# Patient Record
Sex: FEMALE | Race: WHITE | NOT HISPANIC OR LATINO | Employment: OTHER | URBAN - METROPOLITAN AREA
[De-identification: names, ages, dates, MRNs, and addresses within clinical notes are randomized per-mention and may not be internally consistent; named-entity substitution may affect disease eponyms.]

---

## 2017-01-25 ENCOUNTER — HOSPITAL ENCOUNTER (OUTPATIENT)
Dept: LAB | Facility: MEDICAL CENTER | Age: 67
End: 2017-01-25
Attending: NURSE PRACTITIONER
Payer: MEDICARE

## 2017-01-25 LAB
INR PPP: 1.48 (ref 0.87–1.13)
PROTHROMBIN TIME: 18.4 SEC (ref 12–14.6)

## 2017-01-25 PROCEDURE — 36415 COLL VENOUS BLD VENIPUNCTURE: CPT

## 2017-01-25 PROCEDURE — 85610 PROTHROMBIN TIME: CPT

## 2017-01-30 ENCOUNTER — HOSPITAL ENCOUNTER (OUTPATIENT)
Dept: LAB | Facility: MEDICAL CENTER | Age: 67
End: 2017-01-30
Attending: NURSE PRACTITIONER
Payer: MEDICARE

## 2017-01-30 ENCOUNTER — HOSPITAL ENCOUNTER (OUTPATIENT)
Dept: LAB | Facility: MEDICAL CENTER | Age: 67
End: 2017-01-30
Attending: PHYSICIAN ASSISTANT
Payer: MEDICARE

## 2017-01-30 LAB
ALBUMIN SERPL BCP-MCNC: 4.5 G/DL (ref 3.2–4.9)
ALBUMIN/GLOB SERPL: 1.1 G/DL
ALP SERPL-CCNC: 110 U/L (ref 30–99)
ALT SERPL-CCNC: 16 U/L (ref 2–50)
ANION GAP SERPL CALC-SCNC: 6 MMOL/L (ref 0–11.9)
AST SERPL-CCNC: 25 U/L (ref 12–45)
BASOPHILS # BLD AUTO: 0.03 K/UL (ref 0–0.12)
BASOPHILS NFR BLD AUTO: 0.5 % (ref 0–1.8)
BILIRUB SERPL-MCNC: 2 MG/DL (ref 0.1–1.5)
BUN SERPL-MCNC: 15 MG/DL (ref 8–22)
CALCIUM SERPL-MCNC: 10.3 MG/DL (ref 8.5–10.5)
CHLORIDE SERPL-SCNC: 98 MMOL/L (ref 96–112)
CHOLEST SERPL-MCNC: 236 MG/DL (ref 100–199)
CO2 SERPL-SCNC: 33 MMOL/L (ref 20–33)
CREAT SERPL-MCNC: 0.88 MG/DL (ref 0.5–1.4)
DIGOXIN SERPL-MCNC: 2.5 NG/ML (ref 0.8–2)
EOSINOPHIL # BLD: 0.24 K/UL (ref 0–0.51)
EOSINOPHIL NFR BLD AUTO: 3.9 % (ref 0–6.9)
ERYTHROCYTE [DISTWIDTH] IN BLOOD BY AUTOMATED COUNT: 54.5 FL (ref 35.9–50)
GLOBULIN SER CALC-MCNC: 4.2 G/DL (ref 1.9–3.5)
GLUCOSE SERPL-MCNC: 201 MG/DL (ref 65–99)
HCT VFR BLD AUTO: 45.3 % (ref 37–47)
HDLC SERPL-MCNC: 42 MG/DL
HGB BLD-MCNC: 14.1 G/DL (ref 12–16)
IMM GRANULOCYTES # BLD AUTO: 0.03 K/UL (ref 0–0.11)
IMM GRANULOCYTES NFR BLD AUTO: 0.5 % (ref 0–0.9)
INR PPP: 2.1 (ref 0.87–1.13)
LDLC SERPL CALC-MCNC: 146 MG/DL
LYMPHOCYTES # BLD: 0.98 K/UL (ref 1–4.8)
LYMPHOCYTES NFR BLD AUTO: 15.7 % (ref 22–41)
MCH RBC QN AUTO: 30.7 PG (ref 27–33)
MCHC RBC AUTO-ENTMCNC: 31.1 G/DL (ref 33.6–35)
MCV RBC AUTO: 98.5 FL (ref 81.4–97.8)
MONOCYTES # BLD: 0.49 K/UL (ref 0–0.85)
MONOCYTES NFR BLD AUTO: 7.9 % (ref 0–13.4)
NEUTROPHILS # BLD: 4.46 K/UL (ref 2–7.15)
NEUTROPHILS NFR BLD AUTO: 71.5 % (ref 44–72)
NRBC # BLD AUTO: 0 K/UL
NRBC BLD-RTO: 0 /100 WBC
PLATELET # BLD AUTO: 146 K/UL (ref 164–446)
PMV BLD AUTO: 12.7 FL (ref 9–12.9)
POTASSIUM SERPL-SCNC: 3.8 MMOL/L (ref 3.6–5.5)
PROT SERPL-MCNC: 8.7 G/DL (ref 6–8.2)
PROTHROMBIN TIME: 24.2 SEC (ref 12–14.6)
RBC # BLD AUTO: 4.6 M/UL (ref 4.2–5.4)
SODIUM SERPL-SCNC: 137 MMOL/L (ref 135–145)
T4 FREE SERPL-MCNC: 1.04 NG/DL (ref 0.53–1.43)
TRIGL SERPL-MCNC: 241 MG/DL (ref 0–149)
TSH SERPL DL<=0.005 MIU/L-ACNC: 2.99 UIU/ML (ref 0.3–3.7)
WBC # BLD AUTO: 6.2 K/UL (ref 4.8–10.8)

## 2017-01-30 PROCEDURE — 85610 PROTHROMBIN TIME: CPT

## 2017-01-30 PROCEDURE — 36415 COLL VENOUS BLD VENIPUNCTURE: CPT

## 2017-02-06 ENCOUNTER — HOSPITAL ENCOUNTER (OUTPATIENT)
Dept: LAB | Facility: MEDICAL CENTER | Age: 67
End: 2017-02-06
Attending: NURSE PRACTITIONER
Payer: MEDICARE

## 2017-02-06 LAB
INR PPP: 2.34 (ref 0.87–1.13)
PROTHROMBIN TIME: 26.4 SEC (ref 12–14.6)

## 2017-02-06 PROCEDURE — 36415 COLL VENOUS BLD VENIPUNCTURE: CPT

## 2017-02-06 PROCEDURE — 85610 PROTHROMBIN TIME: CPT

## 2017-02-13 ENCOUNTER — OFFICE VISIT (OUTPATIENT)
Dept: MEDICAL GROUP | Facility: PHYSICIAN GROUP | Age: 67
End: 2017-02-13
Payer: MEDICARE

## 2017-02-13 VITALS
TEMPERATURE: 98.8 F | BODY MASS INDEX: 35.17 KG/M2 | OXYGEN SATURATION: 94 % | RESPIRATION RATE: 16 BRPM | SYSTOLIC BLOOD PRESSURE: 136 MMHG | DIASTOLIC BLOOD PRESSURE: 84 MMHG | HEART RATE: 75 BPM | WEIGHT: 206 LBS | HEIGHT: 64 IN

## 2017-02-13 DIAGNOSIS — M25.561 RIGHT KNEE PAIN, UNSPECIFIED CHRONICITY: ICD-10-CM

## 2017-02-13 DIAGNOSIS — I48.91 ATRIAL FIBRILLATION, UNSPECIFIED TYPE (HCC): ICD-10-CM

## 2017-02-13 DIAGNOSIS — E11.8 TYPE 2 DIABETES MELLITUS WITH COMPLICATION, UNSPECIFIED LONG TERM INSULIN USE STATUS: ICD-10-CM

## 2017-02-13 DIAGNOSIS — J44.9 CHRONIC OBSTRUCTIVE PULMONARY DISEASE, UNSPECIFIED COPD TYPE (HCC): ICD-10-CM

## 2017-02-13 DIAGNOSIS — Z76.89 ENCOUNTER TO ESTABLISH CARE WITH NEW DOCTOR: ICD-10-CM

## 2017-02-13 DIAGNOSIS — Z79.01 ANTICOAGULATED ON WARFARIN: ICD-10-CM

## 2017-02-13 DIAGNOSIS — Z98.890 HISTORY OF MITRAL VALVE REPAIR: ICD-10-CM

## 2017-02-13 PROCEDURE — 1101F PT FALLS ASSESS-DOCD LE1/YR: CPT | Performed by: NURSE PRACTITIONER

## 2017-02-13 PROCEDURE — G8417 CALC BMI ABV UP PARAM F/U: HCPCS | Performed by: NURSE PRACTITIONER

## 2017-02-13 PROCEDURE — 1036F TOBACCO NON-USER: CPT | Performed by: NURSE PRACTITIONER

## 2017-02-13 PROCEDURE — 3046F HEMOGLOBIN A1C LEVEL >9.0%: CPT | Mod: 8P | Performed by: NURSE PRACTITIONER

## 2017-02-13 PROCEDURE — 3014F SCREEN MAMMO DOC REV: CPT | Mod: 8P | Performed by: NURSE PRACTITIONER

## 2017-02-13 PROCEDURE — 99204 OFFICE O/P NEW MOD 45 MIN: CPT | Performed by: NURSE PRACTITIONER

## 2017-02-13 PROCEDURE — G8432 DEP SCR NOT DOC, RNG: HCPCS | Performed by: NURSE PRACTITIONER

## 2017-02-13 PROCEDURE — 3017F COLORECTAL CA SCREEN DOC REV: CPT | Mod: 8P | Performed by: NURSE PRACTITIONER

## 2017-02-13 PROCEDURE — 4040F PNEUMOC VAC/ADMIN/RCVD: CPT | Mod: 8P | Performed by: NURSE PRACTITIONER

## 2017-02-13 PROCEDURE — G8484 FLU IMMUNIZE NO ADMIN: HCPCS | Performed by: NURSE PRACTITIONER

## 2017-02-13 RX ORDER — SPIRONOLACTONE 25 MG/1
25 TABLET ORAL
Status: ON HOLD | COMMUNITY
End: 2017-09-18

## 2017-02-13 RX ORDER — ALBUTEROL SULFATE 90 UG/1
2 AEROSOL, METERED RESPIRATORY (INHALATION) EVERY 4 HOURS PRN
COMMUNITY
End: 2017-06-25

## 2017-02-13 RX ORDER — CARVEDILOL 12.5 MG/1
12.5 TABLET ORAL 2 TIMES DAILY WITH MEALS
COMMUNITY
End: 2018-03-12

## 2017-02-13 RX ORDER — DIGOXIN 125 MCG
125 TABLET ORAL DAILY
COMMUNITY
End: 2022-05-18 | Stop reason: SDUPTHER

## 2017-02-13 RX ORDER — PNV NO.95/FERROUS FUM/FOLIC AC 28MG-0.8MG
1 TABLET ORAL DAILY
COMMUNITY
End: 2017-06-25

## 2017-02-13 RX ORDER — FUROSEMIDE 20 MG/1
40 TABLET ORAL
Status: ON HOLD | COMMUNITY
End: 2017-09-18

## 2017-02-13 RX ORDER — POTASSIUM CHLORIDE 750 MG/1
10 TABLET, EXTENDED RELEASE ORAL 2 TIMES DAILY
Status: ON HOLD | COMMUNITY
End: 2017-09-18

## 2017-02-13 ASSESSMENT — PATIENT HEALTH QUESTIONNAIRE - PHQ9: CLINICAL INTERPRETATION OF PHQ2 SCORE: 0

## 2017-02-13 NOTE — ASSESSMENT & PLAN NOTE
Chronic right knee pain, followed by Dr. Grimm.  Will have a total knee replacement when she can get it scheduled. May need something to help with pain at some point.  Discussed plan.

## 2017-02-13 NOTE — ASSESSMENT & PLAN NOTE
Was on Phen-Phen for years.  Wound up with mitral valve damage and mitral valve repair in [place], [place].  Is followed by Dr. Prabhakar.

## 2017-02-13 NOTE — PROGRESS NOTES
Chief Complaint   Patient presents with   • Establish Care       HISTORY OF PRESENT ILLNESS: Patient is a 66 y.o. female new patient who presents today to discuss the following issues:    History of mitral valve repair  Was on Phen-Phen for years.  Wound up with mitral valve damage and mitral valve repair in [place], [place].  Is followed by Dr. Prabhakar.    Atrial fibrillation (CMS-HCC)  Has a pacemaker, followed by cardiology.  Had a pacer check recently.    Right knee pain  Chronic right knee pain, followed by Dr. Grimm.  Will have a total knee replacement when she can get it scheduled. May need something to help with pain at some point.  Discussed plan.    Encounter to establish care with new doctor  Is here to establish with a new primary care provider.  Was previously seen by Dr. Crow.      Chronic obstructive pulmonary disease (CMS-HCC)  Diagnosed with COPD.  On O2.  Followed by Dr. Balderas.    Type 2 diabetes mellitus with complication (CMS-HCC)  Was recently told that she has diabetes.  Would like a referral to a dietician to start managing her diabetes better.  She has had a difficult time with dietary choices because she is on Coumadin and dietary changes often mess with her INR.      Anticoagulated on warfarin  Needs a referral to our Coumadin Clinic.  She is currently managed by [facility].        Patient Active Problem List    Diagnosis Date Noted   • Encounter to establish care with new doctor 02/13/2017   • History of mitral valve repair 02/13/2017   • Chronic obstructive pulmonary disease (CMS-HCC) 02/13/2017   • Atrial fibrillation (CMS-HCC) 02/13/2017   • Right knee pain 02/13/2017   • Type 2 diabetes mellitus with complication (CMS-HCC) 02/13/2017   • Anticoagulated on warfarin 02/13/2017       Allergies:Amlodipine besylate-valsartan; Amoxicillin; Etodolac; Lisinopril; and Other drug    Current Outpatient Prescriptions   Medication Sig Dispense Refill   • carvedilol (COREG) 12.5 MG Tab Take 12.5  mg by mouth 2 times a day, with meals.     • MD ALERT... warfarin (COUMADIN) by Other route as needed.     • furosemide (LASIX) 20 MG Tab Take 20 mg by mouth 2 times a day.     • potassium chloride SA (K-DUR) 10 MEQ Tab CR Take 10 mEq by mouth 2 times a day.     • spironolactone (ALDACTONE) 25 MG Tab Take 25 mg by mouth 1 time daily as needed.     • digoxin (LANOXIN) 125 MCG Tab Take 125 mcg by mouth every day.     • albuterol (VENTOLIN HFA) 108 (90 BASE) MCG/ACT Aero Soln inhalation aerosol Inhale 2 Puffs by mouth every four hours as needed for Shortness of Breath.     • Umeclidinium-Vilanterol (ANORO ELLIPTA) 62.5-25 MCG/INH AEROSOL POWDER, BREATH ACTIVATED Inhale 1 Puff by mouth every day.     • vitamin D (CHOLECALCIFEROL) 1000 UNIT Tab Take 1,000 Units by mouth every day.     • Zinc 50 MG Cap Take 50 mg by mouth every day.     • Ferrous Sulfate (IRON) 325 (65 FE) MG Tab Take 1 Tab by mouth every day.       No current facility-administered medications for this visit.       Social History   Substance Use Topics   • Smoking status: Never Smoker    • Smokeless tobacco: Never Used   • Alcohol Use: No       No family status information on file.   History reviewed. No pertinent family history.    Review of Systems:   Constitutional: Negative for fever, chills, weight loss and malaise/fatigue.   HENT: Negative for ear pain, nosebleeds, congestion, sore throat and neck pain.    Eyes: Negative for blurred vision.   Respiratory: Negative for cough, sputum production, shortness of breath and wheezing. Positive for COPD on O2.  Cardiovascular: Negative for chest pain, palpitations, orthopnea and leg swelling. Positive for A-fib with pacemaker.  Gastrointestinal: Negative for heartburn, nausea, vomiting and abdominal pain.   Genitourinary: Negative for dysuria, urgency and frequency.   Musculoskeletal: Negative for myalgias, joint pain, and back pain. Positive for chronic right knee pain.  Skin: Negative for rash and itching.  "  Neurological: Negative for dizziness, tingling, tremors, sensory change, focal weakness and headaches.   Endo/Heme/Allergies: Does not bruise/bleed easily.   Psychiatric/Behavioral: Negative for depression, suicidal ideas and memory loss.  The patient is not nervous/anxious and does not have insomnia.    All other systems reviewed and are negative except as in HPI.    Exam:  Blood pressure 136/84, pulse 75, temperature 37.1 °C (98.8 °F), resp. rate 16, height 1.626 m (5' 4.02\"), weight 93.441 kg (206 lb), SpO2 94 %.  General:  Well nourished, well developed female in NAD  Head: Grossly normal.  Neck: Supple without JVD or bruit. Thyroid is not enlarged.  Pulmonary: Clear to ausculation. Normal effort. No rales, ronchi, or wheezing. On O2.  Cardiovascular: Regular rate and rhythm without murmur.   Extremities: No clubbing, cyanosis, or edema.  Skin: Intact with no obvious rashes or lesions.  Neuro: Grossly intact.  Psych: Alert and oriented x 3.  Mood and affect appropriate.    Medical decision-making and discussion: Salma is here to establish with a new primary care provider.  We reviewed her past medical history and discussed her current medications.  Referrals for a Dietician and the Coumadin Clinic were submitted.  She will sign a records release for her previous provider, she will sign up with [facility], and she will plan to follow-up here as needed.         Assessment/Plan:  1. Encounter to establish care with new doctor     2. Chronic obstructive pulmonary disease, unspecified COPD type (CMS-HCC)     3. History of mitral valve repair     4. Atrial fibrillation, unspecified type (CMS-HCC)  REFERRAL TO ANTICOAGULATION MONITORING   5. Type 2 diabetes mellitus with complication, unspecified long term insulin use status (HCC)  REFERRAL TO NUTRITION SERVICES   6. Anticoagulated on warfarin  REFERRAL TO NUTRITION SERVICES    REFERRAL TO ANTICOAGULATION MONITORING   7. Right knee pain, unspecified chronicity   "       Return if symptoms worsen or fail to improve.    Please note that this dictation was created using voice recognition software. I have made every reasonable attempt to correct obvious errors, but I expect that there are errors of grammar and possibly content that I did not discover before finalizing the note.

## 2017-02-13 NOTE — ASSESSMENT & PLAN NOTE
Was recently told that she has diabetes.  Would like a referral to a dietician to start managing her diabetes better.  She has had a difficult time with dietary choices because she is on Coumadin and dietary changes often mess with her INR.

## 2017-02-13 NOTE — MR AVS SNAPSHOT
"        Salma Lees   2017 9:20 AM   Office Visit   MRN: [id number]    Department:  [facility]   Dept Phone:  [phone]    Description:  Female : 1950   Provider:  LILIYA De La Rosa           Reason for Visit     Establish Care           Allergies as of 2017     Allergen Noted Reactions    Amlodipine Besylate-Valsartan 2017       Amoxicillin 2017       Etodolac 2017       Lisinopril 2017       Other Drug 2017       metal      You were diagnosed with     Encounter to establish care with new doctor   [[id number]]       Chronic obstructive pulmonary disease, unspecified COPD type (CMS-HCC)   [7826139]       History of mitral valve repair   [874204]       Atrial fibrillation, unspecified type (CMS-HCC)   [3037234]       Type 2 diabetes mellitus with complication, unspecified long term insulin use status (CMS-HCC)   [2939451]       Anticoagulated on warfarin   [846469]       Right knee pain, unspecified chronicity   [1903074]         Vital Signs     Blood Pressure Pulse Temperature Respirations Height Weight    136/84 mmHg 75 37.1 °C (98.8 °F) 16 1.626 m (5' 4.02\") 93.441 kg (206 lb)    Body Mass Index Oxygen Saturation Smoking Status             35.34 kg/m2 94% Never Smoker          Basic Information     Date Of Birth Sex Race Ethnicity Preferred Language    1950 Female White Non- English      Problem List              ICD-10-CM Priority Class Noted - Resolved    Encounter to establish care with new doctor Z71.89   2017 - Present    History of mitral valve repair Z98.890   2017 - Present    Chronic obstructive pulmonary disease (CMS-HCC) J44.9   2017 - Present    Atrial fibrillation (CMS-HCC) I48.91   2017 - Present    Right knee pain M25.561   2017 - Present      Health Maintenance     Patient has no pending health maintenance at this time      Current Immunizations     No immunizations on file.      Below " and/or attached are the medications your provider expects you to take. Review all of your home medications and newly ordered medications with your provider and/or pharmacist. Follow medication instructions as directed by your provider and/or pharmacist. Please keep your medication list with you and share with your provider. Update the information when medications are discontinued, doses are changed, or new medications (including over-the-counter products) are added; and carry medication information at all times in the event of emergency situations     Allergies:  AMLODIPINE BESYLATE-VALSARTAN - (reactions not documented)     AMOXICILLIN - (reactions not documented)     ETODOLAC - (reactions not documented)     LISINOPRIL - (reactions not documented)     OTHER DRUG - (reactions not documented)               Medications  Valid as of: February 13, 2017 -  9:21 AM    Generic Name Brand Name Tablet Size Instructions for use    .                 Medicines prescribed today were sent to:     None      Medication refill instructions:       If your prescription bottle indicates you have medication refills left, it is not necessary to call your provider’s office. Please contact your pharmacy and they will refill your medication.    If your prescription bottle indicates you do not have any refills left, you may request refills at any time through one of the following ways: The online [employer] system (except Urgent Care), by calling your provider’s office, or by asking your pharmacy to contact your provider’s office with a refill request. Medication refills are processed only during regular business hours and may not be available until the next business day. Your provider may request additional information or to have a follow-up visit with you prior to refilling your medication.   *Please Note: Medication refills are assigned a new Rx number when refilled electronically. Your pharmacy may indicate that no refills were authorized  even though a new prescription for the same medication is available at the pharmacy. Please request the medicine by name with the pharmacy before contacting your provider for a refill.        Referral     A referral request has been sent to our patient care coordination department. Please allow 3-5 business days for us to process this request and contact you either by phone or mail. If you do not hear from us by the 5th business day, please call us at [phone].           6Scan Access Code: Activation code not generated  Current 6Scan Status: Active

## 2017-02-16 ENCOUNTER — TELEPHONE (OUTPATIENT)
Dept: VASCULAR LAB | Facility: MEDICAL CENTER | Age: 67
End: 2017-02-16

## 2017-02-17 RX ORDER — FUROSEMIDE 20 MG/1
20 TABLET ORAL 2 TIMES DAILY
Qty: 60 TAB | Refills: 5 | Status: SHIPPED | OUTPATIENT
Start: 2017-02-17 | End: 2017-06-25

## 2017-02-17 NOTE — TELEPHONE ENCOUNTER
Zeferino- I know pt is new to you but medication history states pt is on furosemide 20mg bid and pharmacy is requesting furosemide 20mg 2tabs qd. Please clarify which sig you would like pt to use.

## 2017-02-24 ENCOUNTER — ANTICOAGULATION VISIT (OUTPATIENT)
Dept: MEDICAL GROUP | Facility: PHYSICIAN GROUP | Age: 67
End: 2017-02-24
Payer: MEDICARE

## 2017-02-24 DIAGNOSIS — Z79.01 CHRONIC ANTICOAGULATION: ICD-10-CM

## 2017-02-24 DIAGNOSIS — I48.91 ATRIAL FIBRILLATION, UNSPECIFIED TYPE (HCC): ICD-10-CM

## 2017-02-24 DIAGNOSIS — Z98.890 HISTORY OF MITRAL VALVE REPAIR: ICD-10-CM

## 2017-02-24 LAB — INR PPP: 2.5 (ref 2–3.5)

## 2017-02-24 PROCEDURE — 4040F PNEUMOC VAC/ADMIN/RCVD: CPT | Mod: 8P | Performed by: FAMILY MEDICINE

## 2017-02-24 PROCEDURE — 85610 PROTHROMBIN TIME: CPT | Performed by: FAMILY MEDICINE

## 2017-02-24 PROCEDURE — G8417 CALC BMI ABV UP PARAM F/U: HCPCS | Performed by: FAMILY MEDICINE

## 2017-02-24 PROCEDURE — 99211 OFF/OP EST MAY X REQ PHY/QHP: CPT | Performed by: FAMILY MEDICINE

## 2017-02-24 NOTE — MR AVS SNAPSHOT
Salma Lees   2017 11:15 AM   Anticoagulation Visit   MRN: [id number]    Department:  [facility]   Dept Phone:  [phone]    Description:  Female : 1950   Provider:  Kenneth Salas, PHARMD           Allergies as of 2017     Allergen Noted Reactions    Amlodipine Besylate-Valsartan 2017       Amoxicillin 2017       Etodolac 2017       Lisinopril 2017       Other Drug 2017       metal      You were diagnosed with     Atrial fibrillation, unspecified type (CMS-HCC)   [7418814]         Vital Signs     Smoking Status                   Never Smoker            Basic Information     Date Of Birth Sex Race Ethnicity Preferred Language    1950 Female White Non- English      Your appointments     Mar 10, 2017 11:30 AM   Anti-Coag Routine with [place] PHARMACIST   [facility] [address] [address]   [phone]              Problem List              ICD-10-CM Priority Class Noted - Resolved    Encounter to establish care with new doctor Z71.89   2017 - Present    History of mitral valve repair Z98.890   2017 - Present    Chronic obstructive pulmonary disease (CMS-HCC) J44.9   2017 - Present    Atrial fibrillation (CMS-HCC) I48.91   2017 - Present    Right knee pain M25.561   2017 - Present    Type 2 diabetes mellitus with complication (CMS-HCC) E11.8   2017 - Present    Anticoagulated on warfarin Z79.01   2017 - Present      Health Maintenance        Date Due Completion Dates    A1C SCREENING 1950 ---    DIABETES MONOFILAMENT / LE EXAM 1950 ---    RETINAL SCREENING 1967 ---    URINE ACR / MICROALBUMIN 1967 ---    IMM DTaP/Tdap/Td Vaccine (1 - Tdap) 1968 ---    PAP SMEAR 1970 ---    MAMMOGRAM 1989 ---    COLONOSCOPY 1999 ---    IMM ZOSTER VACCINE 2009 ---    BONE DENSITY 2014 ---    IMM PNEUMOCOCCAL 65+  (ADULT) LOW/MEDIUM RISK SERIES (1 of 2 - PCV13) 2/16/2014 ---    IMM INFLUENZA (1) 9/1/2016 ---    FASTING LIPID PROFILE 1/30/2018 1/30/2017    SERUM CREATININE 1/30/2018 1/30/2017            Results     POCT Protime      Component    INR    2.5    Comment:     144 580-11 1/2018 ic valid                        Current Immunizations     No immunizations on file.      Below and/or attached are the medications your provider expects you to take. Review all of your home medications and newly ordered medications with your provider and/or pharmacist. Follow medication instructions as directed by your provider and/or pharmacist. Please keep your medication list with you and share with your provider. Update the information when medications are discontinued, doses are changed, or new medications (including over-the-counter products) are added; and carry medication information at all times in the event of emergency situations     Allergies:  AMLODIPINE BESYLATE-VALSARTAN - (reactions not documented)     AMOXICILLIN - (reactions not documented)     ETODOLAC - (reactions not documented)     LISINOPRIL - (reactions not documented)     OTHER DRUG - (reactions not documented)               Medications  Valid as of: February 24, 2017 - 11:35 AM    Generic Name Brand Name Tablet Size Instructions for use    Albuterol Sulfate (Aero Soln) albuterol 108 (90 BASE) MCG/ACT Inhale 2 Puffs by mouth every four hours as needed for Shortness of Breath.        Carvedilol (Tab) COREG 12.5 MG Take 12.5 mg by mouth 2 times a day, with meals.        Cholecalciferol (Tab) cholecalciferol 1000 UNIT Take 1,000 Units by mouth every day.        Digoxin (Tab) LANOXIN 125 MCG Take 125 mcg by mouth every day.        Ferrous Sulfate (Tab) Iron 325 (65 FE) MG Take 1 Tab by mouth every day.        Furosemide (Tab) LASIX 20 MG Take 20 mg by mouth 2 times a day.        Furosemide (Tab) LASIX 20 MG Take 1 Tab by mouth 2 times a day.        MD ALERT... warfarin  (COUMADIN) COUMADIN  by Other route as needed.        Potassium Chloride Darline CR (Tab CR) K-DUR 10 MEQ Take 10 mEq by mouth 2 times a day.        Spironolactone (Tab) ALDACTONE 25 MG Take 25 mg by mouth 1 time daily as needed.        Umeclidinium-Vilanterol (AEROSOL POWDER, BREATH ACTIVATED) Umeclidinium-Vilanterol 62.5-25 MCG/INH Inhale 1 Puff by mouth every day.        Zinc (Cap) Zinc 50 MG Take 50 mg by mouth every day.        .                 Medicines prescribed today were sent to:     [id number]  [place], [place] - [address] AT [facility] [address] [facility]    [address] [place] [address]    Phone: [phone] Fax: [phone]    Open 24 Hours?: No      Medication refill instructions:       If your prescription bottle indicates you have medication refills left, it is not necessary to call your provider’s office. Please contact your pharmacy and they will refill your medication.    If your prescription bottle indicates you do not have any refills left, you may request refills at any time through one of the following ways: The online [employer] system (except Urgent Care), by calling your provider’s office, or by asking your pharmacy to contact your provider’s office with a refill request. Medication refills are processed only during regular business hours and may not be available until the next business day. Your provider may request additional information or to have a follow-up visit with you prior to refilling your medication.   *Please Note: Medication refills are assigned a new Rx number when refilled electronically. Your pharmacy may indicate that no refills were authorized even though a new prescription for the same medication is available at the pharmacy. Please request the medicine by name with the pharmacy before contacting your provider for a refill.        Warfarin Dosing Calendar   February 2017 Details    Sun Mon Tue Wed Thu Fri Sat        1               2               3                 4                 5               6               7               8               9               10               11                 12               13               14               15               16               17               18                 19               20               21               22               23               24   2.5   10 mg   See details      25      12.5 mg           26      10 mg         27      10 mg         28      10 mg              Date Details   02/24 This INR check   INR: 2.5   144 580-11 1/2018 ic valid               How to take your warfarin dose     To take:  10 mg Take 2 of the 5 mg tablets.    To take:  12.5 mg Take 2.5 of the 5 mg tablets.           Warfarin Dosing Calendar   March 2017 Details    Sun Mon Tue Wed Thu Fri Sat        1      12.5 mg         2      10 mg         3      10 mg         4      12.5 mg           5      10 mg         6      10 mg         7      10 mg         8      12.5 mg         9      10 mg         10      10 mg         11                 12               13               14               15               16               17               18                 19               20               21               22               23               24               25                 26               27               28               29               30               31                 Date Details   No additional details    Date of next INR:  3/10/2017         How to take your warfarin dose     To take:  10 mg Take 2 of the 5 mg tablets.    To take:  12.5 mg Take 2.5 of the 5 mg tablets.              Close Access Code: Activation code not generated  Current Close Status: Active

## 2017-02-24 NOTE — PROGRESS NOTES
Anticoagulation Summary as of 2/24/2017     INR goal 2.0-3.0   Selected INR 2.5 (2/24/2017)   Maintenance plan 12.5 mg (5 mg x 2.5) on Wed, Sat; 10 mg (5 mg x 2) all other days   Weekly total 75 mg   Plan last modified Kenneth Salas, PHARMD (2/24/2017)   Next INR check 3/10/2017   Target end date Indefinite    Indications   Chronic anticoagulation [Z79.01]  Atrial fibrillation (CMS-HCC) [I48.91]  History of mitral valve repair [Z98.890]         Anticoagulation Episode Summary     INR check location     Preferred lab     Send INR reminders to     Comments       Anticoagulation Care Providers     Provider Role Specialty Phone number    Zeferino Duarte, A.P.N. Referring Family Medicine [phone]    [facility] Responsible  [phone]    Kenneth Salas, PHARMD Responsible          Current Outpatient Prescriptions on File Prior to Visit   Medication Sig Dispense Refill   • furosemide (LASIX) 20 MG Tab Take 1 Tab by mouth 2 times a day. 60 Tab 5   • carvedilol (COREG) 12.5 MG Tab Take 12.5 mg by mouth 2 times a day, with meals.     • MD ALERT... warfarin (COUMADIN) by Other route as needed.     • furosemide (LASIX) 20 MG Tab Take 20 mg by mouth 2 times a day.     • potassium chloride SA (K-DUR) 10 MEQ Tab CR Take 10 mEq by mouth 2 times a day.     • spironolactone (ALDACTONE) 25 MG Tab Take 25 mg by mouth 1 time daily as needed.     • digoxin (LANOXIN) 125 MCG Tab Take 125 mcg by mouth every day.     • albuterol (VENTOLIN HFA) 108 (90 BASE) MCG/ACT Aero Soln inhalation aerosol Inhale 2 Puffs by mouth every four hours as needed for Shortness of Breath.     • Umeclidinium-Vilanterol (ANORO ELLIPTA) 62.5-25 MCG/INH AEROSOL POWDER, BREATH ACTIVATED Inhale 1 Puff by mouth every day.     • vitamin D (CHOLECALCIFEROL) 1000 UNIT Tab Take 1,000 Units by mouth every day.     • Zinc 50 MG Cap Take 50 mg by mouth every day.     • Ferrous Sulfate (IRON) 325 (65 FE) MG Tab Take 1 Tab by mouth every day.        No current facility-administered medications on file prior to visit.     Lab Results   Component Value Date/Time    SODIUM 137 01/30/2017 02:00 PM    POTASSIUM 3.8 01/30/2017 02:00 PM    CHLORIDE 98 01/30/2017 02:00 PM    CO2 33 01/30/2017 02:00 PM    GLUCOSE 201* 01/30/2017 02:00 PM    BUN 15 01/30/2017 02:00 PM    CREATININE 0.88 01/30/2017 02:00 PM      Past Medical History   Diagnosis Date   • A-fib (CMS-HCC)    • COPD (chronic obstructive pulmonary disease) (CMS-HCC)      Anticoagulation Patient Findings    Pt is not new to warfarin, but new to [facility].  Discussed indication for warfarin therapy and INR goal range. Explained our services, hours of operation, warfarin therapy, potential SE, potential DI.. Discussed diet at length, with an emphasis on foods rich in vitamin K.  Discussed monitoring parameters, such as blood in urine, blood in stool, discussed what to do if a dose is missed, or suspected as missed.  Emphasized importance of compliance.  Discussed lifestyle choices of ETOH & smoking and its impact on therapy.    Patient previously followed by [facility].  Cardiologist is Dr. Prabhakar.  History of atrial fibrillation and mitral valve repair.  [id number] = at least 3 (age, female, DM)  Patient denies history of VTE or stroke.  Verified current warfarin regimen.  Med rec completed.  She is newly diagnosed diabetic and is making changes to diet, especially VitK intake.  INR's have been stable for the past six weeks.  INR therapeutic today at 2.5.  Pt is to continue with current warfarin dosing regimen.  Follow up in 2 weeks.    Kenneth Salas, PHARMD    CC Dr Michael Bloch

## 2017-02-27 ENCOUNTER — TELEPHONE (OUTPATIENT)
Dept: VASCULAR LAB | Facility: MEDICAL CENTER | Age: 67
End: 2017-02-27

## 2017-02-28 PROBLEM — Z79.01 CHRONIC ANTICOAGULATION: Status: ACTIVE | Noted: 2017-02-28

## 2017-02-28 NOTE — TELEPHONE ENCOUNTER
Initial anticoagulation note and most recent cards note reviewed.    Pending any further recomendations we will continue with indefinite anticoagulation with warfairn for afib with chads-vasc at least 3 and h/o previous mitral valve repair.    Will defer management of rhythm, rate, valvular heart disease and other CV issues to cards and PCP    Michael Bloch, MD  Anticoagulation Clinic    Cc:  Dr. Aki Duarte

## 2017-03-06 ENCOUNTER — TELEPHONE (OUTPATIENT)
Dept: MEDICAL GROUP | Facility: PHYSICIAN GROUP | Age: 67
End: 2017-03-06

## 2017-03-06 DIAGNOSIS — J44.9 CHRONIC OBSTRUCTIVE PULMONARY DISEASE, UNSPECIFIED COPD TYPE (HCC): ICD-10-CM

## 2017-03-07 NOTE — TELEPHONE ENCOUNTER
Please sign the pended order for the referral to pulmonology.  The patient needs an updated referral.  Thank you.

## 2017-03-07 NOTE — TELEPHONE ENCOUNTER
----- Message from Warren Howell sent at 3/6/2017  4:08 PM PST -----  Regarding: FW: Referral Request  Contact: [phone]      ----- Message -----     From: Salma Lees     Sent: 3/6/2017   1:25 PM       To: [facility]  Subject: Referral Request                                 Salma Lees would like to request a referral.  Reason: replacing [facility]'s physician  Requested provider: pulmonologist  Comment:  I have been a patient of Dr. Andrea Balderas for over two years.  I changed my insurance and must  see a pulmonologist under [facility].

## 2017-03-10 ENCOUNTER — ANTICOAGULATION VISIT (OUTPATIENT)
Dept: MEDICAL GROUP | Facility: PHYSICIAN GROUP | Age: 67
End: 2017-03-10
Payer: MEDICARE

## 2017-03-10 DIAGNOSIS — I48.91 ATRIAL FIBRILLATION, UNSPECIFIED TYPE (HCC): ICD-10-CM

## 2017-03-10 DIAGNOSIS — Z79.01 CHRONIC ANTICOAGULATION: ICD-10-CM

## 2017-03-10 DIAGNOSIS — Z98.890 HISTORY OF MITRAL VALVE REPAIR: ICD-10-CM

## 2017-03-10 LAB — INR PPP: 1.7 (ref 2–3.5)

## 2017-03-10 PROCEDURE — 4040F PNEUMOC VAC/ADMIN/RCVD: CPT | Mod: 8P | Performed by: NURSE PRACTITIONER

## 2017-03-10 PROCEDURE — 99999 PR NO CHARGE: CPT | Performed by: NURSE PRACTITIONER

## 2017-03-10 PROCEDURE — 85610 PROTHROMBIN TIME: CPT | Performed by: NURSE PRACTITIONER

## 2017-03-10 PROCEDURE — G8417 CALC BMI ABV UP PARAM F/U: HCPCS | Performed by: NURSE PRACTITIONER

## 2017-03-10 NOTE — MR AVS SNAPSHOT
Salma Lees   3/10/2017 11:30 AM   Anticoagulation Visit   MRN: [id number]    Department:  [facility]   Dept Phone:  [phone]    Description:  Female : 1950   Provider:  Kenneth Salas PHARMD           Allergies as of 3/10/2017     Allergen Noted Reactions    Amlodipine Besylate-Valsartan 2017       Amoxicillin 2017       Etodolac 2017       Lisinopril 2017       Other Drug 2017       metal      You were diagnosed with     Chronic anticoagulation   [440197]       Atrial fibrillation, unspecified type (CMS-[facility])   [9724548]       History of mitral valve repair   [949805]         Vital Signs     Smoking Status                   Never Smoker            Basic Information     Date Of Birth Sex Race Ethnicity Preferred Language    1950 Female White Non- English      Your appointments     Mar 10, 2017 11:30 AM   Anti-Coag Routine with Kenneth Salas PHARMD   [facility]    [address] [facility] [id number] [phone]            Mar 24, 2017 11:15 AM   Anti-Coag Routine with [place] PHARMACIST   [facility]    [address] [facility] [id number] [phone]              Problem List              ICD-10-CM Priority Class Noted - Resolved    Encounter to establish care with new doctor Z71.89   2017 - Present    History of mitral valve repair Z98.890   2017 - Present    Chronic obstructive pulmonary disease (CMS-[facility]) J44.9   2017 - Present    Atrial fibrillation (CMS-[facility]) I48.91   2017 - Present    Right knee pain M25.561   2017 - Present    Type 2 diabetes mellitus with complication (CMS-[facility]) E11.8   2017 - Present    Anticoagulated on warfarin Z79.01   2017 - Present    Chronic anticoagulation Z79.01   2017 - Present      Health Maintenance        Date Due Completion Dates    A1C SCREENING 1950 ---    DIABETES MONOFILAMENT  / LE EXAM 1950 ---    RETINAL SCREENING 2/16/1967 ---    URINE ACR / MICROALBUMIN 2/16/1967 ---    IMM DTaP/Tdap/Td Vaccine (1 - Tdap) 2/16/1968 ---    PAP SMEAR 2/16/1970 ---    MAMMOGRAM 2/16/1989 ---    COLONOSCOPY 2/16/1999 ---    IMM ZOSTER VACCINE 2/16/2009 ---    BONE DENSITY 2/16/2014 ---    IMM PNEUMOCOCCAL 65+ (ADULT) LOW/MEDIUM RISK SERIES (1 of 2 - PCV13) 2/16/2014 ---    IMM INFLUENZA (1) 9/1/2016 ---    FASTING LIPID PROFILE 1/30/2018 1/30/2017    SERUM CREATININE 1/30/2018 1/30/2017            Results     POCT Protime      Component    INR    1.7    Comment:     144 580-11 1/2018 ic valid                        Current Immunizations     No immunizations on file.      Below and/or attached are the medications your provider expects you to take. Review all of your home medications and newly ordered medications with your provider and/or pharmacist. Follow medication instructions as directed by your provider and/or pharmacist. Please keep your medication list with you and share with your provider. Update the information when medications are discontinued, doses are changed, or new medications (including over-the-counter products) are added; and carry medication information at all times in the event of emergency situations     Allergies:  AMLODIPINE BESYLATE-VALSARTAN - (reactions not documented)     AMOXICILLIN - (reactions not documented)     ETODOLAC - (reactions not documented)     LISINOPRIL - (reactions not documented)     OTHER DRUG - (reactions not documented)               Medications  Valid as of: March 10, 2017 - 11:25 AM    Generic Name Brand Name Tablet Size Instructions for use    Albuterol Sulfate (Aero Soln) albuterol 108 (90 BASE) MCG/ACT Inhale 2 Puffs by mouth every four hours as needed for Shortness of Breath.        Carvedilol (Tab) COREG 12.5 MG Take 12.5 mg by mouth 2 times a day, with meals.        Cholecalciferol (Tab) cholecalciferol 1000 UNIT Take 1,000 Units by mouth every  day.        Digoxin (Tab) LANOXIN 125 MCG Take 125 mcg by mouth every day.        Ferrous Sulfate (Tab) Iron 325 (65 FE) MG Take 1 Tab by mouth every day.        Furosemide (Tab) LASIX 20 MG Take 20 mg by mouth 2 times a day.        Furosemide (Tab) LASIX 20 MG Take 1 Tab by mouth 2 times a day.        MD ALERT... warfarin (COUMADIN) COUMADIN  by Other route as needed.        Potassium Chloride Darline CR (Tab CR) K-DUR 10 MEQ Take 10 mEq by mouth 2 times a day.        Spironolactone (Tab) ALDACTONE 25 MG Take 25 mg by mouth 1 time daily as needed.        Umeclidinium-Vilanterol (AEROSOL POWDER, BREATH ACTIVATED) Umeclidinium-Vilanterol 62.5-25 MCG/INH Inhale 1 Puff by mouth every day.        Zinc (Cap) Zinc 50 MG Take 50 mg by mouth every day.        .                 Medicines prescribed today were sent to:     [facility] [address] [place] [address] [facility] [address] [address]    Phone: [phone] Fax: [phone]    Open 24 Hours?: No      Medication refill instructions:       If your prescription bottle indicates you have medication refills left, it is not necessary to call your provider’s office. Please contact your pharmacy and they will refill your medication.    If your prescription bottle indicates you do not have any refills left, you may request refills at any time through one of the following ways: The online [employer] system (except Urgent Care), by calling your provider’s office, or by asking your pharmacy to contact your provider’s office with a refill request. Medication refills are processed only during regular business hours and may not be available until the next business day. Your provider may request additional information or to have a follow-up visit with you prior to refilling your medication.   *Please Note: Medication refills are assigned a new Rx number when refilled electronically. Your pharmacy may indicate that no refills were  authorized even though a new prescription for the same medication is available at the pharmacy. Please request the medicine by name with the pharmacy before contacting your provider for a refill.        Warfarin Dosing Calendar   March 2017 Details    Sun Mon Tue Wed Thu Fri Sat        1               2               3               4                 5               6               7               8               9               10   1.7   12.5 mg   See details      11      12.5 mg           12      10 mg         13      10 mg         14      10 mg         15      12.5 mg         16      10 mg         17      10 mg         18      12.5 mg           19      10 mg         20      10 mg         21      10 mg         22      12.5 mg         23      10 mg         24      10 mg         25                 26               27               28               29               30               31                 Date Details   03/10 This INR check   INR: 1.7   144 580-11 1/2018 ic valid       Date of next INR:  3/24/2017         How to take your warfarin dose     To take:  10 mg Take 2 of the 5 mg tablets.    To take:  12.5 mg Take 2.5 of the 5 mg tablets.              [employer] Access Code: Activation code not generated  Current [employer] Status: Active

## 2017-03-10 NOTE — PROGRESS NOTES
Anticoagulation Summary as of 3/10/2017     INR goal 2.0-3.0   Selected INR 1.7! (3/10/2017)   Maintenance plan 12.5 mg (5 mg x 2.5) on Wed, Sat; 10 mg (5 mg x 2) all other days   Weekly total 75 mg   Plan last modified Kenneth Salas PHARMD (2/24/2017)   Next INR check 3/24/2017   Target end date Indefinite    Indications   Chronic anticoagulation [Z79.01]  Atrial fibrillation (CMS-[facility]) [I48.91]  History of mitral valve repair [Z98.890]         Anticoagulation Episode Summary     INR check location     Preferred lab     Send INR reminders to     Comments       Anticoagulation Care Providers     Provider Role Specialty Phone number    Zeferino Duarte, A.P.N. Referring Family Medicine [phone]    [facility] Responsible  [phone]    Kenneth Salas, MELE Responsible          Anticoagulation Patient Findings   Positives Missed Doses    Negatives Extra Doses, Medication Changes, Antibiotic Use, Diet Changes, Dental/Other Procedures, Hospitalization, Bleeding Gums, Nose Bleeds, Blood in Urine, Blood in Stool, Any Bruising, Other Complaints        Patient is subtherapeutic today with INR of 1.7.  Pt denies any unusual s/s of bleeding, bruising, clotting or any changes to diet or medications.  She missed dose two days ago, which has lead to low INR.  Instructed her to bolus with 12.5mg X 1, then continue with current warfarin dosing regimen.  Follow up in 2 weeks.    Kenneth Salas, MELE

## 2017-03-24 ENCOUNTER — ANTICOAGULATION VISIT (OUTPATIENT)
Dept: MEDICAL GROUP | Facility: PHYSICIAN GROUP | Age: 67
End: 2017-03-24
Payer: MEDICARE

## 2017-03-24 VITALS — SYSTOLIC BLOOD PRESSURE: 106 MMHG | HEART RATE: 69 BPM | DIASTOLIC BLOOD PRESSURE: 60 MMHG

## 2017-03-24 DIAGNOSIS — Z79.01 CHRONIC ANTICOAGULATION: ICD-10-CM

## 2017-03-24 DIAGNOSIS — Z98.890 HISTORY OF MITRAL VALVE REPAIR: ICD-10-CM

## 2017-03-24 DIAGNOSIS — I48.91 ATRIAL FIBRILLATION, UNSPECIFIED TYPE (HCC): ICD-10-CM

## 2017-03-24 LAB — INR PPP: 2.7 (ref 2–3.5)

## 2017-03-24 PROCEDURE — 99999 PR NO CHARGE: CPT | Performed by: FAMILY MEDICINE

## 2017-03-24 PROCEDURE — 4040F PNEUMOC VAC/ADMIN/RCVD: CPT | Mod: 8P | Performed by: FAMILY MEDICINE

## 2017-03-24 PROCEDURE — G8417 CALC BMI ABV UP PARAM F/U: HCPCS | Performed by: FAMILY MEDICINE

## 2017-03-24 PROCEDURE — 85610 PROTHROMBIN TIME: CPT | Performed by: FAMILY MEDICINE

## 2017-03-24 NOTE — MR AVS SNAPSHOT
Salma Lees   3/24/2017 11:15 AM   Anticoagulation Visit   MRN: [id number]    Department:  [facility]   Dept Phone:  [phone]    Description:  Female : 1950   Provider:  Kenneth Salas, YVONNED           Allergies as of 3/24/2017     Allergen Noted Reactions    Amlodipine Besylate-Valsartan 2017       Amoxicillin 2017       Etodolac 2017       Lisinopril 2017       Other Drug 2017       metal      You were diagnosed with     Chronic anticoagulation   [519900]       Atrial fibrillation, unspecified type (CMS-[facility])   [2022015]       History of mitral valve repair   [339469]         Vital Signs     Blood Pressure Pulse Smoking Status             106/60 mmHg 69 Never Smoker          Basic Information     Date Of Birth Sex Race Ethnicity Preferred Language    1950 Female White Non- English      Your appointments     2017  9:00 AM   Type II Class Day 1 with TYPE 2 CLASS   [facility] ([facility])    [address]  [address]  [place] [id number] [phone]           All visits are required to successfully complete the program.  It is the patient's responsibility to check with your Insurance for benefit coverage for visit / visits.  Arrive 15 minutes before your scheduled appt time  Please eat before arriving.  24 hours notice is required for all appointment changes or cancellation.  All visits are required to successfully complete the program  Please bring the following with you: 1) Picture Id 2) Insurance card 3) New patient forms including the Comprehensive         Patient History Form 4) All medication (including insulin) 5) Blood glucose monitor and strips 6) Blood glucose logs (if you have them) 7) Morning/afternoon snack if you generally eat one            2017  9:00 AM   Type II Class Day 2 with TYPE 2 CLASS   [facility] ([facility])    [address]  [address]  [place] [address]      [phone]           It is the patient's responsibility to check with your Insurance for benefit coverage for visit / visits.  Arrive 15 minutes before your scheduled appt time  Please eat before arriving.  24 hours notice is required for all appointment changes or cancellation.  All visits are required to successfully complete the program  Please bring the following with you: 1) Picture Id 2) Insurance card 3) New patient forms including the Comprehensive      Patient History Form 4) All medication (including insulin) 5) Blood glucose monitor and strips 6) Blood glucose logs (if you have them) 7) Morning/afternoon snack if you generally eat one            2017 11:30 AM   Anti-Coag Routine with [place] PHARMACIST   [facility]    [address] [place] [id number] [phone]            2017 10:00 AM   Follow Up Visit with Beata Michele RD   [facility] [facility])    [address]  [address]  [place] [address] [phone]           It is the patient's responsibility to check with your Insurance for benefit coverage for visit / visits.  24 hours notice is required for all appointment changes or cancellation.  Please arrive 20 min. before your appointment time  Please bring the following with you: 1)Picture Id 2) Insurance card 3) Completed Forms if New Patient  If scheduled for DIABETES VISIT please also brin) Medications 2) Meter 3) Blood glucose logs 4) Any recent labs if you have them  If scheduled for NUTRITION VISIT please also brin) 2-3 days of detailed food intake logs 2) Blood glucose monitor and blood glucose logs (if you have them)            2017  1:00 PM   Pulmonary Function Test with PFT-RM1   [facility] (--)    [address]  [address]  [place] [address] [phone]            2017  2:00 PM   New Patient Pulmonary with A Rotation   [facility]  Pulmonary Medicine (--)    [address]  [address]  [place] [address]   [phone]            Sep 29, 2017 10:00 AM   New Patient with Michelle Brumfield R.N., Beata Michele RD   [facility])    [address]  [address]  [place] [address] [phone]           It is the patient's responsibility to check with your Insurance for benefit coverage for visit / visits.  24 hours notice is required for all appointment changes or cancellation.  Please arrive 20 min. before your appointment time  Please bring the following with you: 1)Picture Id 2) Insurance card 3) Completed Forms if New Patient  If scheduled for DIABETES VISIT please also brin) Medications 2) Meter 3) Blood glucose logs 4) Any recent labs if you have them  If scheduled for NUTRITION VISIT please also brin) 2-3 days of detailed food intake logs 2) Blood glucose monitor and blood glucose logs (if you have them)              Problem List              ICD-10-CM Priority Class Noted - Resolved    Encounter to establish care with new doctor Z71.89   2017 - Present    History of mitral valve repair Z98.890   2017 - Present    Chronic obstructive pulmonary disease (CMS-[facility]) J44.9   2017 - Present    Atrial fibrillation (CMS-[facility]) I48.91   2017 - Present    Right knee pain M25.561   2017 - Present    Type 2 diabetes mellitus with complication (CMS-HCC) E11.8   2017 - Present    Anticoagulated on warfarin Z79.01   2017 - Present    Chronic anticoagulation Z79.01   2017 - Present      Health Maintenance        Date Due Completion Dates    A1C SCREENING 1950 ---    DIABETES MONOFILAMENT / LE EXAM 1950 ---    RETINAL SCREENING 1967 ---    URINE ACR / MICROALBUMIN 1967 ---    IMM DTaP/Tdap/Td Vaccine (1 - Tdap) 1968 ---    PAP SMEAR 1970 ---    MAMMOGRAM 1989 ---    COLONOSCOPY 1999 ---    IMM ZOSTER VACCINE 2009 ---    BONE DENSITY 2014 ---     IMM PNEUMOCOCCAL 65+ (ADULT) LOW/MEDIUM RISK SERIES (1 of 2 - PCV13) 2/16/2014 ---    IMM INFLUENZA (1) 9/1/2016 ---    FASTING LIPID PROFILE 1/30/2018 1/30/2017    SERUM CREATININE 1/30/2018 1/30/2017            Results     POCT Protime      Component    INR    2.7    Comment:     [id number] 2/2018 ic valid                         Current Immunizations     No immunizations on file.      Below and/or attached are the medications your provider expects you to take. Review all of your home medications and newly ordered medications with your provider and/or pharmacist. Follow medication instructions as directed by your provider and/or pharmacist. Please keep your medication list with you and share with your provider. Update the information when medications are discontinued, doses are changed, or new medications (including over-the-counter products) are added; and carry medication information at all times in the event of emergency situations     Allergies:  AMLODIPINE BESYLATE-VALSARTAN - (reactions not documented)     AMOXICILLIN - (reactions not documented)     ETODOLAC - (reactions not documented)     LISINOPRIL - (reactions not documented)     OTHER DRUG - (reactions not documented)               Medications  Valid as of: March 24, 2017 - 11:20 AM    Generic Name Brand Name Tablet Size Instructions for use    Albuterol Sulfate (Aero Soln) albuterol 108 (90 BASE) MCG/ACT Inhale 2 Puffs by mouth every four hours as needed for Shortness of Breath.        Carvedilol (Tab) COREG 12.5 MG Take 12.5 mg by mouth 2 times a day, with meals.        Cholecalciferol (Tab) cholecalciferol 1000 UNIT Take 1,000 Units by mouth every day.        Digoxin (Tab) LANOXIN 125 MCG Take 125 mcg by mouth every day.        Ferrous Sulfate (Tab) Iron 325 (65 FE) MG Take 1 Tab by mouth every day.        Furosemide (Tab) LASIX 20 MG Take 20 mg by mouth 2 times a day.        Furosemide (Tab) LASIX 20 MG Take 1 Tab by mouth 2 times a day.        MD  ALERT... warfarin (COUMADIN) COUMADIN  by Other route as needed.        Potassium Chloride Darline CR (Tab CR) K-DUR 10 MEQ Take 10 mEq by mouth 2 times a day.        Spironolactone (Tab) ALDACTONE 25 MG Take 25 mg by mouth 1 time daily as needed.        Umeclidinium-Vilanterol (AEROSOL POWDER, BREATH ACTIVATED) Umeclidinium-Vilanterol 62.5-25 MCG/INH Inhale 1 Puff by mouth every day.        Zinc (Cap) Zinc 50 MG Take 50 mg by mouth every day.        .                 Medicines prescribed today were sent to:     [facility] [id number]  [employer] [place] - [address] [facility]    [address] [place] [address]    Phone: [phone] Fax: [phone]    Open 24 Hours?: No      Medication refill instructions:       If your prescription bottle indicates you have medication refills left, it is not necessary to call your provider’s office. Please contact your pharmacy and they will refill your medication.    If your prescription bottle indicates you do not have any refills left, you may request refills at any time through one of the following ways: The online [employer] system (except Urgent Care), by calling your provider’s office, or by asking your pharmacy to contact your provider’s office with a refill request. Medication refills are processed only during regular business hours and may not be available until the next business day. Your provider may request additional information or to have a follow-up visit with you prior to refilling your medication.   *Please Note: Medication refills are assigned a new Rx number when refilled electronically. Your pharmacy may indicate that no refills were authorized even though a new prescription for the same medication is available at the pharmacy. Please request the medicine by name with the pharmacy before contacting your provider for a refill.        Warfarin Dosing Calendar   March 2017 Details    Sun Mon Tue Wed Thu Fri Sat        1               2                  3               4                 5               6               7               8               9               10               11                 12               13               14               15               16               17               18                 19               20               21               22               23               24   2.7   10 mg   See details      25      12.5 mg           26      10 mg         27      10 mg         28      10 mg         29      12.5 mg         30      10 mg         31      10 mg           Date Details   03/24 This INR check   INR: 2.7   [id number] 2/2018 ic valid                How to take your warfarin dose     To take:  10 mg Take 2 of the 5 mg tablets.    To take:  12.5 mg Take 2.5 of the 5 mg tablets.           Warfarin Dosing Calendar   April 2017 Details    Sun Mon Tue Wed Thu Fri Sat           1      12.5 mg           2      10 mg         3      10 mg         4      10 mg         5      12.5 mg         6      10 mg         7      10 mg         8      12.5 mg           9      10 mg         10      10 mg         11      10 mg         12      12.5 mg         13      10 mg         14      10 mg         15                 16               17               18               19               20               21               22                 23               24               25               26               27               28               29                 30                      Date Details   No additional details    Date of next INR:  4/14/2017         How to take your warfarin dose     To take:  10 mg Take 2 of the 5 mg tablets.    To take:  12.5 mg Take 2.5 of the 5 mg tablets.              [employer] Access Code: Activation code not generated  Current [employer] Status: Active

## 2017-03-24 NOTE — PROGRESS NOTES
Anticoagulation Summary as of 3/24/2017     INR goal 2.0-3.0   Selected INR 2.7 (3/24/2017)   Maintenance plan 12.5 mg (5 mg x 2.5) on Wed, Sat; 10 mg (5 mg x 2) all other days   Weekly total 75 mg   Plan last modified Kenneth Salas, MELE (2/24/2017)   Next INR check 4/14/2017   Target end date Indefinite    Indications   Chronic anticoagulation [Z79.01]  Atrial fibrillation (CMS-HCC) [I48.91]  History of mitral valve repair [Z98.890]         Anticoagulation Episode Summary     INR check location     Preferred lab     Send INR reminders to     Comments       Anticoagulation Care Providers     Provider Role Specialty Phone number    Zeferino Duarte, A.P.N. Referring Family Medicine [phone]    [facility] Responsible  [phone]    Kenneth Salas, MELE Responsible          Anticoagulation Patient Findings   Negatives Missed Doses, Extra Doses, Medication Changes, Antibiotic Use, Diet Changes, Dental/Other Procedures, Hospitalization, Bleeding Gums, Nose Bleeds, Blood in Urine, Blood in Stool, Any Bruising, Other Complaints        Patient is therapeutic today with INR of 2.7.  Pt denies any unusual s/s of bleeding, bruising, clotting or any changes to diet or medications.  Pt is to continue with current warfarin dosing regimen.  Follow up in 3 weeks.    Kenneth Salas, YVONNED

## 2017-03-28 ENCOUNTER — APPOINTMENT (OUTPATIENT)
Dept: HEALTH INFORMATION MANAGEMENT | Facility: MEDICAL CENTER | Age: 67
End: 2017-03-28
Payer: MEDICARE

## 2017-03-31 ENCOUNTER — APPOINTMENT (OUTPATIENT)
Dept: HEALTH INFORMATION MANAGEMENT | Facility: MEDICAL CENTER | Age: 67
End: 2017-03-31
Payer: MEDICARE

## 2017-04-04 ENCOUNTER — APPOINTMENT (OUTPATIENT)
Dept: HEALTH INFORMATION MANAGEMENT | Facility: MEDICAL CENTER | Age: 67
End: 2017-04-04
Payer: MEDICARE

## 2017-04-11 ENCOUNTER — NON-PROVIDER VISIT (OUTPATIENT)
Dept: HEALTH INFORMATION MANAGEMENT | Facility: MEDICAL CENTER | Age: 67
End: 2017-04-11
Payer: MEDICARE

## 2017-04-11 DIAGNOSIS — E11.8 TYPE 2 DIABETES MELLITUS WITH COMPLICATION, UNSPECIFIED LONG TERM INSULIN USE STATUS: ICD-10-CM

## 2017-04-11 PROCEDURE — G0109 DIAB MANAGE TRN IND/GROUP: HCPCS | Performed by: PREVENTIVE MEDICINE

## 2017-04-11 NOTE — PROGRESS NOTES
Attended Type 2 Self Management Class on : 4/11/17  Time: 6868-8626  Has had diabetes since: 2017  Medications for diabetes: none at this time    Exercise: walking and light weights 45 min. 3x a week.     Eye Exam: no  Foot Exam: no    Diabetes Education Content    Introduction To Diabetes   Define Type 2 diabetes: Education taught  Define Type 1 diabetes: Education taught  Understand feaures and benefits of education and management: Education taught  Describe who is responsible for diabetes management: Education taught  Describe impact of diabetes on family/friends: Education taught  Discuss role of significant other in management: Education taught  Diabetes Lifestyle Changes / Goals  State benefits of making appropriate lifestyle changes: Education taught  Identify lifestyle behaviors participant wants to change: Education taught  Identify risk factors that interfere with health and strategies to reduce : Education taught  Verbalize need for and frequency of health care follow-up: Education taught  Develop behavioral objectives and expected health outcomes: Education taught  Diabetes Exercise and Activity  Describe role of exercise in diabetes management: Education taught  State relationship of exercise to blood sugar: Education taught  State the benefits/risk(s) of exercise and precautions to follow: Education taught  Diabetes Self Blood Glucose Monitoring  Discuss rationale and importance of SBGM: Education taught  Discuss appropriate record keeping: Education taught  Discuss how to use results from blood glucose testing: Education taught  Evaluation and interpretation of blood glucose patterns: Education taught  Diabetes Disease Process  Discuss signs, symptoms, TX and prevention of hyperglycemia: Education taught  Discuss beta cell dysfunction and insulin resistance: Education taught  Discuss Insulin and its role in the body: Education taught  Discuss the role of the liver in glucose metabolism: Education  "taught  Discuss hormonal regulation: Education taught  Define benefits of good control and discuss what it means to be in \"good control\": Education taught  Discuss impact of exercise, food, meds, stress, and special factors on diabetes: Education taught  Discuss when to confer with HCP for possible treatment plan adjustments: Education taught  Diabetes Insulin and Medications  Action of oral medications, onset and duration: Education taught  Discuss incretin secretagogs and their use in diabetes management: Education taught  Identify the onset, peak and duration of different insulin: Education taught  Discuss proper injection technique and site rotation: Education taught  Discuss storage of insulin and disposal of sharps: Education taught  Hypoglycemia  List signs, symptoms and causes of hypoglycemia: Education taught  Discuss physiology of hypoglycemic reactions: Education taught  Accurately describe appropriate treatment and prevention: Education taught  Discuss when to contact HCP: Education taught  Sick Day Care  Verbalize important items to monitor when sick and when to contact HCP: Education taught  Review sick day box: Education taught  State diabetes medication adjustments on sick days: Education taught  Complications (Chronic)  Explain prevention, TX, signs/symptoms of: retinopathy, neuropathy, nephropathy, infections: Education taught  Explain prevention, TX, signs/symptoms of: CAD, cerebral-vascular disease and sexual dysfunction: Education taught  Identify when to notify HCP of complications: Education taught  State principles of skin, dental and foot care.  Discuss proper foot care, prevention of foot probelms when to notify HCP>: Education taught  Demonstrate how to examine feet and what to look for: Education taught  Psychosocial adjustment  Identify sources of stress: Education taught  Identify resources and techniques for stress reduction: Education taught  Discuss health care referral network / " community resources for support: Education taught  Define proper precautions to use when traveling: Education taught

## 2017-04-11 NOTE — Clinical Note
April 11, 2017            RE: Salma Lees  [id number]    Dear: SHAUNA Espino    The above referenced patient received 3 hours of diabetes education from [facility].    Topics taught (may include but not limited to):  Introduction to diabetes, benefits and responsibilities of patient, physiology of diabetes and the diease process, benefits of blood glucose monitoring and record keeping, medication action and possible side effects, hypoglycemia, sick day management, exercise, stress reduction and travel with diabetes.     Provided meter and instructed in use: no. Pt using Relion metermeter.  HER BLOOD SUGAR AT 12 NOON , WHICH WAS 4 HOURS AFTER A MEAL.   Dilated eye exam within the past year: no Goal is for patient to have yearly.   Lipid panel:   Lab Results   Component Value Date/Time    CHOLESTEROL,* 01/30/2017 02:00 PM    * 01/30/2017 02:00 PM    HDL 42 01/30/2017 02:00 PM    TRIGLYCERIDES 241* 01/30/2017 02:00 PM     Patient/caregiver appeared to understand the content as demonstrated by appropriate questions.     Notes She should have a baseline A1c done.  She is going to check her blood sugars at least one time per day at alternating meals and if her blood sugars are > 200 she is to call to get in to see you ASAP in order to start medication.  She was told she cannot wait until a May appt.     The patient was provided with written materials to back-up verbal education.   Thank you for this consultation,     Jody Walsh R.N.  Certified Diabetes Nurse Educator

## 2017-04-11 NOTE — MR AVS SNAPSHOT
Salma Lees   2017 9:00 AM   Appointment   MRN: [id number]    Department:  [facility]   Dept Phone:  [phone]    Description:  Female : 1950   Provider:  TYPE 2 CLASS           Allergies as of 2017     Allergen Noted Reactions    Amlodipine Besylate-Valsartan 2017       Amoxicillin 2017       Etodolac 2017       Lisinopril 2017       Other Drug 2017       metal      Vital Signs     Smoking Status                   Never Smoker            Basic Information     Date Of Birth Sex Race Ethnicity Preferred Language    1950 Female White Non- English      Your appointments     2017  9:00 AM   Type II Class Day 2 with TYPE 2 CLASS   [facility] ([facility])    [address]  [address]  [place] [id number] [phone]           It is the patient's responsibility to check with your Insurance for benefit coverage for visit / visits.  Arrive 15 minutes before your scheduled appt time  Please eat before arriving.  24 hours notice is required for all appointment changes or cancellation.  All visits are required to successfully complete the program  Please bring the following with you: 1) Picture Id 2) Insurance card 3) New patient forms including the Comprehensive      Patient History Form 4) All medication (including insulin) 5) Blood glucose monitor and strips 6) Blood glucose logs (if you have them) 7) Morning/afternoon snack if you generally eat one            2017  3:30 PM   Anti-Coag Routine with [place] PHARMACIST   [facility] ([place])    [address] [place] [address] [phone]            2017 10:00 AM   Follow Up Visit with Beata Michele RD   [facility] ([place])    [address]  [address]  [place] [id number] [phone]           It is the patient's responsibility to check with your Insurance for benefit coverage for  visit / visits.  24 hours notice is required for all appointment changes or cancellation.  Please arrive 20 min. before your appointment time  Please bring the following with you: 1)Picture Id 2) Insurance card 3) Completed Forms if New Patient  If scheduled for DIABETES VISIT please also brin) Medications 2) Meter 3) Blood glucose logs 4) Any recent labs if you have them  If scheduled for NUTRITION VISIT please also brin) 2-3 days of detailed food intake logs 2) Blood glucose monitor and blood glucose logs (if you have them)            2017  1:00 PM   Pulmonary Function Test with PFT-RM1   [facility] (--)    [address]  [address]  [place] [address]   [phone]            2017  2:00 PM   New Patient Pulmonary with A Rotation   [facility] (--)    [address]  [address]  [place] [address]   [phone]            Sep 29, 2017 10:00 AM   New Patient with Michelle Brumfield R.N., Beata Michele RD   [facility])    [address]  [address]  [place] [address]   [phone]           It is the patient's responsibility to check with your Insurance for benefit coverage for visit / visits.  24 hours notice is required for all appointment changes or cancellation.  Please arrive 20 min. before your appointment time  Please bring the following with you: 1)Picture Id 2) Insurance card 3) Completed Forms if New Patient  If scheduled for DIABETES VISIT please also brin) Medications 2) Meter 3) Blood glucose logs 4) Any recent labs if you have them  If scheduled for NUTRITION VISIT please also brin) 2-3 days of detailed food intake logs 2) Blood glucose monitor and blood glucose logs (if you have them)              Problem List              ICD-10-CM Priority Class Noted - Resolved    Encounter to establish care with new doctor Z71.89   2017 - Present    History of mitral valve repair Z98.890   2017 -  Present    Chronic obstructive pulmonary disease (CMS-HCC) J44.9   2/13/2017 - Present    Atrial fibrillation (CMS-HCC) I48.91   2/13/2017 - Present    Right knee pain M25.561   2/13/2017 - Present    Type 2 diabetes mellitus with complication (CMS-HCC) E11.8   2/13/2017 - Present    Anticoagulated on warfarin Z79.01   2/13/2017 - Present    Chronic anticoagulation Z79.01   2/28/2017 - Present      Health Maintenance        Date Due Completion Dates    A1C SCREENING 1950 ---    DIABETES MONOFILAMENT / LE EXAM 1950 ---    RETINAL SCREENING 2/16/1967 ---    URINE ACR / MICROALBUMIN 2/16/1967 ---    IMM DTaP/Tdap/Td Vaccine (1 - Tdap) 2/16/1968 ---    PAP SMEAR 2/16/1970 ---    MAMMOGRAM 2/16/1989 ---    COLONOSCOPY 2/16/1999 ---    IMM ZOSTER VACCINE 2/16/2009 ---    BONE DENSITY 2/16/2014 ---    IMM PNEUMOCOCCAL 65+ (ADULT) LOW/MEDIUM RISK SERIES (1 of 2 - PCV13) 2/16/2014 ---    FASTING LIPID PROFILE 1/30/2018 1/30/2017    SERUM CREATININE 1/30/2018 1/30/2017            Current Immunizations     No immunizations on file.      Below and/or attached are the medications your provider expects you to take. Review all of your home medications and newly ordered medications with your provider and/or pharmacist. Follow medication instructions as directed by your provider and/or pharmacist. Please keep your medication list with you and share with your provider. Update the information when medications are discontinued, doses are changed, or new medications (including over-the-counter products) are added; and carry medication information at all times in the event of emergency situations     Allergies:  AMLODIPINE BESYLATE-VALSARTAN - (reactions not documented)     AMOXICILLIN - (reactions not documented)     ETODOLAC - (reactions not documented)     LISINOPRIL - (reactions not documented)     OTHER DRUG - (reactions not documented)               Medications  Valid as of: April 11, 2017 - 12:10 PM    Generic Name Brand  Name Tablet Size Instructions for use    Albuterol Sulfate (Aero Soln) albuterol 108 (90 BASE) MCG/ACT Inhale 2 Puffs by mouth every four hours as needed for Shortness of Breath.        Carvedilol (Tab) COREG 12.5 MG Take 12.5 mg by mouth 2 times a day, with meals.        Cholecalciferol (Tab) cholecalciferol 1000 UNIT Take 1,000 Units by mouth every day.        Digoxin (Tab) LANOXIN 125 MCG Take 125 mcg by mouth every day.        Ferrous Sulfate (Tab) Iron 325 (65 FE) MG Take 1 Tab by mouth every day.        Furosemide (Tab) LASIX 20 MG Take 20 mg by mouth 2 times a day.        Furosemide (Tab) LASIX 20 MG Take 1 Tab by mouth 2 times a day.        MD ALERT... warfarin (COUMADIN) COUMADIN  by Other route as needed.        Potassium Chloride Darline CR (Tab CR) K-DUR 10 MEQ Take 10 mEq by mouth 2 times a day.        Spironolactone (Tab) ALDACTONE 25 MG Take 25 mg by mouth 1 time daily as needed.        Umeclidinium-Vilanterol (AEROSOL POWDER, BREATH ACTIVATED) Umeclidinium-Vilanterol 62.5-25 MCG/INH Inhale 1 Puff by mouth every day.        Zinc (Cap) Zinc 50 MG Take 50 mg by mouth every day.        .                 Medicines prescribed today were sent to:     [facility] [address] [place] [address] AT [facility] & [address] [address]    Phone: [phone] Fax: [phone]    Open 24 Hours?: No      Medication refill instructions:       If your prescription bottle indicates you have medication refills left, it is not necessary to call your provider’s office. Please contact your pharmacy and they will refill your medication.    If your prescription bottle indicates you do not have any refills left, you may request refills at any time through one of the following ways: The online [facility] system (except Urgent Care), by calling your provider’s office, or by asking your pharmacy to contact your provider’s office with a refill request. Medication refills are processed only  during regular business hours and may not be available until the next business day. Your provider may request additional information or to have a follow-up visit with you prior to refilling your medication.   *Please Note: Medication refills are assigned a new Rx number when refilled electronically. Your pharmacy may indicate that no refills were authorized even though a new prescription for the same medication is available at the pharmacy. Please request the medicine by name with the pharmacy before contacting your provider for a refill.           [employer] Access Code: Activation code not generated  Current [employer] Status: Active

## 2017-04-14 ENCOUNTER — NON-PROVIDER VISIT (OUTPATIENT)
Dept: HEALTH INFORMATION MANAGEMENT | Facility: MEDICAL CENTER | Age: 67
End: 2017-04-14
Payer: MEDICARE

## 2017-04-14 VITALS — BODY MASS INDEX: 35.17 KG/M2 | HEIGHT: 64 IN | WEIGHT: 206 LBS

## 2017-04-14 DIAGNOSIS — E11.8 TYPE 2 DIABETES MELLITUS WITH COMPLICATION, WITHOUT LONG-TERM CURRENT USE OF INSULIN (HCC): ICD-10-CM

## 2017-04-14 PROCEDURE — G0109 DIAB MANAGE TRN IND/GROUP: HCPCS | Performed by: DIETITIAN, REGISTERED

## 2017-04-14 NOTE — PROGRESS NOTES
"4/14/2017    LILIYA Casey  67 y.o.   Time in/out: 9:00/11:00    Anthropometrics/Objective  Filed Vitals:    04/14/17 1224   Height: 1.626 m (5' 4.02\")   Weight: 93.441 kg (206 lb)       Body mass index is 35.34 kg/(m^2).    Stated Goal Weight: 130 lb.  Estimated Caloric needs 1900  Kcal   See comprehensive patient history form for further information     Subjective:  I have to be careful of foods that contain vitamin K so I can keep my INR at 2.5.  I try to be consistant in my intake of foods containing Vitamin K.    Nutrition Diagnosis (PES Statement)  Problem (Nutrition diagnosis)  Clinical: Altered nutrition-related lab values, Clinical: Obese, Behavioral/Environmental: Food/nutrition knowledge deficit and Behavioral/Environmental: Inadequate physical activity    Etiology(Addresses the cause,contributing factors)  Cardiac/endocrine/kidney/liver/neurological/pulmonary dysfunction, Food and nutrition related knowledge deficit and No previous DM nutrition education    Signs/Symptoms (Address observations and stated info: subjective and objective data)  Infrequent, low-duration and /or low-intensity physical activity and Limited knowledge of CHO/PRO/FAT compostition of food and /or CHO/PRO/FAT metabolism  · BMI 35.39  · Weight loss:  She states she's lost 80#.    Client history:  Condition(s) associated with a diagnosis or treatment (specify)     Biochemical data, medical test and procedures  No results found for: HBA1C@  No results found for: POCGLUCOSE  Lab Results   Component Value Date/Time    CHOLESTEROL,* 01/30/2017 02:00 PM    * 01/30/2017 02:00 PM    HDL 42 01/30/2017 02:00 PM    TRIGLYCERIDES 241* 01/30/2017 02:00 PM         Nutrition Intervention  Nutrition Prescription  Recommended Daily Kcals 1600  Carb choices: 13  Protein: 6-8 oz/day  Fat grams: 45    Meal and Snack  Recommend a general/healthful diet, 3 meals and one snack/day.    Comprehensive Nutrition education Instruction " or training leading to in-depth nutrition related knowledge about:  Benefits to following meal plan, Combine carb, protein and fat at each meal, Eating out, Fast food, Meal timing and spacing, Menu Planning, Metabolism of carb, protein, fat, Physical activity/exercise, Portion control, Sweets and alcohol in moderation, Heart-healthy guidelines, Increasing/Decreasing PO intake, Label Reading and Handouts provided regarding topics discussed    Monitoring & Evaluation Plan    Behavioral-Environmental:  Food/Nutrition knowledge Salma has a good knowledge of foods containing Vitamin K.  She is trying to eat healthfully.    Food / Nutrient Intake:  Micronutrients intake She is very careful of the sodium in foods.    Physical Signs / Symptoms:  HbA1c profiles We did not have an A1c for her yet.    Assessment Notes:  Salma was very interested in class and asked many good questions.

## 2017-04-14 NOTE — Clinical Note
April 14, 2017      LILIYA Casey  [address]  [place] [address]                   Dear:Zeferino Duarte A.P.N.      Your patient Salma Lees 1950 [facility] nutrition counseling, regarding type 2 diabetes.      Should you desire any notes from this visit please do not hesitate to give us a call at [phone].      Sincerely  Geeta Victoria RD, CDE  Certified Diabetes Educator

## 2017-04-14 NOTE — MR AVS SNAPSHOT
Salma Lees   2017 9:00 AM   Appointment   MRN: [id number]    Department:  [facility]   Dept Phone:  [phone]    Description:  Female : 1950   Provider:  TYPE 2 CLASS           Allergies as of 2017     Allergen Noted Reactions    Amlodipine Besylate-Valsartan 2017       Amoxicillin 2017       Etodolac 2017       Lisinopril 2017       Other Drug 2017       metal      Vital Signs     Smoking Status                   Never Smoker            Basic Information     Date Of Birth Sex Race Ethnicity Preferred Language    1950 Female White Non- English      Your appointments     2017  3:30 PM   Anti-Coag Routine with JODI STEVEN PHARMACIST   [facility] Jodi Steven ([place])    [address] [facility] [facility] [place] [id number] [phone]            2017  9:30 AM   Follow Up Visit with Beata Michele RD   [facility])    [address]  [address]  [place] [address] [phone]           It is the patient's responsibility to check with your Insurance for benefit coverage for visit / visits.  24 hours notice is required for all appointment changes or cancellation.  Please arrive 20 min. before your appointment time  Please bring the following with you: 1)Picture Id 2) Insurance card 3) Completed Forms if New Patient  If scheduled for DIABETES VISIT please also brin) Medications 2) Meter 3) Blood glucose logs 4) Any recent labs if you have them  If scheduled for NUTRITION VISIT please also brin) 2-3 days of detailed food intake logs 2) Blood glucose monitor and blood glucose logs (if you have them)            2017  1:00 PM   Pulmonary Function Test with PFT-RM1   [facility]monary Medicine (--)    [address]  [address]  [place] [address] [phone]            2017  2:00 PM   New Patient Pulmonary with A Rotation   [facility]monary  Medicine (--)    [address]  [address]  [place] [address]   [phone]            Sep 29, 2017 10:00 AM   New Patient with Michelle Brumfield R.N., Beata Michele RD   [facility])    [address]  [address]  [place] [address] [phone]           It is the patient's responsibility to check with your Insurance for benefit coverage for visit / visits.  24 hours notice is required for all appointment changes or cancellation.  Please arrive 20 min. before your appointment time  Please bring the following with you: 1)Picture Id 2) Insurance card 3) Completed Forms if New Patient  If scheduled for DIABETES VISIT please also brin) Medications 2) Meter 3) Blood glucose logs 4) Any recent labs if you have them  If scheduled for NUTRITION VISIT please also brin) 2-3 days of detailed food intake logs 2) Blood glucose monitor and blood glucose logs (if you have them)              Problem List              ICD-10-CM Priority Class Noted - Resolved    Encounter to establish care with new doctor Z71.89   2017 - Present    History of mitral valve repair Z98.890   2017 - Present    Chronic obstructive pulmonary disease (CMS-HCC) J44.9   2017 - Present    Atrial fibrillation (CMS-HCC) I48.91   2017 - Present    Right knee pain M25.561   2017 - Present    Type 2 diabetes mellitus with complication (CMS-HCC) E11.8   2017 - Present    Anticoagulated on warfarin Z79.01   2017 - Present    Chronic anticoagulation Z79.01   2017 - Present      Health Maintenance        Date Due Completion Dates    A1C SCREENING 1950 ---    DIABETES MONOFILAMENT / LE EXAM 1949 ---    RETINAL SCREENING 1967 ---    URINE ACR / MICROALBUMIN 1967 ---    IMM DTaP/Tdap/Td Vaccine (1 - Tdap) 1968 ---    PAP SMEAR 1970 ---    MAMMOGRAM 1989 ---    COLONOSCOPY 1999 ---    IMM ZOSTER VACCINE 2009 ---    BONE DENSITY 2014 ---    IMM  PNEUMOCOCCAL 65+ (ADULT) LOW/MEDIUM RISK SERIES (1 of 2 - PCV13) 2/16/2014 ---    FASTING LIPID PROFILE 1/30/2018 1/30/2017    SERUM CREATININE 1/30/2018 1/30/2017            Current Immunizations     No immunizations on file.      Below and/or attached are the medications your provider expects you to take. Review all of your home medications and newly ordered medications with your provider and/or pharmacist. Follow medication instructions as directed by your provider and/or pharmacist. Please keep your medication list with you and share with your provider. Update the information when medications are discontinued, doses are changed, or new medications (including over-the-counter products) are added; and carry medication information at all times in the event of emergency situations     Allergies:  AMLODIPINE BESYLATE-VALSARTAN - (reactions not documented)     AMOXICILLIN - (reactions not documented)     ETODOLAC - (reactions not documented)     LISINOPRIL - (reactions not documented)     OTHER DRUG - (reactions not documented)               Medications  Valid as of: April 14, 2017 - 11:25 AM    Generic Name Brand Name Tablet Size Instructions for use    Albuterol Sulfate (Aero Soln) albuterol 108 (90 BASE) MCG/ACT Inhale 2 Puffs by mouth every four hours as needed for Shortness of Breath.        Carvedilol (Tab) COREG 12.5 MG Take 12.5 mg by mouth 2 times a day, with meals.        Cholecalciferol (Tab) cholecalciferol 1000 UNIT Take 1,000 Units by mouth every day.        Digoxin (Tab) LANOXIN 125 MCG Take 125 mcg by mouth every day.        Ferrous Sulfate (Tab) Iron 325 (65 FE) MG Take 1 Tab by mouth every day.        Furosemide (Tab) LASIX 20 MG Take 20 mg by mouth 2 times a day.        Furosemide (Tab) LASIX 20 MG Take 1 Tab by mouth 2 times a day.        MD ALERT... warfarin (COUMADIN) COUMADIN  by Other route as needed.        Potassium Chloride Darline CR (Tab CR) K-DUR 10 MEQ Take 10 mEq by mouth 2 times a day.         Spironolactone (Tab) ALDACTONE 25 MG Take 25 mg by mouth 1 time daily as needed.        Umeclidinium-Vilanterol (AEROSOL POWDER, BREATH ACTIVATED) Umeclidinium-Vilanterol 62.5-25 MCG/INH Inhale 1 Puff by mouth every day.        Zinc (Cap) Zinc 50 MG Take 50 mg by mouth every day.        .                 Medicines prescribed today were sent to:     [id number] [place] [place] [address] [facility]    [address] [place] [address]    Phone: [phone] Fax: [phone]    Open 24 Hours?: No      Medication refill instructions:       If your prescription bottle indicates you have medication refills left, it is not necessary to call your provider’s office. Please contact your pharmacy and they will refill your medication.    If your prescription bottle indicates you do not have any refills left, you may request refills at any time through one of the following ways: The online [employer] system (except Urgent Care), by calling your provider’s office, or by asking your pharmacy to contact your provider’s office with a refill request. Medication refills are processed only during regular business hours and may not be available until the next business day. Your provider may request additional information or to have a follow-up visit with you prior to refilling your medication.   *Please Note: Medication refills are assigned a new Rx number when refilled electronically. Your pharmacy may indicate that no refills were authorized even though a new prescription for the same medication is available at the pharmacy. Please request the medicine by name with the pharmacy before contacting your provider for a refill.           [employer] Access Code: Activation code not generated  Current [employer] Status: Active

## 2017-04-17 ENCOUNTER — ANTICOAGULATION VISIT (OUTPATIENT)
Dept: MEDICAL GROUP | Facility: PHYSICIAN GROUP | Age: 67
End: 2017-04-17
Payer: MEDICARE

## 2017-04-17 VITALS — DIASTOLIC BLOOD PRESSURE: 80 MMHG | HEART RATE: 79 BPM | SYSTOLIC BLOOD PRESSURE: 140 MMHG

## 2017-04-17 DIAGNOSIS — Z79.01 CHRONIC ANTICOAGULATION: ICD-10-CM

## 2017-04-17 DIAGNOSIS — I48.91 ATRIAL FIBRILLATION, UNSPECIFIED TYPE (HCC): ICD-10-CM

## 2017-04-17 DIAGNOSIS — Z98.890 HISTORY OF MITRAL VALVE REPAIR: ICD-10-CM

## 2017-04-17 LAB — INR PPP: 2.5 (ref 2–3.5)

## 2017-04-17 PROCEDURE — 85610 PROTHROMBIN TIME: CPT | Performed by: NURSE PRACTITIONER

## 2017-04-17 PROCEDURE — 4040F PNEUMOC VAC/ADMIN/RCVD: CPT | Mod: 8P | Performed by: NURSE PRACTITIONER

## 2017-04-17 PROCEDURE — G8417 CALC BMI ABV UP PARAM F/U: HCPCS | Performed by: NURSE PRACTITIONER

## 2017-04-17 PROCEDURE — 99999 PR NO CHARGE: CPT | Performed by: NURSE PRACTITIONER

## 2017-04-17 NOTE — PROGRESS NOTES
Anticoagulation Summary as of 4/17/2017     INR goal 2.0-3.0   Selected INR 2.5 (4/17/2017)   Maintenance plan 12.5 mg (5 mg x 2.5) on Wed, Sat; 10 mg (5 mg x 2) all other days   Weekly total 75 mg   Plan last modified Kenneth Salas PHARMD (2/24/2017)   Next INR check 5/19/2017   Target end date Indefinite    Indications   Chronic anticoagulation [Z79.01]  Atrial fibrillation (CMS-HCC) [I48.91]  History of mitral valve repair [Z98.890]         Anticoagulation Episode Summary     INR check location     Preferred lab     Send INR reminders to     Comments       Anticoagulation Care Providers     Provider Role Specialty Phone number    SUSANNE [email or web address]. Referring Family Medicine [phone]    [facility] Responsible  [phone]    Kenneth aSlas, MELE Responsible          Anticoagulation Patient Findings   Negatives Missed Doses, Extra Doses, Medication Changes, Antibiotic Use, Diet Changes, Dental/Other Procedures, Hospitalization, Bleeding Gums, Nose Bleeds, Blood in Urine, Blood in Stool, Any Bruising, Other Complaints        Patient is therapeutic today with INR of 2.5.  Pt denies any unusual s/s of bleeding, bruising, clotting or any changes to diet or medications.  Pt is to continue with current warfarin dosing regimen.  Follow up in 4 weeks.    Kenneth Salas PHARMD

## 2017-04-17 NOTE — MR AVS SNAPSHOT
Salma Lees   2017 3:00 PM   Anticoagulation Visit   MRN: [id number]    Department:  [facility]   Dept Phone:  [phone]    Description:  Female : 1950   Provider:  Kenneth Salas PHARMD           Allergies as of 2017     Allergen Noted Reactions    Amlodipine Besylate-Valsartan 2017       Amoxicillin 2017       Etodolac 2017       Lisinopril 2017       Other Drug 2017       metal      You were diagnosed with     Chronic anticoagulation   [763962]       Atrial fibrillation, unspecified type (CMS-HCC)   [5504285]       History of mitral valve repair   [921778]         Vital Signs     Smoking Status                   Never Smoker            Basic Information     Date Of Birth Sex Race Ethnicity Preferred Language    1950 Female White Non- English      Your appointments     May 19, 2017  3:00 PM   Anti-Coag Routine with [facility] PHARMACIST   [address] [id number] [phone]            2017  9:30 AM   Follow Up Visit with Beata Michele RD   [facility])    [address]  [address]  [facility] [address] [phone]           It is the patient's responsibility to check with your Insurance for benefit coverage for visit / visits.  24 hours notice is required for all appointment changes or cancellation.  Please arrive 20 min. before your appointment time  Please bring the following with you: 1)Picture Id 2) Insurance card 3) Completed Forms if New Patient  If scheduled for DIABETES VISIT please also brin) Medications 2) Meter 3) Blood glucose logs 4) Any recent labs if you have them  If scheduled for NUTRITION VISIT please also brin) 2-3 days of detailed food intake logs 2) Blood glucose monitor and blood glucose logs (if you have them)            2017  1:00 PM   Pulmonary Function Test with PFT-RM1   [facility] (--)    [address]  [address]  [place] [address]   [phone]            2017  2:00 PM   New Patient Pulmonary with A Rotation   [facility] (--)    [address]  [address]  [place] [address]   [phone]            Sep 29, 2017 10:00 AM   New Patient with Michelle Brumfield R.N., Beata Michele RD   [facility]    [address]  [address]  [place] [address]   [phone]           It is the patient's responsibility to check with your Insurance for benefit coverage for visit / visits.  24 hours notice is required for all appointment changes or cancellation.  Please arrive 20 min. before your appointment time  Please bring the following with you: 1)Picture Id 2) Insurance card 3) Completed Forms if New Patient  If scheduled for DIABETES VISIT please also brin) Medications 2) Meter 3) Blood glucose logs 4) Any recent labs if you have them  If scheduled for NUTRITION VISIT please also brin) 2-3 days of detailed food intake logs 2) Blood glucose monitor and blood glucose logs (if you have them)              Problem List              ICD-10-CM Priority Class Noted - Resolved    Encounter to establish care with new doctor Z71.89   2017 - Present    History of mitral valve repair Z98.890   2017 - Present    Chronic obstructive pulmonary disease (CMS-HCC) J44.9   2017 - Present    Atrial fibrillation (CMS-HCC) I48.91   2017 - Present    Right knee pain M25.561   2017 - Present    Type 2 diabetes mellitus with complication (CMS-HCC) E11.8   2017 - Present    Anticoagulated on warfarin Z79.01   2017 - Present    Chronic anticoagulation Z79.01   2017 - Present      Health Maintenance        Date Due Completion Dates    A1C SCREENING 1950 ---    DIABETES MONOFILAMENT / LE EXAM 1949 ---    RETINAL SCREENING 1967 ---    URINE ACR / MICROALBUMIN 1967 ---    IMM  DTaP/Tdap/Td Vaccine (1 - Tdap) 2/16/1968 ---    PAP SMEAR 2/16/1970 ---    MAMMOGRAM 2/16/1989 ---    COLONOSCOPY 2/16/1999 ---    IMM ZOSTER VACCINE 2/16/2009 ---    BONE DENSITY 2/16/2014 ---    IMM PNEUMOCOCCAL 65+ (ADULT) LOW/MEDIUM RISK SERIES (1 of 2 - PCV13) 2/16/2014 ---    FASTING LIPID PROFILE 1/30/2018 1/30/2017    SERUM CREATININE 1/30/2018 1/30/2017            Results     POCT Protime      Component    INR    2.5    Comment:     [id number] 2/2018 ic valid                        Current Immunizations     No immunizations on file.      Below and/or attached are the medications your provider expects you to take. Review all of your home medications and newly ordered medications with your provider and/or pharmacist. Follow medication instructions as directed by your provider and/or pharmacist. Please keep your medication list with you and share with your provider. Update the information when medications are discontinued, doses are changed, or new medications (including over-the-counter products) are added; and carry medication information at all times in the event of emergency situations     Allergies:  AMLODIPINE BESYLATE-VALSARTAN - (reactions not documented)     AMOXICILLIN - (reactions not documented)     ETODOLAC - (reactions not documented)     LISINOPRIL - (reactions not documented)     OTHER DRUG - (reactions not documented)               Medications  Valid as of: April 17, 2017 -  3:05 PM    Generic Name Brand Name Tablet Size Instructions for use    Albuterol Sulfate (Aero Soln) albuterol 108 (90 BASE) MCG/ACT Inhale 2 Puffs by mouth every four hours as needed for Shortness of Breath.        Carvedilol (Tab) COREG 12.5 MG Take 12.5 mg by mouth 2 times a day, with meals.        Cholecalciferol (Tab) cholecalciferol 1000 UNIT Take 1,000 Units by mouth every day.        Digoxin (Tab) LANOXIN 125 MCG Take 125 mcg by mouth every day.        Ferrous Sulfate (Tab) Iron 325 (65 FE) MG Take 1 Tab by mouth  every day.        Furosemide (Tab) LASIX 20 MG Take 20 mg by mouth 2 times a day.        Furosemide (Tab) LASIX 20 MG Take 1 Tab by mouth 2 times a day.        MD ALERT... warfarin (COUMADIN) COUMADIN  by Other route as needed.        Potassium Chloride Darline CR (Tab CR) K-DUR 10 MEQ Take 10 mEq by mouth 2 times a day.        Spironolactone (Tab) ALDACTONE 25 MG Take 25 mg by mouth 1 time daily as needed.        Umeclidinium-Vilanterol (AEROSOL POWDER, BREATH ACTIVATED) Umeclidinium-Vilanterol 62.5-25 MCG/INH Inhale 1 Puff by mouth every day.        Zinc (Cap) Zinc 50 MG Take 50 mg by mouth every day.        .                 Medicines prescribed today were sent to:     [facility] [address] [place] [address], [place] - [address] [facility] & [facility]    [address] [place] [address]    Phone: [phone] Fax: [phone]    Open 24 Hours?: No      Medication refill instructions:       If your prescription bottle indicates you have medication refills left, it is not necessary to call your provider’s office. Please contact your pharmacy and they will refill your medication.    If your prescription bottle indicates you do not have any refills left, you may request refills at any time through one of the following ways: The online [employer] system (except Urgent Care), by calling your provider’s office, or by asking your pharmacy to contact your provider’s office with a refill request. Medication refills are processed only during regular business hours and may not be available until the next business day. Your provider may request additional information or to have a follow-up visit with you prior to refilling your medication.   *Please Note: Medication refills are assigned a new Rx number when refilled electronically. Your pharmacy may indicate that no refills were authorized even though a new prescription for the same medication is available at the pharmacy. Please request the medicine by name with the pharmacy  before contacting your provider for a refill.        Warfarin Dosing Calendar   April 2017 Details    Sun Mon Tue Wed Thu Fri Sat           1                 2               3               4               5               6               7               8                 9               10               11               12               13               14               15                 16               17   2.5   10 mg   See details      18      10 mg         19      12.5 mg         20      10 mg         21      10 mg         22      12.5 mg           23      10 mg         24      10 mg         25      10 mg         26      12.5 mg         27      10 mg         28      10 mg         29      12.5 mg           30      10 mg                Date Details   04/17 This INR check   INR: 2.5   [id number] 2/2018 ic valid               How to take your warfarin dose     To take:  10 mg Take 2 of the 5 mg tablets.    To take:  12.5 mg Take 2.5 of the 5 mg tablets.           Warfarin Dosing Calendar   May 2017 Details    Sun Mon Tue Wed Thu Fri Sat      1      10 mg         2      10 mg         3      12.5 mg         4      10 mg         5      10 mg         6      12.5 mg           7      10 mg         8      10 mg         9      10 mg         10      12.5 mg         11      10 mg         12      10 mg         13      12.5 mg           14      10 mg         15      10 mg         16      10 mg         17      12.5 mg         18      10 mg         19      10 mg         20                 21               22               23               24               25               26               27                 28               29               30               31                   Date Details   No additional details    Date of next INR:  5/19/2017         How to take your warfarin dose     To take:  10 mg Take 2 of the 5 mg tablets.    To take:  12.5 mg Take 2.5 of the 5 mg tablets.              [employer] Access Code: Activation code  not generated  Current MyChart Status: Active

## 2017-04-21 ENCOUNTER — APPOINTMENT (OUTPATIENT)
Dept: HEALTH INFORMATION MANAGEMENT | Facility: MEDICAL CENTER | Age: 67
End: 2017-04-21
Payer: MEDICARE

## 2017-05-18 ENCOUNTER — TELEPHONE (OUTPATIENT)
Dept: MEDICAL GROUP | Facility: PHYSICIAN GROUP | Age: 67
End: 2017-05-18

## 2017-05-18 RX ORDER — WARFARIN SODIUM 5 MG/1
TABLET ORAL
Qty: 90 TAB | Refills: 99 | Status: SHIPPED | OUTPATIENT
Start: 2017-05-18 | End: 2017-06-25

## 2017-05-18 NOTE — TELEPHONE ENCOUNTER
1. Caller Name: Salma Lees                                           Call Back Number: [phone] (home)         Patient approves a detailed voicemail message: N\A    Pt needs refill on her warfarin.  She states she takes 5 mgs tabs  10 mgs daily and 12 mg Wed and Sat.  She states cardiologist informed her today that since she is doing Coumadin clinic with Kenneth her primary has to refill this.  She states she has 1 pill left.

## 2017-05-19 ENCOUNTER — ANTICOAGULATION VISIT (OUTPATIENT)
Dept: MEDICAL GROUP | Facility: PHYSICIAN GROUP | Age: 67
End: 2017-05-19
Payer: MEDICARE

## 2017-05-19 VITALS — SYSTOLIC BLOOD PRESSURE: 97 MMHG | DIASTOLIC BLOOD PRESSURE: 65 MMHG | HEART RATE: 73 BPM

## 2017-05-19 DIAGNOSIS — Z79.01 CHRONIC ANTICOAGULATION: ICD-10-CM

## 2017-05-19 DIAGNOSIS — Z98.890 HISTORY OF MITRAL VALVE REPAIR: ICD-10-CM

## 2017-05-19 DIAGNOSIS — I48.91 ATRIAL FIBRILLATION, UNSPECIFIED TYPE (HCC): ICD-10-CM

## 2017-05-19 LAB — INR PPP: 3.9 (ref 2–3.5)

## 2017-05-19 PROCEDURE — 99999 PR NO CHARGE: CPT | Performed by: FAMILY MEDICINE

## 2017-05-19 PROCEDURE — 85610 PROTHROMBIN TIME: CPT | Performed by: FAMILY MEDICINE

## 2017-05-19 PROCEDURE — G8417 CALC BMI ABV UP PARAM F/U: HCPCS | Performed by: FAMILY MEDICINE

## 2017-05-19 PROCEDURE — 4040F PNEUMOC VAC/ADMIN/RCVD: CPT | Mod: 8P | Performed by: FAMILY MEDICINE

## 2017-05-19 RX ORDER — WARFARIN SODIUM 5 MG/1
TABLET ORAL
Qty: 225 TAB | Refills: 1 | Status: SHIPPED | OUTPATIENT
Start: 2017-05-19 | End: 2017-06-25

## 2017-05-19 NOTE — MR AVS SNAPSHOT
Salma Lees   2017 3:00 PM   Anticoagulation Visit   MRN: [id number]    Department:  [facility]   Dept Phone:  [phone]    Description:  Female : 1950   Provider:  Kenneth Salas PHARMD           Allergies as of 2017     Allergen Noted Reactions    Amlodipine Besylate-Valsartan 2017       Amoxicillin 2017       Etodolac 2017       Lisinopril 2017       Other Drug 2017       metal      You were diagnosed with     Chronic anticoagulation   [973066]       Atrial fibrillation, unspecified type (CMS-HCC)   [7773128]       History of mitral valve repair   [754609]         Vital Signs     Blood Pressure Pulse Smoking Status             97/65 mmHg 73 Never Smoker          Basic Information     Date Of Birth Sex Race Ethnicity Preferred Language    1950 Female White Non- English      Your appointments     2017 10:45 AM   Anti-Coag Routine with [place] PHARMACIST   [facility] ([place])    [address] [facility] [address] [phone]            2017  9:30 AM   Follow Up Visit with Beata Michele RD   [employer] [facility])    [address]  [address]  [facility] [address] [phone]           It is the patient's responsibility to check with your Insurance for benefit coverage for visit / visits.  24 hours notice is required for all appointment changes or cancellation.  Please arrive 20 min. before your appointment time  Please bring the following with you: 1)Picture Id 2) Insurance card 3) Completed Forms if New Patient  If scheduled for DIABETES VISIT please also brin) Medications 2) Meter 3) Blood glucose logs 4) Any recent labs if you have them  If scheduled for NUTRITION VISIT please also brin) 2-3 days of detailed food intake logs 2) Blood glucose monitor and blood glucose logs (if you have them)            2017  3:40 PM   Established Patient with  MARTA Casey.   [facility] ([place])    [address] [facility] [place] [address] [phone]           You will be receiving a confirmation call a few days before your appointment from our automated call confirmation system.   Please bring to your appointment; a photo ID, copies of your insurance card, all medication bottles and any co-pay that you are responsible for.  Please allow 45-60 minutes to complete your scheduled appointment.            2017  1:00 PM   Pulmonary Function Test with PFT-RM2   [facility] (--)    [address]  [address]  [place] [address] [phone]            2017  2:00 PM   New Patient Pulmonary with A Rotation   [facility] (--)    [address]  [address]  [place] [address]   [phone]            Sep 29, 2017 10:00 AM   New Patient with Michelle Brumfield R.N., Beata Michele RD   [facility]    [address]  [address]  [place] [address]   [phone]           It is the patient's responsibility to check with your Insurance for benefit coverage for visit / visits.  24 hours notice is required for all appointment changes or cancellation.  Please arrive 20 min. before your appointment time  Please bring the following with you: 1)Picture Id 2) Insurance card 3) Completed Forms if New Patient  If scheduled for DIABETES VISIT please also brin) Medications 2) Meter 3) Blood glucose logs 4) Any recent labs if you have them  If scheduled for NUTRITION VISIT please also brin) 2-3 days of detailed food intake logs 2) Blood glucose monitor and blood glucose logs (if you have them)              Problem List              ICD-10-CM Priority Class Noted - Resolved    Encounter to establish care with new doctor Z71.89   2017 - Present    History of mitral valve repair Z98.890   2017 - Present    Chronic obstructive pulmonary disease (CMS-HCC)  J44.9   2/13/2017 - Present    Atrial fibrillation (CMS-HCC) I48.91   2/13/2017 - Present    Right knee pain M25.561   2/13/2017 - Present    Type 2 diabetes mellitus with complication (CMS-HCC) E11.8   2/13/2017 - Present    Anticoagulated on warfarin Z79.01   2/13/2017 - Present    Chronic anticoagulation Z79.01   2/28/2017 - Present      Health Maintenance        Date Due Completion Dates    A1C SCREENING 1950 ---    DIABETES MONOFILAMENT / LE EXAM 1950 ---    RETINAL SCREENING 2/16/1967 ---    URINE ACR / MICROALBUMIN 2/16/1967 ---    IMM DTaP/Tdap/Td Vaccine (1 - Tdap) 2/16/1968 ---    PAP SMEAR 2/16/1970 ---    MAMMOGRAM 2/16/1989 ---    COLONOSCOPY 2/16/1999 ---    IMM ZOSTER VACCINE 2/16/2009 ---    BONE DENSITY 2/16/2014 ---    IMM PNEUMOCOCCAL 65+ (ADULT) LOW/MEDIUM RISK SERIES (1 of 2 - PCV13) 2/16/2014 ---    FASTING LIPID PROFILE 1/30/2018 1/30/2017    SERUM CREATININE 1/30/2018 1/30/2017            Results     POCT Protime      Component    INR    3.9    Comment:     [id number] 3/2018 ic valid                        Current Immunizations     No immunizations on file.      Below and/or attached are the medications your provider expects you to take. Review all of your home medications and newly ordered medications with your provider and/or pharmacist. Follow medication instructions as directed by your provider and/or pharmacist. Please keep your medication list with you and share with your provider. Update the information when medications are discontinued, doses are changed, or new medications (including over-the-counter products) are added; and carry medication information at all times in the event of emergency situations     Allergies:  AMLODIPINE BESYLATE-VALSARTAN - (reactions not documented)     AMOXICILLIN - (reactions not documented)     ETODOLAC - (reactions not documented)     LISINOPRIL - (reactions not documented)     OTHER DRUG - (reactions not documented)               Medications   Valid as of: May 19, 2017 -  3:05 PM    Generic Name Brand Name Tablet Size Instructions for use    Albuterol Sulfate (Aero Soln) albuterol 108 (90 BASE) MCG/ACT Inhale 2 Puffs by mouth every four hours as needed for Shortness of Breath.        Carvedilol (Tab) COREG 12.5 MG Take 12.5 mg by mouth 2 times a day, with meals.        Cholecalciferol (Tab) cholecalciferol 1000 UNIT Take 1,000 Units by mouth every day.        Digoxin (Tab) LANOXIN 125 MCG Take 125 mcg by mouth every day.        Ferrous Sulfate (Tab) Iron 325 (65 FE) MG Take 1 Tab by mouth every day.        Furosemide (Tab) LASIX 20 MG Take 20 mg by mouth 2 times a day.        Furosemide (Tab) LASIX 20 MG Take 1 Tab by mouth 2 times a day.        MD ALERT... warfarin (COUMADIN) COUMADIN  by Other route as needed.        Potassium Chloride Darline CR (Tab CR) K-DUR 10 MEQ Take 10 mEq by mouth 2 times a day.        Spironolactone (Tab) ALDACTONE 25 MG Take 25 mg by mouth 1 time daily as needed.        Umeclidinium-Vilanterol (AEROSOL POWDER, BREATH ACTIVATED) Umeclidinium-Vilanterol 62.5-25 MCG/INH Inhale 1 Puff by mouth every day.        Warfarin Sodium (Tab) COUMADIN 5 MG Take as directed by Coumadin clinic.        Zinc (Cap) Zinc 50 MG Take 50 mg by mouth every day.        .                 Medicines prescribed today were sent to:     [facility] [id number] [place] [address] AT [facility] & [address]    [address] [facility] [address]    Phone: [phone] Fax: [phone]    Open 24 Hours?: No      Medication refill instructions:       If your prescription bottle indicates you have medication refills left, it is not necessary to call your provider’s office. Please contact your pharmacy and they will refill your medication.    If your prescription bottle indicates you do not have any refills left, you may request refills at any time through one of the following ways: The online [employer] system (except Urgent Care), by calling your  provider’s office, or by asking your pharmacy to contact your provider’s office with a refill request. Medication refills are processed only during regular business hours and may not be available until the next business day. Your provider may request additional information or to have a follow-up visit with you prior to refilling your medication.   *Please Note: Medication refills are assigned a new Rx number when refilled electronically. Your pharmacy may indicate that no refills were authorized even though a new prescription for the same medication is available at the pharmacy. Please request the medicine by name with the pharmacy before contacting your provider for a refill.        Warfarin Dosing Calendar   May 2017 Details    Sun Mon Tue Wed Thu Fri Sat      1               2               3               4               5               6                 7               8               9               10               11               12               13                 14               15               16               17               18               19   3.9   Hold   See details      20      12.5 mg           21      10 mg         22      10 mg         23      10 mg         24      12.5 mg         25      10 mg         26      10 mg         27      12.5 mg           28      10 mg         29      10 mg         30      10 mg         31      12.5 mg             Date Details   05/19 This INR check   INR: 3.9   [id number] 3/2018 ic valid               How to take your warfarin dose     To take:  10 mg Take 2 of the 5 mg tablets.    To take:  12.5 mg Take 2.5 of the 5 mg tablets.    Hold Do not take your warfarin dose. See the Details table to the right for additional instructions.                Warfarin Dosing Calendar   June 2017 Details    Sun Mon Tue Wed Thu Fri Sat         1      10 mg         2      10 mg         3                 4               5               6               7               8                9               10                 11               12               13               14               15               16               17                 18               19               20               21               22               23               24                 25               26               27               28               29               30                 Date Details   No additional details    Date of next INR:  6/2/2017         How to take your warfarin dose     To take:  10 mg Take 2 of the 5 mg tablets.              [employer] Access Code: Activation code not generated  Current [employer] Status: Active

## 2017-05-19 NOTE — PROGRESS NOTES
Anticoagulation Summary as of 5/19/2017     INR goal 2.0-3.0   Selected INR 3.9! (5/19/2017)   Maintenance plan 12.5 mg (5 mg x 2.5) on Wed, Sat; 10 mg (5 mg x 2) all other days   Weekly total 75 mg   Plan last modified Kenneth Salas PHARMD (2/24/2017)   Next INR check 6/2/2017   Target end date Indefinite    Indications   Chronic anticoagulation [Z79.01]  Atrial fibrillation (CMS-[facility]) [I48.91]  History of mitral valve repair [Z98.890]         Anticoagulation Episode Summary     INR check location     Preferred lab     Send INR reminders to     Comments       Anticoagulation Care Providers     Provider Role Specialty Phone number    Zeferino Duarte A.P.N. Referring Family Medicine [phone]    [facility] Responsible  [phone]    Kenneth Salas, MELE Responsible          Anticoagulation Patient Findings   Negatives Missed Doses, Extra Doses, Medication Changes, Antibiotic Use, Diet Changes, Dental/Other Procedures, Hospitalization, Bleeding Gums, Nose Bleeds, Blood in Urine, Blood in Stool, Any Bruising, Other Complaints        Patient is supratherapeutic today with INR of 3.9.  Pt denies any unusual s/s of bleeding, bruising, clotting or any changes to diet or medications.  Instructed patient to HOLD X 1, then resume current warfarin regimen.  Follow up in 2 weeks.    Kenneth Salas, MELE

## 2017-05-23 ENCOUNTER — HOSPITAL ENCOUNTER (OUTPATIENT)
Dept: LAB | Facility: MEDICAL CENTER | Age: 67
End: 2017-05-23
Attending: INTERNAL MEDICINE
Payer: MEDICARE

## 2017-05-23 LAB
ALBUMIN SERPL BCP-MCNC: 3.8 G/DL (ref 3.2–4.9)
ALBUMIN/GLOB SERPL: 1.1 G/DL
ALP SERPL-CCNC: 85 U/L (ref 30–99)
ALT SERPL-CCNC: 16 U/L (ref 2–50)
ANION GAP SERPL CALC-SCNC: 5 MMOL/L (ref 0–11.9)
AST SERPL-CCNC: 21 U/L (ref 12–45)
BILIRUB SERPL-MCNC: 1.7 MG/DL (ref 0.1–1.5)
BUN SERPL-MCNC: 27 MG/DL (ref 8–22)
CALCIUM SERPL-MCNC: 9.4 MG/DL (ref 8.5–10.5)
CHLORIDE SERPL-SCNC: 100 MMOL/L (ref 96–112)
CHOLEST SERPL-MCNC: 219 MG/DL (ref 100–199)
CO2 SERPL-SCNC: 29 MMOL/L (ref 20–33)
CREAT SERPL-MCNC: 1.08 MG/DL (ref 0.5–1.4)
GFR SERPL CREATININE-BSD FRML MDRD: 50 ML/MIN/1.73 M 2
GLOBULIN SER CALC-MCNC: 3.6 G/DL (ref 1.9–3.5)
GLUCOSE SERPL-MCNC: 263 MG/DL (ref 65–99)
HDLC SERPL-MCNC: 38 MG/DL
LDLC SERPL CALC-MCNC: 128 MG/DL
POTASSIUM SERPL-SCNC: 4.1 MMOL/L (ref 3.6–5.5)
PROT SERPL-MCNC: 7.4 G/DL (ref 6–8.2)
SODIUM SERPL-SCNC: 134 MMOL/L (ref 135–145)
TRIGL SERPL-MCNC: 263 MG/DL (ref 0–149)

## 2017-05-23 PROCEDURE — 36415 COLL VENOUS BLD VENIPUNCTURE: CPT

## 2017-05-23 PROCEDURE — 80061 LIPID PANEL: CPT

## 2017-05-23 PROCEDURE — 80053 COMPREHEN METABOLIC PANEL: CPT

## 2017-06-02 ENCOUNTER — ANTICOAGULATION VISIT (OUTPATIENT)
Dept: MEDICAL GROUP | Facility: PHYSICIAN GROUP | Age: 67
End: 2017-06-02
Payer: MEDICARE

## 2017-06-02 VITALS — HEART RATE: 70 BPM | DIASTOLIC BLOOD PRESSURE: 77 MMHG | SYSTOLIC BLOOD PRESSURE: 112 MMHG

## 2017-06-02 DIAGNOSIS — Z79.01 CHRONIC ANTICOAGULATION: ICD-10-CM

## 2017-06-02 DIAGNOSIS — Z98.890 HISTORY OF MITRAL VALVE REPAIR: ICD-10-CM

## 2017-06-02 LAB — INR PPP: 3.6 (ref 2–3.5)

## 2017-06-02 PROCEDURE — G8417 CALC BMI ABV UP PARAM F/U: HCPCS | Performed by: NURSE PRACTITIONER

## 2017-06-02 PROCEDURE — 99999 PR NO CHARGE: CPT | Performed by: NURSE PRACTITIONER

## 2017-06-02 PROCEDURE — 4040F PNEUMOC VAC/ADMIN/RCVD: CPT | Mod: 8P | Performed by: NURSE PRACTITIONER

## 2017-06-02 PROCEDURE — 85610 PROTHROMBIN TIME: CPT | Performed by: NURSE PRACTITIONER

## 2017-06-02 NOTE — MR AVS SNAPSHOT
Salma Nabeel   2017 10:45 AM   Anticoagulation Visit   MRN: [id number]    Department:  [facility]   Dept Phone:  [phone]    Description:  Female : 1950   Provider:  Tam Rosas, PHARMD           Allergies as of 2017     Allergen Noted Reactions    Amlodipine Besylate-Valsartan 2017       Amoxicillin 2017       Etodolac 2017       Lisinopril 2017       Other Drug 2017       metal      You were diagnosed with     Chronic anticoagulation   [372855]       History of mitral valve repair   [464633]         Vital Signs     Blood Pressure Pulse Smoking Status             112/77 mmHg 70 Never Smoker          Basic Information     Date Of Birth Sex Race Ethnicity Preferred Language    1950 Female White Non- English      Your appointments     2017  9:30 AM   Follow Up Visit with Beata Michele RD   [facility])    [address]  [address]  [place] [address] [phone]           It is the patient's responsibility to check with your Insurance for benefit coverage for visit / visits.  24 hours notice is required for all appointment changes or cancellation.  Please arrive 20 min. before your appointment time  Please bring the following with you: 1)Picture Id 2) Insurance card 3) Completed Forms if New Patient  If scheduled for DIABETES VISIT please also brin) Medications 2) Meter 3) Blood glucose logs 4) Any recent labs if you have them  If scheduled for NUTRITION VISIT please also brin) 2-3 days of detailed food intake logs 2) Blood glucose monitor and blood glucose logs (if you have them)            2017 10:45 AM   Anti-Coag Routine with [place] PHARMACIST   [facility] ([place])    [address] [facility] [place] [address]   [phone]            2017  1:00 PM   Pulmonary Function Test with PFT-RM2   [facility] (--)    [address]  [address]  [place] [address]   [phone]            2017  2:00 PM   New Patient Pulmonary with A Rotation   [facility] (--)    [address]  [address]  [place] [address]   [phone]            Sep 29, 2017 10:00 AM   New Patient with Michelle Brumfield R.N., Beata Michele RD   [facility])    [address]  [address]  [place] [address]   [phone]           It is the patient's responsibility to check with your Insurance for benefit coverage for visit / visits.  24 hours notice is required for all appointment changes or cancellation.  Please arrive 20 min. before your appointment time  Please bring the following with you: 1)Picture Id 2) Insurance card 3) Completed Forms if New Patient  If scheduled for DIABETES VISIT please also brin) Medications 2) Meter 3) Blood glucose logs 4) Any recent labs if you have them  If scheduled for NUTRITION VISIT please also brin) 2-3 days of detailed food intake logs 2) Blood glucose monitor and blood glucose logs (if you have them)              Problem List              ICD-10-CM Priority Class Noted - Resolved    Encounter to establish care with new doctor Z71.89   2017 - Present    History of mitral valve repair Z98.890   2017 - Present    Chronic obstructive pulmonary disease (CMS-HCC) J44.9   2017 - Present    Atrial fibrillation (CMS-HCC) I48.91   2017 - Present    Right knee pain M25.561   2017 - Present    Type 2 diabetes mellitus with complication (CMS-HCC) E11.8   2017 - Present    Anticoagulated on warfarin Z79.01   2017 - Present    Chronic anticoagulation Z79.01   2017 - Present      Health Maintenance        Date Due Completion Dates    A1C SCREENING 1950 ---    DIABETES MONOFILAMENT / LE EXAM 1950 ---    RETINAL SCREENING 1967 ---    URINE ACR / MICROALBUMIN 1967 ---    IMM DTaP/Tdap/Td Vaccine (1 - Tdap) 1968 ---     PAP SMEAR 2/16/1970 ---    MAMMOGRAM 2/16/1989 ---    COLONOSCOPY 2/16/1999 ---    IMM ZOSTER VACCINE 2/16/2009 ---    BONE DENSITY 2/16/2014 ---    IMM PNEUMOCOCCAL 65+ (ADULT) LOW/MEDIUM RISK SERIES (1 of 2 - PCV13) 2/16/2014 ---    FASTING LIPID PROFILE 5/23/2018 5/23/2017, 1/30/2017    SERUM CREATININE 5/23/2018 5/23/2017, 1/30/2017            Results     POCT Protime      Component    INR    3.6                        Current Immunizations     No immunizations on file.      Below and/or attached are the medications your provider expects you to take. Review all of your home medications and newly ordered medications with your provider and/or pharmacist. Follow medication instructions as directed by your provider and/or pharmacist. Please keep your medication list with you and share with your provider. Update the information when medications are discontinued, doses are changed, or new medications (including over-the-counter products) are added; and carry medication information at all times in the event of emergency situations     Allergies:  AMLODIPINE BESYLATE-VALSARTAN - (reactions not documented)     AMOXICILLIN - (reactions not documented)     ETODOLAC - (reactions not documented)     LISINOPRIL - (reactions not documented)     OTHER DRUG - (reactions not documented)               Medications  Valid as of: June 02, 2017 - 10:53 AM    Generic Name Brand Name Tablet Size Instructions for use    Albuterol Sulfate (Aero Soln) albuterol 108 (90 BASE) MCG/ACT Inhale 2 Puffs by mouth every four hours as needed for Shortness of Breath.        Carvedilol (Tab) COREG 12.5 MG Take 12.5 mg by mouth 2 times a day, with meals.        Cholecalciferol (Tab) cholecalciferol 1000 UNIT Take 1,000 Units by mouth every day.        Digoxin (Tab) LANOXIN 125 MCG Take 125 mcg by mouth every day.        Ferrous Sulfate (Tab) Iron 325 (65 FE) MG Take 1 Tab by mouth every day.        Furosemide (Tab) LASIX 20 MG Take 20 mg by mouth 2  times a day.        Furosemide (Tab) LASIX 20 MG Take 1 Tab by mouth 2 times a day.        Potassium Chloride Darline CR (Tab CR) K-DUR 10 MEQ Take 10 mEq by mouth 2 times a day.        Spironolactone (Tab) ALDACTONE 25 MG Take 25 mg by mouth 1 time daily as needed.        Umeclidinium-Vilanterol (AEROSOL POWDER, BREATH ACTIVATED) Umeclidinium-Vilanterol 62.5-25 MCG/INH Inhale 1 Puff by mouth every day.        Warfarin Sodium (Tab) COUMADIN 5 MG Take as directed by Coumadin clinic.        Warfarin Sodium (Tab) COUMADIN 5 MG Take two to two and one-half tablets by mouth one time daily or as directed by coumadin clinic        Zinc (Cap) Zinc 50 MG Take 50 mg by mouth every day.        .                 Medicines prescribed today were sent to:     [facility] [id number] [place] [address], [place] - [address] [facility]    [address] [facility] [address]    Phone: [phone] Fax: [phone]    Open 24 Hours?: No      Medication refill instructions:       If your prescription bottle indicates you have medication refills left, it is not necessary to call your provider’s office. Please contact your pharmacy and they will refill your medication.    If your prescription bottle indicates you do not have any refills left, you may request refills at any time through one of the following ways: The online [employer] system (except Urgent Care), by calling your provider’s office, or by asking your pharmacy to contact your provider’s office with a refill request. Medication refills are processed only during regular business hours and may not be available until the next business day. Your provider may request additional information or to have a follow-up visit with you prior to refilling your medication.   *Please Note: Medication refills are assigned a new Rx number when refilled electronically. Your pharmacy may indicate that no refills were authorized even though a new prescription for the same medication is  available at the pharmacy. Please request the medicine by name with the pharmacy before contacting your provider for a refill.        Warfarin Dosing Calendar   June 2017 Details    Sun Mon Tue Wed Thu Fri Sat         1               2   3.6   10 mg   See details      3      10 mg           4      10 mg         5      10 mg         6      10 mg         7      10 mg         8      10 mg         9      10 mg         10      10 mg           11      10 mg         12      10 mg         13      10 mg         14      10 mg         15      10 mg         16      10 mg         17                 18               19               20               21               22               23               24                 25               26               27               28               29               30                 Date Details   06/02 This INR check   INR: 3.6       Date of next INR:  6/16/2017         How to take your warfarin dose     To take:  10 mg Take 2 of the 5 mg tablets.              [employer] Access Code: Activation code not generated  Current [employer] Status: Active

## 2017-06-02 NOTE — PROGRESS NOTES
Anticoagulation Summary as of 6/2/2017     INR goal 2.0-3.0   Selected INR 3.6! (6/2/2017)   Maintenance plan 10 mg (5 mg x 2) every day   Weekly total 70 mg   Plan last modified Tam Rosas, YVONNED (6/2/2017)   Next INR check 6/16/2017   Target end date Indefinite    Indications   Chronic anticoagulation [Z79.01]  Atrial fibrillation (CMS-HCC) [I48.91]  History of mitral valve repair [Z98.890]         Anticoagulation Episode Summary     INR check location     Preferred lab     Send INR reminders to     Comments       Anticoagulation Care Providers     Provider Role Specialty Phone number    Zeferino Duarte A.P.YASMANY. Referring Family Medicine [phone]    [facility] Responsible  [phone]    Kenneth Salas, YVONNED Responsible          Anticoagulation Patient Findings    Patient's INR was SUPRA therapeutic.  Pt denies any unusual s/s of bleeding, bruising, clotting or any changes to diet.  Lasix and Carvedilol have been adjusted, no interactions.  Pt to begin 7% reduced regimen.    Follow up in 2 weeks.    Tam Rosas, PHARMD

## 2017-06-06 ENCOUNTER — APPOINTMENT (OUTPATIENT)
Dept: HEALTH INFORMATION MANAGEMENT | Facility: MEDICAL CENTER | Age: 67
End: 2017-06-06
Payer: MEDICARE

## 2017-06-14 ENCOUNTER — TELEPHONE (OUTPATIENT)
Dept: PULMONOLOGY | Facility: HOSPICE | Age: 67
End: 2017-06-14

## 2017-06-14 DIAGNOSIS — R06.00 DYSPNEA, UNSPECIFIED TYPE: ICD-10-CM

## 2017-06-16 ENCOUNTER — ANTICOAGULATION VISIT (OUTPATIENT)
Dept: MEDICAL GROUP | Facility: PHYSICIAN GROUP | Age: 67
End: 2017-06-16
Payer: MEDICARE

## 2017-06-16 VITALS — HEART RATE: 91 BPM | SYSTOLIC BLOOD PRESSURE: 122 MMHG | DIASTOLIC BLOOD PRESSURE: 81 MMHG

## 2017-06-16 DIAGNOSIS — Z98.890 HISTORY OF MITRAL VALVE REPAIR: ICD-10-CM

## 2017-06-16 DIAGNOSIS — I48.91 ATRIAL FIBRILLATION, UNSPECIFIED TYPE (HCC): ICD-10-CM

## 2017-06-16 DIAGNOSIS — Z79.01 CHRONIC ANTICOAGULATION: ICD-10-CM

## 2017-06-16 LAB — INR PPP: 2.2 (ref 2–3.5)

## 2017-06-16 PROCEDURE — 85610 PROTHROMBIN TIME: CPT | Performed by: FAMILY MEDICINE

## 2017-06-16 PROCEDURE — 99999 PR NO CHARGE: CPT | Performed by: FAMILY MEDICINE

## 2017-06-16 NOTE — PROGRESS NOTES
Anticoagulation Summary as of 6/16/2017     INR goal 2.0-3.0   Selected INR 2.2 (6/16/2017)   Maintenance plan 10 mg (5 mg x 2) every day   Weekly total 70 mg   Plan last modified Tam Rosas, YVONEND (6/2/2017)   Next INR check 7/7/2017   Target end date Indefinite    Indications   Chronic anticoagulation [Z79.01]  Atrial fibrillation (CMS-HCC) [I48.91]  History of mitral valve repair [Z98.890]         Anticoagulation Episode Summary     INR check location     Preferred lab     Send INR reminders to     Comments       Anticoagulation Care Providers     Provider Role Specialty Phone number    Zeferino Duarte A.P.YASMANY. Referring Family Medicine [phone]    [facility] Responsible  [phone]    Kenneth Salas, YVONNED Responsible          Anticoagulation Patient Findings   Positives Medication Changes    Negatives Missed Doses, Extra Doses, Antibiotic Use, Diet Changes, Dental/Other Procedures, Hospitalization, Bleeding Gums, Nose Bleeds, Blood in Urine, Blood in Stool, Any Bruising, Other Complaints    Comments Carvedilol dose increased         Patient is therapeutic today with INR of 2.2.  Pt denies any unusual s/s of bleeding, bruising, clotting or any changes to diet or medications.  Pt is to continue with current warfarin dosing regimen.  Follow up in 3 weeks.    Kenneth Salas, YVONNED

## 2017-06-16 NOTE — MR AVS SNAPSHOT
Salma Lees   2017 10:45 AM   Anticoagulation Visit   MRN: [id number]    Department:  [facility]   Dept Phone:  [phone]    Description:  Female : 1950   Provider:  Kenneth aSlas PHARMD           Allergies as of 2017     Allergen Noted Reactions    Amlodipine Besylate-Valsartan 2017       Amoxicillin 2017       Etodolac 2017       Lisinopril 2017       Other Drug 2017       metal      You were diagnosed with     Chronic anticoagulation   [148481]       Atrial fibrillation, unspecified type (CMS-HCC)   [7481021]       History of mitral valve repair   [275964]         Vital Signs     Blood Pressure Pulse Smoking Status             122/81 mmHg 91 Never Smoker          Basic Information     Date Of Birth Sex Race Ethnicity Preferred Language    1950 Female White Non- English      Your appointments     2017 10:45 AM   Anti-Coag Routine with Kenneth Salas PHARMD   [facility])    [address] [facility] [address] [phone]            2017  2:00 PM   Follow Up Visit with Beata Michele RD   [employer] [facility])    [address]  [address]  [facility] [id number] [phone]           It is the patient's responsibility to check with your Insurance for benefit coverage for visit / visits.  24 hours notice is required for all appointment changes or cancellation.  Please arrive 20 min. before your appointment time  Please bring the following with you: 1)Picture Id 2) Insurance card 3) Completed Forms if New Patient  If scheduled for DIABETES VISIT please also brin) Medications 2) Meter 3) Blood glucose logs 4) Any recent labs if you have them  If scheduled for NUTRITION VISIT please also brin) 2-3 days of detailed food intake logs 2) Blood glucose monitor and blood glucose logs (if you have them)            2017 10:30 AM   Anti-Coag Routine  with JODI LUGO PHARMACIST   [facility]    [address] [place]  [place] [address]   [phone]            2017  1:00 PM   Pulmonary Function Test with PFT-RM2   [facility] (--)    [address]  [address]  [place] [address]   [phone]            2017  2:00 PM   New Patient Pulmonary with A Rotation   [facility] (--)    [address]  [address]  [place] [address]   [phone]            Sep 29, 2017 10:00 AM   New Patient with Michelle Brumfield R.N., Beata Michele, IVA   [employer] [facility])    [address]  [address]  [place] [id number] [phone]           It is the patient's responsibility to check with your Insurance for benefit coverage for visit / visits.  24 hours notice is required for all appointment changes or cancellation.  Please arrive 20 min. before your appointment time  Please bring the following with you: 1)Picture Id 2) Insurance card 3) Completed Forms if New Patient  If scheduled for DIABETES VISIT please also brin) Medications 2) Meter 3) Blood glucose logs 4) Any recent labs if you have them  If scheduled for NUTRITION VISIT please also brin) 2-3 days of detailed food intake logs 2) Blood glucose monitor and blood glucose logs (if you have them)              Problem List              ICD-10-CM Priority Class Noted - Resolved    Encounter to establish care with new doctor Z71.89   2017 - Present    History of mitral valve repair Z98.890   2017 - Present    Chronic obstructive pulmonary disease (CMS-HCC) J44.9   2017 - Present    Atrial fibrillation (CMS-HCC) I48.91   2017 - Present    Right knee pain M25.561   2017 - Present    Type 2 diabetes mellitus with complication (CMS-HCC) E11.8   2017 - Present    Anticoagulated on warfarin Z79.01   2017 - Present    Chronic anticoagulation Z79.01   2017 - Present         Health Maintenance        Date Due Completion Dates    A1C SCREENING 1950 ---    DIABETES MONOFILAMENT / LE EXAM 1950 ---    RETINAL SCREENING 2/16/1967 ---    URINE ACR / MICROALBUMIN 2/16/1967 ---    IMM DTaP/Tdap/Td Vaccine (1 - Tdap) 2/16/1968 ---    PAP SMEAR 2/16/1970 ---    MAMMOGRAM 2/16/1989 ---    COLONOSCOPY 2/16/1999 ---    IMM ZOSTER VACCINE 2/16/2009 ---    BONE DENSITY 2/16/2014 ---    IMM PNEUMOCOCCAL 65+ (ADULT) LOW/MEDIUM RISK SERIES (1 of 2 - PCV13) 2/16/2014 ---    FASTING LIPID PROFILE 5/23/2018 5/23/2017, 1/30/2017    SERUM CREATININE 5/23/2018 5/23/2017, 1/30/2017            Results     POCT Protime      Component    INR    2.2    Comment:     [id number] 3/2018 ic valid                        Current Immunizations     No immunizations on file.      Below and/or attached are the medications your provider expects you to take. Review all of your home medications and newly ordered medications with your provider and/or pharmacist. Follow medication instructions as directed by your provider and/or pharmacist. Please keep your medication list with you and share with your provider. Update the information when medications are discontinued, doses are changed, or new medications (including over-the-counter products) are added; and carry medication information at all times in the event of emergency situations     Allergies:  AMLODIPINE BESYLATE-VALSARTAN - (reactions not documented)     AMOXICILLIN - (reactions not documented)     ETODOLAC - (reactions not documented)     LISINOPRIL - (reactions not documented)     OTHER DRUG - (reactions not documented)               Medications  Valid as of: June 16, 2017 - 10:44 AM    Generic Name Brand Name Tablet Size Instructions for use    Albuterol Sulfate (Aero Soln) albuterol 108 (90 BASE) MCG/ACT Inhale 2 Puffs by mouth every four hours as needed for Shortness of Breath.        Carvedilol (Tab) COREG 12.5 MG Take 12.5 mg by mouth 2 times a day, with  meals.        Cholecalciferol (Tab) cholecalciferol 1000 UNIT Take 1,000 Units by mouth every day.        Digoxin (Tab) LANOXIN 125 MCG Take 125 mcg by mouth every day.        Ferrous Sulfate (Tab) Iron 325 (65 FE) MG Take 1 Tab by mouth every day.        Furosemide (Tab) LASIX 20 MG Take 20 mg by mouth 2 times a day.        Furosemide (Tab) LASIX 20 MG Take 1 Tab by mouth 2 times a day.        Potassium Chloride Darline CR (Tab CR) K-DUR 10 MEQ Take 10 mEq by mouth 2 times a day.        Spironolactone (Tab) ALDACTONE 25 MG Take 25 mg by mouth 1 time daily as needed.        Umeclidinium-Vilanterol (AEROSOL POWDER, BREATH ACTIVATED) Umeclidinium-Vilanterol 62.5-25 MCG/INH Inhale 1 Puff by mouth every day.        Warfarin Sodium (Tab) COUMADIN 5 MG Take as directed by Coumadin clinic.        Warfarin Sodium (Tab) COUMADIN 5 MG Take two to two and one-half tablets by mouth one time daily or as directed by coumadin clinic        Zinc (Cap) Zinc 50 MG Take 50 mg by mouth every day.        .                 Medicines prescribed today were sent to:     [facility] [address] [place] [address] - [facility]    [address] [facility] [address]    Phone: [phone] Fax: [phone]    Open 24 Hours?: No      Medication refill instructions:       If your prescription bottle indicates you have medication refills left, it is not necessary to call your provider’s office. Please contact your pharmacy and they will refill your medication.    If your prescription bottle indicates you do not have any refills left, you may request refills at any time through one of the following ways: The online [employer] system (except Urgent Care), by calling your provider’s office, or by asking your pharmacy to contact your provider’s office with a refill request. Medication refills are processed only during regular business hours and may not be available until the next business day. Your provider may request  additional information or to have a follow-up visit with you prior to refilling your medication.   *Please Note: Medication refills are assigned a new Rx number when refilled electronically. Your pharmacy may indicate that no refills were authorized even though a new prescription for the same medication is available at the pharmacy. Please request the medicine by name with the pharmacy before contacting your provider for a refill.        Warfarin Dosing Calendar   June 2017 Details    Sun Mon Tue Wed Thu Fri Sat         1               2               3                 4               5               6               7               8               9               10                 11               12               13               14               15               16   2.2   10 mg   See details      17      10 mg           18      10 mg         19      10 mg         20      10 mg         21      10 mg         22      10 mg         23      10 mg         24      10 mg           25      10 mg         26      10 mg         27      10 mg         28      10 mg         29      10 mg         30      10 mg           Date Details   06/16 This INR check   INR: 2.2   [id number] 3/2018 ic valid               How to take your warfarin dose     To take:  10 mg Take 2 of the 5 mg tablets.           Warfarin Dosing Calendar   July 2017 Details    Sun Mon Tue Wed Thu Fri Sat           1      10 mg           2      10 mg         3      10 mg         4      10 mg         5      10 mg         6      10 mg         7      10 mg         8                 9               10               11               12               13               14               15                 16               17               18               19               20               21               22                 23               24               25               26               27               28               29                 30               31                      Date Details   No additional details    Date of next INR:  7/7/2017         How to take your warfarin dose     To take:  10 mg Take 2 of the 5 mg tablets.              [employer] Access Code: Activation code not generated  Current [employer] Status: Active

## 2017-06-19 ENCOUNTER — TELEPHONE (OUTPATIENT)
Dept: PULMONOLOGY | Facility: HOSPICE | Age: 67
End: 2017-06-19

## 2017-06-19 DIAGNOSIS — R06.00 DYSPNEA, UNSPECIFIED TYPE: ICD-10-CM

## 2017-06-21 ENCOUNTER — NON-PROVIDER VISIT (OUTPATIENT)
Dept: HEALTH INFORMATION MANAGEMENT | Facility: MEDICAL CENTER | Age: 67
End: 2017-06-21
Payer: MEDICARE

## 2017-06-21 VITALS — WEIGHT: 198 LBS | HEIGHT: 64 IN | BODY MASS INDEX: 33.8 KG/M2

## 2017-06-21 DIAGNOSIS — E11.8 TYPE 2 DIABETES MELLITUS WITH COMPLICATION, UNSPECIFIED LONG TERM INSULIN USE STATUS: ICD-10-CM

## 2017-06-21 PROCEDURE — G0108 DIAB MANAGE TRN  PER INDIV: HCPCS | Performed by: INTERNAL MEDICINE

## 2017-06-21 NOTE — MR AVS SNAPSHOT
Salma Lees   2017 2:00 PM   Appointment   MRN: [id number]    Department:  [facility]   Dept Phone:  [phone]    Description:  Female : 1950   Provider:  Beata Michele RD           Allergies as of 2017     Allergen Noted Reactions    Amlodipine Besylate-Valsartan 2017       Amoxicillin 2017       Etodolac 2017       Lisinopril 2017       Other Drug 2017       metal      Vital Signs     Smoking Status                   Never Smoker            Basic Information     Date Of Birth Sex Race Ethnicity Preferred Language    1950 Female White Non- English      Your appointments     2017 10:30 AM   Anti-Coag Routine with Alvin PHARMACIST   [facility]    [address] [facility] [place] [address]   [phone]            2017  1:00 PM   Pulmonary Function Test with PFT-RM2   [facility] (--)    [address]  [address]  [place] [address]   [phone]            2017  2:00 PM   New Patient Pulmonary with A Rotation   [facility] (--)    [address]  [address]  [place] [address]   [phone]            Sep 29, 2017 10:00 AM   New Patient with Michelle Brumfield R.N., Beata Michele RD   [facility])    [address]  [address]  [place] [address] [phone]           It is the patient's responsibility to check with your Insurance for benefit coverage for visit / visits.  24 hours notice is required for all appointment changes or cancellation.  Please arrive 20 min. before your appointment time  Please bring the following with you: 1)Picture Id 2) Insurance card 3) Completed Forms if New Patient  If scheduled for DIABETES VISIT please also brin) Medications 2) Meter 3) Blood glucose logs 4) Any recent labs if you have them  If scheduled for NUTRITION VISIT please also brin) 2-3 days of detailed  food intake logs 2) Blood glucose monitor and blood glucose logs (if you have them)              Problem List              ICD-10-CM Priority Class Noted - Resolved    Encounter to establish care with new doctor Z71.89   2/13/2017 - Present    History of mitral valve repair Z98.890   2/13/2017 - Present    Chronic obstructive pulmonary disease (CMS-HCC) J44.9   2/13/2017 - Present    Atrial fibrillation (CMS-HCC) I48.91   2/13/2017 - Present    Right knee pain M25.561   2/13/2017 - Present    Type 2 diabetes mellitus with complication (CMS-HCC) E11.8   2/13/2017 - Present    Anticoagulated on warfarin Z79.01   2/13/2017 - Present    Chronic anticoagulation Z79.01   2/28/2017 - Present      Health Maintenance        Date Due Completion Dates    A1C SCREENING 1950 ---    DIABETES MONOFILAMENT / LE EXAM 1950 ---    RETINAL SCREENING 2/16/1967 ---    URINE ACR / MICROALBUMIN 2/16/1967 ---    IMM DTaP/Tdap/Td Vaccine (1 - Tdap) 2/16/1968 ---    PAP SMEAR 2/16/1970 ---    MAMMOGRAM 2/16/1989 ---    COLONOSCOPY 2/16/1999 ---    IMM ZOSTER VACCINE 2/16/2009 ---    BONE DENSITY 2/16/2014 ---    IMM PNEUMOCOCCAL 65+ (ADULT) LOW/MEDIUM RISK SERIES (1 of 2 - PCV13) 2/16/2014 ---    FASTING LIPID PROFILE 5/23/2018 5/23/2017, 1/30/2017    SERUM CREATININE 5/23/2018 5/23/2017, 1/30/2017            Current Immunizations     No immunizations on file.      Below and/or attached are the medications your provider expects you to take. Review all of your home medications and newly ordered medications with your provider and/or pharmacist. Follow medication instructions as directed by your provider and/or pharmacist. Please keep your medication list with you and share with your provider. Update the information when medications are discontinued, doses are changed, or new medications (including over-the-counter products) are added; and carry medication information at all times in the event of emergency situations     Allergies:   AMLODIPINE BESYLATE-VALSARTAN - (reactions not documented)     AMOXICILLIN - (reactions not documented)     ETODOLAC - (reactions not documented)     LISINOPRIL - (reactions not documented)     OTHER DRUG - (reactions not documented)               Medications  Valid as of: June 21, 2017 -  2:36 PM    Generic Name Brand Name Tablet Size Instructions for use    Albuterol Sulfate (Aero Soln) albuterol 108 (90 BASE) MCG/ACT Inhale 2 Puffs by mouth every four hours as needed for Shortness of Breath.        Carvedilol (Tab) COREG 12.5 MG Take 12.5 mg by mouth 2 times a day, with meals.        Cholecalciferol (Tab) cholecalciferol 1000 UNIT Take 1,000 Units by mouth every day.        Digoxin (Tab) LANOXIN 125 MCG Take 125 mcg by mouth every day.        Ferrous Sulfate (Tab) Iron 325 (65 FE) MG Take 1 Tab by mouth every day.        Furosemide (Tab) LASIX 20 MG Take 20 mg by mouth 2 times a day.        Furosemide (Tab) LASIX 20 MG Take 1 Tab by mouth 2 times a day.        Potassium Chloride Darline CR (Tab CR) K-DUR 10 MEQ Take 10 mEq by mouth 2 times a day.        Spironolactone (Tab) ALDACTONE 25 MG Take 25 mg by mouth 1 time daily as needed.        Umeclidinium-Vilanterol (AEROSOL POWDER, BREATH ACTIVATED) Umeclidinium-Vilanterol 62.5-25 MCG/INH Inhale 1 Puff by mouth every day.        Warfarin Sodium (Tab) COUMADIN 5 MG Take as directed by Coumadin clinic.        Warfarin Sodium (Tab) COUMADIN 5 MG Take two to two and one-half tablets by mouth one time daily or as directed by coumadin clinic        Zinc (Cap) Zinc 50 MG Take 50 mg by mouth every day.        .                 Medicines prescribed today were sent to:     [facility] [id number]  [place], [place] - [address] [facility] [address]    [address] [facility] [place] [address]    Phone: [phone] Fax: [phone]    Open 24 Hours?: No      Medication refill instructions:       If your prescription bottle indicates you have medication refills left, it is  not necessary to call your provider’s office. Please contact your pharmacy and they will refill your medication.    If your prescription bottle indicates you do not have any refills left, you may request refills at any time through one of the following ways: The online [employer] system (except Urgent Care), by calling your provider’s office, or by asking your pharmacy to contact your provider’s office with a refill request. Medication refills are processed only during regular business hours and may not be available until the next business day. Your provider may request additional information or to have a follow-up visit with you prior to refilling your medication.   *Please Note: Medication refills are assigned a new Rx number when refilled electronically. Your pharmacy may indicate that no refills were authorized even though a new prescription for the same medication is available at the pharmacy. Please request the medicine by name with the pharmacy before contacting your provider for a refill.           [employer] Access Code: Activation code not generated  Current [employer] Status: Active

## 2017-06-21 NOTE — PROGRESS NOTES
"Nutrition Reassess    6/21/2017    LILIYA Casey   67 y.o.   Time: in/out 2:00-2:34pm       Subjective:  Pt attended Type 2 DM class in April. She is eating 2 meals a day; skips lunch or sometimes has a snack in the afternoon. Keeps a food journal and records fasting blood sugar, weight and BP daily.   Fasting glucose levels have been ~220's-260's the past 3 weeks. No prandial FSBS.   Pt states due to asthma and COPD she is not medically allowed to exercise.     Anthropometrics/Objective    Filed Vitals:    06/21/17 1522   Height: 1.626 m (5' 4\")   Weight: 89.812 kg (198 lb)     Body mass index is 33.97 kg/(m^2).    Previous weight/date: 206lb  Change : -8lb       Stated Goal Weight: 130lb   BG Values: 220-260's fasting. Recommended that pt start checking pre prandial and HS FSBS , rotating times once a day.   Medication changes: not on any diabetes medications and doesn't want to start any   Estimated calorie goal: 1300kcal based on Camp with consideration for inactivity and age     Diet Recall  Time   :B  Plain greek yogurt with 1/2 c blueberries OR SF banana bread with yogurt   :L  Cheese & crackers or nothing or fruit    :D  Salad or soup with crackers    :S  SF popsicle or nuts or nothing     ReAssesment/Notes:  Pt is currently following a low calorie diet. She was not aware that fruit provided carbohydrate and she will be more mindful of that. Based on her diet hx and food journal, she doesn't appear to eat excess amounts of carbohydrates. Recommended that she consume 4 small meals a day ~ every 4 hours to optimize glycemic control. Recommended she consume a consistent carbohydrate amount; ~30g per meal x4 per day. To include protein each more and 1 serving added fat per meal. This will provide ~1300kcal per day. Recommended pt check FSBS once a day, but to rotate times between fasting, before lunch, before dinner and before bed. If pt is having sugars still over 200, and cannot start exercise she " will likely need to start hypoglycemic agents for diabetes management. Explained to her that it may be necessary to start medication, and that it would not be her fault but just that he body may be very insulin resistant and/or not making sufficient insulin (as insulin production decreases with age).   She is very aware of her carbohydrate intake and now understands the importance of consistent controlled intake.     Follow-up: 6 month follow up class in September

## 2017-06-25 ENCOUNTER — HOSPITAL ENCOUNTER (INPATIENT)
Facility: MEDICAL CENTER | Age: 67
LOS: 3 days | DRG: 871 | End: 2017-06-28
Attending: EMERGENCY MEDICINE | Admitting: INTERNAL MEDICINE
Payer: MEDICARE

## 2017-06-25 ENCOUNTER — APPOINTMENT (OUTPATIENT)
Dept: RADIOLOGY | Facility: MEDICAL CENTER | Age: 67
DRG: 871 | End: 2017-06-25
Attending: EMERGENCY MEDICINE
Payer: MEDICARE

## 2017-06-25 ENCOUNTER — APPOINTMENT (OUTPATIENT)
Dept: RADIOLOGY | Facility: MEDICAL CENTER | Age: 67
DRG: 871 | End: 2017-06-25
Attending: UROLOGY
Payer: MEDICARE

## 2017-06-25 ENCOUNTER — RESOLUTE PROFESSIONAL BILLING HOSPITAL PROF FEE (OUTPATIENT)
Dept: HOSPITALIST | Facility: MEDICAL CENTER | Age: 67
End: 2017-06-25
Payer: MEDICARE

## 2017-06-25 ENCOUNTER — OFFICE VISIT (OUTPATIENT)
Dept: URGENT CARE | Facility: PHYSICIAN GROUP | Age: 67
End: 2017-06-25
Payer: MEDICARE

## 2017-06-25 ENCOUNTER — APPOINTMENT (OUTPATIENT)
Dept: RADIOLOGY | Facility: MEDICAL CENTER | Age: 67
DRG: 871 | End: 2017-06-25
Attending: ANESTHESIOLOGY
Payer: MEDICARE

## 2017-06-25 VITALS
HEART RATE: 108 BPM | SYSTOLIC BLOOD PRESSURE: 168 MMHG | OXYGEN SATURATION: 92 % | DIASTOLIC BLOOD PRESSURE: 88 MMHG | TEMPERATURE: 101.4 F | RESPIRATION RATE: 20 BRPM

## 2017-06-25 DIAGNOSIS — I48.91 ATRIAL FIBRILLATION WITH RAPID VENTRICULAR RESPONSE (HCC): ICD-10-CM

## 2017-06-25 DIAGNOSIS — N12 PYELONEPHRITIS: ICD-10-CM

## 2017-06-25 DIAGNOSIS — J44.9 CHRONIC OBSTRUCTIVE PULMONARY DISEASE, UNSPECIFIED COPD TYPE (HCC): ICD-10-CM

## 2017-06-25 DIAGNOSIS — A41.9 SEPSIS, DUE TO UNSPECIFIED ORGANISM: ICD-10-CM

## 2017-06-25 DIAGNOSIS — N28.9 RENAL INSUFFICIENCY: ICD-10-CM

## 2017-06-25 DIAGNOSIS — Z98.890 HISTORY OF MITRAL VALVE REPAIR: ICD-10-CM

## 2017-06-25 DIAGNOSIS — Z79.01 CHRONIC ANTICOAGULATION: ICD-10-CM

## 2017-06-25 DIAGNOSIS — E11.8 TYPE 2 DIABETES MELLITUS WITH COMPLICATION, WITHOUT LONG-TERM CURRENT USE OF INSULIN (HCC): ICD-10-CM

## 2017-06-25 DIAGNOSIS — N13.2 URETERAL STONE WITH HYDRONEPHROSIS: ICD-10-CM

## 2017-06-25 DIAGNOSIS — R10.9 ACUTE LEFT FLANK PAIN: ICD-10-CM

## 2017-06-25 PROBLEM — N13.9 OBSTRUCTIVE UROPATHY: Status: ACTIVE | Noted: 2017-06-25

## 2017-06-25 PROBLEM — N10 ACUTE PYELONEPHRITIS: Status: ACTIVE | Noted: 2017-06-25

## 2017-06-25 PROBLEM — R65.20 SEVERE SEPSIS(995.92): Status: ACTIVE | Noted: 2017-06-25

## 2017-06-25 LAB
ABO GROUP BLD: NORMAL
ALBUMIN SERPL BCP-MCNC: 3.8 G/DL (ref 3.2–4.9)
ALBUMIN/GLOB SERPL: 1 G/DL
ALP SERPL-CCNC: 112 U/L (ref 30–99)
ALT SERPL-CCNC: 15 U/L (ref 2–50)
ANION GAP SERPL CALC-SCNC: 10 MMOL/L (ref 0–11.9)
APPEARANCE UR: ABNORMAL
APPEARANCE UR: CLEAR
AST SERPL-CCNC: 17 U/L (ref 12–45)
BACTERIA #/AREA URNS HPF: ABNORMAL /HPF
BARCODED ABORH UBTYP: 6200
BARCODED PRD CODE UBPRD: NORMAL
BARCODED UNIT NUM UBUNT: NORMAL
BASOPHILS # BLD AUTO: 0.9 % (ref 0–1.8)
BASOPHILS # BLD: 0.11 K/UL (ref 0–0.12)
BILIRUB SERPL-MCNC: 2.9 MG/DL (ref 0.1–1.5)
BILIRUB UR QL STRIP.AUTO: NEGATIVE
BILIRUB UR STRIP-MCNC: NORMAL MG/DL
BUN SERPL-MCNC: 26 MG/DL (ref 8–22)
CALCIUM SERPL-MCNC: 9.1 MG/DL (ref 8.5–10.5)
CHLORIDE SERPL-SCNC: 98 MMOL/L (ref 96–112)
CO2 SERPL-SCNC: 21 MMOL/L (ref 20–33)
COLOR UR AUTO: YELLOW
COLOR UR: ABNORMAL
COMPONENT FT 8504FT: NORMAL
CREAT SERPL-MCNC: 1.55 MG/DL (ref 0.5–1.4)
CULTURE IF INDICATED INDCX: YES UA CULTURE
DIGOXIN SERPL-MCNC: 1.7 NG/ML (ref 0.8–2)
DIGOXIN SERPL-MCNC: 1.8 NG/ML (ref 0.8–2)
EOSINOPHIL # BLD AUTO: 0 K/UL (ref 0–0.51)
EOSINOPHIL NFR BLD: 0 % (ref 0–6.9)
EPI CELLS #/AREA URNS HPF: ABNORMAL /HPF
ERYTHROCYTE [DISTWIDTH] IN BLOOD BY AUTOMATED COUNT: 51.8 FL (ref 35.9–50)
GFR SERPL CREATININE-BSD FRML MDRD: 33 ML/MIN/1.73 M 2
GLOBULIN SER CALC-MCNC: 3.7 G/DL (ref 1.9–3.5)
GLUCOSE SERPL-MCNC: 513 MG/DL (ref 65–99)
GLUCOSE UR STRIP.AUTO-MCNC: >1000 MG/DL
GLUCOSE UR STRIP.AUTO-MCNC: >2000 MG/DL
HCT VFR BLD AUTO: 37.6 % (ref 37–47)
HGB BLD-MCNC: 12.3 G/DL (ref 12–16)
HYALINE CASTS #/AREA URNS LPF: ABNORMAL /LPF
INR PPP: 2.93 (ref 0.87–1.13)
KETONES UR STRIP.AUTO-MCNC: NEGATIVE MG/DL
KETONES UR STRIP.AUTO-MCNC: NORMAL MG/DL
LACTATE BLD-SCNC: 1.7 MMOL/L (ref 0.5–2)
LACTATE BLD-SCNC: 3.2 MMOL/L (ref 0.5–2)
LEUKOCYTE ESTERASE UR QL STRIP.AUTO: ABNORMAL
LEUKOCYTE ESTERASE UR QL STRIP.AUTO: NORMAL
LYMPHOCYTES # BLD AUTO: 0.31 K/UL (ref 1–4.8)
LYMPHOCYTES NFR BLD: 2.6 % (ref 22–41)
MANUAL DIFF BLD: NORMAL
MCH RBC QN AUTO: 30.4 PG (ref 27–33)
MCHC RBC AUTO-ENTMCNC: 32.7 G/DL (ref 33.6–35)
MCV RBC AUTO: 93.1 FL (ref 81.4–97.8)
METAMYELOCYTES NFR BLD MANUAL: 0.9 %
MICRO URNS: ABNORMAL
MONOCYTES # BLD AUTO: 0.11 K/UL (ref 0–0.85)
MONOCYTES NFR BLD AUTO: 0.9 % (ref 0–13.4)
MORPHOLOGY BLD-IMP: NORMAL
MUCOUS THREADS #/AREA URNS HPF: ABNORMAL /HPF
NEUTROPHILS # BLD AUTO: 11.27 K/UL (ref 2–7.15)
NEUTROPHILS NFR BLD: 85 % (ref 44–72)
NEUTS BAND NFR BLD MANUAL: 9.7 % (ref 0–10)
NITRITE UR QL STRIP.AUTO: NEGATIVE
NITRITE UR QL STRIP.AUTO: NORMAL
NRBC # BLD AUTO: 0 K/UL
NRBC BLD AUTO-RTO: 0 /100 WBC
PH UR STRIP.AUTO: 5 [PH] (ref 5–8)
PH UR STRIP.AUTO: 5.5 [PH]
PLATELET # BLD AUTO: 153 K/UL (ref 164–446)
PLATELET BLD QL SMEAR: NORMAL
PMV BLD AUTO: 11.8 FL (ref 9–12.9)
POTASSIUM SERPL-SCNC: 4.1 MMOL/L (ref 3.6–5.5)
PRODUCT TYPE UPROD: NORMAL
PROT SERPL-MCNC: 7.5 G/DL (ref 6–8.2)
PROT UR QL STRIP: 30 MG/DL
PROT UR QL STRIP: NEGATIVE MG/DL
PROTHROMBIN TIME: 31.5 SEC (ref 12–14.6)
RBC # BLD AUTO: 4.04 M/UL (ref 4.2–5.4)
RBC # URNS HPF: ABNORMAL /HPF
RBC BLD AUTO: NORMAL
RBC UR QL AUTO: ABNORMAL
RBC UR QL AUTO: NORMAL
RH BLD: NORMAL
SODIUM SERPL-SCNC: 129 MMOL/L (ref 135–145)
SP GR UR STRIP.AUTO: 1
SP GR UR STRIP.AUTO: 1.02
UNIT STATUS USTAT: NORMAL
UROBILINOGEN UR STRIP-MCNC: NORMAL MG/DL
WBC # BLD AUTO: 11.9 K/UL (ref 4.8–10.8)
WBC #/AREA URNS HPF: >150 /HPF

## 2017-06-25 PROCEDURE — 80053 COMPREHEN METABOLIC PANEL: CPT

## 2017-06-25 PROCEDURE — A9270 NON-COVERED ITEM OR SERVICE: HCPCS | Performed by: INTERNAL MEDICINE

## 2017-06-25 PROCEDURE — 80162 ASSAY OF DIGOXIN TOTAL: CPT

## 2017-06-25 PROCEDURE — 700105 HCHG RX REV CODE 258: Performed by: INTERNAL MEDICINE

## 2017-06-25 PROCEDURE — 81001 URINALYSIS AUTO W/SCOPE: CPT

## 2017-06-25 PROCEDURE — 36415 COLL VENOUS BLD VENIPUNCTURE: CPT

## 2017-06-25 PROCEDURE — C1769 GUIDE WIRE: HCPCS | Performed by: UROLOGY

## 2017-06-25 PROCEDURE — 99223 1ST HOSP IP/OBS HIGH 75: CPT | Performed by: INTERNAL MEDICINE

## 2017-06-25 PROCEDURE — 160028 HCHG SURGERY MINUTES - 1ST 30 MINS LEVEL 3: Performed by: UROLOGY

## 2017-06-25 PROCEDURE — 160039 HCHG SURGERY MINUTES - EA ADDL 1 MIN LEVEL 3: Performed by: UROLOGY

## 2017-06-25 PROCEDURE — 87040 BLOOD CULTURE FOR BACTERIA: CPT | Mod: 91

## 2017-06-25 PROCEDURE — 87186 SC STD MICRODIL/AGAR DIL: CPT

## 2017-06-25 PROCEDURE — 93005 ELECTROCARDIOGRAM TRACING: CPT | Performed by: EMERGENCY MEDICINE

## 2017-06-25 PROCEDURE — 74176 CT ABD & PELVIS W/O CONTRAST: CPT

## 2017-06-25 PROCEDURE — 30233K1 TRANSFUSION OF NONAUTOLOGOUS FROZEN PLASMA INTO PERIPHERAL VEIN, PERCUTANEOUS APPROACH: ICD-10-PCS | Performed by: UROLOGY

## 2017-06-25 PROCEDURE — C2617 STENT, NON-COR, TEM W/O DEL: HCPCS | Performed by: UROLOGY

## 2017-06-25 PROCEDURE — 700102 HCHG RX REV CODE 250 W/ 637 OVERRIDE(OP)

## 2017-06-25 PROCEDURE — 502240 HCHG MISC OR SUPPLY RC 0272: Performed by: UROLOGY

## 2017-06-25 PROCEDURE — 85027 COMPLETE CBC AUTOMATED: CPT

## 2017-06-25 PROCEDURE — 501329 HCHG SET, CYSTO IRRIG Y TUR: Performed by: UROLOGY

## 2017-06-25 PROCEDURE — 85610 PROTHROMBIN TIME: CPT

## 2017-06-25 PROCEDURE — 83036 HEMOGLOBIN GLYCOSYLATED A1C: CPT

## 2017-06-25 PROCEDURE — 700111 HCHG RX REV CODE 636 W/ 250 OVERRIDE (IP): Performed by: EMERGENCY MEDICINE

## 2017-06-25 PROCEDURE — 99202 OFFICE O/P NEW SF 15 MIN: CPT | Performed by: EMERGENCY MEDICINE

## 2017-06-25 PROCEDURE — 85007 BL SMEAR W/DIFF WBC COUNT: CPT

## 2017-06-25 PROCEDURE — 700111 HCHG RX REV CODE 636 W/ 250 OVERRIDE (IP)

## 2017-06-25 PROCEDURE — 94760 N-INVAS EAR/PLS OXIMETRY 1: CPT

## 2017-06-25 PROCEDURE — 0T9B80Z DRAINAGE OF BLADDER WITH DRAINAGE DEVICE, VIA NATURAL OR ARTIFICIAL OPENING ENDOSCOPIC: ICD-10-PCS | Performed by: UROLOGY

## 2017-06-25 PROCEDURE — 96361 HYDRATE IV INFUSION ADD-ON: CPT

## 2017-06-25 PROCEDURE — 160048 HCHG OR STATISTICAL LEVEL 1-5: Performed by: UROLOGY

## 2017-06-25 PROCEDURE — 82962 GLUCOSE BLOOD TEST: CPT

## 2017-06-25 PROCEDURE — 36430 TRANSFUSION BLD/BLD COMPNT: CPT

## 2017-06-25 PROCEDURE — 700105 HCHG RX REV CODE 258: Performed by: EMERGENCY MEDICINE

## 2017-06-25 PROCEDURE — 87077 CULTURE AEROBIC IDENTIFY: CPT

## 2017-06-25 PROCEDURE — 0T778DZ DILATION OF LEFT URETER WITH INTRALUMINAL DEVICE, VIA NATURAL OR ARTIFICIAL OPENING ENDOSCOPIC: ICD-10-PCS | Performed by: UROLOGY

## 2017-06-25 PROCEDURE — A4357 BEDSIDE DRAINAGE BAG: HCPCS | Performed by: UROLOGY

## 2017-06-25 PROCEDURE — A4338 INDWELLING CATHETER LATEX: HCPCS | Performed by: UROLOGY

## 2017-06-25 PROCEDURE — 99291 CRITICAL CARE FIRST HOUR: CPT

## 2017-06-25 PROCEDURE — 86900 BLOOD TYPING SEROLOGIC ABO: CPT

## 2017-06-25 PROCEDURE — 71010 DX-CHEST-PORTABLE (1 VIEW): CPT

## 2017-06-25 PROCEDURE — 770020 HCHG ROOM/CARE - TELE (206)

## 2017-06-25 PROCEDURE — P9017 PLASMA 1 DONOR FRZ W/IN 8 HR: HCPCS

## 2017-06-25 PROCEDURE — 81002 URINALYSIS NONAUTO W/O SCOPE: CPT | Performed by: EMERGENCY MEDICINE

## 2017-06-25 PROCEDURE — 96365 THER/PROPH/DIAG IV INF INIT: CPT

## 2017-06-25 PROCEDURE — 160035 HCHG PACU - 1ST 60 MINS PHASE I: Performed by: UROLOGY

## 2017-06-25 PROCEDURE — 500880 HCHG PACK, CYSTO W/SEP LEGGINGS: Performed by: UROLOGY

## 2017-06-25 PROCEDURE — 160009 HCHG ANES TIME/MIN: Performed by: UROLOGY

## 2017-06-25 PROCEDURE — 87086 URINE CULTURE/COLONY COUNT: CPT

## 2017-06-25 PROCEDURE — 83605 ASSAY OF LACTIC ACID: CPT

## 2017-06-25 PROCEDURE — 700102 HCHG RX REV CODE 250 W/ 637 OVERRIDE(OP): Performed by: INTERNAL MEDICINE

## 2017-06-25 PROCEDURE — 86901 BLOOD TYPING SEROLOGIC RH(D): CPT

## 2017-06-25 PROCEDURE — 700101 HCHG RX REV CODE 250

## 2017-06-25 PROCEDURE — A9270 NON-COVERED ITEM OR SERVICE: HCPCS

## 2017-06-25 PROCEDURE — 160002 HCHG RECOVERY MINUTES (STAT): Performed by: UROLOGY

## 2017-06-25 DEVICE — STENT UROLOGICAL POLARIS 6X24  ULTRA: Type: IMPLANTABLE DEVICE | Status: FUNCTIONAL

## 2017-06-25 RX ORDER — DEXTROSE MONOHYDRATE 25 G/50ML
25 INJECTION, SOLUTION INTRAVENOUS
Status: DISCONTINUED | OUTPATIENT
Start: 2017-06-25 | End: 2017-06-28 | Stop reason: HOSPADM

## 2017-06-25 RX ORDER — LEVOFLOXACIN 5 MG/ML
750 INJECTION, SOLUTION INTRAVENOUS
Status: DISCONTINUED | OUTPATIENT
Start: 2017-06-26 | End: 2017-06-28 | Stop reason: HOSPADM

## 2017-06-25 RX ORDER — AMOXICILLIN 250 MG
2 CAPSULE ORAL 2 TIMES DAILY
Status: DISCONTINUED | OUTPATIENT
Start: 2017-06-25 | End: 2017-06-28 | Stop reason: HOSPADM

## 2017-06-25 RX ORDER — LEVOFLOXACIN 5 MG/ML
750 INJECTION, SOLUTION INTRAVENOUS ONCE
Status: COMPLETED | OUTPATIENT
Start: 2017-06-25 | End: 2017-06-25

## 2017-06-25 RX ORDER — DIGOXIN 125 MCG
125 TABLET ORAL DAILY
Status: DISCONTINUED | OUTPATIENT
Start: 2017-06-26 | End: 2017-06-28 | Stop reason: HOSPADM

## 2017-06-25 RX ORDER — MORPHINE SULFATE 4 MG/ML
4 INJECTION, SOLUTION INTRAMUSCULAR; INTRAVENOUS
Status: DISCONTINUED | OUTPATIENT
Start: 2017-06-25 | End: 2017-06-28 | Stop reason: HOSPADM

## 2017-06-25 RX ORDER — ACETAMINOPHEN 325 MG/1
650 TABLET ORAL EVERY 6 HOURS PRN
Status: DISCONTINUED | OUTPATIENT
Start: 2017-06-25 | End: 2017-06-28 | Stop reason: HOSPADM

## 2017-06-25 RX ORDER — OXYCODONE HYDROCHLORIDE 10 MG/1
10 TABLET ORAL
Status: DISCONTINUED | OUTPATIENT
Start: 2017-06-25 | End: 2017-06-28 | Stop reason: HOSPADM

## 2017-06-25 RX ORDER — ONDANSETRON 2 MG/ML
4 INJECTION INTRAMUSCULAR; INTRAVENOUS EVERY 4 HOURS PRN
Status: DISCONTINUED | OUTPATIENT
Start: 2017-06-25 | End: 2017-06-28 | Stop reason: HOSPADM

## 2017-06-25 RX ORDER — OXYCODONE HYDROCHLORIDE 5 MG/1
5 TABLET ORAL
Status: DISCONTINUED | OUTPATIENT
Start: 2017-06-25 | End: 2017-06-28 | Stop reason: HOSPADM

## 2017-06-25 RX ORDER — SODIUM CHLORIDE 9 MG/ML
1700 INJECTION, SOLUTION INTRAVENOUS ONCE
Status: COMPLETED | OUTPATIENT
Start: 2017-06-25 | End: 2017-06-25

## 2017-06-25 RX ORDER — SODIUM CHLORIDE 9 MG/ML
500 INJECTION, SOLUTION INTRAVENOUS
Status: DISCONTINUED | OUTPATIENT
Start: 2017-06-25 | End: 2017-06-28 | Stop reason: HOSPADM

## 2017-06-25 RX ORDER — WARFARIN SODIUM 10 MG/1
10 TABLET ORAL EVERY EVENING
COMMUNITY
End: 2017-07-07

## 2017-06-25 RX ORDER — ACETAMINOPHEN 500 MG
1000 TABLET ORAL ONCE
Status: COMPLETED | OUTPATIENT
Start: 2017-06-25 | End: 2017-06-25

## 2017-06-25 RX ORDER — CARVEDILOL 12.5 MG/1
12.5 TABLET ORAL 3 TIMES DAILY
Status: DISCONTINUED | OUTPATIENT
Start: 2017-06-25 | End: 2017-06-27

## 2017-06-25 RX ORDER — LABETALOL HYDROCHLORIDE 5 MG/ML
10 INJECTION, SOLUTION INTRAVENOUS EVERY 4 HOURS PRN
Status: DISCONTINUED | OUTPATIENT
Start: 2017-06-25 | End: 2017-06-28 | Stop reason: HOSPADM

## 2017-06-25 RX ORDER — ONDANSETRON 4 MG/1
4 TABLET, ORALLY DISINTEGRATING ORAL EVERY 4 HOURS PRN
Status: DISCONTINUED | OUTPATIENT
Start: 2017-06-25 | End: 2017-06-28 | Stop reason: HOSPADM

## 2017-06-25 RX ORDER — POLYETHYLENE GLYCOL 3350 17 G/17G
1 POWDER, FOR SOLUTION ORAL
Status: DISCONTINUED | OUTPATIENT
Start: 2017-06-25 | End: 2017-06-28 | Stop reason: HOSPADM

## 2017-06-25 RX ORDER — SODIUM CHLORIDE 9 MG/ML
INJECTION, SOLUTION INTRAVENOUS CONTINUOUS
Status: DISCONTINUED | OUTPATIENT
Start: 2017-06-25 | End: 2017-06-28 | Stop reason: HOSPADM

## 2017-06-25 RX ORDER — MAGNESIUM HYDROXIDE 1200 MG/15ML
LIQUID ORAL
Status: DISCONTINUED | OUTPATIENT
Start: 2017-06-25 | End: 2017-06-25 | Stop reason: HOSPADM

## 2017-06-25 RX ORDER — BISACODYL 10 MG
10 SUPPOSITORY, RECTAL RECTAL
Status: DISCONTINUED | OUTPATIENT
Start: 2017-06-25 | End: 2017-06-28 | Stop reason: HOSPADM

## 2017-06-25 RX ORDER — CEFTRIAXONE 1 G/1
1 INJECTION, POWDER, FOR SOLUTION INTRAMUSCULAR; INTRAVENOUS ONCE
Status: DISCONTINUED | OUTPATIENT
Start: 2017-06-25 | End: 2017-06-25

## 2017-06-25 RX ORDER — SODIUM CHLORIDE 9 MG/ML
1000 INJECTION, SOLUTION INTRAVENOUS ONCE
Status: COMPLETED | OUTPATIENT
Start: 2017-06-25 | End: 2017-06-25

## 2017-06-25 RX ORDER — ONDANSETRON 4 MG/1
4 TABLET, ORALLY DISINTEGRATING ORAL ONCE
Status: DISCONTINUED | OUTPATIENT
Start: 2017-06-25 | End: 2017-06-25

## 2017-06-25 RX ORDER — WARFARIN SODIUM 10 MG/1
10 TABLET ORAL
Status: COMPLETED | OUTPATIENT
Start: 2017-06-25 | End: 2017-06-25

## 2017-06-25 RX ADMIN — Medication 1000 MG: at 16:30

## 2017-06-25 RX ADMIN — SODIUM CHLORIDE 1700 ML: 9 INJECTION, SOLUTION INTRAVENOUS at 19:07

## 2017-06-25 RX ADMIN — LEVOFLOXACIN 750 MG: 5 INJECTION, SOLUTION INTRAVENOUS at 18:40

## 2017-06-25 RX ADMIN — SODIUM CHLORIDE 1000 ML: 9 INJECTION, SOLUTION INTRAVENOUS at 17:45

## 2017-06-25 RX ADMIN — PHYTONADIONE 10 MG: 10 INJECTION, EMULSION INTRAMUSCULAR; INTRAVENOUS; SUBCUTANEOUS at 19:15

## 2017-06-25 RX ADMIN — DILTIAZEM HYDROCHLORIDE 30 MG: 30 TABLET, FILM COATED ORAL at 22:11

## 2017-06-25 RX ADMIN — CARVEDILOL 12.5 MG: 12.5 TABLET, FILM COATED ORAL at 22:11

## 2017-06-25 RX ADMIN — PHYTONADIONE 10 MG: 10 INJECTION, EMULSION INTRAMUSCULAR; INTRAVENOUS; SUBCUTANEOUS at 19:30

## 2017-06-25 RX ADMIN — STANDARDIZED SENNA CONCENTRATE AND DOCUSATE SODIUM 2 TABLET: 8.6; 5 TABLET, FILM COATED ORAL at 22:11

## 2017-06-25 RX ADMIN — ONDANSETRON 4 MG: 4 TABLET, ORALLY DISINTEGRATING ORAL at 14:55

## 2017-06-25 RX ADMIN — COAGULATION FACTOR VIIA (RECOMBINANT) 1000 MCG: KIT at 19:15

## 2017-06-25 RX ADMIN — WARFARIN SODIUM 10 MG: 10 TABLET ORAL at 23:21

## 2017-06-25 RX ADMIN — SODIUM CHLORIDE: 9 INJECTION, SOLUTION INTRAVENOUS at 21:54

## 2017-06-25 RX ADMIN — INSULIN LISPRO 6 UNITS: 100 INJECTION, SOLUTION INTRAVENOUS; SUBCUTANEOUS at 23:15

## 2017-06-25 ASSESSMENT — PAIN SCALES - GENERAL
PAINLEVEL_OUTOF10: 0
PAINLEVEL_OUTOF10: 6
PAINLEVEL_OUTOF10: 0
PAINLEVEL_OUTOF10: 0

## 2017-06-25 ASSESSMENT — ENCOUNTER SYMPTOMS
FEVER: 1
COUGH: 1
VOMITING: 1
BACK PAIN: 1
EYE DISCHARGE: 0
SPEECH CHANGE: 0
NERVOUS/ANXIOUS: 1
DIAPHORESIS: 1
PERIANAL NUMBNESS: 0
EYE REDNESS: 0
HEADACHES: 0
HEMOPTYSIS: 1
SENSORY CHANGE: 0
WEAKNESS: 1
LEG PAIN: 0
CHILLS: 1
NAUSEA: 1
ABDOMINAL PAIN: 0

## 2017-06-25 ASSESSMENT — PATIENT HEALTH QUESTIONNAIRE - PHQ9
SUM OF ALL RESPONSES TO PHQ QUESTIONS 1-9: 0
2. FEELING DOWN, DEPRESSED, IRRITABLE, OR HOPELESS: NOT AT ALL
SUM OF ALL RESPONSES TO PHQ9 QUESTIONS 1 AND 2: 0
1. LITTLE INTEREST OR PLEASURE IN DOING THINGS: NOT AT ALL

## 2017-06-25 ASSESSMENT — LIFESTYLE VARIABLES
ALCOHOL_USE: NO
EVER_SMOKED: YES
DO YOU DRINK ALCOHOL: NO

## 2017-06-25 NOTE — IP AVS SNAPSHOT
LensAR Access Code: Activation code not generated  Current LensAR Status: Active    [employer]  A secure, online tool to manage your health information     [employer]’s LensAR® is a secure, online tool that connects you to your personalized health information from the privacy of your home -- day or night - making it very easy for you to manage your healthcare. Once the activation process is completed, you can even access your medical information using the LensAR andi, which is available for free in the Apple Andi store or Google Play store.     LensAR provides the following levels of access (as shown below):   My Chart Features   [facility] Primary Care Doctor [facility]  Specialists [facility]  Urgent  Care Non-[facility]  Primary Care  Doctor   Email your healthcare team securely and privately 24/7 X X X X   Manage appointments: schedule your next appointment; view details of past/upcoming appointments X      Request prescription refills. X      View recent personal medical records, including lab and immunizations X X X X   View health record, including health history, allergies, medications X X X X   Read reports about your outpatient visits, procedures, consult and ER notes X X X X   See your discharge summary, which is a recap of your hospital and/or ER visit that includes your diagnosis, lab results, and care plan. X X       How to register for LensAR:  1. Go to  [email or web address].  2. Click on the Sign Up Now box, which takes you to the New Member Sign Up page. You will need to provide the following information:  a. Enter your LensAR Access Code exactly as it appears at the top of this page. (You will not need to use this code after you’ve completed the sign-up process. If you do not sign up before the expiration date, you must request a new code.)   b. Enter your date of birth.   c. Enter your home email address.   d. Click Submit, and follow the next screen’s instructions.  3. Create a LensAR ID. This will  be your [employer] login ID and cannot be changed, so think of one that is secure and easy to remember.  4. Create a [employer] password. You can change your password at any time.  5. Enter your Password Reset Question and Answer. This can be used at a later time if you forget your password.   6. Enter your e-mail address. This allows you to receive e-mail notifications when new information is available in [employer].  7. Click Sign Up. You can now view your health information.    For assistance activating your [employer] account, call [phone]

## 2017-06-25 NOTE — IP AVS SNAPSHOT
" Home Care Instructions                                                                                                                  Name:Salma Lees  Medical Record Number:[id number]  CSN: [id number]    YOB: 1950   Age: 67 y.o.  Sex: female  HT:1.626 m (5' 4\") WT: 95.7 kg (210 lb 15.7 oz)          Admit Date: 6/25/2017     Discharge Date:   Today's Date: 6/28/2017  Attending Doctor:  Abhinav Dorado D.O.                  Allergies:  Amlodipine besylate-valsartan; Amoxicillin; Etodolac; Lisinopril; and Other drug            Discharge Instructions       Discharge Instructions    Discharged to home by car with relative. Discharged via wheelchair, hospital escort: Yes.  Special equipment needed: Wheelchair    Be sure to schedule a follow-up appointment with your primary care doctor or any specialists as instructed.     Discharge Plan:   Diet Plan: Discussed (cardiac)  Activity Level: Discussed (as tolerated)  Confirmed Follow up Appointment: Patient to Call and Schedule Appointment  Confirmed Symptoms Management: Discussed  Medication Reconciliation Updated: Yes  Influenza Vaccine Indication: Not indicated: Previously immunized this influenza season and > 8 years of age    I understand that a diet low in cholesterol, fat, and sodium is recommended for good health. Unless I have been given specific instructions below for another diet, I accept this instruction as my diet prescription.   Other diet: cardiac    Special Instructions: None    · Is patient discharged on Warfarin / Coumadin?   Yes    You are receiving the drug warfarin. Please understand the importance of monitoring warfarin with scheduled PT/INR blood draws.  Follow[facility]up with the [facility] in one week for INR lab..    IMPORTANT: HOW TO USE THIS INFORMATION:  This is a summary and does NOT have all possible information about this product. This information does not assure that this product is safe, effective, or appropriate for you. This " "information is not individual medical advice and does not substitute for the advice of your health care professional. Always ask your health care professional for complete information about this product and your specific health needs.      WARFARIN - ORAL (WARF-uh-rin)      COMMON BRAND NAME(S): Coumadin      WARNING:  Warfarin can cause very serious (possibly fatal) bleeding. This is more likely to occur when you first start taking this medication or if you take too much warfarin. To decrease your risk for bleeding, your doctor or other health care provider will monitor you closely and check your lab results (INR test) to make sure you are not taking too much warfarin. Keep all medical and laboratory appointments. Tell your doctor right away if you notice any signs of serious bleeding. See also Side Effects section.      USES:  This medication is used to treat blood clots (such as in deep vein thrombosis-DVT or pulmonary embolus-PE) and/or to prevent new clots from forming in your body. Preventing harmful blood clots helps to reduce the risk of a stroke or heart attack. Conditions that increase your risk of developing blood clots include a certain type of irregular heart rhythm (atrial fibrillation), heart valve replacement, recent heart attack, and certain surgeries (such as hip/knee replacement). Warfarin is commonly called a \"blood thinner,\" but the more correct term is \"anticoagulant.\" It helps to keep blood flowing smoothly in your body by decreasing the amount of certain substances (clotting proteins) in your blood.      HOW TO USE:  Read the Medication Guide provided by your pharmacist before you start taking warfarin and each time you get a refill. If you have any questions, ask your doctor or pharmacist. Take this medication by mouth with or without food as directed by your doctor or other health care professional, usually once a day. It is very important to take it exactly as directed. Do not increase the " dose, take it more frequently, or stop using it unless directed by your doctor. Dosage is based on your medical condition, laboratory tests (such as INR), and response to treatment. Your doctor or other health care provider will monitor you closely while you are taking this medication to determine the right dose for you. Use this medication regularly to get the most benefit from it. To help you remember, take it at the same time each day. It is important to eat a balanced, consistent diet while taking warfarin. Some foods can affect how warfarin works in your body and may affect your treatment and dose. Avoid sudden large increases or decreases in your intake of foods high in vitamin K (such as broccoli, cauliflower, cabbage, brussels sprouts, kale, spinach, and other green leafy vegetables, liver, green tea, certain vitamin supplements). If you are trying to lose weight, check with your doctor before you try to go on a diet. Cranberry products may also affect how your warfarin works. Limit the amount of cranberry juice (16 ounces/480 milliliters a day) or other cranberry products you may drink or eat.      SIDE EFFECTS:  Nausea, loss of appetite, or stomach/abdominal pain may occur. If any of these effects persist or worsen, tell your doctor or pharmacist promptly. Remember that your doctor has prescribed this medication because he or she has judged that the benefit to you is greater than the risk of side effects. Many people using this medication do not have serious side effects. This medication can cause serious bleeding if it affects your blood clotting proteins too much (shown by unusually high INR lab results). Even if your doctor stops your medication, this risk of bleeding can continue for up to a week. Tell your doctor right away if you have any signs of serious bleeding, including: unusual pain/swelling/discomfort, unusual/easy bruising, prolonged bleeding from cuts or gums, persistent/frequent nosebleeds,  unusually heavy/prolonged menstrual flow, pink/dark urine, coughing up blood, vomit that is bloody or looks like coffee grounds, severe headache, dizziness/fainting, unusual or persistent tiredness/weakness, bloody/black/tarry stools, chest pain, shortness of breath, difficulty swallowing. Tell your doctor right away if any of these unlikely but serious side effects occur: persistent nausea/vomiting, severe stomach/abdominal pain, yellowing eyes/skin. This drug rarely has caused very serious (possibly fatal) problems if its effects lead to small blood clots (usually at the beginning of treatment). This can lead to severe skin/tissue damage that may require surgery or amputation if left untreated. Patients with certain blood conditions (protein C or S deficiency) may be at greater risk. Get medical help right away if any of these rare but serious side effects occur: painful/red/purplish patches on the skin (such as on the toe, breast, abdomen), change in the amount of urine, vision changes, confusion, slurred speech, weakness on one side of the body. A very serious allergic reaction to this drug is rare. However, get medical help right away if you notice any symptoms of a serious allergic reaction, including: rash, itching/swelling (especially of the face/tongue/throat), severe dizziness, trouble breathing. This is not a complete list of possible side effects. If you notice other effects not listed above, contact your doctor or pharmacist. In the [place] - Call your doctor for medical advice about side effects. You may report side effects to FDA at 3-036-SEW-8604. In [place] - Call your doctor for medical advice about side effects. You may report side effects to [facility] [place] at [phone].      PRECAUTIONS:  Before taking warfarin, tell your doctor or pharmacist if you are allergic to it; or if you have any other allergies. This product may contain inactive ingredients, which can cause allergic reactions or other  problems. Talk to your pharmacist for more details. Before using this medication, tell your doctor or pharmacist your medical history, especially of: blood disorders (such as anemia, hemophilia), bleeding problems (such as bleeding of the stomach/intestines, bleeding in the brain), blood vessel disorders (such as aneurysms), recent major injury/surgery, liver disease, alcohol use, mental/mood disorders (including memory problems), frequent falls/injuries. It is important that all your doctors and dentists know that you take warfarin. Before having surgery or any medical/dental procedures, tell your doctor or dentist that you are taking this medication and about all the products you use (including prescription drugs, nonprescription drugs, and herbal products). Avoid getting injections into the muscles. If you must have an injection into a muscle (for example, a flu shot), it should be given in the arm. This way, it will be easier to check for bleeding and/or apply pressure bandages. This medication may cause stomach bleeding. Daily use of alcohol while using this medicine will increase your risk for stomach bleeding and may also affect how this medication works. Limit or avoid alcoholic beverages. If you have not been eating well, if you have an illness or infection that causes fever, vomiting, or diarrhea for more than 2 days, or if you start using any antibiotic medications, contact your doctor or pharmacist immediately because these conditions can affect how warfarin works. This medication can cause heavy bleeding. To lower the chance of getting cut, bruised, or injured, use great caution with sharp objects like safety razors and nail cutters. Use an electric razor when shaving and a soft toothbrush when brushing your teeth. Avoid activities such as contact sports. If you fall or injure yourself, especially if you hit your head, call your doctor immediately. Your doctor may need to check you. The Food & Drug  "Administration has stated that generic warfarin products are interchangeable. However, consult your doctor or pharmacist before switching warfarin products. Be careful not to take more than one medication that contains warfarin unless specifically directed by the doctor or health care provider who is monitoring your warfarin treatment. Older adults may be at greater risk for bleeding while using this drug. This medication is not recommended for use during pregnancy because of serious (possibly fatal) harm to an unborn baby. Discuss the use of reliable forms of birth control with your doctor. If you become pregnant or think you may be pregnant, tell your doctor immediately. If you are planning pregnancy, discuss a plan for managing your condition with your doctor before you become pregnant. Your doctor may switch the type of medication you use during pregnancy. Very small amounts of this medication may pass into breast milk but is unlikely to harm a nursing infant. Consult your doctor before breast-feeding.      DRUG INTERACTIONS:  Drug interactions may change how your medications work or increase your risk for serious side effects. This document does not contain all possible drug interactions. Keep a list of all the products you use (including prescription/nonprescription drugs and herbal products) and share it with your doctor and pharmacist. Do not start, stop, or change the dosage of any medicines without your doctor's approval. Warfarin interacts with many prescription, nonprescription, vitamin, and herbal products. This includes medications that are applied to the skin or inside the vagina or rectum. The interactions with warfarin usually result in an increase or decrease in the \"blood-thinning\" (anticoagulant) effect. Your doctor or other health care professional should closely monitor you to prevent serious bleeding or clotting problems. While taking warfarin, it is very important to tell your doctor or " pharmacist of any changes in medications, vitamins, or herbal products that you are taking. Some products that may interact with this drug include: capecitabine, imatinib, mifepristone. Aspirin, aspirin-like drugs (salicylates), and nonsteroidal anti-inflammatory drugs (NSAIDs such as ibuprofen, naproxen, celecoxib) may have effects similar to warfarin. These drugs may increase the risk of bleeding problems if taken during treatment with warfarin. Carefully check all prescription/nonprescription product labels (including drugs applied to the skin such as pain-relieving creams) since the products may contain NSAIDs or salicylates. Talk to your doctor about using a different medication (such as acetaminophen) to treat pain/fever. Low-dose aspirin and related drugs (such as clopidogrel, ticlopidine) should be continued if prescribed by your doctor for specific medical reasons such as heart attack or stroke prevention. Consult your doctor or pharmacist for more details. Many herbal products interact with warfarin. Tell your doctor before taking any herbal products, especially bromelains, coenzyme Q10, cranberry, danshen, dong quai, fenugreek, garlic, ginkgo biloba, ginseng, and Marybeth's wort, among others. This medication may interfere with a certain laboratory test to measure theophylline levels, possibly causing false test results. Make sure laboratory personnel and all your doctors know you use this drug.      OVERDOSE:  If overdose is suspected, contact a poison control center or emergency room immediately. [place] residents can call the [place] National Poison Hotline at 1-655.800.4785. [place] residents can call a provincial poison control center. Symptoms of overdose may include: bloody/black/tarry stools, pink/dark urine, unusual/prolonged bleeding.      NOTES:  Do not share this medication with others. Laboratory and/or medical tests (such as INR, complete blood count) must be performed periodically to monitor your  progress or check for side effects. Consult your doctor for more details.      MISSED DOSE:  For the best possible benefit, do not miss any doses. If you do miss a dose and remember on the same day, take it as soon as you remember. If you remember on the next day, skip the missed dose and resume your usual dosing schedule. Do not double the dose to catch up because this could increase your risk for bleeding. Keep a record of missed doses to give to your doctor or pharmacist. Contact your doctor or pharmacist if you miss 2 or more doses in a row.      STORAGE:  Store at room temperature away from light and moisture. Do not store in the bathroom. Keep all medications away from children and pets. Do not flush medications down the toilet or pour them into a drain unless instructed to do so. Properly discard this product when it is  or no longer needed. Consult your pharmacist or local waste disposal company for more details about how to safely discard your product.      MEDICAL ALERT:  Your condition and medication can cause complications in a medical emergency. For information about enrolling in MedicAlert, call 1-393.262.3660 ([place]) or 1-896.508.4322 ([place]).      Information last revised 2010 Copyright(c) 2010 [employer]             · Is patient Post Blood Transfusion?  No    Depression / Suicide Risk    As you are discharged from this [facility] facility, it is important to learn how to keep safe from harming yourself.    Recognize the warning signs:  · Abrupt changes in personality, positive or negative- including increase in energy   · Giving away possessions  · Change in eating patterns- significant weight changes-  positive or negative  · Change in sleeping patterns- unable to sleep or sleeping all the time   · Unwillingness or inability to communicate  · Depression  · Unusual sadness, discouragement and loneliness  · Talk of wanting to die  · Neglect of personal  appearance   · Rebelliousness- reckless behavior  · Withdrawal from people/activities they love  · Confusion- inability to concentrate     If you or a loved one observes any of these behaviors or has concerns about self-harm, here's what you can do:  · Talk about it- your feelings and reasons for harming yourself  · Remove any means that you might use to hurt yourself (examples: pills, rope, extension cords, firearm)  · Get professional help from the community (Mental Health, Substance Abuse, psychological counseling)  · Do not be alone:Call your Safe Contact- someone whom you trust who will be there for you.  · Call your local CRISIS HOTLINE 600-2137 or 813-524-8904  · Call your local Children's Mobile Crisis Response Team [place] (479) 621-7742 or www.CollabFinder  · Call the toll free National Suicide Prevention Hotlines   · National Suicide Prevention Lifeline 626-021-YTSC (1847)  [employer] Line Network 800-SUICIDE (681-3539)    Ureteral Stent Implantation  Ureteral stent implantation is the implantation of a soft plastic tube with multiple holes into the tube that drains urine from your kidney to your bladder (ureter). The stent helps drain your kidney when there is a blockage of the flow of urine in your ureter. The stent has a coil on each end to keep it from falling out. One end stays in the kidney. The other end stays in the bladder. It is most often taken out after any blockage has been removed or your ureter has healed. Short-term stents have a string attached to make removal quite easy. Removal of a short-term stent can be done in your health care provider's office or by you at home. Long-term stents need to be changed every few months.  LET YOUR HEALTH CARE PROVIDER KNOW ABOUT:  · Any allergies you have.  · All medicines you are taking, including vitamins, herbs, eye drops, creams, and over-the-counter medicines.  · Previous problems you or members of your family have had with the use of  anesthetics.  · Any blood disorders you have.  · Previous surgeries you have had.  · Medical conditions you have.  RISKS AND COMPLICATIONS  Generally, ureteral stent implantation is a safe procedure. However, as with any procedure, complications can occur. Possible complications include:  · Movement of the stent away from where it was originally placed (migration). This may affect the ability of the stent to properly drain your kidney. If migration of the stent occurs, the stent may need to be replaced or repositioned.  · Perforation of the ureter.   · Infection.  BEFORE THE PROCEDURE  · You may be asked to wash your genital area with sterile soap the morning of your procedure.  · You may be given an oral antibiotic which you should take with a sip of water as prescribed by your health care provider.  · You may be asked to not eat or drink for 8 hours before the surgery.  PROCEDURE  · First you will be given an anesthetic so you do not feel pain during the procedure.  · Your health care provider will insert a special lighted instrument called a cystoscope into your bladder. This allows your health care provider to see the opening to your ureter.  · A thin wire is carefully threaded into your bladder and up the ureter. The stent is inserted over the wire and the wire is then removed.  · Your bladder will be emptied of urine.  AFTER THE PROCEDURE  You will be taken to a recovery room until it is okay for you to go home.     This information is not intended to replace advice given to you by your health care provider. Make sure you discuss any questions you have with your health care provider.     Document Released: 12/15/2001 Document Revised: 12/23/2014 Document Reviewed: 05/27/2014  [employer] Interactive Patient Education ©2016 [employer]    ·   Ureteral Stent Implantation, Care After  Refer to this sheet in the next few weeks. These instructions provide you with information on caring for yourself after your  procedure. Your health care provider may also give you more specific instructions. Your treatment has been planned according to current medical practices, but problems sometimes occur. Call your health care provider if you have any problems or questions after your procedure.  WHAT TO EXPECT AFTER THE PROCEDURE  You should be back to normal activity within 48 hours after the procedure. Nausea and vomiting may occur and are commonly the result of anesthesia.  It is common to experience sharp pain in the back or lower abdomen and penis with voiding. This is caused by movement of the ends of the stent with the act of urinating. It usually goes away within minutes after you have stopped urinating.  HOME CARE INSTRUCTIONS  Make sure to drink plenty of fluids. You may have small amounts of bleeding, causing your urine to be red. This is normal. Certain movements may trigger pain or a feeling that you need to urinate. You may be given medicines to prevent infection or bladder spasms. Be sure to take all medicines as directed. Only take over-the-counter or prescription medicines for pain, discomfort, or fever as directed by your health care provider. Do not take aspirin, as this can make bleeding worse.  Your stent will be left in until the blockage is resolved. This may take 2 weeks or longer, depending on the reason for stent implantation. You may have an X-ray exam to make sure your ureter is open and that the stent has not moved out of position (migrated). The stent can be removed by your health care provider in the office. Medicines may be given for comfort while the stent is being removed. Be sure to keep all follow-up appointments so your health care provider can check that you are healing properly.  SEEK MEDICAL CARE IF:  · You experience increasing pain.  · Your pain medicine is not working.  SEEK IMMEDIATE MEDICAL CARE IF:  · Your urine is dark red or has blood clots.  · You are leaking urine (incontinent).  · You  have a fever, chills, feeling sick to your stomach (nausea), or vomiting.  · Your pain is not relieved by pain medicine.  · The end of the stent comes out of the urethra.  · You are unable to urinate.     This information is not intended to replace advice given to you by your health care provider. Make sure you discuss any questions you have with your health care provider.     Document Released: 2014 Document Revised: 2014 Document Reviewed: 2014  [employer] Interactive Patient Education © [employer]      Your appointments     2017 10:30 AM   Anti-Coag Routine with LOS DixS PHARMACIST   [facility]    [address] [place] [address]   [phone]            2017  1:00 PM   Pulmonary Function Test with PFT-RM2   [facility] (--)    [address]  [address]  [place] [address]   [phone]            2017  2:00 PM   New Patient Pulmonary with A Rotation   [facility] (--)    [address]  [address]  [place] [address]   [phone]            Sep 29, 2017 10:00 AM   New Patient with Michelle Brumfield R.N., Beata Michele, IVA   [facility])    [address]  [address]  [place] [address]   [phone]           It is the patient's responsibility to check with your Insurance for benefit coverage for visit / visits.  24 hours notice is required for all appointment changes or cancellation.  Please arrive 20 min. before your appointment time  Please bring the following with you: 1)Picture Id 2) Insurance card 3) Completed Forms if New Patient  If scheduled for DIABETES VISIT please also brin) Medications 2) Meter 3) Blood glucose logs 4) Any recent labs if you have them  If scheduled for NUTRITION VISIT please also brin) 2-3 days of detailed food intake logs 2) Blood glucose monitor and blood glucose logs (if you have them)              Follow-up  Information     1. Follow up with Edgardo Richter M.D.. Schedule an appointment as soon as possible for a visit in 1 week.    Specialty:  Urology    Contact information    [address]  [address]  [place] [address]  [phone]          2. Follow up with LILIYA Casey. Schedule an appointment as soon as possible for a visit in 1 week.    Specialty:  Family Medicine    Contact information    [address]  [address]  [place] [address] [phone]           Discharge Medication Instructions:    Below are the medications your physician expects you to take upon discharge:    Review all your home medications and newly ordered medications with your doctor and/or pharmacist. Follow medication instructions as directed by your doctor and/or pharmacist.    Please keep your medication list with you and share with your physician.               Medication List      START taking these medications        Instructions    Morning Afternoon Evening Bedtime    acetaminophen 325 MG Tabs   Commonly known as:  TYLENOL   Next Dose Due:  As needed        Take 2 Tabs by mouth every 6 hours as needed (Mild Pain; (Pain scale 1-3); Temp greater than 100.5 F).   Dose:  650 mg                        levofloxacin 500 MG tablet   Start taking on:  6/29/2017   Commonly known as:  LEVAQUIN   Next Dose Due:  Tomorrow          Take 1 Tab by mouth every day.   Dose:  500 mg                        tramadol 50 MG Tabs   Last time this was given:  50 mg on 6/28/2017  1:09 AM   Commonly known as:  ULTRAM   Next Dose Due:  As needed        Take 1 Tab by mouth every 6 hours as needed for Moderate Pain or Severe Pain.   Dose:  50 mg                          CONTINUE taking these medications        Instructions    Morning Afternoon Evening Bedtime    carvedilol 12.5 MG Tabs   Last time this was given:  12.5 mg on 6/26/2017  8:41 PM   Commonly known as:  COREG   Next Dose Due:  Today          Take 12.5 mg by mouth 3 times a day.   Dose:  12.5  mg                        DIABETES SUPPORT THERAPY PACK Misc   Next Dose Due:  As directed        Take 1 Each by mouth every day. OTC diabetic pack   Dose:  1 Each                        digoxin 125 MCG Tabs   Last time this was given:  125 mcg on 6/28/2017  9:26 AM   Commonly known as:  LANOXIN   Next Dose Due:  Tomorrow          Take 125 mcg by mouth every day.   Dose:  125 mcg                        diltiazem 30 MG Tabs   Last time this was given:  30 mg on 6/28/2017  9:26 AM   Commonly known as:  CARDIZEM   Next Dose Due:  Tonight          Take 30 mg by mouth 2 Times a Day.   Dose:  30 mg                        furosemide 20 MG Tabs   Commonly known as:  LASIX   Next Dose Due:  As directed        Take 40 mg by mouth every 48 hours. Alternating days with Spironolactone   Dose:  40 mg                        potassium chloride SA 10 MEQ Tbcr   Commonly known as:  K-DUR   Next Dose Due:  Tonight          Take 10 mEq by mouth 2 times a day.   Dose:  10 mEq                        spironolactone 25 MG Tabs   Commonly known as:  ALDACTONE   Next Dose Due:  As directed        Take 25 mg by mouth every 48 hours. Alternating  every other day with furosemide   Dose:  25 mg                        Umeclidinium-Vilanterol 62.5-25 MCG/INH Aepb   Last time this was given:  1 Puff on 6/28/2017  9:29 AM   Commonly known as:  ANORO ELLIPTA   Next Dose Due:  Tomorrow          Doctor's comments:  This is an authorization for a requested refill   Inhale 1 Puff by mouth every day.   Dose:  1 Puff                        warfarin 10 MG Tabs   Last time this was given:  15 mg on 6/27/2017  5:41 PM   Commonly known as:  COUMADIN   Next Dose Due:  Tonight          Take 10 mg by mouth every evening.   Dose:  10 mg                             Where to Get Your Medications      These medications were sent to [id number] - [place], [place] - [address] [facility] [address]  [address] [place] [address]     Phone:   [phone]    - levofloxacin 500 MG tablet      Information about where to get these medications is not yet available     ! Ask your nurse or doctor about these medications    - tramadol 50 MG Tabs            Instructions           Diet / Nutrition:    Follow any diet instructions given to you by your doctor or the dietician, including how much salt (sodium) you are allowed each day.    If you are overweight, talk to your doctor about a weight reduction plan.    Activity:    Remain physically active following your doctor's instructions about exercise and activity.    Rest often.     Any time you become even a little tired or short of breath, SIT DOWN and rest.    Worsening Symptoms:    Report any of the following signs and symptoms to the doctor's office immediately:    *Pain of jaw, arm, or neck  *Chest pain not relieved by medication                               *Dizziness or loss of consciousness  *Difficulty breathing even when at rest   *More tired than usual                                       *Bleeding drainage or swelling of surgical site  *Swelling of feet, ankles, legs or stomach                 *Fever (>100ºF)  *Pink or blood tinged sputum  *Weight gain (3lbs/day or 5lbs /week)           *Shock from internal defibrillator (if applicable)  *Palpitations or irregular heartbeats                *Cool and/or numb extremities    Stroke Awareness    Common Risk Factors for Stroke include:    Age  Atrial Fibrillation  Carotid Artery Stenosis  Diabetes Mellitus  Excessive alcohol consumption  High blood pressure  Overweight   Physical inactivity  Smoking    Warning signs and symptoms of a stroke include:    *Sudden numbness or weakness of the face, arm or leg (especially on one side of the body).  *Sudden confusion, trouble speaking or understanding.  *Sudden trouble seeing in one or both eyes.  *Sudden trouble walking, dizziness, loss of balance or coordination.Sudden severe headache with no known  cause.    It is very important to get treatment quickly when a stroke occurs. If you experience any of the above warning signs, call 911 immediately.                   Disclaimer         Quit Smoking / Tobacco Use:    I understand the use of any tobacco products increases my chance of suffering from future heart disease or stroke and could cause other illnesses which may shorten my life. Quitting the use of tobacco products is the single most important thing I can do to improve my health. For further information on smoking / tobacco cessation call a Toll Free Quit Line at 1-331.834.9986 (*[employer] [place]) or 1-371.272.8636 (American Lung Association) or you can access the web based program at www.lungusa.org.    [place] Tobacco Users Help Line:  (802) 667-4175       Toll Free: 1-234.948.9515  Quit Tobacco Program [facility] Management Services (874)232-2093    Crisis Hotline:    [place] Crisis Hotline:  4-460-NTNOXWG or 1-371.604.5816    [place] Crisis Hotline:    1-328.439.3431 or 923-244-0883    Discharge Survey:   Thank you for choosing [facility]. We hope we did everything we could to make your hospital stay a pleasant one. You may be receiving a phone survey and we would appreciate your time and participation in answering the questions. Your input is very valuable to us in our efforts to improve our service to our patients and their families.        My signature on this form indicates that:    1. I have reviewed and understand the above information.  2. My questions regarding this information have been answered to my satisfaction.  3. I have formulated a plan with my discharge nurse to obtain my prescribed medications for home.                  Disclaimer         __________________________________                     __________       ________                       Patient Signature                                                 Date                    Time

## 2017-06-25 NOTE — IP AVS SNAPSHOT
6/28/2017    Salma Lees  [address]  [place] [address]    Dear Salma:    [facility] wants to ensure your discharge home is safe and you or your loved ones have had all of your questions answered regarding your care after you leave the hospital.    Below is a list of resources and contact information should you have any questions regarding your hospital stay, follow-up instructions, or active medical symptoms.    Questions or Concerns Regarding… Contact   Medical Questions Related to Your Discharge  (7 days a week, 8am-5pm) Contact a Nurse Care Coordinator   [phone]   Medical Questions Not Related to Your Discharge  (24 hours a day / 7 days a week)  Contact the Nurse Health Line   [phone]    Medications or Discharge Instructions Refer to your discharge packet   or contact your [facility] Primary Care Provider   [phone]   Follow-up Appointment(s) Schedule your appointment via MideoMe   or contact Scheduling [phone]   Billing Review your statement via MideoMe  or contact Billing [phone]   Medical Records Review your records via MideoMe   or contact Medical Records [phone]     You may receive a telephone call within two days of discharge. This call is to make certain you understand your discharge instructions and have the opportunity to have any questions answered. You can also easily access your medical information, test results and upcoming appointments via the MideoMe free online health management tool. You can learn more and sign up at [employer]/MideoMe. For assistance setting up your MideoMe account, please call [phone].    Once again, we want to ensure your discharge home is safe and that you have a clear understanding of any next steps in your care. If you have any questions or concerns, please do not hesitate to contact us, we are here for you. Thank you for choosing [facility] for your healthcare needs.    Sincerely,    Your [facility] Healthcare Team

## 2017-06-25 NOTE — IP AVS SNAPSHOT
" <p align=\"LEFT\"><IMG SRC=\"//EMRWB/blob$/Images/[email or web address]\" alt=\"Image\" WIDTH=\"50%\" HEIGHT=\"200\" BORDER=\"\"></p>                   Name:Salma Lees  Medical Record Number:[id number]  CSN: [id number]    YOB: 1950   Age: 67 y.o.  Sex: female  HT:1.626 m (5' 4\") WT: 95.7 kg (210 lb 15.7 oz)          Admit Date: 2017     Discharge Date:   Today's Date: 2017  Attending Doctor:  Abhinav Dorado D.O.                  Allergies:  Amlodipine besylate-valsartan; Amoxicillin; Etodolac; Lisinopril; and Other drug          Your appointments     2017 10:30 AM   Anti-Coag Routine with [place] PHARMACIST   [facility]    [address] [facility] [place] [address]   [phone]            2017  1:00 PM   Pulmonary Function Test with PFT-RM2   [facility] (--)    [address]  [address]  [place] [address]   [phone]            2017  2:00 PM   New Patient Pulmonary with A Rotation   [facility] (--)    [address]  [address]  [place] [address]   [phone]            Sep 29, 2017 10:00 AM   New Patient with Michelle Brumfield R.N., Beata Michele, IVA   [facility])    [address]  [address]  [place] [address]   [phone]           It is the patient's responsibility to check with your Insurance for benefit coverage for visit / visits.  24 hours notice is required for all appointment changes or cancellation.  Please arrive 20 min. before your appointment time  Please bring the following with you: 1)Picture Id 2) Insurance card 3) Completed Forms if New Patient  If scheduled for DIABETES VISIT please also brin) Medications 2) Meter 3) Blood glucose logs 4) Any recent labs if you have them  If scheduled for NUTRITION VISIT please also brin) 2-3 days of detailed food intake logs 2) Blood glucose monitor and blood glucose logs (if you have them)              "   Follow-up Information     1. Follow up with Edgardo Richter M.D.. Schedule an appointment as soon as possible for a visit in 1 week.    Specialty:  Urology    Contact information    [address]  [address]  [place] [address]  [phone]          2. Follow up with LILIYA Casey. Schedule an appointment as soon as possible for a visit in 1 week.    Specialty:  Family Medicine    Contact information    [address] [facility]  [address]  [place] [address] [phone]           Medication List      Take these Medications        Instructions    acetaminophen 325 MG Tabs   Commonly known as:  TYLENOL    Take 2 Tabs by mouth every 6 hours as needed (Mild Pain; (Pain scale 1-3); Temp greater than 100.5 F).   Dose:  650 mg       carvedilol 12.5 MG Tabs   Commonly known as:  COREG    Take 12.5 mg by mouth 3 times a day.   Dose:  12.5 mg       DIABETES SUPPORT THERAPY PACK Misc    Take 1 Each by mouth every day. OTC diabetic pack   Dose:  1 Each       digoxin 125 MCG Tabs   Commonly known as:  LANOXIN    Take 125 mcg by mouth every day.   Dose:  125 mcg       diltiazem 30 MG Tabs   Commonly known as:  CARDIZEM    Take 30 mg by mouth 2 Times a Day.   Dose:  30 mg       furosemide 20 MG Tabs   Commonly known as:  LASIX    Take 40 mg by mouth every 48 hours. Alternating days with Spironolactone   Dose:  40 mg       levofloxacin 500 MG tablet   Start taking on:  6/29/2017   Commonly known as:  LEVAQUIN    Take 1 Tab by mouth every day.   Dose:  500 mg       potassium chloride SA 10 MEQ Tbcr   Commonly known as:  K-DUR    Take 10 mEq by mouth 2 times a day.   Dose:  10 mEq       spironolactone 25 MG Tabs   Commonly known as:  ALDACTONE    Take 25 mg by mouth every 48 hours. Alternating  every other day with furosemide   Dose:  25 mg       tramadol 50 MG Tabs   Commonly known as:  ULTRAM    Take 1 Tab by mouth every 6 hours as needed for Moderate Pain or Severe Pain.   Dose:  50 mg       Umeclidinium-Vilanterol 62.5-25  MCG/INH Aepb   Commonly known as:  PHILL PATEL    Doctor's comments:  This is an authorization for a requested refill   Inhale 1 Puff by mouth every day.   Dose:  1 Puff       warfarin 10 MG Tabs   Commonly known as:  COUMADIN    Take 10 mg by mouth every evening.   Dose:  10 mg

## 2017-06-25 NOTE — MR AVS SNAPSHOT
Salma Lees   2017 2:30 PM   Office Visit   MRN: [id number]    Department:  [facility]   Dept Phone:  [phone]    Description:  Female : 1950   Provider:  MIGUE Hurley M.D.           Reason for Visit     Back Pain lower back pain with lower right side abd pain x this AM      Allergies as of 2017     Allergen Noted Reactions    Amlodipine Besylate-Valsartan 2017       Amoxicillin 2017       Etodolac 2017       Lisinopril 2017       Other Drug 2017       metal      You were diagnosed with     Acute left flank pain   [[id number]]         Vital Signs     Blood Pressure Pulse Temperature Respirations Oxygen Saturation Smoking Status    168/88 mmHg 108 38.6 °C (101.4 °F) 20 92% Never Smoker       Basic Information     Date Of Birth Sex Race Ethnicity Preferred Language    1950 Female White Non- English      Your appointments     2017 10:30 AM   Anti-Coag Routine with [place] PHARMACIST   [facility])    [address] [facility] [place] [address]   [phone]            2017  1:00 PM   Pulmonary Function Test with PFT-RM2   [facility] (--)    [address]  [address]  [place] [address]   [phone]            2017  2:00 PM   New Patient Pulmonary with A Rotation   [facility] (--)    [address]  [address]  [place] [address]   [phone]            Sep 29, 2017 10:00 AM   New Patient with Michelle Brumfield R.N., Beata Michele, IVA   [facility])    [address]  [address]  [place] [address]   [phone]           It is the patient's responsibility to check with your Insurance for benefit coverage for visit / visits.  24 hours notice is required for all appointment changes or cancellation.  Please arrive 20 min. before your appointment time  Please bring the following with you: 1)Picture Id 2)  Insurance card 3) Completed Forms if New Patient  If scheduled for DIABETES VISIT please also brin) Medications 2) Meter 3) Blood glucose logs 4) Any recent labs if you have them  If scheduled for NUTRITION VISIT please also brin) 2-3 days of detailed food intake logs 2) Blood glucose monitor and blood glucose logs (if you have them)              Problem List              ICD-10-CM Priority Class Noted - Resolved    Encounter to establish care with new doctor Z71.89   2017 - Present    History of mitral valve repair Z98.890   2017 - Present    Chronic obstructive pulmonary disease (CMS-HCC) J44.9   2017 - Present    Atrial fibrillation (CMS-HCC) I48.91   2017 - Present    Right knee pain M25.561   2017 - Present    Type 2 diabetes mellitus with complication (CMS-HCC) E11.8   2017 - Present    Anticoagulated on warfarin Z79.01   2017 - Present    Chronic anticoagulation Z79.01   2017 - Present      Health Maintenance        Date Due Completion Dates    A1C SCREENING 1950 ---    DIABETES MONOFILAMENT / LE EXAM 1950 ---    RETINAL SCREENING 1967 ---    URINE ACR / MICROALBUMIN 1967 ---    IMM DTaP/Tdap/Td Vaccine (1 - Tdap) 1968 ---    PAP SMEAR 1970 ---    MAMMOGRAM 1989 ---    COLONOSCOPY 1999 ---    IMM ZOSTER VACCINE 2009 ---    BONE DENSITY 2014 ---    IMM PNEUMOCOCCAL 65+ (ADULT) LOW/MEDIUM RISK SERIES (1 of 2 - PCV13) 2014 ---    FASTING LIPID PROFILE 2018, 2017    SERUM CREATININE 2018, 2017            Current Immunizations     No immunizations on file.      Below and/or attached are the medications your provider expects you to take. Review all of your home medications and newly ordered medications with your provider and/or pharmacist. Follow medication instructions as directed by your provider and/or pharmacist. Please keep your medication list with you and share with  your provider. Update the information when medications are discontinued, doses are changed, or new medications (including over-the-counter products) are added; and carry medication information at all times in the event of emergency situations     Allergies:  AMLODIPINE BESYLATE-VALSARTAN - (reactions not documented)     AMOXICILLIN - (reactions not documented)     ETODOLAC - (reactions not documented)     LISINOPRIL - (reactions not documented)     OTHER DRUG - (reactions not documented)               Medications  Valid as of: June 25, 2017 -  4:08 PM    Generic Name Brand Name Tablet Size Instructions for use    Albuterol Sulfate (Aero Soln) albuterol 108 (90 BASE) MCG/ACT Inhale 2 Puffs by mouth every four hours as needed for Shortness of Breath.        Carvedilol (Tab) COREG 12.5 MG Take 12.5 mg by mouth 2 times a day, with meals.        Cholecalciferol (Tab) cholecalciferol 1000 UNIT Take 1,000 Units by mouth every day.        Digoxin (Tab) LANOXIN 125 MCG Take 125 mcg by mouth every day.        Ferrous Sulfate (Tab) Iron 325 (65 FE) MG Take 1 Tab by mouth every day.        Furosemide (Tab) LASIX 20 MG Take 20 mg by mouth 2 times a day.        Furosemide (Tab) LASIX 20 MG Take 1 Tab by mouth 2 times a day.        Potassium Chloride Darline CR (Tab CR) K-DUR 10 MEQ Take 10 mEq by mouth 2 times a day.        Spironolactone (Tab) ALDACTONE 25 MG Take 25 mg by mouth 1 time daily as needed.        Umeclidinium-Vilanterol (AEROSOL POWDER, BREATH ACTIVATED) Umeclidinium-Vilanterol 62.5-25 MCG/INH Inhale 1 Puff by mouth every day.        Warfarin Sodium (Tab) COUMADIN 5 MG Take as directed by Coumadin clinic.        Warfarin Sodium (Tab) COUMADIN 5 MG Take two to two and one-half tablets by mouth one time daily or as directed by coumadin clinic        Zinc (Cap) Zinc 50 MG Take 50 mg by mouth every day.        .                 Medicines prescribed today were sent to:     [email or web address] \[facility]", [place] - [address] AT [facility] [address]    [address] [place] [address]    Phone: [phone] Fax: [phone]    Open 24 Hours?: No      Medication refill instructions:       If your prescription bottle indicates you have medication refills left, it is not necessary to call your provider’s office. Please contact your pharmacy and they will refill your medication.    If your prescription bottle indicates you do not have any refills left, you may request refills at any time through one of the following ways: The online ClickTale system (except Urgent Care), by calling your provider’s office, or by asking your pharmacy to contact your provider’s office with a refill request. Medication refills are processed only during regular business hours and may not be available until the next business day. Your provider may request additional information or to have a follow-up visit with you prior to refilling your medication.   *Please Note: Medication refills are assigned a new Rx number when refilled electronically. Your pharmacy may indicate that no refills were authorized even though a new prescription for the same medication is available at the pharmacy. Please request the medicine by name with the pharmacy before contacting your provider for a refill.           ClickTale Access Code: Activation code not generated  Current ClickTale Status: Active

## 2017-06-25 NOTE — PROGRESS NOTES
Subjective:      Salma Lees is a 67 y.o. female who presents with Back Pain            Back Pain  This is a new problem. The current episode started today (patient started having shaking chills with left-sided back pain 11:00 this morning. Associated with vomiting.). The problem occurs constantly. The pain is present in the thoracic spine (left CVA). The pain is severe. Associated symptoms include a fever and weakness. Pertinent negatives include no abdominal pain, bladder incontinence, chest pain, headaches, leg pain, pelvic pain or perianal numbness. (Patient is having shaking chills temperature is 101.4 sugars 465.) Risk factors: patient status post open heart surgery second surgery for pacemaker implantation. Denies any history of an aneurysm on Coumadin. She has tried nothing for the symptoms.     Allergies   Allergen Reactions   • Amlodipine Besylate-Valsartan    • Amoxicillin    • Etodolac    • Lisinopril    • Other Drug      metal     Social History     Social History   • Marital Status: Unknown     Spouse Name: N/A   • Number of Children: N/A   • Years of Education: N/A     Occupational History   • Not on file.     Social History Main Topics   • Smoking status: Never Smoker    • Smokeless tobacco: Never Used   • Alcohol Use: No   • Drug Use: No   • Sexual Activity: Not on file     Other Topics Concern   • Not on file     Social History Narrative   • No narrative on file     Past Medical History   Diagnosis Date   • A-fib (CMS-HCC)    • COPD (chronic obstructive pulmonary disease) (CMS-HCC)      Current Outpatient Prescriptions on File Prior to Visit   Medication Sig Dispense Refill   • warfarin (COUMADIN) 5 MG Tab Take two to two and one-half tablets by mouth one time daily or as directed by coumadin clinic 225 Tab 1   • warfarin (COUMADIN) 5 MG Tab Take as directed by Coumadin clinic. 90 Tab 99   • Umeclidinium-Vilanterol (ANORO ELLIPTA) 62.5-25 MCG/INH AEROSOL POWDER, BREATH ACTIVATED Inhale 1 Puff by  mouth every day. 3 Each 0   • furosemide (LASIX) 20 MG Tab Take 1 Tab by mouth 2 times a day. 60 Tab 5   • carvedilol (COREG) 12.5 MG Tab Take 12.5 mg by mouth 2 times a day, with meals.     • furosemide (LASIX) 20 MG Tab Take 20 mg by mouth 2 times a day.     • potassium chloride SA (K-DUR) 10 MEQ Tab CR Take 10 mEq by mouth 2 times a day.     • spironolactone (ALDACTONE) 25 MG Tab Take 25 mg by mouth 1 time daily as needed.     • digoxin (LANOXIN) 125 MCG Tab Take 125 mcg by mouth every day.     • albuterol (VENTOLIN HFA) 108 (90 BASE) MCG/ACT Aero Soln inhalation aerosol Inhale 2 Puffs by mouth every four hours as needed for Shortness of Breath.     • vitamin D (CHOLECALCIFEROL) 1000 UNIT Tab Take 1,000 Units by mouth every day.     • Zinc 50 MG Cap Take 50 mg by mouth every day.     • Ferrous Sulfate (IRON) 325 (65 FE) MG Tab Take 1 Tab by mouth every day.       No current facility-administered medications on file prior to visit.   No family history on file.    Review of Systems   Constitutional: Positive for fever, chills and diaphoresis.   Eyes: Negative for discharge and redness.   Respiratory: Positive for cough and hemoptysis.    Cardiovascular: Negative for chest pain.   Gastrointestinal: Positive for nausea and vomiting. Negative for abdominal pain.   Genitourinary: Negative.  Negative for bladder incontinence and pelvic pain.   Musculoskeletal: Positive for back pain.   Skin: Negative for rash.   Neurological: Positive for weakness. Negative for sensory change, speech change and headaches.   Psychiatric/Behavioral: The patient is nervous/anxious.           Objective:     /88 mmHg  Pulse 108  Temp(Src) 37.4 °C (99.3 °F)  Resp 20  SpO2 92%     Physical Exam   Constitutional: She appears well-developed and well-nourished. She appears distressed.   HENT:   Head: Normocephalic and atraumatic.   Right Ear: External ear normal.   Left Ear: External ear normal.   Eyes: Right eye exhibits no discharge.  Left eye exhibits no discharge.   Pulmonary/Chest: Respiratory distress:  now know that her  as TTP estimate.   Skin: She is diaphoretic.          Zofran 4 mg ODT    Acetaminophen 1 g by mouth    Point of care urine positive leukocytes positive blood.    Serum glucose 465     Assessment/Plan:     Diagnosis Acute pyelonephritis                       Rigors                       Vomiting    I feel the patient has pyelonephritis with or without a kidney stone. I feel she needs to be in the emergency department I feel she is septic and vomiting. They signed out AMA refusing to go to the emergency room via ambulance because of lack of funds. I called and spoke to Dr. Gary Gansert who will have the patient evaluated

## 2017-06-25 NOTE — ED NOTES
"Chief Complaint   Patient presents with   • Abdominal Pain     Pain started in her low back at 1100 and moved around to her abdomen. Pt then started vomiting and felt \"cold\".    • Sent from Urgent Care     Pt seen at  and had a urine test done and sent for further eval of her kidneys.      Pt wheeled into triage on her home oxygen. States she was sent for eval for kidney stone and UTI. Provided with urine cup.   /54 mmHg  Pulse 56  Temp(Src) 36.8 °C (98.2 °F)  Resp 16  Ht 1.626 m (5' 4\")  SpO2 93%  Pt placed back in lobby, educated on triage process, and told to inform staff of any change in condition.     "

## 2017-06-26 PROBLEM — D64.9 NORMOCYTIC ANEMIA: Status: ACTIVE | Noted: 2017-06-26

## 2017-06-26 PROBLEM — N13.2 HYDRONEPHROSIS WITH OBSTRUCTING CALCULUS: Status: ACTIVE | Noted: 2017-06-26

## 2017-06-26 PROBLEM — D68.32 HEMORRHAGIC DISORDER DUE TO EXTRINSIC CIRCULATING ANTICOAGULANTS (HCC): Status: ACTIVE | Noted: 2017-06-26

## 2017-06-26 PROBLEM — N17.9 ACUTE RENAL FAILURE (ARF) (HCC): Status: ACTIVE | Noted: 2017-06-26

## 2017-06-26 LAB
ANION GAP SERPL CALC-SCNC: 7 MMOL/L (ref 0–11.9)
BASOPHILS # BLD AUTO: 0 % (ref 0–1.8)
BASOPHILS # BLD: 0 K/UL (ref 0–0.12)
BUN SERPL-MCNC: 26 MG/DL (ref 8–22)
CALCIUM SERPL-MCNC: 8.3 MG/DL (ref 8.5–10.5)
CHLORIDE SERPL-SCNC: 104 MMOL/L (ref 96–112)
CO2 SERPL-SCNC: 24 MMOL/L (ref 20–33)
CREAT SERPL-MCNC: 1.36 MG/DL (ref 0.5–1.4)
EOSINOPHIL # BLD AUTO: 0 K/UL (ref 0–0.51)
EOSINOPHIL NFR BLD: 0 % (ref 0–6.9)
ERYTHROCYTE [DISTWIDTH] IN BLOOD BY AUTOMATED COUNT: 54 FL (ref 35.9–50)
EST. AVERAGE GLUCOSE BLD GHB EST-MCNC: 272 MG/DL
GFR SERPL CREATININE-BSD FRML MDRD: 39 ML/MIN/1.73 M 2
GLUCOSE BLD-MCNC: 234 MG/DL (ref 65–99)
GLUCOSE BLD-MCNC: 263 MG/DL (ref 65–99)
GLUCOSE BLD-MCNC: 263 MG/DL (ref 65–99)
GLUCOSE BLD-MCNC: 310 MG/DL (ref 65–99)
GLUCOSE BLD-MCNC: 393 MG/DL (ref 65–99)
GLUCOSE BLD-MCNC: 403 MG/DL (ref 65–99)
GLUCOSE SERPL-MCNC: 243 MG/DL (ref 65–99)
HBA1C MFR BLD: 11.1 % (ref 0–5.6)
HCT VFR BLD AUTO: 30.9 % (ref 37–47)
HGB BLD-MCNC: 10 G/DL (ref 12–16)
INR PPP: 1.88 (ref 0.87–1.13)
LACTATE BLD-SCNC: 2.2 MMOL/L (ref 0.5–2)
LYMPHOCYTES # BLD AUTO: 0.61 K/UL (ref 1–4.8)
LYMPHOCYTES NFR BLD: 4.3 % (ref 22–41)
MANUAL DIFF BLD: NORMAL
MCH RBC QN AUTO: 30.7 PG (ref 27–33)
MCHC RBC AUTO-ENTMCNC: 32.4 G/DL (ref 33.6–35)
MCV RBC AUTO: 94.8 FL (ref 81.4–97.8)
MONOCYTES # BLD AUTO: 1.1 K/UL (ref 0–0.85)
MONOCYTES NFR BLD AUTO: 7.8 % (ref 0–13.4)
MORPHOLOGY BLD-IMP: NORMAL
NEUTROPHILS # BLD AUTO: 12.39 K/UL (ref 2–7.15)
NEUTROPHILS NFR BLD: 80.9 % (ref 44–72)
NEUTS BAND NFR BLD MANUAL: 7 % (ref 0–10)
NRBC # BLD AUTO: 0 K/UL
NRBC BLD AUTO-RTO: 0 /100 WBC
PLATELET # BLD AUTO: 107 K/UL (ref 164–446)
PLATELET BLD QL SMEAR: NORMAL
PMV BLD AUTO: 11.6 FL (ref 9–12.9)
POTASSIUM SERPL-SCNC: 4.5 MMOL/L (ref 3.6–5.5)
PROTHROMBIN TIME: 22.2 SEC (ref 12–14.6)
RBC # BLD AUTO: 3.26 M/UL (ref 4.2–5.4)
RBC BLD AUTO: NORMAL
SODIUM SERPL-SCNC: 135 MMOL/L (ref 135–145)
WBC # BLD AUTO: 14.1 K/UL (ref 4.8–10.8)

## 2017-06-26 PROCEDURE — 83605 ASSAY OF LACTIC ACID: CPT

## 2017-06-26 PROCEDURE — 700102 HCHG RX REV CODE 250 W/ 637 OVERRIDE(OP): Performed by: INTERNAL MEDICINE

## 2017-06-26 PROCEDURE — 700105 HCHG RX REV CODE 258: Performed by: INTERNAL MEDICINE

## 2017-06-26 PROCEDURE — 700111 HCHG RX REV CODE 636 W/ 250 OVERRIDE (IP): Performed by: INTERNAL MEDICINE

## 2017-06-26 PROCEDURE — 94760 N-INVAS EAR/PLS OXIMETRY 1: CPT

## 2017-06-26 PROCEDURE — 700102 HCHG RX REV CODE 250 W/ 637 OVERRIDE(OP)

## 2017-06-26 PROCEDURE — 85027 COMPLETE CBC AUTOMATED: CPT

## 2017-06-26 PROCEDURE — 80048 BASIC METABOLIC PNL TOTAL CA: CPT

## 2017-06-26 PROCEDURE — 82962 GLUCOSE BLOOD TEST: CPT | Mod: 91

## 2017-06-26 PROCEDURE — 85007 BL SMEAR W/DIFF WBC COUNT: CPT

## 2017-06-26 PROCEDURE — 770020 HCHG ROOM/CARE - TELE (206)

## 2017-06-26 PROCEDURE — A9270 NON-COVERED ITEM OR SERVICE: HCPCS | Performed by: INTERNAL MEDICINE

## 2017-06-26 PROCEDURE — 85610 PROTHROMBIN TIME: CPT

## 2017-06-26 PROCEDURE — A9270 NON-COVERED ITEM OR SERVICE: HCPCS

## 2017-06-26 PROCEDURE — 36415 COLL VENOUS BLD VENIPUNCTURE: CPT

## 2017-06-26 PROCEDURE — 99233 SBSQ HOSP IP/OBS HIGH 50: CPT | Performed by: INTERNAL MEDICINE

## 2017-06-26 RX ORDER — ALBUTEROL SULFATE 90 UG/1
2 AEROSOL, METERED RESPIRATORY (INHALATION) EVERY 4 HOURS PRN
Status: DISCONTINUED | OUTPATIENT
Start: 2017-06-26 | End: 2017-06-28 | Stop reason: HOSPADM

## 2017-06-26 RX ORDER — SODIUM CHLORIDE 9 MG/ML
1000 INJECTION, SOLUTION INTRAVENOUS ONCE
Status: COMPLETED | OUTPATIENT
Start: 2017-06-26 | End: 2017-06-26

## 2017-06-26 RX ADMIN — INSULIN HUMAN 9 UNITS: 100 INJECTION, SOLUTION PARENTERAL at 18:12

## 2017-06-26 RX ADMIN — LEVOFLOXACIN 750 MG: 750 INJECTION, SOLUTION INTRAVENOUS at 18:08

## 2017-06-26 RX ADMIN — DILTIAZEM HYDROCHLORIDE 30 MG: 30 TABLET, FILM COATED ORAL at 10:32

## 2017-06-26 RX ADMIN — INSULIN LISPRO 3 UNITS: 100 INJECTION, SOLUTION INTRAVENOUS; SUBCUTANEOUS at 11:43

## 2017-06-26 RX ADMIN — STANDARDIZED SENNA CONCENTRATE AND DOCUSATE SODIUM 2 TABLET: 8.6; 5 TABLET, FILM COATED ORAL at 20:41

## 2017-06-26 RX ADMIN — CARVEDILOL 12.5 MG: 12.5 TABLET, FILM COATED ORAL at 10:32

## 2017-06-26 RX ADMIN — DILTIAZEM HYDROCHLORIDE 30 MG: 30 TABLET, FILM COATED ORAL at 20:41

## 2017-06-26 RX ADMIN — INSULIN LISPRO 2 UNITS: 100 INJECTION, SOLUTION INTRAVENOUS; SUBCUTANEOUS at 06:11

## 2017-06-26 RX ADMIN — OXYCODONE HYDROCHLORIDE 5 MG: 5 TABLET ORAL at 19:41

## 2017-06-26 RX ADMIN — SODIUM CHLORIDE: 9 INJECTION, SOLUTION INTRAVENOUS at 04:01

## 2017-06-26 RX ADMIN — WARFARIN SODIUM 12.5 MG: 2.5 TABLET ORAL at 18:06

## 2017-06-26 RX ADMIN — OXYCODONE HYDROCHLORIDE 5 MG: 5 TABLET ORAL at 10:28

## 2017-06-26 RX ADMIN — INSULIN HUMAN 12 UNITS: 100 INJECTION, SOLUTION PARENTERAL at 20:45

## 2017-06-26 RX ADMIN — DIGOXIN 125 MCG: 125 TABLET ORAL at 10:28

## 2017-06-26 RX ADMIN — CARVEDILOL 12.5 MG: 12.5 TABLET, FILM COATED ORAL at 20:41

## 2017-06-26 RX ADMIN — SODIUM CHLORIDE 1000 ML: 9 INJECTION, SOLUTION INTRAVENOUS at 16:10

## 2017-06-26 ASSESSMENT — ENCOUNTER SYMPTOMS
WEAKNESS: 0
LOSS OF CONSCIOUSNESS: 0
COUGH: 0
VOMITING: 0
FALLS: 0
CHILLS: 0
ABDOMINAL PAIN: 0
DIZZINESS: 0
NAUSEA: 0
TINGLING: 0
SHORTNESS OF BREATH: 0
HEADACHES: 0
DEPRESSION: 0
DIARRHEA: 0
BACK PAIN: 1
MYALGIAS: 0
FLANK PAIN: 1
STRIDOR: 0
PALPITATIONS: 0
SPUTUM PRODUCTION: 0
CONSTIPATION: 0
FEVER: 0

## 2017-06-26 ASSESSMENT — PAIN SCALES - GENERAL
PAINLEVEL_OUTOF10: 5
PAINLEVEL_OUTOF10: 0
PAINLEVEL_OUTOF10: 1
PAINLEVEL_OUTOF10: 4
PAINLEVEL_OUTOF10: 4
PAINLEVEL_OUTOF10: 0

## 2017-06-26 ASSESSMENT — COPD QUESTIONNAIRES
HAVE YOU SMOKED AT LEAST 100 CIGARETTES IN YOUR ENTIRE LIFE: YES
DO YOU EVER COUGH UP ANY MUCUS OR PHLEGM?: NO/ONLY WITH OCCASIONAL COLDS OR INFECTIONS
COPD SCREENING SCORE: 6
DURING THE PAST 4 WEEKS HOW MUCH DID YOU FEEL SHORT OF BREATH: SOME OF THE TIME

## 2017-06-26 ASSESSMENT — LIFESTYLE VARIABLES: EVER_SMOKED: YES

## 2017-06-26 NOTE — H&P
"CHIEF COMPLAINT:  Left flank pain, chills, fevers.      HISTORY OF PRESENT ILLNESS:  This is a 67-year-old female with history of   atrial fibrillation on chronic anticoagulation with Coumadin, COPD on home   oxygen 2 liters at rest and 5 liters with exertion, coronary artery disease   status post CABG, with permanent pacemaker in place, along with history of   mitral valve repair.  She also has type 2 diabetes mellitus.      She was doing well until about 9 in the morning on the day of admission when   she suddenly had acute onset low back pain across her back, which subsequently   localized to the left flank, which she described as sharp and \"worse than   childbirth.\"  By 11:00 a.m., she started to feel nauseous with multiple   episodes of vomiting, along with vigorous shaking and chills.  She denied any   chest pain, diarrhea, or any other symptoms.  She did have chronic shortness   of breath, which remains unchanged.  Due to her symptoms, she then decided to   go to the ED for further evaluation and management.      EMERGENCY DEPARTMENT COURSE:  The patient was initially evaluated in the   emergency department, with blood pressure maintaining, tachycardic,   saturating well on requiring 5 liters oxygen by nasal cannula to keep   saturations up, and was afebrile.  Initial blood workup was remarkable for WBC   count of 11,900 with left shift, but no bandemia, with platelet count of 153.    BMP was remarkable for sodium of 129, BUN of 26 and creatinine of 1.55 up   from 1.08, along with hyperglycemia of 513.  .  Lactate was 3.2.  Alkaline   phosphatase was 112 with normal AST and ALT, with INR 2.93.  Urinalysis showed   greater than 150 wbc's with large leukocyte esterase and negative nitrite,   along with 20-50 rbc's.  CT of the kidneys showed 2 left UPJ calculus   measuring 4-5 mm, with associated left hydronephrosis.  She was given IV   Levaquin, along with a normal saline bolus.  Urology was consulted, who then "   planned on bringing her to the OR for cystoscopy.  Given her elevated INR, she   was given vitamin K and FFP.  She underwent cystoscopy and stent placement by   urology in the OR.  She was subsequently admitted to the hospitalist service   for further evaluation and management.      REVIEW OF SYSTEMS  A complete review of system was done. All other systems were negative.    PMH/PSH/FMH: I personally reviewed all ancillary histories as noted.    PAST MEDICAL HISTORY:  Past Medical History   Diagnosis Date   • A-fib (CMS-HCC)    • COPD (chronic obstructive pulmonary disease) (CMS-HCC)        PAST SURGICAL HISTORY:  Past Surgical History   Procedure Laterality Date   • Mitral valve repair  2009   • Pacemaker insertion  2015   • Knee replacement, total Left 2015       PERSONAL/SOCIAL HISTORY:  Social History   Substance Use Topics   • Smoking status: Never Smoker    • Smokeless tobacco: Never Used   • Alcohol Use: No       FAMILY MEDICAL HISTORY:  Reviewed. Non-contributory.    ALLERGIES:  Amlodipine besylate-valsartan; Amoxicillin; Etodolac; Lisinopril; and Other drug    HOME MEDICATIONS:  Medication Sig   warfarin (COUMADIN) 10 MG Tab Take 10 mg by mouth every evening.   Nutritional Supplements (DIABETES SUPPORT) THERAPY PACK Misc Take 1 Each by mouth every day. OTC diabetic pack   diltiazem (CARDIZEM) 30 MG Tab Take 30 mg by mouth 2 Times a Day.   Umeclidinium-Vilanterol (ANORO ELLIPTA) 62.5-25 MCG/INH AEROSOL POWDER, BREATH ACTIVATED Inhale 1 Puff by mouth every day.   carvedilol (COREG) 12.5 MG Tab Take 12.5 mg by mouth 3 times a day.   furosemide (LASIX) 20 MG Tab Take 40 mg by mouth every 48 hours. Alternating days with Spironolactone   potassium chloride SA (K-DUR) 10 MEQ Tab CR Take 10 mEq by mouth 2 times a day.   spironolactone (ALDACTONE) 25 MG Tab Take 25 mg by mouth every 48 hours. Alternating  every other day with furosemide   digoxin (LANOXIN) 125 MCG Tab Take 125 mcg by mouth every day.            PHYSICAL EXAMINATION:    VITAL SIGNS:  Blood pressure 90/46, heart rate 88, respiratory rate 16, oxygen   saturation 97% on 2.5 liters nasal cannula and temperature 36.6 degrees   Celsius.    CONSTITUTIONAL: (-) diaphoresis, (-) distress  HENT: Normocephalic, atraumatic, (-) tonsillopharyngal congestion or exudate.  EYES: PERRLA, pink conjuctivae, (-) icteric sclerae  NECK: (-) cervical lymphadenopathy, (-) neck rigidity  RESPIRATORY:  Diminished air entry, bilateral lung bases, otherwise clear to   auscultation bilaterally.    CARDIOVASCULAR:  Tachycardic, irregular rhythm.  No murmurs, rubs, or gallops.    No edema on the lower extremities.    GASTROINTESTINAL:  Slight left flank tenderness which is better than on   admission.  No abdominal tenderness.  No guarding or rebound.  Normoactive   bowel sounds.  Jose catheter in place.    MUSCULOSKELETAL: (-) joint swelling/tenderness, (-) joint deformities, (-) muscle tenderness,   (-) gross limitation of movement of 4 extremities  SKIN: (-) erythema, warmth, rashes, ulcers, open wounds  PSYCHIATRIC: mood, affect, and thought content WNL, behavior age appropriate  NEUROLOGIC: Non-focal, moves all 4 extremities, sensory grossly intact      PERTINENT DIAGNOSTIC RESULTS:  Reviewed, and as mentioned above. Please refer to ED course.      ASSESSMENT:    1.  Severe sepsis secondary to acute pyelonephritis with obstructive uropathy.  2.  Infected left ureterolithiasis and pyelonephritis.    3.  Atrial fibrillation with rapid ventricular response secondary to sepsis.    4.  Acute on chronic respiratory failure with hypoxia due to sepsis.    5.  Acute kidney injury, prerenal from obstructive uropathy and sepsis.    6.  Hypovolemic hyponatremia secondary to sepsis.    7.  Hyperglycemia secondary to sepsis.    8.  Type 2 diabetes mellitus.    9.  Acquired anticoagulant disorder.    10.  Chronic obstructive pulmonary disease, not in acute exacerbation.    11.  Coronary  artery disease.      PLAN:    ---  I will admit her to the telemetry unit.  I anticipate that she would need   at least 2 midnights hospital stay to provide medically necessary services.    ---  I will continue her on aggressive IV fluids per sepsis protocol.  Patient   was already given initial 30 mL per kilogram of IV fluid bolus, and I will   continue her on normal saline at 125 mL per hour.  We will do serial lactate   and close hemodynamic monitoring with p.r.n. boluses for elevated lactate and   hypotension.  I will hold her diuretics for now.    ---  I will continue her on IV Levaquin for her pyelonephritis.  We will follow   her urine cultures and blood cultures.  We will trend her WBC count as well.    She already underwent cystoscopy and stent placement by urology.    ---  I will continue her on p.r.n. oxycodone and IV morphine for pain, along   with p.r.n. antiemetics.    ---  Resume home dose Cardizem, Coreg, and digoxin.  I will send for digoxin   level given her acute kidney injury.  We will monitor her on telemetry.  Anticipate   improvement in her rate control with improvement in her sepsis.   Resume Coumadin per pharmacy dosing postoperatively.    ---  Check hemoglobin A1c.  I anticipate that her blood sugar control will   improve with resolution of her sepsis.  We will do Accu-Cheks a.c. and at   bedtime.  I will place her on sliding scale insulin while she is in-house.    ---  Continue bronchodilators per RT protocol, along with oxygen support to   keep saturations above 88%.  Resume home dose inhalers including her Anoro   Ellipta.    ---  Follow renal function and sodium with IV fluid resuscitation.  Repeat BMP   in the morning.  Avoid further nephrotoxins.        Deep venous thrombosis prophylaxis -- Coumadin.    Gastrointestinal prophylaxis -- not indicated.    Code status -- full code.       ____________________________________     Corey Hurtado M.D.    [facility]AH / FRANCESCO    DD:  06/26/2017  00:50:39  DT:  06/26/2017 01:13:16    D#:  [id number]  Job#:  [id number]

## 2017-06-26 NOTE — ASSESSMENT & PLAN NOTE
- causing sepsis, continue levaquin  - UA positive for proteus which is sensitive to levaquin  - pain improved

## 2017-06-26 NOTE — ED NOTES
Pt to rm 9 via w/c from triage.  Agree with triage note.  Pt states UTI sx for 3-4 days now with L flank pain.  Urine spec sent

## 2017-06-26 NOTE — DISCHARGE PLANNING
Care Transition Team Assessment    Completed screening and pt interested in financial resources for rx, food, and utilities. Patient provided resource list and Extra Help application for Rx assistance.     Information Source  Orientation : Oriented x 4  Information Given By: Patient  Informant's Name: Salma  Who is responsible for making decisions for patient? : Patient    Elopement Risk  Legal Hold: No  Ambulatory or Self Mobile in Wheelchair: Yes  Disoriented: No  Psychiatric Symptoms: None  History of Wandering: No  Elopement this Admit: No  Vocalizing Wanting to Leave: No  Displays Behaviors, Body Language Wanting to Leave: No-Not at Risk for Elopement  Elopement Risk: Not at Risk for Elopement    Interdisciplinary Discharge Planning  Does Admitting Nurse Feel This Could be a Complex Discharge?: No  Primary Care Physician: DR. Luis Manuel Campbell  Lives with - Patient's Self Care Capacity: Spouse  Support Systems: Spouse / Significant Other  Housing / Facility: 1 Story Apartment / Condo  Do You Take your Prescribed Medications Regularly: Yes  Able to Return to Previous ADL's: Yes  Mobility Issues: No  Prior Services: Home-Independent  Patient Expects to be Discharged to:: Home  Assistance Needed: No  Durable Medical Equipment: Home Oxygen, Walker  DME Provider / Phone: [facility]    Discharge Preparedness  What is your plan after discharge?: Home with help  What are your discharge supports?: Spouse  Prior Functional Level: Drives Self, Independent with Activities of Daily Living, Independent with Medication Management, Uses Walker  Difficulity with ADLs: Walking  Difficulty with ADLs Comment: Uses walker and cane as needed  Difficulity with IADLs: None    Functional Assesment  Prior Functional Level: Drives Self, Independent with Activities of Daily Living, Independent with Medication Management, Uses Walker    Finances  Financial Barriers to Discharge: Yes (Rx, Utilites, food assistance needed. Spouse  unemployed)  Average Monthly Income: 1900 $  Source of Income: Social Security, Pension  Prescription Coverage: Yes ([facility] [employer])    Vision / Hearing Impairment  Vision Impairment : Yes  Right Eye Vision: Impaired, Wears Glasses  Left Eye Vision: Impaired, Wears Glasses  Hearing Impairment : No    Domestic Abuse  Have you ever been the victim of abuse or violence?: No  Physical Abuse or Sexual Abuse: No  Verbal Abuse or Emotional Abuse: No  Possible Abuse Reported to:: Not Applicable    Psychological Assessment  History of Substance Abuse: None  History of Psychiatric Problems: No    Discharge Risks or Barriers  Discharge risks or barriers?: No    Anticipated Discharge Information  Anticipated discharge disposition: Home  Discharge Address: [address] [place] [id number]  Discharge Contact Phone Number: [phone]

## 2017-06-26 NOTE — ED NOTES
Med rec unable to complete   Pt unaware of heart medications name  Called S/O to verify. No answer. LM  Pt home pharmacy closed: [email or web address]   Will follow up     Allergies have been reviewed  No oral ABX within last 30 days

## 2017-06-26 NOTE — PROGRESS NOTES
Report taken on patient from PACU RN, patient arrive to unit via hospital bed with PACU RN and CNA, patient vitamin K infusion just completed as patient arrive to unit. Patient  is alert and oriented, patient put on 2.5L nasal cannula, patient stated that is on 2L at home and increase it to 5L when she is moving around, patient has a brown in place, and right side IJ tripple lumen, Assessment done on patient, post op vitals started on patient, patient denies any pain, stated that she felt on comfortable with brown on and feels the urge to void. Admission profile done on patient. 2RN skin check done, patient started on IVF through peripheral line, awaiting xray result about central line. Lab called to ask for patient blood cultures to be drawn, culture drawn by . Swallow eval done on patient. Patient's BG high max units given to patient to be rechecked in one hour. Patient denies any more need at this time

## 2017-06-26 NOTE — ASSESSMENT & PLAN NOTE
- d/t left ureteral calculous  - now s/p cystoscopy with stenting  - await additional recommendations per urology

## 2017-06-26 NOTE — PROGRESS NOTES
[facility] Hospitalist Progress Note    Date of Service: 2017    Chief Complaint  67 y.o. female admitted 2017 with left flank pain, fever and chills.    Interval Problem Update  Noted left ureteral calculus, hydronephrosis and sepsis.  Now s/p cystoscopy and insertion of left ureteral stent.  Discussed plan and pt condition with RN at bedside and social work.    Consultants/Specialty  Urology - Dr Hugo-Ray    Disposition  Pt requires additional treatment in the hospital        Review of Systems   Constitutional: Positive for malaise/fatigue. Negative for fever and chills.   HENT: Negative for congestion.    Respiratory: Negative for cough, sputum production, shortness of breath and stridor.    Cardiovascular: Negative for chest pain, palpitations and leg swelling.   Gastrointestinal: Negative for nausea, vomiting, abdominal pain, diarrhea and constipation.   Genitourinary: Positive for flank pain (improved ). Negative for dysuria and urgency.   Musculoskeletal: Positive for back pain. Negative for myalgias and falls.   Neurological: Negative for dizziness, tingling, loss of consciousness, weakness and headaches.   Psychiatric/Behavioral: Negative for depression and suicidal ideas.      Physical Exam  Laboratory/Imaging   Hemodynamics  Temp (24hrs), Av.8 °C (98.2 °F), Min:36.6 °C (97.8 °F), Max:37.3 °C (99.1 °F)   Temperature: 36.7 °C (98 °F)  Pulse  Av.6  Min: 56  Max: 117 Heart Rate (Monitored): (!) 103  Blood Pressure : (!) 99/68 mmHg (Nurse notified.), NIBP: 105/57 mmHg      Respiratory      Respiration: 18, Pulse Oximetry: 100 %, O2 Daily Delivery Respiratory : Silicone Nasal Cannula        RUL Breath Sounds: Clear, RML Breath Sounds: Diminished, RLL Breath Sounds: Diminished, SCOTT Breath Sounds: Clear, LLL Breath Sounds: Diminished    Fluids    Intake/Output Summary (Last 24 hours) at 17 0946  Last data filed at 17 0800   Gross per 24 hour   Intake   2890 ml   Output    305 ml   Net    2585 ml       Nutrition  Orders Placed This Encounter   Procedures   • Diet Order     Standing Status: Standing      Number of Occurrences: 1      Standing Expiration Date:      Order Specific Question:  Diet:     Answer:  Diabetic [3]     Order Specific Question:  Diet:     Answer:  Cardiac [6]     Physical Exam   Constitutional: She is oriented to person, place, and time. She appears well-developed. No distress.   HENT:   Head: Normocephalic and atraumatic.   Mouth/Throat: Oropharynx is clear and moist. No oropharyngeal exudate.   Eyes: Right eye exhibits no discharge. Left eye exhibits no discharge.   Neck: Neck supple. No tracheal deviation present.   Cardiovascular: Normal rate and regular rhythm.  Exam reveals no gallop and no friction rub.    No murmur heard.  Pulmonary/Chest: Effort normal and breath sounds normal. No stridor. No respiratory distress. She has no wheezes. She has no rales. She exhibits no tenderness.   Abdominal: Soft. Bowel sounds are normal. She exhibits no distension. There is no tenderness.   Musculoskeletal: Normal range of motion. She exhibits no edema or tenderness.   Lymphadenopathy:     She has no cervical adenopathy.   Neurological: She is alert and oriented to person, place, and time. No cranial nerve deficit.   Skin: Skin is warm and dry. No rash noted. She is not diaphoretic. No erythema.   Psychiatric: She has a normal mood and affect. Her behavior is normal. Judgment and thought content normal.   Nursing note and vitals reviewed.      Recent Labs      06/25/17   1713  06/26/17   0345   WBC  11.9*  14.1*   RBC  4.04*  3.26*   HEMOGLOBIN  12.3  10.0*   HEMATOCRIT  37.6  30.9*   MCV  93.1  94.8   MCH  30.4  30.7   MCHC  32.7*  32.4*   RDW  51.8*  54.0*   PLATELETCT  153*  107*   MPV  11.8  11.6     Recent Labs      06/25/17   1713  06/26/17   0345   SODIUM  129*  135   POTASSIUM  4.1  4.5   CHLORIDE  98  104   CO2  21  24   GLUCOSE  513*  243*   BUN  26*  26*   CREATININE  1.55*   1.36   CALCIUM  9.1  8.3*     Recent Labs      06/25/17   1713  06/26/17   0345   INR  2.93*  1.88*                  Assessment/Plan     Severe sepsis (CMS-HCC)  Assessment & Plan  - d/t pyelonephritis d/t infected ureteral stone  - improved  - continue levaquin  - await full culture results     Chronic obstructive pulmonary disease (CMS-HCC) (present on admission)  Assessment & Plan  - no acute exacerbation    Atrial fibrillation (CMS-HCC) (present on admission)  Assessment & Plan  - now sinus  - continue coreg, diltiazem, digoxin and coumadin    Type 2 diabetes mellitus with complication (CMS-HCC) (present on admission)  Assessment & Plan  - uncontrolled, will increase ISS to high  - pt on no dm meds as outpt  - has hgbA1c of 11.1, will need to be discharged on meds    Obstructive uropathy  Assessment & Plan  - d/t left ureteral calculous  - now s/p cystoscopy with stenting  - await additional recommendations per urology     Acute pyelonephritis  Assessment & Plan  - causing sepsis, continue levaquin  - UA positive for proteus  - pain improved    Hydronephrosis with obstructing calculus  Assessment & Plan  - now s/p ureteral stent    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)  Assessment & Plan  - continue coumadin  - repeat cbc in am    Normocytic anemia  Assessment & Plan  - no sign of gross bleeding  - repeat cbc in am    Acute renal failure (ARF) (CMS-HCC)  Assessment & Plan  - improved with ivf, continue  - repeat bmp in am      Labs reviewed, Medications reviewed and Radiology images reviewed        DVT Prophylaxis: Warfarin (Coumadin)      Antibiotics: Treating active infection/contamination beyond 24 hours perioperative coverage

## 2017-06-26 NOTE — PROGRESS NOTES
"Urology Progress Note    Post op Day # 1. Left ureteral stent placement    Overnight Events: None    S: No fevers, chills, nausea or vomiting.  Feeling better overall. Some left flank discomfort.     O:   Blood pressure 101/60, pulse 71, temperature 36.7 °C (98 °F), resp. rate 18, height 1.626 m (5' 4\"), weight 89.8 kg (197 lb 15.6 oz), SpO2 100 %.  Recent Labs      06/25/17   1713  06/26/17   0345   SODIUM  129*  135   POTASSIUM  4.1  4.5   CHLORIDE  98  104   CO2  21  24   GLUCOSE  513*  243*   BUN  26*  26*   CREATININE  1.55*  1.36   CALCIUM  9.1  8.3*     Recent Labs      06/25/17 1713 06/26/17   0345   WBC  11.9*  14.1*   RBC  4.04*  3.26*   HEMOGLOBIN  12.3  10.0*   HEMATOCRIT  37.6  30.9*   MCV  93.1  94.8   MCH  30.4  30.7   MCHC  32.7*  32.4*   RDW  51.8*  54.0*   PLATELETCT  153*  107*   MPV  11.8  11.6         Intake/Output Summary (Last 24 hours) at 06/26/17 1034  Last data filed at 06/26/17 0800   Gross per 24 hour   Intake   2890 ml   Output    305 ml   Net   2585 ml       Exam:  Abdomen soft, benign.    Urine: clear pink      A/P:    Active Hospital Problems    Diagnosis   • Severe sepsis (CMS-HCC) [A41.9, R65.20]   • Obstructive uropathy [N13.9]   • Acute pyelonephritis [N10]       Stable.   Ambulate, IS.  Urology signing off  F/U 1-2 weeks with Dr Richter to discuss stone treatment  "

## 2017-06-26 NOTE — ED NOTES
Med rec updated and complete.  Allergies reviewed.  Pt called her   at home.   at home read off  pts current prescriptions and strengths.

## 2017-06-26 NOTE — CONSULTS
"DATE OF SERVICE:  06/25/2017    REASON FOR CONSULTATION:  1.  Left upper ureteral calculus.  2.  Left hydronephrosis.  3.  Left flank pain.  4.  Sepsis with elevated white count and elevated lactic acid.    BRIEF HISTORY:  This 67-year-old female developed left flank pain earlier this   morning.  It progressed to the point that later in day, she developed chills   and ongoing nausea and vomiting for most of the day.  She presented to the   emergency room where a CT scan was done that showed a 4.5 mm in diameter left   upper ureteral calculus with hydronephrosis.  She was found to have a white   count of 11,900 with a left shift.  She has slight elevation of her creatinine   up to 1.55 and a lactic acid was 3.5.  Urine also had a lot of white cells in   it and some red cells.  At this point, because of this finding, it was felt   that she is developing \"urosepsis\".  At this point, is to emergently take her   to the operating room.    PAST MEDICAL HISTORY:  Significant for coronary artery disease.  She does have   a cardiac pacemaker in place.  She had a mitral valve replaced in the past.    She has had kidney stones in the distant past, but nothing in this area.  She   has also had a total knee replacement in the past.  She suffers from atrial   fibrillation and COPD and does see the ____ pulmonologist for that.    SOCIAL HISTORY:  She never smoked and does not drink.    ALLERGIES:  TO AMLODIPINE, AMOXICILLIN, ETODOLAC, LISINOPRIL.    CURRENT MEDICATIONS:  Include carvedilol, digoxin, diltiazem and warfarin.    PHYSICAL EXAMINATION:  GENERAL:  Showed a well-developed, well-nourished female, resting fairly   comfortably in bed.  She is awake and alert.  VITAL SIGNS:  Showed she is afebrile, pulse was 107, blood pressure 96/52 at   one point ____ 105/57, respirations 17.    LABORATORY DATA:  Have been discussed.    IMPRESSION:  1.  Left upper ureteral calculus.  2.  Left hydronephrosis.  3.  Left flank pain.  4.  " Sepsis with elevated lactic acid and elevated white count.    PLAN:  At this point, we will take her emergently to the operating room for   cystoscopy and insertion of left ureteral stent.  We will treat the stone at a   later date after the sepsis is resolved.  The need for only doing this was   discussed and the seriousness of the situation was discussed.  At this point,   all questions were answered.  We will proceed with the procedure emergently.       ____________________________________     MD QUINN HO / FRANCESCO    DD:  06/25/2017 21:41:56  DT:  06/25/2017 22:15:23    D#:  [id number]  Job#:  [id number]

## 2017-06-26 NOTE — ASSESSMENT & PLAN NOTE
- now s/p ureteral stent  - pain worse today, no sign of pt losing stent, if pain still bad tomorrow may need to obtain imaging   - will add tramadol and toradol now that renal function is improved

## 2017-06-26 NOTE — ASSESSMENT & PLAN NOTE
- now sinus  - continue diltiazem, digoxin and coumadin  - now with borderline low BP, will stop coreg

## 2017-06-26 NOTE — ASSESSMENT & PLAN NOTE
- d/t pyelonephritis d/t infected ureteral stone, proteus on urine culture   - improved  - continue levaquin

## 2017-06-26 NOTE — PROGRESS NOTES
Inpatient Anticoagulation Service Note    Date: 6/26/2017  Reason for Anticoagulation: Atrial Fibrillation, Mitral Valve Repair        Hemoglobin Value: 10  Hematocrit Value: 30.9  Lab Platelet Value: 107  Target INR: 2.0 to 3.0    INR from last 7 days     Date/Time INR Value    06/26/17 0345 (!)1.88    06/25/17 1713 (!)2.93        Dose from last 7 days     Date/Time Dose (mg)    06/25/17 2000 10        Significant Interactions: Antibiotics  Bridge Therapy: No (If less than 5 days and overlap therapy discontinued -- document reason (i.e. Bleed Risk))    (If still on overlap therapy, if No -- document reason (i.e. Bleed Risk))    Reversal Agent Administered: Vitamin K  Intravenous (10 mg IV started in OR)     Comments: Emergent cystoscopy awith L ureteral stent insertion.  Patient takes 10 mg coumadin daily for A fib and mitral valve repair and is followed by anti coag clinic, last dose was 10 mg yesterday 6/24. INR is therapeutic upon admit.  Vit K 10 mg IV given in OR.     Plan:  Gave 10 mg x1 post op at 23:00.  Monitor INR daily.  Clinical pharmacist to determine continued dosing.    Education Material Provided?: No    Pharmacist suggested discharge dosing: continue 10 mg daily     Elaina Valdivia [facility]

## 2017-06-26 NOTE — ED NOTES
Tita from Lab called with critical result of glucose 513 at 1806. Critical lab result read back to Tita.   Dr. De Souza notified of critical lab result at 1808.  Critical lab result read back by Dr. De Souza.  Primary RN aware.

## 2017-06-26 NOTE — OR SURGEON
Immediate Post-Operative Note      PreOp Diagnosis: L upper ureteral calculus; Left hydronephrosis; left renal colic; sepsis with elevaetd WBC and hyper coagulable state    PostOp Diagnosis: same ; L pyonephrosis    Procedure(s):  CYSTOSCOPY, LEFT URETERAL STENT PLACEMENT - Wound Class: Clean Contaminated    Surgeon(s):  Edgardo Richter M.D.    Anesthesiologist/Type of Anesthesia:  Anesthesiologist: Magdaleno Flannery M.D./General    Surgical Staff:  Circulator: Micha Lopez RYEISON; Devin Cagle R.N.  Scrub Person: Leopold von Garcia; Maribel Medrano Jr.    Specimen: none    Estimated Blood Loss: none    Findings: Grossly purulent drainage from L ureteral stent    Complications: none to PACU guarded        6/25/2017 9:25 PM Edgardo Richter

## 2017-06-26 NOTE — CARE PLAN
Problem: Safety  Goal: Will remain free from injury  Outcome: PROGRESSING AS EXPECTED  Patient steady on her feet call out appropriately, educate patient on use of call bell     Problem: Infection  Goal: Will remain free from infection  Outcome: PROGRESSING SLOWER THAN EXPECTED  IV antibiotics order, lab being monitored

## 2017-06-26 NOTE — PROGRESS NOTES
Urology Er consult note.  Full note dictated    .68 y/o female with 4.5 mm proximal ureteral stone with hydronephrosis.,  WBC = 11.9 with L shift.  INR = 2.93.  Lactic acid = 3.5.  Look ok despite labs.  Will take emergently to OR for cystoscopy L ureteral stent insertion.  Risks and benefits discussed.  Stone treatment at later date.      Edgardo Richter MD

## 2017-06-26 NOTE — RESPIRATORY CARE
COPD EDUCATION by COPD CLINICAL EDUCATOR  6/26/2017 at 11:35 AM by Jewels Sewell     Patient interviewed by COPD education team. Patient refused COPD program at this time.

## 2017-06-26 NOTE — ED PROVIDER NOTES
"ED Provider Note    Scribed for Kiana De Souza M.D. by Tam Murillo. 6/25/2017, 5:33 PM.    Primary care provider: LILIYA Casey  Means of arrival: Walk in  History obtained from: Patient  History limited by: None    CHIEF COMPLAINT  Chief Complaint   Patient presents with   • Abdominal Pain     Pain started in her low back at 1100 and moved around to her abdomen. Pt then started vomiting and felt \"cold\".    • Sent from Urgent Care     Pt seen at  and had a urine test done and sent for further eval of her kidneys.        HPI  Salma Lees is a 67 y.o. female who presents to the Emergency Department complaining of abdominal pain onset 11 AM this morning. Patient states the pain radiates across her back, wrapping around to her left side. The pain has been progressively worsening to the point of vomiting. Patient reports noticing associated blood in her urine 4 days ago and yesterday. She visited Urgent Care earlier who sent her here for further evaluation and care. She states they discovered an infection. Patient also notes fever earlier today at 101 °F, chills, and shortness of breath exacerbated when walking. She denies any dysuria or diarrhea. Patient notes having a kidney stone in 2005. At that time she was admitted for a heart problem. The kidney stone was removed as the heart problem was addressed. Patient has a pacemaker placed. Since readjusting the pacemaker, she notes frequently going into atrial fibrillation. Patient is on Coumadin and Lasix.    REVIEW OF SYSTEMS  Pertinent positives include abdominal pain, hematuria, vomiting, fevers, chills, shortness of breath. Pertinent negatives include no dysuria, diarrhea. As above, all other systems reviewed and are negative.   See HPI for further details.   C    PAST MEDICAL HISTORY  Past Medical History   Diagnosis Date   • A-fib (CMS-[facility])    • COPD (chronic obstructive pulmonary disease) (CMS-[facility])        SURGICAL HISTORY  Past Surgical " "History   Procedure Laterality Date   • Mitral valve repair  2009   • Pacemaker insertion  2015   • Knee replacement, total Left 2015       SOCIAL HISTORY  Social History   Substance Use Topics   • Smoking status: Never Smoker    • Smokeless tobacco: Never Used   • Alcohol Use: No      History   Drug Use No       FAMILY HISTORY  None noted    CURRENT MEDICATIONS  Home Medications     Reviewed by Joi Montenegro, Pharmacy Int (Pharmacy Intern) on 06/25/17 at 1843  Med List Status: Complete    Medication Last Dose Status    albuterol (VENTOLIN HFA) 108 (90 BASE) MCG/ACT Aero Soln inhalation aerosol prn Active    carvedilol (COREG) 12.5 MG Tab 6/25/2017 Active    digoxin (LANOXIN) 125 MCG Tab 6/25/2017 Active    Ferrous Sulfate (IRON) 325 (65 FE) MG Tab 6/25/2017 Active    furosemide (LASIX) 20 MG Tab 6/24/2017 Active    potassium chloride SA (K-DUR) 10 MEQ Tab CR 6/25/2017 Active    spironolactone (ALDACTONE) 25 MG Tab 6/25/2017 Active    Umeclidinium-Vilanterol (ANORO ELLIPTA) 62.5-25 MCG/INH AEROSOL POWDER, BREATH ACTIVATED 6/25/2017 Active    vitamin D (CHOLECALCIFEROL) 1000 UNIT Tab 6/25/2017 Active    warfarin (COUMADIN) 10 MG Tab 6/24/2017 Active    Zinc 50 MG Cap 6/25/2017 Active                ALLERGIES  Allergies   Allergen Reactions   • Amlodipine Besylate-Valsartan    • Amoxicillin    • Etodolac    • Lisinopril    • Other Drug      metal       PHYSICAL EXAM  VITAL SIGNS: /54 mmHg  Pulse 110  Temp(Src) 36.8 °C (98.2 °F)  Resp 16  Ht 1.626 m (5' 4\")  SpO2 89%  Vitals reviewed.  Consitutional: Well-developed, well-nourished. Negative for: distress.  HENT: Normocephalic, right external ear normal, left external ear normal, oropharynx clear and moist.  Eyes: Conjunctivae normal, extraocular movements normal. Negative for: discharge in right and left eye, icterus.  Neck: Range of motion normal, supple. Negative for cervical adenopathy.  Cardiovascular: Tachycardic, irregularly irregular rhythm, heart " sounds normal, intact distal pulses. Negative for: murmur, rub, gallop.  Pulmonary/Chest Wall: Effort normal, breath sounds normal. Negative for: respiratory distress, wheezes, rales, rhonchi.   Abdominal: Soft, bowel sounds normal. Tenderness to left umbilicus. Negative for: distention, rebound, guarding.  Musculoskeletal: Normal range of motion. Negative for edema.  Neurological: Alert and oriented x3. No focal deficits.  Skin: Warm, dry. Negative for rash.  Psych: Mood/affect normal, behavior normal, judgment normal.    DIAGNOSTIC STUDIES / PROCEDURES    LABS  Results for orders placed or performed during the hospital encounter of 06/25/17   URINALYSIS CULTURE, IF INDICATED   Result Value Ref Range    Micro Urine Req Microscopic     Color Lt. Yellow     Character Cloudy (A)     Specific Gravity 1.019 <1.035    Ph 5.5 5.0-8.0    Glucose >1000 (A) Negative mg/dL    Ketones Negative Negative mg/dL    Protein Negative Negative mg/dL    Bilirubin Negative Negative    Nitrite Negative Negative    Leukocyte Esterase Large (A) Negative    Occult Blood Small (A) Negative    Culture Indicated Yes UA Culture   URINE MICROSCOPIC (W/UA)   Result Value Ref Range    WBC >150 (A) /hpf    RBC 20-50 (A) /hpf    Bacteria Few (A) None /hpf    Epithelial Cells Few /hpf    Mucous Threads Few /hpf    Hyaline Cast 0-2 /lpf   CBC WITH DIFFERENTIAL   Result Value Ref Range    WBC 11.9 (H) 4.8 - 10.8 K/uL    RBC 4.04 (L) 4.20 - 5.40 M/uL    Hemoglobin 12.3 12.0 - 16.0 g/dL    Hematocrit 37.6 37.0 - 47.0 %    MCV 93.1 81.4 - 97.8 fL    MCH 30.4 27.0 - 33.0 pg    MCHC 32.7 (L) 33.6 - 35.0 g/dL    RDW 51.8 (H) 35.9 - 50.0 fL    Platelet Count 153 (L) 164 - 446 K/uL    MPV 11.8 9.0 - 12.9 fL    Nucleated RBC 0.00 /100 WBC    NRBC (Absolute) 0.00 K/uL    Neutrophils-Polys 85.00 (H) 44.00 - 72.00 %    Lymphocytes 2.60 (L) 22.00 - 41.00 %    Monocytes 0.90 0.00 - 13.40 %    Eosinophils 0.00 0.00 - 6.90 %    Basophils 0.90 0.00 - 1.80 %     Neutrophils (Absolute) 11.27 (H) 2.00 - 7.15 K/uL    Lymphs (Absolute) 0.31 (L) 1.00 - 4.80 K/uL    Monos (Absolute) 0.11 0.00 - 0.85 K/uL    Eos (Absolute) 0.00 0.00 - 0.51 K/uL    Baso (Absolute) 0.11 0.00 - 0.12 K/uL   COMP METABOLIC PANEL   Result Value Ref Range    Sodium 129 (L) 135 - 145 mmol/L    Potassium 4.1 3.6 - 5.5 mmol/L    Chloride 98 96 - 112 mmol/L    Co2 21 20 - 33 mmol/L    Anion Gap 10.0 0.0 - 11.9    Glucose 513 (HH) 65 - 99 mg/dL    Bun 26 (H) 8 - 22 mg/dL    Creatinine 1.55 (H) 0.50 - 1.40 mg/dL    Calcium 9.1 8.5 - 10.5 mg/dL    AST(SGOT) 17 12 - 45 U/L    ALT(SGPT) 15 2 - 50 U/L    Alkaline Phosphatase 112 (H) 30 - 99 U/L    Total Bilirubin 2.9 (H) 0.1 - 1.5 mg/dL    Albumin 3.8 3.2 - 4.9 g/dL    Total Protein 7.5 6.0 - 8.2 g/dL    Globulin 3.7 (H) 1.9 - 3.5 g/dL    A-G Ratio 1.0 g/dL   LACTIC ACID   Result Value Ref Range    Lactic Acid 3.2 (H) 0.5 - 2.0 mmol/L   PROTHROMBIN TIME (INR)   Result Value Ref Range    PT 31.5 (H) 12.0 - 14.6 sec    INR 2.93 (H) 0.87 - 1.13   DIFFERENTIAL MANUAL   Result Value Ref Range    Bands-Stabs 9.70 0.00 - 10.00 %    Metamyelocytes 0.90 %    Manual Diff Status PERFORMED    PERIPHERAL SMEAR REVIEW   Result Value Ref Range    Peripheral Smear Review see below    PLATELET ESTIMATE   Result Value Ref Range    Plt Estimation Decreased    MORPHOLOGY   Result Value Ref Range    RBC Morphology Normal    ESTIMATED GFR   Result Value Ref Range    GFR If  40 (A) >60 mL/min/1.73 m 2    GFR If Non  33 (A) >60 mL/min/1.73 m 2   DIGOXIN   Result Value Ref Range    Digoxin 1.7 0.8 - 2.0 ng/mL   ABO AND RH DETERMINATION   Result Value Ref Range    ABO Grouping Only A     Rh Grouping Only NEG    FRESH FROZEN PLASMA   Result Value Ref Range    Component Ft       FPT                 Plasma, Thawed      [id number]   transfused   06/25/17   19:41      Product Type Plasma  Thawed     Dispense Status Transfused     Unit Number (Barcoded)  [id number]     Product Code (Barcoded) A6202E25     Blood Type (Barcoded) 6200    DIGOXIN   Result Value Ref Range    Digoxin 1.8 0.8 - 2.0 ng/mL   Lactic Acid -STAT Once   Result Value Ref Range    Lactic Acid 1.7 0.5 - 2.0 mmol/L   EKG (NOW)   Result Value Ref Range    Report       [facility] Emergency Dept.    Test Date:  2017  Pt Name:    CHARLENE GARCIA                Department: ER  MRN:        [id number]                      Room:        09  Gender:     F                            Technician: [id number]  :        [id number]                   Requested By:ER TRIAGE PROTOCOL  Order #:    [id number]                    Reading MD:    Measurements  Intervals                                Axis  Rate:       114                          P:          253  MA:         100                          QRS:        66  QRSD:       94                           T:          266  QT:         336  QTc:        463    Interpretive Statements  SINUS OR ECTOPIC ATRIAL TACHYCARDIA  ATRIAL PREMATURE COMPLEX  BORDERLINE INFERIOR Q WAVES  REPOL ABNRM, PROBABLE ISCHEMIA, DIFFUSE LEADS  No previous ECG available for comparison        All labs reviewed by me.    EKG Interpretation:  12-lead EKG by my interpretation shows: Atrial fibrillation with ventricular response rate of 120  ATRIAL PREMATURE COMPLEX  BORDERLINE INFERIOR Q WAVES  REPOL ABNRM, PROBABLE ISCHEMIA, DIFFUSE LEADS  No previous ECG available for comparison    RADIOLOGY  CT-RENAL COLIC EVALUATION(A/P W/O)   Final Result      1.  2 left ureteropelvic junction calculus each measuring 4 to 5 mm      2.  Associated left hydronephrosis      3.  No other acute finding      4.  Cholelithiasis      5.  Diverticulosis      6.  Aortic atherosclerotic plaque      7.  Prior hysterectomy        The radiologist's interpretation of all radiological studies have been reviewed by me.    COURSE & MEDICAL DECISION MAKING  Nursing notes, VS, PMSFHx reviewed in  chart.    After searching, she has no old records to review.    5:33 PM Patient seen and examined at bedside. Discussed plan of care which includes scan of abdomen to look for any stones and infections. Will consult with pharmacy about patient's medications. The patient presents with abdominal pain and hematuria and the differential diagnosis includes but is not limited to pyelonephritis, kidney stone. Ordered CT renal colic, digoxin, INR, lactic acid, CBC, CMP, urinalysis culture, urine microscopic, urine culture, EKG. The patient will be resuscitated with NS IV due to likely sepsis.  Patient will be treated with IV Levaquin.      6:24 PM Paged urology    6:37 PM - I discussed the patient's case and the above findings with Dr. Richter (urology) who advised to take patient to OR emergently.     6:41 PM Patient reevaluated at bedside. Discussed lab and radiology results as seen above which shows infected kidney stones. Discussed plan of care which includes going to OR. Patient understands and agrees to plan. Due to chronic anticoagulation, I ordered FFP and vitamin K stat with the patient's upcoming procedure.    7:31 PM Paged hospitalist.      8:04 PM - I discussed the patient's case and the above findings with Dr. Hurtado (hospitalist) who will admit the patient for stabilization in the ICU. He will catch up with her after she gets out of the OR.    CRITICAL CARE  The very real possibilty of a deterioration of this patient's condition required the highest level of my preparedness for sudden, emergent intervention.  I provided critical care services, which included medication orders, frequent reevaluations of the patient's condition and response to treatment, ordering and reviewing test results, and discussing the case with various consultants.  The critical care time associated with the care of the patient was 35 minutes. Review chart for interventions. This time is exclusive of any other billable procedures.        DISPOSITION:  Patient will be admitted to Dr. Hurtado, hospitalist, in critical condition.     FINAL IMPRESSION  1. Sepsis, due to unspecified organism (CMS-[facility])    2. Chronic anticoagulation    3. History of mitral valve repair    4. Chronic obstructive pulmonary disease, unspecified COPD type (CMS-[facility])    5. Atrial fibrillation with rapid ventricular response (CMS-[facility])    6. Type 2 diabetes mellitus with complication, without long-term current use of insulin (CMS-[facility])    7. Renal insufficiency    8. Ureteral stone with hydronephrosis    9. Pyelonephritis          Tam STANLEY (Scribe), am scribing for, and in the presence of, Kiana De Souza M.D..    Electronically signed by: Tam Murillo (Scribe), 6/25/2017    Kiana STANLEY M.D. personally performed the services described in this documentation, as scribed by Tam Murillo in my presence, and it is both accurate and complete.    The note accurately reflects work and decisions made by me.  Kiana De Souza  6/25/2017  11:25 PM

## 2017-06-26 NOTE — PROGRESS NOTES
"I examined the patient 6/25/2017 11:45 PM  Vital Signs:BP 95/53 mmHg  Pulse 103  Temp(Src) 36.7 °C (98 °F)  Resp 16  Ht 1.626 m (5' 4\")  Wt 89.8 kg (197 lb 15.6 oz)  BMI 33.97 kg/m2  SpO2 96%  Cardiac examination significant for Tachycardia  Pulmonary examination significant for Clear lung fileds  Capillary refill is brisk  Peripheral Pulse is 2+   Skin is normal  "

## 2017-06-26 NOTE — PROGRESS NOTES
Patient BG recheck after given 6 units insulin for , patient BG is still at 393, NP page to see if they want to give patient one time dose of insulin, NP called back and stated that she will order another 4 units dose for patient, to cover blood sugar

## 2017-06-26 NOTE — ASSESSMENT & PLAN NOTE
- improved on high ISS  - pt on no dm meds as outpt  - has hgbA1c of 11.1, will need to be discharged on meds

## 2017-06-27 LAB
ANION GAP SERPL CALC-SCNC: 7 MMOL/L (ref 0–11.9)
BACTERIA UR CULT: ABNORMAL
BACTERIA UR CULT: ABNORMAL
BUN SERPL-MCNC: 20 MG/DL (ref 8–22)
CALCIUM SERPL-MCNC: 8.7 MG/DL (ref 8.5–10.5)
CHLORIDE SERPL-SCNC: 105 MMOL/L (ref 96–112)
CO2 SERPL-SCNC: 23 MMOL/L (ref 20–33)
CREAT SERPL-MCNC: 0.99 MG/DL (ref 0.5–1.4)
ERYTHROCYTE [DISTWIDTH] IN BLOOD BY AUTOMATED COUNT: 55.1 FL (ref 35.9–50)
GFR SERPL CREATININE-BSD FRML MDRD: 56 ML/MIN/1.73 M 2
GLUCOSE BLD-MCNC: 152 MG/DL (ref 65–99)
GLUCOSE BLD-MCNC: 203 MG/DL (ref 65–99)
GLUCOSE BLD-MCNC: 223 MG/DL (ref 65–99)
GLUCOSE BLD-MCNC: 224 MG/DL (ref 65–99)
GLUCOSE SERPL-MCNC: 133 MG/DL (ref 65–99)
HCT VFR BLD AUTO: 29.5 % (ref 37–47)
HGB BLD-MCNC: 9.4 G/DL (ref 12–16)
INR PPP: 1.41 (ref 0.87–1.13)
MCH RBC QN AUTO: 30.7 PG (ref 27–33)
MCHC RBC AUTO-ENTMCNC: 31.9 G/DL (ref 33.6–35)
MCV RBC AUTO: 96.4 FL (ref 81.4–97.8)
PLATELET # BLD AUTO: 102 K/UL (ref 164–446)
PMV BLD AUTO: 11.6 FL (ref 9–12.9)
POTASSIUM SERPL-SCNC: 3.9 MMOL/L (ref 3.6–5.5)
PROTHROMBIN TIME: 17.7 SEC (ref 12–14.6)
RBC # BLD AUTO: 3.06 M/UL (ref 4.2–5.4)
SIGNIFICANT IND 70042: ABNORMAL
SITE SITE: ABNORMAL
SODIUM SERPL-SCNC: 135 MMOL/L (ref 135–145)
SOURCE SOURCE: ABNORMAL
WBC # BLD AUTO: 9.5 K/UL (ref 4.8–10.8)

## 2017-06-27 PROCEDURE — 82962 GLUCOSE BLOOD TEST: CPT

## 2017-06-27 PROCEDURE — A9270 NON-COVERED ITEM OR SERVICE: HCPCS

## 2017-06-27 PROCEDURE — 700102 HCHG RX REV CODE 250 W/ 637 OVERRIDE(OP): Performed by: INTERNAL MEDICINE

## 2017-06-27 PROCEDURE — 85027 COMPLETE CBC AUTOMATED: CPT

## 2017-06-27 PROCEDURE — 99233 SBSQ HOSP IP/OBS HIGH 50: CPT | Performed by: INTERNAL MEDICINE

## 2017-06-27 PROCEDURE — 80048 BASIC METABOLIC PNL TOTAL CA: CPT

## 2017-06-27 PROCEDURE — 770020 HCHG ROOM/CARE - TELE (206)

## 2017-06-27 PROCEDURE — A9270 NON-COVERED ITEM OR SERVICE: HCPCS | Performed by: INTERNAL MEDICINE

## 2017-06-27 PROCEDURE — 700102 HCHG RX REV CODE 250 W/ 637 OVERRIDE(OP)

## 2017-06-27 PROCEDURE — 85610 PROTHROMBIN TIME: CPT

## 2017-06-27 PROCEDURE — 700105 HCHG RX REV CODE 258: Performed by: INTERNAL MEDICINE

## 2017-06-27 RX ORDER — WARFARIN SODIUM 7.5 MG/1
15 TABLET ORAL
Status: COMPLETED | OUTPATIENT
Start: 2017-06-27 | End: 2017-06-27

## 2017-06-27 RX ORDER — TRAMADOL HYDROCHLORIDE 50 MG/1
50 TABLET ORAL EVERY 6 HOURS PRN
Status: DISCONTINUED | OUTPATIENT
Start: 2017-06-27 | End: 2017-06-28 | Stop reason: HOSPADM

## 2017-06-27 RX ORDER — KETOROLAC TROMETHAMINE 30 MG/ML
30 INJECTION, SOLUTION INTRAMUSCULAR; INTRAVENOUS EVERY 6 HOURS PRN
Status: DISCONTINUED | OUTPATIENT
Start: 2017-06-27 | End: 2017-06-28 | Stop reason: HOSPADM

## 2017-06-27 RX ADMIN — DILTIAZEM HYDROCHLORIDE 30 MG: 30 TABLET, FILM COATED ORAL at 09:57

## 2017-06-27 RX ADMIN — TRAMADOL HYDROCHLORIDE 50 MG: 50 TABLET, COATED ORAL at 10:14

## 2017-06-27 RX ADMIN — SODIUM CHLORIDE: 9 INJECTION, SOLUTION INTRAVENOUS at 18:37

## 2017-06-27 RX ADMIN — STANDARDIZED SENNA CONCENTRATE AND DOCUSATE SODIUM 2 TABLET: 8.6; 5 TABLET, FILM COATED ORAL at 20:51

## 2017-06-27 RX ADMIN — WARFARIN SODIUM 15 MG: 7.5 TABLET ORAL at 17:41

## 2017-06-27 RX ADMIN — TRAMADOL HYDROCHLORIDE 50 MG: 50 TABLET, COATED ORAL at 18:35

## 2017-06-27 RX ADMIN — INSULIN HUMAN 3 UNITS: 100 INJECTION, SOLUTION PARENTERAL at 06:25

## 2017-06-27 RX ADMIN — OXYCODONE HYDROCHLORIDE 5 MG: 5 TABLET ORAL at 00:49

## 2017-06-27 RX ADMIN — DILTIAZEM HYDROCHLORIDE 30 MG: 30 TABLET, FILM COATED ORAL at 20:51

## 2017-06-27 RX ADMIN — INSULIN HUMAN 6 UNITS: 100 INJECTION, SOLUTION PARENTERAL at 20:55

## 2017-06-27 RX ADMIN — STANDARDIZED SENNA CONCENTRATE AND DOCUSATE SODIUM 2 TABLET: 8.6; 5 TABLET, FILM COATED ORAL at 11:36

## 2017-06-27 RX ADMIN — INSULIN HUMAN 6 UNITS: 100 INJECTION, SOLUTION PARENTERAL at 17:39

## 2017-06-27 RX ADMIN — SODIUM CHLORIDE: 9 INJECTION, SOLUTION INTRAVENOUS at 09:57

## 2017-06-27 RX ADMIN — DIGOXIN 125 MCG: 125 TABLET ORAL at 09:57

## 2017-06-27 RX ADMIN — INSULIN HUMAN 6 UNITS: 100 INJECTION, SOLUTION PARENTERAL at 11:39

## 2017-06-27 ASSESSMENT — PAIN SCALES - GENERAL
PAINLEVEL_OUTOF10: 0
PAINLEVEL_OUTOF10: 4
PAINLEVEL_OUTOF10: 7
PAINLEVEL_OUTOF10: 0
PAINLEVEL_OUTOF10: 0

## 2017-06-27 ASSESSMENT — ENCOUNTER SYMPTOMS
SHORTNESS OF BREATH: 0
BACK PAIN: 1
SPUTUM PRODUCTION: 0
DEPRESSION: 0
DIZZINESS: 0
NAUSEA: 0
FLANK PAIN: 1
HEADACHES: 1
ABDOMINAL PAIN: 1
CONSTIPATION: 0
FALLS: 0
STRIDOR: 0
CHILLS: 0
PALPITATIONS: 0
LOSS OF CONSCIOUSNESS: 0
DIARRHEA: 0
FEVER: 0
VOMITING: 0

## 2017-06-27 NOTE — PROGRESS NOTES
[facility] Hospitalist Progress Note    Date of Service: 2017    Chief Complaint  67 y.o. female admitted 2017 with left flank pain, fever and chills.    Interval Problem Update  Noted left ureteral calculus, hydronephrosis and sepsis.  Now s/p cystoscopy and insertion of left ureteral stent.  Today with worse pain on left.  Discussed plan and pt condition with RN at bedside and social work.    Consultants/Specialty  Urology - Dr Hugo-Ray    Disposition  Pt requires additional treatment in the hospital        Review of Systems   Constitutional: Positive for malaise/fatigue. Negative for fever and chills.   HENT: Negative for congestion.    Respiratory: Negative for sputum production, shortness of breath and stridor.    Cardiovascular: Negative for chest pain and palpitations.   Gastrointestinal: Positive for abdominal pain. Negative for nausea, vomiting, diarrhea and constipation.   Genitourinary: Positive for flank pain (improved ). Negative for dysuria, urgency and hematuria.   Musculoskeletal: Positive for back pain. Negative for falls.   Neurological: Positive for headaches. Negative for dizziness and loss of consciousness.   Psychiatric/Behavioral: Negative for depression and suicidal ideas.      Physical Exam  Laboratory/Imaging   Hemodynamics  Temp (24hrs), Av.2 °C (98.9 °F), Min:36.6 °C (97.8 °F), Max:37.6 °C (99.7 °F)   Temperature: 37.6 °C (99.7 °F)  Pulse  Av.8  Min: 56  Max: 117   Blood Pressure : 100/58 mmHg      Respiratory      Respiration: 19, Pulse Oximetry: 97 %        RUL Breath Sounds: Clear, RML Breath Sounds: Diminished, RLL Breath Sounds: Diminished, SCOTT Breath Sounds: Clear, LLL Breath Sounds: Diminished    Fluids    Intake/Output Summary (Last 24 hours) at 17 0935  Last data filed at 17 0800   Gross per 24 hour   Intake   1000 ml   Output   1450 ml   Net   -450 ml       Nutrition  Orders Placed This Encounter   Procedures   • Diet Order     Standing Status:  Standing      Number of Occurrences: 1      Standing Expiration Date:      Order Specific Question:  Diet:     Answer:  Diabetic [3]     Order Specific Question:  Diet:     Answer:  Cardiac [6]     Physical Exam   Constitutional: She is oriented to person, place, and time. No distress.   HENT:   Head: Normocephalic and atraumatic.   Mouth/Throat: No oropharyngeal exudate.   Eyes: Right eye exhibits no discharge. Left eye exhibits no discharge. No scleral icterus.   Neck: Neck supple.   Cardiovascular: Normal rate and regular rhythm.    No murmur heard.  Pulmonary/Chest: Effort normal and breath sounds normal. No stridor. No respiratory distress. She has no wheezes. She exhibits no tenderness.   Abdominal: Soft. Bowel sounds are normal. She exhibits no distension. There is tenderness (left and left flank).   Musculoskeletal: She exhibits no edema or tenderness.   Lymphadenopathy:     She has no cervical adenopathy.   Neurological: She is alert and oriented to person, place, and time.   Skin: Skin is warm and dry. She is not diaphoretic. No erythema.   Psychiatric: She has a normal mood and affect. Her behavior is normal. Judgment normal.   Nursing note and vitals reviewed.      Recent Labs      06/25/17 1713 06/26/17 0345 06/27/17   0255   WBC  11.9*  14.1*  9.5   RBC  4.04*  3.26*  3.06*   HEMOGLOBIN  12.3  10.0*  9.4*   HEMATOCRIT  37.6  30.9*  29.5*   MCV  93.1  94.8  96.4   MCH  30.4  30.7  30.7   MCHC  32.7*  32.4*  31.9*   RDW  51.8*  54.0*  55.1*   PLATELETCT  153*  107*  102*   MPV  11.8  11.6  11.6     Recent Labs      06/25/17 1713 06/26/17 0345  06/27/17   0255   SODIUM  129*  135  135   POTASSIUM  4.1  4.5  3.9   CHLORIDE  98  104  105   CO2  21  24  23   GLUCOSE  513*  243*  133*   BUN  26*  26*  20   CREATININE  1.55*  1.36  0.99   CALCIUM  9.1  8.3*  8.7     Recent Labs      06/25/17 1713 06/26/17 0345  06/27/17   0255   INR  2.93*  1.88*  1.41*                  Assessment/Plan      Severe sepsis (CMS-HCC)  Assessment & Plan  - d/t pyelonephritis d/t infected ureteral stone, proteus on urine culture   - improved  - continue levaquin     Chronic obstructive pulmonary disease (CMS-HCC) (present on admission)  Assessment & Plan  - no acute exacerbation    Atrial fibrillation (CMS-HCC) (present on admission)  Assessment & Plan  - now sinus  - continue diltiazem, digoxin and coumadin  - now with borderline low BP, will stop coreg    Type 2 diabetes mellitus with complication (CMS-HCC) (present on admission)  Assessment & Plan  - improved on high ISS  - pt on no dm meds as outpt  - has hgbA1c of 11.1, will need to be discharged on meds    Obstructive uropathy  Assessment & Plan  - d/t left ureteral calculous  - now s/p cystoscopy with stenting  - await additional recommendations per urology     Acute pyelonephritis  Assessment & Plan  - causing sepsis, continue levaquin  - UA positive for proteus which is sensitive to levaquin  - pain improved    Hydronephrosis with obstructing calculus  Assessment & Plan  - now s/p ureteral stent  - pain worse today, no sign of pt losing stent, if pain still bad tomorrow may need to obtain imaging   - will add tramadol and toradol now that renal function is improved    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)  Assessment & Plan  - continue coumadin  - repeat cbc in am    Normocytic anemia  Assessment & Plan  - no sign of gross bleeding  - repeat cbc in am    Acute renal failure (ARF) (CMS-HCC)  Assessment & Plan  - improved with ivf, continue, now only mild insufficiency   - repeat bmp in am      Labs reviewed, Medications reviewed and Radiology images reviewed        DVT Prophylaxis: Warfarin (Coumadin)      Antibiotics: Treating active infection/contamination beyond 24 hours perioperative coverage

## 2017-06-27 NOTE — CARE PLAN
Problem: Fluid Volume:  Goal: Will maintain balanced intake and output  Outcome: PROGRESSING AS EXPECTED  Strict I&O with brown in place, patient IVF KVO    Problem: Safety  Goal: Will remain free from injury  Outcome: PROGRESSING AS EXPECTED  Bed alarm in place, patient heart tachy to 160s, patient educate on call RN before getting up

## 2017-06-27 NOTE — PROGRESS NOTES
Bedside report received, assumed pt care @9824. Pt A&Ox4. She c/o mild flank pain. POC discussed, she verbalized understanding. Pt steady on her feet. Call light within reach

## 2017-06-27 NOTE — PROGRESS NOTES
Inpatient Anticoagulation Service Note    Date: 6/27/2017  Reason for Anticoagulation: Atrial Fibrillation, Mitral Valve Repair        Hemoglobin Value: 9.4  Hematocrit Value: 29.5  Lab Platelet Value: 102  Target INR: 2.0 to 3.0    INR from last 7 days     Date/Time INR Value    06/27/17 0255 (!)1.41    06/26/17 0345 (!)1.88    06/25/17 1713 (!)2.93        Dose from last 7 days     Date/Time Dose (mg)    06/27/17 1300 15    06/26/17 1700 12.5    06/25/17 2000 10        Significant Interactions: Antibiotics  Bridge Therapy: No     Reversal Agent Administered: Vitamin K  Intravenous (10 mg IV  given in OR)    Comments: INR remains subtherapeutic despite higher dosing last night. Will take a few days for the INR to start increasing to goal due to effects of Vitamin K.  Will give a higher dose today     Plan:  Warfarin 15mg today. Trend INR  Education Material Provided?: No  Pharmacist suggested discharge dosing: Most likely 10mg po daily (home dose)     Samreen Grimaldo, PharmD

## 2017-06-27 NOTE — PROGRESS NOTES
Pt BP 81/52. She denies any SOB or dizziness. Pt resting in bed with eyes closed. Dr Dorado notified. New orders received to give 1 liter bolus NS.

## 2017-06-27 NOTE — PROGRESS NOTES
Inpatient Anticoagulation Service Note    Date: 6/26/2017  Reason for Anticoagulation: Atrial Fibrillation, Mitral Valve Repair        Hemoglobin Value: 10  Hematocrit Value: 30.9  Lab Platelet Value: 107  Target INR: 2.0 to 3.0    INR from last 7 days     Date/Time INR Value    06/26/17 0345 (!)1.88    06/25/17 1713 (!)2.93        Dose from last 7 days     Date/Time Dose (mg)    06/26/17 1700 12.5    06/25/17 2000 10        Significant Interactions: Antibiotics  Bridge Therapy: No     Reversal Agent Administered: Vitamin K  Intravenous (10 mg IV  given in OR)    Comments: INR subtherapeutic most likely due to recent IV vitamin K administration. Emergency surgery, pt takes 10 mg coumdain daily for a fib and mitral valve repair and is followed by Coumadin clinic. Will give a higher dose today due to recent Vitamin K dose.    Plan:  Warfarin 12.5 mg po today, trend INR  Education Material Provided?: No  Pharmacist suggested discharge dosing: most likely warfarin 10mg po daily with a follow up appointment at the Coumadin clinic she normally attends.     Samreen Grimaldo, PharmD

## 2017-06-27 NOTE — PROGRESS NOTES
Bedside report taken on patient, Assume care of patient. Patient is alert and oriented,  Assessment done on patient, patient complain of 4/10 discomfort in vaginal area due to brown, prn pain medication given, brown care done on patient, patient denies any need at this time

## 2017-06-28 ENCOUNTER — PATIENT OUTREACH (OUTPATIENT)
Dept: HEALTH INFORMATION MANAGEMENT | Facility: OTHER | Age: 67
End: 2017-06-28

## 2017-06-28 VITALS
SYSTOLIC BLOOD PRESSURE: 112 MMHG | WEIGHT: 210.98 LBS | OXYGEN SATURATION: 94 % | TEMPERATURE: 97.3 F | DIASTOLIC BLOOD PRESSURE: 71 MMHG | HEIGHT: 64 IN | HEART RATE: 110 BPM | BODY MASS INDEX: 36.02 KG/M2 | RESPIRATION RATE: 16 BRPM

## 2017-06-28 PROBLEM — N17.9 ACUTE RENAL FAILURE (ARF) (HCC): Status: RESOLVED | Noted: 2017-06-26 | Resolved: 2017-06-28

## 2017-06-28 PROBLEM — N13.2 HYDRONEPHROSIS WITH OBSTRUCTING CALCULUS: Status: RESOLVED | Noted: 2017-06-26 | Resolved: 2017-06-28

## 2017-06-28 PROBLEM — N10 ACUTE PYELONEPHRITIS: Status: RESOLVED | Noted: 2017-06-25 | Resolved: 2017-06-28

## 2017-06-28 PROBLEM — N13.9 OBSTRUCTIVE UROPATHY: Status: RESOLVED | Noted: 2017-06-25 | Resolved: 2017-06-28

## 2017-06-28 PROBLEM — R65.20 SEVERE SEPSIS(995.92): Status: RESOLVED | Noted: 2017-06-25 | Resolved: 2017-06-28

## 2017-06-28 PROBLEM — A41.9 SEVERE SEPSIS(995.92): Status: RESOLVED | Noted: 2017-06-25 | Resolved: 2017-06-28

## 2017-06-28 LAB
GLUCOSE BLD-MCNC: 164 MG/DL (ref 65–99)
GLUCOSE BLD-MCNC: 194 MG/DL (ref 65–99)
INR PPP: 1.27 (ref 0.87–1.13)
PROTHROMBIN TIME: 16.3 SEC (ref 12–14.6)

## 2017-06-28 PROCEDURE — 700105 HCHG RX REV CODE 258: Performed by: INTERNAL MEDICINE

## 2017-06-28 PROCEDURE — 700102 HCHG RX REV CODE 250 W/ 637 OVERRIDE(OP): Performed by: INTERNAL MEDICINE

## 2017-06-28 PROCEDURE — 85610 PROTHROMBIN TIME: CPT

## 2017-06-28 PROCEDURE — A9270 NON-COVERED ITEM OR SERVICE: HCPCS | Performed by: INTERNAL MEDICINE

## 2017-06-28 PROCEDURE — 82962 GLUCOSE BLOOD TEST: CPT

## 2017-06-28 PROCEDURE — 99239 HOSP IP/OBS DSCHRG MGMT >30: CPT | Performed by: INTERNAL MEDICINE

## 2017-06-28 RX ORDER — LEVOFLOXACIN 500 MG/1
500 TABLET, FILM COATED ORAL DAILY
Qty: 4 TAB | Refills: 0 | Status: SHIPPED | OUTPATIENT
Start: 2017-06-29 | End: 2017-07-07

## 2017-06-28 RX ORDER — ACETAMINOPHEN 325 MG/1
650 TABLET ORAL EVERY 6 HOURS PRN
Qty: 30 TAB | Refills: 0 | COMMUNITY
Start: 2017-06-28 | End: 2017-09-07

## 2017-06-28 RX ORDER — WARFARIN SODIUM 7.5 MG/1
15 TABLET ORAL
Status: DISCONTINUED | OUTPATIENT
Start: 2017-06-28 | End: 2017-06-28 | Stop reason: HOSPADM

## 2017-06-28 RX ORDER — WARFARIN SODIUM 10 MG/1
10 TABLET ORAL
Status: DISCONTINUED | OUTPATIENT
Start: 2017-06-29 | End: 2017-06-28 | Stop reason: HOSPADM

## 2017-06-28 RX ORDER — TRAMADOL HYDROCHLORIDE 50 MG/1
50 TABLET ORAL EVERY 6 HOURS PRN
Qty: 30 TAB | Refills: 0 | Status: SHIPPED | OUTPATIENT
Start: 2017-06-28 | End: 2017-09-07

## 2017-06-28 RX ADMIN — DILTIAZEM HYDROCHLORIDE 30 MG: 30 TABLET, FILM COATED ORAL at 09:26

## 2017-06-28 RX ADMIN — DIGOXIN 125 MCG: 125 TABLET ORAL at 09:26

## 2017-06-28 RX ADMIN — INSULIN HUMAN 3 UNITS: 100 INJECTION, SOLUTION PARENTERAL at 05:53

## 2017-06-28 RX ADMIN — SODIUM CHLORIDE: 9 INJECTION, SOLUTION INTRAVENOUS at 01:09

## 2017-06-28 RX ADMIN — SODIUM CHLORIDE: 9 INJECTION, SOLUTION INTRAVENOUS at 09:27

## 2017-06-28 RX ADMIN — TRAMADOL HYDROCHLORIDE 50 MG: 50 TABLET, COATED ORAL at 01:09

## 2017-06-28 RX ADMIN — INSULIN HUMAN 3 UNITS: 100 INJECTION, SOLUTION PARENTERAL at 11:58

## 2017-06-28 RX ADMIN — STANDARDIZED SENNA CONCENTRATE AND DOCUSATE SODIUM 2 TABLET: 8.6; 5 TABLET, FILM COATED ORAL at 09:27

## 2017-06-28 ASSESSMENT — PAIN SCALES - GENERAL
PAINLEVEL_OUTOF10: 0
PAINLEVEL_OUTOF10: 4
PAINLEVEL_OUTOF10: 0

## 2017-06-28 NOTE — PROGRESS NOTES
Bedside report taken on patient, Assume care of patient. Patient is alert and oriented, Assessment done on patient, patient stated that she did not have a good day today, she did not feel well, she had back pain and stomach. Stated that she feels much better now, tramadol pain medication is working well for her and that heat packs working well for her back, denies nausea, still passing gas. Patient denies any need at this time

## 2017-06-28 NOTE — CARE PLAN
Problem: Fluid Volume:  Goal: Will maintain balanced intake and output  Outcome: PROGRESSING AS EXPECTED  Patient voiding frequently and adequately since brown removal     Problem: Pain Management  Goal: Pain level will decrease to patient’s comfort goal  Outcome: PROGRESSING AS EXPECTED  Pain control with prn pain medication and heat pack

## 2017-06-28 NOTE — PROGRESS NOTES
Inpatient Anticoagulation Service Note    Date: 6/28/2017  Reason for Anticoagulation: Atrial Fibrillation, Mitral Valve Repair        Hemoglobin Value: 9.4  Hematocrit Value: 29.5  Lab Platelet Value: 102  Target INR: 2.0 to 3.0    INR from last 7 days     Date/Time INR Value    06/28/17 0336 (!)1.27    06/27/17 0255 (!)1.41    06/26/17 0345 (!)1.88    06/25/17 1713 (!)2.93        Dose from last 7 days     Date/Time Dose (mg)    06/28/17 1100 15    06/27/17 1300 15    06/26/17 1700 12.5    06/25/17 2000 10        Average Dose (mg):  (10 mg daily)  Significant Interactions: Antibiotics (Levofloxacin)  Bridge Therapy: No    Reversal Agent Administered: Vitamin K  Intravenous (10 mg IV  given in OR)  Comments: INR still trending down.  Will give 15 mg again tonight then resume home dose of 10 mg daily.  INR daily to follow trend.  Effects of parenteral phytonadione may take several days to overcome.  New drug-drug interaction noted: levofloxacin.      Plan:  15 mg tonight then 10 mg daily  Education Material Provided?: No (Chronic home medication)  Pharmacist suggested discharge dosing: 10 mg daily per [facility]     Jhon De Los Santos, PharmD, BCPS

## 2017-06-28 NOTE — PROGRESS NOTES
"Bedside report received, assumed pt care @7115. Pt A&Ox4. Pt c/o \"feeling worse than yesterday.\" Pt c/o headache and back pain; medicated pt per mar. POC discussed, she verbalized understanding. Pt stephanie removed. Pt appears steady on her feet. Call light within reach  "

## 2017-06-28 NOTE — DISCHARGE SUMMARY
"CHIEF COMPLAINT ON ADMISSION  Chief Complaint   Patient presents with   • Abdominal Pain     Pain started in her low back at 1100 and moved around to her abdomen. Pt then started vomiting and felt \"cold\".    • Sent from Urgent Care     Pt seen at  and had a urine test done and sent for further eval of her kidneys.        CODE STATUS  Full Code    HPI & HOSPITAL COURSE  This is a 67 y.o. Female admitted 6/25/17 with left flank pain, fever and chills. Discharged on 2/28/17.  Pt noted to have left ureteral calculus, hydronephrosis and sepsis. Placed on abx.  Went for cystoscopy with insertion of left ureteral stent.  Pt did well and is now pain free. Will be sent home on abx and f/u with urology soon.       Therefore, she is discharged in good and stable condition with close outpatient follow-up.    SPECIFIC OUTPATIENT FOLLOW-UP  F/U with PCP within 1 week  F/U with urology as scheduled  ER if emergency     DISCHARGE PROBLEM LIST  Active Problems:    Chronic obstructive pulmonary disease (CMS-[facility]) POA: Yes    Atrial fibrillation (CMS-[facility]) POA: Yes    Type 2 diabetes mellitus with complication (CMS-[facility]) POA: Yes    Hemorrhagic disorder due to extrinsic circulating anticoagulants (CMS-[facility]) POA: Unknown    Normocytic anemia POA: Unknown  Resolved Problems:    Severe sepsis (CMS-[facility]) POA: Unknown    Obstructive uropathy POA: Unknown    Acute pyelonephritis POA: Unknown    Hydronephrosis with obstructing calculus POA: Unknown    Acute renal failure (ARF) (CMS-[facility]) POA: Unknown      FOLLOW UP  Future Appointments  Date Time Provider Department Center   7/7/2017 10:30 AM [place] PHARMACIST CARLOS ZAPATA [facility]   7/11/2017 1:00 PM PFT-[id number] PULM None   7/11/2017 2:00 PM [facility] PULM None   9/29/2017 10:00 AM YASMIN Lin M.D.  [address]  [address]  [facility] [address]  [phone]    Schedule an appointment as soon as possible for a visit in 1 week        MEDICATIONS ON DISCHARGE   " Salma Lees   Home Medication Instructions RACHEL:[id number]    Printed on:06/28/17 1215   Medication Information                      acetaminophen (TYLENOL) 325 MG Tab  Take 2 Tabs by mouth every 6 hours as needed (Mild Pain; (Pain scale 1-3); Temp greater than 100.5 F).             carvedilol (COREG) 12.5 MG Tab  Take 12.5 mg by mouth 3 times a day.             digoxin (LANOXIN) 125 MCG Tab  Take 125 mcg by mouth every day.             diltiazem (CARDIZEM) 30 MG Tab  Take 30 mg by mouth 2 Times a Day.             furosemide (LASIX) 20 MG Tab  Take 40 mg by mouth every 48 hours. Alternating days with Spironolactone             levofloxacin (LEVAQUIN) 500 MG tablet  Take 1 Tab by mouth every day.             Nutritional Supplements (DIABETES SUPPORT) THERAPY PACK Misc  Take 1 Each by mouth every day. OTC diabetic pack             potassium chloride SA (K-DUR) 10 MEQ Tab CR  Take 10 mEq by mouth 2 times a day.             spironolactone (ALDACTONE) 25 MG Tab  Take 25 mg by mouth every 48 hours. Alternating  every other day with furosemide             tramadol (ULTRAM) 50 MG Tab  Take 1 Tab by mouth every 6 hours as needed for Moderate Pain or Severe Pain.             Umeclidinium-Vilanterol (ANORO ELLIPTA) 62.5-25 MCG/INH AEROSOL POWDER, BREATH ACTIVATED  Inhale 1 Puff by mouth every day.             warfarin (COUMADIN) 10 MG Tab  Take 10 mg by mouth every evening.                 DIET  Orders Placed This Encounter   Procedures   • Diet Order     Standing Status: Standing      Number of Occurrences: 1      Standing Expiration Date:      Order Specific Question:  Diet:     Answer:  Diabetic [3]     Order Specific Question:  Diet:     Answer:  Cardiac [6]       ACTIVITY  As tolerated.  Weight bearing as tolerated      CONSULTATIONS  Urology - Dr Hugo-Ray    PROCEDURES  Cystoscopy with left ureteral stent    LABORATORY  Lab Results   Component Value Date/Time    SODIUM 135 06/27/2017 02:55 AM    POTASSIUM 3.9  06/27/2017 02:55 AM    CHLORIDE 105 06/27/2017 02:55 AM    CO2 23 06/27/2017 02:55 AM    GLUCOSE 133* 06/27/2017 02:55 AM    BUN 20 06/27/2017 02:55 AM    CREATININE 0.99 06/27/2017 02:55 AM        Lab Results   Component Value Date/Time    WBC 9.5 06/27/2017 02:55 AM    HEMOGLOBIN 9.4* 06/27/2017 02:55 AM    HEMATOCRIT 29.5* 06/27/2017 02:55 AM    PLATELET COUNT 102* 06/27/2017 02:55 AM        Total time of the discharge process exceeds 36 minutes

## 2017-06-28 NOTE — DISCHARGE INSTRUCTIONS
Discharge Instructions    Discharged to home by car with relative. Discharged via wheelchair, hospital escort: Yes.  Special equipment needed: Wheelchair    Be sure to schedule a follow-up appointment with your primary care doctor or any specialists as instructed.     Discharge Plan:   Diet Plan: Discussed (cardiac)  Activity Level: Discussed (as tolerated)  Confirmed Follow up Appointment: Patient to Call and Schedule Appointment  Confirmed Symptoms Management: Discussed  Medication Reconciliation Updated: Yes  Influenza Vaccine Indication: Not indicated: Previously immunized this influenza season and > 8 years of age    I understand that a diet low in cholesterol, fat, and sodium is recommended for good health. Unless I have been given specific instructions below for another diet, I accept this instruction as my diet prescription.   Other diet: cardiac    Special Instructions: None    · Is patient discharged on Warfarin / Coumadin?   Yes    You are receiving the drug warfarin. Please understand the importance of monitoring warfarin with scheduled PT/INR blood draws.  Follow[facility]up with the [facility] in one week for INR lab..    IMPORTANT: HOW TO USE THIS INFORMATION:  This is a summary and does NOT have all possible information about this product. This information does not assure that this product is safe, effective, or appropriate for you. This information is not individual medical advice and does not substitute for the advice of your health care professional. Always ask your health care professional for complete information about this product and your specific health needs.      WARFARIN - ORAL (WARF-uh-rin)      COMMON BRAND NAME(S): Coumadin      WARNING:  Warfarin can cause very serious (possibly fatal) bleeding. This is more likely to occur when you first start taking this medication or if you take too much warfarin. To decrease your risk for bleeding, your doctor or other health care provider will monitor  "you closely and check your lab results (INR test) to make sure you are not taking too much warfarin. Keep all medical and laboratory appointments. Tell your doctor right away if you notice any signs of serious bleeding. See also Side Effects section.      USES:  This medication is used to treat blood clots (such as in deep vein thrombosis-DVT or pulmonary embolus-PE) and/or to prevent new clots from forming in your body. Preventing harmful blood clots helps to reduce the risk of a stroke or heart attack. Conditions that increase your risk of developing blood clots include a certain type of irregular heart rhythm (atrial fibrillation), heart valve replacement, recent heart attack, and certain surgeries (such as hip/knee replacement). Warfarin is commonly called a \"blood thinner,\" but the more correct term is \"anticoagulant.\" It helps to keep blood flowing smoothly in your body by decreasing the amount of certain substances (clotting proteins) in your blood.      HOW TO USE:  Read the Medication Guide provided by your pharmacist before you start taking warfarin and each time you get a refill. If you have any questions, ask your doctor or pharmacist. Take this medication by mouth with or without food as directed by your doctor or other health care professional, usually once a day. It is very important to take it exactly as directed. Do not increase the dose, take it more frequently, or stop using it unless directed by your doctor. Dosage is based on your medical condition, laboratory tests (such as INR), and response to treatment. Your doctor or other health care provider will monitor you closely while you are taking this medication to determine the right dose for you. Use this medication regularly to get the most benefit from it. To help you remember, take it at the same time each day. It is important to eat a balanced, consistent diet while taking warfarin. Some foods can affect how warfarin works in your body and " may affect your treatment and dose. Avoid sudden large increases or decreases in your intake of foods high in vitamin K (such as broccoli, cauliflower, cabbage, brussels sprouts, kale, spinach, and other green leafy vegetables, liver, green tea, certain vitamin supplements). If you are trying to lose weight, check with your doctor before you try to go on a diet. Cranberry products may also affect how your warfarin works. Limit the amount of cranberry juice (16 ounces/480 milliliters a day) or other cranberry products you may drink or eat.      SIDE EFFECTS:  Nausea, loss of appetite, or stomach/abdominal pain may occur. If any of these effects persist or worsen, tell your doctor or pharmacist promptly. Remember that your doctor has prescribed this medication because he or she has judged that the benefit to you is greater than the risk of side effects. Many people using this medication do not have serious side effects. This medication can cause serious bleeding if it affects your blood clotting proteins too much (shown by unusually high INR lab results). Even if your doctor stops your medication, this risk of bleeding can continue for up to a week. Tell your doctor right away if you have any signs of serious bleeding, including: unusual pain/swelling/discomfort, unusual/easy bruising, prolonged bleeding from cuts or gums, persistent/frequent nosebleeds, unusually heavy/prolonged menstrual flow, pink/dark urine, coughing up blood, vomit that is bloody or looks like coffee grounds, severe headache, dizziness/fainting, unusual or persistent tiredness/weakness, bloody/black/tarry stools, chest pain, shortness of breath, difficulty swallowing. Tell your doctor right away if any of these unlikely but serious side effects occur: persistent nausea/vomiting, severe stomach/abdominal pain, yellowing eyes/skin. This drug rarely has caused very serious (possibly fatal) problems if its effects lead to small blood clots (usually  at the beginning of treatment). This can lead to severe skin/tissue damage that may require surgery or amputation if left untreated. Patients with certain blood conditions (protein C or S deficiency) may be at greater risk. Get medical help right away if any of these rare but serious side effects occur: painful/red/purplish patches on the skin (such as on the toe, breast, abdomen), change in the amount of urine, vision changes, confusion, slurred speech, weakness on one side of the body. A very serious allergic reaction to this drug is rare. However, get medical help right away if you notice any symptoms of a serious allergic reaction, including: rash, itching/swelling (especially of the face/tongue/throat), severe dizziness, trouble breathing. This is not a complete list of possible side effects. If you notice other effects not listed above, contact your doctor or pharmacist. In the [place] - Call your doctor for medical advice about side effects. You may report side effects to FDA at 1-213-NVO-4654. In [place] - Call your doctor for medical advice about side effects. You may report side effects to [facility] [place] at 1-453.743.3155.      PRECAUTIONS:  Before taking warfarin, tell your doctor or pharmacist if you are allergic to it; or if you have any other allergies. This product may contain inactive ingredients, which can cause allergic reactions or other problems. Talk to your pharmacist for more details. Before using this medication, tell your doctor or pharmacist your medical history, especially of: blood disorders (such as anemia, hemophilia), bleeding problems (such as bleeding of the stomach/intestines, bleeding in the brain), blood vessel disorders (such as aneurysms), recent major injury/surgery, liver disease, alcohol use, mental/mood disorders (including memory problems), frequent falls/injuries. It is important that all your doctors and dentists know that you take warfarin. Before having surgery or any  medical/dental procedures, tell your doctor or dentist that you are taking this medication and about all the products you use (including prescription drugs, nonprescription drugs, and herbal products). Avoid getting injections into the muscles. If you must have an injection into a muscle (for example, a flu shot), it should be given in the arm. This way, it will be easier to check for bleeding and/or apply pressure bandages. This medication may cause stomach bleeding. Daily use of alcohol while using this medicine will increase your risk for stomach bleeding and may also affect how this medication works. Limit or avoid alcoholic beverages. If you have not been eating well, if you have an illness or infection that causes fever, vomiting, or diarrhea for more than 2 days, or if you start using any antibiotic medications, contact your doctor or pharmacist immediately because these conditions can affect how warfarin works. This medication can cause heavy bleeding. To lower the chance of getting cut, bruised, or injured, use great caution with sharp objects like safety razors and nail cutters. Use an electric razor when shaving and a soft toothbrush when brushing your teeth. Avoid activities such as contact sports. If you fall or injure yourself, especially if you hit your head, call your doctor immediately. Your doctor may need to check you. The Food & Drug Administration has stated that generic warfarin products are interchangeable. However, consult your doctor or pharmacist before switching warfarin products. Be careful not to take more than one medication that contains warfarin unless specifically directed by the doctor or health care provider who is monitoring your warfarin treatment. Older adults may be at greater risk for bleeding while using this drug. This medication is not recommended for use during pregnancy because of serious (possibly fatal) harm to an unborn baby. Discuss the use of reliable forms of birth  "control with your doctor. If you become pregnant or think you may be pregnant, tell your doctor immediately. If you are planning pregnancy, discuss a plan for managing your condition with your doctor before you become pregnant. Your doctor may switch the type of medication you use during pregnancy. Very small amounts of this medication may pass into breast milk but is unlikely to harm a nursing infant. Consult your doctor before breast-feeding.      DRUG INTERACTIONS:  Drug interactions may change how your medications work or increase your risk for serious side effects. This document does not contain all possible drug interactions. Keep a list of all the products you use (including prescription/nonprescription drugs and herbal products) and share it with your doctor and pharmacist. Do not start, stop, or change the dosage of any medicines without your doctor's approval. Warfarin interacts with many prescription, nonprescription, vitamin, and herbal products. This includes medications that are applied to the skin or inside the vagina or rectum. The interactions with warfarin usually result in an increase or decrease in the \"blood-thinning\" (anticoagulant) effect. Your doctor or other health care professional should closely monitor you to prevent serious bleeding or clotting problems. While taking warfarin, it is very important to tell your doctor or pharmacist of any changes in medications, vitamins, or herbal products that you are taking. Some products that may interact with this drug include: capecitabine, imatinib, mifepristone. Aspirin, aspirin-like drugs (salicylates), and nonsteroidal anti-inflammatory drugs (NSAIDs such as ibuprofen, naproxen, celecoxib) may have effects similar to warfarin. These drugs may increase the risk of bleeding problems if taken during treatment with warfarin. Carefully check all prescription/nonprescription product labels (including drugs applied to the skin such as pain-relieving " creams) since the products may contain NSAIDs or salicylates. Talk to your doctor about using a different medication (such as acetaminophen) to treat pain/fever. Low-dose aspirin and related drugs (such as clopidogrel, ticlopidine) should be continued if prescribed by your doctor for specific medical reasons such as heart attack or stroke prevention. Consult your doctor or pharmacist for more details. Many herbal products interact with warfarin. Tell your doctor before taking any herbal products, especially bromelains, coenzyme Q10, cranberry, danshen, dong quai, fenugreek, garlic, ginkgo biloba, ginseng, and Sargent's wort, among others. This medication may interfere with a certain laboratory test to measure theophylline levels, possibly causing false test results. Make sure laboratory personnel and all your doctors know you use this drug.      OVERDOSE:  If overdose is suspected, contact a poison control center or emergency room immediately.  residents can call the [place] Poison Hotline at 1-713.775.5742. [place] residents can call a provincial poison control center. Symptoms of overdose may include: bloody/black/tarry stools, pink/dark urine, unusual/prolonged bleeding.      NOTES:  Do not share this medication with others. Laboratory and/or medical tests (such as INR, complete blood count) must be performed periodically to monitor your progress or check for side effects. Consult your doctor for more details.      MISSED DOSE:  For the best possible benefit, do not miss any doses. If you do miss a dose and remember on the same day, take it as soon as you remember. If you remember on the next day, skip the missed dose and resume your usual dosing schedule. Do not double the dose to catch up because this could increase your risk for bleeding. Keep a record of missed doses to give to your doctor or pharmacist. Contact your doctor or pharmacist if you miss 2 or more doses in a row.      STORAGE:  Store at room  temperature away from light and moisture. Do not store in the bathroom. Keep all medications away from children and pets. Do not flush medications down the toilet or pour them into a drain unless instructed to do so. Properly discard this product when it is  or no longer needed. Consult your pharmacist or local waste disposal company for more details about how to safely discard your product.      MEDICAL ALERT:  Your condition and medication can cause complications in a medical emergency. For information about enrolling in MedicAlert, call 1-798.509.6991 ([place]) or 1-140.301.3438 ([place]).      Information last revised 2010 Copyright(c)  [employer]             · Is patient Post Blood Transfusion?  No    Depression / Suicide Risk    As you are discharged from this [facility] facility, it is important to learn how to keep safe from harming yourself.    Recognize the warning signs:  · Abrupt changes in personality, positive or negative- including increase in energy   · Giving away possessions  · Change in eating patterns- significant weight changes-  positive or negative  · Change in sleeping patterns- unable to sleep or sleeping all the time   · Unwillingness or inability to communicate  · Depression  · Unusual sadness, discouragement and loneliness  · Talk of wanting to die  · Neglect of personal appearance   · Rebelliousness- reckless behavior  · Withdrawal from people/activities they love  · Confusion- inability to concentrate     If you or a loved one observes any of these behaviors or has concerns about self-harm, here's what you can do:  · Talk about it- your feelings and reasons for harming yourself  · Remove any means that you might use to hurt yourself (examples: pills, rope, extension cords, firearm)  · Get professional help from the community (Mental Health, Substance Abuse, psychological counseling)  · Do not be alone:Call your Safe Contact- someone whom you trust who will be  there for you.  · Call your local CRISIS HOTLINE 082-9066 or 612-001-4056  · Call your local Children's Mobile Crisis Response Team [place] (682) 675-4605 or www.IIZI group  · Call the toll free National Suicide Prevention Hotlines   · National Suicide Prevention Lifeline 107-354-TKJP (2306)  [facility] Line Network 800-SUICIDE (214-5472)    Ureteral Stent Implantation  Ureteral stent implantation is the implantation of a soft plastic tube with multiple holes into the tube that drains urine from your kidney to your bladder (ureter). The stent helps drain your kidney when there is a blockage of the flow of urine in your ureter. The stent has a coil on each end to keep it from falling out. One end stays in the kidney. The other end stays in the bladder. It is most often taken out after any blockage has been removed or your ureter has healed. Short-term stents have a string attached to make removal quite easy. Removal of a short-term stent can be done in your health care provider's office or by you at home. Long-term stents need to be changed every few months.  LET YOUR HEALTH CARE PROVIDER KNOW ABOUT:  · Any allergies you have.  · All medicines you are taking, including vitamins, herbs, eye drops, creams, and over-the-counter medicines.  · Previous problems you or members of your family have had with the use of anesthetics.  · Any blood disorders you have.  · Previous surgeries you have had.  · Medical conditions you have.  RISKS AND COMPLICATIONS  Generally, ureteral stent implantation is a safe procedure. However, as with any procedure, complications can occur. Possible complications include:  · Movement of the stent away from where it was originally placed (migration). This may affect the ability of the stent to properly drain your kidney. If migration of the stent occurs, the stent may need to be replaced or repositioned.  · Perforation of the ureter.   · Infection.  BEFORE THE PROCEDURE  · You may  be asked to wash your genital area with sterile soap the morning of your procedure.  · You may be given an oral antibiotic which you should take with a sip of water as prescribed by your health care provider.  · You may be asked to not eat or drink for 8 hours before the surgery.  PROCEDURE  · First you will be given an anesthetic so you do not feel pain during the procedure.  · Your health care provider will insert a special lighted instrument called a cystoscope into your bladder. This allows your health care provider to see the opening to your ureter.  · A thin wire is carefully threaded into your bladder and up the ureter. The stent is inserted over the wire and the wire is then removed.  · Your bladder will be emptied of urine.  AFTER THE PROCEDURE  You will be taken to a recovery room until it is okay for you to go home.     This information is not intended to replace advice given to you by your health care provider. Make sure you discuss any questions you have with your health care provider.     Document Released: 12/15/2001 Document Revised: 12/23/2014 Document Reviewed: 05/27/2014  [employer] Interactive Patient Education ©2016 [employer]    ·   Ureteral Stent Implantation, Care After  Refer to this sheet in the next few weeks. These instructions provide you with information on caring for yourself after your procedure. Your health care provider may also give you more specific instructions. Your treatment has been planned according to current medical practices, but problems sometimes occur. Call your health care provider if you have any problems or questions after your procedure.  WHAT TO EXPECT AFTER THE PROCEDURE  You should be back to normal activity within 48 hours after the procedure. Nausea and vomiting may occur and are commonly the result of anesthesia.  It is common to experience sharp pain in the back or lower abdomen and penis with voiding. This is caused by movement of the ends of the stent with  the act of urinating. It usually goes away within minutes after you have stopped urinating.  HOME CARE INSTRUCTIONS  Make sure to drink plenty of fluids. You may have small amounts of bleeding, causing your urine to be red. This is normal. Certain movements may trigger pain or a feeling that you need to urinate. You may be given medicines to prevent infection or bladder spasms. Be sure to take all medicines as directed. Only take over-the-counter or prescription medicines for pain, discomfort, or fever as directed by your health care provider. Do not take aspirin, as this can make bleeding worse.  Your stent will be left in until the blockage is resolved. This may take 2 weeks or longer, depending on the reason for stent implantation. You may have an X-ray exam to make sure your ureter is open and that the stent has not moved out of position (migrated). The stent can be removed by your health care provider in the office. Medicines may be given for comfort while the stent is being removed. Be sure to keep all follow-up appointments so your health care provider can check that you are healing properly.  SEEK MEDICAL CARE IF:  · You experience increasing pain.  · Your pain medicine is not working.  SEEK IMMEDIATE MEDICAL CARE IF:  · Your urine is dark red or has blood clots.  · You are leaking urine (incontinent).  · You have a fever, chills, feeling sick to your stomach (nausea), or vomiting.  · Your pain is not relieved by pain medicine.  · The end of the stent comes out of the urethra.  · You are unable to urinate.     This information is not intended to replace advice given to you by your health care provider. Make sure you discuss any questions you have with your health care provider.     Document Released: 08/20/2014 Document Revised: 12/23/2014 Document Reviewed: 08/20/2014  [employer] Interactive Patient Education ©2016 [employer]

## 2017-06-28 NOTE — PROGRESS NOTES
Patient discharged with all belongings and prescriptions. RN reviewed discharge summary with pt and discussed medications and hospital stay, all questions answered. Patient's PIV removed. Patient left with  , via personal vehical.

## 2017-06-29 ENCOUNTER — PATIENT OUTREACH (OUTPATIENT)
Dept: HEALTH INFORMATION MANAGEMENT | Facility: OTHER | Age: 67
End: 2017-06-29

## 2017-06-30 ENCOUNTER — PATIENT OUTREACH (OUTPATIENT)
Dept: HEALTH INFORMATION MANAGEMENT | Facility: OTHER | Age: 67
End: 2017-06-30

## 2017-06-30 NOTE — PROGRESS NOTES
06/30/2017 1357 - Discharge Outreach - received call from patient after being discharged on 6/28. States she was told to resume her Lasix at discharge after having ureteral stents placed. States she has resumed Lasix, but urine output has remained very low and has gained around 20 pounds since discharge. Advised to call urologist or PCP now. No other questions or concerns.

## 2017-07-01 LAB
BACTERIA BLD CULT: NORMAL
BACTERIA BLD CULT: NORMAL
SIGNIFICANT IND 70042: NORMAL
SIGNIFICANT IND 70042: NORMAL
SITE SITE: NORMAL
SITE SITE: NORMAL
SOURCE SOURCE: NORMAL
SOURCE SOURCE: NORMAL

## 2017-07-03 ENCOUNTER — PATIENT OUTREACH (OUTPATIENT)
Dept: HEALTH INFORMATION MANAGEMENT | Facility: OTHER | Age: 67
End: 2017-07-03

## 2017-07-05 ENCOUNTER — OFFICE VISIT (OUTPATIENT)
Dept: MEDICAL GROUP | Facility: PHYSICIAN GROUP | Age: 67
End: 2017-07-05
Payer: MEDICARE

## 2017-07-05 VITALS
HEIGHT: 64 IN | TEMPERATURE: 98.6 F | RESPIRATION RATE: 16 BRPM | OXYGEN SATURATION: 92 % | WEIGHT: 207 LBS | BODY MASS INDEX: 35.34 KG/M2 | HEART RATE: 70 BPM | DIASTOLIC BLOOD PRESSURE: 60 MMHG | SYSTOLIC BLOOD PRESSURE: 140 MMHG

## 2017-07-05 DIAGNOSIS — N20.0 KIDNEY STONES: ICD-10-CM

## 2017-07-05 PROBLEM — Z76.89 ENCOUNTER TO ESTABLISH CARE WITH NEW DOCTOR: Status: RESOLVED | Noted: 2017-02-13 | Resolved: 2017-07-05

## 2017-07-05 PROCEDURE — 99214 OFFICE O/P EST MOD 30 MIN: CPT | Performed by: NURSE PRACTITIONER

## 2017-07-05 NOTE — MR AVS SNAPSHOT
"        Salma Martinezp   2017 3:00 PM   Office Visit   MRN: [id number]    Department:  [facility]   Dept Phone:  [phone]    Description:  Female : 1950   Provider:  LILIYA Casey           Reason for Visit     Hospital Follow-up           Allergies as of 2017     Allergen Noted Reactions    Amlodipine Besylate-Valsartan 2017   Unspecified    Chest pain and dizziness     Amoxicillin 2017   Shortness of Breath, Diarrhea    SOB and Diarrhea    Etodolac 2017   Hives    Lisinopril 2017   Unspecified    Dizziness     Other Drug 2017       metal      Vital Signs     Blood Pressure Pulse Temperature Respirations Height Weight    140/60 mmHg 70 37 °C (98.6 °F) 16 1.626 m (5' 4.02\") 93.895 kg (207 lb)    Body Mass Index Oxygen Saturation Smoking Status             35.51 kg/m2 92% Never Smoker          Basic Information     Date Of Birth Sex Race Ethnicity Preferred Language    1950 Female White Non- English      Your appointments     2017 10:30 AM   Anti-Coag Routine with [place] PHARMACIST   [facility] ([place])    [address] [place] [address] [phone]            2017  1:00 PM   Pulmonary Function Test with PFT-RM2   [facility] (--)    [address]  [address]  [place] [id number] [phone]            2017  2:00 PM   New Patient Pulmonary with A Rotation   [facility] (--)    [address]  [address]  [place] [address] [phone]            Sep 29, 2017 10:00 AM   New Patient with Michelle Brumfield R.N., Beata Michele, IVA   [facility])    [address]  [address]  [place] [address] [phone]           It is the patient's responsibility to check with your Insurance for benefit coverage for visit / visits.  24 hours notice is required for all appointment changes or cancellation.  Please " arrive 20 min. before your appointment time  Please bring the following with you: 1)Picture Id 2) Insurance card 3) Completed Forms if New Patient  If scheduled for DIABETES VISIT please also brin) Medications 2) Meter 3) Blood glucose logs 4) Any recent labs if you have them  If scheduled for NUTRITION VISIT please also brin) 2-3 days of detailed food intake logs 2) Blood glucose monitor and blood glucose logs (if you have them)              Problem List              ICD-10-CM Priority Class Noted - Resolved    History of mitral valve repair Z98.890   2017 - Present    Chronic obstructive pulmonary disease (CMS-HCC) J44.9   2017 - Present    Atrial fibrillation (CMS-HCC) I48.91   2017 - Present    Right knee pain M25.561   2017 - Present    Type 2 diabetes mellitus with complication (CMS-HCC) E11.8   2017 - Present    Anticoagulated on warfarin Z79.01   2017 - Present    Chronic anticoagulation Z79.01   2017 - Present    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   2017 - Present    Normocytic anemia D64.9   2017 - Present      Health Maintenance        Date Due Completion Dates    DIABETES MONOFILAMENT / LE EXAM 1950 ---    RETINAL SCREENING 1967 ---    URINE ACR / MICROALBUMIN 1967 ---    IMM DTaP/Tdap/Td Vaccine (1 - Tdap) 1968 ---    PAP SMEAR 1970 ---    MAMMOGRAM 1989 ---    COLONOSCOPY 1999 ---    IMM ZOSTER VACCINE 2009 ---    BONE DENSITY 2014 ---    IMM PNEUMOCOCCAL 65+ (ADULT) LOW/MEDIUM RISK SERIES (1 of 2 - PCV13) 2014 ---    IMM INFLUENZA (1) 2017 10/25/2016    A1C SCREENING 2017    FASTING LIPID PROFILE 2018, 2017    SERUM CREATININE 2018, 2017, 2017, 2017, 2017            Current Immunizations     Influenza Vaccine Quad Inj (Pf) 10/25/2016    Pneumococcal Vaccine (PCV7) Historical Data  10/25/2016      Below and/or attached are the medications your provider expects you to take. Review all of your home medications and newly ordered medications with your provider and/or pharmacist. Follow medication instructions as directed by your provider and/or pharmacist. Please keep your medication list with you and share with your provider. Update the information when medications are discontinued, doses are changed, or new medications (including over-the-counter products) are added; and carry medication information at all times in the event of emergency situations     Allergies:  AMLODIPINE BESYLATE-VALSARTAN - Unspecified     AMOXICILLIN - Shortness of Breath,Diarrhea     ETODOLAC - Hives     LISINOPRIL - Unspecified     OTHER DRUG - (reactions not documented)               Medications  Valid as of: July 05, 2017 -  3:43 PM    Generic Name Brand Name Tablet Size Instructions for use    Acetaminophen (Tab) TYLENOL 325 MG Take 2 Tabs by mouth every 6 hours as needed (Mild Pain; (Pain scale 1-3); Temp greater than 100.5 F).        Carvedilol (Tab) COREG 12.5 MG Take 12.5 mg by mouth 3 times a day.        Digoxin (Tab) LANOXIN 125 MCG Take 125 mcg by mouth every day.        DilTIAZem HCl (Tab) CARDIZEM 30 MG Take 30 mg by mouth 2 Times a Day.        Furosemide (Tab) LASIX 20 MG Take 40 mg by mouth every 48 hours. Alternating days with Spironolactone        LevoFLOXacin (Tab) LEVAQUIN 500 MG Take 1 Tab by mouth every day.        Nutritional Supplements (Misc) DIABETES SUPPORT THERAPY PACK Take 1 Each by mouth every day. OTC diabetic pack        Potassium Chloride Darline CR (Tab CR) K-DUR 10 MEQ Take 10 mEq by mouth 2 times a day.        Spironolactone (Tab) ALDACTONE 25 MG Take 25 mg by mouth every 48 hours. Alternating  every other day with furosemide        TraMADol HCl (Tab) ULTRAM 50 MG Take 1 Tab by mouth every 6 hours as needed for Moderate Pain or Severe Pain.        Umeclidinium-Vilanterol (AEROSOL POWDER,  BREATH ACTIVATED) Umeclidinium-Vilanterol 62.5-25 MCG/INH Inhale 1 Puff by mouth every day.        Warfarin Sodium (Tab) COUMADIN 10 MG Take 10 mg by mouth every evening.        .                 Medicines prescribed today were sent to:     [id number]  [place], [place] - [address] AT [facility] & [address]    [address] [place] [address]    Phone: [phone] Fax: [phone]    Open 24 Hours?: No      Medication refill instructions:       If your prescription bottle indicates you have medication refills left, it is not necessary to call your provider’s office. Please contact your pharmacy and they will refill your medication.    If your prescription bottle indicates you do not have any refills left, you may request refills at any time through one of the following ways: The online [facility] system (except Urgent Care), by calling your provider’s office, or by asking your pharmacy to contact your provider’s office with a refill request. Medication refills are processed only during regular business hours and may not be available until the next business day. Your provider may request additional information or to have a follow-up visit with you prior to refilling your medication.   *Please Note: Medication refills are assigned a new Rx number when refilled electronically. Your pharmacy may indicate that no refills were authorized even though a new prescription for the same medication is available at the pharmacy. Please request the medicine by name with the pharmacy before contacting your provider for a refill.           [facility] Access Code: Activation code not generated  Current [facility] Status: Active

## 2017-07-07 ENCOUNTER — HOSPITAL ENCOUNTER (OUTPATIENT)
Facility: MEDICAL CENTER | Age: 67
End: 2017-07-07
Attending: UROLOGY | Admitting: UROLOGY
Payer: MEDICARE

## 2017-07-07 ENCOUNTER — ANTICOAGULATION VISIT (OUTPATIENT)
Dept: MEDICAL GROUP | Facility: PHYSICIAN GROUP | Age: 67
End: 2017-07-07
Payer: MEDICARE

## 2017-07-07 VITALS — DIASTOLIC BLOOD PRESSURE: 77 MMHG | SYSTOLIC BLOOD PRESSURE: 111 MMHG | HEART RATE: 112 BPM

## 2017-07-07 DIAGNOSIS — I48.91 ATRIAL FIBRILLATION, UNSPECIFIED TYPE (HCC): ICD-10-CM

## 2017-07-07 DIAGNOSIS — Z98.890 HISTORY OF MITRAL VALVE REPAIR: ICD-10-CM

## 2017-07-07 DIAGNOSIS — Z79.01 CHRONIC ANTICOAGULATION: ICD-10-CM

## 2017-07-07 LAB — INR PPP: 3.5 (ref 2–3.5)

## 2017-07-07 PROCEDURE — 99999 PR NO CHARGE: CPT | Performed by: FAMILY MEDICINE

## 2017-07-07 RX ORDER — WARFARIN SODIUM 5 MG/1
TABLET ORAL
Qty: 180 TAB | Refills: 1 | Status: ON HOLD | OUTPATIENT
Start: 2017-07-07 | End: 2017-09-18

## 2017-07-07 NOTE — MR AVS SNAPSHOT
Salma Martinezp   2017 10:30 AM   Anticoagulation Visit   MRN: [id number]    Department:  [facility]   Dept Phone:  [phone]    Description:  Female : 1950   Provider:  Kenneth Salas PHARMD           Allergies as of 2017     Allergen Noted Reactions    Amlodipine Besylate-Valsartan 2017   Unspecified    Chest pain and dizziness     Amoxicillin 2017   Shortness of Breath, Diarrhea    SOB and Diarrhea    Etodolac 2017   Hives    Lisinopril 2017   Unspecified    Dizziness     Other Drug 2017       metal      You were diagnosed with     Chronic anticoagulation   [257149]       Atrial fibrillation, unspecified type (CMS-HCC)   [2980541]       History of mitral valve repair   [221397]         Vital Signs     Blood Pressure Pulse Smoking Status             111/77 mmHg 112 Never Smoker          Basic Information     Date Of Birth Sex Race Ethnicity Preferred Language    1950 Female White Non- English      Your appointments     2017  1:00 PM   Pulmonary Function Test with PFT-RM2   [facility] (--)    [address]  [address]  [place] [id number] [phone]            2017  2:00 PM   New Patient Pulmonary with A Rotation   [facility] (--)    [address]  [address]  [place] [phone] [phone]            2017 10:30 AM   Anti-Coag Routine with [place] PHARMACIST   [facility] ([place])    [address] [address] [facility] [place] [address] [phone]            Sep 29, 2017 10:00 AM   New Patient with Michelle Brumfield R.N., Beata Michele, IVA   [facility])    [address]  [address]  [place] [id number] [phone]           It is the patient's responsibility to check with your Insurance for benefit coverage for visit / visits.  24 hours notice is required for all appointment changes or cancellation.  Please arrive 20  min. before your appointment time  Please bring the following with you: 1)Picture Id 2) Insurance card 3) Completed Forms if New Patient  If scheduled for DIABETES VISIT please also brin) Medications 2) Meter 3) Blood glucose logs 4) Any recent labs if you have them  If scheduled for NUTRITION VISIT please also brin) 2-3 days of detailed food intake logs 2) Blood glucose monitor and blood glucose logs (if you have them)              Problem List              ICD-10-CM Priority Class Noted - Resolved    History of mitral valve repair Z98.890   2017 - Present    Chronic obstructive pulmonary disease (CMS-HCC) J44.9   2017 - Present    Atrial fibrillation (CMS-HCC) I48.91   2017 - Present    Right knee pain M25.561   2017 - Present    Type 2 diabetes mellitus with complication (CMS-HCC) E11.8   2017 - Present    Anticoagulated on warfarin Z79.01   2017 - Present    Chronic anticoagulation Z79.01   2017 - Present    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   2017 - Present    Normocytic anemia D64.9   2017 - Present      Health Maintenance        Date Due Completion Dates    DIABETES MONOFILAMENT / LE EXAM 1949 ---    RETINAL SCREENING 1967 ---    URINE ACR / MICROALBUMIN 1967 ---    IMM DTaP/Tdap/Td Vaccine (1 - Tdap) 1968 ---    PAP SMEAR 1970 ---    MAMMOGRAM 1989 ---    COLONOSCOPY 1999 ---    IMM ZOSTER VACCINE 2009 ---    BONE DENSITY 2014 ---    IMM PNEUMOCOCCAL 65+ (ADULT) LOW/MEDIUM RISK SERIES (1 of 2 - PCV13) 2014 ---    IMM INFLUENZA (1) 2017 10/25/2016    A1C SCREENING 2017    FASTING LIPID PROFILE 2018, 2017    SERUM CREATININE 2018, 2017, 2017, 2017, 2017            Results     POCT Protime      Component    INR    3.5    Comment:     [id number] 2018 ic valid                        Current  Immunizations     Influenza Vaccine Quad Inj (Pf) 10/25/2016    Pneumococcal Vaccine (PCV7) Historical Data 10/25/2016      Below and/or attached are the medications your provider expects you to take. Review all of your home medications and newly ordered medications with your provider and/or pharmacist. Follow medication instructions as directed by your provider and/or pharmacist. Please keep your medication list with you and share with your provider. Update the information when medications are discontinued, doses are changed, or new medications (including over-the-counter products) are added; and carry medication information at all times in the event of emergency situations     Allergies:  AMLODIPINE BESYLATE-VALSARTAN - Unspecified     AMOXICILLIN - Shortness of Breath,Diarrhea     ETODOLAC - Hives     LISINOPRIL - Unspecified     OTHER DRUG - (reactions not documented)               Medications  Valid as of: July 07, 2017 - 10:44 AM    Generic Name Brand Name Tablet Size Instructions for use    Acetaminophen (Tab) TYLENOL 325 MG Take 2 Tabs by mouth every 6 hours as needed (Mild Pain; (Pain scale 1-3); Temp greater than 100.5 F).        Carvedilol (Tab) COREG 12.5 MG Take 12.5 mg by mouth 3 times a day.        Digoxin (Tab) LANOXIN 125 MCG Take 125 mcg by mouth every day.        DilTIAZem HCl (Tab) CARDIZEM 30 MG Take 30 mg by mouth 2 Times a Day.        Furosemide (Tab) LASIX 20 MG Take 40 mg by mouth every 48 hours. Alternating days with Spironolactone        LevoFLOXacin (Tab) LEVAQUIN 500 MG Take 1 Tab by mouth every day.        Nutritional Supplements (Misc) DIABETES SUPPORT THERAPY PACK Take 1 Each by mouth every day. OTC diabetic pack        Potassium Chloride Darline CR (Tab CR) K-DUR 10 MEQ Take 10 mEq by mouth 2 times a day.        Spironolactone (Tab) ALDACTONE 25 MG Take 25 mg by mouth every 48 hours. Alternating  every other day with furosemide        TraMADol HCl (Tab) ULTRAM 50 MG Take 1 Tab by mouth  every 6 hours as needed for Moderate Pain or Severe Pain.        Umeclidinium-Vilanterol (AEROSOL POWDER, BREATH ACTIVATED) Umeclidinium-Vilanterol 62.5-25 MCG/INH Inhale 1 Puff by mouth every day.        Warfarin Sodium (Tab) COUMADIN 10 MG Take 10 mg by mouth every evening.        .                 Medicines prescribed today were sent to:     [facility] [address], [place] - [address] [facility] [address] [facility]    [address] [facility] [address]    Phone: [phone] Fax: [phone]    Open 24 Hours?: No      Medication refill instructions:       If your prescription bottle indicates you have medication refills left, it is not necessary to call your provider’s office. Please contact your pharmacy and they will refill your medication.    If your prescription bottle indicates you do not have any refills left, you may request refills at any time through one of the following ways: The online [facility] system (except Urgent Care), by calling your provider’s office, or by asking your pharmacy to contact your provider’s office with a refill request. Medication refills are processed only during regular business hours and may not be available until the next business day. Your provider may request additional information or to have a follow-up visit with you prior to refilling your medication.   *Please Note: Medication refills are assigned a new Rx number when refilled electronically. Your pharmacy may indicate that no refills were authorized even though a new prescription for the same medication is available at the pharmacy. Please request the medicine by name with the pharmacy before contacting your provider for a refill.        Warfarin Dosing Calendar   July 2017 Details    Sun Mon Tue Wed Thu Fri Sat           1                 2               3               4               5               6               7   3.5   2.5 mg   See details      8      10 mg           9      10 mg         10      10 mg          11      10 mg         12      10 mg         13      10 mg         14      10 mg         15                 16               17               18               19               20               21               22                 23               24               25               26               27               28               29                 30               31                     Date Details   07/07 This INR check   INR: 3.5   [id number] 5/2018 ic valid       Date of next INR:  7/14/2017         How to take your warfarin dose     To take:  2.5 mg Take 0.5 of a 5 mg tablet.    To take:  10 mg Take 2 of the 5 mg tablets.              [facility] Access Code: Activation code not generated  Current [facility] Status: Active

## 2017-07-07 NOTE — PROGRESS NOTES
Anticoagulation Summary as of 7/7/2017     INR goal 2.0-3.0   Selected INR 3.5! (7/7/2017)   Maintenance plan 10 mg (5 mg x 2) every day   Weekly total 70 mg   Plan last modified Tam Rosas, YVONNED (6/2/2017)   Next INR check 7/14/2017   Target end date Indefinite    Indications   Chronic anticoagulation [Z79.01]  Atrial fibrillation (CMS-HCC) [I48.91]  History of mitral valve repair [Z98.890]         Anticoagulation Episode Summary     INR check location     Preferred lab     Send INR reminders to     Comments       Anticoagulation Care Providers     Provider Role Specialty Phone number    Zeferino Duarte A.P.YASMANY. Referring Family Medicine [phone]    [facility] Responsible  [phone]    Kenneth Salas, YVONNED Responsible          Anticoagulation Patient Findings   Positives Antibiotic Use, Hospitalization    Negatives Missed Doses, Extra Doses, Medication Changes, Diet Changes, Dental/Other Procedures, Bleeding Gums, Nose Bleeds, Blood in Urine, Blood in Stool, Any Bruising, Other Complaints    Comments Finished course of levaquin this week          Patient is supratherapeutic today with INR of 3.5.  Pt denies any unusual s/s of bleeding, bruising, clotting or any changes to diet or medications.  Instructed patient to take 2.5mg X 1, then continue with current warfarin dosing regimen.  Follow up in 1 weeks.    Kenneth Salas, PHARMD

## 2017-07-10 PROBLEM — N20.0 KIDNEY STONES: Status: ACTIVE | Noted: 2017-07-10

## 2017-07-10 NOTE — ASSESSMENT & PLAN NOTE
Patient recently hospitalized for infected kidney stones and sepsis.  A stent was placed and she was treated with IV antibiotics.  She is feeling much better.  She was instructed to finish all antibiotics, and to keep her follow-up appointments with urology.

## 2017-07-10 NOTE — PROGRESS NOTES
Chief Complaint   Patient presents with   • Hospital Follow-up       HISTORY OF PRESENT ILLNESS: Patient is a 67 y.o. female established patient who presents today to discuss the following issues:    Kidney stones  Patient recently hospitalized for infected kidney stones and sepsis.  A stent was placed and she was treated with IV antibiotics.  She is feeling much better.  She was instructed to finish all antibiotics, and to keep her follow-up appointments with urology.        Patient Active Problem List    Diagnosis Date Noted   • Kidney stones 07/10/2017   • 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) 06/26/2017   • Normocytic anemia 06/26/2017   • Chronic anticoagulation 02/28/2017   • History of mitral valve repair 02/13/2017   • Chronic obstructive pulmonary disease (CMS-HCC) 02/13/2017   • Atrial fibrillation (CMS-HCC) 02/13/2017   • Right knee pain 02/13/2017   • Type 2 diabetes mellitus with complication (CMS-HCC) 02/13/2017   • Anticoagulated on warfarin 02/13/2017       Allergies:Amlodipine besylate-valsartan; Amoxicillin; Etodolac; Lisinopril; and Other drug    Current Outpatient Prescriptions   Medication Sig Dispense Refill   • tramadol (ULTRAM) 50 MG Tab Take 1 Tab by mouth every 6 hours as needed for Moderate Pain or Severe Pain. 30 Tab 0   • acetaminophen (TYLENOL) 325 MG Tab Take 2 Tabs by mouth every 6 hours as needed (Mild Pain; (Pain scale 1-3); Temp greater than 100.5 F). 30 Tab 0   • Nutritional Supplements (DIABETES SUPPORT) THERAPY PACK Misc Take 1 Each by mouth every day. OTC diabetic pack     • diltiazem (CARDIZEM) 30 MG Tab Take 30 mg by mouth 2 Times a Day.     • Umeclidinium-Vilanterol (ANORO ELLIPTA) 62.5-25 MCG/INH AEROSOL POWDER, BREATH ACTIVATED Inhale 1 Puff by mouth every day. 3 Each 0   • carvedilol (COREG) 12.5 MG Tab Take 12.5 mg by mouth 3 times a day.     • furosemide (LASIX) 20 MG Tab Take 40 mg by mouth every 48 hours. Alternating days with  "Spironolactone     • potassium chloride SA (K-DUR) 10 MEQ Tab CR Take 10 mEq by mouth 2 times a day.     • spironolactone (ALDACTONE) 25 MG Tab Take 25 mg by mouth every 48 hours. Alternating  every other day with furosemide     • digoxin (LANOXIN) 125 MCG Tab Take 125 mcg by mouth every day.     • warfarin (COUMADIN) 5 MG Tab Take two tablets by mouth one time daily or as directed by coumadin clinic 180 Tab 1     No current facility-administered medications for this visit.       Social History   Substance Use Topics   • Smoking status: Never Smoker    • Smokeless tobacco: Never Used   • Alcohol Use: No       No family status information on file.   History reviewed. No pertinent family history.    Review of Systems:   Constitutional: Negative for fever, chills, weight loss and malaise/fatigue.   HENT: Negative for ear pain, nosebleeds, congestion, sore throat and neck pain.    Eyes: Negative for blurred vision.   Respiratory: Negative for cough, sputum production, shortness of breath and wheezing.    Cardiovascular: Negative for chest pain, palpitations, orthopnea and leg swelling.   Gastrointestinal: Negative for heartburn, nausea, vomiting and abdominal pain.   Genitourinary: Negative for dysuria, urgency and frequency.   Musculoskeletal: Negative for myalgias, joint pain, and back pain.  Skin: Negative for rash and itching.   Neurological: Negative for dizziness, tingling, tremors, sensory change, focal weakness and headaches.   Endo/Heme/Allergies: Does not bruise/bleed easily.   Psychiatric/Behavioral: Negative for depression, suicidal ideas and memory loss.  The patient is not nervous/anxious and does not have insomnia.    All other systems reviewed and are negative except as in HPI.    Exam:  Blood pressure 140/60, pulse 70, temperature 37 °C (98.6 °F), resp. rate 16, height 1.626 m (5' 4.02\"), weight 93.895 kg (207 lb), SpO2 92 %.  General:  Well nourished, well developed female in NAD  Head: Grossly " normal.  Pulmonary: Clear to ausculation. Normal effort. No rales, ronchi, or wheezing.  Cardiovascular: Regular rate and rhythm without murmur.   Extremities: No clubbing, cyanosis, or edema.  Skin: Intact with no obvious rashes or lesions.  Neuro: Grossly intact.  Psych: Alert and oriented x 3.  Mood and affect appropriate.    Medical decision-making and discussion: Salma is here today for follow-up.  She was encouraged to finish all of her antibiotics and to keep her appointment with urology to discuss what will happen next.  She will follow-up here as needed.          Assessment/Plan:  1. Kidney stones         Return if symptoms worsen or fail to improve.    Please note that this dictation was created using voice recognition software. I have made every reasonable attempt to correct obvious errors, but I expect that there are errors of grammar and possibly content that I did not discover before finalizing the note.

## 2017-07-11 ENCOUNTER — APPOINTMENT (OUTPATIENT)
Dept: PULMONOLOGY | Facility: HOSPICE | Age: 67
End: 2017-07-11
Payer: MEDICARE

## 2017-07-14 ENCOUNTER — ANTICOAGULATION VISIT (OUTPATIENT)
Dept: MEDICAL GROUP | Facility: PHYSICIAN GROUP | Age: 67
End: 2017-07-14
Payer: MEDICARE

## 2017-07-14 VITALS — DIASTOLIC BLOOD PRESSURE: 69 MMHG | HEART RATE: 71 BPM | SYSTOLIC BLOOD PRESSURE: 100 MMHG

## 2017-07-14 DIAGNOSIS — Z98.890 HISTORY OF MITRAL VALVE REPAIR: ICD-10-CM

## 2017-07-14 DIAGNOSIS — Z79.01 CHRONIC ANTICOAGULATION: ICD-10-CM

## 2017-07-14 DIAGNOSIS — I48.91 ATRIAL FIBRILLATION, UNSPECIFIED TYPE (HCC): ICD-10-CM

## 2017-07-14 LAB — INR PPP: 3.2 (ref 2–3.5)

## 2017-07-14 NOTE — PROGRESS NOTES
Anticoagulation Summary as of 7/14/2017     INR goal 2.0-3.0   Selected INR 3.2! (7/14/2017)   Maintenance plan 5 mg (5 mg x 1) on Fri; 10 mg (5 mg x 2) all other days   Weekly total 65 mg   Plan last modified Kenneth Salas, MELE (7/14/2017)   Next INR check 7/21/2017   Target end date Indefinite    Indications   Chronic anticoagulation [Z79.01]  Atrial fibrillation (CMS-HCC) [I48.91]  History of mitral valve repair [Z98.890]         Anticoagulation Episode Summary     INR check location     Preferred lab     Send INR reminders to     Comments       Anticoagulation Care Providers     Provider Role Specialty Phone number    Zeferino Duarte, A.P.N. Referring Family Medicine [phone]    [facility] Responsible  [phone]    Kenneth Salas, YVONNED Responsible          Anticoagulation Patient Findings   Positives Diet Changes    Negatives Missed Doses, Extra Doses, Medication Changes, Antibiotic Use, Dental/Other Procedures, Hospitalization, Bleeding Gums, Nose Bleeds, Blood in Urine, Blood in Stool, Any Bruising, Other Complaints    Comments Poor appetite  Taking AZO         Patient is slightly supratherapeutic today with INR of 3.2.  Pt denies any unusual s/s of bleeding, bruising, clotting or any changes to diet or medications.  Instructed patient to decrease weekly warfarin regimen by ~7% as detailed above.  Follow up in 1 weeks.    Kenneth Salas, YVONNED

## 2017-07-14 NOTE — MR AVS SNAPSHOT
Salma Lees   2017 10:30 AM   Anticoagulation Visit   MRN: [id number]    Department:  [facility]   Dept Phone:  [phone]    Description:  Female : 1950   Provider:  Kenneth Salas, YVONNED           Allergies as of 2017     Allergen Noted Reactions    Amlodipine Besylate-Valsartan 2017   Unspecified    Chest pain and dizziness     Amoxicillin 2017   Shortness of Breath, Diarrhea    SOB and Diarrhea    Etodolac 2017   Hives    Lisinopril 2017   Unspecified    Dizziness     Other Drug 2017       metal      You were diagnosed with     Chronic anticoagulation   [282162]       Atrial fibrillation, unspecified type (CMS-HCC)   [5261327]       History of mitral valve repair   [448075]         Vital Signs     Blood Pressure Pulse Smoking Status             100/69 mmHg 71 Never Smoker          Basic Information     Date Of Birth Sex Race Ethnicity Preferred Language    1950 Female White Non- English      Your appointments     2017 11:15 AM   Anti-Coag Routine with [place] PHARMACIST   [facility] ([place])    [address] [facility] [address]   [phone]            2017  4:15 PM   Scheduled Pre Admission with PREADMIT  2   PREADMIT TESTS [facility] (--)    [address]  [place] [address]   [phone]            Sep 29, 2017 10:00 AM   New Patient with Michelle Brumfield R.N., Beata Michele, IVA   Health Improvement Programs [facility])    [address]  [address]  [facility] [address]   [phone]           It is the patient's responsibility to check with your Insurance for benefit coverage for visit / visits.  24 hours notice is required for all appointment changes or cancellation.  Please arrive 20 min. before your appointment time  Please bring the following with you: 1)Picture Id 2) Insurance card 3) Completed Forms if New Patient  If scheduled for DIABETES VISIT please also brin)  Medications 2) Meter 3) Blood glucose logs 4) Any recent labs if you have them  If scheduled for NUTRITION VISIT please also brin) 2-3 days of detailed food intake logs 2) Blood glucose monitor and blood glucose logs (if you have them)            2017  1:00 PM   Pulmonary Function Test with PFT-RM3   [facility] (--)    [address]  [address]  [place] [address]   [phone]            2017  2:20 PM   New Patient Pulmonary with A Rotation   [facility] (--)    [address]  [address]  [place] [address] [phone]              Problem List              ICD-10-CM Priority Class Noted - Resolved    History of mitral valve repair Z98.890   2017 - Present    Chronic obstructive pulmonary disease (CMS-HCC) J44.9   2017 - Present    Atrial fibrillation (CMS-HCC) I48.91   2017 - Present    Right knee pain M25.561   2017 - Present    Type 2 diabetes mellitus with complication (CMS-HCC) E11.8   2017 - Present    Anticoagulated on warfarin Z79.01   2017 - Present    Chronic anticoagulation Z79.01   2017 - Present    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   2017 - Present    Normocytic anemia D64.9   2017 - Present    Kidney stones N20.0   7/10/2017 - Present      Health Maintenance        Date Due Completion Dates    DIABETES MONOFILAMENT / LE EXAM 1950 ---    RETINAL SCREENING 1967 ---    URINE ACR / MICROALBUMIN 1967 ---    IMM DTaP/Tdap/Td Vaccine (1 - Tdap) 1968 ---    PAP SMEAR 1970 ---    MAMMOGRAM 1989 ---    COLONOSCOPY 1999 ---    IMM ZOSTER VACCINE 2009 ---    BONE DENSITY 2014 ---    IMM PNEUMOCOCCAL 65+ (ADULT) LOW/MEDIUM RISK SERIES (1 of 2 - PCV13) 2014 ---    IMM INFLUENZA (1) 2017 10/25/2016    A1C SCREENING 2017    FASTING LIPID PROFILE 2018, 2017    SERUM  CREATININE 6/27/2018 6/27/2017, 6/26/2017, 6/25/2017, 5/23/2017, 1/30/2017            Results     POCT Protime      Component    INR    3.2    Comment:     [id number] 5/2018 ic valid                        Current Immunizations     Influenza Vaccine Quad Inj (Pf) 10/25/2016    Pneumococcal Vaccine (PCV7) Historical Data 10/25/2016      Below and/or attached are the medications your provider expects you to take. Review all of your home medications and newly ordered medications with your provider and/or pharmacist. Follow medication instructions as directed by your provider and/or pharmacist. Please keep your medication list with you and share with your provider. Update the information when medications are discontinued, doses are changed, or new medications (including over-the-counter products) are added; and carry medication information at all times in the event of emergency situations     Allergies:  AMLODIPINE BESYLATE-VALSARTAN - Unspecified     AMOXICILLIN - Shortness of Breath,Diarrhea     ETODOLAC - Hives     LISINOPRIL - Unspecified     OTHER DRUG - (reactions not documented)               Medications  Valid as of: July 14, 2017 - 10:43 AM    Generic Name Brand Name Tablet Size Instructions for use    Acetaminophen (Tab) TYLENOL 325 MG Take 2 Tabs by mouth every 6 hours as needed (Mild Pain; (Pain scale 1-3); Temp greater than 100.5 F).        Carvedilol (Tab) COREG 12.5 MG Take 12.5 mg by mouth 3 times a day.        Digoxin (Tab) LANOXIN 125 MCG Take 125 mcg by mouth every day.        DilTIAZem HCl (Tab) CARDIZEM 30 MG Take 30 mg by mouth 2 Times a Day.        Furosemide (Tab) LASIX 20 MG Take 40 mg by mouth every 48 hours. Alternating days with Spironolactone        Nutritional Supplements (Misc) DIABETES SUPPORT THERAPY PACK Take 1 Each by mouth every day. OTC diabetic pack        Potassium Chloride Darline CR (Tab CR) K-DUR 10 MEQ Take 10 mEq by mouth 2 times a day.        Spironolactone (Tab) ALDACTONE 25 MG  Take 25 mg by mouth every 48 hours. Alternating  every other day with furosemide        TraMADol HCl (Tab) ULTRAM 50 MG Take 1 Tab by mouth every 6 hours as needed for Moderate Pain or Severe Pain.        Umeclidinium-Vilanterol (AEROSOL POWDER, BREATH ACTIVATED) Umeclidinium-Vilanterol 62.5-25 MCG/INH Inhale 1 Puff by mouth every day.        Warfarin Sodium (Tab) COUMADIN 5 MG Take two tablets by mouth one time daily or as directed by coumadin clinic        .                 Medicines prescribed today were sent to:     [id number]  [place], [place] - [address] AT [facility]    [address] [facility] [address]    Phone: [phone] Fax: [phone]    Open 24 Hours?: No      Medication refill instructions:       If your prescription bottle indicates you have medication refills left, it is not necessary to call your provider’s office. Please contact your pharmacy and they will refill your medication.    If your prescription bottle indicates you do not have any refills left, you may request refills at any time through one of the following ways: The online App [employer] system (except Urgent Care), by calling your provider’s office, or by asking your pharmacy to contact your provider’s office with a refill request. Medication refills are processed only during regular business hours and may not be available until the next business day. Your provider may request additional information or to have a follow-up visit with you prior to refilling your medication.   *Please Note: Medication refills are assigned a new Rx number when refilled electronically. Your pharmacy may indicate that no refills were authorized even though a new prescription for the same medication is available at the pharmacy. Please request the medicine by name with the pharmacy before contacting your provider for a refill.        Warfarin Dosing Calendar   July 2017 Details    Sun Mon Tue Wed Thu Fri Sat           1                    2               3               4               5               6               7               8                 9               10               11               12               13               14   3.2   5 mg   See details      15      10 mg           16      10 mg         17      10 mg         18      10 mg         19      10 mg         20      10 mg         21      5 mg         22                 23               24               25               26               27               28               29                 30               31                     Date Details   07/14 This INR check   INR: 3.2   [id number] 5/2018 ic valid       Date of next INR:  7/21/2017         How to take your warfarin dose     To take:  5 mg Take 1 of the 5 mg tablets.    To take:  10 mg Take 2 of the 5 mg tablets.              [employer] Access Code: Activation code not generated  Current [employer] Status: Active

## 2017-07-21 ENCOUNTER — ANTICOAGULATION VISIT (OUTPATIENT)
Dept: MEDICAL GROUP | Facility: PHYSICIAN GROUP | Age: 67
End: 2017-07-21
Payer: MEDICARE

## 2017-07-21 VITALS — DIASTOLIC BLOOD PRESSURE: 51 MMHG | SYSTOLIC BLOOD PRESSURE: 98 MMHG | HEART RATE: 74 BPM

## 2017-07-21 DIAGNOSIS — Z79.01 CHRONIC ANTICOAGULATION: ICD-10-CM

## 2017-07-21 DIAGNOSIS — Z98.890 HISTORY OF MITRAL VALVE REPAIR: ICD-10-CM

## 2017-07-21 DIAGNOSIS — I48.91 ATRIAL FIBRILLATION, UNSPECIFIED TYPE (HCC): ICD-10-CM

## 2017-07-21 LAB — INR PPP: 5.5 (ref 2–3.5)

## 2017-07-21 NOTE — MR AVS SNAPSHOT
Salma Lees   2017 11:15 AM   Anticoagulation Visit   MRN: [id number]    Department:  [facility]   Dept Phone:  [phone]    Description:  Female : 1950   Provider:  Kenneth Salas, YVONNED           Allergies as of 2017     Allergen Noted Reactions    Amlodipine Besylate-Valsartan 2017   Unspecified    Chest pain and dizziness     Amoxicillin 2017   Shortness of Breath, Diarrhea    SOB and Diarrhea    Etodolac 2017   Hives    Lisinopril 2017   Unspecified    Dizziness     Other Drug 2017       metal      You were diagnosed with     Chronic anticoagulation   [351543]       Atrial fibrillation, unspecified type (CMS-HCC)   [4232316]       History of mitral valve repair   [400498]         Vital Signs     Blood Pressure Pulse Smoking Status             98/51 mmHg 74 Never Smoker          Basic Information     Date Of Birth Sex Race Ethnicity Preferred Language    1950 Female White Non- English      Your appointments     2017 11:30 AM   Anti-Coag Routine with [place] PHARMACIST   [facility] ([place])    [address] [address]   [phone]            Sep 29, 2017 10:00 AM   New Patient with Michelle Brumfield R.N., Beata Michele, IVA   Health Improvement [employer] [facility]    [address] [facility]  [address]  [facility] [address]   [phone]           It is the patient's responsibility to check with your Insurance for benefit coverage for visit / visits.  24 hours notice is required for all appointment changes or cancellation.  Please arrive 20 min. before your appointment time  Please bring the following with you: 1)Picture Id 2) Insurance card 3) Completed Forms if New Patient  If scheduled for DIABETES VISIT please also brin) Medications 2) Meter 3) Blood glucose logs 4) Any recent labs if you have them  If scheduled for NUTRITION VISIT please also brin) 2-3 days of detailed food  intake logs 2) Blood glucose monitor and blood glucose logs (if you have them)            Nov 16, 2017  1:00 PM   Pulmonary Function Test with PFT-RM3   [facility] (--)    [address]  [address]  [place] [address]   [phone]            Nov 16, 2017  2:20 PM   New Patient Pulmonary with A Rotation   [facility] (--)    [address]  [address]  [place] [address] [phone]              Problem List              ICD-10-CM Priority Class Noted - Resolved    History of mitral valve repair Z98.890   2/13/2017 - Present    Chronic obstructive pulmonary disease (CMS-HCC) J44.9   2/13/2017 - Present    Atrial fibrillation (CMS-HCC) I48.91   2/13/2017 - Present    Right knee pain M25.561   2/13/2017 - Present    Type 2 diabetes mellitus with complication (CMS-HCC) E11.8   2/13/2017 - Present    Anticoagulated on warfarin Z79.01   2/13/2017 - Present    Chronic anticoagulation Z79.01   2/28/2017 - Present    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   6/26/2017 - Present    Normocytic anemia D64.9   6/26/2017 - Present    Kidney stones N20.0   7/10/2017 - Present      Health Maintenance        Date Due Completion Dates    DIABETES MONOFILAMENT / LE EXAM 1950 ---    RETINAL SCREENING 2/16/1967 ---    URINE ACR / MICROALBUMIN 2/16/1967 ---    IMM DTaP/Tdap/Td Vaccine (1 - Tdap) 2/16/1968 ---    PAP SMEAR 2/16/1970 ---    MAMMOGRAM 2/16/1989 ---    COLONOSCOPY 2/16/1999 ---    IMM ZOSTER VACCINE 2/16/2009 ---    BONE DENSITY 2/16/2014 ---    IMM PNEUMOCOCCAL 65+ (ADULT) LOW/MEDIUM RISK SERIES (1 of 2 - PCV13) 2/16/2014 ---    IMM INFLUENZA (1) 9/1/2017 10/25/2016    A1C SCREENING 12/25/2017 6/25/2017    FASTING LIPID PROFILE 5/23/2018 5/23/2017, 1/30/2017    SERUM CREATININE 6/27/2018 6/27/2017, 6/26/2017, 6/25/2017, 5/23/2017, 1/30/2017            Results     POCT Protime      Component    INR    5.5    Comment:      [id number] 5/2018 ic valid                        Current Immunizations     Influenza Vaccine Quad Inj (Pf) 10/25/2016    Pneumococcal Vaccine (PCV7) Historical Data 10/25/2016      Below and/or attached are the medications your provider expects you to take. Review all of your home medications and newly ordered medications with your provider and/or pharmacist. Follow medication instructions as directed by your provider and/or pharmacist. Please keep your medication list with you and share with your provider. Update the information when medications are discontinued, doses are changed, or new medications (including over-the-counter products) are added; and carry medication information at all times in the event of emergency situations     Allergies:  AMLODIPINE BESYLATE-VALSARTAN - Unspecified     AMOXICILLIN - Shortness of Breath,Diarrhea     ETODOLAC - Hives     LISINOPRIL - Unspecified     OTHER DRUG - (reactions not documented)               Medications  Valid as of: July 21, 2017 - 11:33 AM    Generic Name Brand Name Tablet Size Instructions for use    Acetaminophen (Tab) TYLENOL 325 MG Take 2 Tabs by mouth every 6 hours as needed (Mild Pain; (Pain scale 1-3); Temp greater than 100.5 F).        Carvedilol (Tab) COREG 12.5 MG Take 12.5 mg by mouth 3 times a day.        Digoxin (Tab) LANOXIN 125 MCG Take 125 mcg by mouth every day.        DilTIAZem HCl (Tab) CARDIZEM 30 MG Take 30 mg by mouth 2 Times a Day.        Furosemide (Tab) LASIX 20 MG Take 40 mg by mouth every 48 hours. Alternating days with Spironolactone        Nutritional Supplements (Misc) DIABETES SUPPORT THERAPY PACK Take 1 Each by mouth every day. OTC diabetic pack        Potassium Chloride Darline CR (Tab CR) K-DUR 10 MEQ Take 10 mEq by mouth 2 times a day.        Spironolactone (Tab) ALDACTONE 25 MG Take 25 mg by mouth every 48 hours. Alternating  every other day with furosemide        TraMADol HCl (Tab) ULTRAM 50 MG Take 1 Tab by mouth every 6 hours as  needed for Moderate Pain or Severe Pain.        Umeclidinium-Vilanterol (AEROSOL POWDER, BREATH ACTIVATED) Umeclidinium-Vilanterol 62.5-25 MCG/INH Inhale 1 Puff by mouth every day.        Warfarin Sodium (Tab) COUMADIN 5 MG Take two tablets by mouth one time daily or as directed by coumadin clinic        .                 Medicines prescribed today were sent to:     [facility] \[facility]", [place] - [address] [facility]    [email or web address] [facility] [address]    Phone: [phone] Fax: [phone]    Open 24 Hours?: No      Medication refill instructions:       If your prescription bottle indicates you have medication refills left, it is not necessary to call your provider’s office. Please contact your pharmacy and they will refill your medication.    If your prescription bottle indicates you do not have any refills left, you may request refills at any time through one of the following ways: The online [email or web address] system (except Urgent Care), by calling your provider’s office, or by asking your pharmacy to contact your provider’s office with a refill request. Medication refills are processed only during regular business hours and may not be available until the next business day. Your provider may request additional information or to have a follow-up visit with you prior to refilling your medication.   *Please Note: Medication refills are assigned a new Rx number when refilled electronically. Your pharmacy may indicate that no refills were authorized even though a new prescription for the same medication is available at the pharmacy. Please request the medicine by name with the pharmacy before contacting your provider for a refill.        Warfarin Dosing Calendar   July 2017 Details    Sun Mon Tue Wed Thu Fri Sat           1                 2               3               4               5               6               7               8                 9               10               11                  12               13               14               15                 16               17               18               19               20               21   5.5   Hold   See details      22      Hold           23      10 mg         24      5 mg         25      10 mg         26      5 mg         27      10 mg         28      5 mg         29                 30               31                     Date Details   07/21 This INR check   INR: 5.5   [id number] 5/2018 ic valid       Date of next INR:  7/28/2017         How to take your warfarin dose     To take:  5 mg Take 1 of the 5 mg tablets.    To take:  10 mg Take 2 of the 5 mg tablets.    Hold Do not take your warfarin dose. See the Details table to the right for additional instructions.                   QR Artist Access Code: Activation code not generated  Current QR Artist Status: Active

## 2017-07-21 NOTE — PROGRESS NOTES
Anticoagulation Summary as of 7/21/2017     INR goal 2.0-3.0   Selected INR 5.5! (7/21/2017)   Maintenance plan 5 mg (5 mg x 1) on Mon, Wed, Fri; 10 mg (5 mg x 2) all other days   Weekly total 55 mg   Plan last modified Kenneth Salas PHARMD (7/21/2017)   Next INR check 7/28/2017   Target end date Indefinite    Indications   Chronic anticoagulation [Z79.01]  Atrial fibrillation (CMS-HCC) [I48.91]  History of mitral valve repair [Z98.890]         Anticoagulation Episode Summary     INR check location     Preferred lab     Send INR reminders to     Comments       Anticoagulation Care Providers     Provider Role Specialty Phone number    MARTA Casey. Referring Family Medicine [phone]    [facility] Responsible  [phone]    Kenneth Salas PHARMD Responsible          Anticoagulation Patient Findings   Positives Diet Changes    Negatives Missed Doses, Extra Doses, Medication Changes, Antibiotic Use, Dental/Other Procedures, Hospitalization, Bleeding Gums, Nose Bleeds, Blood in Urine, Blood in Stool, Any Bruising, Other Complaints    Comments Less greens, decreased appetite        Patient is supratherapeutic today with INR of 5.5.  Pt denies any unusual s/s of bleeding, bruising, clotting or any changes to diet or medications.  She has a very poor appetite right now and has not been eating as many greens.  Instructed her to HOLD X 2 and decrease weekly warfarin regimen as detailed above.  Follow up in 1 weeks.    Kenneth Salas PHARMD

## 2017-07-24 ENCOUNTER — APPOINTMENT (OUTPATIENT)
Dept: ADMISSIONS | Facility: MEDICAL CENTER | Age: 67
End: 2017-07-24
Payer: MEDICARE

## 2017-07-28 ENCOUNTER — ANTICOAGULATION VISIT (OUTPATIENT)
Dept: MEDICAL GROUP | Facility: PHYSICIAN GROUP | Age: 67
End: 2017-07-28
Payer: MEDICARE

## 2017-07-28 DIAGNOSIS — Z79.01 CHRONIC ANTICOAGULATION: ICD-10-CM

## 2017-07-28 DIAGNOSIS — Z98.890 HISTORY OF MITRAL VALVE REPAIR: ICD-10-CM

## 2017-07-28 DIAGNOSIS — I48.91 ATRIAL FIBRILLATION, UNSPECIFIED TYPE (HCC): ICD-10-CM

## 2017-07-28 LAB — INR PPP: 3.7 (ref 2–3.5)

## 2017-07-28 NOTE — PROGRESS NOTES
Anticoagulation Summary as of 7/28/2017     INR goal 2.0-3.0   Selected INR 3.7! (7/28/2017)   Maintenance plan 10 mg (5 mg x 2) on Tue, Thu; 5 mg (5 mg x 1) all other days   Weekly total 45 mg   Plan last modified Kenneth Salas PHARMD (7/28/2017)   Next INR check 8/4/2017   Target end date Indefinite    Indications   Chronic anticoagulation [Z79.01]  Atrial fibrillation (CMS-HCC) [I48.91]  History of mitral valve repair [Z98.890]         Anticoagulation Episode Summary     INR check location     Preferred lab     Send INR reminders to     Comments       Anticoagulation Care Providers     Provider Role Specialty Phone number    LILIYA Casey Referring Family Medicine [phone]    [facility] Responsible  [phone]    Kenneth Salas PHARMD Responsible          Anticoagulation Patient Findings   Negatives Missed Doses, Extra Doses, Medication Changes, Antibiotic Use, Diet Changes, Dental/Other Procedures, Hospitalization, Bleeding Gums, Nose Bleeds, Blood in Urine, Blood in Stool, Any Bruising, Other Complaints        Patient is supratherapeutic today with INR of 3.7.  Pt denies any unusual s/s of bleeding, bruising, clotting or any changes to diet or medications.  Continues to have poor appetite.  Instructed her to HOLD X 1, and decrease weekly warfarin regimen an additional 18% as detailed above.  Patient declines BP/vitals check today.  Follow up in 1 weeks.    Kenneth Salas PHARMD

## 2017-07-28 NOTE — MR AVS SNAPSHOT
Salma Lees   2017 11:30 AM   Anticoagulation Visit   MRN: [id number]    Department:  [facility]   Dept Phone:  [phone]    Description:  Female : 1950   Provider:  Kenneth Salas, PHARMD           Allergies as of 2017     Allergen Noted Reactions    Amlodipine Besylate-Valsartan 2017   Unspecified    Chest pain and dizziness     Amoxicillin 2017   Shortness of Breath, Diarrhea    SOB and Diarrhea    Etodolac 2017   Hives    Lisinopril 2017   Unspecified    Dizziness     Other Drug 2017       metal      You were diagnosed with     Chronic anticoagulation   [225017]       Atrial fibrillation, unspecified type (CMS-HCC)   [6400836]       History of mitral valve repair   [642554]         Vital Signs     Smoking Status                   Never Smoker            Basic Information     Date Of Birth Sex Race Ethnicity Preferred Language    1950 Female White Non- English      Your appointments     Aug 04, 2017  1:15 PM   Anti-Coag Routine with Fulda PHARMACIST   [facility]    [address] [facility] [address]   [phone]            Sep 29, 2017 10:00 AM   New Patient with Michelle Brumfield R.N., Beata Michele, IVA   [facility] Improvement [facility])    [address]  [address]  [facility] [address]   [phone]           It is the patient's responsibility to check with your Insurance for benefit coverage for visit / visits.  24 hours notice is required for all appointment changes or cancellation.  Please arrive 20 min. before your appointment time  Please bring the following with you: 1)Picture Id 2) Insurance card 3) Completed Forms if New Patient  If scheduled for DIABETES VISIT please also brin) Medications 2) Meter 3) Blood glucose logs 4) Any recent labs if you have them  If scheduled for NUTRITION VISIT please also brin) 2-3 days of detailed food intake logs 2) Blood glucose  monitor and blood glucose logs (if you have them)            Nov 16, 2017  1:00 PM   Pulmonary Function Test with PFT-RM3   [facility] (--)    [address]  [address]  [place] [id number] [phone]            Nov 16, 2017  2:20 PM   New Patient Pulmonary with A Rotation   [facility] (--)    [address]  [address]  [place] [id number] [phone]              Problem List              ICD-10-CM Priority Class Noted - Resolved    History of mitral valve repair Z98.890   2/13/2017 - Present    Chronic obstructive pulmonary disease (CMS-HCC) J44.9   2/13/2017 - Present    Atrial fibrillation (CMS-HCC) I48.91   2/13/2017 - Present    Right knee pain M25.561   2/13/2017 - Present    Type 2 diabetes mellitus with complication (CMS-HCC) E11.8   2/13/2017 - Present    Anticoagulated on warfarin Z79.01   2/13/2017 - Present    Chronic anticoagulation Z79.01   2/28/2017 - Present    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   6/26/2017 - Present    Normocytic anemia D64.9   6/26/2017 - Present    Kidney stones N20.0   7/10/2017 - Present      Health Maintenance        Date Due Completion Dates    DIABETES MONOFILAMENT / LE EXAM 1950 ---    RETINAL SCREENING 2/16/1967 ---    URINE ACR / MICROALBUMIN 2/16/1967 ---    IMM DTaP/Tdap/Td Vaccine (1 - Tdap) 2/16/1968 ---    PAP SMEAR 2/16/1970 ---    MAMMOGRAM 2/16/1989 ---    COLONOSCOPY 2/16/1999 ---    IMM ZOSTER VACCINE 2/16/2009 ---    BONE DENSITY 2/16/2014 ---    IMM PNEUMOCOCCAL 65+ (ADULT) LOW/MEDIUM RISK SERIES (1 of 2 - PCV13) 2/16/2014 ---    IMM INFLUENZA (1) 9/1/2017 10/25/2016    A1C SCREENING 12/25/2017 6/25/2017    FASTING LIPID PROFILE 5/23/2018 5/23/2017, 1/30/2017    SERUM CREATININE 6/27/2018 6/27/2017, 6/26/2017, 6/25/2017, 5/23/2017, 1/30/2017            Results     POCT Protime      Component    INR    3.7    Comment:     [id number] 5/2018 ic valid                          Current Immunizations     Influenza Vaccine Quad Inj (Pf) 10/25/2016    Pneumococcal Vaccine (PCV7) Historical Data 10/25/2016      Below and/or attached are the medications your provider expects you to take. Review all of your home medications and newly ordered medications with your provider and/or pharmacist. Follow medication instructions as directed by your provider and/or pharmacist. Please keep your medication list with you and share with your provider. Update the information when medications are discontinued, doses are changed, or new medications (including over-the-counter products) are added; and carry medication information at all times in the event of emergency situations     Allergies:  AMLODIPINE BESYLATE-VALSARTAN - Unspecified     AMOXICILLIN - Shortness of Breath,Diarrhea     ETODOLAC - Hives     LISINOPRIL - Unspecified     OTHER DRUG - (reactions not documented)               Medications  Valid as of: July 28, 2017 - 11:40 AM    Generic Name Brand Name Tablet Size Instructions for use    Acetaminophen (Tab) TYLENOL 325 MG Take 2 Tabs by mouth every 6 hours as needed (Mild Pain; (Pain scale 1-3); Temp greater than 100.5 F).        Carvedilol (Tab) COREG 12.5 MG Take 12.5 mg by mouth 3 times a day.        Digoxin (Tab) LANOXIN 125 MCG Take 125 mcg by mouth every day.        DilTIAZem HCl (Tab) CARDIZEM 30 MG Take 30 mg by mouth 2 Times a Day.        Furosemide (Tab) LASIX 20 MG Take 40 mg by mouth every 48 hours. Alternating days with Spironolactone        Nutritional Supplements (Misc) DIABETES SUPPORT THERAPY PACK Take 1 Each by mouth every day. OTC diabetic pack        Potassium Chloride Darline CR (Tab CR) K-DUR 10 MEQ Take 10 mEq by mouth 2 times a day.        Spironolactone (Tab) ALDACTONE 25 MG Take 25 mg by mouth every 48 hours. Alternating  every other day with furosemide        TraMADol HCl (Tab) ULTRAM 50 MG Take 1 Tab by mouth every 6 hours as needed for Moderate Pain or Severe  Pain.        Umeclidinium-Vilanterol (AEROSOL POWDER, BREATH ACTIVATED) Umeclidinium-Vilanterol 62.5-25 MCG/INH Inhale 1 Puff by mouth every day.        Warfarin Sodium (Tab) COUMADIN 5 MG Take two tablets by mouth one time daily or as directed by coumadin clinic        .                 Medicines prescribed today were sent to:     [facility], [place] - [address] [facility] [address] [facility]    [address] [facility] [address]    Phone: [phone] Fax: [phone]    Open 24 Hours?: No      Medication refill instructions:       If your prescription bottle indicates you have medication refills left, it is not necessary to call your provider’s office. Please contact your pharmacy and they will refill your medication.    If your prescription bottle indicates you do not have any refills left, you may request refills at any time through one of the following ways: The online [employer] system (except Urgent Care), by calling your provider’s office, or by asking your pharmacy to contact your provider’s office with a refill request. Medication refills are processed only during regular business hours and may not be available until the next business day. Your provider may request additional information or to have a follow-up visit with you prior to refilling your medication.   *Please Note: Medication refills are assigned a new Rx number when refilled electronically. Your pharmacy may indicate that no refills were authorized even though a new prescription for the same medication is available at the pharmacy. Please request the medicine by name with the pharmacy before contacting your provider for a refill.        Warfarin Dosing Calendar   July 2017 Details    Sun Mon Tue Wed Thu Fri Sat           1                 2               3               4               5               6               7               8                 9               10               11               12               13                14               15                 16               17               18               19               20               21               22                 23               24               25               26               27               28   3.7   Hold   See details      29      5 mg           30      5 mg         31      5 mg               Date Details   07/28 This INR check   INR: 3.7   [id number] 5/2018 ic valid               How to take your warfarin dose     To take:  5 mg Take 1 of the 5 mg tablets.    Hold Do not take your warfarin dose. See the Details table to the right for additional instructions.                Warfarin Dosing Calendar   August 2017 Details    Sun Mon Tue Wed Thu Fri Sat       1      10 mg         2      5 mg         3      10 mg         4      5 mg         5                 6               7               8               9               10               11               12                 13               14               15               16               17               18               19                 20               21               22               23               24               25               26                 27               28               29               30               31                  Date Details   No additional details    Date of next INR:  8/4/2017         How to take your warfarin dose     To take:  5 mg Take 1 of the 5 mg tablets.    To take:  10 mg Take 2 of the 5 mg tablets.              [employer] Access Code: Activation code not generated  Current [employer] Status: Active

## 2017-08-01 ENCOUNTER — OFFICE VISIT (OUTPATIENT)
Dept: URGENT CARE | Facility: PHYSICIAN GROUP | Age: 67
End: 2017-08-01
Payer: MEDICARE

## 2017-08-01 ENCOUNTER — HOSPITAL ENCOUNTER (EMERGENCY)
Facility: MEDICAL CENTER | Age: 67
End: 2017-08-02
Payer: MEDICARE

## 2017-08-01 ENCOUNTER — PATIENT OUTREACH (OUTPATIENT)
Dept: HEALTH INFORMATION MANAGEMENT | Facility: OTHER | Age: 67
End: 2017-08-01

## 2017-08-01 VITALS
BODY MASS INDEX: 33.8 KG/M2 | WEIGHT: 198 LBS | SYSTOLIC BLOOD PRESSURE: 98 MMHG | OXYGEN SATURATION: 96 % | HEART RATE: 101 BPM | DIASTOLIC BLOOD PRESSURE: 54 MMHG | HEIGHT: 64 IN | TEMPERATURE: 97.4 F | RESPIRATION RATE: 18 BRPM

## 2017-08-01 VITALS
WEIGHT: 200.62 LBS | RESPIRATION RATE: 18 BRPM | DIASTOLIC BLOOD PRESSURE: 62 MMHG | SYSTOLIC BLOOD PRESSURE: 114 MMHG | TEMPERATURE: 97.8 F | BODY MASS INDEX: 34.42 KG/M2 | HEART RATE: 110 BPM | OXYGEN SATURATION: 91 %

## 2017-08-01 DIAGNOSIS — N20.0 KIDNEY STONES: ICD-10-CM

## 2017-08-01 DIAGNOSIS — R53.81 MALAISE: ICD-10-CM

## 2017-08-01 LAB
APPEARANCE UR: NORMAL
BASOPHILS # BLD AUTO: 0.6 % (ref 0–1.8)
BASOPHILS # BLD: 0.04 K/UL (ref 0–0.12)
BILIRUB UR STRIP-MCNC: NORMAL MG/DL
COLOR UR AUTO: NORMAL
EOSINOPHIL # BLD AUTO: 0.13 K/UL (ref 0–0.51)
EOSINOPHIL NFR BLD: 1.8 % (ref 0–6.9)
ERYTHROCYTE [DISTWIDTH] IN BLOOD BY AUTOMATED COUNT: 50.7 FL (ref 35.9–50)
GLUCOSE UR STRIP.AUTO-MCNC: NORMAL MG/DL
HCT VFR BLD AUTO: 35.2 % (ref 37–47)
HGB BLD-MCNC: 10.6 G/DL (ref 12–16)
IMM GRANULOCYTES # BLD AUTO: 0.05 K/UL (ref 0–0.11)
IMM GRANULOCYTES NFR BLD AUTO: 0.7 % (ref 0–0.9)
KETONES UR STRIP.AUTO-MCNC: NORMAL MG/DL
LEUKOCYTE ESTERASE UR QL STRIP.AUTO: NORMAL
LYMPHOCYTES # BLD AUTO: 0.67 K/UL (ref 1–4.8)
LYMPHOCYTES NFR BLD: 9.2 % (ref 22–41)
MCH RBC QN AUTO: 28.1 PG (ref 27–33)
MCHC RBC AUTO-ENTMCNC: 30.1 G/DL (ref 33.6–35)
MCV RBC AUTO: 93.4 FL (ref 81.4–97.8)
MONOCYTES # BLD AUTO: 0.42 K/UL (ref 0–0.85)
MONOCYTES NFR BLD AUTO: 5.8 % (ref 0–13.4)
NEUTROPHILS # BLD AUTO: 5.96 K/UL (ref 2–7.15)
NEUTROPHILS NFR BLD: 81.9 % (ref 44–72)
NITRITE UR QL STRIP.AUTO: NORMAL
NRBC # BLD AUTO: 0 K/UL
NRBC BLD AUTO-RTO: 0 /100 WBC
PH UR STRIP.AUTO: NORMAL [PH] (ref 5–8)
PLATELET # BLD AUTO: 183 K/UL (ref 164–446)
PMV BLD AUTO: 11.5 FL (ref 9–12.9)
PROT UR QL STRIP: NORMAL MG/DL
RBC # BLD AUTO: 3.77 M/UL (ref 4.2–5.4)
RBC UR QL AUTO: NORMAL
SP GR UR STRIP.AUTO: 1.02
UROBILINOGEN UR STRIP-MCNC: NORMAL MG/DL
WBC # BLD AUTO: 7.3 K/UL (ref 4.8–10.8)

## 2017-08-01 PROCEDURE — 36415 COLL VENOUS BLD VENIPUNCTURE: CPT

## 2017-08-01 PROCEDURE — 302449 STATCHG TRIAGE ONLY (STATISTIC)

## 2017-08-01 PROCEDURE — 80053 COMPREHEN METABOLIC PANEL: CPT

## 2017-08-01 PROCEDURE — 83690 ASSAY OF LIPASE: CPT

## 2017-08-01 PROCEDURE — 99214 OFFICE O/P EST MOD 30 MIN: CPT | Performed by: NURSE PRACTITIONER

## 2017-08-01 PROCEDURE — 85025 COMPLETE CBC W/AUTO DIFF WBC: CPT

## 2017-08-01 PROCEDURE — 81002 URINALYSIS NONAUTO W/O SCOPE: CPT | Performed by: NURSE PRACTITIONER

## 2017-08-01 NOTE — PROGRESS NOTES
Patient Salma Lees discharged from [facility] on 6/28/17 with a LACE+ score of 74. The patient was instructed to follow up with Dr. Edgardo Richter within 1 week and Zeferino VILLATORO within one week. The patient saw now other providers outside of discharge orders. The patient had no barriers to getting her medications. The patient did not discharge with any new orders for durable medical equipment or referrals for home health services. The patient had no issues with transportation and was able to go to and from all medical appointments.   - Follow-up with Zeferino Duarte scheduled 7/5/17 - Kept   - Follow-up with Dr. Richter scheduled 7/6/17 - Patient Rescheduled   - New-patient health improvement visit with Michelle Brumfield RN scheduled 9/29/17 - Scheduled

## 2017-08-02 LAB
ALBUMIN SERPL BCP-MCNC: 3.5 G/DL (ref 3.2–4.9)
ALBUMIN/GLOB SERPL: 0.7 G/DL
ALP SERPL-CCNC: 139 U/L (ref 30–99)
ALT SERPL-CCNC: 15 U/L (ref 2–50)
ANION GAP SERPL CALC-SCNC: 9 MMOL/L (ref 0–11.9)
AST SERPL-CCNC: 23 U/L (ref 12–45)
BILIRUB SERPL-MCNC: 1.6 MG/DL (ref 0.1–1.5)
BUN SERPL-MCNC: 17 MG/DL (ref 8–22)
CALCIUM SERPL-MCNC: 9.8 MG/DL (ref 8.5–10.5)
CHLORIDE SERPL-SCNC: 99 MMOL/L (ref 96–112)
CO2 SERPL-SCNC: 27 MMOL/L (ref 20–33)
CREAT SERPL-MCNC: 0.92 MG/DL (ref 0.5–1.4)
GFR SERPL CREATININE-BSD FRML MDRD: >60 ML/MIN/1.73 M 2
GLOBULIN SER CALC-MCNC: 4.9 G/DL (ref 1.9–3.5)
GLUCOSE SERPL-MCNC: 153 MG/DL (ref 65–99)
LIPASE SERPL-CCNC: 10 U/L (ref 11–82)
POTASSIUM SERPL-SCNC: 3.9 MMOL/L (ref 3.6–5.5)
PROT SERPL-MCNC: 8.4 G/DL (ref 6–8.2)
SODIUM SERPL-SCNC: 135 MMOL/L (ref 135–145)

## 2017-08-02 NOTE — ED NOTES
"Chief Complaint   Patient presents with   • Flank Pain   • Painful Urination   • Sent by MD     Patient ambulatory to triage with home oxygen. Patient sent by  for \"concerns of recent kidney stones with hydronephrosis and pyelonephritis resulting in sepsis. She was admitted to the hospital for observation, approximately 4 weeks ago, and placed on IV antibiotics.\" Patient states that she began to have Bilateral flank pain today. Says that the painful urination has been for a while. /62 mmHg  Pulse 110  Temp(Src) 36.6 °C (97.8 °F) (Temporal)  Resp 18  Wt 91 kg (200 lb 9.9 oz)  SpO2 91%    "

## 2017-08-02 NOTE — PROGRESS NOTES
Chief Complaint   Patient presents with   • Flank Pain       HISTORY OF PRESENT ILLNESS: Patient is a 67 y.o. female who presents today with concerns of recent kidney stones with hydronephrosis and pyelonephritis resulting in sepsis. She was admitted to the hospital for observation, approximately 4 weeks ago, and placed on IV antibiotics. At that time, stents were placed and was supposed to follow up today with urologist for possible stent removal. When she went to her office visit, the office was requesting a $750 co-pay which she was unable to afford, she has not seen a urologist since her hospital visit. States that she has had dysuria and low back pain since discharge from the hospital. She is concerned today because she has had increasing malaise, fatigue, left lower quadrant pain and fever over the past 2 days. She has not had any antibiotics prescribed to her since discharge from the hospital. She took AZO today for symptom relief. She is here today with her , both provide the history.       Patient Active Problem List    Diagnosis Date Noted   • Kidney stones 07/10/2017   • 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) 06/26/2017   • Normocytic anemia 06/26/2017   • Chronic anticoagulation 02/28/2017   • History of mitral valve repair 02/13/2017   • Chronic obstructive pulmonary disease (CMS-HCC) 02/13/2017   • Atrial fibrillation (CMS-HCC) 02/13/2017   • Right knee pain 02/13/2017   • Type 2 diabetes mellitus with complication (CMS-HCC) 02/13/2017   • Anticoagulated on warfarin 02/13/2017       Allergies:Amlodipine besylate-valsartan; Amoxicillin; Etodolac; Lisinopril; and Other drug    Current Outpatient Prescriptions Ordered in [facility]   Medication Sig Dispense Refill   • warfarin (COUMADIN) 5 MG Tab Take two tablets by mouth one time daily or as directed by coumadin clinic 180 Tab 1   • tramadol (ULTRAM) 50 MG Tab Take 1 Tab by mouth every 6 hours as needed for Moderate  Pain or Severe Pain. 30 Tab 0   • acetaminophen (TYLENOL) 325 MG Tab Take 2 Tabs by mouth every 6 hours as needed (Mild Pain; (Pain scale 1-3); Temp greater than 100.5 F). 30 Tab 0   • Nutritional Supplements (DIABETES SUPPORT) THERAPY PACK Misc Take 1 Each by mouth every day. OTC diabetic pack     • diltiazem (CARDIZEM) 30 MG Tab Take 30 mg by mouth 2 Times a Day.     • Umeclidinium-Vilanterol (ANORO ELLIPTA) 62.5-25 MCG/INH AEROSOL POWDER, BREATH ACTIVATED Inhale 1 Puff by mouth every day. 3 Each 0   • carvedilol (COREG) 12.5 MG Tab Take 12.5 mg by mouth 3 times a day.     • furosemide (LASIX) 20 MG Tab Take 40 mg by mouth every 48 hours. Alternating days with Spironolactone     • potassium chloride SA (K-DUR) 10 MEQ Tab CR Take 10 mEq by mouth 2 times a day.     • spironolactone (ALDACTONE) 25 MG Tab Take 25 mg by mouth every 48 hours. Alternating  every other day with furosemide     • digoxin (LANOXIN) 125 MCG Tab Take 125 mcg by mouth every day.       No current [facility]-ordered facility-administered medications on file.       Past Medical History   Diagnosis Date   • A-fib (CMS-HCC)    • COPD (chronic obstructive pulmonary disease) (CMS-HCC)        Social History   Substance Use Topics   • Smoking status: Never Smoker    • Smokeless tobacco: Never Used   • Alcohol Use: No       No family status information on file.   No family history on file.    ROS:  Review of Systems   Constitutional: Positive for fever, malaise, and fatigue.   HENT: Negative for ear pain, nosebleeds, congestion, sore throat and neck pain.    Eyes: Negative for vision changes.   Neuro: Negative for headache, sensory changes, weakness, seizure, LOC.   Cardiovascular: Negative for chest pain, palpitations, orthopnea and leg swelling.   Respiratory: Negative for cough, sputum production, shortness of breath and wheezing.   Gastrointestinal: Positive for abdominal pain. Negative for nausea, vomiting or diarrhea.   Genitourinary: Negative for  "dysuria, bilateral flank pain.  Musculoskeletal: Negative for falls, neck pain, back pain, joint pain, myalgias.   Skin: Negative for rash, diaphoresis.     Exam:  Blood pressure 98/54, pulse 101, temperature 36.3 °C (97.4 °F), resp. rate 18, height 1.626 m (5' 4\"), weight 89.812 kg (198 lb), SpO2 96 %.  General: well-nourished, well-developed female, appears ill  Head: normocephalic, atraumatic  Eyes: PERRLA, no conjunctival injection, acuity grossly intact, lids normal.  Ears: normal shape and symmetry, no tenderness, no discharge. External canals are without any significant edema or erythema. Tympanic membranes are without any inflammation, no effusion. Gross auditory acuity is intact.  Nose: symmetrical without tenderness, no discharge.  Mouth/Throat: reasonable hygiene, no erythema, exudates or tonsillar enlargement.  Neck: no masses, range of motion within normal limits, no tracheal deviation. No obvious thyroid enlargement.   Lymph: no cervical adenopathy. No supraclavicular adenopathy.   Neuro: alert and oriented. Cranial nerves 1-12 grossly intact. No sensory deficit.   Cardiovascular: Tachycardic rate and irregular rhythm (history of A. fib and admits she is is chronically in an irregular rhythm). No edema.  Pulmonary: Chest is symmetrical with respiration, no wheezes, crackles, or rhonchi. Oxygen administered via home nasal cannula.  Abdomen: soft, tender to bilateral lower quadrants, no guarding, no hepatosplenomegaly. Bilateral CVA tenderness present, L>R.  Musculoskeletal: no clubbing, appropriate muscle tone, gait is stable.  Skin: warm, dry, intact, no clubbing, no cyanosis, no rashes.   Psych: appropriate mood, affect, judgement.         Assessment/Plan:  1. Kidney stones  POCT Urinalysis   2. Malaise           Lab Results   Component Value Date/Time    POC COLOR Burnt Orange 08/01/2017 07:10 PM    POC APPEARANCE Cloudy 08/01/2017 07:10 PM    POC LEUKOCYTE ESTERASE large 08/01/2017 07:10 PM    POC " NITRITES ?AZO 08/01/2017 07:10 PM    POC UROBILIGEN ?AZO 08/01/2017 07:10 PM    POC PROTEIN ?AZO 08/01/2017 07:10 PM    POC URINE PH ?AZO 08/01/2017 07:10 PM    POC BLOOD moderate 08/01/2017 07:10 PM    POC SPECIFIC GRAVITY 1.020 08/01/2017 07:10 PM    POC KETONES ?AZO 08/01/2017 07:10 PM    POC BILIRUBEN ?AZO 08/01/2017 07:10 PM    POC GLUCOSE neg 08/01/2017 07:10 PM          At this time, I feel the patient requires a higher level of care including closer monitoring, stat lab work and/or imaging for further evaluation as I am concerned about possible sepsis secondary to kidney stones with recent hydronephrosis and stent placement. This has been discussed with the patient and they state agreement and understanding. The patient would like to go to [facility] ED, report has been called and will be anticipating patient's arrival. I have offered the patient an ambulance ride but she is declining at this time, will be taken directly to ED, without delay, by her .           Please note that this dictation was created using voice recognition software. I have made every reasonable attempt to correct obvious errors, but I expect that there are errors of grammar and possibly content that I did not discover before finalizing the note.      KIRBY Do.

## 2017-08-02 NOTE — ED NOTES
Full rainbow drawn and sent to lab per protocol. Pt informed and educated on triage process and wait times. Pt verbalizes understanding to inform either triage tech or RN to alert staff for any changes in condition. Pt placed in senior lounge.

## 2017-08-04 ENCOUNTER — ANTICOAGULATION VISIT (OUTPATIENT)
Dept: MEDICAL GROUP | Facility: PHYSICIAN GROUP | Age: 67
End: 2017-08-04
Payer: MEDICARE

## 2017-08-04 DIAGNOSIS — Z79.01 CHRONIC ANTICOAGULATION: ICD-10-CM

## 2017-08-04 DIAGNOSIS — I48.91 ATRIAL FIBRILLATION, UNSPECIFIED TYPE (HCC): ICD-10-CM

## 2017-08-04 DIAGNOSIS — Z98.890 HISTORY OF MITRAL VALVE REPAIR: ICD-10-CM

## 2017-08-04 LAB — INR PPP: 1.5 (ref 2–3.5)

## 2017-08-04 NOTE — MR AVS SNAPSHOT
Salma Lees   2017 1:15 PM   Anticoagulation Visit   MRN: [id number]    Department:  [facility]   Dept Phone:  [phone]    Description:  Female : 1950   Provider:  Kenneth Salas, YVONNED           Allergies as of 2017     Allergen Noted Reactions    Amlodipine Besylate-Valsartan 2017   Unspecified    Chest pain and dizziness     Amoxicillin 2017   Shortness of Breath, Diarrhea    SOB and Diarrhea    Etodolac 2017   Hives    Lisinopril 2017   Unspecified    Dizziness     Other Drug 2017       metal      You were diagnosed with     Chronic anticoagulation   [048978]       Atrial fibrillation, unspecified type (CMS-HCC)   [1174871]       History of mitral valve repair   [858390]         Vital Signs     Smoking Status                   Never Smoker            Basic Information     Date Of Birth Sex Race Ethnicity Preferred Language    1950 Female White Non- English      Your appointments     Aug 11, 2017  1:00 PM   Anti-Coag Routine with Rolla PHARMACIST   [facility]    [address] [id number] [phone]            Aug 14, 2017 11:20 AM   Established Patient with LILIYA Casey   [facility]    [address] [id number] [phone]           You will be receiving a confirmation call a few days before your appointment from our automated call confirmation system.            Sep 29, 2017 10:00 AM   New Patient with Michelle Brumfield R.N., Beata Michele RD   [facility])    [address]  [facility] [address]   [phone]           It is the patient's responsibility to check with your Insurance for benefit coverage for visit / visits.  24 hours notice is required for all appointment changes or cancellation.  Please arrive 20 min. before your appointment time  Please bring the following  with you: 1)Picture Id 2) Insurance card 3) Completed Forms if New Patient  If scheduled for DIABETES VISIT please also brin) Medications 2) Meter 3) Blood glucose logs 4) Any recent labs if you have them  If scheduled for NUTRITION VISIT please also brin) 2-3 days of detailed food intake logs 2) Blood glucose monitor and blood glucose logs (if you have them)            2017  1:00 PM   Pulmonary Function Test with PFT-RM3   [facility] (--)    [address]  [address]  [place] [address]   [phone]            2017  2:20 PM   New Patient Pulmonary with A Rotation   [facility] (--)    [address]  [address]  [place] [address]   [phone]              Problem List              ICD-10-CM Priority Class Noted - Resolved    History of mitral valve repair Z98.890   2017 - Present    Chronic obstructive pulmonary disease (CMS-HCC) J44.9   2017 - Present    Atrial fibrillation (CMS-HCC) I48.91   2017 - Present    Right knee pain M25.561   2017 - Present    Type 2 diabetes mellitus with complication (CMS-HCC) E11.8   2017 - Present    Anticoagulated on warfarin Z79.01   2017 - Present    Chronic anticoagulation Z79.01   2017 - Present    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   2017 - Present    Normocytic anemia D64.9   2017 - Present    Kidney stones N20.0   7/10/2017 - Present      Health Maintenance        Date Due Completion Dates    DIABETES MONOFILAMENT / LE EXAM 1950 ---    RETINAL SCREENING 1967 ---    URINE ACR / MICROALBUMIN 1967 ---    IMM DTaP/Tdap/Td Vaccine (1 - Tdap) 1968 ---    PAP SMEAR 1970 ---    MAMMOGRAM 1989 ---    COLONOSCOPY 1999 ---    IMM ZOSTER VACCINE 2009 ---    BONE DENSITY 2014 ---    IMM PNEUMOCOCCAL 65+ (ADULT) LOW/MEDIUM RISK SERIES (1 of 2 - PCV13) 2014 ---    IMM  INFLUENZA (1) 9/1/2017 10/25/2016    A1C SCREENING 12/25/2017 6/25/2017    FASTING LIPID PROFILE 5/23/2018 5/23/2017, 1/30/2017    SERUM CREATININE 8/1/2018 8/1/2017, 6/27/2017, 6/26/2017, 6/25/2017, 5/23/2017, 1/30/2017            Results     POCT Protime      Component    INR    1.5    Comment:     [id number] 5/2018                         Current Immunizations     Influenza Vaccine Quad Inj (Pf) 10/25/2016    Pneumococcal Vaccine (PCV7) Historical Data 10/25/2016      Below and/or attached are the medications your provider expects you to take. Review all of your home medications and newly ordered medications with your provider and/or pharmacist. Follow medication instructions as directed by your provider and/or pharmacist. Please keep your medication list with you and share with your provider. Update the information when medications are discontinued, doses are changed, or new medications (including over-the-counter products) are added; and carry medication information at all times in the event of emergency situations     Allergies:  AMLODIPINE BESYLATE-VALSARTAN - Unspecified     AMOXICILLIN - Shortness of Breath,Diarrhea     ETODOLAC - Hives     LISINOPRIL - Unspecified     OTHER DRUG - (reactions not documented)               Medications  Valid as of: August 04, 2017 -  1:24 PM    Generic Name Brand Name Tablet Size Instructions for use    Acetaminophen (Tab) TYLENOL 325 MG Take 2 Tabs by mouth every 6 hours as needed (Mild Pain; (Pain scale 1-3); Temp greater than 100.5 F).        Carvedilol (Tab) COREG 12.5 MG Take 12.5 mg by mouth 3 times a day.        Digoxin (Tab) LANOXIN 125 MCG Take 125 mcg by mouth every day.        DilTIAZem HCl (Tab) CARDIZEM 30 MG Take 30 mg by mouth 2 Times a Day.        Furosemide (Tab) LASIX 20 MG Take 40 mg by mouth every 48 hours. Alternating days with Spironolactone        Nutritional Supplements (Misc) DIABETES SUPPORT THERAPY PACK Take 1 Each by mouth every day. OTC diabetic  pack        Potassium Chloride Darline CR (Tab CR) K-DUR 10 MEQ Take 10 mEq by mouth 2 times a day.        Spironolactone (Tab) ALDACTONE 25 MG Take 25 mg by mouth every 48 hours. Alternating  every other day with furosemide        TraMADol HCl (Tab) ULTRAM 50 MG Take 1 Tab by mouth every 6 hours as needed for Moderate Pain or Severe Pain.        Umeclidinium-Vilanterol (AEROSOL POWDER, BREATH ACTIVATED) Umeclidinium-Vilanterol 62.5-25 MCG/INH Inhale 1 Puff by mouth every day.        Warfarin Sodium (Tab) COUMADIN 5 MG Take two tablets by mouth one time daily or as directed by coumadin clinic        .                 Medicines prescribed today were sent to:     [facility] [id number]  [place], [place] [facility]    [address] [facility] [address]    Phone: [phone] Fax: [phone]    Open 24 Hours?: No      Medication refill instructions:       If your prescription bottle indicates you have medication refills left, it is not necessary to call your provider’s office. Please contact your pharmacy and they will refill your medication.    If your prescription bottle indicates you do not have any refills left, you may request refills at any time through one of the following ways: The online [employer] system (except Urgent Care), by calling your provider’s office, or by asking your pharmacy to contact your provider’s office with a refill request. Medication refills are processed only during regular business hours and may not be available until the next business day. Your provider may request additional information or to have a follow-up visit with you prior to refilling your medication.   *Please Note: Medication refills are assigned a new Rx number when refilled electronically. Your pharmacy may indicate that no refills were authorized even though a new prescription for the same medication is available at the pharmacy. Please request the medicine by name with the pharmacy before  contacting your provider for a refill.        Warfarin Dosing Calendar   August 2017 Details    Sun Mon Tue Wed Thu Fri Sat       1               2               3               4   1.5   10 mg   See details      5      5 mg           6      5 mg         7      10 mg         8      5 mg         9      10 mg         10      5 mg         11      10 mg         12                 13               14               15               16               17               18               19                 20               21               22               23               24               25               26                 27               28               29               30               31                  Date Details   08/04 This INR check   INR: 1.5   [id number] 5/2018        Date of next INR:  8/11/2017         How to take your warfarin dose     To take:  5 mg Take 1 of the 5 mg tablets.    To take:  10 mg Take 2 of the 5 mg tablets.              Gist Access Code: Activation code not generated  Current Gist Status: Active

## 2017-08-04 NOTE — PROGRESS NOTES
Anticoagulation Summary as of 8/4/2017     INR goal 2.0-3.0   Selected INR 1.5! (8/4/2017)   Maintenance plan 10 mg (5 mg x 2) on Mon, Wed, Fri; 5 mg (5 mg x 1) all other days   Weekly total 50 mg   Plan last modified Kenneth Salas PHARMD (8/4/2017)   Next INR check 8/11/2017   Target end date Indefinite    Indications   Chronic anticoagulation [Z79.01]  Atrial fibrillation (CMS-HCC) [I48.91]  History of mitral valve repair [Z98.890]         Anticoagulation Episode Summary     INR check location     Preferred lab     Send INR reminders to     Comments       Anticoagulation Care Providers     Provider Role Specialty Phone number    MARTA Casey. Referring Family Medicine [phone]    [facility] Responsible  [phone]    Kenneth Salas, MELE Responsible          Anticoagulation Patient Findings   Negatives Missed Doses, Extra Doses, Medication Changes, Antibiotic Use, Diet Changes, Dental/Other Procedures, Hospitalization, Bleeding Gums, Nose Bleeds, Blood in Urine, Blood in Stool, Any Bruising, Other Complaints        Patient is now subtherapeutic today with INR of 1.5.  Pt denies any unusual s/s of bleeding, bruising, clotting or any changes to diet or medications.  Given her labile INR's, lack of appetite, and possible kidney issues, will not bolus dose today.  Instructed her to increase weekly warfarin regimen as detailed above.  Patient declines BP/vitals check today.  Follow up in 1 weeks.    Kenneth Salas, MELE

## 2017-08-08 ENCOUNTER — HOSPITAL ENCOUNTER (EMERGENCY)
Facility: MEDICAL CENTER | Age: 67
End: 2017-08-08
Attending: EMERGENCY MEDICINE
Payer: MEDICARE

## 2017-08-08 ENCOUNTER — APPOINTMENT (OUTPATIENT)
Dept: RADIOLOGY | Facility: MEDICAL CENTER | Age: 67
End: 2017-08-08
Attending: EMERGENCY MEDICINE
Payer: MEDICARE

## 2017-08-08 VITALS
HEIGHT: 64 IN | BODY MASS INDEX: 34.33 KG/M2 | OXYGEN SATURATION: 96 % | HEART RATE: 75 BPM | DIASTOLIC BLOOD PRESSURE: 75 MMHG | RESPIRATION RATE: 20 BRPM | SYSTOLIC BLOOD PRESSURE: 146 MMHG | WEIGHT: 201.06 LBS | TEMPERATURE: 99.2 F

## 2017-08-08 DIAGNOSIS — N20.1 URETERAL STONE: ICD-10-CM

## 2017-08-08 LAB
ALBUMIN SERPL BCP-MCNC: 3.9 G/DL (ref 3.2–4.9)
ALBUMIN/GLOB SERPL: 1 G/DL
ALP SERPL-CCNC: 118 U/L (ref 30–99)
ALT SERPL-CCNC: 18 U/L (ref 2–50)
ANION GAP SERPL CALC-SCNC: 10 MMOL/L (ref 0–11.9)
APPEARANCE UR: ABNORMAL
AST SERPL-CCNC: 30 U/L (ref 12–45)
BACTERIA #/AREA URNS HPF: ABNORMAL /HPF
BASOPHILS # BLD AUTO: 0.4 % (ref 0–1.8)
BASOPHILS # BLD: 0.03 K/UL (ref 0–0.12)
BILIRUB SERPL-MCNC: 1.7 MG/DL (ref 0.1–1.5)
BILIRUB UR QL STRIP.AUTO: NEGATIVE
BUN SERPL-MCNC: 15 MG/DL (ref 8–22)
CALCIUM SERPL-MCNC: 9 MG/DL (ref 8.4–10.2)
CHLORIDE SERPL-SCNC: 103 MMOL/L (ref 96–112)
CO2 SERPL-SCNC: 24 MMOL/L (ref 20–33)
COLOR UR: YELLOW
CREAT SERPL-MCNC: 0.92 MG/DL (ref 0.5–1.4)
CULTURE IF INDICATED INDCX: YES UA CULTURE
EKG IMPRESSION: NORMAL
EKG IMPRESSION: NORMAL
EOSINOPHIL # BLD AUTO: 0.24 K/UL (ref 0–0.51)
EOSINOPHIL NFR BLD: 3.5 % (ref 0–6.9)
EPI CELLS #/AREA URNS HPF: ABNORMAL /HPF
ERYTHROCYTE [DISTWIDTH] IN BLOOD BY AUTOMATED COUNT: 51.2 FL (ref 35.9–50)
GFR SERPL CREATININE-BSD FRML MDRD: >60 ML/MIN/1.73 M 2
GLOBULIN SER CALC-MCNC: 3.8 G/DL (ref 1.9–3.5)
GLUCOSE SERPL-MCNC: 184 MG/DL (ref 65–99)
GLUCOSE UR STRIP.AUTO-MCNC: NEGATIVE MG/DL
HCT VFR BLD AUTO: 33.2 % (ref 37–47)
HGB BLD-MCNC: 10.6 G/DL (ref 12–16)
IMM GRANULOCYTES # BLD AUTO: 0.05 K/UL (ref 0–0.11)
IMM GRANULOCYTES NFR BLD AUTO: 0.7 % (ref 0–0.9)
INR PPP: 1.12 (ref 0.87–1.13)
KETONES UR STRIP.AUTO-MCNC: NEGATIVE MG/DL
LACTATE BLD-SCNC: 1.38 MMOL/L (ref 0.5–2)
LEUKOCYTE ESTERASE UR QL STRIP.AUTO: ABNORMAL
LIPASE SERPL-CCNC: 21 U/L (ref 7–58)
LYMPHOCYTES # BLD AUTO: 0.89 K/UL (ref 1–4.8)
LYMPHOCYTES NFR BLD: 13.1 % (ref 22–41)
MCH RBC QN AUTO: 29.7 PG (ref 27–33)
MCHC RBC AUTO-ENTMCNC: 31.9 G/DL (ref 33.6–35)
MCV RBC AUTO: 93 FL (ref 81.4–97.8)
MICRO URNS: ABNORMAL
MONOCYTES # BLD AUTO: 0.53 K/UL (ref 0–0.85)
MONOCYTES NFR BLD AUTO: 7.8 % (ref 0–13.4)
MUCOUS THREADS #/AREA URNS HPF: ABNORMAL /HPF
NEUTROPHILS # BLD AUTO: 5.05 K/UL (ref 2–7.15)
NEUTROPHILS NFR BLD: 74.5 % (ref 44–72)
NITRITE UR QL STRIP.AUTO: POSITIVE
NRBC # BLD AUTO: 0 K/UL
NRBC BLD AUTO-RTO: 0 /100 WBC
PH UR STRIP.AUTO: 6.5 [PH]
PLATELET # BLD AUTO: 180 K/UL (ref 164–446)
PMV BLD AUTO: 11 FL (ref 9–12.9)
POTASSIUM SERPL-SCNC: 3.9 MMOL/L (ref 3.6–5.5)
PROT SERPL-MCNC: 7.7 G/DL (ref 6–8.2)
PROT UR QL STRIP: 100 MG/DL
PROTHROMBIN TIME: 14.2 SEC (ref 12–14.6)
RBC # BLD AUTO: 3.57 M/UL (ref 4.2–5.4)
RBC # URNS HPF: ABNORMAL /HPF
RBC UR QL AUTO: ABNORMAL
SODIUM SERPL-SCNC: 137 MMOL/L (ref 135–145)
SP GR UR STRIP.AUTO: 1.01
SPECIMEN DRAWN FROM PATIENT: NORMAL
WBC # BLD AUTO: 6.8 K/UL (ref 4.8–10.8)
WBC #/AREA URNS HPF: ABNORMAL /HPF

## 2017-08-08 PROCEDURE — 80053 COMPREHEN METABOLIC PANEL: CPT

## 2017-08-08 PROCEDURE — 83605 ASSAY OF LACTIC ACID: CPT

## 2017-08-08 PROCEDURE — 93005 ELECTROCARDIOGRAM TRACING: CPT | Performed by: EMERGENCY MEDICINE

## 2017-08-08 PROCEDURE — 85610 PROTHROMBIN TIME: CPT

## 2017-08-08 PROCEDURE — 96365 THER/PROPH/DIAG IV INF INIT: CPT

## 2017-08-08 PROCEDURE — 99284 EMERGENCY DEPT VISIT MOD MDM: CPT

## 2017-08-08 PROCEDURE — 700111 HCHG RX REV CODE 636 W/ 250 OVERRIDE (IP): Performed by: EMERGENCY MEDICINE

## 2017-08-08 PROCEDURE — 87040 BLOOD CULTURE FOR BACTERIA: CPT

## 2017-08-08 PROCEDURE — 87077 CULTURE AEROBIC IDENTIFY: CPT

## 2017-08-08 PROCEDURE — 74176 CT ABD & PELVIS W/O CONTRAST: CPT

## 2017-08-08 PROCEDURE — 85025 COMPLETE CBC W/AUTO DIFF WBC: CPT

## 2017-08-08 PROCEDURE — 87086 URINE CULTURE/COLONY COUNT: CPT

## 2017-08-08 PROCEDURE — 36415 COLL VENOUS BLD VENIPUNCTURE: CPT

## 2017-08-08 PROCEDURE — 700105 HCHG RX REV CODE 258: Performed by: EMERGENCY MEDICINE

## 2017-08-08 PROCEDURE — 81001 URINALYSIS AUTO W/SCOPE: CPT

## 2017-08-08 PROCEDURE — 83690 ASSAY OF LIPASE: CPT

## 2017-08-08 PROCEDURE — 87186 SC STD MICRODIL/AGAR DIL: CPT

## 2017-08-08 RX ORDER — CEFTRIAXONE 1 G/1
1 INJECTION, POWDER, FOR SOLUTION INTRAMUSCULAR; INTRAVENOUS ONCE
Status: COMPLETED | OUTPATIENT
Start: 2017-08-08 | End: 2017-08-08

## 2017-08-08 RX ORDER — CEPHALEXIN 500 MG/1
500 CAPSULE ORAL 2 TIMES DAILY
Qty: 14 CAP | Refills: 0 | Status: SHIPPED | OUTPATIENT
Start: 2017-08-08 | End: 2017-08-15

## 2017-08-08 RX ORDER — SODIUM CHLORIDE 9 MG/ML
1000 INJECTION, SOLUTION INTRAVENOUS ONCE
Status: COMPLETED | OUTPATIENT
Start: 2017-08-08 | End: 2017-08-08

## 2017-08-08 RX ORDER — HYDROCODONE BITARTRATE AND ACETAMINOPHEN 5; 325 MG/1; MG/1
1-2 TABLET ORAL EVERY 4 HOURS PRN
Qty: 20 TAB | Refills: 0 | Status: SHIPPED | OUTPATIENT
Start: 2017-08-08 | End: 2017-09-07

## 2017-08-08 RX ADMIN — CEFTRIAXONE 1 G: 1 INJECTION, POWDER, FOR SOLUTION INTRAMUSCULAR; INTRAVENOUS at 22:37

## 2017-08-08 RX ADMIN — SODIUM CHLORIDE 1000 ML: 9 INJECTION, SOLUTION INTRAVENOUS at 21:25

## 2017-08-08 ASSESSMENT — PAIN SCALES - GENERAL: PAINLEVEL_OUTOF10: 6

## 2017-08-08 NOTE — ED AVS SNAPSHOT
KitOrder Access Code: Activation code not generated  Current KitOrder Status: Active    Orbeushart  A secure, online tool to manage your health information     [employer]’s KitOrder® is a secure, online tool that connects you to your personalized health information from the privacy of your home -- day or night - making it very easy for you to manage your healthcare. Once the activation process is completed, you can even access your medical information using the KitOrder andi, which is available for free in the Apple Andi store or Google Play store.     KitOrder provides the following levels of access (as shown below):   My Chart Features   [facility] Primary Care Doctor [facility]  Specialists [facility]  Urgent  Care [facility]  Primary Care  Doctor   Email your healthcare team securely and privately 24/7 X X X X   Manage appointments: schedule your next appointment; view details of past/upcoming appointments X      Request prescription refills. X      View recent personal medical records, including lab and immunizations X X X X   View health record, including health history, allergies, medications X X X X   Read reports about your outpatient visits, procedures, consult and ER notes X X X X   See your discharge summary, which is a recap of your hospital and/or ER visit that includes your diagnosis, lab results, and care plan. X X       How to register for KitOrder:  1. Go to  [email or web address].  2. Click on the Sign Up Now box, which takes you to the New Member Sign Up page. You will need to provide the following information:  a. Enter your KitOrder Access Code exactly as it appears at the top of this page. (You will not need to use this code after you’ve completed the sign-up process. If you do not sign up before the expiration date, you must request a new code.)   b. Enter your date of birth.   c. Enter your home email address.   d. Click Submit, and follow the next screen’s instructions.  3. Create a KitOrder ID. This will  be your Kardium login ID and cannot be changed, so think of one that is secure and easy to remember.  4. Create a Kardium password. You can change your password at any time.  5. Enter your Password Reset Question and Answer. This can be used at a later time if you forget your password.   6. Enter your e-mail address. This allows you to receive e-mail notifications when new information is available in Kardium.  7. Click Sign Up. You can now view your health information.    For assistance activating your Kardium account, call [phone]

## 2017-08-08 NOTE — LETTER
8/17/2017               Salma Lees  [address]   [address]  [place] [address]        Dear Salma (MR#[id number])    As we have been unable to contact you by phone, this letter is sent in regards to your recent visit to the [facility] Emergency Department on 8/8/2017.  During the visit, tests were performed to assist the physician in a medical diagnosis.  A review of those tests requires that we notify you of the following:    Your urine culture and sensitivity that was POSITIVE for a bacteria called Methicillin Resistant Staphylococcus aureus (MRSA), and further treatment may be necessary. The currently prescribed antibiotic (cephalexin) may not be effective in treating your infection. IF YOU ARE NOT FEELING BETTER PLEASE CONTACT ME AS SOON AS POSSIBLE AT THE NUMBER BELOW.       Because of the above findings, we advise that you see your private physician or you may return to the Emergency Department for additional treatment.      Please feel free to contact me at the number below if you have any questions or concerns. Thank you for your cooperation in the matter.    Sincerely,  [employer] Follow-Up Staff  Raúl Levy, PharmD    [facility], Emergency Department  [address] [place] [id number]  [phone]

## 2017-08-08 NOTE — ED AVS SNAPSHOT
8/8/2017    Salma Lees  [address]   [address]  [place] [address]    Dear Salma:    [facility] wants to ensure your discharge home is safe and you or your loved ones have had all of your questions answered regarding your care after you leave the hospital.    Below is a list of resources and contact information should you have any questions regarding your hospital stay, follow-up instructions, or active medical symptoms.    Questions or Concerns Regarding… Contact   Medical Questions Related to Your Discharge  (7 days a week, 8am-5pm) Contact a Nurse Care Coordinator   [phone]   Medical Questions Not Related to Your Discharge  (24 hours a day / 7 days a week)  Contact the Nurse Health Line   [phone]    Medications or Discharge Instructions Refer to your discharge packet   or contact your [facility] Primary Care Provider   [phone]   Follow-up Appointment(s) Schedule your appointment via [employer]   or contact Scheduling [phone]   Billing Review your statement via [employer]  or contact Billing [phone]   Medical Records Review your records via [employer]   or contact Medical Records [phone]     You may receive a telephone call within two days of discharge. This call is to make certain you understand your discharge instructions and have the opportunity to have any questions answered. You can also easily access your medical information, test results and upcoming appointments via the [employer] free online health management tool. You can learn more and sign up at [employer] For assistance setting up your [employer] account, please call [phone].    Once again, we want to ensure your discharge home is safe and that you have a clear understanding of any next steps in your care. If you have any questions or concerns, please do not hesitate to contact us, we are here for you. Thank you for choosing [facility] for your healthcare needs.    Sincerely,    Your [facility] Healthcare Team

## 2017-08-08 NOTE — ED AVS SNAPSHOT
Home Care Instructions                                                                                                                Salma Lees   MRN: [id number]    Department:  [facility], Emergency Dept   Date of Visit:  8/8/2017            [facility], Emergency Dept    [address]    [place] [address]    Phone:  [phone]      You were seen by     Macario Daily M.D.      Your Diagnosis Was     Ureteral stone     N20.1       These are the medications you received during your hospitalization from 08/08/2017 2034 to 08/08/2017 2309     Date/Time Order Dose Route Action    08/08/2017 2125 NS infusion 1,000 mL 1,000 mL Intravenous New Bag    08/08/2017 2237 cefTRIAXone (ROCEPHIN) injection 1 g 1 g Intravenous Given      Follow-up Information     1. Follow up with Edgardo Richter M.D. In 1 day.    Specialty:  Urology    Contact information    [address]  [address]  [place] [address]  [phone]        Medication Information     Review all of your home medications and newly ordered medications with your primary doctor and/or pharmacist as soon as possible. Follow medication instructions as directed by your doctor and/or pharmacist.     Please keep your complete medication list with you and share with your physician. Update the information when medications are discontinued, doses are changed, or new medications (including over-the-counter products) are added; and carry medication information at all times in the event of emergency situations.               Medication List      START taking these medications        Instructions    Morning Afternoon Evening Bedtime    cephALEXin 500 MG Caps   Commonly known as:  KEFLEX        Take 1 Cap by mouth 2 times a day for 7 days.   Dose:  500 mg                        hydrocodone-acetaminophen 5-325 MG Tabs per tablet   Commonly known as:  NORCO        Take 1-2 Tabs by mouth every four hours as needed.   Dose:   1-2 Tab                          ASK your doctor about these medications        Instructions    Morning Afternoon Evening Bedtime    acetaminophen 325 MG Tabs   Commonly known as:  TYLENOL        Take 2 Tabs by mouth every 6 hours as needed (Mild Pain; (Pain scale 1-3); Temp greater than 100.5 F).   Dose:  650 mg                        carvedilol 12.5 MG Tabs   Commonly known as:  COREG        Take 12.5 mg by mouth 3 times a day.   Dose:  12.5 mg                        DIABETES SUPPORT THERAPY PACK Misc        Take 1 Each by mouth every day. OTC diabetic pack   Dose:  1 Each                        digoxin 125 MCG Tabs   Commonly known as:  LANOXIN        Take 125 mcg by mouth every day.   Dose:  125 mcg                        diltiazem 30 MG Tabs   Commonly known as:  CARDIZEM        Take 30 mg by mouth 2 Times a Day.   Dose:  30 mg                        furosemide 20 MG Tabs   Commonly known as:  LASIX        Take 40 mg by mouth every 48 hours. Alternating days with Spironolactone   Dose:  40 mg                        potassium chloride SA 10 MEQ Tbcr   Commonly known as:  K-DUR        Take 10 mEq by mouth 2 times a day.   Dose:  10 mEq                        spironolactone 25 MG Tabs   Commonly known as:  ALDACTONE        Take 25 mg by mouth every 48 hours. Alternating  every other day with furosemide   Dose:  25 mg                        tramadol 50 MG Tabs   Commonly known as:  ULTRAM        Take 1 Tab by mouth every 6 hours as needed for Moderate Pain or Severe Pain.   Dose:  50 mg                        Umeclidinium-Vilanterol 62.5-25 MCG/INH Aepb   Commonly known as:  ANORO ELLIPTA        Doctor's comments:  This is an authorization for a requested refill   Inhale 1 Puff by mouth every day.   Dose:  1 Puff                        warfarin 5 MG Tabs   Commonly known as:  COUMADIN        Doctor's comments:  This prescription was sent by a pharmacist working under a collaborative practice agreement   Take two  tablets by mouth one time daily or as directed by coumadin clinic                             Where to Get Your Medications      These medications were sent to [id number] - [place] - [address] [facility] [address]  [address] [address]     Phone:  [phone]    - cephALEXin 500 MG Caps      You can get these medications from any pharmacy     Bring a paper prescription for each of these medications    - hydrocodone-acetaminophen 5-325 MG Tabs per tablet            Procedures and tests performed during your visit     BLOOD CULTURE    CBC WITH DIFFERENTIAL    COMP METABOLIC PANEL    CT-RENAL COLIC EVALUATION(A/P W/O)    EKG (ER)    ESTIMATED GFR    LACTIC ACID    LIPASE    PROTHROMBIN TIME    TROPONIN    URINALYSIS CULTURE, IF INDICATED    URINE CULTURE(NEW)    URINE MICROSCOPIC (W/UA)        Discharge Instructions       Kidney Stones  Kidney stones (urolithiasis) are solid masses that form inside your kidneys. The intense pain is caused by the stone moving through the kidney, ureter, bladder, and urethra (urinary tract). When the stone moves, the ureter starts to spasm around the stone. The stone is usually passed in your pee (urine).   HOME CARE  · Drink enough fluids to keep your pee clear or pale yellow. This helps to get the stone out.  · Strain all pee through the provided strainer. Do not pee without peeing through the strainer, not even once. If you pee the stone out, catch it in the strainer. The stone may be as small as a grain of salt. Take this to your doctor. This will help your doctor figure out what you can do to try to prevent more kidney stones.  · Only take medicine as told by your doctor.  · Follow up with your doctor as told.  · Get follow-up X-rays as told by your doctor.  GET HELP IF:  You have pain that gets worse even if you have been taking pain medicine.  GET HELP RIGHT AWAY IF:   · Your pain does not get better with medicine.  · You have a  fever or shaking chills.  · Your pain increases and gets worse over 18 hours.  · You have new belly (abdominal) pain.  · You feel faint or pass out.  · You are unable to pee.  MAKE SURE YOU:   · Understand these instructions.  · Will watch your condition.  · Will get help right away if you are not doing well or get worse.     This information is not intended to replace advice given to you by your health care provider. Make sure you discuss any questions you have with your health care provider.     Document Released: 06/05/2009 Document Revised: 08/20/2014 Document Reviewed: 05/21/2014  [employer] Interactive Patient Education ©2016 [employer]            Patient Information     Patient Information    Following emergency treatment: all patient requiring follow-up care must return either to a private physician or a clinic if your condition worsens before you are able to obtain further medical attention, please return to the emergency room.     Billing Information    At [facility], we work to make the billing process streamlined for our patients.  Our Representatives are here to answer any questions you may have regarding your hospital bill.  If you have insurance coverage and have supplied your insurance information to us, we will submit a claim to your insurer on your behalf.  Should you have any questions regarding your bill, we can be reached online or by phone as follows:  Online: You are able pay your bills online or live chat with our representatives about any billing questions you may have. We are here to help Monday - Friday from 8:00am to 7:30pm and 9:00am - 12:00pm on Saturdays.  Please visit [email or web address]  for more information.   Phone:  [phone] or [phone]    Please note that your emergency physician, surgeon, pathologist, radiologist, anesthesiologist, and other specialists are not employed by [facility] and will therefore bill separately for their  services.  Please contact them directly for any questions concerning their bills at the numbers below:     Emergency Physician Services:  [phone]  [facility]:  [phone]  [facility]:  [phone]  [facility]:  [phone]    1. Your final bill may vary from the amount quoted upon discharge if all procedures are not complete at that time, or if your doctor has additional procedures of which we are not aware. You will receive an additional bill if you return to the Emergency Department at [facility] for suture removal regardless of the facility of which the sutures were placed.     2. Please arrange for settlement of this account at the emergency registration.    3. All self-pay accounts are due in full at the time of treatment.  If you are unable to meet this obligation then payment is expected within 4-5 days.     4. If you have had radiology studies (CT, X-ray, Ultrasound, MRI), you have received a preliminary result during your emergency department visit. Please contact the radiology department [phone] to receive a copy of your final result. Please discuss the Final result with your primary physician or with the follow up physician provided.     Crisis Hotline:  [place] Crisis Hotline:  1-631-QHOKQXJ or 1-845.535.5053  [place] Crisis Hotline:    1-856.237.9904 or 503-808-7902         ED Discharge Follow Up Questions    1. In order to provide you with very good care, we would like to follow up with a phone call in the next few days.  May we have your permission to contact you?     YES /  NO    2. What is the best phone number to call you? (       )_____-__________    3. What is the best time to call you?      Morning  /  Afternoon  /  Evening                   Patient Signature:  ____________________________________________________________    Date:  ____________________________________________________________      Your appointments     Aug 11,    1:00 PM   Anti-Coag Routine with [place] PHARMACIST   [facility]    [address] [facility] [place] [id number] [phone]            Aug 14, 2017 11:20 AM   Established Patient with LILIYA Casey   [facility]    [address] [facility] [place] [id number] [phone]           You will be receiving a confirmation call a few days before your appointment from our automated call confirmation system.            Sep 29, 2017 10:00 AM   New Patient with Michelle Brumfield R.N., Beata Michele, IVA   [facility])    [address] [facility]  [address]  [place] [id number] [phone]           It is the patient's responsibility to check with your Insurance for benefit coverage for visit / visits.  24 hours notice is required for all appointment changes or cancellation.  Please arrive 20 min. before your appointment time  Please bring the following with you: 1)Picture Id 2) Insurance card 3) Completed Forms if New Patient  If scheduled for DIABETES VISIT please also brin) Medications 2) Meter 3) Blood glucose logs 4) Any recent labs if you have them  If scheduled for NUTRITION VISIT please also brin) 2-3 days of detailed food intake logs 2) Blood glucose monitor and blood glucose logs (if you have them)            2017  1:00 PM   Pulmonary Function Test with PFT-RM3   [facility] (--)    [address]  [address]  [place] [id number] [phone]            2017  2:20 PM   New Patient Pulmonary with A Rotation   [facility] (--)    [address]  [address]  [place] [id number] [phone]

## 2017-08-09 ENCOUNTER — PATIENT OUTREACH (OUTPATIENT)
Dept: HEALTH INFORMATION MANAGEMENT | Facility: OTHER | Age: 67
End: 2017-08-09

## 2017-08-09 NOTE — ED PROVIDER NOTES
ED Provider Note    ED Provider Note    Primary care provider: LILIYA Casey  Means of arrival: POV  History obtained from: Patient    CHIEF COMPLAINT  Chief Complaint   Patient presents with   • Post-Op Complications   • Flank Pain     Seen at 8:45 PM.   HPI  Salma Lees is a 67 y.o. female who presents to the Emergency Department with gradual worsening bilateral flank pain with left-sided predominance for the past few weeks. She is noted intermittent pain and spasming, particularly after urination since the last ureteral stent was placed June 25. There appears to be some miscommunication, the patient was scheduled to have the stents removed August 1 and a two-week follow-up from discharge. Neither event occurred. The patient presents here with worsening pain, chills and dysuria.     REVIEW OF SYSTEMS  See HPI,  endorses nausea, decreased by mouth intake, denies fevers, abdominal pain, chest pain, shortness of breath. Does endorse some mild intermittent dizziness. Remainder of ROS negative.     PAST MEDICAL HISTORY   has a past medical history of A-fib (CMS-HCC) and COPD (chronic obstructive pulmonary disease) (CMS-HCC).    SURGICAL HISTORY   has past surgical history that includes mitral valve repair (2009); pacemaker insertion (2015); knee replacement, total (Left, 2015); and cystoscopy stent placement (Left, 6/25/2017).    SOCIAL HISTORY  Social History   Substance Use Topics   • Smoking status: Never Smoker    • Smokeless tobacco: Never Used   • Alcohol Use: No      History   Drug Use No       FAMILY HISTORY  History reviewed. No pertinent family history.    CURRENT MEDICATIONS  Reviewed.  See Encounter Summary.     ALLERGIES  Allergies   Allergen Reactions   • Amlodipine Besylate-Valsartan Unspecified     Chest pain and dizziness    • Amoxicillin Shortness of Breath and Diarrhea     SOB and Diarrhea   • Etodolac Hives   • Lisinopril Unspecified     Dizziness    • Other Drug      metal  "      PHYSICAL EXAM  VITAL SIGNS: /75 mmHg  Pulse 73  Temp(Src) 37.3 °C (99.2 °F)  Resp 20  Ht 1.626 m (5' 4\")  Wt 91.2 kg (201 lb 1 oz)  BMI 34.49 kg/m2  SpO2 96%  Constitutional: Awake, alert in no apparent distress.  HENT: Normocephalic, Bilateral external ears normal. Nose normal.   Eyes: Conjunctiva normal, non-icteric, EOMI.    Thorax & Lungs: Easy unlabored respirations, Clear to ascultation bilaterally.  Cardiovascular: Tachycardic, No murmurs, rubs or gallops.  Abdomen:  Soft, nontender, nondistended, normal active bowel sounds. CVA tenderness on the left  :    Skin: Visualized skin is  Dry, No erythema, No rash.   Musculoskeletal:   No cyanosis, clubbing or edema.  Neurologic: Alert, Grossly non-focal.   Psychiatric: Normal affect, Normal mood  Lymphatic:  No cervical LAD    EKG   12 lead Interpreted by me  Rhythm: Sinus tachycardia  Rate: 115  Axis: normal  Ectopy: none  Conduction: normal  ST Segments: ST depressions V2 through V6, unchanged from prior  T Waves: T wave inversions diffusely, unchanged  Clinical Impression: Sinus tach, compared to prior EKG no change in rhythm, T-wave inversions or ST depression.    RADIOLOGY  CT-RENAL COLIC EVALUATION(A/P W/O)   Final Result      1.  Interval placement of LEFT ureteral stent   2.  4 to 5 mm LEFT ureteropelvic junction stone   3.  Mild LEFT hydroureteronephrosis and adjacent fat stranding could be related to infection or stent   4.  Findings suggestive of cirrhosis   5.  Cholelithiasis   6.  Splenomegaly   7.  Prior hysterectomy   8.  Colonic diverticulosis   9.  Atherosclerosis           The radiologist's interpretation of all radiological studies have been reviewed by me.    COURSE & MEDICAL DECISION MAKING  Pertinent Labs & Imaging studies reviewed. (See chart for details)    Differential diagnoses include but are not limited to: Nephrolithiasis, sepsis, renal colic, infected stone, infected stent    8:45 PM - Patient seen and examined at " bedside.    10:22 PM - case discussed with Dr. Burgess of urology. Medical record and imaging reviewed.      Decision Making:  This is a 67 y.o. year old female who presents with persistent back pain since ureteral stent was placed over one month ago. She did arrive tachycardic but afebrile. She does not have any leukocytosis or leftward shift. Lactic acid is normal. Urinalysis is nitrate positive with a large amount of pyuria.    I discussed the case with urology. This is not unusual given the ureteral stent. He states that the stent is often colonized by bacteria. He states that the stent will need to stay in place until lithotripsy can be completed to remove the stone.    The patient is well-appearing, on reassessment her heart rate normalized, she does not have any pain. She does not have any signs of sepsis at this time. There is some concern over urinary tract infections I have given her a dose of Rocephin, culture the urine and will discharge the patient on Keflex. She will need to follow-up with Nrobert Bell tomorrow in the urology clinic.    I discussed the test results and plan of care. If she has intractable pain, temperatures over 101 or any other concerns she should return for repeat evaluation. Otherwise I would expect the patient to undergo some type of stone removal followed by stent removal.        I have looked the patient up in the prescription monitoring database prior to prescribing the scheduled medication. I have also counseled the patient not to drive or operate heavy machinery while taking any medication that may cause sedation.  The patient's blood pressure is elevated today. >120/80. I have referred them to primary care for follow up.     Discharge Medications:  New Prescriptions    CEPHALEXIN (KEFLEX) 500 MG CAP    Take 1 Cap by mouth 2 times a day for 7 days.    HYDROCODONE-ACETAMINOPHEN (NORCO) 5-325 MG TAB PER TABLET    Take 1-2 Tabs by mouth every four hours as needed.       The patient  will be discharged home in good condition.    FINAL IMPRESSION  1. Ureteral stone

## 2017-08-09 NOTE — DISCHARGE INSTRUCTIONS
Kidney Stones  Kidney stones (urolithiasis) are solid masses that form inside your kidneys. The intense pain is caused by the stone moving through the kidney, ureter, bladder, and urethra (urinary tract). When the stone moves, the ureter starts to spasm around the stone. The stone is usually passed in your pee (urine).   HOME CARE  · Drink enough fluids to keep your pee clear or pale yellow. This helps to get the stone out.  · Strain all pee through the provided strainer. Do not pee without peeing through the strainer, not even once. If you pee the stone out, catch it in the strainer. The stone may be as small as a grain of salt. Take this to your doctor. This will help your doctor figure out what you can do to try to prevent more kidney stones.  · Only take medicine as told by your doctor.  · Follow up with your doctor as told.  · Get follow-up X-rays as told by your doctor.  GET HELP IF:  You have pain that gets worse even if you have been taking pain medicine.  GET HELP RIGHT AWAY IF:   · Your pain does not get better with medicine.  · You have a fever or shaking chills.  · Your pain increases and gets worse over 18 hours.  · You have new belly (abdominal) pain.  · You feel faint or pass out.  · You are unable to pee.  MAKE SURE YOU:   · Understand these instructions.  · Will watch your condition.  · Will get help right away if you are not doing well or get worse.     This information is not intended to replace advice given to you by your health care provider. Make sure you discuss any questions you have with your health care provider.     Document Released: 06/05/2009 Document Revised: 08/20/2014 Document Reviewed: 05/21/2014  [employer] Interactive Patient Education ©2016 [employer]

## 2017-08-09 NOTE — ED NOTES
Pt bib family with c/o of left sided flank pain that radiates to the front and dysuria for the past week. Reports feelings of nausea but denies V/D. Pt states she ha a stent placed a couple months ago for kindey stones.

## 2017-08-09 NOTE — ED NOTES
antibiotic completed. Discharge information provided. Pt verbalized understanding of discharge instructions to follow up with PCP and to return to ER if condition worsens. Pt expressed the awareness of not driving or operating heavy machinery. Pt ambulated out of ER in a steady gait.

## 2017-08-09 NOTE — PROGRESS NOTES
· Placed discharge outreach phone call to patient s/p ER discharge 8/8/17.  Left voicemail providing my contact information and instructions to call with any questions or concerns.

## 2017-08-09 NOTE — ED NOTES
First set of blood cultures drawn when IV started. Blood and urine sample to lab. Patient to CT. Report to Marianne MONTGOMERY.

## 2017-08-11 ENCOUNTER — TELEPHONE (OUTPATIENT)
Dept: MEDICAL GROUP | Facility: PHYSICIAN GROUP | Age: 67
End: 2017-08-11

## 2017-08-11 ENCOUNTER — ANTICOAGULATION VISIT (OUTPATIENT)
Dept: MEDICAL GROUP | Facility: PHYSICIAN GROUP | Age: 67
End: 2017-08-11
Payer: MEDICARE

## 2017-08-11 VITALS — HEART RATE: 70 BPM | SYSTOLIC BLOOD PRESSURE: 129 MMHG | DIASTOLIC BLOOD PRESSURE: 77 MMHG

## 2017-08-11 DIAGNOSIS — Z79.01 CHRONIC ANTICOAGULATION: ICD-10-CM

## 2017-08-11 DIAGNOSIS — I48.91 ATRIAL FIBRILLATION, UNSPECIFIED TYPE (HCC): ICD-10-CM

## 2017-08-11 DIAGNOSIS — Z98.890 HISTORY OF MITRAL VALVE REPAIR: ICD-10-CM

## 2017-08-11 LAB
BACTERIA UR CULT: ABNORMAL
BACTERIA UR CULT: ABNORMAL
INR PPP: 1.3 (ref 2–3.5)
SIGNIFICANT IND 70042: ABNORMAL
SITE SITE: ABNORMAL
SOURCE SOURCE: ABNORMAL

## 2017-08-11 NOTE — TELEPHONE ENCOUNTER
ESTABLISHED PATIENT PRE-VISIT PLANNING     Note: Patient will not be contacted if there is no indication to call.     1.  Reviewed notes from the last few office visits within the medical group: Yes    2.  If any orders were placed at last visit or intended to be done for this visit (i.e. 6 mos follow-up), do we have Results/Consult Notes?        •  Labs - Labs ordered, completed and results are in chart.       •  Imaging - Imaging ordered, completed and results are in chart.       •  Referrals - Referral ordered, patient was seen and consult notes are in chart. Care Teams updated  NO.    3. Is this appointment scheduled as a Hospital Follow-Up? No    4.  Immunizations were updated in [employer] using [employer]?: Yes       •  Web Iz Recommendations: FLU    5.  Patient is due for the following Health Maintenance Topics:   Health Maintenance Due   Topic Date Due   • Annual Wellness Visit  1950   • DIABETES MONOFILAMENT / LE EXAM  1950   • RETINAL SCREENING  02/16/1967   • URINE ACR / MICROALBUMIN  02/16/1967   • PFT SCREENING-FEV1 AND FEV/FVC RATIO / SPIROMETRY SHOULD BE PERFORMED ANNUALLY  02/16/1967   • PAP SMEAR  02/16/1970   • MAMMOGRAM  02/16/1989   • COLONOSCOPY  02/16/1999   • IMM ZOSTER VACCINE  02/16/2009   • BONE DENSITY  02/16/2014   • IMM PNEUMOCOCCAL 65+ (ADULT) LOW/MEDIUM RISK SERIES (1 of 2 - PCV13) 02/16/2014           6.  Patient was informed to arrive 15 min prior to their scheduled appointment and bring in their medication bottles.

## 2017-08-11 NOTE — MR AVS SNAPSHOT
Salma Lees   2017 1:00 PM   Anticoagulation Visit   MRN: [id number]    Department:  [facility]   Dept Phone:  [phone]    Description:  Female : 1950   Provider:  Kenneth Salas, YVONNED           Allergies as of 2017     Allergen Noted Reactions    Amlodipine Besylate-Valsartan 2017   Unspecified    Chest pain and dizziness     Amoxicillin 2017   Shortness of Breath, Diarrhea    SOB and Diarrhea    Etodolac 2017   Hives    Lisinopril 2017   Unspecified    Dizziness     Other Drug 2017       metal      You were diagnosed with     Chronic anticoagulation   [209014]       Atrial fibrillation, unspecified type (CMS-HCC)   [7671337]       History of mitral valve repair   [293731]         Vital Signs     Smoking Status                   Never Smoker            Basic Information     Date Of Birth Sex Race Ethnicity Preferred Language    1950 Female White Non- English      Your appointments     Aug 14, 2017 11:20 AM   Established Patient with LILIYA Casey   [address] [id number] [phone]           You will be receiving a confirmation call a few days before your appointment from our automated call confirmation system.            Aug 18, 2017 10:45 AM   Anti-Coag Routine with [place] PHARMAC[facility]   [facility]    [address] [id number] [phone]            Sep 29, 2017 10:00 AM   New Patient with Michelle Brumfield R.N., Beata Michele RD   [facility])    [address]  [facility] [address]   [phone]           It is the patient's responsibility to check with your Insurance for benefit coverage for visit / visits.  24 hours notice is required for all appointment changes or cancellation.  Please arrive 20 min. before your appointment time  Please bring the following  with you: 1)Picture Id 2) Insurance card 3) Completed Forms if New Patient  If scheduled for DIABETES VISIT please also brin) Medications 2) Meter 3) Blood glucose logs 4) Any recent labs if you have them  If scheduled for NUTRITION VISIT please also brin) 2-3 days of detailed food intake logs 2) Blood glucose monitor and blood glucose logs (if you have them)            2017  1:00 PM   Pulmonary Function Test with PFT-RM3   [facility] (--)    [address]  [address]  [place] [address]   [phone]            2017  2:20 PM   New Patient Pulmonary with A Rotation   [facility] (--)    [address]  [address]  [place] [address]   [phone]              Problem List              ICD-10-CM Priority Class Noted - Resolved    History of mitral valve repair Z98.890   2017 - Present    Chronic obstructive pulmonary disease (CMS-HCC) J44.9   2017 - Present    Atrial fibrillation (CMS-HCC) I48.91   2017 - Present    Right knee pain M25.561   2017 - Present    Type 2 diabetes mellitus with complication (CMS-HCC) E11.8   2017 - Present    Anticoagulated on warfarin Z79.01   2017 - Present    Chronic anticoagulation Z79.01   2017 - Present    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   2017 - Present    Normocytic anemia D64.9   2017 - Present    Kidney stones N20.0   7/10/2017 - Present      Health Maintenance        Date Due Completion Dates    DIABETES MONOFILAMENT / LE EXAM 1950 ---    RETINAL SCREENING 1967 ---    URINE ACR / MICROALBUMIN 1967 ---    PAP SMEAR 1970 ---    MAMMOGRAM 1989 ---    COLONOSCOPY 1999 ---    IMM ZOSTER VACCINE 2009 ---    BONE DENSITY 2014 ---    IMM PNEUMOCOCCAL 65+ (ADULT) LOW/MEDIUM RISK SERIES (1 of 2 - PCV13) 2014 ---    IMM INFLUENZA (1) 2017 10/25/2016    A1C SCREENING  12/25/2017 6/25/2017    FASTING LIPID PROFILE 5/23/2018 5/23/2017, 1/30/2017    SERUM CREATININE 8/8/2018 8/8/2017, 8/1/2017, 6/27/2017, 6/26/2017, 6/25/2017, 5/23/2017, 1/30/2017    IMM DTaP/Tdap/Td Vaccine (2 - Td) 11/15/2022 11/15/2012            Results     POCT Protime      Component    INR    1.3    Comment:     [id number] 5/2018 ic valid                        Current Immunizations     Influenza Vaccine Quad Inj (Pf) 10/25/2016    Pneumococcal Vaccine (PCV7) Historical Data 10/25/2016    Tdap Vaccine 11/15/2012      Below and/or attached are the medications your provider expects you to take. Review all of your home medications and newly ordered medications with your provider and/or pharmacist. Follow medication instructions as directed by your provider and/or pharmacist. Please keep your medication list with you and share with your provider. Update the information when medications are discontinued, doses are changed, or new medications (including over-the-counter products) are added; and carry medication information at all times in the event of emergency situations     Allergies:  AMLODIPINE BESYLATE-VALSARTAN - Unspecified     AMOXICILLIN - Shortness of Breath,Diarrhea     ETODOLAC - Hives     LISINOPRIL - Unspecified     OTHER DRUG - (reactions not documented)               Medications  Valid as of: August 11, 2017 -  1:17 PM    Generic Name Brand Name Tablet Size Instructions for use    Acetaminophen (Tab) TYLENOL 325 MG Take 2 Tabs by mouth every 6 hours as needed (Mild Pain; (Pain scale 1-3); Temp greater than 100.5 F).        Carvedilol (Tab) COREG 12.5 MG Take 12.5 mg by mouth 3 times a day.        Cephalexin (Cap) KEFLEX 500 MG Take 1 Cap by mouth 2 times a day for 7 days.        Digoxin (Tab) LANOXIN 125 MCG Take 125 mcg by mouth every day.        DilTIAZem HCl (Tab) CARDIZEM 30 MG Take 30 mg by mouth 2 Times a Day.        Furosemide (Tab) LASIX 20 MG Take 40 mg by mouth every 48 hours. Alternating  days with Spironolactone        Hydrocodone-Acetaminophen (Tab) NORCO 5-325 MG Take 1-2 Tabs by mouth every four hours as needed.        Nutritional Supplements (Misc) DIABETES SUPPORT THERAPY PACK Take 1 Each by mouth every day. OTC diabetic pack        Potassium Chloride Darline CR (Tab CR) K-DUR 10 MEQ Take 10 mEq by mouth 2 times a day.        Spironolactone (Tab) ALDACTONE 25 MG Take 25 mg by mouth every 48 hours. Alternating  every other day with furosemide        TraMADol HCl (Tab) ULTRAM 50 MG Take 1 Tab by mouth every 6 hours as needed for Moderate Pain or Severe Pain.        Umeclidinium-Vilanterol (AEROSOL POWDER, BREATH ACTIVATED) Umeclidinium-Vilanterol 62.5-25 MCG/INH Inhale 1 Puff by mouth every day.        Warfarin Sodium (Tab) COUMADIN 5 MG Take two tablets by mouth one time daily or as directed by coumadin clinic        .                 Medicines prescribed today were sent to:     [facility] [address] [place] [address] [place] [address] [facility] [place] [address] [address] [facility] [address]    Phone: [phone] Fax: [phone]    Open 24 Hours?: No      Medication refill instructions:       If your prescription bottle indicates you have medication refills left, it is not necessary to call your provider’s office. Please contact your pharmacy and they will refill your medication.    If your prescription bottle indicates you do not have any refills left, you may request refills at any time through one of the following ways: The online Remote system (except Urgent Care), by calling your provider’s office, or by asking your pharmacy to contact your provider’s office with a refill request. Medication refills are processed only during regular business hours and may not be available until the next business day. Your provider may request additional information or to have a follow-up visit with you prior to refilling your medication.   *Please Note: Medication refills are assigned a new Rx  number when refilled electronically. Your pharmacy may indicate that no refills were authorized even though a new prescription for the same medication is available at the pharmacy. Please request the medicine by name with the pharmacy before contacting your provider for a refill.        Warfarin Dosing Calendar   August 2017 Details    Sun Mon Tue Wed Thu Fri Sat       1               2               3               4               5                 6               7               8               9               10               11   1.3   10 mg   See details      12      5 mg           13      5 mg         14      10 mg         15      5 mg         16      5 mg         17      5 mg         18      10 mg         19                 20               21               22               23               24               25               26                 27               28               29               30               31                  Date Details   08/11 This INR check   INR: 1.3   [id number] 5/2018 ic valid       Date of next INR:  8/18/2017         How to take your warfarin dose     To take:  5 mg Take 1 of the 5 mg tablets.    To take:  10 mg Take 2 of the 5 mg tablets.              [facility] Access Code: Activation code not generated  Current [facility] Status: Active

## 2017-08-11 NOTE — PROGRESS NOTES
Anticoagulation Summary as of 8/11/2017     INR goal 2.0-3.0   Selected INR 1.3! (8/11/2017)   Maintenance plan 10 mg (5 mg x 2) on Mon, Fri; 5 mg (5 mg x 1) all other days   Weekly total 45 mg   Plan last modified Kenneth Salas PHARMD (8/11/2017)   Next INR check 8/18/2017   Target end date Indefinite    Indications   Chronic anticoagulation [Z79.01]  Atrial fibrillation (CMS-HCC) [I48.91]  History of mitral valve repair [Z98.890]         Anticoagulation Episode Summary     INR check location     Preferred lab     Send INR reminders to     Comments       Anticoagulation Care Providers     Provider Role Specialty Phone number    LILIYA Casey Referring Family Medicine [phone]    [facility] Responsible  [phone]    Kenneth Salas PHARMD Responsible          Anticoagulation Patient Findings   Positives Antibiotic Use, Other Complaints    Negatives Missed Doses, Extra Doses, Medication Changes, Diet Changes, Dental/Other Procedures, Hospitalization, Bleeding Gums, Nose Bleeds, Blood in Urine, Blood in Stool, Any Bruising    Comments Seven day course of cephalexin  Quite sleepy since beginning antibiotic        Patient is subtherapeutic today with INR of 1.3.  Pt denies any unusual s/s of bleeding, bruising, clotting or any changes to diet or medications.  She was taking a much lower dose than instructed at previous visit.  Instructed her to resume previously instructed weekly warfarin regimen.  Started seven day course of cephalexin this week.  Has follow up with PCP on Monday.  Follow up in 1 weeks.    Kenneth Salas PHARMD

## 2017-08-13 LAB
BACTERIA BLD CULT: NORMAL
SIGNIFICANT IND 70042: NORMAL
SITE SITE: NORMAL
SOURCE SOURCE: NORMAL

## 2017-08-14 ENCOUNTER — OFFICE VISIT (OUTPATIENT)
Dept: MEDICAL GROUP | Facility: PHYSICIAN GROUP | Age: 67
End: 2017-08-14
Payer: MEDICARE

## 2017-08-14 VITALS
BODY MASS INDEX: 34.79 KG/M2 | HEIGHT: 64 IN | OXYGEN SATURATION: 92 % | RESPIRATION RATE: 16 BRPM | TEMPERATURE: 99.1 F | HEART RATE: 82 BPM | SYSTOLIC BLOOD PRESSURE: 112 MMHG | WEIGHT: 203.8 LBS | DIASTOLIC BLOOD PRESSURE: 52 MMHG

## 2017-08-14 DIAGNOSIS — E11.8 TYPE 2 DIABETES MELLITUS WITH COMPLICATION, UNSPECIFIED LONG TERM INSULIN USE STATUS: ICD-10-CM

## 2017-08-14 LAB
HBA1C MFR BLD: 6.9 % (ref ?–5.8)
INT CON NEG: NEGATIVE
INT CON POS: POSITIVE

## 2017-08-14 PROCEDURE — 99214 OFFICE O/P EST MOD 30 MIN: CPT | Performed by: NURSE PRACTITIONER

## 2017-08-14 PROCEDURE — 83036 HEMOGLOBIN GLYCOSYLATED A1C: CPT | Performed by: NURSE PRACTITIONER

## 2017-08-14 RX ORDER — PRAVASTATIN SODIUM 20 MG
20 TABLET ORAL DAILY
COMMUNITY
End: 2020-01-30

## 2017-08-14 NOTE — PROGRESS NOTES
Chief Complaint   Patient presents with   • Medication Management     Diabetic meds        HISTORY OF PRESENT ILLNESS: Patient is a 67 y.o. female established patient who presents today to discuss the following issues:    Type 2 diabetes mellitus with complication (CMS-HCC)  Blood sugars had been very high for the last few months.  She originally made this appointment to discuss possible medications.  However, she was recently seen in the ER for dysuria, and she was treated for infection.  Since starting the new antibiotics, her sugars have dropped by about 200 points.  Discussed plan to check an A1c today, and to plan follow-up in 3 months.      Patient Active Problem List    Diagnosis Date Noted   • Kidney stones 07/10/2017   • 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) 06/26/2017   • Normocytic anemia 06/26/2017   • Chronic anticoagulation 02/28/2017   • History of mitral valve repair 02/13/2017   • Chronic obstructive pulmonary disease (CMS-HCC) 02/13/2017   • Atrial fibrillation (CMS-HCC) 02/13/2017   • Right knee pain 02/13/2017   • Type 2 diabetes mellitus with complication (CMS-HCC) 02/13/2017   • Anticoagulated on warfarin 02/13/2017       Allergies:Amlodipine besylate-valsartan; Amoxicillin; Etodolac; Lisinopril; and Other drug    Current Outpatient Prescriptions   Medication Sig Dispense Refill   • pravastatin (PRAVACHOL) 20 MG Tab Take 20 mg by mouth every day.     • cephALEXin (KEFLEX) 500 MG Cap Take 1 Cap by mouth 2 times a day for 7 days. 14 Cap 0   • hydrocodone-acetaminophen (NORCO) 5-325 MG Tab per tablet Take 1-2 Tabs by mouth every four hours as needed. 20 Tab 0   • warfarin (COUMADIN) 5 MG Tab Take two tablets by mouth one time daily or as directed by coumadin clinic 180 Tab 1   • tramadol (ULTRAM) 50 MG Tab Take 1 Tab by mouth every 6 hours as needed for Moderate Pain or Severe Pain. 30 Tab 0   • acetaminophen (TYLENOL) 325 MG Tab Take 2 Tabs by mouth every 6  hours as needed (Mild Pain; (Pain scale 1-3); Temp greater than 100.5 F). 30 Tab 0   • Nutritional Supplements (DIABETES SUPPORT) THERAPY PACK Misc Take 1 Each by mouth every day. OTC diabetic pack     • diltiazem (CARDIZEM) 30 MG Tab Take 30 mg by mouth 2 Times a Day.     • Umeclidinium-Vilanterol (ANORO ELLIPTA) 62.5-25 MCG/INH AEROSOL POWDER, BREATH ACTIVATED Inhale 1 Puff by mouth every day. 3 Each 0   • carvedilol (COREG) 12.5 MG Tab Take 12.5 mg by mouth 3 times a day.     • furosemide (LASIX) 20 MG Tab Take 40 mg by mouth every 48 hours. Alternating days with Spironolactone     • potassium chloride SA (K-DUR) 10 MEQ Tab CR Take 10 mEq by mouth 2 times a day.     • spironolactone (ALDACTONE) 25 MG Tab Take 25 mg by mouth every 48 hours. Alternating  every other day with furosemide     • digoxin (LANOXIN) 125 MCG Tab Take 125 mcg by mouth every day.       No current facility-administered medications for this visit.       Social History   Substance Use Topics   • Smoking status: Never Smoker    • Smokeless tobacco: Never Used   • Alcohol Use: No       No family status information on file.   History reviewed. No pertinent family history.    Review of Systems:   Constitutional: Negative for fever, chills, weight loss and malaise/fatigue.   HENT: Negative for ear pain, nosebleeds, congestion, sore throat and neck pain.    Eyes: Negative for blurred vision.   Respiratory: Negative for cough, sputum production, shortness of breath and wheezing.    Cardiovascular: Negative for chest pain, palpitations, orthopnea and leg swelling.   Gastrointestinal: Negative for heartburn, nausea, vomiting and abdominal pain.   Genitourinary: Negative for dysuria, urgency and frequency.   Musculoskeletal: Negative for myalgias, joint pain, and back pain.  Skin: Negative for rash and itching.   Neurological: Negative for dizziness, tingling, tremors, sensory change, focal weakness and headaches.   Endo/Heme/Allergies: Does not  "bruise/bleed easily.   Psychiatric/Behavioral: Negative for depression, suicidal ideas and memory loss.  The patient is not nervous/anxious and does not have insomnia.    All other systems reviewed and are negative except as in HPI.    Exam:  Blood pressure 112/52, pulse 82, temperature 37.3 °C (99.1 °F), resp. rate 16, height 1.626 m (5' 4.02\"), weight 92.443 kg (203 lb 12.8 oz), SpO2 92 %.  General:  Well nourished, well developed female in NAD  Head: Grossly normal.  Neck: Supple without JVD or bruit. Thyroid is not enlarged.  Pulmonary: Clear to ausculation. Normal effort. No rales, ronchi, or wheezing.  Cardiovascular: Regular rate and rhythm without murmur.   Extremities: No clubbing, cyanosis, or edema.  Skin: Intact with no obvious rashes or lesions.  Neuro: Grossly intact.  Psych: Alert and oriented x 3.  Mood and affect appropriate.    Medical decision-making and discussion: Salma is here today to discuss blood sugars and UTI.  A POCT A1c was done and was 6.9%.  She will keep all appointments with urology, and she will plan to follow-up here in 3 months to discuss her blood sugar.          Assessment/Plan:  1. Type 2 diabetes mellitus with complication, unspecified long term insulin use status (CMS-[facility])  POCT  A1C       Return if symptoms worsen or fail to improve.    Please note that this dictation was created using voice recognition software. I have made every reasonable attempt to correct obvious errors, but I expect that there are errors of grammar and possibly content that I did not discover before finalizing the note.    "

## 2017-08-14 NOTE — MR AVS SNAPSHOT
"        Salma Lees   2017 11:20 AM   Office Visit   MRN: [id number]    Department:  [facility]   Dept Phone:  [phone]    Description:  Female : 1950   Provider:  LILIYA Casey           Reason for Visit     Medication Management Diabetic meds       Allergies as of 2017     Allergen Noted Reactions    Amlodipine Besylate-Valsartan 2017   Unspecified    Chest pain and dizziness     Amoxicillin 2017   Shortness of Breath, Diarrhea    SOB and Diarrhea    Etodolac 2017   Hives    Lisinopril 2017   Unspecified    Dizziness     Other Drug 2017       metal      You were diagnosed with     Type 2 diabetes mellitus with complication, unspecified long term insulin use status (CMS-[facility])   [3194690]         Vital Signs     Blood Pressure Pulse Temperature Respirations Height Weight    112/52 mmHg 82 37.3 °C (99.1 °F) 16 1.626 m (5' 4.02\") 92.443 kg (203 lb 12.8 oz)    Body Mass Index Oxygen Saturation Smoking Status             34.96 kg/m2 92% Never Smoker          Basic Information     Date Of Birth Sex Race Ethnicity Preferred Language    1950 Female White Non- English      Your appointments     Aug 18, 2017 10:45 AM   Anti-Coag Routine with [place] PHARMACIST   [facility] ([place])    [address] [place] [address]   [phone]            Sep 29, 2017 10:00 AM   New Patient with Michelle Brumfield R.N., Beata Michele RD   [facility]    [address]  [address]  [facility] [address]   [phone]           It is the patient's responsibility to check with your Insurance for benefit coverage for visit / visits.  24 hours notice is required for all appointment changes or cancellation.  Please arrive 20 min. before your appointment time  Please bring the following with you: 1)Picture Id 2) Insurance card 3) Completed Forms if New Patient  If scheduled for DIABETES VISIT please also " brin) Medications 2) Meter 3) Blood glucose logs 4) Any recent labs if you have them  If scheduled for NUTRITION VISIT please also brin) 2-3 days of detailed food intake logs 2) Blood glucose monitor and blood glucose logs (if you have them)            2017  1:00 PM   Pulmonary Function Test with PFT-RM3   [facility] (--)    [address]  [address]  [place] [address]   [phone]            2017  2:20 PM   New Patient Pulmonary with A Rotation   [facility] (--)    [address]  [address]  [place] [address]   [phone]              Problem List              ICD-10-CM Priority Class Noted - Resolved    History of mitral valve repair Z98.890   2017 - Present    Chronic obstructive pulmonary disease (CMS-HCC) J44.9   2017 - Present    Atrial fibrillation (CMS-HCC) I48.91   2017 - Present    Right knee pain M25.561   2017 - Present    Type 2 diabetes mellitus with complication (CMS-HCC) E11.8   2017 - Present    Anticoagulated on warfarin Z79.01   2017 - Present    Chronic anticoagulation Z79.01   2017 - Present    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   2017 - Present    Normocytic anemia D64.9   2017 - Present    Kidney stones N20.0   7/10/2017 - Present      Health Maintenance        Date Due Completion Dates    DIABETES MONOFILAMENT / LE EXAM 1950 ---    RETINAL SCREENING 1967 ---    URINE ACR / MICROALBUMIN 1967 ---    PAP SMEAR 1970 ---    MAMMOGRAM 1989 ---    COLONOSCOPY 1999 ---    IMM ZOSTER VACCINE 2009 ---    BONE DENSITY 2014 ---    IMM PNEUMOCOCCAL 65+ (ADULT) LOW/MEDIUM RISK SERIES (1 of 2 - PCV13) 2014 ---    IMM INFLUENZA (1) 2017 10/25/2016    A1C SCREENING 2017    FASTING LIPID PROFILE 2018, 2017    SERUM CREATININE 2018, 2017,  6/27/2017, 6/26/2017, 6/25/2017, 5/23/2017, 1/30/2017    IMM DTaP/Tdap/Td Vaccine (2 - Td) 11/15/2022 11/15/2012            Results     POCT  A1C      Component    Glycohemoglobin    6.9    Internal Control Negative    Negative    Internal Control Positive    Positive                        Current Immunizations     Influenza Vaccine Quad Inj (Pf) 10/25/2016    Pneumococcal Vaccine (PCV7) Historical Data 10/25/2016    Tdap Vaccine 11/15/2012      Below and/or attached are the medications your provider expects you to take. Review all of your home medications and newly ordered medications with your provider and/or pharmacist. Follow medication instructions as directed by your provider and/or pharmacist. Please keep your medication list with you and share with your provider. Update the information when medications are discontinued, doses are changed, or new medications (including over-the-counter products) are added; and carry medication information at all times in the event of emergency situations     Allergies:  AMLODIPINE BESYLATE-VALSARTAN - Unspecified     AMOXICILLIN - Shortness of Breath,Diarrhea     ETODOLAC - Hives     LISINOPRIL - Unspecified     OTHER DRUG - (reactions not documented)               Medications  Valid as of: August 14, 2017 - 11:46 AM    Generic Name Brand Name Tablet Size Instructions for use    Acetaminophen (Tab) TYLENOL 325 MG Take 2 Tabs by mouth every 6 hours as needed (Mild Pain; (Pain scale 1-3); Temp greater than 100.5 F).        Carvedilol (Tab) COREG 12.5 MG Take 12.5 mg by mouth 3 times a day.        Cephalexin (Cap) KEFLEX 500 MG Take 1 Cap by mouth 2 times a day for 7 days.        Digoxin (Tab) LANOXIN 125 MCG Take 125 mcg by mouth every day.        DilTIAZem HCl (Tab) CARDIZEM 30 MG Take 30 mg by mouth 2 Times a Day.        Furosemide (Tab) LASIX 20 MG Take 40 mg by mouth every 48 hours. Alternating days with Spironolactone        Hydrocodone-Acetaminophen (Tab) NORCO 5-325 MG  Take 1-2 Tabs by mouth every four hours as needed.        Nutritional Supplements (Misc) DIABETES SUPPORT THERAPY PACK Take 1 Each by mouth every day. OTC diabetic pack        Potassium Chloride Darline CR (Tab CR) K-DUR 10 MEQ Take 10 mEq by mouth 2 times a day.        Pravastatin Sodium (Tab) PRAVACHOL 20 MG Take 20 mg by mouth every day.        Spironolactone (Tab) ALDACTONE 25 MG Take 25 mg by mouth every 48 hours. Alternating  every other day with furosemide        TraMADol HCl (Tab) ULTRAM 50 MG Take 1 Tab by mouth every 6 hours as needed for Moderate Pain or Severe Pain.        Umeclidinium-Vilanterol (AEROSOL POWDER, BREATH ACTIVATED) Umeclidinium-Vilanterol 62.5-25 MCG/INH Inhale 1 Puff by mouth every day.        Warfarin Sodium (Tab) COUMADIN 5 MG Take two tablets by mouth one time daily or as directed by coumadin clinic        .                 Medicines prescribed today were sent to:     [facility] [address] [place] [address] [place] [address] AT [facility] [place] [address] [facility] [address]    Phone: [phone] Fax: [phone]    Open 24 Hours?: No      Medication refill instructions:       If your prescription bottle indicates you have medication refills left, it is not necessary to call your provider’s office. Please contact your pharmacy and they will refill your medication.    If your prescription bottle indicates you do not have any refills left, you may request refills at any time through one of the following ways: The online [employer] system (except Urgent Care), by calling your provider’s office, or by asking your pharmacy to contact your provider’s office with a refill request. Medication refills are processed only during regular business hours and may not be available until the next business day. Your provider may request additional information or to have a follow-up visit with you prior to refilling your medication.   *Please Note: Medication refills are assigned a new Rx  number when refilled electronically. Your pharmacy may indicate that no refills were authorized even though a new prescription for the same medication is available at the pharmacy. Please request the medicine by name with the pharmacy before contacting your provider for a refill.           MyChart Access Code: Activation code not generated  Current [employer] Status: Active

## 2017-08-14 NOTE — ASSESSMENT & PLAN NOTE
Blood sugars had been very high for the last few months.  She originally made this appointment to discuss possible medications.  However, she was recently seen in the ER for dysuria, and she was treated for infection.  Since starting the new antibiotics, her sugars have dropped by about 200 points.  Discussed plan to check an A1c today, and to plan follow-up in 3 months.

## 2017-08-17 NOTE — PROGRESS NOTES
"ED Positive Culture Follow-up/Notification Note:   Date: 8/17/17    Patient seen in the ED on 8/8/2017 for bilateral flank pain with left sided predominance for past few weeks. Had ureteral stent placed on June 25 and has had intermittent pain. Presents with worsening pain and dysuria.   1. Ureteral stone      Discharge Medication List as of 8/8/2017 11:09 PM      START taking these medications    Details   cephALEXin (KEFLEX) 500 MG Cap Take 1 Cap by mouth 2 times a day for 7 days., Disp-14 Cap, R-0, Normal      hydrocodone-acetaminophen (NORCO) 5-325 MG Tab per tablet Take 1-2 Tabs by mouth every four hours as needed., Disp-20 Tab, R-0, Print Rx Paper           Allergies: Amlodipine besylate-valsartan; Amoxicillin; Etodolac; Lisinopril; and Other drug    Final cultures:   Results     Procedure Component Value Units Date/Time    BLOOD CULTURE [[id number]] Collected:  08/08/17 2107    Order Status:  Completed Specimen Information:  Blood from Peripheral Updated:  08/13/17 2300     Significant Indicator NEG      Source BLD      Site Peripheral      Blood Culture --      Result:        Blood culture testing and Gram stain, if indicated, are  performed at [facility], [address] [place] [address].  Positive blood cultures are  sent to [facility], [address] [place] [address], for organism identification and  susceptibility testing.  No growth after 5 days of incubation.      Narrative:      Per Hospital Policy: Only change Specimen Src: to \"Line\" if  specified by physician order.    URINE CULTURE(NEW) [[id number]]  (Abnormal)  (Susceptibility) Collected:  08/08/17 2107    Order Status:  Completed Specimen Information:  Urine Updated:  08/11/17 0885     Significant Indicator POS (POS)      Source UR      Site --      Urine Culture -- (A)      Urine Culture -- (A)      Result:        Methicillin Resistant Staphylococcus aureus  >100,000 cfu/mL      Narrative:      CALL "  [place]  [facility] tel. [phone],  CALLED  [facility] tel. [phone] 08/11/2017, 08:57, RB PERF. RESULTS CALLED  TO:[phone]    Culture & Susceptibility     METHICILLIN RESISTANT STAPHYLOCOCCUS AUREUS     Antibiotic Sensitivity Microscan Unit Status    Daptomycin Sensitive <=0.5 mcg/mL Final    Nitrofurantoin Sensitive <=32 mcg/mL Final    Penicillin Resistant 8 mcg/mL Final    Tetracycline Sensitive <=4 mcg/mL Final    Trimeth/Sulfa Sensitive <=0.5/9.5 mcg/mL Final    Vancomycin Sensitive 2 mcg/mL Final                             Plan:   Isolated organism MRSA is resistant to prescribed therapy. Diagnosed with kidney stone and blood cultures were negative. Pt had follow-up appointment for diabetes management on 8/14 and dysuria and flank pain had resolved per appointment note. Called and left message. Sent letter notifying patient of positive culture and to call if symptoms persist or worsen. If able to establish contact with patient and symptoms have worsened, depending on severity will advise patient to be seen in ER or will call in prescription for Bactrim DS, take one tablet PO BID x 3 days, #6, no refills - MD: Georgina per protocol    Raúl Levy  Cosigned: Giana Rhodes, PharmD

## 2017-08-18 ENCOUNTER — ANTICOAGULATION VISIT (OUTPATIENT)
Dept: MEDICAL GROUP | Facility: PHYSICIAN GROUP | Age: 67
End: 2017-08-18
Payer: MEDICARE

## 2017-08-18 VITALS — HEART RATE: 70 BPM | DIASTOLIC BLOOD PRESSURE: 62 MMHG | SYSTOLIC BLOOD PRESSURE: 107 MMHG

## 2017-08-18 DIAGNOSIS — Z98.890 HISTORY OF MITRAL VALVE REPAIR: ICD-10-CM

## 2017-08-18 DIAGNOSIS — I48.91 ATRIAL FIBRILLATION, UNSPECIFIED TYPE (HCC): ICD-10-CM

## 2017-08-18 DIAGNOSIS — Z79.01 CHRONIC ANTICOAGULATION: ICD-10-CM

## 2017-08-18 LAB — INR PPP: 1.5 (ref 2–3.5)

## 2017-08-18 PROCEDURE — 99211 OFF/OP EST MAY X REQ PHY/QHP: CPT | Performed by: FAMILY MEDICINE

## 2017-08-18 PROCEDURE — 85610 PROTHROMBIN TIME: CPT | Performed by: FAMILY MEDICINE

## 2017-08-18 NOTE — TELEPHONE ENCOUNTER
Zeferino- You have never prescribed. I know pt is seen by pulmonary, is this something you would like them to be filling?

## 2017-08-18 NOTE — PROGRESS NOTES
Anticoagulation Summary as of 8/18/2017     INR goal 2.0-3.0   Selected INR 1.5! (8/18/2017)   Maintenance plan 5 mg (5 mg x 1) on Thu; 7.5 mg (5 mg x 1.5) all other days   Weekly total 50 mg   Plan last modified Kenneth Salas PHARMD (8/18/2017)   Next INR check 8/25/2017   Target end date Indefinite    Indications   Chronic anticoagulation [Z79.01]  Atrial fibrillation (CMS-HCC) [I48.91]  History of mitral valve repair [Z98.890]         Anticoagulation Episode Summary     INR check location     Preferred lab     Send INR reminders to     Comments       Anticoagulation Care Providers     Provider Role Specialty Phone number    SUSANNE Casey.P.YASMANY. Referring Family Medicine [phone]    [facility] Responsible  [phone]    Kenneth Salas, MELE Responsible          Anticoagulation Patient Findings   Negatives Missed Doses, Extra Doses, Medication Changes, Antibiotic Use, Diet Changes, Dental/Other Procedures, Hospitalization, Bleeding Gums, Nose Bleeds, Blood in Urine, Blood in Stool, Any Bruising, Other Complaints        Patient is subtherapeutic today with INR of 1.5.  Pt denies any unusual s/s of bleeding, bruising, clotting or any changes to diet or medications.  Instructed patient to increase weekly warfarin regimen as detailed above.  Follow up in 1 weeks.    Kenneth Salas, MELE

## 2017-08-18 NOTE — MR AVS SNAPSHOT
Salma Lees   2017 10:45 AM   Anticoagulation Visit   MRN: [id number]    Department:  [facility]   Dept Phone:  [phone]    Description:  Female : 1950   Provider:  Kenneth Salas, YVONNED           Allergies as of 2017     Allergen Noted Reactions    Amlodipine Besylate-Valsartan 2017   Unspecified    Chest pain and dizziness     Amoxicillin 2017   Shortness of Breath, Diarrhea    SOB and Diarrhea    Etodolac 2017   Hives    Lisinopril 2017   Unspecified    Dizziness     Other Drug 2017       metal      You were diagnosed with     Chronic anticoagulation   [340332]       Atrial fibrillation, unspecified type (CMS-HCC)   [2219356]       History of mitral valve repair   [514683]         Vital Signs     Blood Pressure Pulse Smoking Status             107/62 mmHg 70 Never Smoker          Basic Information     Date Of Birth Sex Race Ethnicity Preferred Language    1950 Female White Non- English      Your appointments     Aug 25, 2017 11:15 AM   Anti-Coag Routine with [place] PHARMACIST   [facility]    [address] [facility] [address]   [phone]            Sep 29, 2017 10:00 AM   New Patient with Michelle Brumfield R.N., Beata Michele, IVA   [facility]    [address] [facility]  [address]  [facility] [address]   [phone]           It is the patient's responsibility to check with your Insurance for benefit coverage for visit / visits.  24 hours notice is required for all appointment changes or cancellation.  Please arrive 20 min. before your appointment time  Please bring the following with you: 1)Picture Id 2) Insurance card 3) Completed Forms if New Patient  If scheduled for DIABETES VISIT please also brin) Medications 2) Meter 3) Blood glucose logs 4) Any recent labs if you have them  If scheduled for NUTRITION VISIT please also brin) 2-3 days of detailed food  intake logs 2) Blood glucose monitor and blood glucose logs (if you have them)            Nov 16, 2017 10:40 AM   Established Patient with LILIYA Casey   [facility]    [address] [facility] [place] [address] [phone]           You will be receiving a confirmation call a few days before your appointment from our automated call confirmation system.            Nov 16, 2017  1:00 PM   Pulmonary Function Test with PFT-RM3   [facility] (--)    [address]  [address]  [place] [address] [phone]            Nov 16, 2017  2:20 PM   New Patient Pulmonary with A Rotation   [facility] (--)    [address]  [address]  [place] [address] [phone]              Problem List              ICD-10-CM Priority Class Noted - Resolved    History of mitral valve repair Z98.890   2/13/2017 - Present    Chronic obstructive pulmonary disease (CMS-HCC) J44.9   2/13/2017 - Present    Atrial fibrillation (CMS-HCC) I48.91   2/13/2017 - Present    Right knee pain M25.561   2/13/2017 - Present    Type 2 diabetes mellitus with complication (CMS-HCC) E11.8   2/13/2017 - Present    Anticoagulated on warfarin Z79.01   2/13/2017 - Present    Chronic anticoagulation Z79.01   2/28/2017 - Present    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   6/26/2017 - Present    Normocytic anemia D64.9   6/26/2017 - Present    Kidney stones N20.0   7/10/2017 - Present      Health Maintenance        Date Due Completion Dates    DIABETES MONOFILAMENT / LE EXAM 1950 ---    RETINAL SCREENING 2/16/1967 ---    URINE ACR / MICROALBUMIN 2/16/1967 ---    PAP SMEAR 2/16/1970 ---    MAMMOGRAM 2/16/1989 ---    COLONOSCOPY 2/16/1999 ---    IMM ZOSTER VACCINE 2/16/2009 ---    BONE DENSITY 2/16/2014 ---    IMM PNEUMOCOCCAL 65+ (ADULT) LOW/MEDIUM RISK SERIES (1 of 2 - PCV13) 2/16/2014 ---    IMM INFLUENZA (1) 9/1/2017  10/25/2016    A1C SCREENING 2/14/2018 8/14/2017, 6/25/2017    FASTING LIPID PROFILE 5/23/2018 5/23/2017, 1/30/2017    SERUM CREATININE 8/8/2018 8/8/2017, 8/1/2017, 6/27/2017, 6/26/2017, 6/25/2017, 5/23/2017, 1/30/2017    IMM DTaP/Tdap/Td Vaccine (2 - Td) 11/15/2022 11/15/2012            Results     POCT Protime      Component    INR    1.5    Comment:     [id number] 6/2018 ic valid                        Current Immunizations     Influenza Vaccine Quad Inj (Pf) 10/25/2016    Pneumococcal Vaccine (PCV7) Historical Data 10/25/2016    Tdap Vaccine 11/15/2012      Below and/or attached are the medications your provider expects you to take. Review all of your home medications and newly ordered medications with your provider and/or pharmacist. Follow medication instructions as directed by your provider and/or pharmacist. Please keep your medication list with you and share with your provider. Update the information when medications are discontinued, doses are changed, or new medications (including over-the-counter products) are added; and carry medication information at all times in the event of emergency situations     Allergies:  AMLODIPINE BESYLATE-VALSARTAN - Unspecified     AMOXICILLIN - Shortness of Breath,Diarrhea     ETODOLAC - Hives     LISINOPRIL - Unspecified     OTHER DRUG - (reactions not documented)               Medications  Valid as of: August 18, 2017 - 10:55 AM    Generic Name Brand Name Tablet Size Instructions for use    Acetaminophen (Tab) TYLENOL 325 MG Take 2 Tabs by mouth every 6 hours as needed (Mild Pain; (Pain scale 1-3); Temp greater than 100.5 F).        Carvedilol (Tab) COREG 12.5 MG Take 12.5 mg by mouth 3 times a day.        Digoxin (Tab) LANOXIN 125 MCG Take 125 mcg by mouth every day.        DilTIAZem HCl (Tab) CARDIZEM 30 MG Take 30 mg by mouth 2 Times a Day.        Furosemide (Tab) LASIX 20 MG Take 40 mg by mouth every 48 hours. Alternating days with Spironolactone         Hydrocodone-Acetaminophen (Tab) NORCO 5-325 MG Take 1-2 Tabs by mouth every four hours as needed.        Nutritional Supplements (Misc) DIABETES SUPPORT THERAPY PACK Take 1 Each by mouth every day. OTC diabetic pack        Potassium Chloride Darline CR (Tab CR) K-DUR 10 MEQ Take 10 mEq by mouth 2 times a day.        Pravastatin Sodium (Tab) PRAVACHOL 20 MG Take 20 mg by mouth every day.        Spironolactone (Tab) ALDACTONE 25 MG Take 25 mg by mouth every 48 hours. Alternating  every other day with furosemide        TraMADol HCl (Tab) ULTRAM 50 MG Take 1 Tab by mouth every 6 hours as needed for Moderate Pain or Severe Pain.        Umeclidinium-Vilanterol (AEROSOL POWDER, BREATH ACTIVATED) Umeclidinium-Vilanterol 62.5-25 MCG/INH Inhale 1 Puff by mouth every day.        Warfarin Sodium (Tab) COUMADIN 5 MG Take two tablets by mouth one time daily or as directed by coumadin clinic        .                 Medicines prescribed today were sent to:     [email or web address] [address] [place] [address] [address] [facility]    [address] [facility] [address]    Phone: [phone] Fax: [phone]    Open 24 Hours?: No      Medication refill instructions:       If your prescription bottle indicates you have medication refills left, it is not necessary to call your provider’s office. Please contact your pharmacy and they will refill your medication.    If your prescription bottle indicates you do not have any refills left, you may request refills at any time through one of the following ways: The online [employer] system (except Urgent Care), by calling your provider’s office, or by asking your pharmacy to contact your provider’s office with a refill request. Medication refills are processed only during regular business hours and may not be available until the next business day. Your provider may request additional information or to have a follow-up visit with you prior to refilling your medication.   *Please  Note: Medication refills are assigned a new Rx number when refilled electronically. Your pharmacy may indicate that no refills were authorized even though a new prescription for the same medication is available at the pharmacy. Please request the medicine by name with the pharmacy before contacting your provider for a refill.        Warfarin Dosing Calendar   August 2017 Details    Sun Mon Tue Wed Thu Fri Sat       1               2               3               4               5                 6               7               8               9               10               11               12                 13               14               15               16               17               18   1.5   7.5 mg   See details      19      7.5 mg           20      7.5 mg         21      7.5 mg         22      7.5 mg         23      7.5 mg         24      5 mg         25      7.5 mg         26                 27               28               29               30               31                  Date Details   08/18 This INR check   INR: 1.5   [id number] 6/2018 ic valid       Date of next INR:  8/25/2017         How to take your warfarin dose     To take:  5 mg Take 1 of the 5 mg tablets.    To take:  7.5 mg Take 1.5 of the 5 mg tablets.              Belly Access Code: Activation code not generated  Current Belly Status: Active

## 2017-08-25 ENCOUNTER — TELEPHONE (OUTPATIENT)
Dept: MEDICAL GROUP | Facility: PHYSICIAN GROUP | Age: 67
End: 2017-08-25

## 2017-08-25 ENCOUNTER — OFFICE VISIT (OUTPATIENT)
Dept: MEDICAL GROUP | Facility: PHYSICIAN GROUP | Age: 67
End: 2017-08-25
Payer: MEDICARE

## 2017-08-25 ENCOUNTER — ANTICOAGULATION VISIT (OUTPATIENT)
Dept: MEDICAL GROUP | Facility: PHYSICIAN GROUP | Age: 67
End: 2017-08-25
Payer: MEDICARE

## 2017-08-25 VITALS
SYSTOLIC BLOOD PRESSURE: 102 MMHG | WEIGHT: 208 LBS | RESPIRATION RATE: 20 BRPM | HEIGHT: 64 IN | BODY MASS INDEX: 35.51 KG/M2 | TEMPERATURE: 98 F | DIASTOLIC BLOOD PRESSURE: 70 MMHG | HEART RATE: 110 BPM | OXYGEN SATURATION: 90 %

## 2017-08-25 DIAGNOSIS — Z79.01 ANTICOAGULATED ON WARFARIN: ICD-10-CM

## 2017-08-25 DIAGNOSIS — I48.91 ATRIAL FIBRILLATION, UNSPECIFIED TYPE (HCC): ICD-10-CM

## 2017-08-25 DIAGNOSIS — N30.20: ICD-10-CM

## 2017-08-25 DIAGNOSIS — Z79.899 HIGH RISK MEDICATION USE: ICD-10-CM

## 2017-08-25 DIAGNOSIS — Z98.890 HISTORY OF MITRAL VALVE REPAIR: ICD-10-CM

## 2017-08-25 DIAGNOSIS — Z79.01 CHRONIC ANTICOAGULATION: ICD-10-CM

## 2017-08-25 LAB — INR PPP: 2.2 (ref 2–3.5)

## 2017-08-25 PROCEDURE — 99213 OFFICE O/P EST LOW 20 MIN: CPT | Performed by: NURSE PRACTITIONER

## 2017-08-25 PROCEDURE — 99999 PR NO CHARGE: CPT | Performed by: FAMILY MEDICINE

## 2017-08-25 RX ORDER — SULFAMETHOXAZOLE AND TRIMETHOPRIM 800; 160 MG/1; MG/1
1 TABLET ORAL 2 TIMES DAILY
Qty: 20 TAB | Refills: 0 | Status: ON HOLD | OUTPATIENT
Start: 2017-08-25 | End: 2017-09-18

## 2017-08-25 RX ORDER — LEVOFLOXACIN 500 MG/1
TABLET, FILM COATED ORAL
COMMUNITY
Start: 2017-06-28 | End: 2017-08-25

## 2017-08-25 RX ORDER — POTASSIUM CHLORIDE 750 MG/1
TABLET, FILM COATED, EXTENDED RELEASE ORAL
COMMUNITY
Start: 2017-06-29 | End: 2017-09-07

## 2017-08-25 NOTE — PROGRESS NOTES
Subjective:     Chief Complaint   Patient presents with   • Results     pt notified of a positive MRSA inf       HPI  Salma Lees is a 67 y.o. female here today for abnormal urine test. Ongoing problem that is not resolved. Symptoms initially started June 25 when patient was admitted to the hospital for hydronephrosis and renal stone. Stent was placed in the ureter at this time. Discharged home in stable condition and recommended follow-up with urology for removal of stents. Unfortunately, co-pay is significantly large, therefore patient cannot afford to have the stent removed at this time. On August 8, she developed worsening symptoms and was back in the ER with severe pain.urinalysis taken at this time, given IV fluid hydration, and discharged home on cephalexin. She has not received a letter in the mail that said urine positive for MRSA infection.    Today she is still not feeling well. Reports dysuria, urinary incontinence, and feels like there is a hot coal in bladder. She is also having renal spasms and headaches. No F/C, but she is fatigued. She does have history of chronic atrial fibrillation managed with warfarin and digoxin. She is followed by the Coumadin clinic and has an appointment this afternoon. Additionally he is managed with chronic oxygen continuously    Significant Indicator (Positive)      POS     Source     UR     Site     Urine Culture (Abnormal)     Urine Culture (Abnormal)     Methicillin Resistant Staphylococcus aureus   >100,000 cfu/mL          Narrative      CALL  [facility]  [facility] tel. [phone],  CALLED  EDS tel. [phone] 08/11/2017, 08:57, RB PERF. RESULTS CALLED  TO:[id number]       Diagnoses of Cystitis, subacute, High risk medication use, and Anticoagulated on warfarin were pertinent to this visit.    Allergies: Amlodipine besylate-valsartan; Amoxicillin; Etodolac; Lisinopril; and Other drug  Current medicines (including changes today)  Current Outpatient Prescriptions   Medication Sig  "Dispense Refill   • potassium chloride ER (KLOR-CON) 10 MEQ tablet      • sulfamethoxazole-trimethoprim (BACTRIM DS) 800-160 MG tablet Take 1 Tab by mouth 2 times a day. 20 Tab 0   • ANORO ELLIPTA 62.5-25 MCG/INH AEROSOL POWDER, BREATH ACTIVATED INHALE 1 PUFF BY MOUTH EVERY  Each 0   • pravastatin (PRAVACHOL) 20 MG Tab Take 20 mg by mouth every day.     • hydrocodone-acetaminophen (NORCO) 5-325 MG Tab per tablet Take 1-2 Tabs by mouth every four hours as needed. 20 Tab 0   • warfarin (COUMADIN) 5 MG Tab Take two tablets by mouth one time daily or as directed by coumadin clinic 180 Tab 1   • acetaminophen (TYLENOL) 325 MG Tab Take 2 Tabs by mouth every 6 hours as needed (Mild Pain; (Pain scale 1-3); Temp greater than 100.5 F). 30 Tab 0   • diltiazem (CARDIZEM) 30 MG Tab Take 30 mg by mouth 2 Times a Day.     • carvedilol (COREG) 12.5 MG Tab Take 12.5 mg by mouth 3 times a day.     • digoxin (LANOXIN) 125 MCG Tab Take 125 mcg by mouth every day.     • tramadol (ULTRAM) 50 MG Tab Take 1 Tab by mouth every 6 hours as needed for Moderate Pain or Severe Pain. 30 Tab 0   • Nutritional Supplements (DIABETES SUPPORT) THERAPY PACK Misc Take 1 Each by mouth every day. OTC diabetic pack     • furosemide (LASIX) 20 MG Tab Take 40 mg by mouth every 48 hours. Alternating days with Spironolactone     • potassium chloride SA (K-DUR) 10 MEQ Tab CR Take 10 mEq by mouth 2 times a day.     • spironolactone (ALDACTONE) 25 MG Tab Take 25 mg by mouth every 48 hours. Alternating  every other day with furosemide       No current facility-administered medications for this visit.       She  has a past medical history of A-fib (CMS-[facility]) and COPD (chronic obstructive pulmonary disease) (CMS-HCC).    Health Maintenance: deferred    ROS  As stated in HPI      Objective:     Blood pressure 102/70, pulse 110, temperature 36.7 °C (98 °F), resp. rate 20, height 1.626 m (5' 4\"), weight 94.348 kg (208 lb), SpO2 90 %. Body mass index is 35.69 " kg/(m^2).  Physical Exam:  General: Alert, oriented, in no acute distress.  Eye contact is good, speech goal directed, affect calm  CNs grossly intact.  HEENT: conjunctiva non-injected, sclera non-icteric, EOMs intact.   Gross hearing intact.  Abdomen: Soft, ND, non-tender. No CVAT. +SP tenderness   Gait steady.     Assessment and Plan:   Assessment/Plan:  1. Cystitis, subacute  Ongoing problem, not controlled as has been resistant to antibiotics used. Start Bactrim BID x10 days. F/u one week. ER warning signs reviewed and written down for her.     2. High risk medication use  Check digoxin level Monday or Tuesday next week as Bactrm can have DDI with digoxin   - DIGOXIN; Future    3. Anticoagulated on warfarin  Spoke with Kenneth Salas, pharmacist who is monitoring INR. He is in agreement with plan to start Bactrim due to MRSA infection. We will monitor INR very closely. Patient aware of increased INR and bleeding, therefore ER warning signs reviewed with her today       Follow up:  Return in about 1 week (around 9/1/2017), or w/Zeferino .    Educated in proper administration of medication(s) ordered today including safety, possible SE, risks, benefits, rationale and alternatives to therapy.   Supportive care, differential diagnoses, and indications for immediate follow-up discussed with patient.    Pathogenesis of diagnosis discussed including typical length and natural progression.    Instructed to return to clinic or nearest emergency department for any change in condition, further concerns, or worsening of symptoms.  Patient states understanding of the plan of care and discharge instructions.      Please note that this dictation was created using voice recognition software. I have made every reasonable attempt to correct obvious errors, but I expect that there are errors of grammar and possibly content that I did not discover before finalizing the note.    Followup: Return in about 1 week (around 9/1/2017), or  w/Zeferino . sooner should new symptoms or problems arise.

## 2017-08-25 NOTE — TELEPHONE ENCOUNTER
[employer] message sent to pharmacist Kenneth Salas today regarding tx:     Israel Cooper,     This patient is coming to see you today at 2:00 pm. She has been tested positive for MRSA in  system with moderate symptoms. Ongoing now for 3 weeks.     She is on both warfarin and digoxin. Best treatment I see of course would be Bactrim, but has significant interaction with warfarin. I am wondering if clindamycin would be a safer treatment option.     I feel she needs 7-10 days treatment for this infection as she does have a current stent in place, with recent kidney stone and hydronephrosis.     Please e-mail me back or let me know via e-mail. Thank you in advance.    KIRBY Montano.

## 2017-08-25 NOTE — TELEPHONE ENCOUNTER
Kenneth Salas, PHARMD  Ravindra Sharma, SUSANNE.P.R.N.                     If bactrim will provide best outcome, I would go with that and I will make dose adjustment on her weekly warfarin regimen.  Interaction risk is the same for bactrim and clindamycin with digoxin, but shouldn't be too much of a worry given short treatment course.   I will talk with Salma more in depth this afternoon.   Thanks for the heads up!   SUSANNE Alcazar.P.R.YASMANY.

## 2017-08-25 NOTE — MR AVS SNAPSHOT
Salma Lees   2017 2:00 PM   Anticoagulation Visit   MRN: [id number]    Department:  [facility]   Dept Phone:  [phone]    Description:  Female : 1950   Provider:  Kenneth Salas, PHARMD           Allergies as of 2017     Allergen Noted Reactions    Amlodipine Besylate-Valsartan 2017   Unspecified    Chest pain and dizziness     Amoxicillin 2017   Shortness of Breath, Diarrhea    SOB and Diarrhea    Etodolac 2017   Hives    Lisinopril 2017   Unspecified    Dizziness     Other Drug 2017       metal      You were diagnosed with     Chronic anticoagulation   [968631]       Atrial fibrillation, unspecified type (CMS-HCC)   [1229473]       History of mitral valve repair   [241412]         Vital Signs     Smoking Status                   Never Smoker            Basic Information     Date Of Birth Sex Race Ethnicity Preferred Language    1950 Female White Non- English      Your appointments     Sep 01, 2017  2:15 PM   Anti-Coag Routine with [place] PHARMACIST   [facility])    [address] [address]   [phone]            Sep 29, 2017 10:00 AM   New Patient with Michelle Brumfield R.N., Beata Michele, IVA   [facility])    [address]  [address]  [facility] [address]   [phone]           It is the patient's responsibility to check with your Insurance for benefit coverage for visit / visits.  24 hours notice is required for all appointment changes or cancellation.  Please arrive 20 min. before your appointment time  Please bring the following with you: 1)Picture Id 2) Insurance card 3) Completed Forms if New Patient  If scheduled for DIABETES VISIT please also brin) Medications 2) Meter 3) Blood glucose logs 4) Any recent labs if you have them  If scheduled for NUTRITION VISIT please also brin) 2-3 days of detailed food intake logs 2) Blood glucose  monitor and blood glucose logs (if you have them)            Nov 16, 2017 10:40 AM   Established Patient with LILIYA Casey   [facility]    [address] [place] [id number] [phone]           You will be receiving a confirmation call a few days before your appointment from our automated call confirmation system.            Nov 16, 2017  1:00 PM   Pulmonary Function Test with PFT-RM3   [facility] (--)    [address]  [address]  [place] [address] [phone]            Nov 16, 2017  2:20 PM   New Patient Pulmonary with A Rotation   [facility] (--)    [address]  [address]  [place] [id number] [phone]              Problem List              ICD-10-CM Priority Class Noted - Resolved    History of mitral valve repair Z98.890   2/13/2017 - Present    Chronic obstructive pulmonary disease (CMS-HCC) J44.9   2/13/2017 - Present    Atrial fibrillation (CMS-HCC) I48.91   2/13/2017 - Present    Right knee pain M25.561   2/13/2017 - Present    Type 2 diabetes mellitus with complication (CMS-HCC) E11.8   2/13/2017 - Present    Anticoagulated on warfarin Z79.01   2/13/2017 - Present    Chronic anticoagulation Z79.01   2/28/2017 - Present    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   6/26/2017 - Present    Normocytic anemia D64.9   6/26/2017 - Present    Kidney stones N20.0   7/10/2017 - Present      Health Maintenance        Date Due Completion Dates    DIABETES MONOFILAMENT / LE EXAM 1950 ---    RETINAL SCREENING 2/16/1967 ---    URINE ACR / MICROALBUMIN 2/16/1967 ---    PAP SMEAR 2/16/1970 ---    MAMMOGRAM 2/16/1989 ---    COLONOSCOPY 2/16/1999 ---    IMM ZOSTER VACCINE 2/16/2009 ---    BONE DENSITY 2/16/2014 ---    IMM PNEUMOCOCCAL 65+ (ADULT) LOW/MEDIUM RISK SERIES (1 of 2 - PCV13) 2/16/2014 ---    IMM INFLUENZA (1) 9/1/2017 10/25/2016    A1C SCREENING  2/14/2018 8/14/2017, 6/25/2017    FASTING LIPID PROFILE 5/23/2018 5/23/2017, 1/30/2017    SERUM CREATININE 8/8/2018 8/8/2017, 8/1/2017, 6/27/2017, 6/26/2017, 6/25/2017, 5/23/2017, 1/30/2017    IMM DTaP/Tdap/Td Vaccine (2 - Td) 11/15/2022 11/15/2012            Results     POCT Protime      Component    INR    2.2    Comment:     [id number] 6/2018 ic valid                        Current Immunizations     Influenza Vaccine Quad Inj (Pf) 10/25/2016    Pneumococcal Vaccine (PCV7) Historical Data 10/25/2016    Tdap Vaccine 11/15/2012      Below and/or attached are the medications your provider expects you to take. Review all of your home medications and newly ordered medications with your provider and/or pharmacist. Follow medication instructions as directed by your provider and/or pharmacist. Please keep your medication list with you and share with your provider. Update the information when medications are discontinued, doses are changed, or new medications (including over-the-counter products) are added; and carry medication information at all times in the event of emergency situations     Allergies:  AMLODIPINE BESYLATE-VALSARTAN - Unspecified     AMOXICILLIN - Shortness of Breath,Diarrhea     ETODOLAC - Hives     LISINOPRIL - Unspecified     OTHER DRUG - (reactions not documented)               Medications  Valid as of: August 25, 2017 -  2:08 PM    Generic Name Brand Name Tablet Size Instructions for use    Acetaminophen (Tab) TYLENOL 325 MG Take 2 Tabs by mouth every 6 hours as needed (Mild Pain; (Pain scale 1-3); Temp greater than 100.5 F).        Carvedilol (Tab) COREG 12.5 MG Take 12.5 mg by mouth 3 times a day.        Digoxin (Tab) LANOXIN 125 MCG Take 125 mcg by mouth every day.        DilTIAZem HCl (Tab) CARDIZEM 30 MG Take 30 mg by mouth 2 Times a Day.        Furosemide (Tab) LASIX 20 MG Take 40 mg by mouth every 48 hours. Alternating days with Spironolactone        Hydrocodone-Acetaminophen (Tab) NORCO 5-325  MG Take 1-2 Tabs by mouth every four hours as needed.        Nutritional Supplements (Misc) DIABETES SUPPORT THERAPY PACK Take 1 Each by mouth every day. OTC diabetic pack        Potassium Chloride (Tab CR) KLOR-CON 10 MEQ         Potassium Chloride Darline CR (Tab CR) K-DUR 10 MEQ Take 10 mEq by mouth 2 times a day.        Pravastatin Sodium (Tab) PRAVACHOL 20 MG Take 20 mg by mouth every day.        Spironolactone (Tab) ALDACTONE 25 MG Take 25 mg by mouth every 48 hours. Alternating  every other day with furosemide        Sulfamethoxazole-Trimethoprim (Tab) BACTRIM -160 MG Take 1 Tab by mouth 2 times a day.        TraMADol HCl (Tab) ULTRAM 50 MG Take 1 Tab by mouth every 6 hours as needed for Moderate Pain or Severe Pain.        Umeclidinium-Vilanterol (AEROSOL POWDER, BREATH ACTIVATED) ANORO ELLIPTA 62.5-25 MCG/INH INHALE 1 PUFF BY MOUTH EVERY DAY        Warfarin Sodium (Tab) COUMADIN 5 MG Take two tablets by mouth one time daily or as directed by coumadin clinic        .                 Medicines prescribed today were sent to:     [facility] [address] [facility] [place] - [facility] [address] [facility] [address] [facility]    [address] [address]    Phone: [phone] Fax: [phone]    Open 24 Hours?: No      Medication refill instructions:       If your prescription bottle indicates you have medication refills left, it is not necessary to call your provider’s office. Please contact your pharmacy and they will refill your medication.    If your prescription bottle indicates you do not have any refills left, you may request refills at any time through one of the following ways: The online [employer] system (except Urgent Care), by calling your provider’s office, or by asking your pharmacy to contact your provider’s office with a refill request. Medication refills are processed only during regular business hours and may not be available until the next business day. Your provider may request additional  information or to have a follow-up visit with you prior to refilling your medication.   *Please Note: Medication refills are assigned a new Rx number when refilled electronically. Your pharmacy may indicate that no refills were authorized even though a new prescription for the same medication is available at the pharmacy. Please request the medicine by name with the pharmacy before contacting your provider for a refill.        Warfarin Dosing Calendar August 2017 Details    Sun Mon Tue Wed Thu Fri Sat       1               2               3               4               5                 6               7               8               9               10               11               12                 13               14               15               16               17               18               19                 20               21               22               23               24               25   2.2   7.5 mg   See details      26      5 mg           27      5 mg         28      5 mg         29      5 mg         30      5 mg         31      5 mg            Date Details   08/25 This INR check   INR: 2.2   [id number] 6/2018 ic valid               How to take your warfarin dose     To take:  5 mg Take 1 of the 5 mg tablets.    To take:  7.5 mg Take 1.5 of the 5 mg tablets.           Warfarin Dosing Calendar September 2017 Details    Sun Mon Tue Wed Thu Fri Sat          1      7.5 mg         2                 3               4               5               6               7               8               9                 10               11               12               13               14               15               16                 17               18               19               20               21               22               23                 24               25               26               27               28               29               30                Date Details   No additional  details    Date of next INR:  9/1/2017         How to take your warfarin dose     To take:  7.5 mg Take 1.5 of the 5 mg tablets.              [employer] Access Code: [email or web address]  Expires: 9/24/2017  2:08 PM    [employer]  A secure, online tool to manage your health information     [employer]’s [employer]® is a secure, online tool that connects you to your personalized health information from the privacy of your home -- day or night - making it very easy for you to manage your healthcare. Once the activation process is completed, you can even access your medical information using the [employer] andi, which is available for free in the Apple Andi store or Google Play store.     [employer] provides the following levels of access (as shown below):   My Chart Features   [facility] Doctor [facility]  Urgent  Care Non-[facility]  Primary Care  Doctor   Email your healthcare team securely and privately 24/7 X X X    Manage appointments: schedule your next appointment; view details of past/upcoming appointments X      Request prescription refills. X      View recent personal medical records, including lab and immunizations X X X X   View health record, including health history, allergies, medications X X X X   Read reports about your outpatient visits, procedures, consult and ER notes X X X X   See your discharge summary, which is a recap of your hospital and/or ER visit that includes your diagnosis, lab results, and care plan. X X       How to register for [employer]:  1. Go to  [email or web address].  2. Click on the Sign Up Now box, which takes you to the New Member Sign Up page. You will need to provide the following information:  a. Enter your [employer] Access Code exactly as it appears at the top of this page. (You will not need to use this code after you’ve completed the sign-up process. If you do not sign up before the expiration date, you must request a new code.)   b. Enter your date of birth.    c. Enter your home email address.   d. Click Submit, and follow the next screen’s instructions.  3. Create a [email or web address] ID. This will be your [employer] login ID and cannot be changed, so think of one that is secure and easy to remember.  4. Create a [email or web address] password. You can change your password at any time.  5. Enter your Password Reset Question and Answer. This can be used at a later time if you forget your password.   6. Enter your e-mail address. This allows you to receive e-mail notifications when new information is available in [employer].  7. Click Sign Up. You can now view your health information.    For assistance activating your [employer] account, call [phone]

## 2017-08-25 NOTE — PROGRESS NOTES
Anticoagulation Summary as of 8/25/2017     INR goal 2.0-3.0   Selected INR 2.2 (8/25/2017)   Maintenance plan 5 mg (5 mg x 1) on Thu; 7.5 mg (5 mg x 1.5) all other days   Weekly total 50 mg   Plan last modified Kenneth Salas PHARMD (8/18/2017)   Next INR check 9/1/2017   Target end date Indefinite    Indications   Chronic anticoagulation [Z79.01]  Atrial fibrillation (CMS-HCC) [I48.91]  History of mitral valve repair [Z98.890]         Anticoagulation Episode Summary     INR check location     Preferred lab     Send INR reminders to     Comments       Anticoagulation Care Providers     Provider Role Specialty Phone number    MARTA Casey. Referring Family Medicine [phone]    [facility] Responsible  [phone]    Kenneth Salas, MELE Responsible          Anticoagulation Patient Findings   Negatives Missed Doses, Extra Doses, Medication Changes, Antibiotic Use, Diet Changes, Dental/Other Procedures, Hospitalization, Bleeding Gums, Nose Bleeds, Blood in Urine, Blood in Stool, Any Bruising, Other Complaints        Patient is therapeutic today with INR of 2.2.  Pt denies any unusual s/s of bleeding, bruising, clotting or any changes to diet or medications.  Patient seen by PCP today following urine culture positive for MRSA.  She will be starting on bactrim this evening, case discussed with Ravindra Sharma.  Instructed patient to decrease weekly warfarin regimen by ~28% while taking antibiotic.  Patient declines BP/vitals check today.  Follow up in 1 weeks.    Kenneth Salas PHARMD

## 2017-08-29 ENCOUNTER — HOSPITAL ENCOUNTER (OUTPATIENT)
Dept: LAB | Facility: MEDICAL CENTER | Age: 67
End: 2017-08-29
Attending: NURSE PRACTITIONER
Payer: MEDICARE

## 2017-08-29 DIAGNOSIS — Z79.899 HIGH RISK MEDICATION USE: ICD-10-CM

## 2017-08-29 LAB — DIGOXIN SERPL-MCNC: 1.5 NG/ML (ref 0.8–2)

## 2017-08-29 PROCEDURE — 36415 COLL VENOUS BLD VENIPUNCTURE: CPT

## 2017-08-29 PROCEDURE — 80162 ASSAY OF DIGOXIN TOTAL: CPT

## 2017-08-30 ENCOUNTER — TELEPHONE (OUTPATIENT)
Dept: MEDICAL GROUP | Facility: PHYSICIAN GROUP | Age: 67
End: 2017-08-30

## 2017-08-30 NOTE — TELEPHONE ENCOUNTER
----- Message from PERRI Montano sent at 8/29/2017  7:48 PM PDT -----  Please advise patient that good news is that her digoxin level is doing well while on the medication. She may continue the medication and continue monitoring of INR with pharmacy    PERIR Montano

## 2017-08-30 NOTE — LETTER
August 30, 2017         Salma Lees  [address]   [address]  [facility] [address]        Dear Salma:      Below are the results from your recent visit:    Please advise patient that good news is that her digoxin level is doing well while on the medication. She may continue the medication and continue monitoring of INR with pharmacy     PERRI Montano     Resulted Orders   DIGOXIN   Result Value Ref Range    Digoxin 1.5 0.8 - 2.0 ng/mL

## 2017-09-01 ENCOUNTER — ANTICOAGULATION VISIT (OUTPATIENT)
Dept: MEDICAL GROUP | Facility: PHYSICIAN GROUP | Age: 67
End: 2017-09-01
Payer: MEDICARE

## 2017-09-01 DIAGNOSIS — Z79.01 CHRONIC ANTICOAGULATION: ICD-10-CM

## 2017-09-01 DIAGNOSIS — I48.91 ATRIAL FIBRILLATION, UNSPECIFIED TYPE (HCC): ICD-10-CM

## 2017-09-01 DIAGNOSIS — Z98.890 HISTORY OF MITRAL VALVE REPAIR: ICD-10-CM

## 2017-09-01 LAB — INR PPP: 1.8 (ref 2–3.5)

## 2017-09-01 PROCEDURE — 99999 PR NO CHARGE: CPT | Performed by: FAMILY MEDICINE

## 2017-09-01 NOTE — PROGRESS NOTES
Anticoagulation Summary  As of 9/1/2017    INR goal:   2.0-3.0   TTR:   42.5 % (5.6 mo)   Today's INR:   1.8!   Maintenance plan:   5 mg (5 mg x 1) on Thu; 7.5 mg (5 mg x 1.5) all other days   Weekly total:   50 mg   Plan last modified:   Kenneth Salas PharmD (8/18/2017)   Next INR check:   9/11/2017   Target end date:   Indefinite    Indications    Chronic anticoagulation [Z79.01]  Atrial fibrillation (CMS-HCC) [I48.91]  History of mitral valve repair [Z98.890]             Anticoagulation Episode Summary     INR check location:       Preferred lab:       Send INR reminders to:       Comments:         Anticoagulation Care Providers     Provider Role Specialty Phone number    LILIYA Casey Referring Family Medicine [phone]    [facility] Responsible  [phone]    Kenneth Salas PharmD Responsible          Anticoagulation Patient Findings  Patient Findings     Positives:   Missed doses    Negatives:   Signs/symptoms of thrombosis, Signs/symptoms of bleeding, Laboratory test error suspected, Change in health, Change in alcohol use, Change in activity, Upcoming invasive procedure, Emergency department visit, Upcoming dental procedure, Extra doses, Change in medications, Change in diet/appetite, Hospital admission, Bruising, Other complaints        Patient is subtherapeutic today with INR of 1.8.  Pt denies any unusual s/s of bleeding, bruising, clotting or any changes to diet or medications.  Patient symptoms improving since starting course of bactrim.  She missed dose earlier this week.  Will continue with reduced regimen until bactrim course complete.  Message sent to Ravindra Sharma to determine if patient will have repeat urinalysis next week and/or continue bactrim on longer course.  Patient declines BP/vitals check today.  Follow up in 1 weeks.    Kenneth Salas PharmD

## 2017-09-04 ENCOUNTER — TELEPHONE (OUTPATIENT)
Dept: MEDICAL GROUP | Facility: PHYSICIAN GROUP | Age: 67
End: 2017-09-04

## 2017-09-05 NOTE — TELEPHONE ENCOUNTER
----- Message from Kenneth Salas PharmD sent at 9/1/2017  5:20 PM PDT -----  Regarding: follow up urinalysis  Israel Waite,  This patient will be finishing a 10 day course of bactrim for UTI due to MRSA.  She is wanted to know if she should have a follow up urinalysis next Tuesday/Wednesday after course is finished and if so can you place that order?  Thanks so much.  Kenneth

## 2017-09-05 NOTE — TELEPHONE ENCOUNTER
Please call patient to inform her that yes, I would like a repeat urine sample 7 full days after she has finished the antibiotics. Order is in for her to go to any lab.    KIRBY Montano.

## 2017-09-07 DIAGNOSIS — Z01.812 PRE-OPERATIVE LABORATORY EXAMINATION: ICD-10-CM

## 2017-09-07 LAB
ALBUMIN SERPL BCP-MCNC: 4 G/DL (ref 3.2–4.9)
ALBUMIN/GLOB SERPL: 0.9 G/DL
ALP SERPL-CCNC: 91 U/L (ref 30–99)
ALT SERPL-CCNC: 14 U/L (ref 2–50)
ANION GAP SERPL CALC-SCNC: 7 MMOL/L (ref 0–11.9)
APPEARANCE UR: ABNORMAL
AST SERPL-CCNC: 21 U/L (ref 12–45)
BACTERIA #/AREA URNS HPF: ABNORMAL /HPF
BILIRUB SERPL-MCNC: 1.1 MG/DL (ref 0.1–1.5)
BILIRUB UR QL STRIP.AUTO: NEGATIVE
BUN SERPL-MCNC: 16 MG/DL (ref 8–22)
CALCIUM SERPL-MCNC: 10 MG/DL (ref 8.5–10.5)
CHLORIDE SERPL-SCNC: 102 MMOL/L (ref 96–112)
CO2 SERPL-SCNC: 27 MMOL/L (ref 20–33)
COLOR UR: YELLOW
CREAT SERPL-MCNC: 0.89 MG/DL (ref 0.5–1.4)
EPI CELLS #/AREA URNS HPF: NEGATIVE /HPF
GFR SERPL CREATININE-BSD FRML MDRD: >60 ML/MIN/1.73 M 2
GLOBULIN SER CALC-MCNC: 4.3 G/DL (ref 1.9–3.5)
GLUCOSE SERPL-MCNC: 186 MG/DL (ref 65–99)
GLUCOSE UR STRIP.AUTO-MCNC: 250 MG/DL
HYALINE CASTS #/AREA URNS LPF: ABNORMAL /LPF
KETONES UR STRIP.AUTO-MCNC: NEGATIVE MG/DL
LEUKOCYTE ESTERASE UR QL STRIP.AUTO: ABNORMAL
MICRO URNS: ABNORMAL
NITRITE UR QL STRIP.AUTO: NEGATIVE
PH UR STRIP.AUTO: 5.5 [PH]
POTASSIUM SERPL-SCNC: 4.1 MMOL/L (ref 3.6–5.5)
PROT SERPL-MCNC: 8.3 G/DL (ref 6–8.2)
PROT UR QL STRIP: 30 MG/DL
RBC # URNS HPF: ABNORMAL /HPF
RBC UR QL AUTO: ABNORMAL
SODIUM SERPL-SCNC: 136 MMOL/L (ref 135–145)
SP GR UR STRIP.AUTO: 1.02
UROBILINOGEN UR STRIP.AUTO-MCNC: 0.2 MG/DL
WBC #/AREA URNS HPF: ABNORMAL /HPF

## 2017-09-07 PROCEDURE — 36415 COLL VENOUS BLD VENIPUNCTURE: CPT

## 2017-09-07 PROCEDURE — 80053 COMPREHEN METABOLIC PANEL: CPT

## 2017-09-07 PROCEDURE — 87086 URINE CULTURE/COLONY COUNT: CPT

## 2017-09-07 PROCEDURE — 81001 URINALYSIS AUTO W/SCOPE: CPT

## 2017-09-07 PROCEDURE — 87077 CULTURE AEROBIC IDENTIFY: CPT

## 2017-09-07 PROCEDURE — 87186 SC STD MICRODIL/AGAR DIL: CPT

## 2017-09-10 LAB
BACTERIA UR CULT: ABNORMAL
SIGNIFICANT IND 70042: ABNORMAL
SITE SITE: ABNORMAL
SOURCE SOURCE: ABNORMAL

## 2017-09-11 ENCOUNTER — HOSPITAL ENCOUNTER (OUTPATIENT)
Dept: LAB | Facility: MEDICAL CENTER | Age: 67
End: 2017-09-11
Attending: FAMILY MEDICINE
Payer: MEDICARE

## 2017-09-11 ENCOUNTER — TELEPHONE (OUTPATIENT)
Dept: MEDICAL GROUP | Facility: PHYSICIAN GROUP | Age: 67
End: 2017-09-11

## 2017-09-11 ENCOUNTER — ANTICOAGULATION VISIT (OUTPATIENT)
Dept: MEDICAL GROUP | Facility: PHYSICIAN GROUP | Age: 67
End: 2017-09-11
Payer: MEDICARE

## 2017-09-11 DIAGNOSIS — Z98.890 HISTORY OF MITRAL VALVE REPAIR: ICD-10-CM

## 2017-09-11 DIAGNOSIS — N20.0 KIDNEY STONES: ICD-10-CM

## 2017-09-11 DIAGNOSIS — I48.91 ATRIAL FIBRILLATION, UNSPECIFIED TYPE (HCC): ICD-10-CM

## 2017-09-11 DIAGNOSIS — Z79.01 CHRONIC ANTICOAGULATION: ICD-10-CM

## 2017-09-11 LAB
APPEARANCE UR: ABNORMAL
BACTERIA #/AREA URNS HPF: ABNORMAL /HPF
BILIRUB UR QL STRIP.AUTO: NEGATIVE
COLOR UR: ABNORMAL
EPI CELLS #/AREA URNS HPF: ABNORMAL /HPF
GLUCOSE UR STRIP.AUTO-MCNC: 100 MG/DL
INR PPP: 1.6 (ref 2–3.5)
KETONES UR STRIP.AUTO-MCNC: NEGATIVE MG/DL
LEUKOCYTE ESTERASE UR QL STRIP.AUTO: ABNORMAL
MICRO URNS: ABNORMAL
NITRITE UR QL STRIP.AUTO: POSITIVE
PH UR STRIP.AUTO: 6 [PH]
PROT UR QL STRIP: 300 MG/DL
RBC # URNS HPF: ABNORMAL /HPF
RBC UR QL AUTO: ABNORMAL
SP GR UR STRIP.AUTO: 1.02
UROBILINOGEN UR STRIP.AUTO-MCNC: 0.2 MG/DL
WBC #/AREA URNS HPF: ABNORMAL /HPF

## 2017-09-11 PROCEDURE — 81001 URINALYSIS AUTO W/SCOPE: CPT

## 2017-09-11 PROCEDURE — 85610 PROTHROMBIN TIME: CPT | Performed by: FAMILY MEDICINE

## 2017-09-11 PROCEDURE — 99211 OFF/OP EST MAY X REQ PHY/QHP: CPT | Performed by: FAMILY MEDICINE

## 2017-09-11 NOTE — PROGRESS NOTES
Anticoagulation Summary  As of 9/11/2017    INR goal:   2.0-3.0   TTR:   40.2 % (5.9 mo)   Today's INR:   1.6!   Maintenance plan:   5 mg (5 mg x 1) on Thu; 7.5 mg (5 mg x 1.5) all other days   Weekly total:   50 mg   Plan last modified:   Kenneth Salas PharmD (8/18/2017)   Next INR check:   9/25/2017   Target end date:   Indefinite    Indications    Chronic anticoagulation [Z79.01]  Atrial fibrillation (CMS-HCC) [I48.91]  History of mitral valve repair [Z98.890]             Anticoagulation Episode Summary     INR check location:       Preferred lab:       Send INR reminders to:       Comments:         Anticoagulation Care Providers     Provider Role Specialty Phone number    LILIYA Casey Referring Family Medicine [phone]    [facility] Responsible  [phone]    Kenneth Salas PharmD Responsible          Anticoagulation Patient Findings  Patient Findings     Negatives:   Signs/symptoms of thrombosis, Signs/symptoms of bleeding, Laboratory test error suspected, Change in health, Change in alcohol use, Change in activity, Upcoming invasive procedure, Emergency department visit, Upcoming dental procedure, Missed doses, Extra doses, Change in medications, Change in diet/appetite, Hospital admission, Bruising, Other complaints        HPI:   Interval history since last visit: Pt denies any unusual s/s of bleeding, bruising, clotting or any changes to diet or medications.  Compliant with current weekly warfarin regimen as detailed above.        Vitals:  Patient declines BP/vitals check today.      Asssessment:  INR subtherapeutic at 1.6.  She will be having cytoscopic stent removal on 9-18-17 and will need to hold warfarin for five days.  CHADS2 = 1, no history of stroke, no bridging necessary.  She was also in need of follow up urinalysis after course of antibiotics, Dr. Issa gave ok to place order under her name.     Plan:  Instructed patient to bolus with 10mg X 1, then resume  current warfarin regimen.  She will being holding warfarin this week as explained above.     Follow up in 2 weeks (one week after procedure).    Kenneth Salas, JoleenD

## 2017-09-11 NOTE — TELEPHONE ENCOUNTER
1. Caller Name: [facility]                                         Call Back Number:       Patient approves a detailed voicemail message: N\A    2. SPECIFIC Action To Be Taken: orders needed for Urinalysis Pt went to lab to get it done but no orders in system.    3. Diagnosis/Clinical Reason for Request: UTI With MRSA    4. Specialty & Provider Name/Lab/Imaging Location: [facility]    5. Is appointment scheduled for requested order/referral: no    Patient informed they will receive a return phone call from the office ONLY if there are any questions before processing their request. Advised to call back if they haven't received a call from the referral department in 5 days.

## 2017-09-14 ENCOUNTER — TELEPHONE (OUTPATIENT)
Dept: MEDICAL GROUP | Facility: PHYSICIAN GROUP | Age: 67
End: 2017-09-14

## 2017-09-14 NOTE — TELEPHONE ENCOUNTER
Contacted Pt on previous message, Pt states she was seen by her Urologist Neelam and prescribed nitrofurantoin(macrodantin) 100mg and 50mg; states to take 100 mg in the morning and then 50 mg nightly. Pt has a scheduled surgery for Monday at 5:30am. Pt also states she is not taking the Bactrim any longer.   Please Advise

## 2017-09-14 NOTE — TELEPHONE ENCOUNTER
----- Message from Madie Issa M.D. sent at 9/14/2017 11:24 AM PDT -----  Patient is on Bactrim.  Please see how she is feeling

## 2017-09-18 ENCOUNTER — APPOINTMENT (OUTPATIENT)
Dept: RADIOLOGY | Facility: MEDICAL CENTER | Age: 67
End: 2017-09-18
Attending: NURSE PRACTITIONER
Payer: MEDICARE

## 2017-09-18 ENCOUNTER — APPOINTMENT (OUTPATIENT)
Dept: RADIOLOGY | Facility: MEDICAL CENTER | Age: 67
End: 2017-09-18
Attending: UROLOGY
Payer: MEDICARE

## 2017-09-18 ENCOUNTER — HOSPITAL ENCOUNTER (OUTPATIENT)
Facility: MEDICAL CENTER | Age: 67
End: 2017-09-18
Attending: UROLOGY | Admitting: UROLOGY
Payer: MEDICARE

## 2017-09-18 VITALS
TEMPERATURE: 97.9 F | BODY MASS INDEX: 35.23 KG/M2 | HEART RATE: 75 BPM | SYSTOLIC BLOOD PRESSURE: 127 MMHG | HEIGHT: 64 IN | DIASTOLIC BLOOD PRESSURE: 44 MMHG | WEIGHT: 206.35 LBS | OXYGEN SATURATION: 98 % | RESPIRATION RATE: 16 BRPM

## 2017-09-18 PROBLEM — N20.1 CALCULUS OF URETER: Status: ACTIVE | Noted: 2017-09-18

## 2017-09-18 LAB
ANION GAP SERPL CALC-SCNC: 10 MMOL/L (ref 0–11.9)
APTT PPP: 32.6 SEC (ref 24.7–36)
BASE EXCESS BLDV CALC-SCNC: 5 MMOL/L (ref -4–3)
BASOPHILS # BLD AUTO: 0.5 % (ref 0–1.8)
BASOPHILS # BLD: 0.03 K/UL (ref 0–0.12)
BODY TEMPERATURE: ABNORMAL DEGREES
BUN SERPL-MCNC: 15 MG/DL (ref 8–22)
CA-I BLD ISE-SCNC: 1.27 MMOL/L (ref 1.1–1.3)
CALCIUM SERPL-MCNC: 9.4 MG/DL (ref 8.5–10.5)
CHLORIDE SERPL-SCNC: 101 MMOL/L (ref 96–112)
CO2 BLDV-SCNC: 31 MMOL/L (ref 20–33)
CO2 SERPL-SCNC: 25 MMOL/L (ref 20–33)
CREAT SERPL-MCNC: 0.91 MG/DL (ref 0.5–1.4)
EOSINOPHIL # BLD AUTO: 0.29 K/UL (ref 0–0.51)
EOSINOPHIL NFR BLD: 4.6 % (ref 0–6.9)
ERYTHROCYTE [DISTWIDTH] IN BLOOD BY AUTOMATED COUNT: 54.3 FL (ref 35.9–50)
GFR SERPL CREATININE-BSD FRML MDRD: >60 ML/MIN/1.73 M 2
GLUCOSE BLD-MCNC: 148 MG/DL (ref 65–99)
GLUCOSE SERPL-MCNC: 143 MG/DL (ref 65–99)
HCO3 BLDV-SCNC: 29.8 MMOL/L (ref 24–28)
HCT VFR BLD AUTO: 32.1 % (ref 37–47)
HCT VFR BLD CALC: 33 % (ref 37–47)
HGB BLD-MCNC: 11.2 G/DL (ref 12–16)
HGB BLD-MCNC: 9.9 G/DL (ref 12–16)
IMM GRANULOCYTES # BLD AUTO: 0.02 K/UL (ref 0–0.11)
IMM GRANULOCYTES NFR BLD AUTO: 0.3 % (ref 0–0.9)
INR PPP: 1.14 (ref 0.87–1.13)
LV EJECT FRACT  99904: 55
LV EJECT FRACT MOD 2C 99903: 64.61
LV EJECT FRACT MOD 4C 99902: 57.06
LV EJECT FRACT MOD BP 99901: 57.46
LYMPHOCYTES # BLD AUTO: 1.05 K/UL (ref 1–4.8)
LYMPHOCYTES NFR BLD: 16.8 % (ref 22–41)
MCH RBC QN AUTO: 28.3 PG (ref 27–33)
MCHC RBC AUTO-ENTMCNC: 30.8 G/DL (ref 33.6–35)
MCV RBC AUTO: 91.7 FL (ref 81.4–97.8)
MONOCYTES # BLD AUTO: 0.48 K/UL (ref 0–0.85)
MONOCYTES NFR BLD AUTO: 7.7 % (ref 0–13.4)
NEUTROPHILS # BLD AUTO: 4.37 K/UL (ref 2–7.15)
NEUTROPHILS NFR BLD: 70.1 % (ref 44–72)
NRBC # BLD AUTO: 0 K/UL
NRBC BLD AUTO-RTO: 0 /100 WBC
PCO2 BLDV: 44.5 MMHG (ref 41–51)
PH BLDV: 7.43 [PH] (ref 7.31–7.45)
PLATELET # BLD AUTO: 130 K/UL (ref 164–446)
PMV BLD AUTO: 11.5 FL (ref 9–12.9)
PO2 BLDV: 36 MMHG (ref 25–40)
POTASSIUM BLD-SCNC: 3.9 MMOL/L (ref 3.6–5.5)
POTASSIUM SERPL-SCNC: 3.8 MMOL/L (ref 3.6–5.5)
PROTHROMBIN TIME: 15 SEC (ref 12–14.6)
RBC # BLD AUTO: 3.5 M/UL (ref 4.2–5.4)
SAO2 % BLDV: 70 %
SODIUM BLD-SCNC: 141 MMOL/L (ref 135–145)
SODIUM SERPL-SCNC: 136 MMOL/L (ref 135–145)
SPECIMEN DRAWN FROM PATIENT: ABNORMAL
WBC # BLD AUTO: 6.2 K/UL (ref 4.8–10.8)

## 2017-09-18 PROCEDURE — 85610 PROTHROMBIN TIME: CPT

## 2017-09-18 PROCEDURE — 82330 ASSAY OF CALCIUM: CPT

## 2017-09-18 PROCEDURE — 80048 BASIC METABOLIC PNL TOTAL CA: CPT

## 2017-09-18 PROCEDURE — 93306 TTE W/DOPPLER COMPLETE: CPT

## 2017-09-18 PROCEDURE — 88300 SURGICAL PATH GROSS: CPT

## 2017-09-18 PROCEDURE — C1769 GUIDE WIRE: HCPCS | Performed by: UROLOGY

## 2017-09-18 PROCEDURE — 160029 HCHG SURGERY MINUTES - 1ST 30 MINS LEVEL 4: Performed by: UROLOGY

## 2017-09-18 PROCEDURE — 700102 HCHG RX REV CODE 250 W/ 637 OVERRIDE(OP): Performed by: UROLOGY

## 2017-09-18 PROCEDURE — 160041 HCHG SURGERY MINUTES - EA ADDL 1 MIN LEVEL 4: Performed by: UROLOGY

## 2017-09-18 PROCEDURE — 500062 HCHG BASKET: Performed by: UROLOGY

## 2017-09-18 PROCEDURE — 82947 ASSAY GLUCOSE BLOOD QUANT: CPT

## 2017-09-18 PROCEDURE — 160046 HCHG PACU - 1ST 60 MINS PHASE II: Performed by: UROLOGY

## 2017-09-18 PROCEDURE — 160036 HCHG PACU - EA ADDL 30 MINS PHASE I: Performed by: UROLOGY

## 2017-09-18 PROCEDURE — 160035 HCHG PACU - 1ST 60 MINS PHASE I: Performed by: UROLOGY

## 2017-09-18 PROCEDURE — 85014 HEMATOCRIT: CPT

## 2017-09-18 PROCEDURE — 160025 RECOVERY II MINUTES (STATS): Performed by: UROLOGY

## 2017-09-18 PROCEDURE — A9270 NON-COVERED ITEM OR SERVICE: HCPCS

## 2017-09-18 PROCEDURE — 85730 THROMBOPLASTIN TIME PARTIAL: CPT

## 2017-09-18 PROCEDURE — 160047 HCHG PACU  - EA ADDL 30 MINS PHASE II: Performed by: UROLOGY

## 2017-09-18 PROCEDURE — 82803 BLOOD GASES ANY COMBINATION: CPT

## 2017-09-18 PROCEDURE — 700102 HCHG RX REV CODE 250 W/ 637 OVERRIDE(OP)

## 2017-09-18 PROCEDURE — 85025 COMPLETE CBC W/AUTO DIFF WBC: CPT

## 2017-09-18 PROCEDURE — 160002 HCHG RECOVERY MINUTES (STAT): Performed by: UROLOGY

## 2017-09-18 PROCEDURE — 84295 ASSAY OF SERUM SODIUM: CPT

## 2017-09-18 PROCEDURE — 74000 DX-ABDOMEN-1 VIEW: CPT

## 2017-09-18 PROCEDURE — 84132 ASSAY OF SERUM POTASSIUM: CPT

## 2017-09-18 PROCEDURE — 160048 HCHG OR STATISTICAL LEVEL 1-5: Performed by: UROLOGY

## 2017-09-18 PROCEDURE — 500880 HCHG PACK, CYSTO W/SEP LEGGINGS: Performed by: UROLOGY

## 2017-09-18 PROCEDURE — A9270 NON-COVERED ITEM OR SERVICE: HCPCS | Performed by: UROLOGY

## 2017-09-18 PROCEDURE — 93306 TTE W/DOPPLER COMPLETE: CPT | Mod: 26 | Performed by: INTERNAL MEDICINE

## 2017-09-18 PROCEDURE — 82365 CALCULUS SPECTROSCOPY: CPT

## 2017-09-18 PROCEDURE — 700111 HCHG RX REV CODE 636 W/ 250 OVERRIDE (IP)

## 2017-09-18 PROCEDURE — 160009 HCHG ANES TIME/MIN: Performed by: UROLOGY

## 2017-09-18 PROCEDURE — 501329 HCHG SET, CYSTO IRRIG Y TUR: Performed by: UROLOGY

## 2017-09-18 PROCEDURE — 700101 HCHG RX REV CODE 250

## 2017-09-18 PROCEDURE — A4357 BEDSIDE DRAINAGE BAG: HCPCS | Performed by: UROLOGY

## 2017-09-18 RX ORDER — LIDOCAINE HYDROCHLORIDE 10 MG/ML
0.5 INJECTION, SOLUTION INFILTRATION; PERINEURAL
Status: COMPLETED | OUTPATIENT
Start: 2017-09-18 | End: 2017-09-18

## 2017-09-18 RX ORDER — TRAMADOL HYDROCHLORIDE 50 MG/1
50 TABLET ORAL
COMMUNITY
End: 2018-11-14

## 2017-09-18 RX ORDER — ALBUTEROL SULFATE 90 UG/1
2 AEROSOL, METERED RESPIRATORY (INHALATION) EVERY 6 HOURS PRN
COMMUNITY
End: 2020-01-30

## 2017-09-18 RX ORDER — OXYCODONE HYDROCHLORIDE 10 MG/1
5 TABLET ORAL
Status: DISCONTINUED | OUTPATIENT
Start: 2017-09-18 | End: 2017-09-18 | Stop reason: HOSPADM

## 2017-09-18 RX ORDER — ONDANSETRON 2 MG/ML
4 INJECTION INTRAMUSCULAR; INTRAVENOUS EVERY 6 HOURS PRN
Status: DISCONTINUED | OUTPATIENT
Start: 2017-09-18 | End: 2017-09-18 | Stop reason: HOSPADM

## 2017-09-18 RX ORDER — SODIUM CHLORIDE, SODIUM LACTATE, POTASSIUM CHLORIDE, CALCIUM CHLORIDE 600; 310; 30; 20 MG/100ML; MG/100ML; MG/100ML; MG/100ML
INJECTION, SOLUTION INTRAVENOUS CONTINUOUS
Status: DISCONTINUED | OUTPATIENT
Start: 2017-09-18 | End: 2017-09-18 | Stop reason: HOSPADM

## 2017-09-18 RX ORDER — LIDOCAINE AND PRILOCAINE 25; 25 MG/G; MG/G
1 CREAM TOPICAL
Status: COMPLETED | OUTPATIENT
Start: 2017-09-18 | End: 2017-09-18

## 2017-09-18 RX ORDER — OXYCODONE HYDROCHLORIDE AND ACETAMINOPHEN 5; 325 MG/1; MG/1
TABLET ORAL
Status: COMPLETED
Start: 2017-09-18 | End: 2017-09-18

## 2017-09-18 RX ORDER — ACETAMINOPHEN 325 MG/1
650 TABLET ORAL EVERY 6 HOURS
Status: DISCONTINUED | OUTPATIENT
Start: 2017-09-18 | End: 2017-09-18 | Stop reason: HOSPADM

## 2017-09-18 RX ORDER — NITROFURANTOIN MACROCRYSTALS 100 MG/1
100 CAPSULE ORAL 2 TIMES DAILY
COMMUNITY
End: 2017-10-18 | Stop reason: SINTOL

## 2017-09-18 RX ORDER — TRAMADOL HYDROCHLORIDE 50 MG/1
50 TABLET ORAL EVERY 4 HOURS PRN
Qty: 30 TAB | Refills: 0 | Status: SHIPPED | OUTPATIENT
Start: 2017-09-18 | End: 2019-11-19

## 2017-09-18 RX ORDER — LIDOCAINE HYDROCHLORIDE 10 MG/ML
INJECTION, SOLUTION INFILTRATION; PERINEURAL
Status: COMPLETED
Start: 2017-09-18 | End: 2017-09-18

## 2017-09-18 RX ORDER — NITROFURANTOIN 25; 75 MG/1; MG/1
100 CAPSULE ORAL
Qty: 30 CAP | Refills: 3 | Status: SHIPPED | OUTPATIENT
Start: 2017-09-18 | End: 2017-10-18 | Stop reason: SINTOL

## 2017-09-18 RX ORDER — WARFARIN SODIUM 10 MG/1
10 TABLET ORAL DAILY
Status: ON HOLD | COMMUNITY
End: 2017-09-18

## 2017-09-18 RX ORDER — OXYCODONE HYDROCHLORIDE 5 MG/1
2.5 TABLET ORAL
Status: DISCONTINUED | OUTPATIENT
Start: 2017-09-18 | End: 2017-09-18 | Stop reason: HOSPADM

## 2017-09-18 RX ORDER — WARFARIN SODIUM 5 MG/1
5-7.5 TABLET ORAL DAILY
COMMUNITY
End: 2018-03-12

## 2017-09-18 RX ADMIN — SODIUM CHLORIDE, SODIUM LACTATE, POTASSIUM CHLORIDE, CALCIUM CHLORIDE: 600; 310; 30; 20 INJECTION, SOLUTION INTRAVENOUS at 06:50

## 2017-09-18 RX ADMIN — LIDOCAINE HYDROCHLORIDE 0.5 ML: 10 INJECTION, SOLUTION INFILTRATION; PERINEURAL at 06:50

## 2017-09-18 RX ADMIN — OXYCODONE AND ACETAMINOPHEN 1 TABLET: 5; 325 TABLET ORAL at 11:50

## 2017-09-18 ASSESSMENT — PAIN SCALES - GENERAL
PAINLEVEL_OUTOF10: 2
PAINLEVEL_OUTOF10: 0

## 2017-09-18 NOTE — DISCHARGE INSTRUCTIONS
ACTIVITY: Rest and take it easy for the first 24 hours.  A responsible adult is recommended to remain with you during that time.  It is normal to feel sleepy.  We encourage you to not do anything that requires balance, judgment or coordination.    MILD FLU-LIKE SYMPTOMS ARE NORMAL. YOU MAY EXPERIENCE GENERALIZED MUSCLE ACHES, THROAT IRRITATION, HEADACHE AND/OR SOME NAUSEA.    FOR 24 HOURS DO NOT:  Drive, operate machinery or run household appliances.  Drink beer or alcoholic beverages.   Make important decisions or sign legal documents.    SPECIAL INSTRUCTIONS:   Ureteroscopy, Care After  Refer to this sheet in the next few weeks. These instructions provide you with information on caring for yourself after your procedure. Your health care provider may also give you more specific instructions. Your treatment has been planned according to current medical practices, but problems sometimes occur. Call your health care provider if you have any problems or questions after your procedure.   WHAT TO EXPECT AFTER THE PROCEDURE   After your procedure, it is typical to have the following:   · A burning sensation when you urinate.  · Blood in your urine.  HOME CARE INSTRUCTIONS   · Only take medicines as directed by your health care provider. Do not take any over-the-counter pain medication unless your health care provider says it is okay.  · Take a warm bath or hold a warm washcloth over your groin to relieve burning.  · Drink enough fluids to keep your urine clear or pale yellow.  ¨ Drink two 8-ounce glasses of water every hour for the first 2 hours after you get home.  ¨ Continue to drink water often at home.  · You can eat what you usually do.  · Ask your surgeon when you can do your usual activities.  · If you had a tube placed to keep urine flowing (ureteral stent), ask your health care provider when you need to return to have it removed.  · Keep all follow-up appointments.  SEEK MEDICAL CARE IF:   · You have chills or  fever.  · You have burning pain for longer than 24 hours after the procedure.  · You have blood in your urine for longer than 24 hours after the procedure.  SEEK IMMEDIATE MEDICAL CARE IF:   · You have large amounts of blood or clots in your urine.  · You have very bad pain.  · You have chest pain or trouble breathing.     This information is not intended to replace advice given to you by your health care provider. Make sure you discuss any questions you have with your health care provider.       DIET: To avoid nausea, slowly advance diet as tolerated, avoiding spicy or greasy foods for the first day.  Add more substantial food to your diet according to your physician's instructions. INCREASE FLUIDS AND FIBER TO AVOID CONSTIPATION.    SURGICAL DRESSING/BATHING: Follow instructions given to you by your surgeon    FOLLOW-UP APPOINTMENT:  A follow-up appointment should be arranged with your doctor; call to schedule.    You should CALL YOUR PHYSICIAN if you develop:  Fever greater than 101 degrees F.  Pain not relieved by medication, or persistent nausea or vomiting.  Excessive bleeding (blood soaking through dressing) or unexpected drainage from the wound.  Extreme redness or swelling around the incision site, drainage of pus or foul smelling drainage.  Inability to urinate or empty your bladder within 8 hours.  Problems with breathing or chest pain.    You should call 911 if you develop problems with breathing or chest pain.  If you are unable to contact your doctor or surgical center, you should go to the nearest emergency room or urgent care center.  Physician's telephone #: [phone]    If any questions arise, call your doctor.  If your doctor is not available, please feel free to call the Surgical Center at [phone].  The Center is open Monday through Friday from 7AM to 7PM.  You can also call the HEALTH HOTLINE open 24 hours/day, 7 days/week and speak to a nurse at [phone], or toll free at [phone]  [phone].    A registered nurse may call you a few days after your surgery to see how you are doing after your procedure.    MEDICATIONS: Resume taking daily medication.  Take prescribed pain medication with food.  If no medication is prescribed, you may take non-aspirin pain medication if needed.  PAIN MEDICATION CAN BE VERY CONSTIPATING.  Take a stool softener or laxative such as senokot, pericolace, or milk of magnesia if needed.    Prescription given for Ultram & Nitorofurantoin.  Last pain medication given at 11:50am.    If your physician has prescribed pain medication that includes Acetaminophen (Tylenol), do not take additional Acetaminophen (Tylenol) while taking the prescribed medication.    Depression / Suicide Risk    As you are discharged from this Critical access hospital facility, it is important to learn how to keep safe from harming yourself.    Recognize the warning signs:  · Abrupt changes in personality, positive or negative- including increase in energy   · Giving away possessions  · Change in eating patterns- significant weight changes-  positive or negative  · Change in sleeping patterns- unable to sleep or sleeping all the time   · Unwillingness or inability to communicate  · Depression  · Unusual sadness, discouragement and loneliness  · Talk of wanting to die  · Neglect of personal appearance   · Rebelliousness- reckless behavior  · Withdrawal from people/activities they love  · Confusion- inability to concentrate     If you or a loved one observes any of these behaviors or has concerns about self-harm, here's what you can do:  · Talk about it- your feelings and reasons for harming yourself  · Remove any means that you might use to hurt yourself (examples: pills, rope, extension cords, firearm)  · Get professional help from the community (Mental Health, Substance Abuse, psychological counseling)  · Do not be alone:Call your Safe Contact- someone whom you trust who will be there for you.  · Call your  local CRISIS HOTLINE 890-8457 or 951-385-1863  · Call your local Children's Mobile Crisis Response Team [place] (723) 148-5135 or www.[employer]  · Call the toll free National Suicide Prevention Hotlines   · National Suicide Prevention Lifeline 386-692-UPCZ (2699)  · National [employer] Line Network 800-SUICIDE (384-9286)

## 2017-09-18 NOTE — OP REPORT
DATE OF SERVICE:  09/18/2017    PREOPERATIVE DIAGNOSES:  Cytoscopy with removal of left ureteral stent.  Left   flexible ureteroscopy with laser tripsy of small stone and debris in the left   kidney.    ANESTHESIA:  General.    ANESTHESIOLOGIST:  Bhakti Scott MD    SURGEON:  Edgardo Richter MD    BRIEF HISTORY:  This 67-year-old female presented to the emergency room   several months ago with obstructing left stone and a urinary tract infection   with possible early sepsis.  She had a ureteral stent placed at that time and   now presents for treatment of that calculus.  The patient thus had been   treated for recent urinary tract infection.    REPORT OF OPERATION:  Under general anesthesia with the patient in lithotomy   position, the genitalia were prepped and draped in the usual manner.  The   21-Slovak cystoscope was introduced into the bladder.  Urethra was   unremarkable.  Bladder had mild diffuse cystitis.  At this point, the stent   was grasped and pulled down to the urethral meatus.  A zip wire was passed up   to the left kidney without difficulty.  The old stent was removed and   discarded.  The flexible ureteroscope was unable to be passed over the wire up   into the kidney.  The wire was removed and examination did show a   conglomeration of debris that did appear almost tissue like.  They did have a   yellowish appearance like stone material.  I went ahead and used a 200 micron   laser fiber.  Settings were 20 Hz and 0.2 joules.  I then proceeded to   basically fragment up this tissue debris.  Eventually, I continue to observe   and eventually did get it smaller and smaller into multiple pieces.  There is   a large piece and at this point, I never really did see a definite stone.  I   then at this point grasped, got a basket and extracted a fragment of this   debris and once removed it was just soft tissue necrotic debris.  I was able   to pass the ureteroscope back up the ureter without a  wire.  The ureter was   wide open.  Complete examination of the kidney failed to reveal any stones or   any other abnormalities.  At this point, the ureteroscope was backloaded down   the ureter and no other stones, abnormalities, or debris was seen and the   channel was wide open.  At this point, the ureteroscope was removed.  The   cystoscope was reintroduced into the bladder.  There was a surprising amount   of this debris that had washed down from the kidney.  At this point, this was   washed out of the bladder and at one point I did see a small stone; in fact   that was probably a stone in question.  This did measure approximately 3 mm   which is close to the size of the stone.  I then collected this and sent it   off.  The remainder of this debris was just necrotic soft tissue debris that   was inconsequential.  At this point, the bladder was completely drained, the   cystoscope removed.  She was cleaned up, woken up and transferred to the   recovery room in stable condition.       ____________________________________     MD QUINN HO / FRANCESCO    DD:  09/18/2017 11:50:02  DT:  09/18/2017 12:35:25    D#:  [id number]  Job#:  [id number]

## 2017-09-18 NOTE — OR NURSING
"1230-Assisted  To BR-voided QS am't. Of light bloody urine,pt. verbalized \"still with burning\",teachings given.  "

## 2017-09-18 NOTE — OR NURSING
Pt uses 2-3 L oxygen continuous while at rest and up to 5 L with activity.  Pt uses preferred homecare for oxygen supplies.

## 2017-09-18 NOTE — OR NURSING
"Pt has c/o being cold and shivering after she got up to use the bathroom. She is hooked up to the warmer for now and comfortable in bed.  at beside.     1345:Pt states \"I feel much better\"and she is requesting to go home. VSS.   "

## 2017-09-18 NOTE — OR NURSING
"Updated Dr. Richter on delays.  Notified him of pt's statement of \"pimples on labia\" and urine smelling like yeast.  No new orders received.  "

## 2017-09-22 LAB
APPEARANCE STONE: NORMAL
COMPN STONE: NORMAL
NUMBER STONE: 1
SIZE STONE: NORMAL MM
SPECIMEN WT: 28 MG

## 2017-09-25 ENCOUNTER — ANTICOAGULATION VISIT (OUTPATIENT)
Dept: MEDICAL GROUP | Facility: PHYSICIAN GROUP | Age: 67
End: 2017-09-25
Payer: MEDICARE

## 2017-09-25 DIAGNOSIS — Z79.01 CHRONIC ANTICOAGULATION: ICD-10-CM

## 2017-09-25 DIAGNOSIS — Z98.890 HISTORY OF MITRAL VALVE REPAIR: ICD-10-CM

## 2017-09-25 DIAGNOSIS — I48.91 ATRIAL FIBRILLATION, UNSPECIFIED TYPE (HCC): ICD-10-CM

## 2017-09-25 LAB — INR PPP: 1.5 (ref 2–3.5)

## 2017-09-25 NOTE — PROGRESS NOTES
Anticoagulation Summary  As of 9/25/2017    INR goal:   2.0-3.0   TTR:   37.2 % (6.4 mo)   Today's INR:   1.5!   Maintenance plan:   5 mg (5 mg x 1) on Thu; 7.5 mg (5 mg x 1.5) all other days   Weekly total:   50 mg   Plan last modified:   Kenneth Salas PharmD (8/18/2017)   Next INR check:   10/6/2017   Target end date:   Indefinite    Indications    Chronic anticoagulation [Z79.01]  Atrial fibrillation (CMS-[facility]) [I48.91]  History of mitral valve repair [Z98.890]             Anticoagulation Episode Summary     INR check location:       Preferred lab:       Send INR reminders to:       Comments:         Anticoagulation Care Providers     Provider Role Specialty Phone number    LILIYA Casey Referring Family Medicine [phone]    [facility] Responsible  [phone]    Kenneth Salas PharmD Responsible          Anticoagulation Patient Findings  Patient Findings     Positives:   Change in medications, Hospital admission    Negatives:   Signs/symptoms of thrombosis, Signs/symptoms of bleeding, Laboratory test error suspected, Change in health, Change in alcohol use, Change in activity, Upcoming invasive procedure, Emergency department visit, Upcoming dental procedure, Missed doses, Extra doses, Change in diet/appetite, Bruising, Other complaints    Comments:   Stent removal  10 day course of linezolid         HPI:   Salma Martinezp seen in clinic today, on anticoagulation therapy with warfairn for atrial fibrillation  Changes to current medical/health status since last appt: NONE  Denies signs/symptoms of bleeding and/or thrombosis since the last appt.    Denies any interval changes to diet  Denies any interval changes to medications since last appt.   Denies any complications or cost restrictions with current therapy.      Vitals:  Patient declines BP/vitals check today      Asssessment:  INR subtherapeutic at 1.5.  Patient had cytoscopy with ureteral stent removal on 9-18-17.  Was off  warfarin for five days prior.  She was also placed on course of linezolid shortly after, will finish in six days.     Plan:  Instructed patient to bolus with 10mg X 1, then continue current warfarin regimen.     Follow up in 1.5 weeks due to clinic availability.    Kenneth Salas, PharmD

## 2017-09-29 ENCOUNTER — APPOINTMENT (OUTPATIENT)
Dept: HEALTH INFORMATION MANAGEMENT | Facility: MEDICAL CENTER | Age: 67
End: 2017-09-29
Payer: MEDICARE

## 2017-10-06 ENCOUNTER — ANTICOAGULATION VISIT (OUTPATIENT)
Dept: MEDICAL GROUP | Facility: PHYSICIAN GROUP | Age: 67
End: 2017-10-06
Payer: MEDICARE

## 2017-10-06 DIAGNOSIS — I48.91 ATRIAL FIBRILLATION, UNSPECIFIED TYPE (HCC): ICD-10-CM

## 2017-10-06 DIAGNOSIS — Z98.890 HISTORY OF MITRAL VALVE REPAIR: ICD-10-CM

## 2017-10-06 DIAGNOSIS — Z79.01 CHRONIC ANTICOAGULATION: ICD-10-CM

## 2017-10-06 LAB — INR PPP: 4.3 (ref 2–3.5)

## 2017-10-06 NOTE — PROGRESS NOTES
Anticoagulation Summary  As of 10/6/2017    INR goal:   2.0-3.0   TTR:   37.1 % (6.7 mo)   Today's INR:   4.3!   Maintenance plan:   7.5 mg (5 mg x 1.5) every day   Weekly total:   52.5 mg   Plan last modified:   Kenneth Salas, PharmD (10/6/2017)   Next INR check:   10/13/2017   Target end date:   Indefinite    Indications    Chronic anticoagulation [Z79.01]  Atrial fibrillation (CMS-HCC) [I48.91]  History of mitral valve repair [Z98.890]             Anticoagulation Episode Summary     INR check location:       Preferred lab:       Send INR reminders to:       Comments:         Anticoagulation Care Providers     Provider Role Specialty Phone number    Zeferino Duarte, A.P.N. Referring Family Medicine [phone]    [facility]  [phone]    Kenneth Salas, PharmD Responsible          Anticoagulation Patient Findings  Patient Findings     Positives:   Change in medications    Negatives:   Signs/symptoms of thrombosis, Signs/symptoms of bleeding, Laboratory test error suspected, Change in health, Change in alcohol use, Change in activity, Upcoming invasive procedure, Emergency department visit, Upcoming dental procedure, Missed doses, Extra doses, Change in diet/appetite, Hospital admission, Bruising, Other complaints    Comments:   Took fluconazole for three days        HPI:   Salma Lees seen in clinic today, on anticoagulation therapy with warfarin for atrial fibrillation  Changes to current medical/health status since last appt: NONE  Denies signs/symptoms of bleeding and/or thrombosis since the last appt.    Denies any interval changes to diet  Denies any interval changes to medications since last appt.   Denies any complications or cost restrictions with current therapy.      Vitals:  Patient declines BP/vitals check today      Asssessment:  INR supratherapeutic at 4.3.  She was on short three day course of fluconazole this week.      Plan:  Instructed patient to HOLD X 1,  then resume current warfarin regimen.     Follow up in 1 weeks.    Kenneth Salas, PharmD

## 2017-10-13 ENCOUNTER — HOSPITAL ENCOUNTER (OUTPATIENT)
Dept: RADIOLOGY | Facility: MEDICAL CENTER | Age: 67
End: 2017-10-13
Attending: UROLOGY
Payer: MEDICARE

## 2017-10-13 ENCOUNTER — ANTICOAGULATION VISIT (OUTPATIENT)
Dept: MEDICAL GROUP | Facility: PHYSICIAN GROUP | Age: 67
End: 2017-10-13
Payer: MEDICARE

## 2017-10-13 VITALS — DIASTOLIC BLOOD PRESSURE: 75 MMHG | HEART RATE: 69 BPM | SYSTOLIC BLOOD PRESSURE: 120 MMHG

## 2017-10-13 DIAGNOSIS — Z79.01 CHRONIC ANTICOAGULATION: ICD-10-CM

## 2017-10-13 DIAGNOSIS — I48.91 ATRIAL FIBRILLATION, UNSPECIFIED TYPE (HCC): ICD-10-CM

## 2017-10-13 DIAGNOSIS — Z98.890 HISTORY OF MITRAL VALVE REPAIR: ICD-10-CM

## 2017-10-13 DIAGNOSIS — N20.1 CALCULUS OF URETER: ICD-10-CM

## 2017-10-13 LAB — INR PPP: 3.1 (ref 2–3.5)

## 2017-10-13 PROCEDURE — 85610 PROTHROMBIN TIME: CPT | Performed by: FAMILY MEDICINE

## 2017-10-13 PROCEDURE — 74000 DX-ABDOMEN-1 VIEW: CPT

## 2017-10-13 PROCEDURE — 99211 OFF/OP EST MAY X REQ PHY/QHP: CPT | Performed by: FAMILY MEDICINE

## 2017-10-13 NOTE — PROGRESS NOTES
Anticoagulation Summary  As of 10/13/2017    INR goal:   2.0-3.0   TTR:   35.9 % (7 mo)   Today's INR:   3.1!   Maintenance plan:   5 mg (5 mg x 1) on Fri; 7.5 mg (5 mg x 1.5) all other days   Weekly total:   50 mg   Plan last modified:   Kenneth Salas PharmD (10/13/2017)   Next INR check:   10/23/2017   Target end date:   Indefinite    Indications    Chronic anticoagulation [Z79.01]  Atrial fibrillation (CMS-HCC) [I48.91]  History of mitral valve repair [Z98.890]             Anticoagulation Episode Summary     INR check location:       Preferred lab:       Send INR reminders to:       Comments:         Anticoagulation Care Providers     Provider Role Specialty Phone number    LILIYA Silva Referring Family Medicine [phone]    [facility]  [phone]    Kenneth Salas PharmD Responsible          Anticoagulation Patient Findings  Patient Findings     Negatives:   Signs/symptoms of thrombosis, Signs/symptoms of bleeding, Laboratory test error suspected, Change in health, Change in alcohol use, Change in activity, Upcoming invasive procedure, Emergency department visit, Upcoming dental procedure, Missed doses, Extra doses, Change in medications, Change in diet/appetite, Hospital admission, Bruising, Other complaints        HPI:   Salma Lees seen in clinic today, on anticoagulation therapy with warfarin for atrial fibrillation  Changes to current medical/health status since last appt: NONE  Denies signs/symptoms of bleeding and/or thrombosis since the last appt.    Denies any interval changes to diet  Denies any interval changes to medications since last appt.   Denies any complications or cost restrictions with current therapy.      Vitals:  /75  HR 69      Asssessment:  INR slightly supratherapeutic at 3.1      Plan:  Instructed patient to decrease weekly warfarin regimen as detailed above.     Follow up in 1.5 weeks.    Kenneth Salas PharmD

## 2017-10-18 ENCOUNTER — HOSPITAL ENCOUNTER (OUTPATIENT)
Dept: RADIOLOGY | Facility: MEDICAL CENTER | Age: 67
End: 2017-10-18
Attending: NURSE PRACTITIONER
Payer: MEDICARE

## 2017-10-18 ENCOUNTER — OFFICE VISIT (OUTPATIENT)
Dept: MEDICAL GROUP | Facility: PHYSICIAN GROUP | Age: 67
End: 2017-10-18
Payer: MEDICARE

## 2017-10-18 VITALS
OXYGEN SATURATION: 94 % | TEMPERATURE: 98.3 F | DIASTOLIC BLOOD PRESSURE: 70 MMHG | WEIGHT: 211 LBS | HEART RATE: 70 BPM | RESPIRATION RATE: 20 BRPM | SYSTOLIC BLOOD PRESSURE: 110 MMHG | BODY MASS INDEX: 36.22 KG/M2

## 2017-10-18 DIAGNOSIS — R06.02 SHORTNESS OF BREATH: ICD-10-CM

## 2017-10-18 DIAGNOSIS — Z23 NEED FOR VACCINATION: ICD-10-CM

## 2017-10-18 PROCEDURE — 90662 IIV NO PRSV INCREASED AG IM: CPT | Performed by: NURSE PRACTITIONER

## 2017-10-18 PROCEDURE — 99214 OFFICE O/P EST MOD 30 MIN: CPT | Mod: 25 | Performed by: NURSE PRACTITIONER

## 2017-10-18 PROCEDURE — 71020 DX-CHEST-2 VIEWS: CPT

## 2017-10-18 PROCEDURE — G0008 ADMIN INFLUENZA VIRUS VAC: HCPCS | Performed by: NURSE PRACTITIONER

## 2017-10-18 RX ORDER — FUROSEMIDE 20 MG/1
20 TABLET ORAL DAILY
Qty: 30 TAB | Refills: 0 | Status: SHIPPED | OUTPATIENT
Start: 2017-10-18 | End: 2018-03-05 | Stop reason: SDUPTHER

## 2017-10-18 NOTE — PROGRESS NOTES
Chief Complaint   Patient presents with   • Shortness of Breath     after taking nitrofurantoin       HISTORY OF PRESENT ILLNESS: Patient is a 67 y.o. female established patient who presents today to discuss the following issues:    Need for vaccination  Would like a flu shot today.      Shortness of breath  Had stent removed by Dr. Oanh Bell and was put on macrobid.  She reports that the macrobid caused terrible shortness of breath, and caused her to go in and out of A-fib.  She stopped taking the macrobid and was put on something else, but she is still having issues.  Will get a chest xray.  Has an appointment with pulmonology, and will schedule an appointment with cardiology.      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) 06/26/2017   • Normocytic anemia 06/26/2017   • Chronic anticoagulation 02/28/2017   • History of mitral valve repair 02/13/2017   • Chronic obstructive pulmonary disease (CMS-HCC) 02/13/2017   • Atrial fibrillation (CMS-HCC) 02/13/2017   • Right knee pain 02/13/2017   • Type 2 diabetes mellitus with complication (CMS-HCC) 02/13/2017   • Anticoagulated on warfarin 02/13/2017       Allergies:Pcn [penicillins]; Amlodipine besylate-valsartan; Amoxicillin; Etodolac; Food; Lisinopril; Other drug; Other misc; and Tape    Current Outpatient Prescriptions   Medication Sig Dispense Refill   • albuterol 108 (90 Base) MCG/ACT Aero Soln inhalation aerosol Inhale 2 Puffs by mouth every 6 hours as needed for Shortness of Breath.     • warfarin (COUMADIN) 5 MG Tab Take 5-7.5 mg by mouth every day. 5 mg on Tuesday . All other days 7.5 mg     • tramadol (ULTRAM) 50 MG Tab Take 50 mg by mouth 1 time daily as needed.     • tramadol (ULTRAM) 50 MG Tab Take 1 Tab by mouth every four hours as  needed. 30 Tab 0   • ANORO ELLIPTA 62.5-25 MCG/INH AEROSOL POWDER, BREATH ACTIVATED INHALE 1 PUFF BY MOUTH EVERY  Each 0   • pravastatin (PRAVACHOL) 20 MG Tab Take 20 mg by mouth every day.     • diltiazem (CARDIZEM) 30 MG Tab Take 30 mg by mouth 2 Times a Day.     • carvedilol (COREG) 12.5 MG Tab Take 12.5 mg by mouth 2 times a day, with meals.     • digoxin (LANOXIN) 125 MCG Tab Take 125 mcg by mouth every day.       No current facility-administered medications for this visit.        Social History   Substance Use Topics   • Smoking status: Former Smoker     Packs/day: 1.00     Quit date: 1/7/1990   • Smokeless tobacco: Never Used   • Alcohol use No       No family status information on file.   History reviewed. No pertinent family history.    Review of Systems:   Constitutional: Negative for fever, chills, weight loss and malaise/fatigue.   HENT: Negative for ear pain, nosebleeds, congestion, sore throat and neck pain.    Eyes: Negative for blurred vision.   Respiratory: Negative for cough, sputum production, and wheezing.  Positive for shortness of breath.  Cardiovascular: Negative for chest pain,, orthopnea and leg swelling.  Positive for palpitations.  Gastrointestinal: Negative for heartburn, nausea, vomiting and abdominal pain.   Genitourinary: Negative for dysuria, urgency and frequency.   Musculoskeletal: Negative for myalgias, joint pain, and back pain.  Skin: Negative for rash and itching.   Neurological: Negative for dizziness, tingling, tremors, sensory change, focal weakness and headaches.   Endo/Heme/Allergies: Does not bruise/bleed easily.   Psychiatric/Behavioral: Negative for depression, suicidal ideas and memory loss.  The patient is not nervous/anxious and does not have insomnia.    All other systems reviewed and are negative except as in HPI.    Exam:  Blood pressure 110/70, pulse 70, temperature 36.8 °C (98.3 °F), resp. rate 20, weight 95.7 kg (211 lb), SpO2 94 %.  General:  Well  nourished, well developed female in NAD  Head: Grossly normal.  Neck: Supple without JVD or bruit. Thyroid is not enlarged.  Pulmonary: Clear to ausculation. Normal effort. No rales, ronchi, or wheezing.  Cardiovascular: Regular rate and rhythm without murmur.   Extremities: No clubbing, cyanosis, or edema.  Skin: Intact with no obvious rashes or lesions.  Neuro: Grossly intact.  Psych: Alert and oriented x 3.  Mood and affect appropriate.    Medical decision-making and discussion: Salma is here today to discuss a few issues.  A chest xray was ordered.  She will keep her appointment with pulmonology, and she will schedule an appointment with cardiology.  She was given a flu shot, and she will follow-up here as needed.          Assessment/Plan:  1. Shortness of breath  DX-CHEST-2 VIEWS   2. Need for vaccination  INFLUENZA VACCINE, HIGH DOSE (65+ ONLY)       Return if symptoms worsen or fail to improve.    Please note that this dictation was created using voice recognition software. I have made every reasonable attempt to correct obvious errors, but I expect that there are errors of grammar and possibly content that I did not discover before finalizing the note.

## 2017-10-18 NOTE — ASSESSMENT & PLAN NOTE
Had stent removed by Dr. Oanh Bell and was put on macrobid.  She reports that the macrobid caused terrible shortness of breath, and caused her to go in and out of A-fib.  She stopped taking the macrobid and was put on something else, but she is still having issues.  Will get a chest xray.  Has an appointment with pulmonology, and will schedule an appointment with cardiology.

## 2017-10-19 NOTE — TELEPHONE ENCOUNTER
Was the patient seen in the last year in this department? Yes     Does patient have an active prescription for medications requested? No     Received Request Via: Pharmacy      Pt met protocol?: Yes, ov 10/18/17 appt 11/16/17

## 2017-10-23 ENCOUNTER — ANTICOAGULATION VISIT (OUTPATIENT)
Dept: MEDICAL GROUP | Facility: PHYSICIAN GROUP | Age: 67
End: 2017-10-23
Payer: MEDICARE

## 2017-10-23 VITALS — DIASTOLIC BLOOD PRESSURE: 76 MMHG | HEART RATE: 70 BPM | SYSTOLIC BLOOD PRESSURE: 106 MMHG

## 2017-10-23 DIAGNOSIS — Z98.890 HISTORY OF MITRAL VALVE REPAIR: ICD-10-CM

## 2017-10-23 DIAGNOSIS — Z79.01 CHRONIC ANTICOAGULATION: ICD-10-CM

## 2017-10-23 DIAGNOSIS — I48.91 ATRIAL FIBRILLATION, UNSPECIFIED TYPE (HCC): ICD-10-CM

## 2017-10-23 LAB — INR PPP: 2 (ref 2–3.5)

## 2017-10-23 PROCEDURE — 85610 PROTHROMBIN TIME: CPT | Performed by: NURSE PRACTITIONER

## 2017-10-23 NOTE — PROGRESS NOTES
Anticoagulation Summary  As of 10/23/2017    INR goal:   2.0-3.0   TTR:   38.4 % (7.3 mo)   Today's INR:   2.0   Maintenance plan:   5 mg (5 mg x 1) on Fri; 7.5 mg (5 mg x 1.5) all other days   Weekly total:   50 mg   Plan last modified:   Kenneth Salas PharmD (10/13/2017)   Next INR check:   11/10/2017   Target end date:   Indefinite    Indications    Chronic anticoagulation [Z79.01]  Atrial fibrillation (CMS-HCC) [I48.91]  History of mitral valve repair [Z98.890]             Anticoagulation Episode Summary     INR check location:       Preferred lab:       Send INR reminders to:       Comments:         Anticoagulation Care Providers     Provider Role Specialty Phone number    LILIYA Silva Referring Family Medicine [phone]    [facility] Responsible  [phone]    Kenneth Salas PharmD Responsible          Anticoagulation Patient Findings  Patient Findings     Negatives:   Signs/symptoms of thrombosis, Signs/symptoms of bleeding, Laboratory test error suspected, Change in health, Change in alcohol use, Change in activity, Upcoming invasive procedure, Emergency department visit, Upcoming dental procedure, Missed doses, Extra doses, Change in medications, Change in diet/appetite, Hospital admission, Bruising, Other complaints        HPI:   Salma Lees seen in clinic today, on anticoagulation therapy with warfairn for atrial fibrillation  Changes to current medical/health status since last appt: NONE  Denies signs/symptoms of bleeding and/or thrombosis since the last appt.    Denies any interval changes to diet  Denies any interval changes to medications since last appt.   Denies any complications or cost restrictions with current therapy.      Vitals:  /76  HR 70      Asssessment:  INR therapeutic at 2.0      Plan:  Pt is to continue with current warfarin dosing regimen.     Follow up in 2 weeks.    Kenneth Salas, Jing

## 2017-11-10 ENCOUNTER — ANTICOAGULATION VISIT (OUTPATIENT)
Dept: MEDICAL GROUP | Facility: PHYSICIAN GROUP | Age: 67
End: 2017-11-10
Payer: MEDICARE

## 2017-11-10 DIAGNOSIS — Z79.01 CHRONIC ANTICOAGULATION: ICD-10-CM

## 2017-11-10 DIAGNOSIS — Z98.890 HISTORY OF MITRAL VALVE REPAIR: ICD-10-CM

## 2017-11-10 DIAGNOSIS — I48.91 ATRIAL FIBRILLATION, UNSPECIFIED TYPE (HCC): ICD-10-CM

## 2017-11-10 LAB — INR PPP: 1.9 (ref 2–3.5)

## 2017-11-10 PROCEDURE — 99211 OFF/OP EST MAY X REQ PHY/QHP: CPT | Performed by: FAMILY MEDICINE

## 2017-11-10 PROCEDURE — 85610 PROTHROMBIN TIME: CPT | Performed by: FAMILY MEDICINE

## 2017-11-10 NOTE — PROGRESS NOTES
Anticoagulation Summary  As of 11/10/2017    INR goal:   2.0-3.0   TTR:   35.5 % (7.9 mo)   Today's INR:   1.9!   Maintenance plan:   7.5 mg (5 mg x 1.5) every day   Weekly total:   52.5 mg   Plan last modified:   Kenneth Salas PharmD (11/10/2017)   Next INR check:   11/20/2017   Target end date:   Indefinite    Indications    Chronic anticoagulation [Z79.01]  Atrial fibrillation (CMS-HCC) [I48.91]  History of mitral valve repair [Z98.890]             Anticoagulation Episode Summary     INR check location:       Preferred lab:       Send INR reminders to:       Comments:         Anticoagulation Care Providers     Provider Role Specialty Phone number    MARTA Silva. Referring Family Medicine [phone]    [facility] Responsible  [phone]    Kenneth Salas, Jing Responsible          Anticoagulation Patient Findings    HPI:   Salma seen in clinic today, on anticoagulation therapy with warfarin for a-fib, hx of mitral valve repair  Changes to current medical/health status since last appt: none   Denies signs/symptoms of bleeding and/or thrombosis since the last appt.    Denies any interval changes to diet. Small appetite (~ one meal per day)  Denies any interval changes to medications since last appt.   Denies any complications or cost restrictions with current therapy.      Vitals:  pt denied vital check     Asssessment:  INR subtherapeutic at 1.9      Plan:       Increase weekly dose ~5% as detailed above.    Follow up in 1 weeks.    Romana Fu Pharmacy intern    Kenneth Salsa, PharmD

## 2017-11-20 ENCOUNTER — ANTICOAGULATION VISIT (OUTPATIENT)
Dept: MEDICAL GROUP | Facility: PHYSICIAN GROUP | Age: 67
End: 2017-11-20
Payer: MEDICARE

## 2017-11-20 DIAGNOSIS — Z79.01 CHRONIC ANTICOAGULATION: ICD-10-CM

## 2017-11-20 DIAGNOSIS — I48.91 ATRIAL FIBRILLATION, UNSPECIFIED TYPE (HCC): ICD-10-CM

## 2017-11-20 DIAGNOSIS — Z98.890 HISTORY OF MITRAL VALVE REPAIR: ICD-10-CM

## 2017-11-20 LAB — INR PPP: 1.5 (ref 2–3.5)

## 2017-11-20 PROCEDURE — 99211 OFF/OP EST MAY X REQ PHY/QHP: CPT | Performed by: FAMILY MEDICINE

## 2017-11-20 PROCEDURE — 85610 PROTHROMBIN TIME: CPT | Performed by: FAMILY MEDICINE

## 2017-11-20 NOTE — PROGRESS NOTES
Anticoagulation Summary  As of 11/20/2017    INR goal:   2.0-3.0   TTR:   34.1 % (8.2 mo)   Today's INR:   1.5!   Maintenance plan:   7.5 mg (5 mg x 1.5) every day   Weekly total:   52.5 mg   Plan last modified:   Kenneth Salas, PharmD (11/10/2017)   Next INR check:   12/1/2017   Target end date:   Indefinite    Indications    Chronic anticoagulation [Z79.01]  Atrial fibrillation (CMS-HCC) [I48.91]  History of mitral valve repair [Z98.890]             Anticoagulation Episode Summary     INR check location:       Preferred lab:       Send INR reminders to:       Comments:         Anticoagulation Care Providers     Provider Role Specialty Phone number    LILIYA Silva Referring Family Medicine [phone]    [facility] Responsible  [phone]    Kenneth Salas, PharmD Responsible          Anticoagulation Patient Findings  Patient Findings     Positives:   Missed doses    Negatives:   Signs/symptoms of thrombosis, Signs/symptoms of bleeding, Laboratory test error suspected, Change in health, Change in alcohol use, Change in activity, Upcoming invasive procedure, Emergency department visit, Upcoming dental procedure, Extra doses, Change in medications, Change in diet/appetite, Hospital admission, Bruising, Other complaints        HPI:   Salma Lees seen in clinic today, on anticoagulation therapy with warfarin for stroke prevention due to history of nonvalvular atrial fibrillation and mitral valve repair.  CHADS-VASc = at least 3  (adjusted ischemic stroke risk:  3.2%, which is moderate)    Previous INR  1.9 on 11-10-17    Does patient have any changes to current medical/health status since last appt (Y/N):  NO  Does patient have any signs/symptoms of bleeding and/or thrombosis since the last appt (Y/N):  NO  Does patient have any interval changes to diet or medications since last appt (Y/N):  Caloric intake has increased as she is feeling better and better each week  Are there any  complications or cost restrictions with current therapy (Y/N):  NO      Vitals:  Patient declines BP/vitals check at today's visit.    Asssessment:      INR subtherapeutic at 1.5, therefore putting her at higher risk of stroke secondary to a-fib   Reason(s) for out of range INR today:  Possibly missed dose this past Saturday.  She has also began to eat more greens and higher caloric intake overall.      Plan:  Instructed patient to bolus with 15mg X 1, then resume current warfarin regimen.  Would like to see if missed dose is true cause of low INR or if increased caloric intake will require higher weekly regimen.  Will make appropriate adjustments based on INR at next visit in order to prevent future stroke or bleeding/bruising events.     Follow up:  Because warfarin is a high risk medication and current CHEST guidelines recommend regular monitoring intervals (few days up to 12 weeks), will have patient return to clinic in 1 weeks to recheck INR.    Kenneth Salas, PharmD

## 2017-12-01 ENCOUNTER — ANTICOAGULATION VISIT (OUTPATIENT)
Dept: MEDICAL GROUP | Facility: PHYSICIAN GROUP | Age: 67
End: 2017-12-01
Payer: MEDICARE

## 2017-12-01 DIAGNOSIS — I48.91 ATRIAL FIBRILLATION, UNSPECIFIED TYPE (HCC): ICD-10-CM

## 2017-12-01 DIAGNOSIS — Z79.01 CHRONIC ANTICOAGULATION: ICD-10-CM

## 2017-12-01 DIAGNOSIS — Z98.890 HISTORY OF MITRAL VALVE REPAIR: ICD-10-CM

## 2017-12-01 LAB — INR PPP: 1.8 (ref 2–3.5)

## 2017-12-01 PROCEDURE — 85610 PROTHROMBIN TIME: CPT | Performed by: FAMILY MEDICINE

## 2017-12-01 PROCEDURE — 99211 OFF/OP EST MAY X REQ PHY/QHP: CPT | Performed by: FAMILY MEDICINE

## 2017-12-01 NOTE — PROGRESS NOTES
Anticoagulation Summary  As of 12/1/2017    INR goal:   2.0-3.0   TTR:   32.6 % (8.6 mo)   Today's INR:   1.8!   Maintenance plan:   10 mg (5 mg x 2) on Mon, Fri; 7.5 mg (5 mg x 1.5) all other days   Weekly total:   57.5 mg   Plan last modified:   Kenneth Salas PharmD (12/1/2017)   Next INR check:   12/8/2017   Target end date:   Indefinite    Indications    Chronic anticoagulation [Z79.01]  Atrial fibrillation (CMS-[facility]) [I48.91]  History of mitral valve repair [Z98.890]             Anticoagulation Episode Summary     INR check location:       Preferred lab:       Send INR reminders to:       Comments:         Anticoagulation Care Providers     Provider Role Specialty Phone number    Zeferino Duarte, A.P.N. Referring Family Medicine [phone]    [facility] Responsible  [phone]    Kenneth Salas, PharmD Responsible          Anticoagulation Patient Findings  Patient Findings     Negatives:   Signs/symptoms of thrombosis, Signs/symptoms of bleeding, Laboratory test error suspected, Change in health, Change in alcohol use, Change in activity, Upcoming invasive procedure, Emergency department visit, Upcoming dental procedure, Missed doses, Extra doses, Change in medications, Change in diet/appetite, Hospital admission, Bruising, Other complaints        HPI:   Salma Lees seen in clinic today, on anticoagulation therapy with warfarin for stroke prevention due to history of atrial fibrillation  CHADS-VASc = at least 3  (adjusted ischemic stroke risk/year:  3.2%, which is moderate risk)    Previous INR  1.5 on 11-20-17    Does patient have any changes to current medical/health status since last appt (Y/N):  NO  Does patient have any signs/symptoms of bleeding and/or thrombosis since the last appt (Y/N):  NO  Does patient have any interval changes to diet or medications since last appt (Y/N):  NO  Are there any complications or cost restrictions with current therapy (Y/N):  NO       Vitals:  Patient declines BP/vitals check at today's visit.     Asssessment:      INR remains subtherapeutic at 1.8, therefore putting patient at higher risk of stroke secondary to a-fib   Reason(s) for out of range INR today:  No identifiable causes for low INR      Plan:  Instructed patient to increase weekly warfarin regimen by ~10% as detailed above to ensure INR returns to and maintains in therapeutic range preventing stroke and/or bleeding/bruising events.     Follow up:  Because warfarin is a high risk medication and current CHEST guidelines recommend regular monitoring intervals (few days up to 12 weeks), will have patient return to clinic in 1 weeks to recheck INR.    Kenneth Salas, PharmD

## 2017-12-08 ENCOUNTER — ANTICOAGULATION VISIT (OUTPATIENT)
Dept: MEDICAL GROUP | Facility: PHYSICIAN GROUP | Age: 67
End: 2017-12-08
Payer: MEDICARE

## 2017-12-08 DIAGNOSIS — Z79.01 CHRONIC ANTICOAGULATION: ICD-10-CM

## 2017-12-08 DIAGNOSIS — I48.91 ATRIAL FIBRILLATION, UNSPECIFIED TYPE (HCC): ICD-10-CM

## 2017-12-08 DIAGNOSIS — Z98.890 HISTORY OF MITRAL VALVE REPAIR: ICD-10-CM

## 2017-12-08 LAB — INR PPP: 1.8 (ref 2–3.5)

## 2017-12-08 PROCEDURE — 85610 PROTHROMBIN TIME: CPT | Performed by: INTERNAL MEDICINE

## 2017-12-08 PROCEDURE — 99211 OFF/OP EST MAY X REQ PHY/QHP: CPT | Performed by: INTERNAL MEDICINE

## 2017-12-08 NOTE — PROGRESS NOTES
Anticoagulation Summary  As of 12/8/2017    INR goal:   2.0-3.0   TTR:   31.8 % (8.8 mo)   Today's INR:   1.8!   Maintenance plan:   10 mg (5 mg x 2) on Mon, Fri; 7.5 mg (5 mg x 1.5) all other days   Weekly total:   57.5 mg   Plan last modified:   Kenneth Salas PharmD (12/1/2017)   Next INR check:   12/15/2017   Target end date:   Indefinite    Indications    Chronic anticoagulation [Z79.01]  Atrial fibrillation (CMS-[facility]) [I48.91]  History of mitral valve repair [Z98.890]             Anticoagulation Episode Summary     INR check location:       Preferred lab:       Send INR reminders to:       Comments:         Anticoagulation Care Providers     Provider Role Specialty Phone number    Zeferino Duarte, A.P.N. Referring Family Medicine [phone]    [facility] Responsible  [phone]    Kenneth Salas, PharmD Responsible          Anticoagulation Patient Findings  Patient Findings     Negatives:   Signs/symptoms of thrombosis, Signs/symptoms of bleeding, Laboratory test error suspected, Change in health, Change in alcohol use, Change in activity, Upcoming invasive procedure, Emergency department visit, Upcoming dental procedure, Missed doses, Extra doses, Change in medications, Change in diet/appetite, Hospital admission, Bruising, Other complaints        HPI:   Salma Lees seen in clinic today, on anticoagulation therapy with warfarin for stroke prevention due to history of atrial fibrillation  CHADS-VASc = at least 3  (unadjusted ischemic stroke risk/year:  3.2%, which is moderate risk)    Previous INR  1.8 on 12-1-17    Does patient have any changes to current medical/health status since last appt (Y/N):  NO  Does patient have any signs/symptoms of bleeding and/or thrombosis since the last appt (Y/N):  NO  Does patient have any interval changes to diet or medications since last appt (Y/N):  NO  Are there any complications or cost restrictions with current therapy (Y/N):  NO       Vitals:  Patient declines BP/vitals check a today's visit.     Asssessment:      INR remains subtherapeutic at 1.8, therefore putting patient at risk of stroke secondary to a-fib   Reason(s) for out of range INR today:  Patient was taking lower warfarin dose than instructed this week.      Plan:  Instructed patient to restart weekly warfarin regimen as detailed above.  This will ensure that INR remains therapeutic, thus decreasing her risk of stroke and/or bleeding/bruising complications.       Follow up:  Because warfarin is a high risk medication and current CHEST guidelines recommend regular monitoring intervals (few days up to 12 weeks), will have patient return to clinic in 1 weeks to recheck INR.    Kenneth Salas, PharmD

## 2017-12-15 ENCOUNTER — ANTICOAGULATION VISIT (OUTPATIENT)
Dept: MEDICAL GROUP | Facility: PHYSICIAN GROUP | Age: 67
End: 2017-12-15
Payer: MEDICARE

## 2017-12-15 VITALS — DIASTOLIC BLOOD PRESSURE: 79 MMHG | HEART RATE: 70 BPM | SYSTOLIC BLOOD PRESSURE: 127 MMHG

## 2017-12-15 DIAGNOSIS — Z79.01 CHRONIC ANTICOAGULATION: ICD-10-CM

## 2017-12-15 DIAGNOSIS — I48.91 ATRIAL FIBRILLATION, UNSPECIFIED TYPE (HCC): ICD-10-CM

## 2017-12-15 DIAGNOSIS — Z98.890 HISTORY OF MITRAL VALVE REPAIR: ICD-10-CM

## 2017-12-15 LAB — INR PPP: 2.7 (ref 2–3.5)

## 2017-12-15 PROCEDURE — 99999 PR NO CHARGE: CPT | Performed by: FAMILY MEDICINE

## 2017-12-15 PROCEDURE — 85610 PROTHROMBIN TIME: CPT | Performed by: FAMILY MEDICINE

## 2017-12-15 NOTE — PROGRESS NOTES
Anticoagulation Summary  As of 12/15/2017    INR goal:   2.0-3.0   TTR:   32.9 % (9.1 mo)   Today's INR:   2.7   Maintenance plan:   10 mg (5 mg x 2) on Mon, Fri; 7.5 mg (5 mg x 1.5) all other days   Weekly total:   57.5 mg   Plan last modified:   Kenneth Salas, PharmD (12/1/2017)   Next INR check:   12/29/2017   Target end date:   Indefinite    Indications    Chronic anticoagulation [Z79.01]  Atrial fibrillation (CMS-[facility]) [I48.91]  History of mitral valve repair [Z98.890]             Anticoagulation Episode Summary     INR check location:       Preferred lab:       Send INR reminders to:       Comments:         Anticoagulation Care Providers     Provider Role Specialty Phone number    Zeferino Duarte A.P.YASMANY. Referring Family Medicine [phone]    [facility] Responsible  [phone]    Kenneth Salas, PharmD Responsible          Anticoagulation Patient Findings  Patient Findings     Positives:   Change in medications    Negatives:   Signs/symptoms of thrombosis, Signs/symptoms of bleeding, Laboratory test error suspected, Change in health, Change in alcohol use, Change in activity, Upcoming invasive procedure, Emergency department visit, Upcoming dental procedure, Missed doses, Extra doses, Change in diet/appetite, Hospital admission, Bruising, Other complaints    Comments:   10 day course of doxycyline        HPI:   Salma Lees seen in clinic today, on anticoagulation therapy with warfarin for stroke prevention due to history of atrial fibrillation    Patient's previous INR was subtherapeutic at 1.8 on 12-8-17, at which time patient was instructed to increase weekly warfarin regimen.  She returns to clinic today to recheck INR to ensure it is therapeutic and thus preventing possible clotting and/or bleeding/bruising complications.    CHADS-VASc = at least 3  (unadjusted ischemic stroke risk/year:  3.2%, which is moderate risk)    Does patient have any changes to current medical/health  status since last appt (Y/N):  NO  Does patient have any signs/symptoms of bleeding and/or thrombosis since the last appt (Y/N):  NO  Does patient have any interval changes to diet or medications since last appt (Y/N):  NO  Are there any complications or cost restrictions with current therapy (Y/N):  NO      Vitals:  /79  HR 70      Asssessment:      INR now therapeutic at 2.7, therefore decreasing risk of stroke secondary to a-fib   Reason(s) for out of range INR today:  n/a      Plan:  Pt is to continue with current warfarin dosing regimen as this has now returned patient to therapeutic range, thus preventing future clotting or bleeding event.     Follow up:  Because warfarin is a high risk medication and current CHEST guidelines recommend regular monitoring intervals (few days up to 12 weeks), will have patient return to clinic in 2 weeks to recheck INR.    Kenneth Salas, PharmD

## 2017-12-29 ENCOUNTER — ANTICOAGULATION VISIT (OUTPATIENT)
Dept: MEDICAL GROUP | Facility: PHYSICIAN GROUP | Age: 67
End: 2017-12-29
Payer: MEDICARE

## 2017-12-29 VITALS — SYSTOLIC BLOOD PRESSURE: 120 MMHG | DIASTOLIC BLOOD PRESSURE: 67 MMHG | HEART RATE: 70 BPM

## 2017-12-29 DIAGNOSIS — Z98.890 HISTORY OF MITRAL VALVE REPAIR: ICD-10-CM

## 2017-12-29 DIAGNOSIS — I48.91 ATRIAL FIBRILLATION, UNSPECIFIED TYPE (HCC): ICD-10-CM

## 2017-12-29 DIAGNOSIS — Z79.01 CHRONIC ANTICOAGULATION: ICD-10-CM

## 2017-12-29 LAB — INR PPP: 2.4 (ref 2–3.5)

## 2017-12-29 PROCEDURE — 85610 PROTHROMBIN TIME: CPT | Performed by: FAMILY MEDICINE

## 2017-12-29 PROCEDURE — 99999 PR NO CHARGE: CPT | Performed by: FAMILY MEDICINE

## 2017-12-29 NOTE — PROGRESS NOTES
Anticoagulation Summary  As of 12/29/2017    INR goal:   2.0-3.0   TTR:   36.2 % (9.5 mo)   Today's INR:   2.4   Maintenance plan:   10 mg (5 mg x 2) on Mon, Fri; 7.5 mg (5 mg x 1.5) all other days   Weekly total:   57.5 mg   Plan last modified:   Kenneth Salas, PharmD (12/1/2017)   Next INR check:   1/19/2018   Target end date:   Indefinite    Indications    Chronic anticoagulation [Z79.01]  Atrial fibrillation (CMS-[facility]) [I48.91]  History of mitral valve repair [Z98.890]             Anticoagulation Episode Summary     INR check location:       Preferred lab:       Send INR reminders to:       Comments:         Anticoagulation Care Providers     Provider Role Specialty Phone number    SUSANNE Silva.P.YASMANY. Referring Family Medicine [phone]    [facility] Responsible  [phone]    Kenneth Salas, PharmD Responsible          Anticoagulation Patient Findings  Patient Findings     Negatives:   Signs/symptoms of thrombosis, Signs/symptoms of bleeding, Laboratory test error suspected, Change in health, Change in alcohol use, Change in activity, Upcoming invasive procedure, Emergency department visit, Upcoming dental procedure, Missed doses, Extra doses, Change in medications, Change in diet/appetite, Hospital admission, Bruising, Other complaints        HPI:   Salma Lees seen in clinic today, on anticoagulation therapy with warfarin for stroke prevention due to history of atrial fibrillation.    Patient's previous INR was therapeutic at 2.7 on 12-15-17, at which time patient was instructed to continue current warfarin regimen.  She returns to clinic today to recheck INR to ensure it is therapeutic and thus preventing possible clotting and/or bleeding/bruising complications.    CHADS-VASc = at least 3  (unadjusted ischemic stroke risk/year:  3.2%, which is moderate risk)    Does patient have any changes to current medical/health status since last appt (Y/N):  NO  Does patient have any  "signs/symptoms of bleeding and/or thrombosis since the last appt (Y/N):  NO  Does patient have any interval changes to diet or medications since last appt (Y/N):  NO  Are there any complications or cost restrictions with current therapy (Y/N):  NO      Vitals:  /67  HR 70    Weight  declines   Height   5' 4\"     Asssessment:      INR remains therapeutic at 2.4, therefore decreasing risk of stroke secondary to a-fib and/or bleeding complications.   Reason(s) for out of range INR today:  n/a      Plan:  Pt is to continue with current warfarin dosing regimen as this has now maintained patient in therapeutic range.     Follow up:  Because warfarin is a high risk medication and current CHEST guidelines recommend regular monitoring intervals (few days up to 12 weeks), will have patient return to clinic in 3 weeks to recheck INR.    Kenneth Salas, PharmD    "

## 2018-01-03 ENCOUNTER — TELEPHONE (OUTPATIENT)
Dept: MEDICAL GROUP | Facility: PHYSICIAN GROUP | Age: 68
End: 2018-01-03

## 2018-01-03 DIAGNOSIS — Z45.010 PACER AT END OF BATTERY LIFE: ICD-10-CM

## 2018-01-03 DIAGNOSIS — J44.9 CHRONIC OBSTRUCTIVE PULMONARY DISEASE, UNSPECIFIED COPD TYPE (HCC): ICD-10-CM

## 2018-01-03 DIAGNOSIS — Z09 FOLLOW UP: ICD-10-CM

## 2018-01-03 NOTE — TELEPHONE ENCOUNTER
From: Salma Lees  Sent: 1/3/2018 10:29 AM PST  Subject: Medication Renewal Request    Salma Lees would like a refill of the following medications:     ANORO ELLIPTA 62.5-25 MCG/INH AEROSOL POWDER, BREATH ACTIVATED inhaler [Madie Issa M.D.]   Patient Comment: a    Preferred pharmacy: Other - [facility] [address] [place] [id number]    

## 2018-01-04 NOTE — TELEPHONE ENCOUNTER
1. Caller Name: Pt                                          Call Back Number: [phone] (home)         Patient approves a detailed voicemail message: N\A    2. SPECIFIC Action To Be Taken: Referral pending, please sign.    3. Diagnosis/Clinical Reason for Request: COPD and pacer check     4. Specialty & Provider Name/Lab/Imaging Location: Cardiology and pulmonology     5. Is appointment scheduled for requested order/referral: yes - 02/2018    Patient informed they will receive a return phone call from the office ONLY if there are any questions before processing their request. Advised to call back if they haven't received a call from the referral department in 5 days.

## 2018-01-04 NOTE — TELEPHONE ENCOUNTER
Last ov  10/18/17    Patient requesting RX be sent to:  [facility] [place]  [address]

## 2018-01-19 ENCOUNTER — ANTICOAGULATION VISIT (OUTPATIENT)
Dept: MEDICAL GROUP | Facility: PHYSICIAN GROUP | Age: 68
End: 2018-01-19
Payer: MEDICARE

## 2018-01-19 VITALS — HEART RATE: 98 BPM | SYSTOLIC BLOOD PRESSURE: 131 MMHG | DIASTOLIC BLOOD PRESSURE: 73 MMHG

## 2018-01-19 DIAGNOSIS — I48.91 ATRIAL FIBRILLATION, UNSPECIFIED TYPE (HCC): ICD-10-CM

## 2018-01-19 DIAGNOSIS — Z79.01 CHRONIC ANTICOAGULATION: ICD-10-CM

## 2018-01-19 DIAGNOSIS — Z98.890 HISTORY OF MITRAL VALVE REPAIR: ICD-10-CM

## 2018-01-19 LAB — INR PPP: 2.4 (ref 2–3.5)

## 2018-01-19 PROCEDURE — 85610 PROTHROMBIN TIME: CPT | Performed by: FAMILY MEDICINE

## 2018-01-19 PROCEDURE — 99999 PR NO CHARGE: CPT | Performed by: FAMILY MEDICINE

## 2018-01-19 NOTE — PROGRESS NOTES
"Anticoagulation Summary  As of 1/19/2018    INR goal:   2.0-3.0   TTR:   40.6 % (10.2 mo)   Today's INR:   2.4   Maintenance plan:   10 mg (5 mg x 2) on Mon, Fri; 7.5 mg (5 mg x 1.5) all other days   Weekly total:   57.5 mg   Plan last modified:   Kenneth Salas, PharmD (12/1/2017)   Next INR check:   2/16/2018   Target end date:   Indefinite    Indications    Chronic anticoagulation [Z79.01]  Atrial fibrillation (CMS-[facility]) [I48.91]  History of mitral valve repair [Z98.890]             Anticoagulation Episode Summary     INR check location:       Preferred lab:       Send INR reminders to:       Comments:         Anticoagulation Care Providers     Provider Role Specialty Phone number    LILIYA Pearson Referring Family Medicine [phone]    [facility] Anticoagulation Services Responsible  [phone]    Kenneth Salas, PharmD Responsible          Anticoagulation Patient Findings    HPI:   Salma Lees seen in clinic today, on anticoagulation therapy with warfarin for stroke prevention due to history of atrial fibrillation.    Patient's previous INR was therapeutic at 2.4 on 12-19-17, at which time patient was instructed to continue current warfarin regimen.  She returns to clinic today to recheck INR to ensure it is therapeutic and thus preventing possible clotting and/or bleeding/bruising complications.    CHADS-VASc = at least 3  (unadjusted ischemic stroke risk/year:  3.2%, which is moderate risk)    Does patient have any changes to current medical/health status since last appt (Y/N):  NO  Does patient have any signs/symptoms of bleeding and/or thrombosis since the last appt (Y/N):  NO  Does patient have any interval changes to diet or medications since last appt (Y/N):  NO  Are there any complications or cost restrictions with current therapy (Y/N):  NO      Vitals:  /73  HR 98    Weight  declines   Height   5' 4\"     Asssessment:      INR remains therapeutic at 2.4, therefore decreasing patient's " risk of stroke and/or bleeding complications.   Reason(s) for out of range INR today:  n/a      Plan:  Pt is to continue with current warfarin dosing regimen as this has now maintained INR in therapeutic range.     Follow up:  Because warfarin is a high risk medication and current CHEST guidelines recommend regular monitoring intervals (few days up to 12 weeks), will have patient return to clinic in 4 weeks to recheck INR.    Kenneth Salas, PharmD

## 2018-01-23 ENCOUNTER — TELEPHONE (OUTPATIENT)
Dept: MEDICAL GROUP | Facility: PHYSICIAN GROUP | Age: 68
End: 2018-01-23

## 2018-01-23 DIAGNOSIS — Z45.010 PACER AT END OF BATTERY LIFE: ICD-10-CM

## 2018-01-23 NOTE — TELEPHONE ENCOUNTER
2. SPECIFIC Action To Be Taken: Referral pending, please sign.    3. Diagnosis/Clinical Reason for Request: Pacer at end of battery life    4. Specialty & Provider Name/Lab/Imaging Location:     5. Is appointment scheduled for requested order/referral: no    Patient informed they will receive a return phone call from the office ONLY if there are any questions before processing their request. Advised to call back if they haven't received a call from the referral department in 5 days.

## 2018-02-16 ENCOUNTER — ANTICOAGULATION VISIT (OUTPATIENT)
Dept: MEDICAL GROUP | Facility: PHYSICIAN GROUP | Age: 68
End: 2018-02-16
Payer: MEDICARE

## 2018-02-16 VITALS — DIASTOLIC BLOOD PRESSURE: 71 MMHG | HEART RATE: 70 BPM | SYSTOLIC BLOOD PRESSURE: 117 MMHG

## 2018-02-16 DIAGNOSIS — Z79.01 CHRONIC ANTICOAGULATION: ICD-10-CM

## 2018-02-16 LAB — INR PPP: 3.6 (ref 2–3.5)

## 2018-02-16 PROCEDURE — 85610 PROTHROMBIN TIME: CPT | Performed by: INTERNAL MEDICINE

## 2018-02-16 PROCEDURE — 99211 OFF/OP EST MAY X REQ PHY/QHP: CPT | Performed by: INTERNAL MEDICINE

## 2018-02-16 NOTE — PROGRESS NOTES
Anticoagulation Summary  As of 2/16/2018    INR goal:   2.0-3.0   TTR:   41.4 % (11.2 mo)   Today's INR:   3.6!   Maintenance plan:   10 mg (5 mg x 2) on Mon, Fri; 7.5 mg (5 mg x 1.5) all other days   Weekly total:   57.5 mg   Plan last modified:   Kenneth Salas PharmD (12/1/2017)   Next INR check:   3/9/2018   Target end date:   Indefinite    Indications    Chronic anticoagulation [Z79.01]  Atrial fibrillation (CMS-[facility]) [I48.91]  History of mitral valve repair [Z98.890]             Anticoagulation Episode Summary     INR check location:       Preferred lab:       Send INR reminders to:       Comments:         Anticoagulation Care Providers     Provider Role Specialty Phone number    Zeferino Almaraz A.P.YASMANY. Referring Family Medicine [phone]    [facility] Responsible  [phone]    Kenneth Salas, PharmD Responsible          Anticoagulation Patient Findings  Patient Findings     Negatives:   Signs/symptoms of thrombosis, Signs/symptoms of bleeding, Laboratory test error suspected, Change in health, Change in alcohol use, Change in activity, Upcoming invasive procedure, Emergency department visit, Upcoming dental procedure, Missed doses, Extra doses, Change in medications, Change in diet/appetite, Hospital admission, Bruising, Other complaints        HPI:   Salma Lees seen in clinic today, on anticoagulation therapy with warfarin for stroke prevention due to history of atrial fibrillation and mitral valve repair.    Patient's previous INR was therapeutic at 2.4 on 1-19-18, at which time patient was instructed to continue with current warfarin regimen.  She returns to clinic today to recheck INR to ensure it is therapeutic and thus preventing possible clotting and/or bleeding/bruising complications.    CHADS-VASc = at least 3  (unadjusted ischemic stroke risk/year:  3.2%, which is moderate risk)    Does patient have any changes to current medical/health status since last appt (Y/N):   "NO  Does patient have any signs/symptoms of bleeding and/or thrombosis since the last appt (Y/N):  NO  Does patient have any interval changes to diet or medications since last appt (Y/N):  No  Are there any complications or cost restrictions with current therapy (Y/N):  NO      Vitals:  /72  HR 70    Weight  declines   Height   5' 4\"     Asssessment:      INR supratherapeutic at 3.6, therefore increasing patient's risk of bleeding/bruising complications.   Reason(s) for out of range INR today:  No identifiable causes for high INR      Plan:  Instructed patient to decrease today's dose to 2.5mg, then resume current warfarin regimen in order to bring INR back to therapeutic range.     Follow up:  Because warfarin is a high risk medication and current CHEST guidelines recommend regular monitoring intervals (few days up to 12 weeks), will have patient return to clinic in 3 weeks to recheck INR.    Kenneth Salas, PharmD    "

## 2018-03-07 RX ORDER — FUROSEMIDE 20 MG/1
20 TABLET ORAL DAILY
Qty: 90 TAB | Refills: 1 | Status: SHIPPED | OUTPATIENT
Start: 2018-03-07 | End: 2022-05-18 | Stop reason: SDUPTHER

## 2018-03-07 NOTE — TELEPHONE ENCOUNTER
Refill X 6 months, sent to pharmacy.Pt. Seen in the last 6 months per protocol.   Lab Results   Component Value Date/Time    SODIUM 136 09/18/2017 07:20 AM    POTASSIUM 3.8 09/18/2017 07:20 AM    CHLORIDE 101 09/18/2017 07:20 AM    CO2 25 09/18/2017 07:20 AM    GLUCOSE 143 (H) 09/18/2017 07:20 AM    BUN 15 09/18/2017 07:20 AM    CREATININE 0.91 09/18/2017 07:20 AM

## 2018-03-12 ENCOUNTER — ANTICOAGULATION VISIT (OUTPATIENT)
Dept: MEDICAL GROUP | Facility: PHYSICIAN GROUP | Age: 68
End: 2018-03-12
Payer: MEDICARE

## 2018-03-12 DIAGNOSIS — Z79.01 CHRONIC ANTICOAGULATION: ICD-10-CM

## 2018-03-12 DIAGNOSIS — I48.91 ATRIAL FIBRILLATION, UNSPECIFIED TYPE (HCC): ICD-10-CM

## 2018-03-12 LAB — INR PPP: 2.8 (ref 2–3.5)

## 2018-03-12 PROCEDURE — 85610 PROTHROMBIN TIME: CPT | Performed by: INTERNAL MEDICINE

## 2018-03-12 PROCEDURE — 99999 PR NO CHARGE: CPT | Performed by: INTERNAL MEDICINE

## 2018-03-12 RX ORDER — WARFARIN SODIUM 5 MG/1
TABLET ORAL
Qty: 180 TAB | Refills: 1 | Status: SHIPPED | OUTPATIENT
Start: 2018-03-12 | End: 2018-11-14 | Stop reason: SDUPTHER

## 2018-03-12 RX ORDER — CARVEDILOL 25 MG/1
25 TABLET ORAL 2 TIMES DAILY
COMMUNITY
End: 2022-05-18 | Stop reason: SDUPTHER

## 2018-03-12 NOTE — PROGRESS NOTES
Anticoagulation Summary  As of 3/12/2018    INR goal:   2.0-3.0   TTR:   40.3 % (12 mo)   Today's INR:   2.8   Maintenance plan:   10 mg (5 mg x 2) on Mon, Fri; 7.5 mg (5 mg x 1.5) all other days   Weekly total:   57.5 mg   Plan last modified:   Kenneth Salas PharmD (12/1/2017)   Next INR check:   4/2/2018   Target end date:   Indefinite    Indications    Chronic anticoagulation [Z79.01]  Atrial fibrillation (CMS-HCC) [I48.91]  History of mitral valve repair [Z98.890]             Anticoagulation Episode Summary     INR check location:       Preferred lab:       Send INR reminders to:       Comments:         Anticoagulation Care Providers     Provider Role Specialty Phone number    LILIYA Pearson Referring Family Medicine [phone]    [facility] Anticoagulation Services Responsible  [phone]    Kenneth Salas, PharmD Responsible          Anticoagulation Patient Findings  Patient Findings     Negatives:   Signs/symptoms of thrombosis, Signs/symptoms of bleeding, Laboratory test error suspected, Change in health, Change in alcohol use, Change in activity, Upcoming invasive procedure, Emergency department visit, Upcoming dental procedure, Missed doses, Extra doses, Change in medications, Change in diet/appetite, Hospital admission, Bruising, Other complaints        HPI:   Salma Lees seen in clinic today, on anticoagulation therapy with warfarin for stroke prevention due to history of atrial fibrillation and mitral valve repair.    Patient's previous INR was supratherapeutic at 3.6 on 2-16-18, at which time patient was instructed to decrease one dose, then resume current warfarin regimen.  She returns to clinic today to recheck INR to ensure it is therapeutic and thus preventing possible clotting and/or bleeding/bruising complications.    CHADS-VASc = at least 3  (unadjusted ischemic stroke risk/year:  3.2%, which is moderate risk)    Does patient have any changes to current medical/health status since  "last appt (Y/N):  NO  Does patient have any signs/symptoms of bleeding and/or thrombosis since the last appt (Y/N):  NO  Does patient have any interval changes to diet or medications since last appt (Y/N):  NO  Are there any complications or cost restrictions with current therapy (Y/N):  NO      Vitals:  BP declined at today's visit    Weight  declines   Height   5' 4\"     Asssessment:      INR therapeutic at 2.8, therefore decreasing patient's risk of stroke and/or bleeding complications.   Reason(s) for out of range INR today:  n/a      Plan:  Pt is to continue with current warfarin dosing regimen as this dose has maintained INR in therapeutic range.     Follow up:  Because warfarin is a high risk medication and current CHEST guidelines recommend regular monitoring intervals (few days up to 12 weeks), will have patient return to clinic in 3 weeks to recheck INR.    Kenneth Salas, PharmD    "

## 2018-03-20 DIAGNOSIS — I48.91 ATRIAL FIBRILLATION, UNSPECIFIED TYPE (HCC): ICD-10-CM

## 2018-04-02 ENCOUNTER — ANTICOAGULATION VISIT (OUTPATIENT)
Dept: MEDICAL GROUP | Facility: PHYSICIAN GROUP | Age: 68
End: 2018-04-02
Payer: MEDICARE

## 2018-04-02 VITALS — HEART RATE: 101 BPM | SYSTOLIC BLOOD PRESSURE: 119 MMHG | DIASTOLIC BLOOD PRESSURE: 83 MMHG

## 2018-04-02 DIAGNOSIS — Z79.01 CHRONIC ANTICOAGULATION: ICD-10-CM

## 2018-04-02 LAB — INR PPP: 2.5 (ref 2–3.5)

## 2018-04-02 PROCEDURE — 85610 PROTHROMBIN TIME: CPT | Performed by: INTERNAL MEDICINE

## 2018-04-02 NOTE — PROGRESS NOTES
Anticoagulation Summary  As of 4/2/2018    INR goal:   2.0-3.0   TTR:   43.5 % (1 y)   Today's INR:   2.5   Maintenance plan:   10 mg (5 mg x 2) on Mon, Fri; 7.5 mg (5 mg x 1.5) all other days   Weekly total:   57.5 mg   Plan last modified:   Kenneth Salas PharmD (12/1/2017)   Next INR check:   5/7/2018   Target end date:   Indefinite    Indications    Chronic anticoagulation [Z79.01]  Atrial fibrillation (CMS-HCC) [I48.91]  History of mitral valve repair [Z98.890]             Anticoagulation Episode Summary     INR check location:       Preferred lab:       Send INR reminders to:       Comments:         Anticoagulation Care Providers     Provider Role Specialty Phone number    LILIYA Pearson Referring Family Medicine [phone]    [facility] Anticoagulation Services Responsible  [phone]    Kenneth Salas, PharmD Responsible          Anticoagulation Patient Findings  Patient Findings     Negatives:   Signs/symptoms of thrombosis, Signs/symptoms of bleeding, Laboratory test error suspected, Change in health, Change in alcohol use, Change in activity, Upcoming invasive procedure, Emergency department visit, Upcoming dental procedure, Missed doses, Extra doses, Change in medications, Change in diet/appetite, Hospital admission, Bruising, Other complaints        HPI:   Salma Lees seen in clinic today, on anticoagulation therapy with warfarin for stroke prevention due to history of atrial fibrillation.    Patient's previous INR was therapeutic at 2.8 on 3-12-18, at which time patient was instructed to continue with current warfarin regimen.  She returns to clinic today to recheck INR to ensure it is therapeutic and thus preventing possible clotting and/or bleeding/bruising complications.    CHADS-VASc = at least 3  (unadjusted ischemic stroke risk/year:  3.2%, which is moderate risk)    Does patient have any changes to current medical/health status since last appt (Y/N):  NO  Does patient have any  "signs/symptoms of bleeding and/or thrombosis since the last appt (Y/N):  NO  Does patient have any interval changes to diet or medications since last appt (Y/N):  NO  Are there any complications or cost restrictions with current therapy (Y/N):  NO      Vitals:  /83      Weight  declines   Height   5' 4\"     Asssessment:      INR remains therapeutic at 2.5, therefore decreasing patient's risk of stroke and/or bleeding/bruising complications.   Reason(s) for out of range INR today:  n/a      Plan:  Pt is to continue with current warfarin dosing regimen as this dose continues to maintain INR in therapeutic range.     Follow up:  Because warfarin is a high risk medication and current CHEST guidelines recommend regular monitoring intervals (few days up to 12 weeks), will have patient return to clinic in 5 weeks to recheck INR.    Kenneth Salas, PharmD    "

## 2018-05-07 ENCOUNTER — ANTICOAGULATION VISIT (OUTPATIENT)
Dept: MEDICAL GROUP | Facility: PHYSICIAN GROUP | Age: 68
End: 2018-05-07
Payer: MEDICARE

## 2018-05-07 VITALS — SYSTOLIC BLOOD PRESSURE: 126 MMHG | DIASTOLIC BLOOD PRESSURE: 102 MMHG | HEART RATE: 88 BPM

## 2018-05-07 DIAGNOSIS — I48.91 ATRIAL FIBRILLATION, UNSPECIFIED TYPE (HCC): ICD-10-CM

## 2018-05-07 DIAGNOSIS — Z79.01 CHRONIC ANTICOAGULATION: Primary | ICD-10-CM

## 2018-05-07 DIAGNOSIS — Z98.890 HISTORY OF MITRAL VALVE REPAIR: ICD-10-CM

## 2018-05-07 LAB — INR PPP: 3.7 (ref 2–3.5)

## 2018-05-07 PROCEDURE — 85610 PROTHROMBIN TIME: CPT | Performed by: INTERNAL MEDICINE

## 2018-05-07 PROCEDURE — 99211 OFF/OP EST MAY X REQ PHY/QHP: CPT | Performed by: INTERNAL MEDICINE

## 2018-05-07 NOTE — PROGRESS NOTES
Anticoagulation Summary  As of 5/7/2018    INR goal:   2.0-3.0   TTR:   43.4 % (1.1 y)   Today's INR:   3.7!   Warfarin maintenance plan:   10 mg (5 mg x 2) on Mon, Fri; 7.5 mg (5 mg x 1.5) all other days   Weekly warfarin total:   57.5 mg   Plan last modified:   Kenneth Salas, PharmD (12/1/2017)   Next INR check:   5/21/2018   Target end date:   Indefinite    Indications    Chronic anticoagulation [Z79.01]  Atrial fibrillation (HCC) [I48.91]  History of mitral valve repair [Z98.890]             Anticoagulation Episode Summary     INR check location:       Preferred lab:       Send INR reminders to:       Comments:         Anticoagulation Care Providers     Provider Role Specialty Phone number    Zeferino Almaraz A.P.N. Referring Family Medicine [phone]    [facility] Anticoagulation Services Responsible  [phone]    Kenneth Salas, PharmD Responsible          Anticoagulation Patient Findings  Patient Findings     Negatives:   Signs/symptoms of thrombosis, Signs/symptoms of bleeding, Laboratory test error suspected, Change in health, Change in alcohol use, Change in activity, Upcoming invasive procedure, Emergency department visit, Upcoming dental procedure, Missed doses, Extra doses, Change in medications, Change in diet/appetite, Hospital admission, Bruising, Other complaints        HPI:   Salma Lees seen in clinic today, on anticoagulation therapy with warfarin for stroke prevention due to history of valvular atrial fibrillation.    Patient's previous INR was therapeutic at 2.5 on 4-2-18, at which time patient was instructed to continue with current warfarin regimen.  She returns to clinic today to recheck INR to ensure it is therapeutic and thus preventing possible clotting and/or bleeding/bruising complications.    CHADS-VASc = n/a  (unadjusted ischemic stroke risk/year:  n/a)    Does patient have any changes to current medical/health status since last appt (Y/N):  NO  Does patient have any  "signs/symptoms of bleeding and/or thrombosis since the last appt (Y/N):  NO  Does patient have any interval changes to diet or medications since last appt (Y/N):  NO  Are there any complications or cost restrictions with current therapy (Y/N):  NO      Vitals:  /102  HR 88    Weight  declines   Height   5' 4\"     Asssessment:      INR supratherapeutic at 3.7, therefore increasing patient's risk of bleeding/bruising complications due to warfarin   Reason(s) for out of range INR today:  Possibly took extra dose this past week      Plan:  Instructed patient to HOLD X 1, then resume current warfarin regimen in order to bring INR back to therapeutic range.     Follow up:  Because warfarin is a high risk medication and current CHEST guidelines recommend regular monitoring intervals (few days up to 12 weeks), will have patient return to clinic in 2 weeks to recheck INR.    Kenneth Salas, PharmD    "

## 2018-05-10 ENCOUNTER — PATIENT OUTREACH (OUTPATIENT)
Dept: HEALTH INFORMATION MANAGEMENT | Facility: OTHER | Age: 68
End: 2018-05-10

## 2018-05-10 NOTE — PROGRESS NOTES
Outcome: Left Message    Please transfer to Patient Outreach Team at [phone] when patient returns call.        Attempt # 1

## 2018-05-21 ENCOUNTER — ANTICOAGULATION VISIT (OUTPATIENT)
Dept: MEDICAL GROUP | Facility: PHYSICIAN GROUP | Age: 68
End: 2018-05-21
Payer: MEDICARE

## 2018-05-21 VITALS — DIASTOLIC BLOOD PRESSURE: 94 MMHG | HEART RATE: 107 BPM | SYSTOLIC BLOOD PRESSURE: 112 MMHG

## 2018-05-21 DIAGNOSIS — Z79.01 CHRONIC ANTICOAGULATION: Primary | ICD-10-CM

## 2018-05-21 LAB — INR PPP: 2.3 (ref 2–3.5)

## 2018-05-21 PROCEDURE — 85610 PROTHROMBIN TIME: CPT | Performed by: INTERNAL MEDICINE

## 2018-05-21 NOTE — PROGRESS NOTES
Anticoagulation Summary  As of 5/21/2018    INR goal:   2.0-3.0   TTR:   43.6 % (1.2 y)   Today's INR:   2.3   Warfarin maintenance plan:   10 mg (5 mg x 2) on Mon, Fri; 7.5 mg (5 mg x 1.5) all other days   Weekly warfarin total:   57.5 mg   Plan last modified:   Kenneth Salas, PharmD (12/1/2017)   Next INR check:   6/18/2018   Target end date:   Indefinite    Indications    Chronic anticoagulation [Z79.01]  Atrial fibrillation (HCC) [I48.91]  History of mitral valve repair [Z98.890]             Anticoagulation Episode Summary     INR check location:       Preferred lab:       Send INR reminders to:       Comments:         Anticoagulation Care Providers     Provider Role Specialty Phone number    Zeferino Almaraz A.P.YASMANY. Referring Family Medicine [phone]    [facility] Anticoagulation Services Responsible  [phone]    Kenneth Salas, PharmD Responsible          Anticoagulation Patient Findings  Patient Findings     Negatives:   Signs/symptoms of thrombosis, Signs/symptoms of bleeding, Laboratory test error suspected, Change in health, Change in alcohol use, Change in activity, Upcoming invasive procedure, Emergency department visit, Upcoming dental procedure, Missed doses, Extra doses, Change in medications, Change in diet/appetite, Hospital admission, Bruising, Other complaints        HPI:   Salma Lees seen in clinic today, on anticoagulation therapy with warfarin for stroke prevention due to history of valvular atrial fibrillation.    Patient's previous INR was supratherapeutic at 3.7 on 5-7-18, at which time patient was instructed to hold one dose, then resume current warfarin regimen.  She returns to clinic today to recheck INR to ensure it is therapeutic and thus preventing possible clotting and/or bleeding/bruising complications.    CHADS-VASc = n/a  (unadjusted ischemic stroke risk/year:  n/a)    Does patient have any changes to current medical/health status since last appt (Y/N):  NO  Does patient  "have any signs/symptoms of bleeding and/or thrombosis since the last appt (Y/N):  NO  Does patient have any interval changes to diet or medications since last appt (Y/N):  NO  Are there any complications or cost restrictions with current therapy (Y/N):  NO      Vitals:  /94      Weight  declines   Height   5' 4\"     Asssessment:      INR therapeutic at 2.3, therefore decreasing patient's risk of blood clots and/or bleeding complications.   Reason(s) for out of range INR today:  n/a      Plan:  Pt is to continue with current warfarin dosing regimen in order to maintain INR in therapeutic range.     Follow up:  Because warfarin is a high risk medication and current CHEST guidelines recommend regular monitoring intervals (few days up to 12 weeks), will have patient return to clinic in 4 weeks to recheck INR.    Kenneth Salas, PharmD    "

## 2018-06-18 ENCOUNTER — ANTICOAGULATION VISIT (OUTPATIENT)
Dept: MEDICAL GROUP | Facility: PHYSICIAN GROUP | Age: 68
End: 2018-06-18
Payer: MEDICARE

## 2018-06-18 VITALS — DIASTOLIC BLOOD PRESSURE: 68 MMHG | HEART RATE: 75 BPM | SYSTOLIC BLOOD PRESSURE: 109 MMHG

## 2018-06-18 DIAGNOSIS — Z79.01 CHRONIC ANTICOAGULATION: ICD-10-CM

## 2018-06-18 DIAGNOSIS — I48.91 ATRIAL FIBRILLATION, UNSPECIFIED TYPE (HCC): ICD-10-CM

## 2018-06-18 DIAGNOSIS — Z98.890 HISTORY OF MITRAL VALVE REPAIR: ICD-10-CM

## 2018-06-18 LAB — INR PPP: 3.7 (ref 2–3.5)

## 2018-06-18 PROCEDURE — 99211 OFF/OP EST MAY X REQ PHY/QHP: CPT | Performed by: INTERNAL MEDICINE

## 2018-06-18 PROCEDURE — 85610 PROTHROMBIN TIME: CPT | Performed by: INTERNAL MEDICINE

## 2018-06-18 NOTE — PROGRESS NOTES
Anticoagulation Summary  As of 6/18/2018    INR goal:   2.0-3.0   TTR:   --   Today's INR:   3.7!   Warfarin maintenance plan:   10 mg (5 mg x 2) on Mon, Fri; 7.5 mg (5 mg x 1.5) all other days   Weekly warfarin total:   57.5 mg   Plan last modified:   Kenneth Salas, Jing (12/1/2017)   Next INR check:   7/2/2018   Target end date:   Indefinite    Indications    Chronic anticoagulation [Z79.01]  Atrial fibrillation (HCC) [I48.91]  History of mitral valve repair [Z98.890]             Anticoagulation Episode Summary     INR check location:       Preferred lab:       Send INR reminders to:       Comments:         Anticoagulation Care Providers     Provider Role Specialty Phone number    LILIYA Pearson Referring Family Medicine [phone]    [facility] Anticoagulation Services Responsible  [phone]    Kenneth Salas, Jing Responsible          Anticoagulation Patient Findings  Patient Findings     Negatives:   Signs/symptoms of thrombosis, Signs/symptoms of bleeding, Laboratory test error suspected, Change in health, Change in alcohol use, Change in activity, Upcoming invasive procedure, Emergency department visit, Upcoming dental procedure, Missed doses, Extra doses, Change in medications, Change in diet/appetite, Hospital admission, Bruising, Other complaints        Encounter Vitals  Standard Vitals  Vitals  Blood Pressure : 109/68  Pulse: 75  Encounter Vitals  Blood Pressure : 109/68  BP Location: Left, Upper Arm  Patient BP Position: Sitting  Pulse: 75  Pulmonary-Specific Vitals     Durable Medical Equipment-Specific Vitals       History of Present Illness: follow up appointment for chronic anticoagulation with the high risk medication, warfarin for stroke prevention with atrial fibrillation.    INR supratherapeutic  Patient eats irregularly, likely ate less recently causing elevated INR  Hold x 1 dose  No change in weekly dose  F/U INR in 2 weeks    Jadiel Erickson, JoleenD

## 2018-07-02 ENCOUNTER — ANTICOAGULATION VISIT (OUTPATIENT)
Dept: MEDICAL GROUP | Facility: PHYSICIAN GROUP | Age: 68
End: 2018-07-02
Payer: MEDICARE

## 2018-07-02 VITALS — DIASTOLIC BLOOD PRESSURE: 73 MMHG | SYSTOLIC BLOOD PRESSURE: 123 MMHG | HEART RATE: 60 BPM

## 2018-07-02 DIAGNOSIS — Z79.01 CHRONIC ANTICOAGULATION: Primary | ICD-10-CM

## 2018-07-02 DIAGNOSIS — I48.91 ATRIAL FIBRILLATION, UNSPECIFIED TYPE (HCC): ICD-10-CM

## 2018-07-02 DIAGNOSIS — Z98.890 HISTORY OF MITRAL VALVE REPAIR: ICD-10-CM

## 2018-07-02 LAB — INR PPP: 3.8 (ref 2–3.5)

## 2018-07-02 PROCEDURE — 85610 PROTHROMBIN TIME: CPT | Performed by: NURSE PRACTITIONER

## 2018-07-02 PROCEDURE — 99211 OFF/OP EST MAY X REQ PHY/QHP: CPT | Performed by: NURSE PRACTITIONER

## 2018-07-02 NOTE — PROGRESS NOTES
Anticoagulation Summary  As of 7/2/2018    INR goal:   2.0-3.0   TTR:   42.7 % (1.3 y)   Today's INR:   3.8!   Warfarin maintenance plan:   7.5 mg (5 mg x 1.5) every day   Weekly warfarin total:   52.5 mg   Plan last modified:   Kenneth Salas, PharmD (7/2/2018)   Next INR check:   7/16/2018   Target end date:   Indefinite    Indications    Chronic anticoagulation [Z79.01]  Atrial fibrillation (HCC) [I48.91]  History of mitral valve repair [Z98.890]             Anticoagulation Episode Summary     INR check location:       Preferred lab:       Send INR reminders to:       Comments:         Anticoagulation Care Providers     Provider Role Specialty Phone number    CHARLES PearsonPKENNY. Referring Family Medicine [phone]    [facility] Anticoagulation Services Responsible  [phone]    Kenneth Salas, PharmD Responsible          Anticoagulation Patient Findings  Patient Findings     Negatives:   Signs/symptoms of thrombosis, Signs/symptoms of bleeding, Laboratory test error suspected, Change in health, Change in alcohol use, Change in activity, Upcoming invasive procedure, Emergency department visit, Upcoming dental procedure, Missed doses, Extra doses, Change in medications, Change in diet/appetite, Hospital admission, Bruising, Other complaints        HPI:   Salma Lees seen in clinic today, on anticoagulation therapy with warfarin for stroke prevention due to history of valvular atrial fibrillation.    Patient's previous INR was supratherapeutic at 3.7 on 6-18-18, at which time patient was instructed to hold one dose, then resume current warfarin regimen.  She returns to clinic today to recheck INR to ensure it is therapeutic and thus preventing possible clotting and/or bleeding/bruising complications.    CHADS-VASc = n/a  (unadjusted ischemic stroke risk/year:  n/a)    Does patient have any changes to current medical/health status since last appt (Y/N):  NO  Does patient have any signs/symptoms of bleeding  "and/or thrombosis since the last appt (Y/N):  NO  Does patient have any interval changes to diet or medications since last appt (Y/N):  NO  Are there any complications or cost restrictions with current therapy (Y/N):  NO      Vitals:  /73  HR 60    Weight  declines   Height   5' 4\"     Asssessment:      INR remains supratherapeutic at 3.8, therefore increasing patient's risk of bleeding complication due to warfarin   Reason(s) for out of range INR today:  No identifiable causes for high INR.      Plan:  Instructed patient to HOLD X 1, then decrease weekly warfarin regimen by ~8% as detailed above in order to bring INR back to therapeutic range.     Follow up:  Because warfarin is a high risk medication and current CHEST guidelines recommend regular monitoring intervals (few days up to 12 weeks), will have patient return to clinic in 2 weeks to recheck INR.    Kenneth Salas, PharmD    "

## 2018-07-16 ENCOUNTER — ANTICOAGULATION VISIT (OUTPATIENT)
Dept: MEDICAL GROUP | Facility: PHYSICIAN GROUP | Age: 68
End: 2018-07-16
Payer: MEDICARE

## 2018-07-16 VITALS — SYSTOLIC BLOOD PRESSURE: 117 MMHG | DIASTOLIC BLOOD PRESSURE: 63 MMHG | HEART RATE: 60 BPM

## 2018-07-16 DIAGNOSIS — Z79.01 CHRONIC ANTICOAGULATION: Primary | ICD-10-CM

## 2018-07-16 DIAGNOSIS — I48.91 ATRIAL FIBRILLATION, UNSPECIFIED TYPE (HCC): ICD-10-CM

## 2018-07-16 DIAGNOSIS — Z98.890 HISTORY OF MITRAL VALVE REPAIR: ICD-10-CM

## 2018-07-16 LAB — INR PPP: 1.9 (ref 2–3.5)

## 2018-07-16 PROCEDURE — 99211 OFF/OP EST MAY X REQ PHY/QHP: CPT | Performed by: INTERNAL MEDICINE

## 2018-07-16 PROCEDURE — 85610 PROTHROMBIN TIME: CPT | Performed by: INTERNAL MEDICINE

## 2018-07-16 NOTE — PROGRESS NOTES
Anticoagulation Summary  As of 7/16/2018    INR goal:   2.0-3.0   TTR:   43.0 % (1.3 y)   Today's INR:   1.9!   Warfarin maintenance plan:   7.5 mg (5 mg x 1.5) every day   Weekly warfarin total:   52.5 mg   Plan last modified:   Kenneth Salas, PharmD (7/2/2018)   Next INR check:   7/30/2018   Target end date:   Indefinite    Indications    Chronic anticoagulation [Z79.01]  Atrial fibrillation (HCC) [I48.91]  History of mitral valve repair [Z98.890]             Anticoagulation Episode Summary     INR check location:       Preferred lab:       Send INR reminders to:       Comments:         Anticoagulation Care Providers     Provider Role Specialty Phone number    SUSANNE Pearson.PKENNY. Referring Family Medicine [phone]    [facility] Anticoagulation Services Responsible  [phone]    Kenneth Salas, PharmD Responsible          Anticoagulation Patient Findings  Patient Findings     Positives:   Missed doses    Negatives:   Signs/symptoms of thrombosis, Signs/symptoms of bleeding, Laboratory test error suspected, Change in health, Change in alcohol use, Change in activity, Upcoming invasive procedure, Emergency department visit, Upcoming dental procedure, Extra doses, Change in medications, Change in diet/appetite, Hospital admission, Bruising, Other complaints        HPI:   Salma Lees seen in clinic today, on anticoagulation therapy with warfarin for stroke prevention due to history of valvular atrial fibrillation.    Patient's previous INR was supratherapeutic at 3.8 on 7-2-18, at which time patient was instructed to hold two doses, then decrease weekly warfarin regimen.  She returns to clinic today to recheck INR to ensure it is therapeutic and thus preventing possible clotting and/or bleeding/bruising complications.    CHADS-VASc = n/a  (unadjusted ischemic stroke risk/year:  n/a)    Does patient have any changes to current medical/health status since last appt (Y/N):  NO  Does patient have any  "signs/symptoms of bleeding and/or thrombosis since the last appt (Y/N):  NO  Does patient have any interval changes to diet or medications since last appt (Y/N):  NO  Are there any complications or cost restrictions with current therapy (Y/N):  NO      Vitals:  /63  HR 60    Weight  declines   Height   5' 4\"     Asssessment:      INR subtherapeutic at 1.9, therefore increasing patient's risk of stroke due to a-fib.   Reason(s) for out of range INR today:  Missed dose mid-week       Plan:  Instructed patient to continue with current warfarin regimen as it should trend nicely to therapeutic range following missed dose last week.     Follow up:  Because warfarin is a high risk medication and current CHEST guidelines recommend regular monitoring intervals (few days up to 12 weeks), will have patient return to clinic in 2 weeks to recheck INR.    Kenneth Salas, PharmD    "

## 2018-07-30 ENCOUNTER — ANTICOAGULATION VISIT (OUTPATIENT)
Dept: MEDICAL GROUP | Facility: PHYSICIAN GROUP | Age: 68
End: 2018-07-30
Payer: MEDICARE

## 2018-07-30 DIAGNOSIS — I48.91 ATRIAL FIBRILLATION, UNSPECIFIED TYPE (HCC): ICD-10-CM

## 2018-07-30 DIAGNOSIS — Z79.01 CHRONIC ANTICOAGULATION: Primary | ICD-10-CM

## 2018-07-30 DIAGNOSIS — Z98.890 HISTORY OF MITRAL VALVE REPAIR: ICD-10-CM

## 2018-07-30 LAB — INR PPP: 2.8 (ref 2–3.5)

## 2018-07-30 PROCEDURE — 85610 PROTHROMBIN TIME: CPT | Performed by: INTERNAL MEDICINE

## 2018-07-30 PROCEDURE — 99999 PR NO CHARGE: CPT | Performed by: INTERNAL MEDICINE

## 2018-07-30 NOTE — PROGRESS NOTES
Anticoagulation Summary  As of 7/30/2018    INR goal:   2.0-3.0   TTR:   44.3 % (1.4 y)   Today's INR:   2.8   Warfarin maintenance plan:   7.5 mg (5 mg x 1.5) every day   Weekly warfarin total:   52.5 mg   Plan last modified:   Kenneth Salas, PharmD (7/2/2018)   Next INR check:   8/20/2018   Target end date:   Indefinite    Indications    Chronic anticoagulation [Z79.01]  Atrial fibrillation (HCC) [I48.91]  History of mitral valve repair [Z98.890]             Anticoagulation Episode Summary     INR check location:       Preferred lab:       Send INR reminders to:       Comments:         Anticoagulation Care Providers     Provider Role Specialty Phone number    MARTA Pearson. Referring Family Medicine [phone]    [facility] Anticoagulation Services Responsible  [phone]    Kenneth Salas, PharmD Responsible          Anticoagulation Patient Findings  Patient Findings     Negatives:   Signs/symptoms of thrombosis, Signs/symptoms of bleeding, Laboratory test error suspected, Change in health, Change in alcohol use, Change in activity, Upcoming invasive procedure, Emergency department visit, Upcoming dental procedure, Missed doses, Extra doses, Change in medications, Change in diet/appetite, Hospital admission, Bruising, Other complaints        HPI:   Salma Lees seen in clinic today, on anticoagulation therapy with warfarin for stroke prevention due to history of valvular atrial fibrillation.    Patient's previous INR was subtherapeutic at 1.9 on 7-16-18, at which time patient was instructed to continue with current warfarin regimen.  She returns to clinic today to recheck INR to ensure it is therapeutic and thus preventing possible clotting and/or bleeding/bruising complications.    CHADS-VASc = n/a  (unadjusted ischemic stroke risk/year:  n/a)    Does patient have any changes to current medical/health status since last appt (Y/N):  NO  Does patient have any signs/symptoms of bleeding and/or  "thrombosis since the last appt (Y/N):  NO  Does patient have any interval changes to diet or medications since last appt (Y/N):  NO  Are there any complications or cost restrictions with current therapy (Y/N):  NO      Vitals:  BP check declined at today's visit    Weight  declines   Height   5' 4\"     Asssessment:      INR therapeutic at 2.8, therefore decreasing patient's risk of stroke and/or bleeding complications.   Reason(s) for out of range INR today:  n/a      Plan:  Pt is to continue with current warfarin dosing regimen in order to maintain INR in therapeutic range.     Follow up:  Because warfarin is a high risk medication and current CHEST guidelines recommend regular monitoring intervals (few days up to 12 weeks), will have patient return to clinic in 3 weeks to recheck INR.    Kenneth Salas, PharmD    "

## 2018-08-08 ENCOUNTER — HOSPITAL ENCOUNTER (OUTPATIENT)
Dept: RADIOLOGY | Facility: MEDICAL CENTER | Age: 68
End: 2018-08-08

## 2018-08-10 ENCOUNTER — PATIENT OUTREACH (OUTPATIENT)
Dept: HEALTH INFORMATION MANAGEMENT | Facility: OTHER | Age: 68
End: 2018-08-10

## 2018-08-10 NOTE — PROGRESS NOTES
Outcome: Left Message    Please transfer to Patient Outreach Team at [phone] when patient returns call.    WebIZ Checked & Epic Updated:  yes    [facility] Verified: yes    Attempt # 1

## 2018-08-20 ENCOUNTER — ANTICOAGULATION VISIT (OUTPATIENT)
Dept: MEDICAL GROUP | Facility: PHYSICIAN GROUP | Age: 68
End: 2018-08-20
Payer: MEDICARE

## 2018-08-20 VITALS — SYSTOLIC BLOOD PRESSURE: 118 MMHG | DIASTOLIC BLOOD PRESSURE: 67 MMHG | HEART RATE: 62 BPM

## 2018-08-20 DIAGNOSIS — Z98.890 HISTORY OF MITRAL VALVE REPAIR: ICD-10-CM

## 2018-08-20 DIAGNOSIS — I48.91 ATRIAL FIBRILLATION, UNSPECIFIED TYPE (HCC): ICD-10-CM

## 2018-08-20 DIAGNOSIS — Z79.01 CHRONIC ANTICOAGULATION: Primary | ICD-10-CM

## 2018-08-20 LAB — INR PPP: 2.9 (ref 2–3.5)

## 2018-08-20 PROCEDURE — 85610 PROTHROMBIN TIME: CPT | Performed by: INTERNAL MEDICINE

## 2018-08-20 PROCEDURE — 99999 PR NO CHARGE: CPT | Performed by: INTERNAL MEDICINE

## 2018-08-20 NOTE — PROGRESS NOTES
Anticoagulation Summary  As of 8/20/2018    INR goal:   2.0-3.0   TTR:   46.5 % (1.4 y)   Today's INR:   2.9   Warfarin maintenance plan:   7.5 mg (5 mg x 1.5) every day   Weekly warfarin total:   52.5 mg   Plan last modified:   Kenneth Salas, PharmD (7/2/2018)   Next INR check:   9/17/2018   Target end date:   Indefinite    Indications    Chronic anticoagulation [Z79.01]  Atrial fibrillation (HCC) [I48.91]  History of mitral valve repair [Z98.890]             Anticoagulation Episode Summary     INR check location:       Preferred lab:       Send INR reminders to:       Comments:         Anticoagulation Care Providers     Provider Role Specialty Phone number    LILIYA Pearson Referring Family Medicine [phone]    [facility] Anticoagulation Services Responsible  [phone]    Kenneth Salas, PharmD Responsible          Anticoagulation Patient Findings  Patient Findings     Negatives:   Signs/symptoms of thrombosis, Signs/symptoms of bleeding, Laboratory test error suspected, Change in health, Change in alcohol use, Change in activity, Upcoming invasive procedure, Emergency department visit, Upcoming dental procedure, Missed doses, Extra doses, Change in medications, Change in diet/appetite, Hospital admission, Bruising, Other complaints        HPI:   Salma Lees seen in clinic today, on anticoagulation therapy with warfarin for stroke prevention due to history of valvular atrial fibrillation.    Patient's previous INR was therapeutic at 2.8 on 7-30-18, at which time patient was instructed to continue with current warfarin regimen.  She returns to clinic today to recheck INR to ensure it is therapeutic and thus preventing possible clotting and/or bleeding/bruising complications.    CHADS-VASc = n/a  (unadjusted ischemic stroke risk/year:  n/a)    Does patient have any changes to current medical/health status since last appt (Y/N):  NO  Does patient have any signs/symptoms of bleeding and/or thrombosis  "since the last appt (Y/N):  NO  Does patient have any interval changes to diet or medications since last appt (Y/N):  no  Are there any complications or cost restrictions with current therapy (Y/N):  no      Vitals:  /67  HR 62    Weight  declines   Height   5' 4\"     Asssessment:      INR remains therapeutic at 2.9, therefore decreasing patient's risk of stroke and/or bleeding complications.   Reason(s) for out of range INR today:  n/a      Plan:  Pt is to continue with current warfarin dosing regimen in order to maintain INR in therapeutic range.     Follow up:  Because warfarin is a high risk medication and current CHEST guidelines recommend regular monitoring intervals (few days up to 12 weeks), will have patient return to clinic in 4 weeks to recheck INR.    Kenneth Salas, PharmD    "

## 2018-09-03 NOTE — PROGRESS NOTES
Outcome: Left Message    Please transfer to Patient Outreach Team at [phone] when patient returns call.    Attempt # 2

## 2018-09-06 NOTE — Clinical Note
Israel Mcgarry,  I saw your patient today with chronic A-Fib on warfarin and digoxin with positive MRSA in urine, current ureteral stent in place. I have spoke with Kenneth Salas (pharm) who is aware and agreed with Bactrim for treatment, with close INR monitoring d/t DDI. She has appointment with him today. Also ordered lab to check Digoxin level next week. I have recommended she f/u with you next week. Please let me know of any questions. Thank you.  KIRBY Montano. 
No

## 2018-09-17 ENCOUNTER — TELEPHONE (OUTPATIENT)
Dept: MEDICAL GROUP | Facility: PHYSICIAN GROUP | Age: 68
End: 2018-09-17

## 2018-09-17 ENCOUNTER — ANTICOAGULATION VISIT (OUTPATIENT)
Dept: MEDICAL GROUP | Facility: PHYSICIAN GROUP | Age: 68
End: 2018-09-17
Payer: MEDICARE

## 2018-09-17 VITALS — HEART RATE: 62 BPM | DIASTOLIC BLOOD PRESSURE: 71 MMHG | SYSTOLIC BLOOD PRESSURE: 129 MMHG

## 2018-09-17 DIAGNOSIS — I48.91 ATRIAL FIBRILLATION, UNSPECIFIED TYPE (HCC): ICD-10-CM

## 2018-09-17 DIAGNOSIS — Z98.890 HISTORY OF MITRAL VALVE REPAIR: ICD-10-CM

## 2018-09-17 DIAGNOSIS — Z79.01 CHRONIC ANTICOAGULATION: Primary | ICD-10-CM

## 2018-09-17 LAB — INR PPP: 4 (ref 2–3.5)

## 2018-09-17 PROCEDURE — 85610 PROTHROMBIN TIME: CPT | Performed by: INTERNAL MEDICINE

## 2018-09-17 PROCEDURE — 99211 OFF/OP EST MAY X REQ PHY/QHP: CPT | Performed by: INTERNAL MEDICINE

## 2018-09-17 NOTE — PROGRESS NOTES
Anticoagulation Summary  As of 9/17/2018    INR goal:   2.0-3.0   TTR:   44.6 % (1.5 y)   Today's INR:   4.0!   Warfarin maintenance plan:   7.5 mg (5 mg x 1.5) every day   Weekly warfarin total:   52.5 mg   Plan last modified:   Kenneth Salas, PharmD (7/2/2018)   Next INR check:   9/24/2018   Target end date:   Indefinite    Indications    Chronic anticoagulation [Z79.01]  Atrial fibrillation (HCC) [I48.91]  History of mitral valve repair [Z98.890]             Anticoagulation Episode Summary     INR check location:       Preferred lab:       Send INR reminders to:       Comments:         Anticoagulation Care Providers     Provider Role Specialty Phone number    Zeferino Almaraz A.P.YASMANY. Referring Family Medicine [phone]    [facility] Anticoagulation Services Responsible  [phone]    Kenneth Salas, PharmD Responsible          Anticoagulation Patient Findings  Patient Findings     Positives:   Change in medications    Negatives:   Signs/symptoms of thrombosis, Signs/symptoms of bleeding, Laboratory test error suspected, Change in health, Change in alcohol use, Change in activity, Upcoming invasive procedure, Emergency department visit, Upcoming dental procedure, Missed doses, Extra doses, Change in diet/appetite, Hospital admission, Bruising, Other complaints    Comments:   Doxycycline         HPI:   Salma Lees seen in clinic today, on anticoagulation therapy with warfarin for stroke prevention due to history of valvular atrial fibrillation.    Patient's previous INR was therapeutic at 2.9 on 8-20-18, at which time patient was instructed to continue with current warfarin regimen.  She returns to clinic today to recheck INR to ensure it is therapeutic and thus preventing possible clotting and/or bleeding/bruising complications.    CHADS-VASc = n/a  (unadjusted ischemic stroke risk/year:  n/a)    Does patient have any changes to current medical/health status since last appt (Y/N):  NO  Does patient have any  "signs/symptoms of bleeding and/or thrombosis since the last appt (Y/N):  NO  Does patient have any interval changes to diet or medications since last appt (Y/N):  Doxycycline for seven days, finishes tomorrow   Are there any complications or cost restrictions with current therapy (Y/N):  NO      Vitals:  /71  HR 62    Weight  declines   Height   5' 4\"     Asssessment:      INR supratherapeutic at 4.0, therefore increasing patient's risk of bleeding complications.   Reason(s) for out of range INR today:  Doxycycline      Plan:  Instructed patient to HOLD X 1, decrease tomorrow's dose to 5mg, then resume current warfarin regimen in order to bring INR back to therapeutic range.  She is having extensive oral surgery starting on 10-1-18, will need to hold warfarin for five days prior to each procedure, will await clearance request from orthodontist and make arrangements for periprocedural management of warfarin.    Follow up:  Because warfarin is a high risk medication and current CHEST guidelines recommend regular monitoring intervals (few days up to 12 weeks), will have patient return to clinic in 1 weeks to recheck INR.    Kenneth Salas, PharmD    "

## 2018-09-17 NOTE — PROGRESS NOTES
Outcome: Left Message    Please transfer to Patient Outreach Team at [phone] when patient returns call.    Attempt # 3

## 2018-09-17 NOTE — TELEPHONE ENCOUNTER
Received form from [facility] for O2 renewal.  Left msg for pt to return call.  Is pt still using O2 at 3.0 liters and for 24 hrs per day?

## 2018-09-21 ENCOUNTER — IMMUNIZATION (OUTPATIENT)
Dept: SOCIAL WORK | Facility: CLINIC | Age: 68
End: 2018-09-21
Payer: MEDICARE

## 2018-09-21 DIAGNOSIS — Z23 NEED FOR VACCINATION: ICD-10-CM

## 2018-09-21 PROCEDURE — 90662 IIV NO PRSV INCREASED AG IM: CPT | Performed by: REGISTERED NURSE

## 2018-09-21 PROCEDURE — G0008 ADMIN INFLUENZA VIRUS VAC: HCPCS | Performed by: REGISTERED NURSE

## 2018-09-24 ENCOUNTER — ANTICOAGULATION VISIT (OUTPATIENT)
Dept: MEDICAL GROUP | Facility: PHYSICIAN GROUP | Age: 68
End: 2018-09-24
Payer: MEDICARE

## 2018-09-24 DIAGNOSIS — I48.91 ATRIAL FIBRILLATION, UNSPECIFIED TYPE (HCC): ICD-10-CM

## 2018-09-24 DIAGNOSIS — Z98.890 HISTORY OF MITRAL VALVE REPAIR: ICD-10-CM

## 2018-09-24 DIAGNOSIS — Z79.01 CHRONIC ANTICOAGULATION: Primary | ICD-10-CM

## 2018-09-24 LAB — INR PPP: 2.5 (ref 2–3.5)

## 2018-09-24 PROCEDURE — 85610 PROTHROMBIN TIME: CPT | Performed by: INTERNAL MEDICINE

## 2018-09-24 PROCEDURE — 99999 PR NO CHARGE: CPT | Performed by: INTERNAL MEDICINE

## 2018-09-24 NOTE — PROGRESS NOTES
Anticoagulation Summary  As of 9/24/2018    INR goal:   2.0-3.0   TTR:   44.5 % (1.5 y)   Today's INR:   2.5   Warfarin maintenance plan:   7.5 mg (5 mg x 1.5) every day   Weekly warfarin total:   52.5 mg   Plan last modified:   Kenneth Salas, PharmD (7/2/2018)   Next INR check:   10/8/2018   Target end date:   Indefinite    Indications    Chronic anticoagulation [Z79.01]  Atrial fibrillation (HCC) [I48.91]  History of mitral valve repair [Z98.890]             Anticoagulation Episode Summary     INR check location:       Preferred lab:       Send INR reminders to:       Comments:         Anticoagulation Care Providers     Provider Role Specialty Phone number    SUSANNE Pearson.P.YASMANY. Referring Family Medicine [phone]    [facility] Anticoagulation Services Responsible  [phone]    Kenneth Salas, PharmD Responsible          Anticoagulation Patient Findings  Patient Findings     Negatives:   Signs/symptoms of thrombosis, Signs/symptoms of bleeding, Laboratory test error suspected, Change in health, Change in alcohol use, Change in activity, Upcoming invasive procedure, Emergency department visit, Upcoming dental procedure, Missed doses, Extra doses, Change in medications, Change in diet/appetite, Hospital admission, Bruising, Other complaints        HPI:   Salma Lees seen in clinic today, on anticoagulation therapy with warfarin for stroke prevention due to history of valvular atrial fibrillation.    Patient's previous INR was supratherapeutic at 4.0 on 9-17-18, at which time patient was instructed to hold one dose, decrease one dose, then resume current warfarin regimen.  She returns to clinic today to recheck INR to ensure it is therapeutic and thus preventing possible clotting and/or bleeding/bruising complications.    CHADS-VASc = n/a  (unadjusted ischemic stroke risk/year:  n/a)    Does patient have any changes to current medical/health status since last appt (Y/N):  NO  Does patient have any  signs/symptoms of bleeding and/or thrombosis since the last appt (Y/N):  NO  Does patient have any interval changes to diet or medications since last appt (Y/N):  NO  Are there any complications or cost restrictions with current therapy (Y/N):  NO      Vitals:  BP check declined at today's visit     Asssessment:      INR therapeutic at 2.5, therefore decreasing patient's risk of stroke and/or bleeding complications.   Reason(s) for out of range INR today:  n/a      Plan:  Pt is to continue with current warfarin dosing regimen in order to maintain INR in therapeutic range.     Follow up:  Because warfarin is a high risk medication and current CHEST guidelines recommend regular monitoring intervals (few days up to 12 weeks), will have patient return to clinic in 2 weeks to recheck INR.    Kenneth Salas, PharmD

## 2018-09-26 NOTE — PROGRESS NOTES
Outcome: Left Message    Please transfer to Patient Outreach Team at [phone] when patient returns call.    Attempt # Final

## 2018-10-17 NOTE — TELEPHONE ENCOUNTER
Was the patient seen in the last year in this department? NO    Does patient have an active prescription for medications requested? No     Received Request Via: Pharmacy      Pt met protocol?: NO    OV 10/17

## 2018-10-19 ENCOUNTER — ANTICOAGULATION VISIT (OUTPATIENT)
Dept: MEDICAL GROUP | Facility: PHYSICIAN GROUP | Age: 68
End: 2018-10-19
Payer: MEDICARE

## 2018-10-19 ENCOUNTER — HOSPITAL ENCOUNTER (OUTPATIENT)
Dept: LAB | Facility: MEDICAL CENTER | Age: 68
End: 2018-10-19
Attending: INTERNAL MEDICINE
Payer: MEDICARE

## 2018-10-19 DIAGNOSIS — Z98.890 HISTORY OF MITRAL VALVE REPAIR: ICD-10-CM

## 2018-10-19 DIAGNOSIS — I48.91 ATRIAL FIBRILLATION, UNSPECIFIED TYPE (HCC): ICD-10-CM

## 2018-10-19 DIAGNOSIS — Z79.01 CHRONIC ANTICOAGULATION: Primary | ICD-10-CM

## 2018-10-19 LAB
ALBUMIN SERPL BCP-MCNC: 4 G/DL (ref 3.2–4.9)
ALBUMIN/GLOB SERPL: 1 G/DL
ALP SERPL-CCNC: 81 U/L (ref 30–99)
ALT SERPL-CCNC: 13 U/L (ref 2–50)
ANION GAP SERPL CALC-SCNC: 7 MMOL/L (ref 0–11.9)
AST SERPL-CCNC: 16 U/L (ref 12–45)
BILIRUB SERPL-MCNC: 1.3 MG/DL (ref 0.1–1.5)
BUN SERPL-MCNC: 20 MG/DL (ref 8–22)
CALCIUM SERPL-MCNC: 9.9 MG/DL (ref 8.5–10.5)
CHLORIDE SERPL-SCNC: 98 MMOL/L (ref 96–112)
CHOLEST SERPL-MCNC: 279 MG/DL (ref 100–199)
CO2 SERPL-SCNC: 29 MMOL/L (ref 20–33)
CREAT SERPL-MCNC: 1.1 MG/DL (ref 0.5–1.4)
FASTING STATUS PATIENT QL REPORTED: NORMAL
GLOBULIN SER CALC-MCNC: 4 G/DL (ref 1.9–3.5)
GLUCOSE SERPL-MCNC: 410 MG/DL (ref 65–99)
HDLC SERPL-MCNC: 42 MG/DL
INR PPP: 3 (ref 2–3.5)
LDLC SERPL CALC-MCNC: 166 MG/DL
POTASSIUM SERPL-SCNC: 4.1 MMOL/L (ref 3.6–5.5)
PROT SERPL-MCNC: 8 G/DL (ref 6–8.2)
SODIUM SERPL-SCNC: 134 MMOL/L (ref 135–145)
TRIGL SERPL-MCNC: 357 MG/DL (ref 0–149)

## 2018-10-19 PROCEDURE — 85610 PROTHROMBIN TIME: CPT | Performed by: FAMILY MEDICINE

## 2018-10-19 PROCEDURE — 80061 LIPID PANEL: CPT

## 2018-10-19 PROCEDURE — 80053 COMPREHEN METABOLIC PANEL: CPT

## 2018-10-19 PROCEDURE — 36415 COLL VENOUS BLD VENIPUNCTURE: CPT

## 2018-10-19 PROCEDURE — 99999 PR NO CHARGE: CPT | Performed by: FAMILY MEDICINE

## 2018-10-19 NOTE — PROGRESS NOTES
Anticoagulation Summary  As of 10/19/2018    INR goal:   2.0-3.0   TTR:   46.8 % (1.6 y)   Today's INR:   3.0   Warfarin maintenance plan:   7.5 mg (5 mg x 1.5) every day   Weekly warfarin total:   52.5 mg   Plan last modified:   Kenneth Salas, PharmD (7/2/2018)   Next INR check:   11/16/2018   Target end date:   Indefinite    Indications    Chronic anticoagulation [Z79.01]  Atrial fibrillation (HCC) [I48.91]  History of mitral valve repair [Z98.890]             Anticoagulation Episode Summary     INR check location:       Preferred lab:       Send INR reminders to:       Comments:         Anticoagulation Care Providers     Provider Role Specialty Phone number    MARTA Pearson. Referring Family Medicine [phone]    [facility] Anticoagulation Services Responsible  [phone]    Kenneth Salas, PharmD Responsible          Anticoagulation Patient Findings  Patient Findings     Negatives:   Signs/symptoms of thrombosis, Signs/symptoms of bleeding, Laboratory test error suspected, Change in health, Change in alcohol use, Change in activity, Upcoming invasive procedure, Emergency department visit, Upcoming dental procedure, Missed doses, Extra doses, Change in medications, Change in diet/appetite, Hospital admission, Bruising, Other complaints        HPI:   Salma Lees seen in clinic today, on anticoagulation therapy with warfarin for stroke prevention due to history of valvular atrial fibrillation.    Patient's previous INR was therapeutic at 2.5 on 9-24-18, at which time patient was instructed to continue with current warfarin regimen.  She returns to clinic today to recheck INR to ensure it is therapeutic and thus preventing possible clotting and/or bleeding/bruising complications.    CHADS-VASc = n/a  (unadjusted ischemic stroke risk/year:  n/a)    Does patient have any changes to current medical/health status since last appt (Y/N):  NO  Does patient have any signs/symptoms of bleeding and/or thrombosis  "since the last appt (Y/N):  NO  Does patient have any interval changes to diet or medications since last appt (Y/N):  NO  Are there any complications or cost restrictions with current therapy (Y/N):  NO      Vitals:  BP check declined at today's visit    Weight  declines   Height   5' 4\"     Asssessment:      INR remains therapeutic at 3.0, therefore decreasing patient's risk of stroke due to a-fib   Reason(s) for out of range INR today:  n/a      Plan:  Pt is to continue with current warfarin dosing regimen in order to maintain INR in therapeutic range.     Follow up:  Because warfarin is a high risk medication and current CHEST guidelines recommend regular monitoring intervals (few days up to 12 weeks), will have patient return to clinic in 4 weeks to recheck INR.    Kenneth Salas, PharmD    "

## 2018-10-25 ENCOUNTER — HOSPITAL ENCOUNTER (OUTPATIENT)
Dept: CARDIOLOGY | Facility: MEDICAL CENTER | Age: 68
End: 2018-10-25
Attending: INTERNAL MEDICINE
Payer: MEDICARE

## 2018-10-25 DIAGNOSIS — I50.22 CHRONIC SYSTOLIC HEART FAILURE (HCC): ICD-10-CM

## 2018-10-25 DIAGNOSIS — I10 HYPERTENSION, UNSPECIFIED TYPE: ICD-10-CM

## 2018-10-25 LAB
LV EJECT FRACT  99904: 60
LV EJECT FRACT MOD 2C 99903: 60.69
LV EJECT FRACT MOD 4C 99902: 60.06
LV EJECT FRACT MOD BP 99901: 59.01

## 2018-10-25 PROCEDURE — 93306 TTE W/DOPPLER COMPLETE: CPT | Mod: 26 | Performed by: INTERNAL MEDICINE

## 2018-10-25 PROCEDURE — 93306 TTE W/DOPPLER COMPLETE: CPT

## 2018-11-06 ENCOUNTER — TELEPHONE (OUTPATIENT)
Dept: PULMONOLOGY | Facility: HOSPICE | Age: 68
End: 2018-11-06

## 2018-11-06 DIAGNOSIS — R06.02 SOB (SHORTNESS OF BREATH): ICD-10-CM

## 2018-11-08 ENCOUNTER — TELEPHONE (OUTPATIENT)
Dept: RADIOLOGY | Facility: IMAGING CENTER | Age: 68
End: 2018-11-08

## 2018-11-08 DIAGNOSIS — R93.89 ABNORMAL CHEST X-RAY: ICD-10-CM

## 2018-11-12 ENCOUNTER — NON-PROVIDER VISIT (OUTPATIENT)
Dept: PULMONOLOGY | Facility: HOSPICE | Age: 68
End: 2018-11-12
Payer: MEDICARE

## 2018-11-12 ENCOUNTER — OFFICE VISIT (OUTPATIENT)
Dept: PULMONOLOGY | Facility: HOSPICE | Age: 68
End: 2018-11-12
Payer: MEDICARE

## 2018-11-12 ENCOUNTER — APPOINTMENT (OUTPATIENT)
Dept: RADIOLOGY | Facility: IMAGING CENTER | Age: 68
End: 2018-11-12
Payer: MEDICARE

## 2018-11-12 VITALS
TEMPERATURE: 97.7 F | HEIGHT: 64 IN | HEART RATE: 109 BPM | BODY MASS INDEX: 35.68 KG/M2 | SYSTOLIC BLOOD PRESSURE: 120 MMHG | WEIGHT: 209 LBS | DIASTOLIC BLOOD PRESSURE: 80 MMHG | OXYGEN SATURATION: 94 %

## 2018-11-12 VITALS — WEIGHT: 209 LBS | BODY MASS INDEX: 35.68 KG/M2 | HEIGHT: 64 IN

## 2018-11-12 DIAGNOSIS — R93.89 ABNORMAL CHEST X-RAY: ICD-10-CM

## 2018-11-12 DIAGNOSIS — Z98.890 HISTORY OF MITRAL VALVE REPAIR: ICD-10-CM

## 2018-11-12 DIAGNOSIS — I48.91 ATRIAL FIBRILLATION, UNSPECIFIED TYPE (HCC): ICD-10-CM

## 2018-11-12 DIAGNOSIS — J44.9 CHRONIC OBSTRUCTIVE PULMONARY DISEASE, UNSPECIFIED COPD TYPE (HCC): ICD-10-CM

## 2018-11-12 DIAGNOSIS — R06.02 SHORTNESS OF BREATH: ICD-10-CM

## 2018-11-12 DIAGNOSIS — R06.02 SOB (SHORTNESS OF BREATH): ICD-10-CM

## 2018-11-12 PROCEDURE — 99204 OFFICE O/P NEW MOD 45 MIN: CPT | Mod: 25 | Performed by: INTERNAL MEDICINE

## 2018-11-12 PROCEDURE — 94729 DIFFUSING CAPACITY: CPT | Performed by: INTERNAL MEDICINE

## 2018-11-12 PROCEDURE — 94060 EVALUATION OF WHEEZING: CPT | Performed by: INTERNAL MEDICINE

## 2018-11-12 PROCEDURE — 94726 PLETHYSMOGRAPHY LUNG VOLUMES: CPT | Performed by: INTERNAL MEDICINE

## 2018-11-12 PROCEDURE — 71046 X-RAY EXAM CHEST 2 VIEWS: CPT | Mod: TC,FY | Performed by: INTERNAL MEDICINE

## 2018-11-12 RX ORDER — SPIRONOLACTONE 25 MG/1
25 TABLET ORAL DAILY
COMMUNITY
End: 2022-05-18 | Stop reason: SDUPTHER

## 2018-11-12 RX ORDER — BUDESONIDE AND FORMOTEROL FUMARATE DIHYDRATE 160; 4.5 UG/1; UG/1
2 AEROSOL RESPIRATORY (INHALATION) 2 TIMES DAILY
Qty: 1 INHALER | Refills: 4 | Status: SHIPPED | OUTPATIENT
Start: 2018-11-12 | End: 2019-11-19

## 2018-11-12 RX ORDER — ATORVASTATIN CALCIUM 20 MG/1
10 TABLET, FILM COATED ORAL NIGHTLY
COMMUNITY
End: 2022-03-03 | Stop reason: SDUPTHER

## 2018-11-12 RX ORDER — LEVALBUTEROL TARTRATE 45 UG/1
2 AEROSOL, METERED ORAL EVERY 4 HOURS PRN
Qty: 1 INHALER | Refills: 2 | Status: SHIPPED | OUTPATIENT
Start: 2018-11-12 | End: 2019-12-09 | Stop reason: SDUPTHER

## 2018-11-12 ASSESSMENT — PULMONARY FUNCTION TESTS
FEV1_PERCENT_CHANGE: 8
FEV1_PERCENT_PREDICTED: 55
FEV1: 1.44
FEV1/FVC_PERCENT_PREDICTED: 86
FVC_LLN: 2.51
FEV1/FVC_PERCENT_CHANGE: 4
FEV1/FVC_PERCENT_LLN: 63
FVC: 2.03
FEV1/FVC_PERCENT_PREDICTED: 82
FEV1/FVC: 62
FEV1/FVC_PERCENT_PREDICTED: 76
FVC_PERCENT_PREDICTED: 73
FEV1/FVC: 63
FEV1_PREDICTED: 2.28
FEV1/FVC_PERCENT_PREDICTED: 85
FEV1: 1.27
FVC_PREDICTED: 3.01
FEV1/FVC_PREDICTED: 76
FEV1/FVC: 65.45
FEV1/FVC_PERCENT_CHANGE: 163
FEV1/FVC_PERCENT_PREDICTED: 82
FEV1_PERCENT_CHANGE: 13
FEV1/FVC: 65
FVC_PERCENT_PREDICTED: 67
FVC: 2.2
FEV1_PERCENT_PREDICTED: 63
FEV1_LLN: 1.90

## 2018-11-12 NOTE — PROCEDURES
Technician Olivia Gu, [facility] Comments:Good patient effort & cooperation.  The results of this test meet the ATS/ERS standards for acceptability & reproducibility.  Test was performed on the [employer] Plethysmograph-Elite DX system.  Predicted values were [facility]-3 for spirometry, [facility] for DLCO, ITS for Lung Volumes.  The DLCO was uncorrected for Hgb.  A bronchodilator of Xopenex HFA -2puffs via spacer administered.  DLCO performed during dilation period.    Interpretation:    Lung function testing was completed on November 12, 2018.  Mid flows at 31% predicted.  Definite bronchodilator response, 50% improvement.  FEV1 low at 1.27 L, 55% predicted.  FEV1/FVC ratio is 82% predicted.  Mild hyperinflation noted low oxygen transfer at 71% suggests possible emphysema, clinical correlation required good patient effort noted

## 2018-11-12 NOTE — PATIENT INSTRUCTIONS
This lady is  in for evaluation of shortness of breath and COPD.  Lung function testing today was reviewed, mid flows are 31% predicted, definite bronchodilator response.  With 57% improvement in mid flows.  Mild hyperinflation is noted.  Low oxygen transfer is seen as well.  Prior smoking was remote, none for over 30 years.    Presently she is having difficulty with albuterol, triggers heart racing, she does have a pacemaker with prior mitral valve repair.  I changed that to Xopenex.  Xopenex produce the 57% improvement in her mid flows.  Neuro is expensive, we will change her to Symbicort, and reassess her response to these changes in several months.    In the meantime she will try to reduce her weight, body mass index is 36, expiratory reserve volume is low and she is aware of being short of breath when bending over.  Portion control and then exercise as guided by rehab.    The other health maintenance issue is immunizations reviewed show that she is current on Prevnar, Pneumovax and flu vaccine    We will make adjustments in her inhalers, rehab referral for pulmonary, and then reassess in several months.   (3) slightly limited

## 2018-11-12 NOTE — PROGRESS NOTES
Salma Lees is a 68 y.o. female here for COPD with maintenance inhalers, also atrial fibrillation and albuterol intolerance. Patient was referred by her primary care doctor.    History of Present Illness:      This lady is  in for evaluation of shortness of breath and COPD.  Lung function testing today was reviewed, mid flows are 31% predicted, definite bronchodilator response.  With 57% improvement in mid flows.  Mild hyperinflation is noted.  Low oxygen transfer is seen as well.  Prior smoking was remote, none for over 30 years.    Presently she is having difficulty with albuterol, triggers heart racing, she does have a pacemaker with prior mitral valve repair.  I changed that to Xopenex.  Xopenex produce the 57% improvement in her mid flows.  Neuro is expensive, we will change her to Symbicort, and reassess her response to these changes in several months.    In the meantime she will try to reduce her weight, body mass index is 36, expiratory reserve volume is low and she is aware of being short of breath when bending over.  Portion control and then exercise as guided by rehab.    The other health maintenance issue is immunizations reviewed show that she is current on Prevnar, Pneumovax and flu vaccine    We will make adjustments in her inhalers, rehab referral for pulmonary, and then reassess in several months.    Constitutional ROS: No unexpected change in weight, No unexplained fevers  Eyes: No change in vision or blurring or double vision  Mouth/Throat ROS: No sore throat, No recent change in voice or hoarseness  Pulmonary ROS: See present history for pertinent positives  Cardiovascular ROS: No chest pain to suggest acute coronary syndrome  Gastrointestinal ROS: No abdominal pain to suggest peptic disease  Musculoskeletal/Extremities ROS: no acute artritis or unusual swelling  Hematologic/Lymphatic ROS: No easy bleeding or unusual lymph node swelling  Neurologic ROS: No new or unusual  weakness  Psychiatric ROS: No hallucinations  Allergic/Immunologic: No  urticaria or allergic rash      Current Outpatient Prescriptions   Medication Sig Dispense Refill   • Potassium (POTASSIMIN PO) Take  by mouth.     • spironolactone (ALDACTONE) 25 MG Tab Take 25 mg by mouth every day.     • atorvastatin (LIPITOR) 20 MG Tab Take 20 mg by mouth every evening.     • levalbuterol (XOPENEX HFA) 45 MCG/ACT inhaler Inhale 2 Puffs by mouth every four hours as needed for Shortness of Breath. 1 Inhaler 2   • budesonide-formoterol (SYMBICORT) 160-4.5 MCG/ACT Aerosol Inhale 2 Puffs by mouth 2 Times a Day. Use spacer. Rinse mouth after each use. 1 Inhaler 4   • ANORO ELLIPTA 62.5-25 MCG/INH AEROSOL POWDER, BREATH ACTIVATED inhaler INHALE 1 PUFF BY MOUTH EVERY DAY 3 Inhaler 0   • carvedilol (COREG) 25 MG Tab Take 25 mg by mouth 2 times a day.     • warfarin (COUMADIN) 5 MG Tab Take one and one-half to two tablets by mouth one time daily or as directed by coumadin clinic 180 Tab 1   • furosemide (LASIX) 20 MG Tab Take 1 Tab by mouth every day. 90 Tab 1   • diltiazem (CARDIZEM) 30 MG Tab Take 30 mg by mouth 2 Times a Day.     • digoxin (LANOXIN) 125 MCG Tab Take 125 mcg by mouth every day.     • albuterol 108 (90 Base) MCG/ACT Aero Soln inhalation aerosol Inhale 2 Puffs by mouth every 6 hours as needed for Shortness of Breath.     • tramadol (ULTRAM) 50 MG Tab Take 50 mg by mouth 1 time daily as needed.     • tramadol (ULTRAM) 50 MG Tab Take 1 Tab by mouth every four hours as needed. 30 Tab 0   • pravastatin (PRAVACHOL) 20 MG Tab Take 20 mg by mouth every day.       No current facility-administered medications for this visit.        Social History   Substance Use Topics   • Smoking status: Former Smoker     Packs/day: 1.00     Years: 5.00     Types: Cigarettes     Quit date: 1/7/1990   • Smokeless tobacco: Never Used   • Alcohol use No        Past Medical History:   Diagnosis Date   • A-fib ([facility])    • Asthma    • Breath  "shortness 09/07/2017    uses oxygen at 3 liters/min cont. with \"[facility]\"   • Congestive heart failure (HCC)    • COPD (chronic obstructive pulmonary disease) ([facility])    • Emphysema of lung (HCC)    • Hypertension    • Infectious disease 09/07/2017    UTI/hx. MRSA   • Pacemaker 2014   • Renal disorder 09/07/2017    has stent       Past Surgical History:   Procedure Laterality Date   • URETEROSCOPY Left 9/18/2017    Procedure: URETEROSCOPY;  Surgeon: Edgardo Richter M.D.;  Location: SURGERY [facility];  Service: Urology   • LASERTRIPSY Left 9/18/2017    Procedure: LASERTRIPSY LITHO  ;  Surgeon: Edgardo Richter M.D.;  Location: SURGERY [facility];  Service: Urology   • STENT REMOVAL Left 9/18/2017    Procedure: STENT REMOVAL;  Surgeon: Edgardo Richter M.D.;  Location: SURGERY [facility];  Service: Urology   • CYSTOSCOPY STENT PLACEMENT Left 6/25/2017    Procedure: CYSTOSCOPY, LEFT URETERAL STENT PLACEMENT;  Surgeon: Edgardo Richter M.D.;  Location: SURGERY [facility];  Service:    • PACEMAKER INSERTION  2015   • KNEE REPLACEMENT, TOTAL Left 2015   • MITRAL VALVE REPAIR  2009       Allergies: Pcn [penicillins]; Amlodipine besylate-valsartan; Amoxicillin; Etodolac; Food; Lisinopril; Other drug; Other misc; and Tape    No family history on file.    Physical Examination    Vitals:    11/12/18 1356 11/12/18 1358   Height: 1.626 m (5' 4\")    Weight: 94.8 kg (209 lb)    Weight % change since last entry.: 0 %    BP: 120/80    Pulse: (!) 109    BMI (Calculated): 35.87    Temp: 36.5 °C (97.7 °F)    O2 sat % on O2:  94 %   O2 Flow Rate (L/min):  2       General Appearance: alert, no distress  Skin: Skin color, texture, turgor normal. No rashes or lesions.  Eyes: negative  Oropharynx: Lips, mucosa, and tongue normal. Teeth and gums normal. Oropharynx moist and without lesion  Lungs: positive findings: Very quiet and clear  Heart: negative. RRR without murmur, gallop, or " rubs.  No ectopy.  Abdomen: Abdomen soft, non-tender. . No masses,  No organomegaly  Extremities:  No deformities, edema, or skin discoloration  Joints: No acute arthritis  Peripheral Pulses:perfused  Neurologic: intact grossly  No clubbing or cyanosis    I (soft palate, uvula, fauces, tonsillar pillars visible)    Imaging: Chest x-ray did not show an acute process, personally reviewed    PFTS: Severe obstruction      Assessment and Plan  1. Chronic obstructive pulmonary disease, unspecified COPD type (HCC)  - REFERRAL TO PULMONARY REHAB  - ALPHA-1-ANTITRYPSIN; Future    2. Atrial fibrillation, unspecified type (HCC)    3. History of mitral valve repair    4. Shortness of breath  - REFERRAL TO PULMONARY REHAB    5. BMI 35.0-35.9,adult  - OBESITY COUNSELING (No Charge): Patient identified as having weight management issue.  Appropriate orders and counseling given.    This lady is  in for evaluation of shortness of breath and COPD.  Lung function testing today was reviewed, mid flows are 31% predicted, definite bronchodilator response.  With 57% improvement in mid flows.  Mild hyperinflation is noted.  Low oxygen transfer is seen as well.  Prior smoking was remote, none for over 30 years.    Presently she is having difficulty with albuterol, triggers heart racing, she does have a pacemaker with prior mitral valve repair.  I changed that to Xopenex.  Xopenex produce the 57% improvement in her mid flows.  Neuro is expensive, we will change her to Symbicort, and reassess her response to these changes in several months.    In the meantime she will try to reduce her weight, body mass index is 36, expiratory reserve volume is low and she is aware of being short of breath when bending over.  Portion control and then exercise as guided by rehab.    The other health maintenance issue is immunizations reviewed show that she is current on Prevnar, Pneumovax and flu vaccine    We will make adjustments in her inhalers, rehab  referral for pulmonary, and then reassess in several months.  Followup Return in about 3 months (around 2/12/2019) for With MD or APRN.

## 2018-11-14 RX ORDER — WARFARIN SODIUM 5 MG/1
TABLET ORAL
Qty: 135 TAB | Refills: 1 | Status: SHIPPED | OUTPATIENT
Start: 2018-11-14 | End: 2019-05-21 | Stop reason: SDUPTHER

## 2018-11-16 ENCOUNTER — OFFICE VISIT (OUTPATIENT)
Dept: URGENT CARE | Facility: PHYSICIAN GROUP | Age: 68
End: 2018-11-16
Payer: MEDICARE

## 2018-11-16 ENCOUNTER — ANTICOAGULATION VISIT (OUTPATIENT)
Dept: MEDICAL GROUP | Facility: PHYSICIAN GROUP | Age: 68
End: 2018-11-16
Payer: MEDICARE

## 2018-11-16 ENCOUNTER — HOSPITAL ENCOUNTER (OUTPATIENT)
Facility: MEDICAL CENTER | Age: 68
End: 2018-11-16
Attending: FAMILY MEDICINE
Payer: MEDICARE

## 2018-11-16 VITALS — DIASTOLIC BLOOD PRESSURE: 80 MMHG | HEART RATE: 72 BPM | SYSTOLIC BLOOD PRESSURE: 118 MMHG

## 2018-11-16 VITALS
TEMPERATURE: 98.1 F | DIASTOLIC BLOOD PRESSURE: 62 MMHG | SYSTOLIC BLOOD PRESSURE: 106 MMHG | BODY MASS INDEX: 35.87 KG/M2 | OXYGEN SATURATION: 93 % | RESPIRATION RATE: 16 BRPM | HEART RATE: 70 BPM | WEIGHT: 209 LBS

## 2018-11-16 DIAGNOSIS — I48.91 ATRIAL FIBRILLATION, UNSPECIFIED TYPE (HCC): ICD-10-CM

## 2018-11-16 DIAGNOSIS — Z79.01 CHRONIC ANTICOAGULATION: Primary | ICD-10-CM

## 2018-11-16 DIAGNOSIS — N30.00 ACUTE CYSTITIS WITHOUT HEMATURIA: ICD-10-CM

## 2018-11-16 DIAGNOSIS — Z98.890 HISTORY OF MITRAL VALVE REPAIR: ICD-10-CM

## 2018-11-16 LAB
APPEARANCE UR: CLEAR
BILIRUB UR STRIP-MCNC: NEGATIVE MG/DL
COLOR UR AUTO: YELLOW
GLUCOSE UR STRIP.AUTO-MCNC: 500 MG/DL
INR PPP: 2.3 (ref 2–3.5)
KETONES UR STRIP.AUTO-MCNC: NEGATIVE MG/DL
LEUKOCYTE ESTERASE UR QL STRIP.AUTO: NORMAL
NITRITE UR QL STRIP.AUTO: POSITIVE
PH UR STRIP.AUTO: 5 [PH] (ref 5–8)
PROT UR QL STRIP: NEGATIVE MG/DL
RBC UR QL AUTO: NEGATIVE
SP GR UR STRIP.AUTO: 1
UROBILINOGEN UR STRIP-MCNC: 0.2 MG/DL

## 2018-11-16 PROCEDURE — 99999 PR NO CHARGE: CPT | Performed by: FAMILY MEDICINE

## 2018-11-16 PROCEDURE — 99214 OFFICE O/P EST MOD 30 MIN: CPT | Performed by: FAMILY MEDICINE

## 2018-11-16 PROCEDURE — 87077 CULTURE AEROBIC IDENTIFY: CPT

## 2018-11-16 PROCEDURE — 81002 URINALYSIS NONAUTO W/O SCOPE: CPT | Performed by: FAMILY MEDICINE

## 2018-11-16 PROCEDURE — 85610 PROTHROMBIN TIME: CPT | Performed by: FAMILY MEDICINE

## 2018-11-16 PROCEDURE — 87186 SC STD MICRODIL/AGAR DIL: CPT

## 2018-11-16 PROCEDURE — 87086 URINE CULTURE/COLONY COUNT: CPT

## 2018-11-16 RX ORDER — SULFAMETHOXAZOLE AND TRIMETHOPRIM 800; 160 MG/1; MG/1
1 TABLET ORAL EVERY 12 HOURS
Qty: 14 TAB | Refills: 0 | Status: SHIPPED | OUTPATIENT
Start: 2018-11-16 | End: 2018-11-23

## 2018-11-16 RX ORDER — PHENAZOPYRIDINE HYDROCHLORIDE 200 MG/1
200 TABLET, FILM COATED ORAL 3 TIMES DAILY
Qty: 6 TAB | Refills: 0 | Status: SHIPPED | OUTPATIENT
Start: 2018-11-16 | End: 2018-11-18

## 2018-11-16 ASSESSMENT — ENCOUNTER SYMPTOMS
EYE REDNESS: 0
WEIGHT LOSS: 0
MYALGIAS: 0
EYE DISCHARGE: 0

## 2018-11-16 NOTE — PROGRESS NOTES
Subjective:      Salma Lees is a 68 y.o. female who presents with UTI (possible UTI started yesterday)            Onset yesterday urinary frequency.  No fever or flank pain.  She does have a past medical history of pyelonephritis/urosepsis.  No hematuria.  She is aware that her blood sugars have been elevated.  Bowel movements are normal.  Some relief with OTC Azo.  Symptoms are moderate severity and progressively worse.  No other aggravating or alleviating factors.        Review of Systems   Constitutional: Negative for malaise/fatigue and weight loss.   Eyes: Negative for discharge and redness.   Musculoskeletal: Negative for joint pain and myalgias.   Skin: Negative for itching and rash.     .  Medications, Allergies, and current problem list reviewed today in Epic       Objective:     /62 (BP Location: Right arm, Patient Position: Sitting, BP Cuff Size: Large adult)   Pulse 70   Temp 36.7 °C (98.1 °F)   Resp 16   Wt 94.8 kg (209 lb)   SpO2 93%   BMI 35.87 kg/m²      Physical Exam   Constitutional: She is oriented to person, place, and time. She appears well-developed and well-nourished. No distress.   HENT:   Head: Normocephalic and atraumatic.   Right Ear: External ear normal.   Eyes: Conjunctivae are normal.   Cardiovascular: Normal rate, regular rhythm and normal heart sounds.    Pulmonary/Chest: Effort normal and breath sounds normal.   Genitourinary:   Genitourinary Comments: No CVA tenderness.  Mild suprapubic tenderness patient states is chronic.   Neurological: She is alert and oriented to person, place, and time.   Skin: Skin is warm and dry. No rash noted.               Assessment/Plan:   UA reviewed    1. Acute cystitis without hematuria    - POCT Urinalysis  - Urine Culture; Future  - sulfamethoxazole-trimethoprim (BACTRIM DS) 800-160 MG tablet; Take 1 Tab by mouth every 12 hours for 7 days.  Dispense: 14 Tab; Refill: 0  - phenazopyridine (PYRIDIUM) 200 MG Tab; Take 1 Tab by  mouth 3 times a day for 2 days.  Dispense: 6 Tab; Refill: 0    Differential diagnosis, natural history, supportive care, and indications for immediate follow-up discussed at length.     Will f/u culture    Patient will contact anticoagulation clinic/Pharm.D. due to interaction of warfarin and Bactrim.

## 2018-11-16 NOTE — PROGRESS NOTES
Anticoagulation Summary  As of 11/16/2018    INR goal:   2.0-3.0   TTR:   49.3 % (1.7 y)   Today's INR:   2.3   Warfarin maintenance plan:   7.5 mg (5 mg x 1.5) every day   Weekly warfarin total:   52.5 mg   Plan last modified:   Kenneth Salas, PharmD (7/2/2018)   Next INR check:   12/14/2018   Target end date:   Indefinite    Indications    Chronic anticoagulation [Z79.01]  Atrial fibrillation (HCC) [I48.91]  History of mitral valve repair [Z98.890]             Anticoagulation Episode Summary     INR check location:       Preferred lab:       Send INR reminders to:       Comments:         Anticoagulation Care Providers     Provider Role Specialty Phone number    MARTA Pearson. Referring Family Medicine [phone]    [facility] Anticoagulation Services Responsible  [phone]    Kenneth Salas, PharmD Responsible          Anticoagulation Patient Findings  Patient Findings     Negatives:   Signs/symptoms of thrombosis, Signs/symptoms of bleeding, Laboratory test error suspected, Change in health, Change in alcohol use, Change in activity, Upcoming invasive procedure, Emergency department visit, Upcoming dental procedure, Missed doses, Extra doses, Change in medications, Change in diet/appetite, Hospital admission, Bruising, Other complaints        HPI:   Salma Lees seen in clinic today, on anticoagulation therapy with warfarin for stroke prevention due to history of valvular atrial fibrillation with mitral valve repair.    Patient's previous INR was therapeutic at 3.0 on 10-19-18, at which time patient was instructed to continue with current warfarin regimen.  She returns to clinic today to recheck INR to ensure it is therapeutic and thus preventing possible clotting and/or bleeding/bruising complications.    CHADS-VASc = n/a  (unadjusted ischemic stroke risk/year:  n/a)    Does patient have any changes to current medical/health status since last appt (Y/N):  NO  Does patient have any signs/symptoms of  "bleeding and/or thrombosis since the last appt (Y/N):  NO  Does patient have any interval changes to diet or medications since last appt (Y/N):  NO  Are there any complications or cost restrictions with current therapy (Y/N):  NO      Vitals:  /80  HR 72    Weight  declines   Height   5' 4\"     Asssessment:      INR remains therapeutic at 2.3, therefore decreasing patient's risk of stroke and/or bleeding complications.   Reason(s) for out of range INR today:  n/a      Plan:  Pt is to continue with current warfarin dosing regimen in order to maintain INR in therapeutic range.     Follow up:  Because warfarin is a high risk medication and current CHEST guidelines recommend regular monitoring intervals (few days up to 12 weeks), will have patient return to clinic in 4 weeks to recheck INR.    Kenneth Salas, PharmD    "

## 2018-11-18 LAB
BACTERIA UR CULT: ABNORMAL
BACTERIA UR CULT: ABNORMAL
SIGNIFICANT IND 70042: ABNORMAL
SITE SITE: ABNORMAL
SOURCE SOURCE: ABNORMAL

## 2018-11-19 ENCOUNTER — ANTICOAGULATION MONITORING (OUTPATIENT)
Dept: VASCULAR LAB | Facility: MEDICAL CENTER | Age: 68
End: 2018-11-19

## 2018-11-19 DIAGNOSIS — Z79.01 CHRONIC ANTICOAGULATION: ICD-10-CM

## 2018-11-19 DIAGNOSIS — Z98.890 HISTORY OF MITRAL VALVE REPAIR: ICD-10-CM

## 2018-11-19 NOTE — PROGRESS NOTES
[facility]    Pt started Bactrim two days ago.  She self held her warfarin.  Pt to begin reduced dose of warfarin while on Bactrim therapy.  Test INR in 2 days.    Tam Rosas, JoleenD

## 2018-11-20 ENCOUNTER — PATIENT OUTREACH (OUTPATIENT)
Dept: HEALTH INFORMATION MANAGEMENT | Facility: OTHER | Age: 68
End: 2018-11-20

## 2018-11-20 NOTE — PROGRESS NOTES
Outcome: Left Message    Please transfer to Patient Outreach Team at [phone] when patient returns call.    WebIZ Checked & Epic Updated:  yes    [facility] Verified: yes    Attempt # 1

## 2018-11-21 ENCOUNTER — TELEPHONE (OUTPATIENT)
Dept: MEDICAL GROUP | Facility: PHYSICIAN GROUP | Age: 68
End: 2018-11-21

## 2018-11-21 ENCOUNTER — APPOINTMENT (OUTPATIENT)
Dept: VASCULAR LAB | Facility: MEDICAL CENTER | Age: 68
End: 2018-11-21
Payer: MEDICARE

## 2018-11-21 NOTE — TELEPHONE ENCOUNTER
Future Appointments       Provider Department Center    11/26/2018 1:05 PM LILIYA Pearson [facility]    11/26/2018 4:00 PM [place] PHARMACIST [facility]    11/30/2018 12:00 PM Lara Caceres R.N. PULMONARY REHAB [facility]     12/14/2018 11:00 AM [place] PHARMACIST [facility]    2/20/2019 1:00 PM Peggy Shannon P.A.-C. [facility] Pulmonary Medicine       ESTABLISHED PATIENT PRE-VISIT PLANNING     Patient was contacted to complete PVP.       1.  Reviewed notes from the last few office visits within the medical group: Yes    2.  If any orders were placed at last visit or intended to be done for this visit (i.e. 6 mos follow-up), do we have Results/Consult Notes?        •  Labs - Labs were not ordered at last office visit.       •  Imaging - Imaging was not ordered at last office visit.       •  Referrals - Referral ordered, patient was seen and consult notes are in chart. Care Teams updated  YES.    3. Is this appointment scheduled as a Hospital Follow-Up? No    4.  Immunizations were updated in [employer] using WebIZ?: Yes       •  Web Iz Recommendations: PNEUMOVAX (PPSV23), TD and ZOSTAVAX (Shingles)    5.  Patient is due for the following Health Maintenance Topics:   Health Maintenance Due   Topic Date Due   • Annual Wellness Visit  1950   • DIABETES MONOFILAMENT / LE EXAM  1950   • RETINAL SCREENING  02/16/1967   • URINE ACR / MICROALBUMIN  02/16/1967   • IMM HEP B VACCINE (1 of 3 - Risk 3-dose series) 02/16/1968   • PAP SMEAR  02/16/1970   • MAMMOGRAM  02/16/1989   • COLONOSCOPY  02/16/1999   • IMM ZOSTER VACCINES (1 of 2) 02/16/1999   • BONE DENSITY  02/16/2014   • IMM PNEUMOCOCCAL 65+ (ADULT) LOW/MEDIUM RISK SERIES (2 of 2 - PPSV23) 10/06/2017   • A1C SCREENING  02/14/2018     6.  MDX printed for Provider? NO    7.  Patient was informed to arrive 15 min prior to their scheduled appointment and  bring in their medication bottles.  11/21/2018- M

## 2018-11-26 ENCOUNTER — OFFICE VISIT (OUTPATIENT)
Dept: MEDICAL GROUP | Facility: PHYSICIAN GROUP | Age: 68
End: 2018-11-26
Payer: MEDICARE

## 2018-11-26 ENCOUNTER — ANTICOAGULATION VISIT (OUTPATIENT)
Dept: MEDICAL GROUP | Facility: PHYSICIAN GROUP | Age: 68
End: 2018-11-26
Payer: MEDICARE

## 2018-11-26 ENCOUNTER — HOSPITAL ENCOUNTER (OUTPATIENT)
Dept: LAB | Facility: MEDICAL CENTER | Age: 68
End: 2018-11-26
Attending: NURSE PRACTITIONER
Payer: MEDICARE

## 2018-11-26 VITALS
RESPIRATION RATE: 16 BRPM | TEMPERATURE: 98.2 F | DIASTOLIC BLOOD PRESSURE: 84 MMHG | WEIGHT: 204 LBS | OXYGEN SATURATION: 94 % | HEART RATE: 108 BPM | HEIGHT: 64 IN | BODY MASS INDEX: 34.83 KG/M2 | SYSTOLIC BLOOD PRESSURE: 126 MMHG

## 2018-11-26 DIAGNOSIS — E11.9 TYPE 2 DIABETES MELLITUS WITHOUT COMPLICATION, UNSPECIFIED WHETHER LONG TERM INSULIN USE (HCC): ICD-10-CM

## 2018-11-26 DIAGNOSIS — Z79.01 CHRONIC ANTICOAGULATION: Primary | ICD-10-CM

## 2018-11-26 DIAGNOSIS — Z98.890 HISTORY OF MITRAL VALVE REPAIR: ICD-10-CM

## 2018-11-26 DIAGNOSIS — E11.8 TYPE 2 DIABETES MELLITUS WITH COMPLICATION, WITHOUT LONG-TERM CURRENT USE OF INSULIN (HCC): ICD-10-CM

## 2018-11-26 DIAGNOSIS — I48.91 ATRIAL FIBRILLATION, UNSPECIFIED TYPE (HCC): ICD-10-CM

## 2018-11-26 PROBLEM — Z23 NEED FOR VACCINATION: Status: RESOLVED | Noted: 2017-10-18 | Resolved: 2018-11-26

## 2018-11-26 LAB
EST. AVERAGE GLUCOSE BLD GHB EST-MCNC: 355 MG/DL
HBA1C MFR BLD: 14 % (ref 0–5.6)
INR PPP: 1.8 (ref 2–3.5)

## 2018-11-26 PROCEDURE — 36415 COLL VENOUS BLD VENIPUNCTURE: CPT

## 2018-11-26 PROCEDURE — 99999 PR NO CHARGE: CPT | Performed by: INTERNAL MEDICINE

## 2018-11-26 PROCEDURE — 83036 HEMOGLOBIN GLYCOSYLATED A1C: CPT

## 2018-11-26 PROCEDURE — 85610 PROTHROMBIN TIME: CPT | Performed by: INTERNAL MEDICINE

## 2018-11-26 PROCEDURE — 99214 OFFICE O/P EST MOD 30 MIN: CPT | Performed by: NURSE PRACTITIONER

## 2018-11-26 NOTE — PROGRESS NOTES
Anticoagulation Summary  As of 11/26/2018    INR goal:   2.0-3.0   TTR:   49.5 % (1.7 y)   Today's INR:   1.8!   Warfarin maintenance plan:   7.5 mg (5 mg x 1.5) every day   Weekly warfarin total:   52.5 mg   Plan last modified:   Kenneth Salas, PharmD (7/2/2018)   Next INR check:   12/3/2018   Target end date:   Indefinite    Indications    Chronic anticoagulation [Z79.01]  Atrial fibrillation (HCC) [I48.91]  History of mitral valve repair [Z98.890]             Anticoagulation Episode Summary     INR check location:       Preferred lab:       Send INR reminders to:       Comments:         Anticoagulation Care Providers     Provider Role Specialty Phone number    Zeferino Almaraz A.P.[place]. Referring Family Medicine [phone]    [facility] Responsible  [phone]    Kenneth Salas, PharmD Responsible          Anticoagulation Patient Findings  Patient Findings     Positives:   Change in medications    Negatives:   Signs/symptoms of thrombosis, Signs/symptoms of bleeding, Laboratory test error suspected, Change in health, Change in alcohol use, Change in activity, Upcoming invasive procedure, Emergency department visit, Upcoming dental procedure, Missed doses, Extra doses, Change in diet/appetite, Hospital admission, Bruising, Other complaints    Comments:   Seven day course of bactrim, finished yesterday        HPI:   Salma Lees seen in clinic today, on anticoagulation therapy with warfarin for stroke prevention due to history of valvular atrial fibrillation with mitral valve repair.    Patient's previous INR was therapeutic at 2.3 on 11-16-18, at which time patient was instructed to continue with current warfarin regimen.  She returns to clinic today to recheck INR to ensure it is therapeutic and thus preventing possible clotting and/or bleeding/bruising complications.    CHADS-VASc = n/a  (unadjusted ischemic stroke risk/year:  n/a)    Does patient have any changes to current  medical/health status since last appt (Y/N):  NO  Does patient have any signs/symptoms of bleeding and/or thrombosis since the last appt (Y/N):  No  Does patient have any interval changes to diet or medications since last appt (Y/N):  Started on seven day course of bactrim, dose decreased per Joleen MarcosD on 11-19-18  Are there any complications or cost restrictions with current therapy (Y/N):  NO      Vitals:  Taken during visit with PCP.     Asssessment:      INR subtherapeutic at 1.8, therefore increasing patient's risk of stroke due to a-fib   Reason(s) for out of range INR today:  She decreased her dose more than instructed and missed last week's follow up.      Plan:  Instructed patient to return to previously stable weekly warfarin regimen now that she has completed course of bactrim in order to bring INR back to therapeutic range.     Follow up:  Because warfarin is a high risk medication and current CHEST guidelines recommend regular monitoring intervals (few days up to 12 weeks), will have patient return to clinic in 1 weeks to recheck INR.    Kenneth Salas, JoleenD

## 2018-11-26 NOTE — ASSESSMENT & PLAN NOTE
Had labs done last month and fasting glucose was 410.  She states that it is because she has had ongoing issues with dental infections.  Discussed plan to proceed with an A1c.  Last check in August of 2017 was 6.9%.  Prior to that was battling a MRSA UTI in June of 2017, and her A1c was 11.1%.  She has never taken any diabetes medication.  She understands that we need to control her blood sugar while her dentist is working on the infection.  She will have her A1c drawn today, and we will consider medication vs referral to endocrinology based on the result.

## 2018-11-26 NOTE — PROGRESS NOTES
Chief Complaint   Patient presents with   • Blood Sugar Problem     Discuss Labs        HISTORY OF PRESENT ILLNESS: Patient is a 68 y.o. female established patient who presents today to discuss the following issues:    Type 2 diabetes mellitus with complication (CMS-[facility])  Had labs done last month and fasting glucose was 410.  She states that it is because she has had ongoing issues with dental infections.  Discussed plan to proceed with an A1c.  Last check in August of 2017 was 6.9%.  Prior to that was battling a MRSA UTI in June of 2017, and her A1c was 11.1%.  She has never taken any diabetes medication.  She understands that we need to control her blood sugar while her dentist is working on the infection.  She will have her A1c drawn today, and we will consider medication vs referral to endocrinology based on the result.        Patient Active Problem List    Diagnosis Date Noted   • BMI 35.0-35.9,adult 11/12/2018   • Shortness of breath 10/18/2017   • Calculus of ureter 09/18/2017   • Kidney stones 07/10/2017   • Hemorrhagic disorder due to extrinsic circulating anticoagulants ([facility]) 06/26/2017   • Normocytic anemia 06/26/2017   • Chronic anticoagulation 02/28/2017   • History of mitral valve repair 02/13/2017   • Chronic obstructive pulmonary disease ([facility]) 02/13/2017   • Atrial fibrillation ([facility]) 02/13/2017   • Right knee pain 02/13/2017   • Type 2 diabetes mellitus with complication ([facility]) 02/13/2017   • Anticoagulated on warfarin 02/13/2017       Allergies:Pcn [penicillins]; Amlodipine besylate-valsartan; Amoxicillin; Etodolac; Food; Lisinopril; Other drug; Other misc; and Tape    Current Outpatient Prescriptions   Medication Sig Dispense Refill   • warfarin (COUMADIN) 5 MG Tab Take one and one-half (1.5) tablets daily as directed by [facility] 135 Tab 1   • levalbuterol (XOPENEX HFA) 45 MCG/ACT inhaler Inhale 2 Puffs by mouth every four hours as needed for Shortness of Breath. 1 Inhaler 2    • budesonide-formoterol (SYMBICORT) 160-4.5 MCG/ACT Aerosol Inhale 2 Puffs by mouth 2 Times a Day. Use spacer. Rinse mouth after each use. 1 Inhaler 4   • Potassium (POTASSIMIN PO) Take  by mouth.     • spironolactone (ALDACTONE) 25 MG Tab Take 25 mg by mouth every day.     • atorvastatin (LIPITOR) 20 MG Tab Take 20 mg by mouth every evening.     • ANORO ELLIPTA 62.5-25 MCG/INH AEROSOL POWDER, BREATH ACTIVATED inhaler INHALE 1 PUFF BY MOUTH EVERY DAY 3 Inhaler 0   • carvedilol (COREG) 25 MG Tab Take 25 mg by mouth 2 times a day.     • furosemide (LASIX) 20 MG Tab Take 1 Tab by mouth every day. 90 Tab 1   • albuterol 108 (90 Base) MCG/ACT Aero Soln inhalation aerosol Inhale 2 Puffs by mouth every 6 hours as needed for Shortness of Breath.     • tramadol (ULTRAM) 50 MG Tab Take 1 Tab by mouth every four hours as needed. 30 Tab 0   • pravastatin (PRAVACHOL) 20 MG Tab Take 20 mg by mouth every day.     • diltiazem (CARDIZEM) 30 MG Tab Take 30 mg by mouth 2 Times a Day.     • digoxin (LANOXIN) 125 MCG Tab Take 125 mcg by mouth every day.       No current facility-administered medications for this visit.        Social History   Substance Use Topics   • Smoking status: Former Smoker     Packs/day: 1.00     Years: 5.00     Types: Cigarettes     Quit date: 1/7/1990   • Smokeless tobacco: Never Used   • Alcohol use No       No family status information on file.   History reviewed. No pertinent family history.    Review of Systems:   Constitutional: Negative for fever, chills, weight loss and malaise/fatigue.   HENT: Negative for ear pain, nosebleeds, congestion, sore throat and neck pain.    Eyes: Negative for blurred vision.   Respiratory: Negative for cough, sputum production, shortness of breath and wheezing.    Cardiovascular: Negative for chest pain, palpitations, orthopnea and leg swelling.   Gastrointestinal: Negative for heartburn, nausea, vomiting and abdominal pain.   Genitourinary: Negative for dysuria, urgency  "and frequency.   Musculoskeletal: Negative for myalgias, joint pain, and back pain.  Skin: Negative for rash and itching.   Neurological: Negative for dizziness, tingling, tremors, sensory change, focal weakness and headaches.   Endo/Heme/Allergies: Does not bruise/bleed easily.   Psychiatric/Behavioral: Negative for depression, suicidal ideas and memory loss.  The patient is not nervous/anxious and does not have insomnia.    All other systems reviewed and are negative except as in HPI.    Exam:  Blood pressure 126/84, pulse (!) 108, temperature 36.8 °C (98.2 °F), resp. rate 16, height 1.626 m (5' 4\"), weight 92.5 kg (204 lb), SpO2 94 %.  General:  Well nourished, well developed female in NAD  Head: Grossly normal.  Neck: Supple without JVD or bruit. Thyroid is not enlarged.  Pulmonary: Clear to ausculation. Normal effort. No rales, ronchi, or wheezing.  Cardiovascular: Regular rate and rhythm without murmur.   Abdomen:  Soft, nontender, nondistended.  Extremities: No clubbing, cyanosis, or edema.  Skin: Intact with no obvious rashes or lesions.  Neuro: Grossly intact.  Psych: Alert and oriented x 3.  Mood and affect appropriate.    Medical decision-making and discussion: Salma is here today for follow-up.  She will have an A1c drawn today, and we will discuss the results on MyChart.  She will keep all appointments with her specialists, and she will follow-up here as needed.          Assessment/Plan:  1. Type 2 diabetes mellitus without complication, unspecified whether long term insulin use (HCC)  HEMOGLOBIN A1C    CANCELED: POCT  A1C   2. Type 2 diabetes mellitus with complication, without long-term current use of insulin (HCC)         Return if symptoms worsen or fail to improve.    Please note that this dictation was created using voice recognition software. I have made every reasonable attempt to correct obvious errors, but I expect that there are errors of grammar and possibly content that I did not discover " before finalizing the note.

## 2018-11-27 DIAGNOSIS — E11.65 UNCONTROLLED TYPE 2 DIABETES MELLITUS WITH HYPERGLYCEMIA (HCC): ICD-10-CM

## 2018-11-30 ENCOUNTER — HOSPITAL ENCOUNTER (OUTPATIENT)
Dept: PULMONOLOGY | Facility: MEDICAL CENTER | Age: 68
End: 2018-11-30
Attending: INTERNAL MEDICINE
Payer: MEDICARE

## 2018-11-30 PROCEDURE — 94618 PULMONARY STRESS TESTING: CPT

## 2018-11-30 PROCEDURE — 99201 HCHG OFFICE VISIT-NEW-LEVEL 1: CPT | Mod: 25

## 2018-12-03 ENCOUNTER — ANTICOAGULATION VISIT (OUTPATIENT)
Dept: MEDICAL GROUP | Facility: PHYSICIAN GROUP | Age: 68
End: 2018-12-03
Payer: MEDICARE

## 2018-12-03 DIAGNOSIS — I48.91 ATRIAL FIBRILLATION, UNSPECIFIED TYPE (HCC): ICD-10-CM

## 2018-12-03 DIAGNOSIS — Z98.890 HISTORY OF MITRAL VALVE REPAIR: ICD-10-CM

## 2018-12-03 DIAGNOSIS — Z79.01 CHRONIC ANTICOAGULATION: Primary | ICD-10-CM

## 2018-12-03 LAB — INR PPP: 2.7 (ref 2–3.5)

## 2018-12-03 PROCEDURE — 85610 PROTHROMBIN TIME: CPT | Performed by: INTERNAL MEDICINE

## 2018-12-03 PROCEDURE — 99211 OFF/OP EST MAY X REQ PHY/QHP: CPT | Performed by: INTERNAL MEDICINE

## 2018-12-03 NOTE — PROGRESS NOTES
Anticoagulation Summary  As of 12/3/2018    INR goal:   2.0-3.0   TTR:   49.8 % (1.7 y)   Today's INR:   2.7   Warfarin maintenance plan:   7.5 mg (5 mg x 1.5) every day   Weekly warfarin total:   52.5 mg   Plan last modified:   Kenneth Salas, PharmD (7/2/2018)   Next INR check:   12/14/2018   Target end date:   Indefinite    Indications    Chronic anticoagulation [Z79.01]  Atrial fibrillation (HCC) [I48.91]  History of mitral valve repair [Z98.890]             Anticoagulation Episode Summary     INR check location:       Preferred lab:       Send INR reminders to:       Comments:         Anticoagulation Care Providers     Provider Role Specialty Phone number    MARTA Pearson. Referring Family Medicine [phone]    [facility] Responsible  [phone]    Kenneth Salas, PharmD Responsible          Anticoagulation Patient Findings  Patient Findings     Negatives:   Signs/symptoms of thrombosis, Signs/symptoms of bleeding, Laboratory test error suspected, Change in health, Change in alcohol use, Change in activity, Upcoming invasive procedure, Emergency department visit, Upcoming dental procedure, Missed doses, Extra doses, Change in medications, Change in diet/appetite, Hospital admission, Bruising, Other complaints        HPI:   Salma Lees seen in clinic today, on anticoagulation therapy with warfarin for stroke prevention due to history of valvular atrial fibrillation with mitral valve repair.    Patient's previous INR was subtherapeutic at 1.8 on 11-26-18, at which time patient was instructed to continue with current warfarin regimen.  She returns to clinic today to recheck INR to ensure it is therapeutic and thus preventing possible clotting and/or bleeding/bruising complications.    CHADS-VASc = n/a  (unadjusted ischemic stroke risk/year:  n/a)    Does patient have any changes to current medical/health status since last appt (Y/N):  NO  Does patient have any signs/symptoms  "of bleeding and/or thrombosis since the last appt (Y/N):  NO  Does patient have any interval changes to diet or medications since last appt (Y/N):  NO  Are there any complications or cost restrictions with current therapy (Y/N):  NO      Vitals:  BP check declined today, did not wish to remove thick sweater    Weight  declines   Height   5' 4\"     Asssessment:      INR therapeutic at 2.7, therefore decreasing patient's risk of stroke and/or bleeding complications.   Reason(s) for out of range INR today:  n/a      Plan:  Pt is to continue with current warfarin dosing regimen in order to maintain INR in therapeutic range.  She is scheduled for multiple tooth extraction (>five teeth) on 12-10-18 and has been instructed by dentist to hold warfarin three days prior.  No hx of CVA/TIA or VTE event in the past, will forego bridging in this setting.  Will have patient bolus with 10mg X 2 when restarting warfarin.     Follow up:  Because warfarin is a high risk medication and current CHEST guidelines recommend regular monitoring intervals (few days up to 12 weeks), will have patient return to clinic in 1.5 weeks to recheck INR.    Kenneth Salas, PharmD    "

## 2018-12-14 NOTE — PROGRESS NOTES
PT STATES THAT SHE CANNOT AFFORD THE CO-PAYMENTS IN ORDER TO TAKE CARE OF HER HEALTH MAINTENANCE TOPICS. I DID TRY TO OFFER THE AWV SINCE IT IS COVERED BY [facility] AND SHE STATED SHE ALREADY COMPLETED IT THIS YEAR, BUT SHE HAS NOT. PT HUNG UP THE PHONE BEFORE I COULD OFFER HER ALTERNATIVE SOLUTIONS.

## 2018-12-14 NOTE — PROGRESS NOTES
Outcome: Left Message    Please transfer to Patient Outreach Team at [phone] when patient returns call.    WebIZ Checked & Epic Updated:  yes    [facility] Verified: yes    Attempt # 2

## 2018-12-18 ENCOUNTER — HOSPITAL ENCOUNTER (OUTPATIENT)
Dept: PULMONOLOGY | Facility: MEDICAL CENTER | Age: 68
End: 2018-12-18
Attending: INTERNAL MEDICINE
Payer: MEDICARE

## 2018-12-18 PROCEDURE — G0424 PULMONARY REHAB W EXER: HCPCS

## 2018-12-20 ENCOUNTER — HOSPITAL ENCOUNTER (OUTPATIENT)
Dept: PULMONOLOGY | Facility: MEDICAL CENTER | Age: 68
End: 2018-12-20
Attending: INTERNAL MEDICINE
Payer: MEDICARE

## 2018-12-20 PROCEDURE — G0424 PULMONARY REHAB W EXER: HCPCS

## 2018-12-21 ENCOUNTER — ANTICOAGULATION VISIT (OUTPATIENT)
Dept: MEDICAL GROUP | Facility: PHYSICIAN GROUP | Age: 68
End: 2018-12-21
Payer: MEDICARE

## 2018-12-21 DIAGNOSIS — Z79.01 CHRONIC ANTICOAGULATION: Primary | ICD-10-CM

## 2018-12-21 DIAGNOSIS — I48.91 ATRIAL FIBRILLATION, UNSPECIFIED TYPE (HCC): ICD-10-CM

## 2018-12-21 DIAGNOSIS — Z98.890 HISTORY OF MITRAL VALVE REPAIR: ICD-10-CM

## 2018-12-21 LAB — INR PPP: 1.4 (ref 2–3.5)

## 2018-12-21 PROCEDURE — 99211 OFF/OP EST MAY X REQ PHY/QHP: CPT | Performed by: FAMILY MEDICINE

## 2018-12-21 PROCEDURE — 85610 PROTHROMBIN TIME: CPT | Performed by: FAMILY MEDICINE

## 2018-12-21 NOTE — PROGRESS NOTES
Anticoagulation Summary  As of 12/21/2018    INR goal:   2.0-3.0   TTR:   49.9 % (1.8 y)   Today's INR:   1.4!   Warfarin maintenance plan:   7.5 mg (5 mg x 1.5) every day   Weekly warfarin total:   52.5 mg   Plan last modified:   Kenneth Salas, PharmD (7/2/2018)   Next INR check:   12/28/2018   Target end date:   Indefinite    Indications    Chronic anticoagulation [Z79.01]  Atrial fibrillation (HCC) [I48.91]  History of mitral valve repair [Z98.890]             Anticoagulation Episode Summary     INR check location:       Preferred lab:       Send INR reminders to:       Comments:         Anticoagulation Care Providers     Provider Role Specialty Phone number    Zeferino Almaraz A.P.YASMANY. Referring Family Medicine [phone]    [facility] Responsible  [phone]    Kenneth Salas, PharmD Responsible          Anticoagulation Patient Findings  Patient Findings     Positives:   Upcoming dental procedure    Negatives:   Signs/symptoms of thrombosis, Signs/symptoms of bleeding, Laboratory test error suspected, Change in health, Change in alcohol use, Change in activity, Upcoming invasive procedure, Emergency department visit, Missed doses, Extra doses, Change in medications, Change in diet/appetite, Hospital admission, Bruising, Other complaints    Comments:   Multiple tooth extractions (>five) done last week        HPI:   Salma Lees seen in clinic today, on anticoagulation therapy with warfarin for stroke prevention due to history of valvular atrial fibrillation with mitral valve repair.    Patient's previous INR was therapeutic at 2.7 on 12-3-18, at which time patient was instructed to continue with current warfarin regimen.  She held warfarin for better part of last 10 days for extensive dental extractions, but returns to clinic today to recheck INR to ensure it is therapeutic and thus preventing possible clotting and/or bleeding/bruising complications.    CHADS-VASc = n/a  (unadjusted  "ischemic stroke risk/year:  n/a)    Does patient have any changes to current medical/health status since last appt (Y/N):  Dental extractions per previous note  Does patient have any signs/symptoms of bleeding and/or thrombosis since the last appt (Y/N):  NO  Does patient have any interval changes to diet or medications since last appt (Y/N):  NO  Are there any complications or cost restrictions with current therapy (Y/N):  NO      Vitals:  BP check declined today      Weight  declines   Height   5' 4\"     Asssessment:      INR subtherapeutic at 1.4, therefore increasing patient's risk of stroke due to a-fib.   Reason(s) for out of range INR today:  Held doses last week, had just restarted four days ago.      Plan:  Instructed patient to bolus with 10mg X 2, then resume current warfarin regimen in order to bring INR to therapeutic range.     Follow up:  Because warfarin is a high risk medication and current CHEST guidelines recommend regular monitoring intervals (few days up to 12 weeks), will have patient return to clinic in 1 weeks to recheck INR.    Kenneth Salas, PharmD    "

## 2018-12-27 ENCOUNTER — HOSPITAL ENCOUNTER (OUTPATIENT)
Dept: PULMONOLOGY | Facility: MEDICAL CENTER | Age: 68
End: 2018-12-27
Attending: INTERNAL MEDICINE
Payer: MEDICARE

## 2019-01-03 ENCOUNTER — APPOINTMENT (OUTPATIENT)
Dept: PULMONOLOGY | Facility: MEDICAL CENTER | Age: 69
End: 2019-01-03
Attending: INTERNAL MEDICINE
Payer: MEDICARE

## 2019-01-08 ENCOUNTER — HOSPITAL ENCOUNTER (OUTPATIENT)
Dept: PULMONOLOGY | Facility: MEDICAL CENTER | Age: 69
End: 2019-01-08
Attending: INTERNAL MEDICINE
Payer: MEDICARE

## 2019-01-08 PROCEDURE — G0424 PULMONARY REHAB W EXER: HCPCS

## 2019-01-10 ENCOUNTER — HOSPITAL ENCOUNTER (OUTPATIENT)
Dept: PULMONOLOGY | Facility: MEDICAL CENTER | Age: 69
End: 2019-01-10
Attending: INTERNAL MEDICINE
Payer: MEDICARE

## 2019-01-10 PROCEDURE — G0424 PULMONARY REHAB W EXER: HCPCS

## 2019-01-15 ENCOUNTER — HOSPITAL ENCOUNTER (OUTPATIENT)
Dept: PULMONOLOGY | Facility: MEDICAL CENTER | Age: 69
End: 2019-01-15
Attending: INTERNAL MEDICINE
Payer: MEDICARE

## 2019-01-15 ENCOUNTER — ANTICOAGULATION VISIT (OUTPATIENT)
Dept: MEDICAL GROUP | Facility: PHYSICIAN GROUP | Age: 69
End: 2019-01-15
Payer: MEDICARE

## 2019-01-15 VITALS — DIASTOLIC BLOOD PRESSURE: 80 MMHG | SYSTOLIC BLOOD PRESSURE: 113 MMHG | HEART RATE: 106 BPM

## 2019-01-15 DIAGNOSIS — Z79.01 CHRONIC ANTICOAGULATION: Primary | ICD-10-CM

## 2019-01-15 DIAGNOSIS — Z98.890 HISTORY OF MITRAL VALVE REPAIR: ICD-10-CM

## 2019-01-15 DIAGNOSIS — I48.91 ATRIAL FIBRILLATION, UNSPECIFIED TYPE (HCC): ICD-10-CM

## 2019-01-15 LAB — INR PPP: 2.2 (ref 2–3.5)

## 2019-01-15 PROCEDURE — 85610 PROTHROMBIN TIME: CPT | Performed by: FAMILY MEDICINE

## 2019-01-15 PROCEDURE — G0424 PULMONARY REHAB W EXER: HCPCS

## 2019-01-15 NOTE — PROGRESS NOTES
Anticoagulation Summary  As of 1/15/2019    INR goal:   2.0-3.0   TTR:   49.0 % (1.8 y)   Today's INR:   2.2   Warfarin maintenance plan:   7.5 mg (5 mg x 1.5) every day   Weekly warfarin total:   52.5 mg   Plan last modified:   Kenneth Salas, PharmD (7/2/2018)   Next INR check:   2/8/2019   Target end date:   Indefinite    Indications    Chronic anticoagulation [Z79.01]  Atrial fibrillation (HCC) [I48.91]  History of mitral valve repair [Z98.890]             Anticoagulation Episode Summary     INR check location:       Preferred lab:       Send INR reminders to:       Comments:         Anticoagulation Care Providers     Provider Role Specialty Phone number    Zeferino Almaraz A.P.YASMANY. Referring Family Medicine [phone]    [facility] Responsible  [phone]    Kenneth Salas, PharmD Responsible          Anticoagulation Patient Findings  Patient Findings     Positives:   Change in medications    Negatives:   Signs/symptoms of thrombosis, Signs/symptoms of bleeding, Laboratory test error suspected, Change in health, Change in alcohol use, Change in activity, Upcoming invasive procedure, Emergency department visit, Upcoming dental procedure, Missed doses, Extra doses, Change in diet/appetite, Hospital admission, Bruising, Other complaints    Comments:   Increase in diltiazem dose        HPI:   Salma Lees seen in clinic today, on anticoagulation therapy with warfarin for stroke prevention due to history of valvular atrial fibrillation.    Patient's previous INR was subtherapeutic at 1.4 on 12-21-18, at which time patient was instructed to bolus with two doses, then resume current warfarin regimen.  She returns to clinic today to recheck INR to ensure it is therapeutic and thus preventing possible clotting and/or bleeding/bruising complications.    CHADS-VASc = n/a  (unadjusted ischemic stroke risk/year:  n/a)    Does patient have any changes to current medical/health status since last appt  "(Y/N):  NO  Does patient have any signs/symptoms of bleeding and/or thrombosis since the last appt (Y/N):  NO  Does patient have any interval changes to diet or medications since last appt (Y/N):  As above  Are there any complications or cost restrictions with current therapy (Y/N):  NO      Vitals:  /80      Weight  declines   Height   5' 4\"     Asssessment:      INR therapeutic at 2.2, therefore decreasing patient's risk of stroke and/or bleeding complications.   Reason(s) for out of range INR today:  n/a      Plan:  Pt is to continue with current warfarin dosing regimen in order to maintain INR in therapeutic range.     Follow up:  Because warfarin is a high risk medication and current CHEST guidelines recommend regular monitoring intervals (few days up to 12 weeks), will have patient return to clinic in 3 weeks to recheck INR.    Kenneth Salas, PharmD    "

## 2019-01-16 ENCOUNTER — TELEPHONE (OUTPATIENT)
Dept: MEDICAL GROUP | Facility: PHYSICIAN GROUP | Age: 69
End: 2019-01-16

## 2019-01-16 NOTE — TELEPHONE ENCOUNTER
1. Caller Name: Salma                                           Call Back Number: [phone] (home)         Patient approves a detailed voicemail message: N\A    Called and talked with pt about scheduling her AWV, pt declined.

## 2019-01-17 ENCOUNTER — APPOINTMENT (OUTPATIENT)
Dept: PULMONOLOGY | Facility: MEDICAL CENTER | Age: 69
End: 2019-01-17
Attending: INTERNAL MEDICINE
Payer: MEDICARE

## 2019-01-22 ENCOUNTER — HOSPITAL ENCOUNTER (OUTPATIENT)
Dept: PULMONOLOGY | Facility: MEDICAL CENTER | Age: 69
End: 2019-01-22
Attending: INTERNAL MEDICINE
Payer: MEDICARE

## 2019-01-22 PROCEDURE — G0424 PULMONARY REHAB W EXER: HCPCS

## 2019-01-24 ENCOUNTER — HOSPITAL ENCOUNTER (OUTPATIENT)
Dept: PULMONOLOGY | Facility: MEDICAL CENTER | Age: 69
End: 2019-01-24
Attending: INTERNAL MEDICINE
Payer: MEDICARE

## 2019-01-24 PROCEDURE — G0424 PULMONARY REHAB W EXER: HCPCS

## 2019-01-29 ENCOUNTER — HOSPITAL ENCOUNTER (OUTPATIENT)
Dept: PULMONOLOGY | Facility: MEDICAL CENTER | Age: 69
End: 2019-01-29
Attending: INTERNAL MEDICINE
Payer: MEDICARE

## 2019-01-29 PROCEDURE — G0424 PULMONARY REHAB W EXER: HCPCS

## 2019-01-31 ENCOUNTER — HOSPITAL ENCOUNTER (OUTPATIENT)
Dept: PULMONOLOGY | Facility: MEDICAL CENTER | Age: 69
End: 2019-01-31
Attending: INTERNAL MEDICINE
Payer: MEDICARE

## 2019-01-31 PROCEDURE — G0424 PULMONARY REHAB W EXER: HCPCS

## 2019-02-05 ENCOUNTER — APPOINTMENT (OUTPATIENT)
Dept: PULMONOLOGY | Facility: MEDICAL CENTER | Age: 69
End: 2019-02-05
Attending: INTERNAL MEDICINE
Payer: MEDICARE

## 2019-02-07 ENCOUNTER — APPOINTMENT (OUTPATIENT)
Dept: PULMONOLOGY | Facility: MEDICAL CENTER | Age: 69
End: 2019-02-07
Attending: INTERNAL MEDICINE
Payer: MEDICARE

## 2019-02-08 ENCOUNTER — ANTICOAGULATION VISIT (OUTPATIENT)
Dept: MEDICAL GROUP | Facility: PHYSICIAN GROUP | Age: 69
End: 2019-02-08
Payer: MEDICARE

## 2019-02-08 DIAGNOSIS — Z79.01 CHRONIC ANTICOAGULATION: ICD-10-CM

## 2019-02-08 DIAGNOSIS — Z98.890 HISTORY OF MITRAL VALVE REPAIR: ICD-10-CM

## 2019-02-08 DIAGNOSIS — I48.91 ATRIAL FIBRILLATION, UNSPECIFIED TYPE (HCC): ICD-10-CM

## 2019-02-08 LAB — INR PPP: 3 (ref 2–3.5)

## 2019-02-08 PROCEDURE — 85610 PROTHROMBIN TIME: CPT | Performed by: INTERNAL MEDICINE

## 2019-02-08 PROCEDURE — 93793 ANTICOAG MGMT PT WARFARIN: CPT | Performed by: FAMILY MEDICINE

## 2019-02-08 NOTE — PROGRESS NOTES
Anticoagulation Summary  As of 2/8/2019    INR goal:   2.0-3.0   TTR:   50.7 % (1.9 y)   INR used for dosing:   3.0 (2/8/2019)   Warfarin maintenance plan:   7.5 mg (5 mg x 1.5) every day   Weekly warfarin total:   52.5 mg   Plan last modified:   Kenneth Salas PharmD (7/2/2018)   Next INR check:   3/8/2019   Target end date:   Indefinite    Indications    Chronic anticoagulation [Z79.01]  Atrial fibrillation (HCC) [I48.91]  History of mitral valve repair [Z98.890]             Anticoagulation Episode Summary     INR check location:       Preferred lab:       Send INR reminders to:       Comments:         Anticoagulation Care Providers     Provider Role Specialty Phone number    LILIYA Pearson Referring Family Medicine [phone]    [facility] Responsible  [phone]    Kenneth Salas, PharmD Responsible          Anticoagulation Patient Findings  Patient Findings     Negatives:   Signs/symptoms of thrombosis, Signs/symptoms of bleeding, Laboratory test error suspected, Change in health, Change in alcohol use, Change in activity, Upcoming invasive procedure, Emergency department visit, Upcoming dental procedure, Missed doses, Extra doses, Change in medications, Change in diet/appetite, Hospital admission, Bruising, Other complaints        HPI:   Salma Lees seen in clinic today, on anticoagulation therapy with warfarin for stroke prevention due to history of atrial fibrillation and mitral valve repair.    Patient's previous INR was therapeutic at 2.2 on 1-15-19, at which time patient was instructed to continue with current warfarin regimen.  She returns to clinic today to recheck INR to ensure it is therapeutic and thus preventing possible clotting and/or bleeding/bruising complications.    CHADS-VASc = n/a  (unadjusted ischemic stroke risk/year:  n/a)    Does patient have any changes to current medical/health status since last appt (Y/N):  No  Does patient have any signs/symptoms  "of bleeding and/or thrombosis since the last appt (Y/N):  NO  Does patient have any interval changes to diet or medications since last appt (Y/N):  NO  Are there any complications or cost restrictions with current therapy (Y/N):  NO      Vitals:  BP check declined today      Weight  declines   Height   5' 4\"     Asssessment:      INR remains therapeutic at 3.0, therefore decreasing patient's risk of stroke and/or bleeding complications.   Reason(s) for out of range INR today:  n/a      Plan:  Pt is to continue with current warfarin dosing regimen in order to maintain INR in therapeutic range.     Follow up:  Because warfarin is a high risk medication and current CHEST guidelines recommend regular monitoring intervals (few days up to 12 weeks), will have patient return to clinic in 4 weeks to recheck INR.    Kenneth Salas, PharmD    "

## 2019-02-12 ENCOUNTER — APPOINTMENT (OUTPATIENT)
Dept: PULMONOLOGY | Facility: MEDICAL CENTER | Age: 69
End: 2019-02-12
Attending: INTERNAL MEDICINE
Payer: MEDICARE

## 2019-02-14 ENCOUNTER — APPOINTMENT (OUTPATIENT)
Dept: PULMONOLOGY | Facility: MEDICAL CENTER | Age: 69
End: 2019-02-14
Attending: INTERNAL MEDICINE
Payer: MEDICARE

## 2019-02-19 ENCOUNTER — APPOINTMENT (OUTPATIENT)
Dept: PULMONOLOGY | Facility: MEDICAL CENTER | Age: 69
End: 2019-02-19
Payer: MEDICARE

## 2019-02-20 ENCOUNTER — APPOINTMENT (OUTPATIENT)
Dept: PULMONOLOGY | Facility: HOSPICE | Age: 69
End: 2019-02-20
Payer: MEDICARE

## 2019-02-20 DIAGNOSIS — I48.91 ATRIAL FIBRILLATION, UNSPECIFIED TYPE (HCC): ICD-10-CM

## 2019-02-21 ENCOUNTER — HOSPITAL ENCOUNTER (OUTPATIENT)
Dept: PULMONOLOGY | Facility: MEDICAL CENTER | Age: 69
End: 2019-02-21
Attending: INTERNAL MEDICINE
Payer: MEDICARE

## 2019-02-21 PROCEDURE — G0424 PULMONARY REHAB W EXER: HCPCS

## 2019-02-22 ENCOUNTER — OFFICE VISIT (OUTPATIENT)
Dept: PULMONOLOGY | Facility: HOSPICE | Age: 69
End: 2019-02-22
Payer: MEDICARE

## 2019-02-22 VITALS
OXYGEN SATURATION: 93 % | HEIGHT: 64 IN | HEART RATE: 70 BPM | WEIGHT: 201 LBS | TEMPERATURE: 97.9 F | BODY MASS INDEX: 34.31 KG/M2 | SYSTOLIC BLOOD PRESSURE: 126 MMHG | DIASTOLIC BLOOD PRESSURE: 66 MMHG | RESPIRATION RATE: 16 BRPM

## 2019-02-22 DIAGNOSIS — J44.9 CHRONIC OBSTRUCTIVE PULMONARY DISEASE, UNSPECIFIED COPD TYPE (HCC): ICD-10-CM

## 2019-02-22 DIAGNOSIS — R09.02 HYPOXIA: ICD-10-CM

## 2019-02-22 PROCEDURE — 99213 OFFICE O/P EST LOW 20 MIN: CPT | Performed by: PHYSICIAN ASSISTANT

## 2019-02-22 NOTE — PATIENT INSTRUCTIONS
1-continue Symbicort  2-continue xopenex  3-use spacer for both  4-spacer instruct  5-encourage continued activity as tolerated

## 2019-02-26 ENCOUNTER — HOSPITAL ENCOUNTER (OUTPATIENT)
Dept: PULMONOLOGY | Facility: MEDICAL CENTER | Age: 69
End: 2019-02-26
Attending: INTERNAL MEDICINE
Payer: MEDICARE

## 2019-02-28 ENCOUNTER — HOSPITAL ENCOUNTER (OUTPATIENT)
Dept: PULMONOLOGY | Facility: MEDICAL CENTER | Age: 69
End: 2019-02-28
Attending: INTERNAL MEDICINE
Payer: MEDICARE

## 2019-02-28 PROCEDURE — G0424 PULMONARY REHAB W EXER: HCPCS

## 2019-03-03 NOTE — PROGRESS NOTES
CC: Seeing improvement with pulmonary rehab    HPI:  Salma Lees is a 69 y.o. year old female here today for follow-up on COPD, hypoxia.  Patient last seen in clinic on 11/12/2018 by Dr. Galloway.  She does have a remote brief smoking history quit in 1990.  Reviewed in clinic vitals including blood pressure of 126/66, heart rate of 70 and O2 sat of 93% on 2 L oxygen which she wears 24/7. Patient's body mass index is 34.5 kg/m². Exercise and nutrition counseling were performed at this visit.  Patient currently participating in pulmonary rehab and reports it is very beneficial.  She has been increasing her O2 to 3 L for increased tolerance with activity.    Patient does have a pacemaker with prior mitral valve repair and was switched from albuterol to Xopenex with positive bronchodilator response on PFT.  PFT completed on 11/12/2028 demonstrated FVC of 2.03 L or 67% predicted, FEV1 of 1.27 L or 55% predicted, FEV1/FVC ratio of 82% predicted, FEF 25-75% was 31% predicted, residual volume was 128% predicted, total lung capacity 101% predicted and DLCO of 71% predicted.  Per pulmonologist interpretation findings consistent with emphysema.    Most recent imaging on 11/12/2018, chest x-ray reviewed and as per radiology interpretation no acute cardiopulmonary disease was noted, left bipolar transvenous pacemaker was in place and mitral valve replacement change was seen.    Current home regimen consisting of oxygen, Xopenex as a rescue inhaler which patient reports not using often due to mild tremor but she reports it better than albuterol.  Patient is on Symbicort low-dose and tolerating that well.  No new concerns.      ROS:   Constitutional: Denies fevers, chills, night sweats or unintentional weight loss, reports increased fatigue after rehab class  Skin: Denies rashes, hair or nail changes, lumps or sores   Eyes: Wears reading glasses, denies new or sudden vision changes  Ears, Nose, Throat:  history of seasonal  "allergies, no history of sinus infections, persistent hoarseness or sore throat, denies nasal congestion, earaches occasionally with cold wind, reports runny nose   GI: Denies heartburn/reflux or difficulty swallowing  Respiratory: Denies cough or wheezing, no shortness of breath at rest, reports shortness of breath with heavy activity only, remote history of pneumonia x1, no history of bronchitis or TB, 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 with \"[facility]   • Congestive heart failure ([facility])    • COPD (chronic obstructive pulmonary disease) ([facility])    • Emphysema of lung ([facility])    • Hypertension    • Infectious disease 09/07/2017    UTI/hx. MRSA   • Pacemaker 2014   • Renal disorder 09/07/2017    has stent       Past Surgical History:   Procedure Laterality Date   • URETEROSCOPY Left 9/18/2017    Procedure: URETEROSCOPY;  Surgeon: Edgardo Richter M.D.;  Location: [facility];  Service: Urology   • LASERTRIPSY Left 9/18/2017    Procedure: LASERTRIPSY LITHO  ;  Surgeon: Edgardo Richter M.D.;  Location: [facility];  Service: Urology   • STENT REMOVAL Left 9/18/2017    Procedure: STENT REMOVAL;  Surgeon: Edgardo Richter M.D.;  Location: [facility];  Service: Urology   • CYSTOSCOPY STENT PLACEMENT Left 6/25/2017    Procedure: CYSTOSCOPY, LEFT URETERAL STENT PLACEMENT;  Surgeon: Edgardo Richter M.D.;  Location: [facility];  Service:    • PACEMAKER INSERTION  2015   • KNEE REPLACEMENT, TOTAL Left 2015   • MITRAL VALVE REPAIR  2009       History reviewed. No pertinent family history.    Social History     Social History   • Marital status:      Spouse " "name: N/A   • Number of children: N/A   • Years of education: N/A     Occupational History   • Not on file.     Social History Main Topics   • Smoking status: Former Smoker     Packs/day: 1.00     Years: 5.00     Types: Cigarettes     Quit date: 1/7/1990   • Smokeless tobacco: Never Used   • Alcohol use No   • Drug use: No   • Sexual activity: No     Other Topics Concern   • Not on file     Social History Narrative   • No narrative on file       Allergies as of 02/22/2019 - Reviewed 02/22/2019   Allergen Reaction Noted   • Pcn [penicillins] Swelling 09/07/2017   • Amlodipine besylate-valsartan Unspecified 02/13/2017   • Amoxicillin Anaphylaxis and Shortness of Breath 02/13/2017   • Etodolac Hives and Nausea 02/13/2017   • Food Hives 09/07/2017   • Lisinopril Unspecified 02/13/2017   • Other drug Rash 02/13/2017   • Other misc  09/18/2017   • Tape  09/18/2017        @Vital signs for this encounter:  Vitals:    02/22/19 1327 02/22/19 1329   Height: 1.626 m (5' 4\")    Weight: 91.2 kg (201 lb)    Weight % change since last entry.: 0 %    BP: 126/66    Pulse: 70    BMI (Calculated): 34.5    Resp: 16    Temp: 36.6 °C (97.9 °F)    TempSrc: Oral    O2 sat % on O2:  93 %   O2 Flow Rate (L/min):  2       Current medications as of today   Current Outpatient Prescriptions   Medication Sig Dispense Refill   • warfarin (COUMADIN) 5 MG Tab Take one and one-half (1.5) tablets daily as directed by [facility] 135 Tab 1   • Potassium (POTASSIMIN PO) Take  by mouth.     • spironolactone (ALDACTONE) 25 MG Tab Take 25 mg by mouth every day.     • atorvastatin (LIPITOR) 20 MG Tab Take 20 mg by mouth every evening.     • levalbuterol (XOPENEX HFA) 45 MCG/ACT inhaler Inhale 2 Puffs by mouth every four hours as needed for Shortness of Breath. 1 Inhaler 2   • budesonide-formoterol (SYMBICORT) 160-4.5 MCG/ACT Aerosol Inhale 2 Puffs by mouth 2 Times a Day. Use spacer. Rinse mouth after each use. 1 Inhaler 4   • carvedilol " (COREG) 25 MG Tab Take 25 mg by mouth 2 times a day.     • furosemide (LASIX) 20 MG Tab Take 1 Tab by mouth every day. 90 Tab 1   • diltiazem (CARDIZEM) 30 MG Tab Take 30 mg by mouth 2 Times a Day.     • digoxin (LANOXIN) 125 MCG Tab Take 125 mcg by mouth every day.     • ANORO ELLIPTA 62.5-25 MCG/INH AEROSOL POWDER, BREATH ACTIVATED inhaler INHALE 1 PUFF BY MOUTH EVERY DAY (Patient not taking: Reported on 2/22/2019) 3 Inhaler 0   • albuterol 108 (90 Base) MCG/ACT Aero Soln inhalation aerosol Inhale 2 Puffs by mouth every 6 hours as needed for Shortness of Breath.     • tramadol (ULTRAM) 50 MG Tab Take 1 Tab by mouth every four hours as needed. (Patient not taking: Reported on 2/22/2019) 30 Tab 0   • pravastatin (PRAVACHOL) 20 MG Tab Take 20 mg by mouth every day.       No current facility-administered medications for this visit.          Physical Exam:   Gen:           Alert and oriented, No apparent distress. Mood and affect appropriate, normal interaction with provider.  Eyes:          sclere white, conjunctive moist.  Hearing:     Grossly intact.  Dentition:    Both upper and lower dentures  Oropharynx:   Tongue normal, posterior pharynx without erythema or exudate.  Mallampati Classification: I  Neck:        Supple, trachea midline, no masses.  Respiratory Effort: No intercostal retractions or use of accessory muscles.   Lung Auscultation:      Clear to auscultation bilaterally; no rales, rhonchi or wheezing.  CV:            Regular rate and rhythm. No murmurs, rubs or gallops.  No edema  Digits, Nails, Ext: No clubbing, cyanosis, petechiae, or nodes.   Skin:        No rashes, lesions or ulcers noted on back or exposed skin surfaces.                     Assessment:  1. BMI 34.0-34.9,adult  OBESITY COUNSELING (No Charge): Patient identified as having weight management issue.  Appropriate orders and counseling given.   2. Chronic obstructive pulmonary disease, unspecified COPD type (HCC)   spacer provided with  instructions for use and cleaning   3. Hypoxia   O2 at 2 L rest and night, 3 L with activity       Immunizations:    Flu: 9/21/2018  Pneumovax 23: 9/21/2011  Prevnar 13: 10/6/2016    Plan:  1-continue Symbicort  2-continue xopenex  3-use spacer for both  4-spacer instruct  5-encourage continued activity as tolerated     This dictation was created using voice recognition software. The accuracy of the dictation is limited to the abilities of the software. I expect there may be some errors of grammar and possibly content.

## 2019-03-05 ENCOUNTER — HOSPITAL ENCOUNTER (OUTPATIENT)
Dept: PULMONOLOGY | Facility: MEDICAL CENTER | Age: 69
End: 2019-03-05
Attending: INTERNAL MEDICINE
Payer: MEDICARE

## 2019-03-07 ENCOUNTER — HOSPITAL ENCOUNTER (OUTPATIENT)
Dept: PULMONOLOGY | Facility: MEDICAL CENTER | Age: 69
End: 2019-03-07
Attending: INTERNAL MEDICINE
Payer: MEDICARE

## 2019-03-14 ENCOUNTER — APPOINTMENT (OUTPATIENT)
Dept: PULMONOLOGY | Facility: MEDICAL CENTER | Age: 69
End: 2019-03-14
Attending: INTERNAL MEDICINE
Payer: MEDICARE

## 2019-03-19 ENCOUNTER — HOSPITAL ENCOUNTER (OUTPATIENT)
Dept: PULMONOLOGY | Facility: MEDICAL CENTER | Age: 69
End: 2019-03-19
Attending: INTERNAL MEDICINE
Payer: MEDICARE

## 2019-03-19 PROCEDURE — G0424 PULMONARY REHAB W EXER: HCPCS

## 2019-03-26 ENCOUNTER — HOSPITAL ENCOUNTER (OUTPATIENT)
Dept: PULMONOLOGY | Facility: MEDICAL CENTER | Age: 69
End: 2019-03-26
Attending: INTERNAL MEDICINE
Payer: MEDICARE

## 2019-03-26 PROCEDURE — G0424 PULMONARY REHAB W EXER: HCPCS

## 2019-03-28 ENCOUNTER — HOSPITAL ENCOUNTER (OUTPATIENT)
Dept: PULMONOLOGY | Facility: MEDICAL CENTER | Age: 69
End: 2019-03-28
Attending: INTERNAL MEDICINE
Payer: MEDICARE

## 2019-03-28 PROCEDURE — G0424 PULMONARY REHAB W EXER: HCPCS

## 2019-04-02 ENCOUNTER — HOSPITAL ENCOUNTER (OUTPATIENT)
Dept: PULMONOLOGY | Facility: MEDICAL CENTER | Age: 69
End: 2019-04-02
Attending: INTERNAL MEDICINE
Payer: MEDICARE

## 2019-04-04 ENCOUNTER — HOSPITAL ENCOUNTER (OUTPATIENT)
Dept: PULMONOLOGY | Facility: MEDICAL CENTER | Age: 69
End: 2019-04-04
Attending: INTERNAL MEDICINE
Payer: MEDICARE

## 2019-04-04 PROCEDURE — G0424 PULMONARY REHAB W EXER: HCPCS

## 2019-04-10 ENCOUNTER — PATIENT OUTREACH (OUTPATIENT)
Dept: HEALTH INFORMATION MANAGEMENT | Facility: OTHER | Age: 69
End: 2019-04-10

## 2019-04-10 NOTE — PROGRESS NOTES
Outcome: Left Message    Please transfer to Patient Outreach Team at [phone] when patient returns call.    [facility] Verified: yes    Attempt # 1

## 2019-04-11 ENCOUNTER — HOSPITAL ENCOUNTER (OUTPATIENT)
Dept: PULMONOLOGY | Facility: MEDICAL CENTER | Age: 69
End: 2019-04-11
Attending: INTERNAL MEDICINE
Payer: MEDICARE

## 2019-04-11 PROCEDURE — G0424 PULMONARY REHAB W EXER: HCPCS

## 2019-04-16 ENCOUNTER — HOSPITAL ENCOUNTER (OUTPATIENT)
Dept: PULMONOLOGY | Facility: MEDICAL CENTER | Age: 69
End: 2019-04-16
Attending: INTERNAL MEDICINE
Payer: MEDICARE

## 2019-05-17 ENCOUNTER — APPOINTMENT (OUTPATIENT)
Dept: PULMONOLOGY | Facility: HOSPICE | Age: 69
End: 2019-05-17
Payer: MEDICARE

## 2019-05-21 ENCOUNTER — ANTICOAGULATION VISIT (OUTPATIENT)
Dept: MEDICAL GROUP | Facility: MEDICAL CENTER | Age: 69
End: 2019-05-21
Payer: MEDICARE

## 2019-05-21 DIAGNOSIS — Z79.01 CHRONIC ANTICOAGULATION: Primary | ICD-10-CM

## 2019-05-21 DIAGNOSIS — I48.91 ATRIAL FIBRILLATION, UNSPECIFIED TYPE (HCC): ICD-10-CM

## 2019-05-21 DIAGNOSIS — Z98.890 HISTORY OF MITRAL VALVE REPAIR: ICD-10-CM

## 2019-05-21 LAB — INR PPP: 3.7 (ref 2–3.5)

## 2019-05-21 PROCEDURE — 85610 PROTHROMBIN TIME: CPT | Performed by: INTERNAL MEDICINE

## 2019-05-21 PROCEDURE — 99211 OFF/OP EST MAY X REQ PHY/QHP: CPT | Performed by: INTERNAL MEDICINE

## 2019-05-21 RX ORDER — WARFARIN SODIUM 5 MG/1
TABLET ORAL
Qty: 135 TAB | Refills: 1 | Status: SHIPPED | OUTPATIENT
Start: 2019-05-21 | End: 2019-06-10 | Stop reason: SDUPTHER

## 2019-05-21 NOTE — PROGRESS NOTES
OP Anticoagulation Service Note    Date: 5/21/2019      Anticoagulation Summary  As of 5/21/2019    INR goal:   2.0-3.0   TTR:   44.2 % (2.2 y)   INR used for dosing:   3.70! (5/21/2019)   Warfarin maintenance plan:   5 mg (5 mg x 1) every Tue, Sat; 7.5 mg (5 mg x 1.5) all other days   Weekly warfarin total:   47.5 mg   Plan last modified:   Sakshi Mcconnell, PharmD (5/21/2019)   Next INR check:   6/4/2019   Target end date:   Indefinite    Indications    Chronic anticoagulation [Z79.01]  Atrial fibrillation (HCC) [I48.91]  History of mitral valve repair [Z98.890]             Anticoagulation Episode Summary     INR check location:       Preferred lab:       Send INR reminders to:       Comments:         Anticoagulation Care Providers     Provider Role Specialty Phone number    SUSANNE Pearson.P.YASMANY. Referring Family Medicine [phone]    [facility] Responsible  [phone]    Kenneth Salas, PharmD Responsible          Anticoagulation Patient Findings      HPI:   Salma Lees seen in clinic today, on anticoagulation therapy with warfarin (a high risk medication) for atrial fibrillation    Pt is here today to evaluate anticoagulation therapy    Previous INR was  3.0 on 2-8-19    Pt was instructed to continue current regimen    Confirmed warfarin dosing regimen, denies missed or extra doses of coumadin.   Diet has been consistent with foods rich in vitamin K: pt had teeth pulled, has really affected diet. No longer eating a salad a night  Changes in ETOH:  No  Changes in smoking status: No  Changes in medication: No   Cost restriction: No  S/s of bleeding:  No  Falls or accidents since last visit No  Signs/symptoms  thrombosis since the last appt: No    A/P   INR  SUPRA-therapeutic today, will require dose adjust ment today to prevent bleeding complications and closer follow up.   HOLD then lower regimen d/t diet changes       2/20 check referral    Pt educated to contact our clinic  with any changes in medications or s/s of bleeding or thrombosis. Pt is aware to seek immediate medical attention for falls, head injury or deep cuts    Follow up appointment in 2 week(s) to reduce risk of adverse events from warfarin  Sakshi Mcconnell, PharmD

## 2019-06-04 ENCOUNTER — ANTICOAGULATION VISIT (OUTPATIENT)
Dept: MEDICAL GROUP | Facility: MEDICAL CENTER | Age: 69
End: 2019-06-04
Payer: MEDICARE

## 2019-06-04 VITALS — DIASTOLIC BLOOD PRESSURE: 66 MMHG | HEART RATE: 71 BPM | SYSTOLIC BLOOD PRESSURE: 110 MMHG

## 2019-06-04 DIAGNOSIS — Z79.01 CHRONIC ANTICOAGULATION: Primary | ICD-10-CM

## 2019-06-04 DIAGNOSIS — Z98.890 HISTORY OF MITRAL VALVE REPAIR: ICD-10-CM

## 2019-06-04 DIAGNOSIS — I48.91 ATRIAL FIBRILLATION, UNSPECIFIED TYPE (HCC): ICD-10-CM

## 2019-06-04 LAB — INR PPP: 1.5 (ref 2–3.5)

## 2019-06-04 PROCEDURE — 99211 OFF/OP EST MAY X REQ PHY/QHP: CPT | Performed by: FAMILY MEDICINE

## 2019-06-04 PROCEDURE — 85610 PROTHROMBIN TIME: CPT | Performed by: FAMILY MEDICINE

## 2019-06-04 NOTE — PROGRESS NOTES
OP Anticoagulation Service Note    Date: 2019  Vitals:    19 1401   BP: 110/66   Pulse: 71         Anticoagulation Summary  As of 2019    INR goal:   2.0-3.0   TTR:   44.2 % (2.2 y)   INR used for dosin.50! (2019)   Warfarin maintenance plan:   5 mg (5 mg x 1) every Tue, Sat; 7.5 mg (5 mg x 1.5) all other days   Weekly warfarin total:   47.5 mg   Plan last modified:   Sakshi Mcconnell, PharmD (2019)   Next INR check:   2019   Target end date:   Indefinite    Indications    Chronic anticoagulation [Z79.01]  Atrial fibrillation (HCC) [I48.91]  History of mitral valve repair [Z98.890]             Anticoagulation Episode Summary     INR check location:       Preferred lab:       Send INR reminders to:       Comments:         Anticoagulation Care Providers     Provider Role Specialty Phone number    Zeferino Almaraz, A.P.N. Referring Family Medicine [phone]    [facility] Responsible  [phone]    Kenneth Salas, PharmD Responsible          Anticoagulation Patient Findings      HPI:   Salma Lees seen in clinic today, on anticoagulation therapy with warfarin (a high risk medication) for atrial fibrillation and mitral valve repair      Pt is here today to evaluate anticoagulation therapy    Previous INR was  3.7 on 19    Pt was instructed to HOLD then lower weekly regimen    Confirmed warfarin dosing regimen, missed a dose this past week.   Diet has been consistent with foods rich in vitamin K: Yes  Changes in ETOH:  No  Changes in smoking status: No  Changes in medication: No   Cost restriction: No  S/s of bleeding:  No  Falls or accidents since last visit No  Signs/symptoms  thrombosis since the last appt: No    A/P   INR  sub-therapeutic today, will require dose adjust ment today to prevent stroke and closer follow up   Tonight 7.5 mg then Continue current warfarin regimen          2020 check referral    Pt educated to contact our clinic with  any changes in medications or s/s of bleeding or thrombosis. Pt is aware to seek immediate medical attention for falls, head injury or deep cuts    Follow up appointment in 2 week(s) to reduce risk of adverse events from warfarin   Sakshi Mcconnell, PharmD

## 2019-06-10 RX ORDER — WARFARIN SODIUM 5 MG/1
TABLET ORAL
Qty: 135 TAB | Refills: 1 | Status: SHIPPED | OUTPATIENT
Start: 2019-06-10 | End: 2020-01-14

## 2019-06-18 ENCOUNTER — APPOINTMENT (OUTPATIENT)
Dept: MEDICAL GROUP | Facility: MEDICAL CENTER | Age: 69
End: 2019-06-18
Payer: MEDICARE

## 2019-08-29 NOTE — PROGRESS NOTES
Outcome: Left Message    Please transfer to Patient Outreach Team at [phone] when patient returns call.    [facility] Verified: yes    AHA list

## 2019-09-16 ENCOUNTER — OFFICE VISIT (OUTPATIENT)
Dept: PULMONOLOGY | Facility: HOSPICE | Age: 69
End: 2019-09-16
Payer: MEDICARE

## 2019-09-16 VITALS
OXYGEN SATURATION: 92 % | SYSTOLIC BLOOD PRESSURE: 128 MMHG | HEIGHT: 64 IN | RESPIRATION RATE: 16 BRPM | WEIGHT: 199 LBS | HEART RATE: 102 BPM | BODY MASS INDEX: 33.97 KG/M2 | DIASTOLIC BLOOD PRESSURE: 78 MMHG

## 2019-09-16 DIAGNOSIS — J44.9 CHRONIC OBSTRUCTIVE PULMONARY DISEASE, UNSPECIFIED COPD TYPE (HCC): ICD-10-CM

## 2019-09-16 DIAGNOSIS — Z23 NEED FOR VACCINATION: ICD-10-CM

## 2019-09-16 PROCEDURE — G0009 ADMIN PNEUMOCOCCAL VACCINE: HCPCS | Performed by: PHYSICIAN ASSISTANT

## 2019-09-16 PROCEDURE — 99214 OFFICE O/P EST MOD 30 MIN: CPT | Mod: 25 | Performed by: PHYSICIAN ASSISTANT

## 2019-09-16 PROCEDURE — 90732 PPSV23 VACC 2 YRS+ SUBQ/IM: CPT | Performed by: PHYSICIAN ASSISTANT

## 2019-09-16 PROCEDURE — G0008 ADMIN INFLUENZA VIRUS VAC: HCPCS | Performed by: PHYSICIAN ASSISTANT

## 2019-09-16 PROCEDURE — 90662 IIV NO PRSV INCREASED AG IM: CPT | Performed by: PHYSICIAN ASSISTANT

## 2019-09-16 RX ORDER — POTASSIUM CHLORIDE 750 MG/1
TABLET, EXTENDED RELEASE ORAL
COMMUNITY
Start: 2019-08-30 | End: 2019-11-19 | Stop reason: SDUPTHER

## 2019-09-16 ASSESSMENT — ENCOUNTER SYMPTOMS
INSOMNIA: 1
WEIGHT LOSS: 0
HEARTBURN: 0
CLAUDICATION: 0
DIZZINESS: 0
SPUTUM PRODUCTION: 0
SHORTNESS OF BREATH: 1
SINUS PAIN: 0
FEVER: 0
COUGH: 0
ORTHOPNEA: 1
SORE THROAT: 0
WHEEZING: 0
PALPITATIONS: 1
CHILLS: 0
HEADACHES: 0
EYES NEGATIVE: 1
DIAPHORESIS: 0

## 2019-09-16 NOTE — PROGRESS NOTES
CC: Life stressors    HPI:  Salma Lees is a 69 y.o. year old female here today for follow-up on COPD. Last seen in clinic 2/22/2019.  Patient reports a brief remote smoking history with quit date in 1990 and 5-pack-year reported.  Continued abstinence advised.  Pertinent medical history includes pacemaker.     Reviewed in clinic vitals including blood pressure 128/78, heart rate of 102, O2 sat of 92%. Patient's body mass index is 34.16 kg/m². Exercise and nutrition counseling were performed at this visit.  Discussion included impact of central adiposity on respiratory function.    Reviewed home medication regimen including albuterol, Xopenex which did not seem to help, Symbicort but patient request change back to Anoro due to scratchy throat, also on furosemide, potassium.  Patient is on oxygen at 2 L at rest at night and 3 L with activity.  Would like to get an [employer] portable oxygen concentrator.  She does have a mini simply go and understands the other would be out-of-pocket.  She will let us know when she saves up the money.    Reviewed most recent imaging including chest x-ray obtained 11/12/2018 demonstrated left bipolar transvenous pacemaker in place, mitral valve replacement changes, otherwise no acute cardiopulmonary disease.    Patient did have an echocardiogram 10/25/2018 demonstrating normal left ventricular wall thickness, LVEF estimated 60%, mildly dilated right ventricle, enlarged right atrium, mildly dilated left atrium, aortic sclerosis without stenosis, trace aortic insufficiency, thickened mitral valve leaflets, known mitral valve repair functioning normally, mild mitral regurgitation, estimated RVSP of 60 mmHg, severe tricuspid regurgitation.    Last pulmonary function testing 11/12/2018 demonstrated FEV1 of 1.27 L or 55% predicted, FVC of 2.03 L or 67% predicted, FEV1/FVC ratio 62, 13% increase in FEV1 postbronchodilator, residual volume 128% predicted, % predicted and DLCO 71%  "predicted.  Per pulmonologist interpretation definite bronchodilator response, mild hyperinflation noted, low oxygen transfer at 71% predicted suggest possible emphysema.    Patient was emotional in clinic, states lately she has ignored her health due to recent loss of .  Support and encouragement given.     Review of Systems   Constitutional: Negative for chills, diaphoresis, fever, malaise/fatigue and weight loss.   HENT: Positive for congestion and nosebleeds (occasional if water runs dry on humidifier for concentrator). Negative for hearing loss, sinus pain, sore throat and tinnitus.    Eyes: Negative.         Prescription eye glasses   Respiratory: Positive for shortness of breath (mostly with activity ). Negative for cough, sputum production and wheezing.    Cardiovascular: Positive for palpitations, orthopnea and leg swelling. Negative for chest pain and claudication.   Gastrointestinal: Negative for heartburn.        Dentures up and down     Takes out for eating and drinking    Skin: Negative.    Neurological: Negative for dizziness and headaches.   Psychiatric/Behavioral: The patient has insomnia (life stressors ).        Past Medical History:   Diagnosis Date   • A-fib ([facility])    • Asthma    • Breath shortness 09/07/2017    uses oxygen at 3 liters/min cont. with \"[facility]   • Congestive heart failure ([facility])    • COPD (chronic obstructive pulmonary disease) ([facility])    • Emphysema of lung ([facility])    • Hypertension    • Infectious disease 09/07/2017    UTI/hx. MRSA   • Pacemaker 2014   • Renal disorder 09/07/2017    has stent       Past Surgical History:   Procedure Laterality Date   • URETEROSCOPY Left 9/18/2017    Procedure: URETEROSCOPY;  Surgeon: Edgardo Richter M.D.;  Location: [facility];  Service: Urology   • LASERTRIPSY Left 9/18/2017    Procedure: LASERTRIPSY LITHO  ;  Surgeon: Edgardo Richter M.D.;  Location: SUR[facility];  Service: Urology   • STENT " REMOVAL Left 2017    Procedure: STENT REMOVAL;  Surgeon: Edgardo Richter M.D.;  Location: SUR[facility]ERY [facility];  Service: Urology   • CYSTOSCOPY STENT PLACEMENT Left 2017    Procedure: CYSTOSCOPY, LEFT URETERAL STENT PLACEMENT;  Surgeon: Edgardo Richter M.D.;  Location: SUR[facility]ERY [facility];  Service:    • PACEMAKER INSERTION     • KNEE REPLACEMENT, TOTAL Left    • MITRAL VALVE REPAIR         History reviewed. No pertinent family history.    Social History     Socioeconomic History   • Marital status:      Spouse name: Not on file   • Number of children: Not on file   • Years of education: Not on file   • Highest education level: Not on file   Occupational History   • Not on file   Social Needs   • Financial resource strain: Not on file   • Food insecurity:     Worry: Not on file     Inability: Not on file   • Transportation needs:     Medical: Not on file     Non-medical: Not on file   Tobacco Use   • Smoking status: Former Smoker     Packs/day: 1.00     Years: 5.00     Pack years: 5.00     Types: Cigarettes     Last attempt to quit: 1990     Years since quittin.7   • Smokeless tobacco: Never Used   • Tobacco comment: Continued abstinence   Substance and Sexual Activity   • Alcohol use: No     Alcohol/week: 0.0 oz   • Drug use: No   • Sexual activity: Never   Lifestyle   • Physical activity:     Days per week: Not on file     Minutes per session: Not on file   • Stress: Not on file   Relationships   • Social connections:     Talks on phone: Not on file     Gets together: Not on file     Attends [employer] service: Not on file     Active member of club or organization: Not on file     Attends meetings of clubs or organizations: Not on file     Relationship status: Not on file   • Intimate partner violence:     Fear of current or ex partner: Not on file     Emotionally abused: Not on file     Physically abused: Not on file     Forced sexual activity: Not on  "file   Other Topics Concern   • Not on file   Social History Narrative   • Not on file       Allergies as of 09/16/2019 - Reviewed 09/16/2019   Allergen Reaction Noted   • Pcn [penicillins] Swelling 09/07/2017   • Amlodipine besylate-valsartan Unspecified 02/13/2017   • Amoxicillin Anaphylaxis and Shortness of Breath 02/13/2017   • Etodolac Hives and Nausea 02/13/2017   • Food Hives 09/07/2017   • Lisinopril Unspecified 02/13/2017   • Other drug Rash 02/13/2017   • Other misc  09/18/2017   • Tape  09/18/2017        @Vital signs for this encounter:  Vitals:    09/16/19 1331 09/16/19 1339   Height: 1.626 m (5' 4\")    Weight: 90.3 kg (199 lb)    Weight % change since last entry.: 0 %    BP: 128/78    Pulse: (!) 102    BMI (Calculated): 34.16    Resp: 16    O2 sat % on O2:  92 %   O2 Flow Rate (L/min):  2       Current medications as of today   Current Outpatient Medications   Medication Sig Dispense Refill   • potassium chloride SA (K-DUR) 10 MEQ Tab CR      • warfarin (COUMADIN) 5 MG Tab Take one to one and one-half (1-1.5) tablets daily as directed by [facility] 135 Tab 1   • spironolactone (ALDACTONE) 25 MG Tab Take 25 mg by mouth every day.     • levalbuterol (XOPENEX HFA) 45 MCG/ACT inhaler Inhale 2 Puffs by mouth every four hours as needed for Shortness of Breath. 1 Inhaler 2   • budesonide-formoterol (SYMBICORT) 160-4.5 MCG/ACT Aerosol Inhale 2 Puffs by mouth 2 Times a Day. Use spacer. Rinse mouth after each use. 1 Inhaler 4   • carvedilol (COREG) 25 MG Tab Take 25 mg by mouth 2 times a day.     • furosemide (LASIX) 20 MG Tab Take 1 Tab by mouth every day. 90 Tab 1   • albuterol 108 (90 Base) MCG/ACT Aero Soln inhalation aerosol Inhale 2 Puffs by mouth every 6 hours as needed for Shortness of Breath.     • diltiazem (CARDIZEM) 30 MG Tab Take 30 mg by mouth 2 Times a Day.     • digoxin (LANOXIN) 125 MCG Tab Take 125 mcg by mouth every day.     • Potassium (POTASSIMIN PO) Take  by mouth.     • " atorvastatin (LIPITOR) 20 MG Tab Take 20 mg by mouth every evening.     • ANORO ELLIPTA 62.5-25 MCG/INH AEROSOL POWDER, BREATH ACTIVATED inhaler INHALE 1 PUFF BY MOUTH EVERY DAY (Patient not taking: Reported on 2/22/2019) 3 Inhaler 0   • tramadol (ULTRAM) 50 MG Tab Take 1 Tab by mouth every four hours as needed. (Patient not taking: Reported on 2/22/2019) 30 Tab 0   • pravastatin (PRAVACHOL) 20 MG Tab Take 20 mg by mouth every day.       No current facility-administered medications for this visit.          Physical Exam:   Gen:           Alert and oriented, No apparent distress. Mood and affect     appropriate, normal interaction with provider.  Eyes:          sclere white, conjunctive moist.  Hearing:     Grossly intact.  Dentition:    Upper and lower dentures  Oropharynx:   Tongue normal, posterior pharynx without erythema or exudate.  Neck:        Supple, trachea midline, no masses.  Respiratory Effort: No intercostal retractions or use of accessory muscles.   Lung Auscultation:      Clear to auscultation bilaterally; no rales, rhonchi or wheezing.  CV:            Regular rate and rhythm. No edema. No murmurs, rubs or gallops.  Digits, Nails, Ext: No clubbing, cyanosis, petechiae, or nodes.   Skin:        No rashes, lesions or ulcers noted on back or exposed skin surfaces.                     Assessment:  1. Chronic obstructive pulmonary disease, unspecified COPD type (HCC)  umeclidinium-vilanterol (ANORO ELLIPTA) 62.5-25 MCG/INH AEROSOL POWDER, BREATH ACTIVATED inhaler    PULMONARY FUNCTION TESTS -Test requested: Complete Pulmonary Function Test   2. Need for vaccination  Pneumoccocal Polysaccharide Vaccine 23-Valent =>1yo SQ/IM    INFLUENZA VACCINE, HIGH DOSE (65+ ONLY)   3. BMI 35.0-35.9,adult  OBESITY COUNSELING (No Charge): Patient identified as having weight management issue.  Appropriate orders and counseling given.       Immunizations:    Flu: 9/16/2019  Pneumovax 23: 9/16/2019  Prevnar 13:  10/6/2016    Plan:    1-switched back to Anoro at patient request-scratchy throat  2-caught up on immunizations today  3-follow up in 6 months with PFT  4-sooner if needed     This dictation was created using voice recognition software. The accuracy of the dictation is limited to the abilities of the software. I expect there may be some errors of grammar and possibly content.

## 2019-09-16 NOTE — PATIENT INSTRUCTIONS
1-switched back to Anoro at patient request-scratchy throat  2-caught up on immunizations today  3-follow up in 6 months with PFT  4-sooner if needed

## 2019-09-19 ENCOUNTER — APPOINTMENT (OUTPATIENT)
Dept: MEDICAL GROUP | Facility: MEDICAL CENTER | Age: 69
End: 2019-09-19
Payer: MEDICARE

## 2019-09-26 ENCOUNTER — ANTICOAGULATION VISIT (OUTPATIENT)
Dept: MEDICAL GROUP | Facility: MEDICAL CENTER | Age: 69
End: 2019-09-26
Payer: MEDICARE

## 2019-09-26 DIAGNOSIS — I48.91 ATRIAL FIBRILLATION, UNSPECIFIED TYPE (HCC): ICD-10-CM

## 2019-09-26 DIAGNOSIS — Z98.890 HISTORY OF MITRAL VALVE REPAIR: ICD-10-CM

## 2019-09-26 DIAGNOSIS — Z79.01 CHRONIC ANTICOAGULATION: Primary | ICD-10-CM

## 2019-09-26 LAB — INR PPP: 4.1 (ref 2–3.5)

## 2019-09-26 PROCEDURE — 99211 OFF/OP EST MAY X REQ PHY/QHP: CPT | Performed by: INTERNAL MEDICINE

## 2019-09-26 PROCEDURE — 85610 PROTHROMBIN TIME: CPT | Performed by: FAMILY MEDICINE

## 2019-09-26 NOTE — PROGRESS NOTES
OP Anticoagulation Service Note    Date: 2019      Anticoagulation Summary  As of 2019    INR goal:   2.0-3.0   TTR:   43.5 % (2.5 y)   INR used for dosin.10! (2019)   Warfarin maintenance plan:   5 mg (5 mg x 1) every Tue, Sat; 7.5 mg (5 mg x 1.5) all other days   Weekly warfarin total:   47.5 mg   Plan last modified:   Sakshi Mcconnell, PharmD (2019)   Next INR check:   10/10/2019   Target end date:   Indefinite    Indications    Chronic anticoagulation [Z79.01]  Atrial fibrillation (HCC) [I48.91]  History of mitral valve repair [Z98.890]             Anticoagulation Episode Summary     INR check location:       Preferred lab:       Send INR reminders to:       Comments:         Anticoagulation Care Providers     Provider Role Specialty Phone number    Zeferino Almaraz A.P.N. Referring Family Medicine [phone]    [facility] Responsible  [phone]    Kenneth Salas, PharmD Responsible          Anticoagulation Patient Findings      HPI:   Salma Lees seen in clinic today, on anticoagulation therapy with warfarin (a high risk medication) for atrial fibrillation and mitral valve repair    Pt is here today to evaluate anticoagulation therapy    Pt missed last appointment    She has been taking 7.5 mg daily  Diet has been consistent with foods rich in vitamin K: Yes  Changes in ETOH:  No  Changes in smoking status: No  Changes in medication: No   Cost restriction: No  S/s of bleeding:  No  Falls or accidents since last visit No  Signs/symptoms  thrombosis since the last appt: No    A/P   INR supra therapeutic today    HOLD warfarin and decrease regimen to previous dosing regimen.        check referral    Pt educated to contact our clinic with any changes in medications or s/s of bleeding or thrombosis. Pt is aware to seek immediate medical attention for falls, head injury or deep cuts    Follow up appointment in 2 week(s) to reduce risk of adverse events  from warfarin   Sakshi Mcconnell, PharmD

## 2019-10-10 ENCOUNTER — ANTICOAGULATION VISIT (OUTPATIENT)
Dept: MEDICAL GROUP | Facility: MEDICAL CENTER | Age: 69
End: 2019-10-10
Payer: MEDICARE

## 2019-10-10 ENCOUNTER — TELEPHONE (OUTPATIENT)
Dept: MEDICAL GROUP | Facility: PHYSICIAN GROUP | Age: 69
End: 2019-10-10

## 2019-10-10 DIAGNOSIS — Z79.01 CHRONIC ANTICOAGULATION: Primary | ICD-10-CM

## 2019-10-10 DIAGNOSIS — Z98.890 HISTORY OF MITRAL VALVE REPAIR: ICD-10-CM

## 2019-10-10 LAB — INR PPP: 1.9 (ref 2–3.5)

## 2019-10-10 PROCEDURE — 99211 OFF/OP EST MAY X REQ PHY/QHP: CPT | Performed by: INTERNAL MEDICINE

## 2019-10-10 PROCEDURE — 85610 PROTHROMBIN TIME: CPT | Performed by: INTERNAL MEDICINE

## 2019-10-10 NOTE — TELEPHONE ENCOUNTER
1. Caller Name: Salma      Call Back Number: [phone] (home)       Patient approves a detailed voicemail message: N\A    LVM for pt to call me back to schedule AWV.

## 2019-10-10 NOTE — PROGRESS NOTES
OP Anticoagulation Service Note    Date: 10/10/2019  There were no vitals filed for this visit.   pt declined vitals    Anticoagulation Summary  As of 10/10/2019    INR goal:   2.0-3.0   TTR:   43.5 % (2.5 y)   INR used for dosing:      Warfarin maintenance plan:   5 mg (5 mg x 1) every Tue, Sat; 7.5 mg (5 mg x 1.5) all other days   Weekly warfarin total:   47.5 mg   Plan last modified:   Sakshi Mcconnell, PharmD (5/21/2019)   Next INR check:      Target end date:   Indefinite    Indications    Chronic anticoagulation [Z79.01]  Atrial fibrillation (HCC) [I48.91]  History of mitral valve repair [Z98.890]             Anticoagulation Episode Summary     INR check location:       Preferred lab:       Send INR reminders to:       Comments:         Anticoagulation Care Providers     Provider Role Specialty Phone number    MARTA Pearson. Referring Family Medicine [phone]    [facility] Responsible  [phone]    Kenneth Salas, PharmD Responsible          Anticoagulation Patient Findings      HPI:   Salma Lees seen in clinic today, on anticoagulation therapy with warfarin (a high risk medication) for atrial fibrillation with hx of mitral valve repair      Pt is here today to evaluate anticoagulation therapy    Previous INR was  4.1 on 9-26-19    Pt was instructed to lower weekly regimen    Confirmed warfarin dosing regimen, denies missed or extra doses of coumadin.   Diet has been consistent with foods rich in vitamin K: No  Changes in ETOH:  No  Changes in smoking status: No  Changes in medication: No   Cost restriction: No  S/s of bleeding:  No  Falls or accidents since last visit No  Signs/symptoms  thrombosis since the last appt: No    A/P   INR  Sub-therapeutic today, will require dose adjust ment today to prevent  stroke and closer follow up.   Increase weekly regimen slightly       Pt educated to contact our clinic with any changes in medications or s/s of bleeding or  thrombosis. Pt is aware to seek immediate medical attention for falls, head injury or deep cuts    Follow up appointment in 2 week(s) to reduce risk of adverse events from warfarin   Sakshi Mcconnell, PharmD

## 2019-10-24 ENCOUNTER — ANTICOAGULATION VISIT (OUTPATIENT)
Dept: MEDICAL GROUP | Facility: MEDICAL CENTER | Age: 69
End: 2019-10-24
Payer: MEDICARE

## 2019-10-24 DIAGNOSIS — Z98.890 HISTORY OF MITRAL VALVE REPAIR: ICD-10-CM

## 2019-10-24 DIAGNOSIS — Z79.01 CHRONIC ANTICOAGULATION: ICD-10-CM

## 2019-10-24 LAB — INR PPP: 2.4 (ref 2–3.5)

## 2019-10-24 PROCEDURE — 85610 PROTHROMBIN TIME: CPT | Performed by: NURSE PRACTITIONER

## 2019-10-24 PROCEDURE — 93793 ANTICOAG MGMT PT WARFARIN: CPT | Performed by: NURSE PRACTITIONER

## 2019-10-24 NOTE — PROGRESS NOTES
OP Anticoagulation Service Note    Date: 10/24/2019  There were no vitals filed for this visit.   pt declined vitals    Anticoagulation Summary  As of 10/24/2019    INR goal:   2.0-3.0   TTR:   44.1 % (2.6 y)   INR used for dosin.40 (10/24/2019)   Warfarin maintenance plan:   5 mg (5 mg x 1) every Tue; 7.5 mg (5 mg x 1.5) all other days   Weekly warfarin total:   50 mg   Plan last modified:   Sakshi Mcconnell, PharmD (10/10/2019)   Next INR check:   2019   Target end date:   Indefinite    Indications    Chronic anticoagulation [Z79.01]  Atrial fibrillation (HCC) [I48.91]  History of mitral valve repair [Z98.890]             Anticoagulation Episode Summary     INR check location:       Preferred lab:       Send INR reminders to:       Comments:         Anticoagulation Care Providers     Provider Role Specialty Phone number    Zeferino Almaraz, A.P.N. Referring Family Medicine [phone]    [facility] Responsible  [phone]    Kenneth Salas, PharmD Responsible          Anticoagulation Patient Findings      HPI:   Salma Lees seen in clinic today, on anticoagulation therapy with warfarin (a high risk medication) for A fib      Pt is here today to evaluate anticoagulation therapy    Previous INR was  1.90 on 10/10/19    Pt was instructed to increase weekly regimen    Confirmed warfarin dosing regimen, denies missed or extra doses of coumadin.   Diet has been consistent with foods rich in vitamin K: Yes  Changes in ETOH:  no  Changes in smoking status: No  Changes in medication: No   Cost restriction: No  S/s of bleeding:  No  Falls or accidents since last visit No  Signs/symptoms  thrombosis since the last appt: No    A/P   INR  therapeutic today,    Continue current warfarin regimen           check referral    Pt educated to contact our clinic with any changes in medications or s/s of bleeding or thrombosis. Pt is aware to seek immediate medical attention for falls,  head injury or deep cuts    Follow up appointment in 3 week(s) to reduce risk of adverse events from warfarin   Sakshi Mcconnell, PharmD

## 2019-11-11 ENCOUNTER — ANTICOAGULATION VISIT (OUTPATIENT)
Dept: MEDICAL GROUP | Facility: MEDICAL CENTER | Age: 69
End: 2019-11-11
Payer: MEDICARE

## 2019-11-11 DIAGNOSIS — Z79.01 CHRONIC ANTICOAGULATION: ICD-10-CM

## 2019-11-11 DIAGNOSIS — Z98.890 HISTORY OF MITRAL VALVE REPAIR: ICD-10-CM

## 2019-11-11 LAB — INR PPP: 2.1 (ref 2–3.5)

## 2019-11-11 PROCEDURE — 93793 ANTICOAG MGMT PT WARFARIN: CPT | Performed by: PHYSICIAN ASSISTANT

## 2019-11-11 PROCEDURE — 85610 PROTHROMBIN TIME: CPT | Performed by: PHYSICIAN ASSISTANT

## 2019-11-11 NOTE — PROGRESS NOTES
OP Anticoagulation Service Note    Date: 2019  There were no vitals filed for this visit.   pt declined vitals    Anticoagulation Summary  As of 2019    INR goal:   2.0-3.0   TTR:   45.1 % (2.7 y)   INR used for dosin.10 (2019)   Warfarin maintenance plan:   5 mg (5 mg x 1) every Tue; 7.5 mg (5 mg x 1.5) all other days   Weekly warfarin total:   50 mg   Plan last modified:   Sakshi Mcconnell, PharmD (10/10/2019)   Next INR check:   2019   Target end date:   Indefinite    Indications    Chronic anticoagulation [Z79.01]  Atrial fibrillation (HCC) [I48.91]  History of mitral valve repair [Z98.890]             Anticoagulation Episode Summary     INR check location:       Preferred lab:       Send INR reminders to:       Comments:         Anticoagulation Care Providers     Provider Role Specialty Phone number    Zeferino Almaraz A.P.N. Referring [facility] Medicine [phone]    [facility] Responsible  [phone]    Kenneth Salas, PharmD Responsible          Anticoagulation Patient Findings      HPI:   Salma Lees seen in clinic today, on anticoagulation therapy with warfarin (a high risk medication) for atrial fibrillation and hx of mitral valve repair*      Pt is here today to evaluate anticoagulation therapy    Previous INR was  2.4 on 10-24-19    Pt was instructed to continue current regimen    Confirmed warfarin dosing regimen, denies missed or extra doses of coumadin.   Diet has been consistent with foods rich in vitamin K: Yes  Changes in ETOH:  No  Changes in smoking status: No  Changes in medication: Yes - one CBD gummie  Cost restriction: No  S/s of bleeding:  No  Falls or accidents since last visit No  Signs/symptoms  thrombosis since the last appt: none    A/P   INR  therapeutic today  Continue current warfarin regimen           check referral    Pt educated to contact our clinic with any changes in medications or s/s of bleeding or thrombosis.  Pt is aware to seek immediate medical attention for falls, head injury or deep cuts    Follow up appointment in 4 week(s) to reduce risk of adverse events from warfarin   Sakshi Mcconnell, PharmD

## 2019-11-19 ENCOUNTER — OFFICE VISIT (OUTPATIENT)
Dept: MEDICAL GROUP | Facility: MEDICAL CENTER | Age: 69
End: 2019-11-19
Payer: MEDICARE

## 2019-11-19 VITALS
HEART RATE: 93 BPM | DIASTOLIC BLOOD PRESSURE: 60 MMHG | SYSTOLIC BLOOD PRESSURE: 118 MMHG | OXYGEN SATURATION: 93 % | HEIGHT: 64 IN | WEIGHT: 194.22 LBS | TEMPERATURE: 98.2 F | BODY MASS INDEX: 33.16 KG/M2

## 2019-11-19 DIAGNOSIS — N18.30 CKD (CHRONIC KIDNEY DISEASE) STAGE 3, GFR 30-59 ML/MIN (HCC): ICD-10-CM

## 2019-11-19 DIAGNOSIS — J96.11 CHRONIC RESPIRATORY FAILURE WITH HYPOXIA, ON HOME OXYGEN THERAPY (HCC): ICD-10-CM

## 2019-11-19 DIAGNOSIS — J44.9 CHRONIC OBSTRUCTIVE PULMONARY DISEASE, UNSPECIFIED COPD TYPE (HCC): ICD-10-CM

## 2019-11-19 DIAGNOSIS — I70.0 ATHEROSCLEROSIS OF AORTA (HCC): ICD-10-CM

## 2019-11-19 DIAGNOSIS — I48.91 ATRIAL FIBRILLATION, UNSPECIFIED TYPE (HCC): ICD-10-CM

## 2019-11-19 DIAGNOSIS — Z98.890 HISTORY OF MITRAL VALVE REPAIR: ICD-10-CM

## 2019-11-19 DIAGNOSIS — D64.9 NORMOCYTIC ANEMIA: ICD-10-CM

## 2019-11-19 DIAGNOSIS — E11.9 TYPE 2 DIABETES MELLITUS WITHOUT COMPLICATION, UNSPECIFIED WHETHER LONG TERM INSULIN USE (HCC): ICD-10-CM

## 2019-11-19 DIAGNOSIS — Z99.81 CHRONIC RESPIRATORY FAILURE WITH HYPOXIA, ON HOME OXYGEN THERAPY (HCC): ICD-10-CM

## 2019-11-19 DIAGNOSIS — I27.20 PULMONARY HYPERTENSION (HCC): ICD-10-CM

## 2019-11-19 DIAGNOSIS — N20.0 NEPHROLITHIASIS: ICD-10-CM

## 2019-11-19 DIAGNOSIS — I50.22 CHRONIC SYSTOLIC CONGESTIVE HEART FAILURE (HCC): ICD-10-CM

## 2019-11-19 PROBLEM — I50.812 CHRONIC RIGHT-SIDED CONGESTIVE HEART FAILURE (HCC): Status: ACTIVE | Noted: 2019-11-19

## 2019-11-19 PROBLEM — N20.1 CALCULUS OF URETER: Status: RESOLVED | Noted: 2017-09-18 | Resolved: 2019-11-19

## 2019-11-19 PROBLEM — E11.8 TYPE 2 DIABETES MELLITUS WITH COMPLICATION (HCC): Status: RESOLVED | Noted: 2017-02-13 | Resolved: 2019-11-19

## 2019-11-19 PROBLEM — D68.32 HEMORRHAGIC DISORDER DUE TO EXTRINSIC CIRCULATING ANTICOAGULANTS (HCC): Status: RESOLVED | Noted: 2017-06-26 | Resolved: 2019-11-19

## 2019-11-19 PROBLEM — R06.02 SHORTNESS OF BREATH: Status: RESOLVED | Noted: 2017-10-18 | Resolved: 2019-11-19

## 2019-11-19 PROCEDURE — 99215 OFFICE O/P EST HI 40 MIN: CPT | Performed by: INTERNAL MEDICINE

## 2019-11-19 RX ORDER — POTASSIUM CHLORIDE 20 MEQ/1
20 TABLET, EXTENDED RELEASE ORAL DAILY
Qty: 90 TAB | Refills: 3 | Status: SHIPPED | OUTPATIENT
Start: 2019-11-19 | End: 2020-12-01

## 2019-11-20 NOTE — ASSESSMENT & PLAN NOTE
Chronic problem unknown severity.  Sounds like she has met criteria for diabetes in the past with an A1c about 6.5 however when she is acutely ill her A1c is skyrocketed to the 11-14 range.  She is never required medications for diabetes and does not use systemic steroids.  We will update her hemoglobin A1c so we can decide if she needs to pull the trigger on starting medicines or if we can continue to observe.  Once we have that number then we can decide on whether we need to initiate an ACE or arm, for example, for renal protection and make sure to get her other diabetic measures caught up.  I will see her again in about 3 weeks.

## 2019-11-20 NOTE — ASSESSMENT & PLAN NOTE
Chronic and ongoing problem.  She follows up with cardiology at [facility] and had a recent increase in her diltiazem to 30 mg 3 times daily.  She also continues on Coreg 25 mg twice daily and digoxin 125 mcg daily.  I do wonder if she would build to switch to a DOAC such as Eliquis to limit the monthly INR checks.

## 2019-11-20 NOTE — ASSESSMENT & PLAN NOTE
Chronic problem unknown severity.  We will update her blood counts to determine if she needs additional evaluation for her anemia.  Likely multifactorial from both anemia of chronic disease from kidney and heart disease.

## 2019-11-20 NOTE — ASSESSMENT & PLAN NOTE
Chronic problem unknown severity.  We will update her renal function and electrolytes to ensure ongoing stability due to several high risk medications that need to be appropriately dosed for kidney function.

## 2019-11-20 NOTE — PROGRESS NOTES
Subjective:     CC:  Diagnoses of Atrial fibrillation, unspecified type ([facility]), Chronic obstructive pulmonary disease, unspecified COPD type ([facility]), Type 2 diabetes mellitus without complication, unspecified whether long term insulin use ([facility]), Atherosclerosis of aorta ([facility]), History of mitral valve repair, Chronic respiratory failure with hypoxia, on home oxygen therapy ([facility]), Chronic systolic congestive heart failure ([facility]), Normocytic anemia, Pulmonary hypertension ([facility]), CKD (chronic kidney disease) stage 3, GFR 30-59 ml/min ([facility]), and Nephrolithiasis were pertinent to this visit.    HISTORY OF THE PRESENT ILLNESS: Patient is a 69 y.o. female. This pleasant patient is here today to establish care and discuss the below stated chronic medical conditions.  She is unaccompanied for today's visit.    Problem   Chronic Respiratory Failure With Hypoxia, On Home Oxygen Therapy ([facility])    She reports chronic oxygen use initiated on 2014.  She uses 2 L continuously and 5 L with exertion.  This was initiated due to significant dyspnea on exertion.  She does follow with pulmonary medicine and carries a diagnosis of both asthma as well as obstructive lung disease.     Chronic Systolic Congestive Heart Failure ([facility])    Echocardiogram completed August 2016 showed ejection fraction 45 to 50%, mild left atrial and right atrial enlargement, mild to moderate MR, moderate TR, RSVP 63 mmHg.  Echocardiogram completed October 2018 showed ejection fraction 60%, mildly enlarged RV, severe TR, RSVP 60 mmHg.    She reports prior hospital admissions for fluid overload above 200 pounds.  She required IV diuresis and now is maintained on a 6 drug regiment for her congestive heart failure.  She is on carvedilol 25 mg twice daily, digoxin 125 mcg daily, diltiazem 30 mg 3 times daily, Lasix 20 mg alternating with spironolactone 25 mg every other day, and potassium chloride 20 mEq daily.     She is overdue for follow-up echo.  She has chronic dyspnea  on exertion at baseline.  No chest pain or palpitations.     Atherosclerosis of Aorta (Hcc)    Noted on a CT scan in 2017 evaluating for kidney stones.  She is taking atorvastatin 20 mg daily.  She denies history of heart blockages or stroke.     Pulmonary Hypertension (Hcc)    She has pulmonary hypertension on her echocardiogram with right heart pressures between 60 and 63 mmHg.  She also has chronic lung disease on oxygenation at all times.  Unclear to me which class she is for pulmonary hypertension, as she purely secondary to heart disease, lung disease, or combination of both.     Ckd (Chronic Kidney Disease) Stage 3, Gfr 30-59 Ml/Min ([facility])    Lab Results   Component Value Date/Time    SODIUM 134 (L) 10/19/2018 10:27 AM    POTASSIUM 4.1 10/19/2018 10:27 AM    CHLORIDE 98 10/19/2018 10:27 AM    CO2 29 10/19/2018 10:27 AM    GLUCOSE 410 (H) 10/19/2018 10:27 AM    BUN 20 10/19/2018 10:27 AM    CREATININE 1.10 10/19/2018 10:27 AM      She has a history of chronic kidney disease likely secondary to heart disease as well as nephrolithiasis.  Also a questionable diagnosis of diabetes due to hyperglycemia when she was septic.  She is on several renally excreted medications including spironolactone and Lasix.     Nephrolithiasis    She has a history of kidney stones over the years.  Last episode caused a stone in the left ureter and she had a stent placement however was miscommunication and this was never removed and there appeared to be a stone behind it that led to some hydronephrosis and possible infection.  She followed up with urology and was able to complete lithotripsy and stent removal.     Normocytic Anemia    Lab Results   Component Value Date/Time    WBC 6.2 09/18/2017 06:50 AM    RBC 3.50 (L) 09/18/2017 06:50 AM    HEMOGLOBIN 9.9 (L) 09/18/2017 06:50 AM    HEMATOCRIT 32.1 (L) 09/18/2017 06:50 AM    MCV 91.7 09/18/2017 06:50 AM    MCH 28.3 09/18/2017 06:50 AM    MCHC 30.8 (L) 09/18/2017 06:50 AM    MPV 11.5  09/18/2017 06:50 AM    NEUTSPOLYS 70.10 09/18/2017 06:50 AM    LYMPHOCYTES 16.80 (L) 09/18/2017 06:50 AM    MONOCYTES 7.70 09/18/2017 06:50 AM    EOSINOPHILS 4.60 09/18/2017 06:50 AM    BASOPHILS 0.50 09/18/2017 06:50 AM      She has a history of normocytic anemia likely secondary to anemia of chronic disease from chronic kidney disease and heart disease.  Not currently you using any iron supplementation.  No recent CBC.  She does have dyspnea at baseline with exertion.     History of Mitral Valve Repair    History of MV prolapse with a mitral valve repair via Gutierrez ring in 11/2009 while living in [place].    Echo (8/2016): mild to moderate MR     Chronic Obstructive Pulmonary Disease (Hcc)    She follows with pulmonary medicine.    Last pulmonary function testing 11/12/2018 demonstrated FEV1 of 1.27 L or 55% predicted, FVC of 2.03 L or 67% predicted, FEV1/FVC ratio 62, 13% increase in FEV1 postbronchodilator, residual volume 128% predicted, % predicted and DLCO 71% predicted.  Per pulmonologist interpretation definite bronchodilator response, mild hyperinflation noted, low oxygen transfer at 71% predicted suggest possible emphysema.    Current regimen includes Anoro Ellipta and as needed levalbuterol.  Previously trialed on Symbicort however this was still quite expensive and caused scratchy throat.    Next follow-up with pulmonary as in March 2020.  She has chronic oxygen and wears 2 L all the time and 5 L with exertion.  She does endorse dyspnea with exertion, this is at baseline.     Atrial Fibrillation (Hcc)    She reports diagnosis of atrial fibrillation in approximately 2014.  She was warned that after repair of her mitral valve this could be a likely outcome.  She required hospitalization in February 2015 due to CHF and A. fib with RVR.  She did experience significant bradycardia during her treatment requiring implantation of permanent pacemaker.  She is in permanent A. fib at this time.  She is  using anticoagulation in the form of warfarin, she denies any bleeding issues.  Rate control achieved through diltiazem recently increased to 30 mg 3 times daily, Coreg 25 mg twice daily, and digoxin 125 mcg daily.  No signs or symptoms of orthostasis or overtreatment.  She is reportedly permanent A. fib at this point.     Type 2 Diabetes Mellitus Without Complication (Hcc)    She reports challenges with extremely high blood sugar and A1c when she is acutely ill, for example when she had ongoing dental infections and when she was septic in the hospital for nephrolithiasis.  She is never required medical treatment for her diabetes.  No recent lab evaluations.  She denies any history of nephropathy, neuropathy, or retinopathy.       Allergies: Pcn [penicillins]; Amlodipine besylate-valsartan; Amoxicillin; Etodolac; Food; Lisinopril; Other drug; Other misc; and Tape    Current Outpatient Medications Ordered in Epic   Medication Sig Dispense Refill   • potassium chloride SA (KDUR) 20 MEQ Tab CR Take 1 Tab by mouth every day. 90 Tab 3   • umeclidinium-vilanterol (ANORO ELLIPTA) 62.5-25 MCG/INH AEROSOL POWDER, BREATH ACTIVATED inhaler Inhale 1 Puff by mouth every day. 1 Inhaler 0   • warfarin (COUMADIN) 5 MG Tab Take one to one and one-half (1-1.5) tablets daily as directed by [facility] 135 Tab 1   • spironolactone (ALDACTONE) 25 MG Tab Take 25 mg by mouth every day.     • atorvastatin (LIPITOR) 20 MG Tab Take 20 mg by mouth every evening.     • carvedilol (COREG) 25 MG Tab Take 25 mg by mouth 2 times a day.     • furosemide (LASIX) 20 MG Tab Take 1 Tab by mouth every day. 90 Tab 1   • albuterol 108 (90 Base) MCG/ACT Aero Soln inhalation aerosol Inhale 2 Puffs by mouth every 6 hours as needed for Shortness of Breath.     • diltiazem (CARDIZEM) 30 MG Tab Take 30 mg by mouth 2 Times a Day.     • digoxin (LANOXIN) 125 MCG Tab Take 125 mcg by mouth every day.     • levalbuterol (XOPENEX HFA) 45 MCG/ACT  "inhaler Inhale 2 Puffs by mouth every four hours as needed for Shortness of Breath. 1 Inhaler 2   • pravastatin (PRAVACHOL) 20 MG Tab Take 20 mg by mouth every day.       No current [facility]-ordered facility-administered medications on file.        Past Medical History:   Diagnosis Date   • A-fib ([facility])    • Asthma    • Breath shortness 2017    uses oxygen at 3 liters/min cont. with \"[facility]   • Calculus of ureter 2017   • Congestive heart failure ([facility])    • COPD (chronic obstructive pulmonary disease) ([facility])    • Diabetes ([facility])    • Hypertension    • Infectious disease 2017    UTI/hx. MRSA   • Pacemaker    • Renal disorder 2017    has stent   • Shortness of breath 10/18/2017       Past Surgical History:   Procedure Laterality Date   • URETEROSCOPY Left 2017    Procedure: URETEROSCOPY;  Surgeon: Edgardo Richter M.D.;  Location: SUR[facility]ERY [facility];  Service: Urology   • LASERTRIPSY Left 2017    Procedure: LASERTRIPSY LITHO  ;  Surgeon: Edgardo Richter M.D.;  Location: SUR[facility]ERY [facility];  Service: Urology   • STENT REMOVAL Left 2017    Procedure: STENT REMOVAL;  Surgeon: Edgardo Richter M.D.;  Location: SURGERY [facility];  Service: Urology   • CYSTOSCOPY STENT PLACEMENT Left 2017    Procedure: CYSTOSCOPY, LEFT URETERAL STENT PLACEMENT;  Surgeon: Edgardo Richter M.D.;  Location: SURGERY [facility];  Service:    • PACEMAKER INSERTION     • KNEE REPLACEMENT, TOTAL Left    • MITRAL VALVE REPAIR         Social History     Tobacco Use   • Smoking status: Former Smoker     Packs/day: 1.00     Years: 5.00     Pack years: 5.00     Types: Cigarettes     Last attempt to quit: 1990     Years since quittin.8   • Smokeless tobacco: Never Used   • Tobacco comment: Continued abstinence   Substance Use Topics   • Alcohol use: No     Alcohol/week: 0.0 oz   • Drug use: No       Social History     Patient does not " "qualify to have social determinant information on file (likely too young).   Social History Narrative    She has a son. She had previously experience in [employer].       Family History   Problem Relation Age of Onset   • Lung Disease Mother         asthma   • Heart Disease Father        Health Maintenance: We will go 3 more detail at our follow-up in 3 weeks to talk about bone density.  She declines mammogram or colonoscopy at this time.  We will get her diabetes parameters up-to-date if indicated at that time.    ROS:   As above in the HPI All Others Reviewed and Negative  Objective:     Exam: /60 (BP Location: Left arm, Patient Position: Sitting, BP Cuff Size: Adult)   Pulse 93   Temp 36.8 °C (98.2 °F) (Temporal)   Ht 1.626 m (5' 4\")   Wt 88.1 kg (194 lb 3.6 oz)   SpO2 93%  Body mass index is 33.34 kg/m².    General: Normal appearing. No distress. Appears stated age.  EENT: Eyes conjunctiva clear lids without ptosis, extraocular movements intact, ears normal shape and contour, canals are clear bilaterally, tympanic membranes are benign, oropharynx is without erythema, edema or exudates. Fair dentition.   Neck: Supple without JVD. Thyroid is not enlarged.  Pulmonary: Clear to ausculation.  Normal effort. No rales, ronchi, or wheezing. No cough.  Nasal cannula oxygen in place.  Cardiovascular: Regular rate and irregular rhythm. 1+ lower extremity edema bilaterally, pretibial.  Abdomen: Soft, nontender, nondistended. Normal bowel sounds.   Neurologic: No resting tremor, no increased tone or rigidity.  Lymph: No cervical or supraclavicular lymph nodes are palpable  Skin: Warm and dry.  No obvious lesions on skin exposed areas.  Musculoskeletal: Normal gait. No extremity cyanosis, or clubbing. Patient ambulates independently and without a gait aid.  Psych: Normal mood and affect. Alert and oriented x3. Judgment and insight is normal.    Assessment & Plan:   69 y.o. female with the following -    Problem List " Items Addressed This Visit     History of mitral valve repair     Chronic and stable problem.  She follows up with cardiology at [place].  We have ordered a repeat echocardiogram to check on status of her valve.         Chronic obstructive pulmonary disease (HCC)     Chronic ongoing problem.  She continues to wear oxygen 24/7 at 2 L and increases to 5 L with exertion.  I have sent a prescription for Anoro Ellipta to our healthcare pharmacy so she can get a free sample due to the high cost of this medication.  She will continue to use left albuterol as needed for breakthrough shortness of breath.  Follow-up with pulmonary medicine in March 2020.         Relevant Medications    umeclidinium-vilanterol (ANORO ELLIPTA) 62.5-25 MCG/INH AEROSOL POWDER, BREATH ACTIVATED inhaler    Other Relevant Orders    CBC WITH DIFFERENTIAL    Comp Metabolic Panel    Atrial fibrillation (HCC)     Chronic and ongoing problem.  She follows up with cardiology at [facility] and had a recent increase in her diltiazem to 30 mg 3 times daily.  She also continues on Coreg 25 mg twice daily and digoxin 125 mcg daily.  I do wonder if she would build to switch to a DOAC such as Eliquis to limit the monthly INR checks.         Relevant Medications    potassium chloride SA (KDUR) 20 MEQ Tab CR    Type 2 diabetes mellitus without complication (HCC)     Chronic problem unknown severity.  Sounds like she has met criteria for diabetes in the past with an A1c about 6.5 however when she is acutely ill her A1c is skyrocketed to the 11-14 range.  She is never required medications for diabetes and does not use systemic steroids.  We will update her hemoglobin A1c so we can decide if she needs to pull the trigger on starting medicines or if we can continue to observe.  Once we have that number then we can decide on whether we need to initiate an ACE or arm, for example, for renal protection and make sure to get her other diabetic measures caught up.  I  will see her again in about 3 weeks.         Relevant Orders    CBC WITH DIFFERENTIAL    Comp Metabolic Panel    HEMOGLOBIN A1C    MICROALBUMIN CREAT RATIO URINE    Normocytic anemia     Chronic problem unknown severity.  We will update her blood counts to determine if she needs additional evaluation for her anemia.  Likely multifactorial from both anemia of chronic disease from kidney and heart disease.         Nephrolithiasis     Chronic and stable problem.  She has a history of kidney stones, fortunately none in recent time.  We will continue to observe at this time.         Chronic respiratory failure with hypoxia, on home oxygen therapy (HCC)     Chronic and ongoing problem.  Likely multifactorial including pulmonary hypertension, valvular disease, arrhythmia, congestive heart failure, asthma, and obstructive lung disease.  Continue home oxygen therapy 2 L continuously and 5 L with exertion.  Continue follow-up with pulmonary medicine, next visit in March 2020.         Chronic systolic congestive heart failure (HCC)     Chronic ongoing problem.  She follows with cardiology.  She is due for an echocardiogram which I have ordered.  I question why she is not on an ACE inhibitor or ARB due to her history of systolic heart failure.  I wonder if she still requires digoxin therapy for rate control or she could instead have her carvedilol and diltiazem optimized so that she can cut down some of her medicines.  She also is on an odd pattern of alternating Lasix with spironolactone every other day but apparently this is the only way that she can keep the fluid from reaccumulating.  Her potassium is quite expensive, $35 for a 1 month supply, and I told her on my and it looks like it should be no more than $14 and to ask her pharmacy and let me know in case we need to send a different type of potassium prescription.  She will keep me updated and will have her follow-up in about 3 weeks after echocardiogram and lab work are  completed.         Relevant Medications    potassium chloride SA (KDUR) 20 MEQ Tab CR    Other Relevant Orders    EC-ECHOCARDIOGRAM COMPLETE W/O CONT    CBC WITH DIFFERENTIAL    Comp Metabolic Panel    Atherosclerosis of aorta (HCC)     Chronic and stable problem.  Continue atorvastatin 20 mg daily.         Pulmonary hypertension (HCC)     Chronic ongoing problem.  We will update her echocardiogram to see where her right-sided pressures are at this time.  If they continue to climb I will notify pulmonary and cardiology so they can consider additional evaluation such as a right heart cath.         CKD (chronic kidney disease) stage 3, GFR 30-59 ml/min ([facility])     Chronic problem unknown severity.  We will update her renal function and electrolytes to ensure ongoing stability due to several high risk medications that need to be appropriately dosed for kidney function.              Return in about 1 month (around 12/19/2019).    Please note that this dictation was created using voice recognition software. I have made every reasonable attempt to correct obvious errors, but I expect that there are errors of grammar and possibly content that I did not discover before finalizing the note.

## 2019-11-20 NOTE — ASSESSMENT & PLAN NOTE
Chronic and stable problem.  She has a history of kidney stones, fortunately none in recent time.  We will continue to observe at this time.

## 2019-11-20 NOTE — ASSESSMENT & PLAN NOTE
Chronic ongoing problem.  She follows with cardiology.  She is due for an echocardiogram which I have ordered.  I question why she is not on an ACE inhibitor or ARB due to her history of systolic heart failure.  I wonder if she still requires digoxin therapy for rate control or she could instead have her carvedilol and diltiazem optimized so that she can cut down some of her medicines.  She also is on an odd pattern of alternating Lasix with spironolactone every other day but apparently this is the only way that she can keep the fluid from reaccumulating.  Her potassium is quite expensive, $35 for a 1 month supply, and I told her on my and it looks like it should be no more than $14 and to ask her pharmacy and let me know in case we need to send a different type of potassium prescription.  She will keep me updated and will have her follow-up in about 3 weeks after echocardiogram and lab work are completed.

## 2019-11-20 NOTE — ASSESSMENT & PLAN NOTE
Chronic ongoing problem.  She continues to wear oxygen 24/7 at 2 L and increases to 5 L with exertion.  I have sent a prescription for Anoro Ellipta to our healthcare pharmacy so she can get a free sample due to the high cost of this medication.  She will continue to use left albuterol as needed for breakthrough shortness of breath.  Follow-up with pulmonary medicine in March 2020.

## 2019-11-20 NOTE — ASSESSMENT & PLAN NOTE
Chronic ongoing problem.  We will update her echocardiogram to see where her right-sided pressures are at this time.  If they continue to climb I will notify pulmonary and cardiology so they can consider additional evaluation such as a right heart cath.

## 2019-11-20 NOTE — ASSESSMENT & PLAN NOTE
Chronic and stable problem.  She follows up with cardiology at [place].  We have ordered a repeat echocardiogram to check on status of her valve.

## 2019-11-21 ENCOUNTER — TELEPHONE (OUTPATIENT)
Dept: MEDICAL GROUP | Facility: MEDICAL CENTER | Age: 69
End: 2019-11-21

## 2019-11-21 NOTE — TELEPHONE ENCOUNTER
1. Caller Name: Salma Martinezp                                           Call Back Number: [phone] (home)         Patient approves a detailed voicemail message: yes    Left Salma sam regarding Anoro samples from our house pharmacist, Sakshi.  Come in on Monday or today Thursday to see Sakshi and she can give you 1 month sample of Anoro.

## 2019-12-03 ENCOUNTER — HOSPITAL ENCOUNTER (OUTPATIENT)
Dept: LAB | Facility: MEDICAL CENTER | Age: 69
End: 2019-12-03
Attending: INTERNAL MEDICINE
Payer: MEDICARE

## 2019-12-03 DIAGNOSIS — J44.9 CHRONIC OBSTRUCTIVE PULMONARY DISEASE, UNSPECIFIED COPD TYPE (HCC): ICD-10-CM

## 2019-12-03 DIAGNOSIS — I50.22 CHRONIC SYSTOLIC CONGESTIVE HEART FAILURE (HCC): ICD-10-CM

## 2019-12-03 DIAGNOSIS — E11.9 TYPE 2 DIABETES MELLITUS WITHOUT COMPLICATION, UNSPECIFIED WHETHER LONG TERM INSULIN USE (HCC): ICD-10-CM

## 2019-12-03 LAB
ALBUMIN SERPL BCP-MCNC: 4.1 G/DL (ref 3.2–4.9)
ALBUMIN/GLOB SERPL: 1.1 G/DL
ALP SERPL-CCNC: 76 U/L (ref 30–99)
ALT SERPL-CCNC: 10 U/L (ref 2–50)
ANION GAP SERPL CALC-SCNC: 7 MMOL/L (ref 0–11.9)
AST SERPL-CCNC: 19 U/L (ref 12–45)
BASOPHILS # BLD AUTO: 0.9 % (ref 0–1.8)
BASOPHILS # BLD: 0.06 K/UL (ref 0–0.12)
BILIRUB SERPL-MCNC: 1.4 MG/DL (ref 0.1–1.5)
BUN SERPL-MCNC: 26 MG/DL (ref 8–22)
CALCIUM SERPL-MCNC: 9.8 MG/DL (ref 8.5–10.5)
CHLORIDE SERPL-SCNC: 98 MMOL/L (ref 96–112)
CO2 SERPL-SCNC: 30 MMOL/L (ref 20–33)
CREAT SERPL-MCNC: 1.1 MG/DL (ref 0.5–1.4)
CREAT UR-MCNC: 169.2 MG/DL
EOSINOPHIL # BLD AUTO: 0.3 K/UL (ref 0–0.51)
EOSINOPHIL NFR BLD: 4.3 % (ref 0–6.9)
ERYTHROCYTE [DISTWIDTH] IN BLOOD BY AUTOMATED COUNT: 57.3 FL (ref 35.9–50)
EST. AVERAGE GLUCOSE BLD GHB EST-MCNC: 200 MG/DL
FASTING STATUS PATIENT QL REPORTED: NORMAL
GLOBULIN SER CALC-MCNC: 3.9 G/DL (ref 1.9–3.5)
GLUCOSE SERPL-MCNC: 266 MG/DL (ref 65–99)
HBA1C MFR BLD: 8.6 % (ref 0–5.6)
HCT VFR BLD AUTO: 38.6 % (ref 37–47)
HGB BLD-MCNC: 11.7 G/DL (ref 12–16)
IMM GRANULOCYTES # BLD AUTO: 0.04 K/UL (ref 0–0.11)
IMM GRANULOCYTES NFR BLD AUTO: 0.6 % (ref 0–0.9)
LYMPHOCYTES # BLD AUTO: 0.77 K/UL (ref 1–4.8)
LYMPHOCYTES NFR BLD: 11 % (ref 22–41)
MCH RBC QN AUTO: 27.4 PG (ref 27–33)
MCHC RBC AUTO-ENTMCNC: 30.3 G/DL (ref 33.6–35)
MCV RBC AUTO: 90.4 FL (ref 81.4–97.8)
MICROALBUMIN UR-MCNC: 9.5 MG/DL
MICROALBUMIN/CREAT UR: 56 MG/G (ref 0–30)
MONOCYTES # BLD AUTO: 0.43 K/UL (ref 0–0.85)
MONOCYTES NFR BLD AUTO: 6.1 % (ref 0–13.4)
NEUTROPHILS # BLD AUTO: 5.41 K/UL (ref 2–7.15)
NEUTROPHILS NFR BLD: 77.1 % (ref 44–72)
NRBC # BLD AUTO: 0 K/UL
NRBC BLD-RTO: 0 /100 WBC
PLATELET # BLD AUTO: 157 K/UL (ref 164–446)
PMV BLD AUTO: 11.7 FL (ref 9–12.9)
POTASSIUM SERPL-SCNC: 4.4 MMOL/L (ref 3.6–5.5)
PROT SERPL-MCNC: 8 G/DL (ref 6–8.2)
RBC # BLD AUTO: 4.27 M/UL (ref 4.2–5.4)
SODIUM SERPL-SCNC: 135 MMOL/L (ref 135–145)
WBC # BLD AUTO: 7 K/UL (ref 4.8–10.8)

## 2019-12-03 PROCEDURE — 85025 COMPLETE CBC W/AUTO DIFF WBC: CPT

## 2019-12-03 PROCEDURE — 36415 COLL VENOUS BLD VENIPUNCTURE: CPT

## 2019-12-03 PROCEDURE — 80053 COMPREHEN METABOLIC PANEL: CPT

## 2019-12-03 PROCEDURE — 83036 HEMOGLOBIN GLYCOSYLATED A1C: CPT

## 2019-12-03 PROCEDURE — 82570 ASSAY OF URINE CREATININE: CPT

## 2019-12-03 PROCEDURE — 82043 UR ALBUMIN QUANTITATIVE: CPT

## 2019-12-04 ENCOUNTER — HOSPITAL ENCOUNTER (OUTPATIENT)
Dept: CARDIOLOGY | Facility: MEDICAL CENTER | Age: 69
End: 2019-12-04
Attending: INTERNAL MEDICINE
Payer: MEDICARE

## 2019-12-04 DIAGNOSIS — I50.22 CHRONIC SYSTOLIC CONGESTIVE HEART FAILURE (HCC): ICD-10-CM

## 2019-12-04 LAB
LV EJECT FRACT  99904: 50
LV EJECT FRACT MOD 2C 99903: 51.8
LV EJECT FRACT MOD 4C 99902: 56.07
LV EJECT FRACT MOD BP 99901: 57.42

## 2019-12-04 PROCEDURE — 93306 TTE W/DOPPLER COMPLETE: CPT

## 2019-12-04 PROCEDURE — 93306 TTE W/DOPPLER COMPLETE: CPT | Mod: 26 | Performed by: INTERNAL MEDICINE

## 2019-12-09 ENCOUNTER — ANTICOAGULATION VISIT (OUTPATIENT)
Dept: MEDICAL GROUP | Facility: MEDICAL CENTER | Age: 69
End: 2019-12-09
Payer: MEDICARE

## 2019-12-09 DIAGNOSIS — Z98.890 HISTORY OF MITRAL VALVE REPAIR: ICD-10-CM

## 2019-12-09 DIAGNOSIS — J44.9 CHRONIC OBSTRUCTIVE PULMONARY DISEASE, UNSPECIFIED COPD TYPE (HCC): ICD-10-CM

## 2019-12-09 LAB — INR PPP: 2.5 (ref 2–3.5)

## 2019-12-09 PROCEDURE — 85610 PROTHROMBIN TIME: CPT | Performed by: INTERNAL MEDICINE

## 2019-12-09 PROCEDURE — 93793 ANTICOAG MGMT PT WARFARIN: CPT | Performed by: INTERNAL MEDICINE

## 2019-12-09 NOTE — PROGRESS NOTES
OP Anticoagulation Service Note    Date: 2019  There were no vitals filed for this visit.   pt declined vitals    Anticoagulation Summary  As of 2019    INR goal:   2.0-3.0   TTR:   46.7 % (2.7 y)   INR used for dosin.50 (2019)   Warfarin maintenance plan:   5 mg (5 mg x 1) every Tue, Sat; 7.5 mg (5 mg x 1.5) all other days   Weekly warfarin total:   47.5 mg   Plan last modified:   Sakshi Mcconnell, PharmD (2019)   Next INR check:   2020   Target end date:   Indefinite    Indications    Atrial fibrillation (HCC) [I48.91]  History of mitral valve repair [Z98.890]             Anticoagulation Episode Summary     INR check location:       Preferred lab:       Send INR reminders to:       Comments:         Anticoagulation Care Providers     Provider Role Specialty Phone number    Zeferino Almaraz A.P.N. Referring Family Medicine [phone]    [facility] Responsible  [phone]    Kenneth Salas, PharmD Responsible          Anticoagulation Patient Findings      HPI:   Salma Lees seen in clinic today, on anticoagulation therapy with warfarin (a high risk medication) for atrial fibrillation and hx of mitral valve repair      Pt is here today to evaluate anticoagulation therapy    Previous INR was  2.1 on 19    Pt was instructed to continue current regimen    Confirmed warfarin dosing regimen, denies missed or extra doses of coumadin.   Diet has been consistent with foods rich in vitamin K: Yes  Changes in ETOH:  No  Changes in smoking status: No  Changes in medication: No   Cost restriction: No  S/s of bleeding:  No  Falls or accidents since last visit No  Signs/symptoms  thrombosis since the last appt: No    A/P   INR  therapeutic today  Continue current warfarin regimen           check referral    Pt educated to contact our clinic with any changes in medications or s/s of bleeding or thrombosis. Pt is aware to seek immediate medical attention for  falls, head injury or deep cuts    Follow up appointment in 5 week(s) to reduce risk of adverse events from warfarin   Sakshi Mcconnell, PharmD

## 2019-12-10 RX ORDER — LEVALBUTEROL TARTRATE 45 UG/1
AEROSOL, METERED ORAL
Qty: 1 INHALER | Refills: 11 | Status: SHIPPED | OUTPATIENT
Start: 2019-12-10 | End: 2020-12-23 | Stop reason: SDUPTHER

## 2019-12-10 NOTE — TELEPHONE ENCOUNTER
Have we ever prescribed this med? Yes.  If yes, what date? 11/12/2018    Last OV: 09/16/2019 - MARYJANE KINNEY    Next OV: 03/16/2020 - MARYJANE KINNEY    DX: COPD    Medications: Xopenex

## 2019-12-10 NOTE — RESULT ENCOUNTER NOTE
Israel Marsh,    We check several labs in anticipation of her next follow-up visit in 2 weeks.  What is most notable is there is a small amount of protein in your urine, this is not uncommon with diabetes and heart disease.  We often started particular medicine we discover this finding to protect the kidneys from further progression of disease.  We can talk about this more at her follow-up visit.  Your metabolic panel is stable overall with normal electrolytes and liver function.  Kidney function continues to remain impaired but it is stable to where it was a year ago.  Diabetes test of hemoglobin A1c has returned and is at 8.6, as you might remember criteria for type 2 diabetes is an A1c of at least 6.5.  This is a significant improvement from last November 2018 when you are up to 14.  However, you can definitely develop side effects from diabetes if your level is at 8.5 we may need to talk about starting a low-dose medication.  Blood count is stable, you still have mild anemia with low hemoglobin  as well as low platelets, these have both been findings in the past.    We will talk about any additional work-up per medication changes at our follow-up on December 24.  Let me know if you have any follow-up questions in the meantime.      Have a nice week,    Dr. Bonilla

## 2019-12-11 NOTE — RESULT ENCOUNTER NOTE
Israel Marsh,    I reviewed your echocardiogram from last week and mostly it looks stable. However, there is one measurement for the pressures in the right side of the heart that is more elevated than last year. I am going to reach out to your lung physicians to let them know in case they would recommend any other medications or work up. I will keep you updated when I hear back. Let me know if you have any follow up questions for me. Have a nice week,    Dr. Bonilla

## 2019-12-24 ENCOUNTER — OFFICE VISIT (OUTPATIENT)
Dept: MEDICAL GROUP | Facility: MEDICAL CENTER | Age: 69
End: 2019-12-24
Payer: MEDICARE

## 2019-12-24 VITALS
BODY MASS INDEX: 34.14 KG/M2 | HEART RATE: 100 BPM | OXYGEN SATURATION: 90 % | DIASTOLIC BLOOD PRESSURE: 60 MMHG | SYSTOLIC BLOOD PRESSURE: 122 MMHG | WEIGHT: 199.96 LBS | TEMPERATURE: 98 F | HEIGHT: 64 IN

## 2019-12-24 DIAGNOSIS — D64.9 NORMOCYTIC ANEMIA: ICD-10-CM

## 2019-12-24 DIAGNOSIS — E11.9 TYPE 2 DIABETES MELLITUS WITHOUT COMPLICATION, WITHOUT LONG-TERM CURRENT USE OF INSULIN (HCC): ICD-10-CM

## 2019-12-24 DIAGNOSIS — I48.0 PAROXYSMAL ATRIAL FIBRILLATION (HCC): ICD-10-CM

## 2019-12-24 DIAGNOSIS — I27.20 PULMONARY HYPERTENSION (HCC): ICD-10-CM

## 2019-12-24 DIAGNOSIS — I48.91 ATRIAL FIBRILLATION, UNSPECIFIED TYPE (HCC): ICD-10-CM

## 2019-12-24 PROCEDURE — 99214 OFFICE O/P EST MOD 30 MIN: CPT | Performed by: INTERNAL MEDICINE

## 2019-12-24 ASSESSMENT — PATIENT HEALTH QUESTIONNAIRE - PHQ9: CLINICAL INTERPRETATION OF PHQ2 SCORE: 0

## 2019-12-24 NOTE — PROGRESS NOTES
Subjective:   Chief Complaint/History of Present Illness:  Salma Lees is a 69 y.o. female established patient who presents today to discuss medical problems as listed below. She is unaccompanied for today's visit.    Problem   Pulmonary Hypertension (Hcc)    She has pulmonary hypertension on her echocardiogram with right heart pressures between 60 and 63 mmHg.  She also has chronic lung disease on oxygenation at all times.     I had a complete a follow-up echocardiogram to track her right heart pressures and these had increased to 75 mmHg.  She follows with both cardiology and pulmonary medicine.  She is on chronic oxygen supplementation.  She has never been evaluated for obstructive sleep apnea as a potential contributor.  No history of chronic VTE.  Most likely explanation would be primary lung or primary heart disease.     Normocytic Anemia    Lab Results   Component Value Date/Time    WBC 7.0 12/03/2019 10:41 AM    RBC 4.27 12/03/2019 10:41 AM    HEMOGLOBIN 11.7 (L) 12/03/2019 10:41 AM    HEMATOCRIT 38.6 12/03/2019 10:41 AM    MCV 90.4 12/03/2019 10:41 AM    MCH 27.4 12/03/2019 10:41 AM    MCHC 30.3 (L) 12/03/2019 10:41 AM    MPV 11.7 12/03/2019 10:41 AM    NEUTSPOLYS 77.10 (H) 12/03/2019 10:41 AM    LYMPHOCYTES 11.00 (L) 12/03/2019 10:41 AM    MONOCYTES 6.10 12/03/2019 10:41 AM    EOSINOPHILS 4.30 12/03/2019 10:41 AM    BASOPHILS 0.90 12/03/2019 10:41 AM      She has a history of normocytic anemia likely secondary to anemia of chronic disease from chronic kidney disease and heart disease.  Not currently you using any iron supplementation.  We updated her CBC in early December to ensure stability and found that she still has anemia but it is very borderline at this point and normocytic.  Likely still manifestation of anemia of chronic disease.     Atrial Fibrillation (Hcc)    She reports diagnosis of atrial fibrillation in approximately 2014.  She was warned that after repair of her mitral valve this  could be a likely outcome.  She required hospitalization in February 2015 due to CHF and A. fib with RVR.  She did experience significant bradycardia during her treatment requiring implantation of permanent pacemaker.  She is in permanent A. fib at this time.  She is using anticoagulation in the form of warfarin, she denies any bleeding issues.  Rate control achieved through diltiazem recently increased to 30 mg 3 times daily, Coreg 25 mg twice daily, and digoxin 125 mcg daily.  No signs or symptoms of orthostasis or overtreatment.  She is reportedly permanent A. fib at this point.    She would be interested in transitioning from warfarin to Eliquis if this would preclude the frequent visits for testing.  We would start with ordering 1 month supply to see what the out-of-pocket cost would be for her through a pharmacy, she notes that she does have a drug plan through [facility].     Type 2 Diabetes Mellitus Without Complication (Hcc)    Results for CHARLENE GARCIA (MRN [id number]) as of 12/24/2019 13:00   Ref. Range 12/3/2019 10:41 11/26/2018 14:10   Glycohemoglobin Latest Ref Range: 0.0 - 5.6 % 8.6 (H) 14.0   Estim. Avg Glu Latest Units: mg/dL 200 365       She reports challenges with extremely high blood sugar and A1c when she is acutely ill, for example when she had ongoing dental infections and when she was septic in the hospital for nephrolithiasis.  She is never required medical treatment for her diabetes.     After our visit we updated her hemoglobin A1c and this was in the mid 8 range, previously 14.  I shared with her that she is likely developed diabetes possibly from pancreatic burnout.  However she is making some insulin as A1c has spontaneously improved by over 6 points without treatment.  Think at this time it be reasonable to initiate glucose lowering medications to prevent complications such as dehydration or infection.  We will start her on low-dose metformin 500 mg daily.  We discussed the potential  side effects including weight loss, gastric upset, diarrhea.  She will keep an eye out for these symptoms and let me know how she is doing.  We will follow-up with repeat A1c and March 2020.  She also has mild nephropathy but this is likely secondary to longstanding cardiac disease as opposed to overt diabetes, we can discuss addition of an ACE inhibitor or ARB at her next follow-up in March.  No past history of retinopathy, she has an eye exam in January and will be sure to let her ophthalmologist know about her more recent diagnosis of diabetes.  No history of neuropathy.          Additional History:   Allergies:    Pcn [penicillins]; Amlodipine besylate-valsartan; Amoxicillin; Etodolac; Food; Lisinopril; Other drug; Other misc; and Tape     Current Medications:     Current Outpatient Medications   Medication Sig Dispense Refill   • metFORMIN (GLUCOPHAGE) 500 MG Tab Take 1 Tab by mouth every day. 30 Tab 3   • apixaban (ELIQUIS) 5mg Tab Take 1 Tab by mouth 2 Times a Day. 60 Tab 3   • levalbuterol (XOPENEX HFA) 45 MCG/ACT inhaler inhale 2 puffs by mouth every 4 hours as needed for shortness of breath. 1 Inhaler 11   • potassium chloride SA (KDUR) 20 MEQ Tab CR Take 1 Tab by mouth every day. 90 Tab 3   • umeclidinium-vilanterol (ANORO ELLIPTA) 62.5-25 MCG/INH AEROSOL POWDER, BREATH ACTIVATED inhaler Inhale 1 Puff by mouth every day. 1 Inhaler 0   • warfarin (COUMADIN) 5 MG Tab Take one to one and one-half (1-1.5) tablets daily as directed by [facility] 135 Tab 1   • spironolactone (ALDACTONE) 25 MG Tab Take 25 mg by mouth every day.     • atorvastatin (LIPITOR) 20 MG Tab Take 20 mg by mouth every evening.     • carvedilol (COREG) 25 MG Tab Take 25 mg by mouth 2 times a day.     • furosemide (LASIX) 20 MG Tab Take 1 Tab by mouth every day. 90 Tab 1   • albuterol 108 (90 Base) MCG/ACT Aero Soln inhalation aerosol Inhale 2 Puffs by mouth every 6 hours as needed for Shortness of Breath.     •  "pravastatin (PRAVACHOL) 20 MG Tab Take 20 mg by mouth every day.     • diltiazem (CARDIZEM) 30 MG Tab Take 30 mg by mouth 3 times a day.     • digoxin (LANOXIN) 125 MCG Tab Take 125 mcg by mouth every day.       No current facility-administered medications for this visit.         Social History:     Social History     Tobacco Use   • Smoking status: Former Smoker     Packs/day: 1.00     Years: 5.00     Pack years: 5.00     Types: Cigarettes     Last attempt to quit: 1990     Years since quittin.9   • Smokeless tobacco: Never Used   • Tobacco comment: Continued abstinence   Substance Use Topics   • Alcohol use: No     Alcohol/week: 0.0 oz   • Drug use: No       ROS: As above in the HPI      Objective:   Physical Exam:    Vitals: /60   Pulse 100   Temp 36.7 °C (98 °F) (Temporal)   Ht 1.626 m (5' 4\")   Wt 90.7 kg (199 lb 15.3 oz)   SpO2 90%    BMI: Body mass index is 34.32 kg/m².   General/Constitutional: Vitals as above, Well nourished, well developed female in no acute distress   Head/Eyes: Head is grossly normal & atraumatic, bilateral conjunctivae clear and not injected, bilateral EOMI   ENT: Bilateral external ears grossly normal in appearance, Hearing grossly intact, External nares normal in appearance and without discharge/bleeding, supplemental oxygen in place.   Respiratory: No respiratory distress, bilateral lungs are clear to ausculation in all lung fields, no wheezing/rhonchi/rales   Cardiovascular: Regular rate and irregular rhythm without murmur/rubs, distal pulses are intact and equal bilaterally (radial), no bilateral lower extremity edema   MSK: Gait grossly normal & not antalgic   Integumentary: No apparent rashes   Psych: Judgment grossly appropriate, no apparent depression/anxiety    Health Maintenance:     - Completed    Assessment and Plan:     Problem List Items Addressed This Visit     Atrial fibrillation (HCC)     Chronic problem that requires medication titration.  We will " have her insurance run the cost for Eliquis to see if this would be financially feasible for her and if not she can plan to continue on the warfarin therapy indefinitely.  I also asked her to question the digoxin to her cardiologist as I do not see indication for this medication at this time.  Her heart rate is well controlled and she is already on both a partial alpha/beta blocker as well as a calcium channel blocker and I do not believe she needs a digoxin in addition to these other 2 as she has stable heart rate and normal blood pressure.  He voiced understanding and will discuss further with her cardiologist and their follow-up in a couple weeks.         Relevant Medications    apixaban (ELIQUIS) 5mg Tab    Type 2 diabetes mellitus without complication (HCC)     Chronic and decompensated problem.  Will initiate her on metformin 500 mg daily.  We will recheck her hemoglobin A1c in March 2020 to ensure improvement.  In the meantime she will let me know if she develops any intolerable side effects.         Relevant Medications    metFORMIN (GLUCOPHAGE) 500 MG Tab    Normocytic anemia     Chronic and slightly improved problem.  We will continue to follow her blood counts periodically but at this time no additional work-up needed.         Pulmonary hypertension (HCC)     Chronic and worsening problem.  Will refer her for a sleep study to ensure there is no contributing obstructive sleep apnea leading to her progressive pulmonary hypertension.  Pulmonary medicine does not feel like there is any additional work-up needed on their end at this time.  She should continue on her prescribed oxygen therapy as well as both Anoro and Xopenex as needed.  She will discuss further with her cardiologist when they have follow-up in January 2020 to see if the right heart cath or any other testing would be indicated on their end.         Relevant Medications    apixaban (ELIQUIS) 5mg Tab         RTC: 3 months.    PLEASE NOTE: This  dictation was created using voice recognition software. I have made every reasonable attempt to correct obvious errors, but I expect that there are errors of grammar and possibly content that I did not discover before finalizing the note.

## 2019-12-24 NOTE — ASSESSMENT & PLAN NOTE
Chronic and worsening problem.  Will refer her for a sleep study to ensure there is no contributing obstructive sleep apnea leading to her progressive pulmonary hypertension.  Pulmonary medicine does not feel like there is any additional work-up needed on their end at this time.  She should continue on her prescribed oxygen therapy as well as both Anoro and Xopenex as needed.  She will discuss further with her cardiologist when they have follow-up in January 2020 to see if the right heart cath or any other testing would be indicated on their end.

## 2019-12-24 NOTE — ASSESSMENT & PLAN NOTE
Chronic and decompensated problem.  Will initiate her on metformin 500 mg daily.  We will recheck her hemoglobin A1c in March 2020 to ensure improvement.  In the meantime she will let me know if she develops any intolerable side effects.

## 2019-12-24 NOTE — ASSESSMENT & PLAN NOTE
Chronic and slightly improved problem.  We will continue to follow her blood counts periodically but at this time no additional work-up needed.

## 2019-12-24 NOTE — ASSESSMENT & PLAN NOTE
Chronic problem that requires medication titration.  We will have her insurance run the cost for Eliquis to see if this would be financially feasible for her and if not she can plan to continue on the warfarin therapy indefinitely.  I also asked her to question the digoxin to her cardiologist as I do not see indication for this medication at this time.  Her heart rate is well controlled and she is already on both a partial alpha/beta blocker as well as a calcium channel blocker and I do not believe she needs a digoxin in addition to these other 2 as she has stable heart rate and normal blood pressure.  He voiced understanding and will discuss further with her cardiologist and their follow-up in a couple weeks.

## 2019-12-31 DIAGNOSIS — E11.9 TYPE 2 DIABETES MELLITUS WITHOUT COMPLICATION, WITHOUT LONG-TERM CURRENT USE OF INSULIN (HCC): ICD-10-CM

## 2020-01-13 ENCOUNTER — ANTICOAGULATION VISIT (OUTPATIENT)
Dept: MEDICAL GROUP | Facility: MEDICAL CENTER | Age: 70
End: 2020-01-13
Payer: MEDICARE

## 2020-01-13 DIAGNOSIS — Z79.01 CHRONIC ANTICOAGULATION: Primary | ICD-10-CM

## 2020-01-13 DIAGNOSIS — Z98.890 HISTORY OF MITRAL VALVE REPAIR: ICD-10-CM

## 2020-01-13 LAB — INR PPP: 1.1 (ref 2–3.5)

## 2020-01-13 PROCEDURE — 99211 OFF/OP EST MAY X REQ PHY/QHP: CPT | Performed by: INTERNAL MEDICINE

## 2020-01-13 PROCEDURE — 85610 PROTHROMBIN TIME: CPT | Performed by: INTERNAL MEDICINE

## 2020-01-13 NOTE — PROGRESS NOTES
OP Anticoagulation Service Note    Date: 2020  There were no vitals filed for this visit.   pt declined vitals    Anticoagulation Summary  As of 2020    INR goal:   2.0-3.0   TTR:   46.3 % (2.8 y)   INR used for dosin.10! (2020)   Warfarin maintenance plan:   5 mg (5 mg x 1) every Tue, Sat; 7.5 mg (5 mg x 1.5) all other days   Weekly warfarin total:   47.5 mg   Plan last modified:   Sakshi Mcconnell, PharmD (2019)   Next INR check:   2020   Target end date:   Indefinite    Indications    Atrial fibrillation (HCC) [I48.91]  History of mitral valve repair [Z98.890]             Anticoagulation Episode Summary     INR check location:       Preferred lab:       Send INR reminders to:       Comments:         Anticoagulation Care Providers     Provider Role Specialty Phone number    Zeferino Almaraz, A.P.N. Referring Family Medicine [phone]    [facility] Responsible  [phone]    Kenneth Salas, PharmD Responsible          Anticoagulation Patient Findings      HPI:   Salma Lees seen in clinic today, on anticoagulation therapy with warfarin (a high risk medication) for atrial fibrillation hx of mitral valve repaire      Pt is here today to evaluate anticoagulation therapy    Pt tried to switch to Eliquis but she report it caused SOB and dizziness. So she stopped and switched back to warfarin yesterday.       Diet has been consistent with foods rich in vitamin K: Yes  Changes in ETOH:  No  Changes in smoking status: No  Changes in medication: No   Cost restriction: No  S/s of bleeding:  No  Falls or accidents since last visit No  Signs/symptoms  thrombosis since the last appt: No    A/P   INR SUB therapeutic today, will require dose adjust mentoday to prevent stroke and closer follow up.   Tonight 10 mg then resume previous warfarin regimen        check referral    Pt educated to contact our clinic with any changes in medications or s/s of bleeding or  thrombosis. Pt is aware to seek immediate medical attention for falls, head injury or deep cuts    Follow up appointment in 2 week(s) to reduce risk of adverse events from warfarin   Sakshi Mcconnell, PharmD

## 2020-01-14 RX ORDER — WARFARIN SODIUM 5 MG/1
TABLET ORAL
Qty: 135 TAB | Refills: 0 | Status: SHIPPED | OUTPATIENT
Start: 2020-01-14 | End: 2020-04-20

## 2020-01-29 ENCOUNTER — PATIENT OUTREACH (OUTPATIENT)
Dept: HEALTH INFORMATION MANAGEMENT | Facility: OTHER | Age: 70
End: 2020-01-29

## 2020-01-30 ENCOUNTER — ANTICOAGULATION VISIT (OUTPATIENT)
Dept: MEDICAL GROUP | Facility: MEDICAL CENTER | Age: 70
End: 2020-01-30
Payer: MEDICARE

## 2020-01-30 DIAGNOSIS — Z98.890 HISTORY OF MITRAL VALVE REPAIR: ICD-10-CM

## 2020-01-30 LAB — INR PPP: 2.5 (ref 2–3.5)

## 2020-01-30 PROCEDURE — 85610 PROTHROMBIN TIME: CPT | Performed by: INTERNAL MEDICINE

## 2020-01-30 PROCEDURE — 93793 ANTICOAG MGMT PT WARFARIN: CPT | Performed by: INTERNAL MEDICINE

## 2020-01-30 NOTE — PROGRESS NOTES
OP Anticoagulation Service Note    Date: 2020  There were no vitals filed for this visit.   pt declined vitals    Anticoagulation Summary  As of 2020    INR goal:   2.0-3.0   TTR:   46.1 % (2.9 y)   INR used for dosin.50 (2020)   Warfarin maintenance plan:   5 mg (5 mg x 1) every Tue, Sat; 7.5 mg (5 mg x 1.5) all other days   Weekly warfarin total:   47.5 mg   Plan last modified:   Sakshi Mcconnell, PharmD (2019)   Next INR check:   2020   Target end date:   Indefinite    Indications    Atrial fibrillation (HCC) [I48.91]  History of mitral valve repair [Z98.890]             Anticoagulation Episode Summary     INR check location:       Preferred lab:       Send INR reminders to:       Comments:         Anticoagulation Care Providers     Provider Role Specialty Phone number    Zeferino Almaraz, A.P.N. Referring Family Medicine [phone]    [facility] Responsible  [phone]    Kenneth Salas, PharmD Responsible          Anticoagulation Patient Findings  Patient Findings     Negatives:   Signs/symptoms of thrombosis, Signs/symptoms of bleeding, Laboratory test error suspected, Change in health, Change in alcohol use, Change in activity, Upcoming invasive procedure, Emergency department visit, Upcoming dental procedure, Missed doses, Extra doses, Change in medications, Change in diet/appetite, Hospital admission, Bruising, Other complaints          HPI:   Salma Lees seen in clinic today, on anticoagulation therapy with warfarin (a high risk medication) for atrial fibrillation and hx of MV repair      Pt is here today to evaluate anticoagulation therapy    Previous INR was  1.1 on 20    Pt was instructed to resume warfarin    Confirmed warfarin dosing regimen, missed 3 doses d/t running out of wafarin, took extra x 2 days after.   Diet has been consistent with foods rich in vitamin K: Yes  Changes in ETOH:  No  Changes in smoking status: No  Changes  in medication: Stopped Metformin d/t diarrhea   Cost restriction: No  S/s of bleeding:  No  Falls or accidents since last visit No  Signs/symptoms  thrombosis since the last appt: No    A/P   INR  therapeutic today, will require close follow up as previous INR was sub-therapeutic   Continue current warfarin regimen          2/20 check referral    Pt educated to contact our clinic with any changes in medications or s/s of bleeding or thrombosis. Pt is aware to seek immediate medical attention for falls, head injury or deep cuts    Follow up appointment in 3 week(s) to reduce risk of adverse events from warfarin   Sakshi Mcconnell, PharmD

## 2020-01-30 NOTE — PROGRESS NOTES
Outcome: Left Message    Please transfer to Patient Outreach Team at [phone] when patient returns call.      [facility] Verified: yes    Attempt # 1

## 2020-02-06 ENCOUNTER — PATIENT MESSAGE (OUTPATIENT)
Dept: HEALTH INFORMATION MANAGEMENT | Facility: OTHER | Age: 70
End: 2020-02-06

## 2020-02-10 DIAGNOSIS — E11.9 TYPE 2 DIABETES MELLITUS WITHOUT COMPLICATION, WITHOUT LONG-TERM CURRENT USE OF INSULIN (HCC): ICD-10-CM

## 2020-02-11 DIAGNOSIS — E11.9 TYPE 2 DIABETES MELLITUS WITHOUT COMPLICATION, WITHOUT LONG-TERM CURRENT USE OF INSULIN (HCC): ICD-10-CM

## 2020-02-12 DIAGNOSIS — Z79.01 CHRONIC ANTICOAGULATION: ICD-10-CM

## 2020-02-13 ENCOUNTER — ANTICOAGULATION MONITORING (OUTPATIENT)
Dept: MEDICAL GROUP | Facility: MEDICAL CENTER | Age: 70
End: 2020-02-13

## 2020-02-13 DIAGNOSIS — Z98.890 HISTORY OF MITRAL VALVE REPAIR: ICD-10-CM

## 2020-02-13 NOTE — PROGRESS NOTES
Ozempic 0.25 mg sample pen dispensed to pt. Counseled pt on side effects, storage and demonstrated how to use pen.     Joleen JoeD

## 2020-02-17 ENCOUNTER — PATIENT OUTREACH (OUTPATIENT)
Dept: HEALTH INFORMATION MANAGEMENT | Facility: OTHER | Age: 70
End: 2020-02-17

## 2020-02-18 ENCOUNTER — TELEPHONE (OUTPATIENT)
Dept: PULMONOLOGY | Facility: HOSPICE | Age: 70
End: 2020-02-18

## 2020-02-18 NOTE — TELEPHONE ENCOUNTER
Patient called [facility] received call from vascular, [facility] is scheduled to see SUZETTE Shannon Pa-C 3/16/2020. I informed the patient referral to vascular was placed from PCP. I informed the patient she will be receiving call from pulmonary to reschedule appt with SUZETTE Shannon PA-C she will be out of the office for medical leave.

## 2020-02-20 ENCOUNTER — ANTICOAGULATION VISIT (OUTPATIENT)
Dept: MEDICAL GROUP | Facility: MEDICAL CENTER | Age: 70
End: 2020-02-20
Payer: MEDICARE

## 2020-02-20 DIAGNOSIS — Z98.890 HISTORY OF MITRAL VALVE REPAIR: ICD-10-CM

## 2020-02-20 DIAGNOSIS — I48.91 ATRIAL FIBRILLATION, UNSPECIFIED TYPE (HCC): ICD-10-CM

## 2020-02-20 LAB — INR PPP: 1.8 (ref 2–3.5)

## 2020-02-20 PROCEDURE — 99211 OFF/OP EST MAY X REQ PHY/QHP: CPT | Performed by: INTERNAL MEDICINE

## 2020-02-20 PROCEDURE — 85610 PROTHROMBIN TIME: CPT | Performed by: INTERNAL MEDICINE

## 2020-02-20 NOTE — PROGRESS NOTES
OP Anticoagulation Service Note    Date: 2020  There were no vitals filed for this visit.   pt declined vitals    Anticoagulation Summary  As of 2020    INR goal:   2.0-3.0   TTR:   46.6 % (2.9 y)   INR used for dosin.80! (2020)   Warfarin maintenance plan:   5 mg (5 mg x 1) every Tue, Sat; 7.5 mg (5 mg x 1.5) all other days   Weekly warfarin total:   47.5 mg   Plan last modified:   Sakshi Mcconnell, PharmD (2019)   Next INR check:   3/5/2020   Target end date:   Indefinite    Indications    Atrial fibrillation (HCC) [I48.91]  History of mitral valve repair [Z98.890]             Anticoagulation Episode Summary     INR check location:       Preferred lab:       Send INR reminders to:       Comments:         Anticoagulation Care Providers     Provider Role Specialty Phone number    SUSANNE Pearson.P.N. Referring Family Medicine [phone]    [facility] Responsible  [phone]    Kenneth Salas, PharmD Responsible          Anticoagulation Patient Findings      HPI:   Salma Lees seen in clinic today, on anticoagulation therapy with warfarin (a high risk medication) for atrial fibrillation and MV repair      Pt is here today to evaluate anticoagulation therapy    Previous INR was 2.5  on 2020    Pt was instructed to continue current regimen.     Confirmed warfarin dosing regimen, she reports she may have missed a half tablet  Diet has been consistent with foods rich in vitamin K: Yes  Changes in ETOH:  No  Changes in smoking status: No  Changes in medication: No   Cost restriction: No  S/s of bleeding:  No  Falls or accidents since last visit No  Signs/symptoms  thrombosis since the last appt: No    A/P   INR  SUBtherapeutic today, will require dose adjust ment today to prevent stroke and closer follow up.    Tonight 10 mg then resume usual regimen        check referral    Pt educated to contact our clinic with any changes in medications or s/s of  bleeding or thrombosis. Pt is aware to seek immediate medical attention for falls, head injury or deep cuts    Follow up appointment in 2 week(s) to reduce risk of adverse events from warfarin  Sakshi Mcconnell, PharmD

## 2020-03-04 RX ORDER — SEMAGLUTIDE 1.34 MG/ML
INJECTION, SOLUTION SUBCUTANEOUS
OUTPATIENT
Start: 2020-03-04

## 2020-03-05 ENCOUNTER — ANTICOAGULATION VISIT (OUTPATIENT)
Dept: MEDICAL GROUP | Facility: MEDICAL CENTER | Age: 70
End: 2020-03-05
Payer: MEDICARE

## 2020-03-05 DIAGNOSIS — Z98.890 HISTORY OF MITRAL VALVE REPAIR: ICD-10-CM

## 2020-03-05 LAB — INR PPP: 1.8 (ref 2–3.5)

## 2020-03-05 PROCEDURE — 99211 OFF/OP EST MAY X REQ PHY/QHP: CPT | Performed by: INTERNAL MEDICINE

## 2020-03-05 PROCEDURE — 85610 PROTHROMBIN TIME: CPT | Performed by: INTERNAL MEDICINE

## 2020-03-05 NOTE — PROGRESS NOTES
Anticoagulation Summary  As of 3/5/2020    INR goal:   2.0-3.0   TTR:   46.0 % (3 y)   INR used for dosin.80! (3/5/2020)   Warfarin maintenance plan:   7.5 mg (5 mg x 1.5) every day   Weekly warfarin total:   52.5 mg   Plan last modified:   Mehnaz Simms (3/5/2020)   Next INR check:   3/19/2020   Target end date:   Indefinite    Indications    Atrial fibrillation (HCC) [I48.91]  History of mitral valve repair [Z98.890]             Anticoagulation Episode Summary     INR check location:       Preferred lab:       Send INR reminders to:       Comments:         Anticoagulation Care Providers     Provider Role Specialty Phone number    Zeferino Almaraz, A.P.N. Referring Family Medicine [phone]    [facility] Responsible  [phone]    Kenneth Salas, PharmD Responsible          Anticoagulation Patient Findings          History of Present Illness: follow up appointment for chronic anticoagulation with the high risk medication, warfarin for atrial fibrillation    Last INR was out of range, dosage adjusted: pt remains sub therapeutic today despite boluses.  Will increase weekly dose by 10%. Follow up in 2 weeks, to reduce the risk of adverse events related to this high risk medication, warfarin.    Pt declines vitals today    Mehnaz Simms, Clinical Pharmacist

## 2020-03-16 ENCOUNTER — PATIENT MESSAGE (OUTPATIENT)
Dept: PULMONOLOGY | Facility: HOSPICE | Age: 70
End: 2020-03-16

## 2020-03-16 DIAGNOSIS — J44.9 CHRONIC OBSTRUCTIVE PULMONARY DISEASE, UNSPECIFIED COPD TYPE (HCC): ICD-10-CM

## 2020-03-16 DIAGNOSIS — E11.9 TYPE 2 DIABETES MELLITUS WITHOUT COMPLICATION, WITHOUT LONG-TERM CURRENT USE OF INSULIN (HCC): ICD-10-CM

## 2020-03-17 NOTE — TELEPHONE ENCOUNTER
From: Salma Lees  To: Ivy Galloway M.D.  Sent: 3/16/2020 7:27 PM PDT  Subject: Prescription Question    Good morning, I need a script refill but would like to change drugstores.     I presently use [facility] but would like to change to [facility] ([facility]) due to their drive up window. I am out of [facility] and being in the most vulnerable population would greatly appreciate this request.    I hope all is well with you and your staff during these trying times.    Salma Montana

## 2020-03-17 NOTE — PATIENT COMMUNICATION
Have we ever prescribed this med? Yes.  If yes, what date? 11/19/2019    Last OV: 09/16/2019 - MARYJANE KINNEY    Next OV: 06/02/2020 - MARYJANE KINNEY     DX: COPD    Medications: Anoro

## 2020-03-19 ENCOUNTER — APPOINTMENT (OUTPATIENT)
Dept: MEDICAL GROUP | Facility: MEDICAL CENTER | Age: 70
End: 2020-03-19
Payer: MEDICARE

## 2020-04-03 ENCOUNTER — TELEPHONE (OUTPATIENT)
Dept: VASCULAR LAB | Facility: MEDICAL CENTER | Age: 70
End: 2020-04-03

## 2020-04-03 NOTE — TELEPHONE ENCOUNTER
Pt cancelled her last anticoagulation services appointment. Left message for pt to have INR checked  Mehnaz Simms, Clinical Pharmacist, CDE, CACP

## 2020-04-16 ENCOUNTER — TELEPHONE (OUTPATIENT)
Dept: VASCULAR LAB | Facility: MEDICAL CENTER | Age: 70
End: 2020-04-16

## 2020-04-16 NOTE — TELEPHONE ENCOUNTER
OP Anticoagulation Telephone Note    Date: 4/16/2020       Plan:  Pt no showed for appt. Called to remind patient please reschedule appt. Left VM and sent johnny Mcconnell, Pharm D

## 2020-04-18 DIAGNOSIS — Z79.01 CHRONIC ANTICOAGULATION: ICD-10-CM

## 2020-04-20 RX ORDER — WARFARIN SODIUM 5 MG/1
TABLET ORAL
Qty: 45 TAB | Refills: 0 | Status: SHIPPED | OUTPATIENT
Start: 2020-04-20 | End: 2020-06-02

## 2020-04-21 ENCOUNTER — TELEPHONE (OUTPATIENT)
Dept: VASCULAR LAB | Facility: MEDICAL CENTER | Age: 70
End: 2020-04-21

## 2020-04-27 ENCOUNTER — ANTICOAGULATION VISIT (OUTPATIENT)
Dept: MEDICAL GROUP | Facility: MEDICAL CENTER | Age: 70
End: 2020-04-27
Payer: MEDICARE

## 2020-04-27 DIAGNOSIS — Z79.01 CHRONIC ANTICOAGULATION: Primary | ICD-10-CM

## 2020-04-27 DIAGNOSIS — Z98.890 HISTORY OF MITRAL VALVE REPAIR: ICD-10-CM

## 2020-04-27 LAB — INR PPP: 2.1 (ref 2–3.5)

## 2020-04-27 PROCEDURE — 93793 ANTICOAG MGMT PT WARFARIN: CPT | Performed by: INTERNAL MEDICINE

## 2020-04-27 PROCEDURE — 85610 PROTHROMBIN TIME: CPT | Performed by: INTERNAL MEDICINE

## 2020-04-27 NOTE — PROGRESS NOTES
OP Anticoagulation Service Note    Date: 2020  There were no vitals filed for this visit.   pt declined vitals    Anticoagulation Summary  As of 2020    INR goal:   2.0-3.0   TTR:   45.4 % (3.1 y)   INR used for dosin.10 (2020)   Warfarin maintenance plan:   7.5 mg (5 mg x 1.5) every day   Weekly warfarin total:   52.5 mg   Plan last modified:   Mehnaz Simms (3/5/2020)   Next INR check:   2020   Target end date:   Indefinite    Indications    Atrial fibrillation (HCC) [I48.91]  History of mitral valve repair [Z98.890]             Anticoagulation Episode Summary     INR check location:       Preferred lab:       Send INR reminders to:       Comments:         Anticoagulation Care Providers     Provider Role Specialty Phone number    Zeferino Almaraz, A.P.YASMANY. Referring Family Medicine [phone]    [facility] Anticoagulation Services Responsible  [phone]    Kenneth Salas, PharmD Responsible          Anticoagulation Patient Findings      HPI:   Salmalissett Hdz Nabeel seen in clinic today, on anticoagulation therapy with warfarin (a high risk medication) for atrial fibrillation, hx of MV repair      Pt is here today to evaluate anticoagulation therapy    Previous INR was  1.8 on 3-5-20    Pt was instructed to increase weekly regimen    Confirmed warfarin dosing regimen, denies missed or extra doses of coumadin.   Diet has been consistent with foods rich in vitamin K: Yes  Changes in ETOH:  No  Changes in smoking status: No  Changes in medication: No   Cost restriction: No  S/s of bleeding:  No  Falls or accidents since last visit No  Signs/symptoms  thrombosis since the last appt: No    A/P   INR  therapeutic today,    Continue current warfarin regimen            Pt educated to contact our clinic with any changes in medications or s/s of bleeding or thrombosis. Pt is aware to seek immediate medical attention for falls, head injury or deep cuts    Follow up appointment in 6 week(s) to reduce  risk of adverse events from warfarin   Sakshi Mcconnell, PharmD

## 2020-04-29 ENCOUNTER — TELEPHONE (OUTPATIENT)
Dept: HEALTH INFORMATION MANAGEMENT | Facility: OTHER | Age: 70
End: 2020-04-29

## 2020-04-29 NOTE — TELEPHONE ENCOUNTER
Outcome: Left Message    Please transfer to Patient Outreach Team at [phone] when patient returns call.    WebIZ Checked & Epic Updated:  no    HealthConnect Verified: no    Attempt #  1

## 2020-05-20 ENCOUNTER — APPOINTMENT (OUTPATIENT)
Dept: CARDIOLOGY | Facility: MEDICAL CENTER | Age: 70
End: 2020-05-20
Payer: MEDICARE

## 2020-06-01 ENCOUNTER — ANTICOAGULATION MONITORING (OUTPATIENT)
Dept: MEDICAL GROUP | Facility: MEDICAL CENTER | Age: 70
End: 2020-06-01

## 2020-06-01 DIAGNOSIS — Z98.890 HISTORY OF MITRAL VALVE REPAIR: ICD-10-CM

## 2020-06-01 NOTE — PROGRESS NOTES
Dispensed Anoro 1 inhaler - 7 days to patient    Dispensed Ozepic 2 pens. 8 weeks of medication.     Pt reports she will be able to afford Anoro with ozempic samples being given.     Encouraged pt to see if she qualifies for patient assistance through Sakshi Araya, JoleenD

## 2020-06-01 NOTE — Clinical Note
Hey!     She came by for samples. I was able to give her two months of Ozempic samples and 1 week of anoro. She says she will be able to pay for Anoro now. Encouraged her to apply for pt assistance.     ThanksSakshi

## 2020-06-02 DIAGNOSIS — Z79.01 CHRONIC ANTICOAGULATION: ICD-10-CM

## 2020-06-02 RX ORDER — WARFARIN SODIUM 5 MG/1
TABLET ORAL
Qty: 135 TAB | Refills: 1 | Status: SHIPPED | OUTPATIENT
Start: 2020-06-02 | End: 2020-12-02

## 2020-06-22 ENCOUNTER — ANTICOAGULATION VISIT (OUTPATIENT)
Dept: MEDICAL GROUP | Facility: MEDICAL CENTER | Age: 70
End: 2020-06-22
Payer: MEDICARE

## 2020-06-22 DIAGNOSIS — Z79.01 CHRONIC ANTICOAGULATION: Primary | ICD-10-CM

## 2020-06-22 DIAGNOSIS — Z98.890 HISTORY OF MITRAL VALVE REPAIR: ICD-10-CM

## 2020-06-22 LAB — INR PPP: 4 (ref 2–3.5)

## 2020-06-22 PROCEDURE — 85610 PROTHROMBIN TIME: CPT | Performed by: INTERNAL MEDICINE

## 2020-06-22 PROCEDURE — 99211 OFF/OP EST MAY X REQ PHY/QHP: CPT | Performed by: INTERNAL MEDICINE

## 2020-06-22 NOTE — PROGRESS NOTES
OP Anticoagulation Service Note    Date: 2020  There were no vitals filed for this visit.   pt declined vitals    Anticoagulation Summary  As of 2020    INR goal:   2.0-3.0   TTR:   45.5 % (3.3 y)   INR used for dosin.00! (2020)   Warfarin maintenance plan:   7.5 mg (5 mg x 1.5) every day   Weekly warfarin total:   52.5 mg   Plan last modified:   Mehnaz Simms (3/5/2020)   Next INR check:   2020   Target end date:   Indefinite    Indications    Atrial fibrillation (HCC) [I48.91]  History of mitral valve repair [Z98.890]             Anticoagulation Episode Summary     INR check location:       Preferred lab:       Send INR reminders to:       Comments:         Anticoagulation Care Providers     Provider Role Specialty Phone number    Zeferino Almaraz, A.P.N. Referring Family Medicine [phone]    [facility] Responsible  [phone]    eKnneth Salas, PharmD Responsible          Anticoagulation Patient Findings      HPI:   Salma Lees seen in clinic today, on anticoagulation therapy with warfarin (a high risk medication) for atrial fibrillation, hx of mitral valve repair      Pt is here today to evaluate anticoagulation therapy    Previous INR was  2.1 on 20    Pt was instructed to continue current regimen    Confirmed warfarin dosing regimen, denies missed or extra doses of coumadin.   Diet has been consistent with foods rich in vitamin K: No - she has not been eating her usual green vegetables  Changes in ETOH:  No  Changes in smoking status: No  Changes in medication: No   Cost restriction: No  S/s of bleeding:  No  Falls or accidents since last visit No  Signs/symptoms  thrombosis since the last appt: No    A/P   INR  SUPRA therapeutic today, will require dose adjust ment today to prevent bleeding complications and closer follow up.   HOLD then resume usual regime. She will increase her green vegetables.         Pt educated to contact our clinic  with any changes in medications or s/s of bleeding or thrombosis. Pt is aware to seek immediate medical attention for falls, head injury or deep cuts    Follow up appointment in 2 week(s) to reduce risk of adverse events from warfarin  Sakshi Mcconnell, PharmD

## 2020-07-06 ENCOUNTER — ANTICOAGULATION VISIT (OUTPATIENT)
Dept: MEDICAL GROUP | Facility: MEDICAL CENTER | Age: 70
End: 2020-07-06
Payer: MEDICARE

## 2020-07-06 DIAGNOSIS — Z79.01 CHRONIC ANTICOAGULATION: Primary | ICD-10-CM

## 2020-07-06 DIAGNOSIS — Z98.890 HISTORY OF MITRAL VALVE REPAIR: ICD-10-CM

## 2020-07-06 LAB — INR PPP: 1.7 (ref 2–3.5)

## 2020-07-06 PROCEDURE — 85610 PROTHROMBIN TIME: CPT | Performed by: INTERNAL MEDICINE

## 2020-07-06 PROCEDURE — 99211 OFF/OP EST MAY X REQ PHY/QHP: CPT | Performed by: INTERNAL MEDICINE

## 2020-07-06 NOTE — PROGRESS NOTES
OP Anticoagulation Service Note    Date: 2020  There were no vitals filed for this visit.   pt declined vitals    Anticoagulation Summary  As of 2020    INR goal:   2.0-3.0   TTR:   45.5 % (3.3 y)   INR used for dosin.70! (2020)   Warfarin maintenance plan:   7.5 mg (5 mg x 1.5) every day   Weekly warfarin total:   52.5 mg   Plan last modified:   Mehnaz Simms (3/5/2020)   Next INR check:   2020   Target end date:   Indefinite    Indications    Atrial fibrillation (HCC) [I48.91]  History of mitral valve repair [Z98.890]             Anticoagulation Episode Summary     INR check location:       Preferred lab:       Send INR reminders to:       Comments:         Anticoagulation Care Providers     Provider Role Specialty Phone number    Zeferino Almaraz, A.P.N. Referring Family Medicine [phone]    [facility] Responsible  [phone]    Kenneth Salas, PharmD Responsible          Anticoagulation Patient Findings      HPI:   Salma Lees seen in clinic today, on anticoagulation therapy with warfarin (a high risk medication) for atrial fibrillation and hx of mitral valve repair      Pt is here today to evaluate anticoagulation therapy    Previous INR was  4.0 on 20    Pt was instructed to HOLD then resume usual regimen    Pt reports she took 5 mg to 7.5 mg daily, she didn't under stand why her INR went up and was scared to take her usual dose  Diet has been consistent with foods rich in vitamin K: Yes - she is back to eating her usual greens.   Changes in ETOH:  No  Changes in smoking status: No  Changes in medication: No   Cost restriction: No  S/s of bleeding:  No  Falls or accidents since last visit No  Signs/symptoms  thrombosis since the last appt: No    A/P   INR  SUBtherapeutic today, will require dose adjust ment today to prevent stroke and closer follow up.   Resume usual regimen of 7.5 mg daily       Pt educated to contact our clinic with any  changes in medications or s/s of bleeding or thrombosis. Pt is aware to seek immediate medical attention for falls, head injury or deep cuts    Follow up appointment in 2 week(s) to reduce risk of adverse events from warfarin  Sakshi Mcconnell, PharmD

## 2020-07-14 ENCOUNTER — PATIENT OUTREACH (OUTPATIENT)
Dept: HEALTH INFORMATION MANAGEMENT | Facility: OTHER | Age: 70
End: 2020-07-14

## 2020-07-14 NOTE — PROGRESS NOTES
Outcome: Left Message  Outreach / AWV    Please transfer to Patient Outreach Team at [phone] when patient returns call.      HealthConnect Verified: yes    Attempt # 2

## 2020-07-20 ENCOUNTER — ANTICOAGULATION VISIT (OUTPATIENT)
Dept: MEDICAL GROUP | Facility: MEDICAL CENTER | Age: 70
End: 2020-07-20
Payer: MEDICARE

## 2020-07-20 ENCOUNTER — APPOINTMENT (OUTPATIENT)
Dept: MEDICAL GROUP | Facility: MEDICAL CENTER | Age: 70
End: 2020-07-20
Payer: MEDICARE

## 2020-07-20 DIAGNOSIS — Z79.01 CHRONIC ANTICOAGULATION: Primary | ICD-10-CM

## 2020-07-20 DIAGNOSIS — Z98.890 HISTORY OF MITRAL VALVE REPAIR: ICD-10-CM

## 2020-07-20 LAB — INR PPP: 3.3 (ref 2–3.5)

## 2020-07-20 PROCEDURE — 85610 PROTHROMBIN TIME: CPT | Performed by: INTERNAL MEDICINE

## 2020-07-20 PROCEDURE — 99211 OFF/OP EST MAY X REQ PHY/QHP: CPT | Performed by: INTERNAL MEDICINE

## 2020-07-20 NOTE — PROGRESS NOTES
OP Anticoagulation Service Note    Date: 7/20/2020  There were no vitals filed for this visit.   pt declined vitals    Anticoagulation Summary  As of 7/20/2020    INR goal:   2.0-3.0   TTR:   45.7 % (3.3 y)   INR used for dosing:   3.30! (7/20/2020)   Warfarin maintenance plan:   5 mg (5 mg x 1) every Mon, Fri; 7.5 mg (5 mg x 1.5) all other days   Weekly warfarin total:   47.5 mg   Plan last modified:   Sakshi Mcconnell, PharmD (7/20/2020)   Next INR check:   8/6/2020   Target end date:   Indefinite    Indications    Atrial fibrillation (HCC) [I48.91]  History of mitral valve repair [Z98.890]             Anticoagulation Episode Summary     INR check location:       Preferred lab:       Send INR reminders to:       Comments:         Anticoagulation Care Providers     Provider Role Specialty Phone number    Zeferino Almaraz A.P.N. Referring Family Medicine [phone]    [facility] Anticoagulation Services Responsible  [phone]    Kenneth Salas, PharmD Responsible          Anticoagulation Patient Findings      HPI:   Salma Lees seen in clinic today, on anticoagulation therapy with warfarin (a high risk medication) for atrial fibrillation hx of MV repair      Pt is here today to evaluate anticoagulation therapy    Previous INR was  1.7 on 7-6-20    Pt was instructed to resume previous warfarin regimen    Confirmed warfarin dosing regimen, denies missed or extra doses of coumadin.   Diet has been consistent with foods rich in vitamin K: Yes  Changes in ETOH:  No  Changes in smoking status: No  Changes in medication: No   Cost restriction: No  S/s of bleeding:  No  Falls or accidents since last visit No  Signs/symptoms  thrombosis since the last appt: No    A/P   INR  supratherapeutic today, will require dose adjust ment today to prevent bleeding complications  and closer follow up.   Lower weekly regimen       Pt educated to contact our clinic with any changes in medications or s/s of bleeding or thrombosis.  Pt is aware to seek immediate medical attention for falls, head injury or deep cuts    Follow up appointment in 2 week(s) to reduce risk of adverse events from warfarin   Sakshi Mcconnell, PharmD

## 2020-07-31 ENCOUNTER — PATIENT OUTREACH (OUTPATIENT)
Dept: HEALTH INFORMATION MANAGEMENT | Facility: OTHER | Age: 70
End: 2020-07-31

## 2020-07-31 NOTE — PROGRESS NOTES
Patient called earlier and left message with another SCP PA. She was requesting a reimbursement form so she could show proof of the payments she made for her medication. I called and left a message for patient to return my call as I want to clarify exactly what she is needed so I can get her the correct information. Will wait for call back.

## 2020-08-06 ENCOUNTER — ANTICOAGULATION VISIT (OUTPATIENT)
Dept: MEDICAL GROUP | Facility: MEDICAL CENTER | Age: 70
End: 2020-08-06
Payer: MEDICARE

## 2020-08-06 DIAGNOSIS — Z98.890 HISTORY OF MITRAL VALVE REPAIR: ICD-10-CM

## 2020-08-06 DIAGNOSIS — Z79.01 ANTICOAGULATED ON WARFARIN: ICD-10-CM

## 2020-08-06 LAB — INR PPP: 3 (ref 2–3.5)

## 2020-08-06 PROCEDURE — 99211 OFF/OP EST MAY X REQ PHY/QHP: CPT | Performed by: PHYSICIAN ASSISTANT

## 2020-08-06 PROCEDURE — 85610 PROTHROMBIN TIME: CPT | Performed by: PHYSICIAN ASSISTANT

## 2020-08-06 NOTE — PROGRESS NOTES
OP Anticoagulation Service Note    Date: 8/6/2020  There were no vitals filed for this visit.   pt declined vitals    Anticoagulation Summary  As of 8/6/2020    INR goal:  2.0-3.0   TTR:  45.1 % (3.4 y)   INR used for dosing:  3.00 (8/6/2020)   Warfarin maintenance plan:  7.5 mg (5 mg x 1.5) every Sun, Tue, Thu; 5 mg (5 mg x 1) all other days   Weekly warfarin total:  42.5 mg   Plan last modified:  Sakshi Mcconnell, PharmD (8/6/2020)   Next INR check:  8/20/2020   Target end date:  Indefinite    Indications    Atrial fibrillation (HCC) [I48.91]  History of mitral valve repair [Z98.890]             Anticoagulation Episode Summary     INR check location:      Preferred lab:      Send INR reminders to:      Comments:        Anticoagulation Care Providers     Provider Role Specialty Phone number    Zeferino Almaraz A.P.N. Referring Family Medicine [phone]    [facility] Anticoagulation Services Responsible  [phone]    Kenneth Salas, PharmD Responsible          Anticoagulation Patient Findings      HPI:   Salma Lees seen in clinic today, on anticoagulation therapy with warfarin (a high risk medication) for atrial fibrillation and mitral valve repair      Pt is here today to evaluate anticoagulation therapy    Previous INR was  3.2 on 7-20-20    Pt was instructed to Lower weekly regimen    Confirmed warfarin dosing regimen, she missed her dose on Sunday.   Diet has been consistent with foods rich in vitamin K: Yes  Changes in ETOH:  No  Changes in smoking status: No  Changes in medication: No   Cost restriction: No  S/s of bleeding:  No  Falls or accidents since last visit No  Signs/symptoms  thrombosis since the last appt: No    A/P   INR  therapeutic today,  But INR is on high side of range even though she missed a 7.5 mg dose 4 days ago.   Lower weekly regimen     2/21 check referral    Pt educated to contact our clinic with any changes in medications or s/s of bleeding or thrombosis. Pt is aware to  seek immediate medical attention for falls, head injury or deep cuts    Follow up appointment in 2 week(s) to reduce risk of adverse events from warfarin   Sakshi Mcconnell, PharmD

## 2020-08-20 ENCOUNTER — ANTICOAGULATION VISIT (OUTPATIENT)
Dept: MEDICAL GROUP | Facility: MEDICAL CENTER | Age: 70
End: 2020-08-20
Payer: MEDICARE

## 2020-08-20 DIAGNOSIS — Z98.890 HISTORY OF MITRAL VALVE REPAIR: ICD-10-CM

## 2020-08-20 DIAGNOSIS — Z79.01 CHRONIC ANTICOAGULATION: Primary | ICD-10-CM

## 2020-08-20 LAB — INR PPP: 2.3 (ref 2–3.5)

## 2020-08-20 PROCEDURE — 93793 ANTICOAG MGMT PT WARFARIN: CPT | Performed by: INTERNAL MEDICINE

## 2020-08-20 PROCEDURE — 85610 PROTHROMBIN TIME: CPT | Performed by: INTERNAL MEDICINE

## 2020-08-20 NOTE — PROGRESS NOTES
OP Anticoagulation Service Note    Date: 2020  There were no vitals filed for this visit.   pt declined vitals    Anticoagulation Summary  As of 2020    INR goal:  2.0-3.0   TTR:  45.7 % (3.4 y)   INR used for dosin.30 (2020)   Warfarin maintenance plan:  7.5 mg (5 mg x 1.5) every Sun, Tue, Thu; 5 mg (5 mg x 1) all other days   Weekly warfarin total:  42.5 mg   Plan last modified:  Sakshi Mcconnell, PharmD (2020)   Next INR check:  2020   Target end date:  Indefinite    Indications    Atrial fibrillation (HCC) [I48.91]  History of mitral valve repair [Z98.890]             Anticoagulation Episode Summary     INR check location:      Preferred lab:      Send INR reminders to:      Comments:        Anticoagulation Care Providers     Provider Role Specialty Phone number    Zeferino Almaraz A.P.N. Referring Family Medicine [phone]    [facility] Anticoagulation Services Responsible  [phone]    Kenneth Salas, PharmD Responsible          Anticoagulation Patient Findings      HPI:   Salma Lees seen in clinic today, on anticoagulation therapy with warfarin (a high risk medication) for atrial fibrillation and hx of mitral valve    Pt is here today to evaluate anticoagulation therapy    Previous INR was  3.0 on 20    Pt was instructed to continue current regimen    Confirmed warfarin dosing regimen, denies missed or extra doses of coumadin.   Diet has been consistent with foods rich in vitamin K: Yes  Changes in ETOH:  No  Changes in smoking status: No  Changes in medication: No   Cost restriction: No  S/s of bleeding:  No  Falls or accidents since last visit No  Signs/symptoms  thrombosis since the last appt: No    A/P   INR  therapeutic today,   Continue current warfarin regimen        Pt educated to contact our clinic with any changes in medications or s/s of bleeding or thrombosis. Pt is aware to seek immediate medical attention for falls, head injury or deep cuts    Follow  up appointment in 4 week(s) to reduce risk of adverse events from warfarin  Sakshi Mcconnell, PharmD

## 2020-08-21 ENCOUNTER — PATIENT MESSAGE (OUTPATIENT)
Dept: PULMONOLOGY | Facility: HOSPICE | Age: 70
End: 2020-08-21

## 2020-08-21 DIAGNOSIS — J44.9 CHRONIC OBSTRUCTIVE PULMONARY DISEASE, UNSPECIFIED COPD TYPE (HCC): ICD-10-CM

## 2020-08-21 NOTE — TELEPHONE ENCOUNTER
From: Salma Lees  To: Peggy Shannon P.A.-C.  Sent: 8/21/2020 11:00 AM PDT  Subject: Non-Urgent Medical Question    Good day. I hope you and yours are doing well during these difficult times.    I recently have fallen into the “donut hole” for Medicare. My pharmacist advised me of an assistance   program Offered by [facility]. I have filled out the necessary paperwork and documentation. They request a  Signed prescriptions faxed to them with a cover sheet from the Physician’s office. They request the prescription   be made out for three (3) Anoro Ellipta with three (3) refills. Their fax number is: [phone].    I’ll Mail in my application next week if it’s okay with you. I hope this isn’t a great inconvenience for you and  Really appreciate your assistance.     Thanks again, Nan

## 2020-09-17 ENCOUNTER — ANTICOAGULATION VISIT (OUTPATIENT)
Dept: MEDICAL GROUP | Facility: MEDICAL CENTER | Age: 70
End: 2020-09-17
Payer: MEDICARE

## 2020-09-17 DIAGNOSIS — Z79.01 CHRONIC ANTICOAGULATION: Primary | ICD-10-CM

## 2020-09-17 DIAGNOSIS — Z98.890 HISTORY OF MITRAL VALVE REPAIR: ICD-10-CM

## 2020-09-17 LAB — INR PPP: 2.3 (ref 2–3.5)

## 2020-09-17 PROCEDURE — 85610 PROTHROMBIN TIME: CPT | Performed by: INTERNAL MEDICINE

## 2020-09-17 PROCEDURE — 93793 ANTICOAG MGMT PT WARFARIN: CPT | Performed by: INTERNAL MEDICINE

## 2020-09-17 NOTE — PROGRESS NOTES
OP Anticoagulation Service Note    Date: 2020  There were no vitals filed for this visit.   pt declined vitals    Anticoagulation Summary  As of 2020    INR goal:  2.0-3.0   TTR:  46.9 % (3.5 y)   INR used for dosin.30 (2020)   Warfarin maintenance plan:  7.5 mg (5 mg x 1.5) every Sun, e, Thu; 5 mg (5 mg x 1) all other days   Weekly warfarin total:  42.5 mg   Plan last modified:  Sakshi Mcconnell, PharmD (2020)   Next INR check:  10/22/2020   Target end date:  Indefinite    Indications    Atrial fibrillation (HCC) [I48.91]  History of mitral valve repair [Z98.890]             Anticoagulation Episode Summary     INR check location:      Preferred lab:      Send INR reminders to:      Comments:        Anticoagulation Care Providers     Provider Role Specialty Phone number    SUSANNE Pearson.FARAZ.N. Referring [facility] Medicine [phone]    [facility] Anticoagulation Services Responsible  [phone]    Kenneth Salas, PharmD Responsible          Anticoagulation Patient Findings      HPI:   Salma Lees seen in clinic today, on anticoagulation therapy with warfarin (a high risk medication) for atrial fibrillation and hx of mitral valve       Pt is here today to evaluate anticoagulation therapy    Previous INR was  2.3 on 20    Pt was instructed to continue current regimen    Confirmed warfarin dosing regimen, denies missed or extra doses of coumadin.   Diet has been consistent with foods rich in vitamin K: Yes  Changes in ETOH:  No  Changes in smoking status: No  Changes in medication: No   Cost restriction: No  S/s of bleeding:  No  Falls or accidents since last visit No  Signs/symptoms  thrombosis since the last appt: No    A/P   INR  therapeutic today,   Continue current warfarin regimen              Pt educated to contact our clinic with any changes in medications or s/s of bleeding or thrombosis. Pt is aware to seek immediate medical attention for falls, head injury or deep  cuts    Follow up appointment in 5 week(s) to reduce risk of adverse events from warfarin   Sakshi Mcconnell, PharmD

## 2020-09-21 ENCOUNTER — NON-PROVIDER VISIT (OUTPATIENT)
Dept: MEDICAL GROUP | Facility: MEDICAL CENTER | Age: 70
End: 2020-09-21
Payer: MEDICARE

## 2020-09-21 ENCOUNTER — TELEPHONE (OUTPATIENT)
Dept: PULMONOLOGY | Facility: HOSPICE | Age: 70
End: 2020-09-21

## 2020-09-21 DIAGNOSIS — Z23 NEED FOR INFLUENZA VACCINATION: ICD-10-CM

## 2020-09-21 PROCEDURE — 90662 IIV NO PRSV INCREASED AG IM: CPT | Performed by: INTERNAL MEDICINE

## 2020-09-21 PROCEDURE — G0008 ADMIN INFLUENZA VIRUS VAC: HCPCS | Performed by: INTERNAL MEDICINE

## 2020-09-21 NOTE — TELEPHONE ENCOUNTER
Anoro sample was placed at  for patient on 8/24/2020. Patient has not picked up as of 9/21/2020 and sample is getting put back into inventory.     Pt will need to request new sample if she needs a sample in the future

## 2020-10-01 NOTE — NON-PROVIDER
"Salma Lees is a 70 y.o. female here for a non-provider visit for:   FLU    Reason for immunization: Annual Flu Vaccine  Immunization records indicate need for vaccine: Yes, confirmed with Epic  Minimum interval has been met for this vaccine: Yes  ABN completed: No    Order and dose verified by: eo  VIS Dated  [id number] was given to patient: Yes  All IAC Questionnaire questions were answered \"No.\"    Patient tolerated injection and no adverse effects were observed or reported: Yes    Pt scheduled for next dose in series: No  "

## 2020-10-22 ENCOUNTER — ANTICOAGULATION VISIT (OUTPATIENT)
Dept: MEDICAL GROUP | Facility: MEDICAL CENTER | Age: 70
End: 2020-10-22
Payer: MEDICARE

## 2020-10-22 DIAGNOSIS — Z79.01 CHRONIC ANTICOAGULATION: Primary | ICD-10-CM

## 2020-10-22 DIAGNOSIS — Z98.890 HISTORY OF MITRAL VALVE REPAIR: ICD-10-CM

## 2020-10-22 LAB — INR PPP: 2.2 (ref 2–3.5)

## 2020-10-22 PROCEDURE — 85610 PROTHROMBIN TIME: CPT | Performed by: INTERNAL MEDICINE

## 2020-10-22 PROCEDURE — 93793 ANTICOAG MGMT PT WARFARIN: CPT | Performed by: INTERNAL MEDICINE

## 2020-10-22 NOTE — PROGRESS NOTES
OP Anticoagulation Service Note    Date: 10/22/2020  There were no vitals filed for this visit.   pt declined vitals    Anticoagulation Summary  As of 10/22/2020    INR goal:  2.0-3.0   TTR:  48.3 % (3.6 y)   INR used for dosin.20 (10/22/2020)   Warfarin maintenance plan:  7.5 mg (5 mg x 1.5) every Sun, Tue, Thu; 5 mg (5 mg x 1) all other days   Weekly warfarin total:  42.5 mg   Plan last modified:  Sakshi Mcconnell, PharmD (2020)   Next INR check:  12/3/2020   Target end date:  Indefinite    Indications    Atrial fibrillation (HCC) [I48.91]  History of mitral valve repair [Z98.890]             Anticoagulation Episode Summary     INR check location:      Preferred lab:      Send INR reminders to:      Comments:        Anticoagulation Care Providers     Provider Role Specialty Phone number    SUSANNE Pearson.P.N. Referring [facility] Medicine [phone]    [facility] Anticoagulation [facility] Responsible  [phone]    Kenneth Salas, PharmD Responsible          Anticoagulation Patient Findings      HPI:   Salma Lees seen in clinic today, on anticoagulation therapy with warfarin (a high risk medication) for atrial fibrillation and hx of mitral valve repair       Pt is here today to evaluate anticoagulation therapy    Previous INR was  2.3 on 2020    Pt was instructed to continue current regimen    Confirmed warfarin dosing regimen, denies missed or extra doses of coumadin.   Diet has been consistent with foods rich in vitamin K: Yes  Changes in ETOH:  No  Changes in smoking status: No  Changes in medication: No   Cost restriction: No  S/s of bleeding:  No  Falls or accidents since last visit No  Signs/symptoms  thrombosis since the last appt: No    A/P   INR  therapeutic today,  Continue current warfarin regimen        Dispensed Ozempic 2 mg/1.5 mL pen to patient.      check referral    Pt educated to contact our clinic with any changes in medications or s/s of bleeding or thrombosis. Pt is  aware to seek immediate medical attention for falls, head injury or deep cuts    Follow up appointment in 6 week(s) to reduce risk of adverse events from warfarin  Sakshi Mcconnell, PharmD

## 2020-10-30 ENCOUNTER — PATIENT OUTREACH (OUTPATIENT)
Dept: HEALTH INFORMATION MANAGEMENT | Facility: OTHER | Age: 70
End: 2020-10-30

## 2020-10-30 NOTE — PROGRESS NOTES
Outcome: Left Message  AWV / AHA    Please transfer to Patient Outreach Team at [phone] when patient returns call.      HealthConnect Verified: yes    Attempt # 1

## 2020-11-04 DIAGNOSIS — J44.9 CHRONIC OBSTRUCTIVE PULMONARY DISEASE, UNSPECIFIED COPD TYPE (HCC): ICD-10-CM

## 2020-11-19 ENCOUNTER — ANTICOAGULATION MONITORING (OUTPATIENT)
Dept: VASCULAR LAB | Facility: MEDICAL CENTER | Age: 70
End: 2020-11-19

## 2020-11-19 DIAGNOSIS — Z98.890 HISTORY OF MITRAL VALVE REPAIR: ICD-10-CM

## 2020-12-01 DIAGNOSIS — Z79.01 CHRONIC ANTICOAGULATION: ICD-10-CM

## 2020-12-02 RX ORDER — WARFARIN SODIUM 5 MG/1
TABLET ORAL
Qty: 135 TAB | Refills: 0 | Status: SHIPPED | OUTPATIENT
Start: 2020-12-02 | End: 2021-03-04

## 2020-12-02 NOTE — TELEPHONE ENCOUNTER
Received request via: Pharmacy    Was the patient seen in the last year in this department? Yes    Does the patient have an active prescription (recently filled or refills available) for medication(s) requested? No   Requested Prescriptions     Pending Prescriptions Disp Refills   • warfarin (COUMADIN) 5 MG Tab [Pharmacy Med Name: Warfarin Sodium Oral Tablet 5 MG] 135 Tab 0     Sig: take 1 and 1/2 tablets by mouth daily or as directed by anticoagulation clinic

## 2020-12-03 ENCOUNTER — ANTICOAGULATION VISIT (OUTPATIENT)
Dept: MEDICAL GROUP | Facility: MEDICAL CENTER | Age: 70
End: 2020-12-03
Payer: MEDICARE

## 2020-12-03 DIAGNOSIS — Z98.890 HISTORY OF MITRAL VALVE REPAIR: ICD-10-CM

## 2020-12-03 DIAGNOSIS — Z79.01 CHRONIC ANTICOAGULATION: Primary | ICD-10-CM

## 2020-12-03 LAB — INR PPP: 2.4 (ref 2–3.5)

## 2020-12-03 PROCEDURE — 85610 PROTHROMBIN TIME: CPT | Performed by: INTERNAL MEDICINE

## 2020-12-03 PROCEDURE — 93793 ANTICOAG MGMT PT WARFARIN: CPT | Performed by: INTERNAL MEDICINE

## 2020-12-03 NOTE — PROGRESS NOTES
OP Anticoagulation Service Note    Date: 12/3/2020  There were no vitals filed for this visit.   pt declined vitals    Anticoagulation Summary  As of 12/3/2020    INR goal:  2.0-3.0   TTR:  49.9 % (3.7 y)   INR used for dosin.40 (12/3/2020)   Warfarin maintenance plan:  7.5 mg (5 mg x 1.5) every Sun, Tue, Thu; 5 mg (5 mg x 1) all other days   Weekly warfarin total:  42.5 mg   Plan last modified:  Sakshi Mcconnell, PharmD (2020)   Next INR check:  2021   Target end date:  Indefinite    Indications    Atrial fibrillation (HCC) [I48.91]  History of mitral valve repair [Z98.890]             Anticoagulation Episode Summary     INR check location:      Preferred lab:      Send INR reminders to:      Comments:        Anticoagulation Care Providers     Provider Role Specialty Phone number    eZferino Almaraz A.P.N. Referring [facility] Medicine [phone]    [facility] Anticoagulation Services Responsible  [phone]    Kenneth Salas, PharmD Responsible          Anticoagulation Patient Findings      HPI:   Salma Lees seen in clinic today, on anticoagulation therapy with warfarin (a high risk medication) for atrial fibrillati s/p MV repair      Pt is here today to evaluate anticoagulation therapy    Previous INR was  2.2 on 10-    Pt was instructed to continue current regimen    Confirmed warfarin dosing regimen, denies missed or extra doses of coumadin.   Diet has been consistent with foods rich in vitamin K: Yes  Changes in ETOH:  No  Changes in smoking status: No  Changes in medication: No   Cost restriction: No  S/s of bleeding:  No  Falls or accidents since last visit No  Signs/symptoms  thrombosis since the last appt: No    A/P   INR  therapeutic today,   Continue current warfarin regimen           check referral    Pt educated to contact our clinic with any changes in medications or s/s of bleeding or thrombosis. Pt is aware to seek immediate medical attention for falls, head injury or  deep cuts    Follow up appointment in 6 week(s) to reduce risk of adverse events from warfarin   Sakshi Mcconnell, PharmD

## 2020-12-09 NOTE — PROGRESS NOTES
Pt called stating that she has an appt sched for 12/22, for a pulmonary function test and 12/23 with the Pulmonologist and wanted to know if she will have to pay a copay for each appt?/ I adv that that it is correct, because are 2 different visits. Pt requested to be transfer to specialties to reschedule it.

## 2020-12-19 ENCOUNTER — PATIENT OUTREACH (OUTPATIENT)
Dept: HEALTH INFORMATION MANAGEMENT | Facility: OTHER | Age: 70
End: 2020-12-19

## 2020-12-19 NOTE — PROGRESS NOTES
Called patient to schedule overdue mammogram.     Outcome:   Left Message Please transfer to Patient Outreach Team at [phone] when patient returns call.     Attempt # 1  -aep

## 2020-12-22 ENCOUNTER — HOSPITAL ENCOUNTER (OUTPATIENT)
Dept: PULMONOLOGY | Facility: MEDICAL CENTER | Age: 70
End: 2020-12-22
Attending: PHYSICIAN ASSISTANT
Payer: MEDICARE

## 2020-12-22 PROCEDURE — 94729 DIFFUSING CAPACITY: CPT

## 2020-12-22 PROCEDURE — 94726 PLETHYSMOGRAPHY LUNG VOLUMES: CPT

## 2020-12-22 PROCEDURE — 94060 EVALUATION OF WHEEZING: CPT

## 2020-12-22 RX ORDER — ALBUTEROL SULFATE 90 UG/1
2 AEROSOL, METERED RESPIRATORY (INHALATION)
Status: DISCONTINUED | OUTPATIENT
Start: 2020-12-22 | End: 2020-12-23 | Stop reason: HOSPADM

## 2020-12-22 ASSESSMENT — PULMONARY FUNCTION TESTS
FEV1_LLN: 1.82
FEV1_PERCENT_CHANGE: -6
FEV1_PERCENT_CHANGE: -6
FVC_LLN: 2.35
FVC: 2.26
FVC_PREDICTED: 2.82
FEV1/FVC_PERCENT_LLN: 65.14
FEV1/FVC: 62.56
FVC: 2.11
FEV1/FVC_PERCENT_PREDICTED: 80
FEV1/FVC_PERCENT_PREDICTED: 81
FEV1/FVC: 62.65
FVC_PERCENT_PREDICTED: 74
FEV1/FVC_PERCENT_PREDICTED: 77
FEV1: 1.32
FVC_LLN: 2.35
FEV1_PREDICTED: 2.18
FVC_PERCENT_PREDICTED: 80
FEV1/FVC: 62.65
FEV1/FVC_PERCENT_PREDICTED: 80
FEV1/FVC_PERCENT_LLN: 65.14
FEV1/FVC_PERCENT_PREDICTED: 80
FEV1/FVC_PREDICTED: 78.01
FEV1: 1.41
FEV1/FVC_PERCENT_CHANGE: 0
FEV1/FVC: 62
FEV1/FVC_PERCENT_CHANGE: 100
FEV1_PERCENT_PREDICTED: 64
FEV1_LLN: 1.82
FEV1_PERCENT_PREDICTED: 60

## 2020-12-23 ENCOUNTER — OFFICE VISIT (OUTPATIENT)
Dept: SLEEP MEDICINE | Facility: MEDICAL CENTER | Age: 70
End: 2020-12-23
Payer: MEDICARE

## 2020-12-23 VITALS
WEIGHT: 194 LBS | RESPIRATION RATE: 16 BRPM | HEIGHT: 64 IN | DIASTOLIC BLOOD PRESSURE: 72 MMHG | OXYGEN SATURATION: 96 % | BODY MASS INDEX: 33.12 KG/M2 | SYSTOLIC BLOOD PRESSURE: 120 MMHG | HEART RATE: 61 BPM

## 2020-12-23 DIAGNOSIS — J44.9 CHRONIC OBSTRUCTIVE PULMONARY DISEASE, UNSPECIFIED COPD TYPE (HCC): ICD-10-CM

## 2020-12-23 DIAGNOSIS — Z99.81 CHRONIC RESPIRATORY FAILURE WITH HYPOXIA, ON HOME OXYGEN THERAPY (HCC): ICD-10-CM

## 2020-12-23 DIAGNOSIS — J96.11 CHRONIC RESPIRATORY FAILURE WITH HYPOXIA, ON HOME OXYGEN THERAPY (HCC): ICD-10-CM

## 2020-12-23 DIAGNOSIS — I27.20 PULMONARY HYPERTENSION (HCC): ICD-10-CM

## 2020-12-23 PROCEDURE — 99214 OFFICE O/P EST MOD 30 MIN: CPT | Performed by: PHYSICIAN ASSISTANT

## 2020-12-23 RX ORDER — LEVALBUTEROL TARTRATE 45 UG/1
2 AEROSOL, METERED ORAL EVERY 4 HOURS PRN
Qty: 1 EACH | Refills: 2 | Status: SHIPPED | OUTPATIENT
Start: 2020-12-23 | End: 2022-10-05 | Stop reason: SDUPTHER

## 2020-12-23 RX ORDER — LEVALBUTEROL TARTRATE 45 UG/1
2 AEROSOL, METERED ORAL EVERY 4 HOURS PRN
Qty: 1 EACH | Refills: 2 | Status: SHIPPED | OUTPATIENT
Start: 2020-12-23 | End: 2020-12-23

## 2020-12-23 ASSESSMENT — ENCOUNTER SYMPTOMS
HEADACHES: 0
COUGH: 1
INSOMNIA: 0
SORE THROAT: 0
HEARTBURN: 0
FEVER: 0
PALPITATIONS: 1
SPUTUM PRODUCTION: 1
CHILLS: 0
ORTHOPNEA: 0
WEIGHT LOSS: 0
SHORTNESS OF BREATH: 1
WHEEZING: 0
SINUS PAIN: 0
TREMORS: 0
DIZZINESS: 0

## 2020-12-23 ASSESSMENT — FIBROSIS 4 INDEX: FIB4 SCORE: 2.72

## 2020-12-23 NOTE — PATIENT INSTRUCTIONS
1-reviewed covid 19 precautions including social distancing, frequent handwashing, wearing mask in public, had flu shot  2-Anoro sample provided  3-reviewed PFT test  4-follow up in 6 months

## 2020-12-23 NOTE — PROGRESS NOTES
CC: Water in oxygen tubing due to humidifier filter broken    HPI:  Salma Lees is a 70 y.o. year old female here today for follow-up on COPD. Last seen in clinic 9/16/2019.  Patient has a brief remote smoking history with reported quit date in 1990 and 5 pack years.    Pertinent past medical history includes atrial fibrillation, hypertension, congestive heart failure, asthma, pacemaker, mitral valve repair, chronic kidney disease and pulmonary hypertension.    Reviewed in clinic vitals including blood pressure of 120/72, heart rate is 61, O2 sat of 96% on 2 L oxygen. Patient's body mass index is 33.3 kg/m². Exercise and nutrition counseling were performed at this visit.  Patient did previously participate in pulmonary rehab with stated benefit.    Reviewed home medication regimen including warfarin, Anoro, Xopenex.  Refills provided.    Reviewed most recent imaging including echocardiogram obtained 12/4/2019 demonstrating normal left ventricular chamber size, LVEF estimated at 50%, distal inferior wall hypokinesis, abnormal septal motion consistent with ventricular pacing, mitral valve repair with mild regurgitation, thickened mitral valve leaflets, mild to moderate mitral stenosis, moderate aortic insufficiency, moderate tricuspid regurgitation, RVSP estimated 75 mmHg, reduced right ventricular systolic function, pacer/ICD wire seen in right ventricle.  Increased RVSP as compared to prior study.    Chest x-ray obtained 11/12/2018 demonstrated left bipolar transvenous pacing device, median sternotomy and mitral valve replacement changes, normal heart size, no acute pulmonary disease.    Reviewed pulmonary function testing obtained 12/22/2020 as compared to prior testing 11/12/2018 demonstrating FEV1 of 1.41 L or 64% improvement was 55% previously, FVC of 2.26 L or 80% predicted was 67% prior, FEV1/FVC ratio of 63, no significant postbronchodilator change, residual volume 161% predicted was 128, %  "predicted was 101, DLCO 57 was 71.      Review of Systems   Constitutional: Negative for chills, fever, malaise/fatigue and weight loss.   HENT: Positive for congestion. Negative for hearing loss, nosebleeds, sinus pain, sore throat and tinnitus.    Respiratory: Positive for cough, sputum production (rare) and shortness of breath (walking up stairs). Negative for wheezing.    Cardiovascular: Positive for palpitations (a fib if oxygen levels low). Negative for chest pain, orthopnea and leg swelling.   Gastrointestinal: Negative for heartburn.        Upper and lower but not wearing, no difficulty swallowing    Neurological: Negative for dizziness, tremors and headaches.   Psychiatric/Behavioral: The patient does not have insomnia.        Past Medical History:   Diagnosis Date   • A-fib ([facility])    • Asthma    • Breath shortness 09/07/2017    uses oxygen at 3 liters/min cont. with [facility]   • Calculus of ureter 9/18/2017   • Congestive heart failure ([facility])    • COPD (chronic obstructive pulmonary disease) ([facility])    • Diabetes ([facility])    • Hypertension    • Infectious disease 09/07/2017    UTI/hx. MRSA   • Pacemaker 2014   • Renal disorder 09/07/2017    has stent   • Shortness of breath 10/18/2017       Past Surgical History:   Procedure Laterality Date   • URETEROSCOPY Left 9/18/2017    Procedure: URETEROSCOPY;  Surgeon: Edgardo Richter M.D.;  Location: [facility];  Service: Urology   • LASERTRIPSY Left 9/18/2017    Procedure: LASERTRIPSY LITHO  ;  Surgeon: Edgardo Richter M.D.;  Location: [facility];  Service: Urology   • STENT REMOVAL Left 9/18/2017    Procedure: STENT REMOVAL;  Surgeon: Edgardo Richter M.D.;  Location: [facility];  Service: Urology   • CYSTOSCOPY STENT PLACEMENT Left 6/25/2017    Procedure: CYSTOSCOPY, LEFT URETERAL STENT PLACEMENT;  Surgeon: Edgardo Richter M.D.;  Location: [facility];  Service:    • PACEMAKER " INSERTION     • KNEE REPLACEMENT, TOTAL Left    • MITRAL VALVE REPAIR         Family History   Problem Relation Age of Onset   • Lung Disease Mother         asthma   • Heart Disease Father        Social History     Socioeconomic History   • Marital status:      Spouse name: Not on file   • Number of children: Not on file   • Years of education: Not on file   • Highest education level: Not on file   Occupational History   • Not on file   Social Needs   • Financial resource strain: Not on file   • Food insecurity     Worry: Not on file     Inability: Not on file   • Transportation needs     Medical: Not on file     Non-medical: Not on file   Tobacco Use   • Smoking status: Former Smoker     Packs/day: 1.00     Years: 5.00     Pack years: 5.00     Types: Cigarettes     Quit date: 1990     Years since quittin.9   • Smokeless tobacco: Never Used   • Tobacco comment: Continued abstinence   Substance and Sexual Activity   • Alcohol use: No     Alcohol/week: 0.0 oz   • Drug use: No   • Sexual activity: Not Currently   Lifestyle   • Physical activity     Days per week: Not on file     Minutes per session: Not on file   • Stress: Not on file   Relationships   • Social connections     Talks on phone: Not on file     Gets together: Not on file     Attends [employer] service: Not on file     Active member of club or organization: Not on file     Attends meetings of clubs or organizations: Not on file     Relationship status: Not on file   • Intimate partner violence     Fear of current or ex partner: Not on file     Emotionally abused: Not on file     Physically abused: Not on file     Forced sexual activity: Not on file   Other Topics Concern   • Not on file   Social History Narrative    She has a son. She had previously experience in [employer].       Allergies as of 2020 - Reviewed 2020   Allergen Reaction Noted   • Pcn [penicillins] Swelling 2017   • Amlodipine besylate-valsartan  "Unspecified 02/13/2017   • Amoxicillin Anaphylaxis and Shortness of Breath 02/13/2017   • Etodolac Hives and Nausea 02/13/2017   • Food Hives 09/07/2017   • Lisinopril Unspecified 02/13/2017   • Other drug Rash 02/13/2017   • Other misc  09/18/2017   • Tape  09/18/2017   • Eliquis [apixaban] Shortness of Breath 01/13/2020        @Vital signs for this encounter:  Vitals:    12/23/20 1242   Height: 1.626 m (5' 4\")   Weight: 88 kg (194 lb)   Weight % change since last entry.: 0 %   BP: 120/72   Pulse: 61   BMI (Calculated): 33.3   Resp: 16       Current medications as of today   Current Outpatient Medications   Medication Sig Dispense Refill   • warfarin (COUMADIN) 5 MG Tab take 1 and 1/2 tablets by mouth daily or as directed by anticoagulation clinic 135 Tab 0   • potassium chloride SA (KDUR) 20 MEQ Tab CR TAKE ONE TABLET BY MOUTH ONCE DAILY 100 Tab 3   • umeclidinium-vilanterol (ANORO ELLIPTA) 62.5-25 MCG/INH AEROSOL POWDER, BREATH ACTIVATED inhaler Inhale 1 Puff by mouth every day. 1 Each 0   • Semaglutide,0.25 or 0.5MG/DOS, 2 MG/1.5ML Solution Pen-injector Inject 0.5 mg as instructed every 7 days. 0.5 mg for 4 weeks 1 PEN 3   • levalbuterol (XOPENEX HFA) 45 MCG/ACT inhaler inhale 2 puffs by mouth every 4 hours as needed for shortness of breath. 1 Inhaler 11   • spironolactone (ALDACTONE) 25 MG Tab Take 25 mg by mouth every day.     • atorvastatin (LIPITOR) 20 MG Tab Take 20 mg by mouth every evening.     • carvedilol (COREG) 25 MG Tab Take 25 mg by mouth 2 times a day.     • furosemide (LASIX) 20 MG Tab Take 1 Tab by mouth every day. 90 Tab 1   • diltiazem (CARDIZEM) 30 MG Tab Take 30 mg by mouth 3 times a day.     • digoxin (LANOXIN) 125 MCG Tab Take 125 mcg by mouth every day.       No current facility-administered medications for this visit.          Physical Exam:   Gen:           Alert and oriented, No apparent distress. Mood and affect appropriate, normal interaction with provider.  Eyes:          sclere " white, conjunctive moist.  Hearing:     Grossly intact.  Dentition:    Edentulous, does not tolerate wearing her dentures  Oropharynx:   Tongue normal, posterior pharynx without erythema or exudate.  Neck:        Supple, trachea midline, no masses.  Respiratory Effort: No intercostal retractions or use of accessory muscles.   Lung Auscultation:      Clear to auscultation bilaterally; no rales, rhonchi or wheezing.  CV:            Regular rate and rhythm. No edema. No murmurs, rubs or gallops.  Digits, Nails, Ext: No clubbing, cyanosis, petechiae, or nodes.   Skin:        No rashes, lesions or ulcers noted on exposed skin surfaces.                     Assessment:  1. Chronic obstructive pulmonary disease, unspecified COPD type (HCC)  umeclidinium-vilanterol (ANORO ELLIPTA) 62.5-25 MCG/INH AEROSOL POWDER, BREATH ACTIVATED inhaler    umeclidinium-vilanterol (ANORO ELLIPTA) 62.5-25 MCG/INH AEROSOL POWDER, BREATH ACTIVATED inhaler    levalbuterol (XOPENEX HFA) 45 MCG/ACT inhaler   2. Chronic respiratory failure with hypoxia, on home oxygen therapy (HCC)     3. Pulmonary hypertension (HCC)     4.       BMI 33.0-33.9    Immunizations:    Flu: 9/21/2020  Pneumovax 23: 9/16/2019  Prevnar 13: 10/6/2016    Plan:    70 y.o. year old female here today for follow-up on COPD. Last seen in clinic 9/16/2019.  Patient has a brief remote smoking history with reported quit date in 1990 and 5 pack years.    Pertinent past medical history includes atrial fibrillation, hypertension, congestive heart failure, asthma, pacemaker, mitral valve repair, chronic kidney disease and pulmonary hypertension.    Reviewed in clinic vitals including blood pressure of 120/72, heart rate is 61, O2 sat of 96% on 2 L oxygen. Patient's body mass index is 33.3 kg/m².     Chronic respiratory failure with hypoxia: Patient is on O2 at 2 L ATC, reports increase in palpitations if oxygen saturation drops.  Continue to monitor.    Elevated BMI:  Exercise and  nutrition counseling were performed at this visit.  Patient did previously participate in pulmonary rehab with stated benefit.  Does increase her oxygen to 3 L with any significant exertion.    Pulmonary hypertension: Patient is on O2, has pacer, also followed by cardiology.    Reviewed home medication regimen including warfarin, Anoro, Xopenex.    COPD: Reviewed pulmonary function testing.  Refills provided.  Sample Anoro provided.  Benefit with Xopenex when requires.    Reviewed most recent imaging including echocardiogram obtained 12/4/2019 demonstrating normal left ventricular chamber size, LVEF estimated at 50%, distal inferior wall hypokinesis, abnormal septal motion consistent with ventricular pacing, mitral valve repair with mild regurgitation, thickened mitral valve leaflets, mild to moderate mitral stenosis, moderate aortic insufficiency, moderate tricuspid regurgitation, RVSP estimated 75 mmHg, reduced right ventricular systolic function, pacer/ICD wire seen in right ventricle.  Increased RVSP as compared to prior study.    Chest x-ray obtained 11/12/2018 demonstrated left bipolar transvenous pacing device, median sternotomy and mitral valve replacement changes, normal heart size, no acute pulmonary disease.    Reviewed pulmonary function testing obtained 12/22/2020 as compared to prior testing 11/12/2018 demonstrating FEV1 of 1.41 L or 64% improvement was 55% previously, FVC of 2.26 L or 80% predicted was 67% prior, FEV1/FVC ratio of 63, no significant postbronchodilator change, residual volume 161% predicted was 128, % predicted was 101, DLCO 57 was 71.    Reviewed COVID-19 precautions including wearing mask in public, social distancing, frequent handwashing, has had 2020 flu shot.    Follow-up in 6 months, sooner if needed.    This dictation was created using voice recognition software. The accuracy of the dictation is limited to the abilities of the software. I expect there may be some errors  of grammar and possibly content.

## 2020-12-28 NOTE — PROCEDURES
DATE OF SERVICE:  12/22/2020     PULMONARY FUNCTION TEST INTERPRETATION     REQUESTING PROVIDER:  Peggy Shannon PA-C     REASON FOR REQUEST:  COPD.     INTERPRETATION:  1.  Acceptable and reproducible.  2.  FEV1 1.41 liters (64%), FVC 2.26 liters (80%), ratio 62%.  3.  Flow volume loop is consistent with peripheral airway obstruction.  4.  TLC 5.77 liters (114% ).  5.  DLCO 11.17 mL/min/mmHg (57%).     IMPRESSION:  Moderate obstruction with no response to bronchodilator, evidence   of air trapping and moderate reduction in gas transfer, all consistent with   underlying chronic obstructive pulmonary disease.        ______________________________  MD STUART Dumont/ALEXANDRA    DD:  12/27/2020 20:50  DT:  12/27/2020 21:52    Job#:  [id number]    CC:Peggy Shannon PA-C

## 2021-01-04 PROCEDURE — 94726 PLETHYSMOGRAPHY LUNG VOLUMES: CPT | Mod: 26 | Performed by: INTERNAL MEDICINE

## 2021-01-04 PROCEDURE — 94729 DIFFUSING CAPACITY: CPT | Mod: 26 | Performed by: INTERNAL MEDICINE

## 2021-01-04 PROCEDURE — 94060 EVALUATION OF WHEEZING: CPT | Mod: 26 | Performed by: INTERNAL MEDICINE

## 2021-01-13 ASSESSMENT — ENCOUNTER SYMPTOMS
DIARRHEA: 0
SHORTNESS OF BREATH: 0
NAUSEA: 0
WEAKNESS: 0
VOMITING: 0
PALPITATIONS: 0
BLURRED VISION: 0
FEVER: 0
CONSTIPATION: 0
CHILLS: 0
DEPRESSION: 0

## 2021-01-13 NOTE — PROGRESS NOTES
History of Present Illness  70 year old female presents to clinic to establish care.  She has a history of atrial fibrillation, and subsequent anticoagulation.  She is taking digoxin, Coreg, and diltiazem for this.  She does not check her blood pressures on a regular basis.  She does intermittently get palpitations, but notes these only come when she is short of breath. She is using Coumadin, and denies any active bleeding.  She also has some atherosclerosis of the aorta for which she is taking atorvastatin.  She denies any side effects, no myalgias.  She has some chronic systolic heart failure, and alternates using Lasix and spironolactone for water weight.  She did have her mitral valve replaced, and is due for an echo in May.  She denies any chest pain currently.  She does follow with cardiology every 6 months    She also has COPD, pulmonary hypertension, and subsequent respiratory failure with hypoxia.  She does need to use 2 L of supplemental oxygen at all times.  She reports she only feels short of breath if she is bending down or carrying something heavy.  She does continue to use Ellipta daily and levalbuterol as needed which is only about 1-2 times per year.  She does follow with pulmonology.    She also has a history of diabetes type 2, for which she is taking semaglutide.  She does not check her blood glucose regularly at home.     She also has some chronic kidney disease stage III.  She does report that she had some sort of kidney blockage a couple years ago, and thinks that her CKD may be related to that.    She denies any other questions or concerns at this time.    ROS  Review of Systems   Constitutional: Negative for chills and fever.   HENT: Negative for hearing loss.    Eyes: Negative for blurred vision.   Respiratory: Negative for shortness of breath.    Cardiovascular: Negative for chest pain and palpitations.   Gastrointestinal: Negative for constipation, diarrhea, nausea and vomiting.    Genitourinary: Negative for dysuria and hematuria.   Skin: Negative for rash.   Neurological: Negative for weakness.   Psychiatric/Behavioral: Negative for depression.     Medications  Current Outpatient Medications   Medication Sig Dispense Refill   • Semaglutide,0.25 or 0.5MG/DOS, 2 MG/1.5ML Solution Pen-injector Inject 0.5 mg under the skin every 7 days. 0.5 mg for 4 weeks 1 Each 3   • warfarin (COUMADIN) 5 MG Tab take 1 and 1/2 tablets by mouth daily or as directed by anticoagulation clinic 135 Tab 0   • levalbuterol (XOPENEX HFA) 45 MCG/ACT inhaler Inhale 2 Puffs every four hours as needed. FOR SHORTNESS OF BREATH 1 Each 2   • potassium chloride SA (KDUR) 20 MEQ Tab CR TAKE ONE TABLET BY MOUTH ONCE DAILY 100 Tab 3   • umeclidinium-vilanterol (ANORO ELLIPTA) 62.5-25 MCG/INH AEROSOL POWDER, BREATH ACTIVATED inhaler Inhale 1 Puff by mouth every day. 1 Each 0   • spironolactone (ALDACTONE) 25 MG Tab Take 25 mg by mouth every day.     • atorvastatin (LIPITOR) 20 MG Tab Take 20 mg by mouth every evening.     • carvedilol (COREG) 25 MG Tab Take 25 mg by mouth 2 times a day.     • furosemide (LASIX) 20 MG Tab Take 1 Tab by mouth every day. 90 Tab 1   • diltiazem (CARDIZEM) 30 MG Tab Take 30 mg by mouth 3 times a day.     • digoxin (LANOXIN) 125 MCG Tab Take 125 mcg by mouth every day.       No current facility-administered medications for this visit.      Allergies  Allergies   Allergen Reactions   • Pcn [Penicillins] Swelling   • Amlodipine Besylate-Valsartan Unspecified     Chest pain and dizziness    • Amoxicillin Anaphylaxis and Shortness of Breath   • Etodolac Hives and Nausea     Dark stools   • Food Hives     Plums   • Lisinopril Unspecified     Dizziness    • Other Drug Rash     Metal silver   • Other Misc      Mold, dust, and feathers, adhesives, plums   • Tape      Paper tape ok   • Eliquis [Apixaban] Shortness of Breath     Pt reports SOB and dizzy when she took       Problem List  Patient Active Problem  "List   Diagnosis   • History of mitral valve repair   • Chronic obstructive pulmonary disease (HCC)   • Atrial fibrillation (HCC)   • Type 2 diabetes mellitus without complication (HCC)   • Anticoagulated on warfarin   • Normocytic anemia   • BMI 35.0-35.9,adult   • Chronic respiratory failure with hypoxia, on home oxygen therapy (HCC)   • Chronic systolic congestive heart failure (HCC)   • Atherosclerosis of aorta (HCC)   • Pulmonary hypertension ([facility])   • CKD (chronic kidney disease) stage 3, GFR 30-59 ml/min     Past Medical History  Past Medical History:   Diagnosis Date   • A-fib ([facility])    • Asthma    • Breath shortness 09/07/2017    uses oxygen at 3 liters/min cont. with [facility]   • Calculus of ureter 9/18/2017   • Congestive heart failure (HCC)    • COPD (chronic obstructive pulmonary disease) ([facility])    • Diabetes ([facility])    • Hypertension    • Infectious disease 09/07/2017    UTI/hx. MRSA   • Pacemaker 2014   • Renal disorder 09/07/2017    has stent   • Shortness of breath 10/18/2017     Past Surgical History  Past Surgical History:   Procedure Laterality Date   • URETEROSCOPY Left 9/18/2017    Procedure: URETEROSCOPY;  Surgeon: Edgardo Richter M.D.;  Location: [facility];  Service: Urology   • LASERTRIPSY Left 9/18/2017    Procedure: LASERTRIPSY LITHO  ;  Surgeon: Edgardo Richter M.D.;  Location: [facility];  Service: Urology   • STENT REMOVAL Left 9/18/2017    Procedure: STENT REMOVAL;  Surgeon: Edgardo Richter M.D.;  Location: [facility];  Service: Urology   • CYSTOSCOPY STENT PLACEMENT Left 6/25/2017    Procedure: CYSTOSCOPY, LEFT URETERAL STENT PLACEMENT;  Surgeon: Edgardo Richter M.D.;  Location: [facility];  Service:    • PACEMAKER INSERTION  2015   • KNEE REPLACEMENT, TOTAL Left 2015   • MITRAL VALVE REPAIR  2009     Past Family History  Family History   Problem Relation Age of Onset   • Lung Disease Mother       " "  asthma   • Cancer Mother         ovarian   • Heart Disease Father    • Stroke Father    • Thyroid Sister    • Cancer Brother         pancreas   • Cancer Paternal Grandmother         colon   • Heart Disease Brother    • No Known Problems Brother    • No Known Problems Brother    • Heart Disease Son         afib     Social History  She reports eating a healthy and balanced diet, and gets regular exercise mostly through stretching. She is currently retired, but previously worked in commercial aviations. She drinks an average of 0-1 alcoholic beverages per week. She denies any current tobacco product or illicit drug use.  She is a former smoker, she quit in 1989, prior to that she smoked 1 pack of cigarettes daily for total of 5 years.  She is not currently sexually active.      Physical Exam  /60 (BP Location: Left arm, Patient Position: Sitting, BP Cuff Size: Large adult)   Pulse 79   Temp 36.6 °C (97.9 °F) (Temporal)   Resp 16   Ht 1.626 m (5' 4\")   Wt 86.2 kg (190 lb)   SpO2 94%   BMI 32.61 kg/m²   Physical Exam   Constitutional: She is well-developed, well-nourished, and in no distress. No distress.   HENT:   Head: Normocephalic and atraumatic.   Right Ear: Tympanic membrane, external ear and ear canal normal.   Left Ear: Tympanic membrane, external ear and ear canal normal.   Eyes: Pupils are equal, round, and reactive to light. Right eye exhibits no discharge. Left eye exhibits no discharge. No scleral icterus.   Neck: No thyromegaly present.   Cardiovascular: Normal rate, regular rhythm and normal heart sounds.   Pulmonary/Chest: Effort normal and breath sounds normal. No respiratory distress.   Abdominal: Soft. Bowel sounds are normal. She exhibits no distension. There is no abdominal tenderness.   Musculoskeletal:         General: No edema.   Neurological: She is alert.   Skin: Skin is warm and dry. She is not diaphoretic.   Psychiatric: Affect and judgment normal.   Monofilament testing with a " 10 gram force: sensation: intact bilaterally  Visual Inspection: Feet without maceration, ulcers, or fissures.  Pedal pulses: intact bilaterally    Assessment & Plan  1. Paroxysmal atrial fibrillation (HCC)  2. Anticoagulated on warfarin  3. Atherosclerosis of aorta ([facility])  4. Chronic systolic congestive heart failure (HCC)  5. History of mitral valve repair  His problems are new to me, however are chronic and stable.  We will continue the same medication regime.  She continues to follow with cardiology.  I will get updated labs.  I have placed an order for her echo which is due in May.  - Lipid Profile; Future  - Comp Metabolic Panel; Future  - EC-ECHOCARDIOGRAM COMPLETE W/ CONT; Future    6. Chronic obstructive pulmonary disease, unspecified COPD type ([facility])  7. Chronic respiratory failure with hypoxia, on home oxygen therapy ([facility])  8. Pulmonary hypertension ([facility])  These problems are new to me, however are chronic and stable.  We will continue with the same medications, as well as 2 L of supplemental oxygen.  She continues to follow with pulmonology.    9. Type 2 diabetes mellitus without complication, without long-term current use of insulin ([facility])  This problem is new to me, however is chronic.  She has not had a hemoglobin A1c checked for quite some time, I will get this updated, as well as a urine microalbuminuria.  Diabetic foot exam was done and normal today.  She is up-to-date on her eye exam.  Refill has been sent for semaglutide.  - HEMOGLOBIN A1C; Future  - Diabetic Monofilament Lower Extremity Exam  - MICROALBUMIN CREAT RATIO URINE (LAB COLLECT); Future  - Semaglutide,0.25 or 0.5MG/DOS, 2 MG/1.5ML Solution Pen-injector; Inject 0.5 mg under the skin every 7 days. 0.5 mg for 4 weeks  Dispense: 1 Each; Refill: 3    10. Stage 3a chronic kidney disease  This problem is new to me, however is chronic and stable.  The patient's labs were abnormal for 2 years in a row, which makes me think it is less likely related  to a blockage.  We will get updated labs.  - Comp Metabolic Panel; Future    11. Encounter for screening mammogram for malignant neoplasm of breast  Mammogram is due, order has been placed  - MA-SCREENING MAMMO BILAT W/CAD; Future    12. BMI 35.0-35.9,adult  We did review the recommended amount of weekly exercise.  Although stretching is very beneficial, does need to be done in combination with cardio and weightlifting exercises.  We also reviewed healthy diet.    13. Colon cancer screening  Colon cancer screening is due, order has been placed.  - REFERRAL TO GI FOR COLONOSCOPY    14. Need for hepatitis C screening test  One-time hepatitis C screening is needed, order has been placed.  - HCV Scrn ( [id number] [employer]); Future      Return in about 6 months (around 2021) for Chronic disease management.    Ivy Sparks M.D.

## 2021-01-15 DIAGNOSIS — Z23 NEED FOR VACCINATION: ICD-10-CM

## 2021-01-16 ENCOUNTER — PATIENT MESSAGE (OUTPATIENT)
Dept: SLEEP MEDICINE | Facility: MEDICAL CENTER | Age: 71
End: 2021-01-16

## 2021-01-18 ENCOUNTER — ANTICOAGULATION VISIT (OUTPATIENT)
Dept: MEDICAL GROUP | Facility: MEDICAL CENTER | Age: 71
End: 2021-01-18
Payer: MEDICARE

## 2021-01-18 ENCOUNTER — OFFICE VISIT (OUTPATIENT)
Dept: MEDICAL GROUP | Facility: MEDICAL CENTER | Age: 71
End: 2021-01-18
Payer: MEDICARE

## 2021-01-18 VITALS
RESPIRATION RATE: 16 BRPM | HEIGHT: 64 IN | DIASTOLIC BLOOD PRESSURE: 60 MMHG | SYSTOLIC BLOOD PRESSURE: 100 MMHG | OXYGEN SATURATION: 94 % | BODY MASS INDEX: 32.44 KG/M2 | HEART RATE: 79 BPM | TEMPERATURE: 97.9 F | WEIGHT: 190 LBS

## 2021-01-18 DIAGNOSIS — E11.9 TYPE 2 DIABETES MELLITUS WITHOUT COMPLICATION, WITHOUT LONG-TERM CURRENT USE OF INSULIN (HCC): ICD-10-CM

## 2021-01-18 DIAGNOSIS — J96.11 CHRONIC RESPIRATORY FAILURE WITH HYPOXIA, ON HOME OXYGEN THERAPY (HCC): ICD-10-CM

## 2021-01-18 DIAGNOSIS — Z11.59 NEED FOR HEPATITIS C SCREENING TEST: ICD-10-CM

## 2021-01-18 DIAGNOSIS — I70.0 ATHEROSCLEROSIS OF AORTA (HCC): ICD-10-CM

## 2021-01-18 DIAGNOSIS — Z98.890 HISTORY OF MITRAL VALVE REPAIR: ICD-10-CM

## 2021-01-18 DIAGNOSIS — N18.31 STAGE 3A CHRONIC KIDNEY DISEASE: ICD-10-CM

## 2021-01-18 DIAGNOSIS — I27.20 PULMONARY HYPERTENSION (HCC): ICD-10-CM

## 2021-01-18 DIAGNOSIS — Z99.81 CHRONIC RESPIRATORY FAILURE WITH HYPOXIA, ON HOME OXYGEN THERAPY (HCC): ICD-10-CM

## 2021-01-18 DIAGNOSIS — I50.22 CHRONIC SYSTOLIC CONGESTIVE HEART FAILURE (HCC): ICD-10-CM

## 2021-01-18 DIAGNOSIS — I48.0 PAROXYSMAL ATRIAL FIBRILLATION (HCC): ICD-10-CM

## 2021-01-18 DIAGNOSIS — J44.9 CHRONIC OBSTRUCTIVE PULMONARY DISEASE, UNSPECIFIED COPD TYPE (HCC): ICD-10-CM

## 2021-01-18 DIAGNOSIS — Z79.01 ANTICOAGULATED ON WARFARIN: ICD-10-CM

## 2021-01-18 DIAGNOSIS — Z12.11 COLON CANCER SCREENING: ICD-10-CM

## 2021-01-18 DIAGNOSIS — Z12.31 ENCOUNTER FOR SCREENING MAMMOGRAM FOR MALIGNANT NEOPLASM OF BREAST: ICD-10-CM

## 2021-01-18 PROBLEM — N20.0 CALCULUS OF KIDNEY: Status: RESOLVED | Noted: 2017-07-10 | Resolved: 2021-01-18

## 2021-01-18 PROBLEM — M25.561 RIGHT KNEE PAIN: Status: RESOLVED | Noted: 2017-02-13 | Resolved: 2021-01-18

## 2021-01-18 LAB — INR PPP: 6.2 (ref 2–3.5)

## 2021-01-18 PROCEDURE — 99214 OFFICE O/P EST MOD 30 MIN: CPT | Performed by: FAMILY MEDICINE

## 2021-01-18 PROCEDURE — 99999 PR NO CHARGE: CPT | Performed by: INTERNAL MEDICINE

## 2021-01-18 PROCEDURE — 85610 PROTHROMBIN TIME: CPT | Performed by: INTERNAL MEDICINE

## 2021-01-18 ASSESSMENT — PATIENT HEALTH QUESTIONNAIRE - PHQ9: CLINICAL INTERPRETATION OF PHQ2 SCORE: 0

## 2021-01-18 ASSESSMENT — FIBROSIS 4 INDEX: FIB4 SCORE: 2.72

## 2021-01-18 NOTE — PROGRESS NOTES
OP Anticoagulation Service Note    Date: 2021  There were no vitals filed for this visit.   pt declined vitals    Anticoagulation Summary  As of 2021    INR goal:  2.0-3.0   TTR:  48.8 % (3.8 y)   INR used for dosin.20 (2021)   Warfarin maintenance plan:  7.5 mg (5 mg x 1.5) every Sun, Tue, Thu; 5 mg (5 mg x 1) all other days   Weekly warfarin total:  42.5 mg   Plan last modified:  Sakshi Mcconnell, PharmD (2020)   Next INR check:  2021   Target end date:  Indefinite    Indications    Atrial fibrillation (HCC) [I48.91]  History of mitral valve repair [Z98.890]             Anticoagulation Episode Summary     INR check location:      Preferred lab:      Send INR reminders to:      Comments:        Anticoagulation Care Providers     Provider Role Specialty Phone number    Zeferino Almaraz, A.P.N. Referring Family Medicine [phone]    [facility] Anticoagulation Services Responsible  [phone]    Kenneth Salas, PharmD Responsible          Anticoagulation Patient Findings  Patient Findings     Positives:  Extra doses (pt might have gotten confused between warfarin and carvedilol tabs; she taken 10 mg a couple times inadvertently last week), Change in diet/appetite (skipped salads)    Negatives:  Signs/symptoms of thrombosis, Signs/symptoms of bleeding, Laboratory test error suspected, Change in health, Change in alcohol use, Change in activity, Upcoming invasive procedure, Emergency department visit, Upcoming dental procedure, Missed doses, Change in medications, Hospital admission, Bruising, Other complaints          HPI:   Salma Hdz Nabeel seen in clinic today, on anticoagulation therapy with warfarin (a high risk medication) for atrial fibrillation    Pt is here today to evaluate anticoagulation therapy    Previous INR was  2.4 on 12/3/2020    Pt was instructed to continue regimen    Confirmed warfarin dosing regimen, denies missed doses of coumadin.   Diet has been consistent with  foods rich in vitamin K: No  Changes in ETOH:  No  Changes in smoking status: No  Changes in medication: No   Cost restriction: No  S/s of bleeding:  No  Falls or accidents since last visit No  Signs/symptoms  thrombosis since the last appt: No  =    A/P   INR  supra-therapeutic today, will require dose adjust ment today to prevent bleeding complications  and closer follow up.        Hold x 2 days then continue regimen    Advised pt to separate warfarin tabs from other medications so that she doesn't get confused. Also advised pt that local pharmacies should have pill identifier resources to help her distinguish which pill is which.    02/21 check referral    Pt educated to contact our clinic with any changes in medications or s/s of bleeding or thrombosis. Pt is aware to seek immediate medical attention for falls, head injury or deep cuts    Follow up appointment in 1 week(s) to reduce risk of adverse events from warfarin    Kaylin Fiore, JoleenD

## 2021-01-25 ENCOUNTER — APPOINTMENT (OUTPATIENT)
Dept: MEDICAL GROUP | Facility: MEDICAL CENTER | Age: 71
End: 2021-01-25
Payer: MEDICARE

## 2021-01-30 DIAGNOSIS — J44.9 CHRONIC OBSTRUCTIVE PULMONARY DISEASE, UNSPECIFIED COPD TYPE (HCC): ICD-10-CM

## 2021-02-01 NOTE — TELEPHONE ENCOUNTER
Have we ever prescribed this med? Yes.  If yes, what date? 08/24/20    Last OV: 12/23/20 with Peggy Shannon PA-C    Next OV: 06/30/21 with Peggy Shannon PA-C    DX: Chronic obstructive pulmonary disease, unspecified COPD type (HCC) (J44.9)    Medications:   Requested Prescriptions     Pending Prescriptions Disp Refills   • ANORO ELLIPTA 62.5-25 MCG/INH AEROSOL POWDER, BREATH ACTIVATED inhaler [Pharmacy Med Name: ANORO ELLIPTA 62.5-25 ORAL INH(30S)] 60 Each      Sig: INHALE 1 PUFF EVERY DAY AS DIRECTED

## 2021-02-08 ENCOUNTER — ANTICOAGULATION VISIT (OUTPATIENT)
Dept: MEDICAL GROUP | Facility: MEDICAL CENTER | Age: 71
End: 2021-02-08
Payer: MEDICARE

## 2021-02-08 DIAGNOSIS — Z98.890 HISTORY OF MITRAL VALVE REPAIR: ICD-10-CM

## 2021-02-08 DIAGNOSIS — Z79.01 CHRONIC ANTICOAGULATION: Primary | ICD-10-CM

## 2021-02-08 LAB — INR PPP: 2 (ref 2–3.5)

## 2021-02-08 PROCEDURE — 93793 ANTICOAG MGMT PT WARFARIN: CPT | Performed by: INTERNAL MEDICINE

## 2021-02-08 PROCEDURE — 85610 PROTHROMBIN TIME: CPT | Performed by: INTERNAL MEDICINE

## 2021-02-08 NOTE — PROGRESS NOTES
OP Anticoagulation Service Note    Date: 2021  There were no vitals filed for this visit.   pt declined vitals    Anticoagulation Summary  As of 2021    INR goal:  2.0-3.0   TTR:  48.4 % (3.9 y)   INR used for dosin.00 (2021)   Warfarin maintenance plan:  7.5 mg (5 mg x 1.5) every Sun, Tue, Thu; 5 mg (5 mg x 1) all other days   Weekly warfarin total:  42.5 mg   Plan last modified:  Sakshi Mcconnell, PharmD (2020)   Next INR check:  3/8/2021   Target end date:  Indefinite    Indications    Atrial fibrillation (HCC) [I48.91]  History of mitral valve repair [Z98.890]             Anticoagulation Episode Summary     INR check location:      Preferred lab:      Send INR reminders to:      Comments:        Anticoagulation Care Providers     Provider Role Specialty Phone number    Zeferino Almaraz A.P.N. Referring [facility] Medicine [phone]    [facility] Anticoagulation Services Responsible  [phone]    Kenneth Salas, PharmD Responsible          Anticoagulation Patient Findings      HPI:   Salma Lees seen in clinic today, on anticoagulation therapy with warfarin (a high risk medication) for h/o AF, s/p mitral valve repair    Pt is here today to evaluate anticoagulation therapy    Previous INR was  6.2 on 2021    Pt was instructed to HOLD warfarin for 2 days then resume scheduled dosing    Confirmed warfarin dosing regimen, denies missed or extra doses of coumadin.   Diet has been consistent with foods rich in vitamin K: Yes  Changes in ETOH:  No  Changes in smoking status: No  Changes in medication: No   Cost restriction: No  S/s of bleeding:  No  Falls or accidents since last visit No  Signs/symptoms  thrombosis since the last appt: No  =    A/P   INR  therapeutic today at 2.0, pt resumed proper dosing after the confusion of medications  Pt is to continue with current warfarin dosing regimen.       check referral    Pt educated to contact our clinic with any changes in medications  or s/s of bleeding or thrombosis. Pt is aware to seek immediate medical attention for falls, head injury or deep cuts    Follow up appointment in 4 week(s) per pt preference     Joseph Herrera, JoleenD

## 2021-02-10 ENCOUNTER — ANTICOAGULATION MONITORING (OUTPATIENT)
Dept: VASCULAR LAB | Facility: MEDICAL CENTER | Age: 71
End: 2021-02-10

## 2021-02-10 DIAGNOSIS — Z98.890 HISTORY OF MITRAL VALVE REPAIR: ICD-10-CM

## 2021-03-03 DIAGNOSIS — Z79.01 CHRONIC ANTICOAGULATION: ICD-10-CM

## 2021-03-04 RX ORDER — WARFARIN SODIUM 5 MG/1
TABLET ORAL
Qty: 135 TABLET | Refills: 3 | Status: SHIPPED | OUTPATIENT
Start: 2021-03-04 | End: 2022-03-01

## 2021-03-08 ENCOUNTER — ANTICOAGULATION VISIT (OUTPATIENT)
Dept: MEDICAL GROUP | Facility: MEDICAL CENTER | Age: 71
End: 2021-03-08
Payer: MEDICARE

## 2021-03-08 DIAGNOSIS — Z98.890 HISTORY OF MITRAL VALVE REPAIR: ICD-10-CM

## 2021-03-08 LAB — INR PPP: 2.1 (ref 2–3.5)

## 2021-03-08 PROCEDURE — 85610 PROTHROMBIN TIME: CPT | Performed by: INTERNAL MEDICINE

## 2021-03-08 PROCEDURE — 93793 ANTICOAG MGMT PT WARFARIN: CPT | Performed by: INTERNAL MEDICINE

## 2021-03-08 PROCEDURE — 99999 PR NO CHARGE: CPT | Performed by: INTERNAL MEDICINE

## 2021-03-08 NOTE — PROGRESS NOTES
OP Anticoagulation Service Note    Date: 3/8/2021  There were no vitals filed for this visit.   pt declined vitals    Anticoagulation Summary  As of 3/8/2021    INR goal:  2.0-3.0   TTR:  49.4 % (4 y)   INR used for dosin.10 (3/8/2021)   Warfarin maintenance plan:  7.5 mg (5 mg x 1.5) every Sun, Tue, Thu; 5 mg (5 mg x 1) all other days   Weekly warfarin total:  42.5 mg   Plan last modified:  Sakshi Mcconnell, PharmD (2020)   Next INR check:  2021   Target end date:  Indefinite    Indications    Atrial fibrillation (HCC) [I48.91]  History of mitral valve repair [Z98.890]             Anticoagulation Episode Summary     INR check location:      Preferred lab:      Send INR reminders to:      Comments:        Anticoagulation Care Providers     Provider Role Specialty Phone number    Zeferino Almaraz, A.P.N. Referring Family Medicine [phone]    [facility] Anticoagulation Services Responsible  [phone]    Kenneth Salas, PharmD Responsible          Anticoagulation Patient Findings  Patient Findings     Negatives:  Signs/symptoms of thrombosis, Signs/symptoms of bleeding, Laboratory test error suspected, Change in health, Change in alcohol use, Change in activity, Upcoming invasive procedure, Emergency department visit, Upcoming dental procedure, Missed doses, Extra doses, Change in medications, Change in diet/appetite, Hospital admission, Bruising, Other complaints          HPI:   Salma Lees seen in clinic today, on anticoagulation therapy with warfarin (a high risk medication) for atrial fibrillation    Pt is here today to evaluate anticoagulation therapy    Previous INR was  2.0 on 21    Pt was instructed to continue regimen    Confirmed warfarin dosing regimen, denies missed or extra doses of coumadin.   Diet has been consistent with foods rich in vitamin K: Yes  Changes in ETOH:  No  Changes in smoking status: No  Changes in medication: No   Cost restriction: No  S/s of bleeding:   No  Falls or accidents since last visit No  Signs/symptoms  thrombosis since the last appt: No      A/P   INR  -therapeutic today    Pt is to continue with current warfarin dosing regimen.    02/22 check referral    Pt educated to contact our clinic with any changes in medications or s/s of bleeding or thrombosis. Pt is aware to seek immediate medical attention for falls, head injury or deep cuts    Follow up appointment in 6 week(s) to reduce risk of adverse events from warfarin   Kaylin Fiore, JoleenD

## 2021-04-02 ENCOUNTER — PATIENT MESSAGE (OUTPATIENT)
Dept: HEALTH INFORMATION MANAGEMENT | Facility: OTHER | Age: 71
End: 2021-04-02

## 2021-04-14 ENCOUNTER — PATIENT OUTREACH (OUTPATIENT)
Dept: HEALTH INFORMATION MANAGEMENT | Facility: OTHER | Age: 71
End: 2021-04-14

## 2021-04-14 NOTE — PROGRESS NOTES
Called patient to schedule for CGA.     Outcome:   Left Message Please transfer to Patient Outreach Team at [phone] when patient returns call.     Attempt # [phone]  [id number]

## 2021-04-19 ENCOUNTER — ANTICOAGULATION VISIT (OUTPATIENT)
Dept: MEDICAL GROUP | Facility: MEDICAL CENTER | Age: 71
End: 2021-04-19
Payer: MEDICARE

## 2021-04-19 DIAGNOSIS — Z98.890 HISTORY OF MITRAL VALVE REPAIR: ICD-10-CM

## 2021-04-19 LAB — INR PPP: 3.3 (ref 2–3.5)

## 2021-04-19 PROCEDURE — 85610 PROTHROMBIN TIME: CPT | Performed by: INTERNAL MEDICINE

## 2021-04-19 PROCEDURE — 99211 OFF/OP EST MAY X REQ PHY/QHP: CPT | Performed by: INTERNAL MEDICINE

## 2021-04-19 NOTE — PROGRESS NOTES
OP Anticoagulation Service Note    Date: 4/19/2021  There were no vitals filed for this visit.   pt declined vitals    Anticoagulation Summary  As of 4/19/2021    INR goal:  2.0-3.0   TTR:  50.1 % (4.1 y)   INR used for dosing:  3.30 (4/19/2021)   Warfarin maintenance plan:  7.5 mg (5 mg x 1.5) every Sun, Tue, Thu; 5 mg (5 mg x 1) all other days   Weekly warfarin total:  42.5 mg   Plan last modified:  Sakshi Mcconnell, PharmD (8/6/2020)   Next INR check:  5/17/2021   Target end date:  Indefinite    Indications    Atrial fibrillation (HCC) [I48.91]  History of mitral valve repair [Z98.890]             Anticoagulation Episode Summary     INR check location:      Preferred lab:      Send INR reminders to:      Comments:        Anticoagulation Care Providers     Provider Role Specialty Phone number    Zeferino Almaraz A.P.N. Referring Family Medicine [phone]    [facility] Anticoagulation Services Responsible  [phone]    Kenneth Salas, PharmD Responsible          Anticoagulation Patient Findings  Patient Findings     Positives:  Change in diet/appetite (intaking less vit K)    Negatives:  Signs/symptoms of thrombosis, Signs/symptoms of bleeding, Laboratory test error suspected, Change in health, Change in alcohol use, Change in activity, Upcoming invasive procedure, Emergency department visit, Upcoming dental procedure, Missed doses, Extra doses, Change in medications, Hospital admission, Bruising, Other complaints          HPI:   Salma Lees seen in clinic today, on anticoagulation therapy with warfarin (a high risk medication) for atrial fib    Pt is here today to evaluate anticoagulation therapy    Previous INR was  2.1 on 3/8/21    Pt was instructed to continue regimen    Confirmed warfarin dosing regimen, denies missed or extra doses of coumadin.   Diet has been consistent with foods rich in vitamin K: No  Changes in ETOH:  No  Changes in smoking status: No  Changes in medication: No   Cost  restriction: No  S/s of bleeding:  No  Falls or accidents since last visit No  Signs/symptoms  thrombosis since the last appt: No      A/P   INR  supratherapeutic today, will require dose adjust ment today to prevent bleeding complications and closer follow up.     Hold x 1 day then continue regimen; pt to return to normal intake of vit K    02/22 check referral    Pt educated to contact our clinic with any changes in medications or s/s of bleeding or thrombosis. Pt is aware to seek immediate medical attention for falls, head injury or deep cuts    Follow up appointment in 4 week(s) to reduce risk of adverse events from warfarin   Kaylin Fiore, JoleenD

## 2021-05-17 ENCOUNTER — ANTICOAGULATION VISIT (OUTPATIENT)
Dept: MEDICAL GROUP | Facility: MEDICAL CENTER | Age: 71
End: 2021-05-17
Payer: MEDICARE

## 2021-05-17 DIAGNOSIS — Z98.890 HISTORY OF MITRAL VALVE REPAIR: ICD-10-CM

## 2021-05-17 LAB — INR PPP: 2.9 (ref 2–3.5)

## 2021-05-17 PROCEDURE — 93793 ANTICOAG MGMT PT WARFARIN: CPT | Performed by: INTERNAL MEDICINE

## 2021-05-17 PROCEDURE — 85610 PROTHROMBIN TIME: CPT | Performed by: INTERNAL MEDICINE

## 2021-06-02 ENCOUNTER — HOSPITAL ENCOUNTER (OUTPATIENT)
Dept: CARDIOLOGY | Facility: MEDICAL CENTER | Age: 71
End: 2021-06-02
Attending: FAMILY MEDICINE
Payer: MEDICARE

## 2021-06-02 ENCOUNTER — HOSPITAL ENCOUNTER (OUTPATIENT)
Dept: LAB | Facility: MEDICAL CENTER | Age: 71
End: 2021-06-02
Attending: FAMILY MEDICINE
Payer: MEDICARE

## 2021-06-02 DIAGNOSIS — I70.0 ATHEROSCLEROSIS OF AORTA (HCC): ICD-10-CM

## 2021-06-02 DIAGNOSIS — Z98.890 HISTORY OF MITRAL VALVE REPAIR: ICD-10-CM

## 2021-06-02 DIAGNOSIS — N18.31 STAGE 3A CHRONIC KIDNEY DISEASE: ICD-10-CM

## 2021-06-02 DIAGNOSIS — Z11.59 NEED FOR HEPATITIS C SCREENING TEST: ICD-10-CM

## 2021-06-02 DIAGNOSIS — E11.9 TYPE 2 DIABETES MELLITUS WITHOUT COMPLICATION, WITHOUT LONG-TERM CURRENT USE OF INSULIN (HCC): ICD-10-CM

## 2021-06-02 DIAGNOSIS — I48.0 PAROXYSMAL ATRIAL FIBRILLATION (HCC): ICD-10-CM

## 2021-06-02 LAB
ALBUMIN SERPL BCP-MCNC: 4 G/DL (ref 3.2–4.9)
ALBUMIN/GLOB SERPL: 1.1 G/DL
ALP SERPL-CCNC: 108 U/L (ref 30–99)
ALT SERPL-CCNC: 14 U/L (ref 2–50)
ANION GAP SERPL CALC-SCNC: 12 MMOL/L (ref 7–16)
AST SERPL-CCNC: 15 U/L (ref 12–45)
BILIRUB SERPL-MCNC: 1.2 MG/DL (ref 0.1–1.5)
BUN SERPL-MCNC: 37 MG/DL (ref 8–22)
CALCIUM SERPL-MCNC: 9.5 MG/DL (ref 8.4–10.2)
CHLORIDE SERPL-SCNC: 98 MMOL/L (ref 96–112)
CHOLEST SERPL-MCNC: 175 MG/DL (ref 100–199)
CO2 SERPL-SCNC: 24 MMOL/L (ref 20–33)
CREAT SERPL-MCNC: 1.53 MG/DL (ref 0.5–1.4)
EST. AVERAGE GLUCOSE BLD GHB EST-MCNC: 237 MG/DL
FASTING STATUS PATIENT QL REPORTED: NORMAL
GLOBULIN SER CALC-MCNC: 3.8 G/DL (ref 1.9–3.5)
GLUCOSE SERPL-MCNC: 411 MG/DL (ref 65–99)
HBA1C MFR BLD: 9.9 % (ref 4–5.6)
HCV AB SER QL: NORMAL
HDLC SERPL-MCNC: 41 MG/DL
LDLC SERPL CALC-MCNC: 78 MG/DL
LV EJECT FRACT  99904: 60
LV EJECT FRACT MOD 2C 99903: 65.6
LV EJECT FRACT MOD 4C 99902: 63.62
LV EJECT FRACT MOD BP 99901: 63.58
POTASSIUM SERPL-SCNC: 5.3 MMOL/L (ref 3.6–5.5)
PROT SERPL-MCNC: 7.8 G/DL (ref 6–8.2)
SODIUM SERPL-SCNC: 134 MMOL/L (ref 135–145)
TRIGL SERPL-MCNC: 279 MG/DL (ref 0–149)

## 2021-06-02 PROCEDURE — 83036 HEMOGLOBIN GLYCOSYLATED A1C: CPT

## 2021-06-02 PROCEDURE — 80053 COMPREHEN METABOLIC PANEL: CPT

## 2021-06-02 PROCEDURE — 93306 TTE W/DOPPLER COMPLETE: CPT

## 2021-06-02 PROCEDURE — 80061 LIPID PANEL: CPT

## 2021-06-02 PROCEDURE — 93306 TTE W/DOPPLER COMPLETE: CPT | Mod: 26 | Performed by: INTERNAL MEDICINE

## 2021-06-02 PROCEDURE — G0472 HEP C SCREEN HIGH RISK/OTHER: HCPCS

## 2021-06-02 PROCEDURE — 36415 COLL VENOUS BLD VENIPUNCTURE: CPT

## 2021-06-10 ENCOUNTER — DOCUMENTATION (OUTPATIENT)
Dept: MEDICAL GROUP | Facility: MEDICAL CENTER | Age: 71
End: 2021-06-10

## 2021-06-10 NOTE — PROGRESS NOTES
Received [employer] PAP form for Ozempic 1 mg from pt.  Submitted form to [employer] + sent form to be scanned via central scanning dept.    Joleen PulliamD

## 2021-06-11 ENCOUNTER — TELEPHONE (OUTPATIENT)
Dept: MEDICAL GROUP | Facility: MEDICAL CENTER | Age: 71
End: 2021-06-11

## 2021-06-11 NOTE — TELEPHONE ENCOUNTER
Left message for patient to call back regarding Annual Wellness Visit. Please transfer call to [facility] 120.  OK to schedule patient with any available provider in office before June 30th to complete the [employer] form.

## 2021-06-14 ENCOUNTER — HOSPITAL ENCOUNTER (OUTPATIENT)
Facility: MEDICAL CENTER | Age: 71
End: 2021-06-14
Attending: FAMILY MEDICINE
Payer: MEDICARE

## 2021-06-14 ENCOUNTER — ANTICOAGULATION VISIT (OUTPATIENT)
Dept: MEDICAL GROUP | Facility: MEDICAL CENTER | Age: 71
End: 2021-06-14
Payer: MEDICARE

## 2021-06-14 DIAGNOSIS — Z98.890 HISTORY OF MITRAL VALVE REPAIR: ICD-10-CM

## 2021-06-14 LAB — INR PPP: 2.5 (ref 2–3.5)

## 2021-06-14 PROCEDURE — 82274 ASSAY TEST FOR BLOOD FECAL: CPT

## 2021-06-14 PROCEDURE — 85610 PROTHROMBIN TIME: CPT | Performed by: INTERNAL MEDICINE

## 2021-06-14 PROCEDURE — 93793 ANTICOAG MGMT PT WARFARIN: CPT | Performed by: INTERNAL MEDICINE

## 2021-06-14 NOTE — PROGRESS NOTES
OP Anticoagulation Service Note    Date: 2021  There were no vitals filed for this visit.   pt declined vitals    Anticoagulation Summary  As of 2021    INR goal:  2.0-3.0   TTR:  50.6 % (4.2 y)   INR used for dosin.50 (2021)   Warfarin maintenance plan:  7.5 mg (5 mg x 1.5) every Sun, Tue, Thu; 5 mg (5 mg x 1) all other days   Weekly warfarin total:  42.5 mg   Plan last modified:  Sakshi Mcconnell, PharmD (2020)   Next INR check:  2021   Target end date:  Indefinite    Indications    Atrial fibrillation (HCC) [I48.91]  History of mitral valve repair [Z98.890]             Anticoagulation Episode Summary     INR check location:      Preferred lab:      Send INR reminders to:      Comments:        Anticoagulation Care Providers     Provider Role Specialty Phone number    Zeferino Almaraz A.P.N. Referring [facility] Medicine [phone]    [facility] Anticoagulation Services Responsible  [phone]    Kenneth Salas, PharmD Responsible          Anticoagulation Patient Findings      HPI:   Salma Lees seen in clinic today, on anticoagulation therapy with warfarin (a high risk medication) for atrial fibrillation and hx of MV repair    Pt is here today to evaluate anticoagulation therapy    Previous INR was  2.9 on 21    Pt was instructed to continue current regimen    Confirmed warfarin dosing regimen, denies missed or extra doses of coumadin.   Diet has been consistent with foods rich in vitamin K: Yes  Changes in ETOH:  No  Changes in smoking status: No  Changes in medication: No   Pt is not on antiplatelet/NSAID therapy  Cost restriction: No  S/s of bleeding:  No  Falls or accidents since last visit No  Signs/symptoms  thrombosis since the last appt: No      A/P   INR  therapeutic today,   Continue current warfarin regimen         check referral    Pt educated to contact our clinic with any changes in medications or s/s of bleeding or thrombosis. Pt is aware to seek immediate  medical attention for falls, head injury or deep cuts    Follow up appointment in 6 week(s) to reduce risk of adverse events from warfarin  Sakshi Mcconnell, PharmD

## 2021-06-16 ENCOUNTER — TELEPHONE (OUTPATIENT)
Dept: MEDICAL GROUP | Facility: MEDICAL CENTER | Age: 71
End: 2021-06-16

## 2021-06-16 DIAGNOSIS — Z12.11 COLON CANCER SCREENING: ICD-10-CM

## 2021-06-16 NOTE — TELEPHONE ENCOUNTER
VOICEMAIL  1. Caller Name: lex from lab at [facility]                      Call Back Number: fax [phone]    2. Message: they received a stool sample for pt ana but they don't any order in for for her, they are asking if you can put in an order and or fax it to them     3. Patient approves office to leave a detailed voicemail/MyChart message: yes

## 2021-06-19 LAB — IMM ASSAY OCC BLD FITOB: NEGATIVE

## 2021-06-30 ENCOUNTER — OFFICE VISIT (OUTPATIENT)
Dept: SLEEP MEDICINE | Facility: MEDICAL CENTER | Age: 71
End: 2021-06-30
Payer: MEDICARE

## 2021-06-30 VITALS
BODY MASS INDEX: 33.63 KG/M2 | RESPIRATION RATE: 16 BRPM | WEIGHT: 197 LBS | DIASTOLIC BLOOD PRESSURE: 74 MMHG | HEIGHT: 64 IN | SYSTOLIC BLOOD PRESSURE: 114 MMHG | OXYGEN SATURATION: 91 % | HEART RATE: 70 BPM

## 2021-06-30 DIAGNOSIS — Z99.81 CHRONIC RESPIRATORY FAILURE WITH HYPOXIA, ON HOME OXYGEN THERAPY (HCC): ICD-10-CM

## 2021-06-30 DIAGNOSIS — J44.9 CHRONIC OBSTRUCTIVE PULMONARY DISEASE, UNSPECIFIED COPD TYPE (HCC): ICD-10-CM

## 2021-06-30 DIAGNOSIS — I27.20 PULMONARY HYPERTENSION (HCC): ICD-10-CM

## 2021-06-30 DIAGNOSIS — J96.11 CHRONIC RESPIRATORY FAILURE WITH HYPOXIA, ON HOME OXYGEN THERAPY (HCC): ICD-10-CM

## 2021-06-30 PROCEDURE — 99214 OFFICE O/P EST MOD 30 MIN: CPT | Performed by: PHYSICIAN ASSISTANT

## 2021-06-30 ASSESSMENT — ENCOUNTER SYMPTOMS
FEVER: 0
CHILLS: 0
SPUTUM PRODUCTION: 0
HEARTBURN: 0
DIZZINESS: 0
PALPITATIONS: 1
SHORTNESS OF BREATH: 1
WEIGHT LOSS: 0
TREMORS: 0
COUGH: 0
INSOMNIA: 0
ORTHOPNEA: 0
WHEEZING: 0
SINUS PAIN: 0
HEADACHES: 0
SORE THROAT: 0

## 2021-06-30 ASSESSMENT — FIBROSIS 4 INDEX: FIB4 SCORE: 1.84

## 2021-06-30 NOTE — PROGRESS NOTES
CC: Follow-up on COPD    HPI:  Salma Lees is a 71 y.o. year old female here today for follow-up on COPD.  Patient last seen in clinic 12/23/2020.  Brief remote smoking history with reported 5 pack years and quit date in 1990.    Pertinent past medical history includes A. fib, hypertension, CHF, mitral valve repair, pacemaker, chronic kidney disease, asthma and pulmonary hypertension.    Reviewed in clinic vitals 11/70, HR 70, oxygen saturation 91% on 3 L of oxygen, BMI 33.81 kg/m2.    Reviewed home medication regimen including Xopenex which she uses approximately 2-3 times per year, Lasix, Anoro.  She is on 3 L of oxygen continuous.    Reviewed most recent imaging including echocardiogram obtained on 6/2/2021 demonstrating mild concentric left ventricular hypertrophy, normal left ventricular size and systolic function, LVEF estimated 60%, mildly dilated right ventricle, normal right ventricular systolic function.  Moderately dilated left atrium.  Known mitral valve repair functioning normally, mild mitral regurgitation.  Pacemaker in situ.  Mild aortic insufficiency, moderate tricuspid regurgitation with estimated RVSP of 45 to 50 mmHg.  Trace pulmonic insufficiency.    Last chest x-ray obtained 11/12/2018 demonstrated no acute cardiopulmonary disease, left bipolar transvenous pacemaker in place, mitral valve replacement changes noted.    Reviewed pulmonary function testing obtained 12/22/2020 as compared to prior testing 11/12/2018 demonstrating FEV1 of 1.41 L or 64% improvement was 55% previously, FVC of 2.26 L or 80% predicted was 67% prior, FEV1/FVC ratio of 63, no significant postbronchodilator change, residual volume 161% predicted was 128, % predicted was 101, DLCO 57 was 71.  Per pulmonologist interpretation moderate obstruction, no response to bronchodilator, evidence of air trapping and moderate reduction in gas transfer all consistent with underlying COPD.    Review of Systems  "  Constitutional: Negative for chills, fever, malaise/fatigue and weight loss.   HENT: Positive for congestion (at night, uses vaporub ). Negative for hearing loss, nosebleeds, sinus pain, sore throat and tinnitus.    Eyes:        Presc eyeglasses   Respiratory: Positive for shortness of breath (with going up the stairs or incline ). Negative for cough, sputum production and wheezing.    Cardiovascular: Positive for palpitations (occasionally with low oxygen ). Negative for chest pain, orthopnea and leg swelling.   Gastrointestinal: Negative for heartburn.        Upper and lower dentures, not wearing, no difficulty swallowing    Neurological: Negative for dizziness, tremors and headaches.   Psychiatric/Behavioral: The patient does not have insomnia.        Past Medical History:   Diagnosis Date   • A-fib ([facility])    • Asthma    • Breath shortness 09/07/2017    uses oxygen at 3 liters/min cont. with \"[facility]\"   • Calculus of ureter 9/18/2017   • Congestive heart failure ([facility])    • COPD (chronic obstructive pulmonary disease) ([facility])    • Diabetes ([facility])    • Hypertension    • Infectious disease 09/07/2017    UTI/hx. MRSA   • Pacemaker 2014   • Renal disorder 09/07/2017    has stent   • Shortness of breath 10/18/2017       Past Surgical History:   Procedure Laterality Date   • URETEROSCOPY Left 9/18/2017    Procedure: URETEROSCOPY;  Surgeon: Edgardo Richter M.D.;  Location: [facility];  Service: Urology   • LASERTRIPSY Left 9/18/2017    Procedure: LASERTRIPSY LITHO  ;  Surgeon: Edgardo Richter M.D.;  Location: [facility];  Service: Urology   • STENT REMOVAL Left 9/18/2017    Procedure: STENT REMOVAL;  Surgeon: Edgardo Richter M.D.;  Location: [facility];  Service: Urology   • CYSTOSCOPY STENT PLACEMENT Left 6/25/2017    Procedure: CYSTOSCOPY, LEFT URETERAL STENT PLACEMENT;  Surgeon: Edgardo Richter M.D.;  Location: [facility];  " Service:    • PACEMAKER INSERTION     • KNEE REPLACEMENT, TOTAL Left    • MITRAL VALVE REPAIR         Family History   Problem Relation Age of Onset   • Lung Disease Mother         asthma   • Cancer Mother         ovarian   • Heart Disease Father    • Stroke Father    • Thyroid Sister    • Cancer Brother         pancreas   • Cancer Paternal Grandmother         colon   • Heart Disease Brother    • No Known Problems Brother    • No Known Problems Brother    • Heart Disease Son         afib       Social History     Socioeconomic History   • Marital status:      Spouse name: Not on file   • Number of children: Not on file   • Years of education: Not on file   • Highest education level: Not on file   Occupational History   • Not on file   Tobacco Use   • Smoking status: Former Smoker     Packs/day: 1.00     Years: 5.00     Pack years: 5.00     Types: Cigarettes     Quit date: 1990     Years since quittin.4   • Smokeless tobacco: Never Used   • Tobacco comment: Continued abstinence   Vaping Use   • Vaping Use: Never used   Substance and Sexual Activity   • Alcohol use: No     Alcohol/week: 0.0 oz   • Drug use: No   • Sexual activity: Not Currently   Other Topics Concern   • Not on file   Social History Narrative    She has a son. She had previously experience in [email or web address].     Social Determinants of Health     Financial Resource Strain:    • Difficulty of Paying Living Expenses:    Food Insecurity:    • Worried About Running Out of Food in the Last Year:    • Ran Out of Food in the Last Year:    Transportation Needs:    • Lack of Transportation (Medical):    • Lack of Transportation (Non-Medical):    Physical Activity:    • Days of Exercise per Week:    • Minutes of Exercise per Session:    Stress:    • Feeling of Stress :    Social Connections:    • Frequency of Communication with Friends and Family:    • Frequency of Social Gatherings with Friends and Family:    • Attends Nondenominational Services:   "  • Active Member of Clubs or Organizations:    • Attends Club or Organization Meetings:    • Marital Status:    Intimate Partner Violence:    • Fear of Current or Ex-Partner:    • Emotionally Abused:    • Physically Abused:    • Sexually Abused:        Allergies as of 06/30/2021 - Reviewed 06/30/2021   Allergen Reaction Noted   • Pcn [penicillins] Swelling 09/07/2017   • Amlodipine besylate-valsartan Unspecified 02/13/2017   • Amoxicillin Anaphylaxis and Shortness of Breath 02/13/2017   • Etodolac Hives and Nausea 02/13/2017   • Food Hives 09/07/2017   • Lisinopril Unspecified 02/13/2017   • Other drug Rash 02/13/2017   • Other misc  09/18/2017   • Tape  09/18/2017   • Eliquis [apixaban] Shortness of Breath 01/13/2020        @Vital signs for this encounter:  Vitals:    06/30/21 0956   Height: 1.626 m (5' 4\")   Weight: 89.4 kg (197 lb)   Weight % change since last entry.: 0 %   BP: 114/74   Pulse: 70   BMI (Calculated): 33.81   Resp: 16       Current medications as of today   Current Outpatient Medications   Medication Sig Dispense Refill   • Semaglutide, 1 MG/DOSE, (OZEMPIC, 1 MG/DOSE,) 4 MG/3ML Solution Pen-injector Inject 1 mg under the skin every 7 days. 3 mL 3   • warfarin (COUMADIN) 5 MG Tab take 1 and 1/2 tablets by mouth daily or as directed by anticoagulation clinic 135 tablet 3   • ANORO ELLIPTA 62.5-25 MCG/INH AEROSOL POWDER, BREATH ACTIVATED inhaler INHALE 1 PUFF EVERY DAY AS DIRECTED 60 Each 6   • levalbuterol (XOPENEX HFA) 45 MCG/ACT inhaler Inhale 2 Puffs every four hours as needed. FOR SHORTNESS OF BREATH 1 Each 2   • potassium chloride SA (KDUR) 20 MEQ Tab CR TAKE ONE TABLET BY MOUTH ONCE DAILY 100 Tab 3   • spironolactone (ALDACTONE) 25 MG Tab Take 25 mg by mouth every day.     • atorvastatin (LIPITOR) 20 MG Tab Take 20 mg by mouth every evening.     • carvedilol (COREG) 25 MG Tab Take 25 mg by mouth 2 times a day.     • furosemide (LASIX) 20 MG Tab Take 1 Tab by mouth every day. 90 Tab 1   • " diltiazem (CARDIZEM) 30 MG Tab Take 30 mg by mouth 3 times a day.     • digoxin (LANOXIN) 125 MCG Tab Take 125 mcg by mouth every day.       No current facility-administered medications for this visit.         Physical Exam:   Gen:           Alert and oriented, No apparent distress. Mood and affect appropriate, normal interaction with provider.  Eyes:          sclere white, conjunctive moist.  Hearing:     Grossly intact.  Dentition:    Edentulous  Oropharynx:   Tongue normal, posterior pharynx without erythema or exudate.  Neck:        Supple, trachea midline, no masses.  Respiratory Effort: No intercostal retractions or use of accessory muscles.   Lung Auscultation:      Diminished throughout; no rales, rhonchi or wheezing.  CV:            Regular rate and rhythm. No edema. No murmurs, rubs or gallops.  Digits, Nails, Ext: No clubbing, cyanosis, petechiae, or nodes.   Skin:        No rashes, lesions or ulcers noted on exposed skin surfaces.                     Assessment:  1. Chronic obstructive pulmonary disease, unspecified COPD type (HCC)  umeclidinium-vilanterol (ANORO ELLIPTA) 62.5-25 MCG/INH AEROSOL POWDER, BREATH ACTIVATED inhaler    PULMONARY FUNCTION TESTS -Test requested: Complete Pulmonary Function Test   2. Chronic respiratory failure with hypoxia, on home oxygen therapy (HCC)     3. Pulmonary hypertension (HCC)     4. BMI 33.0-33.9,adult         Immunizations:    Flu: 9/21/2020  Pneumovax 23: 9/16/2019  Prevnar 13: 10/6/2016  Pneumococcal vaccine: 10/25/2016  Pfizer SARS-CoV-2 vaccine: 2/9/2021, 1/19/2021    Plan:    71 y.o. year old female here today for follow-up on COPD.  Patient last seen in clinic 12/23/2020.  Brief remote smoking history with reported 5 pack years and quit date in 1990.    Pertinent past medical history includes A. fib, hypertension, CHF, mitral valve repair, pacemaker, chronic kidney disease, asthma and pulmonary hypertension.    Reviewed in clinic vitals 11/70, HR 70, oxygen  saturation 91% on 3 L of oxygen.  Patient's body mass index is 33.81 kg/m².     Elevated BMI: Exercise and nutrition counseling were performed at this visit.    Reviewed home medication regimen including Xopenex which she uses approximately 2-3 times per year, Lasix, Anoro.  She is on 3 L of oxygen continuous.    Chronic respiratory failure with hypoxia: Patient continues to use and benefit from 3 L oxygen per nasal cannula.  She reports occasionally needing to increase her oxygen with activity.    Patient requested to review pulmonary function testing from December 2020.    COPD: Continue Anoro, sample provided.  Concerns regarding cost of medication.     Had Covid vaccine.  Follow-up in 6 months with PFT.      This dictation was created using voice recognition software. The accuracy of the dictation is limited to the abilities of the software. I expect there may be some errors of grammar and possibly content.

## 2021-06-30 NOTE — PATIENT INSTRUCTIONS
1-concerns regarding cost of meds   2-sample Anoro  3-continue same regimen   4-follow up in 6 months with PFT

## 2021-07-01 ENCOUNTER — HOSPITAL ENCOUNTER (OUTPATIENT)
Dept: RADIOLOGY | Facility: MEDICAL CENTER | Age: 71
End: 2021-07-01
Attending: FAMILY MEDICINE
Payer: MEDICARE

## 2021-07-01 DIAGNOSIS — Z12.31 ENCOUNTER FOR SCREENING MAMMOGRAM FOR MALIGNANT NEOPLASM OF BREAST: ICD-10-CM

## 2021-07-01 PROCEDURE — 77063 BREAST TOMOSYNTHESIS BI: CPT

## 2021-07-19 ENCOUNTER — ANTICOAGULATION VISIT (OUTPATIENT)
Dept: MEDICAL GROUP | Facility: MEDICAL CENTER | Age: 71
End: 2021-07-19
Payer: MEDICARE

## 2021-07-19 ENCOUNTER — OFFICE VISIT (OUTPATIENT)
Dept: MEDICAL GROUP | Facility: MEDICAL CENTER | Age: 71
End: 2021-07-19
Payer: MEDICARE

## 2021-07-19 VITALS
SYSTOLIC BLOOD PRESSURE: 122 MMHG | OXYGEN SATURATION: 96 % | TEMPERATURE: 98.4 F | DIASTOLIC BLOOD PRESSURE: 68 MMHG | HEART RATE: 85 BPM | WEIGHT: 193.12 LBS | BODY MASS INDEX: 32.97 KG/M2 | HEIGHT: 64 IN

## 2021-07-19 DIAGNOSIS — Z98.890 HISTORY OF MITRAL VALVE REPAIR: ICD-10-CM

## 2021-07-19 DIAGNOSIS — Z79.01 ANTICOAGULATED ON WARFARIN: ICD-10-CM

## 2021-07-19 DIAGNOSIS — J44.9 CHRONIC OBSTRUCTIVE PULMONARY DISEASE, UNSPECIFIED COPD TYPE (HCC): ICD-10-CM

## 2021-07-19 DIAGNOSIS — I50.22 CHRONIC SYSTOLIC CONGESTIVE HEART FAILURE (HCC): ICD-10-CM

## 2021-07-19 DIAGNOSIS — Z99.81 CHRONIC RESPIRATORY FAILURE WITH HYPOXIA, ON HOME OXYGEN THERAPY (HCC): ICD-10-CM

## 2021-07-19 DIAGNOSIS — E11.9 TYPE 2 DIABETES MELLITUS WITHOUT COMPLICATION, WITHOUT LONG-TERM CURRENT USE OF INSULIN (HCC): ICD-10-CM

## 2021-07-19 DIAGNOSIS — Z78.0 POST-MENOPAUSE: ICD-10-CM

## 2021-07-19 DIAGNOSIS — Z79.01 CHRONIC ANTICOAGULATION: Primary | ICD-10-CM

## 2021-07-19 DIAGNOSIS — J96.11 CHRONIC RESPIRATORY FAILURE WITH HYPOXIA, ON HOME OXYGEN THERAPY (HCC): ICD-10-CM

## 2021-07-19 DIAGNOSIS — I27.20 PULMONARY HYPERTENSION (HCC): ICD-10-CM

## 2021-07-19 DIAGNOSIS — I70.0 ATHEROSCLEROSIS OF AORTA (HCC): ICD-10-CM

## 2021-07-19 DIAGNOSIS — I48.91 ATRIAL FIBRILLATION, UNSPECIFIED TYPE (HCC): ICD-10-CM

## 2021-07-19 DIAGNOSIS — N18.32 STAGE 3B CHRONIC KIDNEY DISEASE: ICD-10-CM

## 2021-07-19 LAB — INR PPP: 3.4 (ref 2–3.5)

## 2021-07-19 PROCEDURE — 85610 PROTHROMBIN TIME: CPT | Performed by: INTERNAL MEDICINE

## 2021-07-19 PROCEDURE — 93793 ANTICOAG MGMT PT WARFARIN: CPT | Performed by: FAMILY MEDICINE

## 2021-07-19 PROCEDURE — G0439 PPPS, SUBSEQ VISIT: HCPCS | Mod: 25 | Performed by: FAMILY MEDICINE

## 2021-07-19 ASSESSMENT — ENCOUNTER SYMPTOMS: GENERAL WELL-BEING: GOOD

## 2021-07-19 ASSESSMENT — PATIENT HEALTH QUESTIONNAIRE - PHQ9: CLINICAL INTERPRETATION OF PHQ2 SCORE: 0

## 2021-07-19 ASSESSMENT — FIBROSIS 4 INDEX: FIB4 SCORE: 1.84

## 2021-07-19 ASSESSMENT — ACTIVITIES OF DAILY LIVING (ADL): BATHING_REQUIRES_ASSISTANCE: 0

## 2021-07-19 NOTE — PROGRESS NOTES
Chief Complaint   Patient presents with   • Follow-Up   • Annual Exam       HPI:  Salma Lees is a 71 year old here for Medicare Annual Wellness Visit. She has a history of chronic systolic heart failure, atrial fibrillation, and subsequent anticoagulation.  She is taking digoxin, Coreg, diltiazem and Coumadin for this.  She follows with the INR clinic, and denies any active bleeding.  She denies any chest pain, palpitations, or shortness of breath.  She does alternate days where she is taking Lasix and spironolactone, which is keeping her lower extremity edema at bay. She is taking atorvastatin for atherosclerosis of her aorta.  She denies any side effects with this, no myalgias.  She follows with cardiology every 6 months.     She also has COPD, pulmonary hypertension, and subsequent respiratory failure with hypoxia.  She uses 2 L of supplemental oxygen at all times, as well as a neuro left daily, and levalbuterol as needed, which is 1-2 times per year.  No new cough or shortness of breath today.  She follows with pulmonology regularly.    She is taking semaglutide for her diabetes type 2.  She does not check her blood glucose regularly at home.  She denies any signs or symptoms of hypo or hyperglycemia.     She also has some chronic kidney disease stage III.  She does report that she had some sort of kidney blockage a couple years ago, and thinks that her CKD may be related to that.  She stays well-hydrated, drinking plenty of water and does try and avoid nephrotoxic medications.    Patient Active Problem List    Diagnosis Date Noted   • Chronic respiratory failure with hypoxia, on home oxygen therapy ([facility]) 11/19/2019   • Chronic systolic congestive heart failure ([facility]) 11/19/2019   • Atherosclerosis of aorta ([facility]) 11/19/2019   • Pulmonary hypertension ([facility]) 11/19/2019   • CKD (chronic kidney disease) stage 3, GFR 30-59 ml/min ([facility]) 11/19/2019   • BMI 35.0-35.9,adult 11/12/2018   • Normocytic anemia  06/26/2017   • History of mitral valve repair 02/13/2017   • Chronic obstructive pulmonary disease (HCC) 02/13/2017   • Atrial fibrillation (HCC) 02/13/2017   • Type 2 diabetes mellitus without complication (HCC) 02/13/2017   • Anticoagulated on warfarin 02/13/2017       Current Outpatient Medications   Medication Sig Dispense Refill   • umeclidinium-vilanterol (ANORO ELLIPTA) 62.5-25 MCG/INH AEROSOL POWDER, BREATH ACTIVATED inhaler Inhale 1 Puff every day. 1 Each 0   • Semaglutide, 1 MG/DOSE, (OZEMPIC, 1 MG/DOSE,) 4 MG/3ML Solution Pen-injector Inject 1 mg under the skin every 7 days. 3 mL 3   • warfarin (COUMADIN) 5 MG Tab take 1 and 1/2 tablets by mouth daily or as directed by anticoagulation clinic 135 tablet 3   • ANORO ELLIPTA 62.5-25 MCG/INH AEROSOL POWDER, BREATH ACTIVATED inhaler INHALE 1 PUFF EVERY DAY AS DIRECTED 60 Each 6   • levalbuterol (XOPENEX HFA) 45 MCG/ACT inhaler Inhale 2 Puffs every four hours as needed. FOR SHORTNESS OF BREATH 1 Each 2   • potassium chloride SA (KDUR) 20 MEQ Tab CR TAKE ONE TABLET BY MOUTH ONCE DAILY 100 Tab 3   • spironolactone (ALDACTONE) 25 MG Tab Take 25 mg by mouth every day.     • atorvastatin (LIPITOR) 20 MG Tab Take 20 mg by mouth every evening.     • carvedilol (COREG) 25 MG Tab Take 25 mg by mouth 2 times a day.     • furosemide (LASIX) 20 MG Tab Take 1 Tab by mouth every day. 90 Tab 1   • diltiazem (CARDIZEM) 30 MG Tab Take 30 mg by mouth 3 times a day.     • digoxin (LANOXIN) 125 MCG Tab Take 125 mcg by mouth every day.       No current facility-administered medications for this visit.          Current supplements as per medication list.     Allergies: Pcn [penicillins], Amlodipine besylate-valsartan, Amoxicillin, Etodolac, Food, Lisinopril, Other drug, Other misc, Tape, and Eliquis [apixaban]    Current social contact/activities: Due to her comorbidities, she does not leave the house regularly as she is still nervous about Covid.  She does have a garden box that  she tends to, and enjoys watching [employer] videos.    She  reports that she quit smoking about 31 years ago. Her smoking use included cigarettes. She has a 5.00 pack-year smoking history. She has never used smokeless tobacco. She reports that she does not drink alcohol and does not use drugs.  Counseling given: Not Answered  Comment: Continued abstinence      DPA/Advanced Directive:  Patient has Advanced Directive on file.     ROS:    Gait: Uses no assistive device  Ostomy: No  Other tubes: No  Amputations: No  Chronic oxygen use: Yes  Last eye exam: jan 22, 21  Wears hearing aids: No   : Reports urinary leakage during the last 6 months that has somewhat interfered with their daily activities or sleep.    Screening:    Depression Screening  Little interest or pleasure in doing things?  0 - not at all  Feeling down, depressed , or hopeless? 0 - not at all  Trouble falling or staying asleep, or sleeping too much?     Feeling tired or having little energy?     Poor appetite or overeating?     Feeling bad about yourself - or that you are a failure or have let yourself or your family down?    Trouble concentrating on things, such as reading the newspaper or watching television?    Moving or speaking so slowly that other people could have noticed.  Or the opposite - being so fidgety or restless that you have been moving around a lot more than usual?     Thoughts that you would be better off dead, or of hurting yourself?     Patient Health Questionnaire Score:      If depressive symptoms identified deferred to follow up visit unless specifically addressed in assessment and plan.    Interpretation of PHQ-9 Total Score   Score Severity   1-4 No Depression   5-9 Mild Depression   10-14 Moderate Depression   15-19 Moderately Severe Depression   20-27 Severe Depression    Screening for Cognitive Impairment  Three Minute Recall (captain, garden, picture) 3/3    Dave clock face with all 12 numbers and set the hands to show 5  past 8.  Yes    Cognitive concerns identified deferred for follow up unless specifically addressed in assessment and plan.    Fall Risk Assessment  Has the patient had two or more falls in the last year or any fall with injury in the last year?  No    Safety Assessment  Throw rugs on floor.  Yes  Handrails on all stairs.  No  Good lighting in all hallways.  Yes  Difficulty hearing.  No  Patient counseled about all safety risks that were identified.    Functional Assessment ADLs  Are there any barriers preventing you from cooking for yourself or meeting nutritional needs?  No.    Are there any barriers preventing you from driving safely or obtaining transportation?  No.    Are there any barriers preventing you from using a telephone or calling for help?  No.    Are there any barriers preventing you from shopping?  No.    Are there any barriers preventing you from taking care of your own finances?  No.    Are there any barriers preventing you from managing your medications?  No.    Are there any barriers preventing you from showering, bathing or dressing yourself? No.    Are you currently engaging in any exercise or physical activity?  Yes.     What is your perception of your health?  Good.    Health Maintenance Summary                COLONOSCOPY Overdue 2/16/1999     IMM ZOSTER VACCINES Overdue 2/16/1999     BONE DENSITY Overdue 2/16/2014     URINE ACR / MICROALBUMIN Overdue 12/3/2020      Done 12/3/2019 MICROALBUMIN CREAT RATIO URINE    IMM INFLUENZA Next Due 9/1/2021      Done 9/21/2020 Imm Admin: Influenza Vaccine Adult HD     Patient has more history with this topic...    A1C SCREENING Next Due 12/2/2021      Done 6/2/2021 HEMOGLOBIN A1C     Patient has more history with this topic...    Annual Pulmonary Function Test / Spirometry Next Due 12/22/2021      Done 12/22/2020 PFT DICTATED RESULTS     Patient has more history with this topic...    RETINAL SCREENING Next Due 1/14/2022      Done 1/14/2021      Patient  has more history with this topic...    DIABETES MONOFILAMENT / LE EXAM Next Due 2022      Done 2021 AMB DIABETIC MONOFILAMENT LOWER EXTREMITY EXAM     Patient has more history with this topic...    FASTING LIPID PROFILE Next Due 2022      Done 2021 LIPID PROFILE     Patient has more history with this topic...    SERUM CREATININE Next Due 2022      Done 2021 COMP METABOLIC PANEL     Patient has more history with this topic...    MAMMOGRAM Next Due 2022      Done 2021 MA-SCREENING MAMMO BILAT W/TOMOSYNTHESIS W/CAD    IMM DTaP/Tdap/Td Vaccine Next Due 11/15/2022      Done 11/15/2012 Imm Admin: Tdap Vaccine          Patient Care Team:  Ivy Sparks M.D. as PCP - General (Family Medicine)  Ivy Galloway M.D. (Pulmonary Medicine)  [facility] as    PREFERRED (DME Supplier)      Social History     Tobacco Use   • Smoking status: Former Smoker     Packs/day: 1.00     Years: 5.00     Pack years: 5.00     Types: Cigarettes     Quit date: 1990     Years since quittin.5   • Smokeless tobacco: Never Used   • Tobacco comment: Continued abstinence   Vaping Use   • Vaping Use: Never used   Substance Use Topics   • Alcohol use: No     Alcohol/week: 0.0 oz   • Drug use: No     Family History   Problem Relation Age of Onset   • Lung Disease Mother         asthma   • Cancer Mother         ovarian   • Heart Disease Father    • Stroke Father    • Thyroid Sister    • Cancer Brother         pancreas   • Cancer Paternal Grandmother         colon   • Heart Disease Brother    • No Known Problems Brother    • No Known Problems Brother    • Heart Disease Son         afib     She  has a past medical history of A-fib (HCC), Asthma, Breath shortness (2017), Calculus of ureter (2017), Congestive heart failure (HCC), COPD (chronic obstructive pulmonary disease) (HCC), Diabetes (HCC), Hypertension, Infectious disease (2017), Pacemaker (), Renal  "disorder (09/07/2017), and Shortness of breath (10/18/2017). She also has no past medical history of Encounter for long-term (current) use of other medications.   Past Surgical History:   Procedure Laterality Date   • URETEROSCOPY Left 9/18/2017    Procedure: URETEROSCOPY;  Surgeon: Edgardo Richter M.D.;  Location: SURGERY [facility];  Service: Urology   • LASERTRIPSY Left 9/18/2017    Procedure: LASERTRIPSY LITHO  ;  Surgeon: Edgardo Richter M.D.;  Location: SURGERY [facility];  Service: Urology   • STENT REMOVAL Left 9/18/2017    Procedure: STENT REMOVAL;  Surgeon: Edgardo Richter M.D.;  Location: SURGERY [facility];  Service: Urology   • CYSTOSCOPY STENT PLACEMENT Left 6/25/2017    Procedure: CYSTOSCOPY, LEFT URETERAL STENT PLACEMENT;  Surgeon: Edgardo Richter M.D.;  Location: SURGERY [facility];  Service:    • PACEMAKER INSERTION  2015   • KNEE REPLACEMENT, TOTAL Left 2015   • MITRAL VALVE REPAIR  2009       Exam:   /68 (BP Location: Left arm, Patient Position: Sitting, BP Cuff Size: Adult)   Pulse 85   Temp 36.9 °C (98.4 °F) (Temporal)   Ht 1.626 m (5' 4\")   Wt 87.6 kg (193 lb 2 oz)   SpO2 96%  Body mass index is 33.15 kg/m².  Cardiovascular: Normal rate, regular rhythm and normal heart sounds.   Pulmonary/Chest: Effort normal and breath sounds normal. No respiratory distress.   Abdominal: Soft. Bowel sounds are normal. She exhibits no distension. There is no abdominal tenderness.   Musculoskeletal:         General: No edema.   Hearing excellent.    Dentition good, she has dentures but they no longer fit as she hasn't worn them in so long.   Alert, oriented in no acute distress.  Eye contact is good, speech goal directed, affect calm    Assessment and Plan. The following treatment and monitoring plan is recommended:    1. Chronic systolic congestive heart failure (HCC)  2. Atrial fibrillation, unspecified type (HCC)  3. Anticoagulated on warfarin  4. " Atherosclerosis of aorta (HCC)  Chronic and stable.  Continue the same medications.    5. Chronic obstructive pulmonary disease, unspecified COPD type (HCC)  6. Chronic respiratory failure with hypoxia, on home oxygen therapy (HCC)  7. Pulmonary hypertension (HCC)  Chronic and stable.  Continue the same medications.    8. Type 2 diabetes mellitus without complication, without long-term current use of insulin (HCC)  Chronic and stable.  Continue with semaglutide.  - Microalbumin Creat Ratio Urine - Lab Collect; Future    9. Stage 3b chronic kidney disease (HCC)  Chronic and stable.  Continue to monitor.    10. Post-menopause  Osteoporosis screening is due, DEXA scan has been ordered.  - DS-BONE DENSITY STUDY (DEXA); Future    Services suggested: No services needed at this time  Health Care Screening: Age-appropriate preventive services recommended by USPTF and ACIP covered by Medicare were discussed today. Services ordered if indicated and agreed upon by the patient.  Referrals offered: Community-based lifestyle interventions to reduce health risks and promote self-management and wellness, fall prevention, nutrition, physical activity, tobacco-use cessation, weight loss, and mental health services as per orders if indicated.    Discussion today about general wellness and lifestyle habits:    · Prevent falls and reduce trip hazards; Cautioned about securing or removing rugs.  · Have a working fire alarm and carbon monoxide detector;   · Engage in regular physical activity and social activities     Follow-up: Return in about 6 months (around 1/19/2022) for Chronic disease management.

## 2021-07-19 NOTE — PROGRESS NOTES
OP Anticoagulation Service Note    Date: 7/19/2021  There were no vitals filed for this visit.   pt declined vitals    Anticoagulation Summary  As of 7/19/2021    INR goal:  2.0-3.0   TTR:  50.7 % (4.3 y)   INR used for dosing:  3.40 (7/19/2021)   Warfarin maintenance plan:  7.5 mg (5 mg x 1.5) every Sun, Tue, Thu; 5 mg (5 mg x 1) all other days   Weekly warfarin total:  42.5 mg   Plan last modified:  Sakshi Mcconnell, PharmD (8/6/2020)   Next INR check:  8/2/2021   Target end date:  Indefinite    Indications    Atrial fibrillation (HCC) [I48.91]  History of mitral valve repair [Z98.890]             Anticoagulation Episode Summary     INR check location:      Preferred lab:      Send INR reminders to:      Comments:        Anticoagulation Care Providers     Provider Role Specialty Phone number    Zeferino Almaraz A.P.N. Referring [facility] [phone]    [facility] Anticoagulation Services Responsible  [phone]    Kenneth Salas, PharmD Responsible              HPI:   Salma Lees seen in clinic today, on anticoagulation therapy with warfarin (a high risk medication) for atrial fibrillation s/p mitral valve repair    Pt is here today to evaluate anticoagulation therapy    Previous INR was  2.5 on 6/14/21    Pt was instructed to continue current regimen    Anticoagulation Patient Findings  Patient Findings     Negatives:  Signs/symptoms of thrombosis, Signs/symptoms of bleeding, Laboratory test error suspected, Change in health, Change in alcohol use, Upcoming invasive procedure, Upcoming dental procedure, Missed doses, Extra doses, Change in medications, Change in diet/appetite, Bruising          Confirmed warfarin dosing regimen    Pt is not on antiplatelet/NSAID therapy    Falls or accidents since last visit No        A/P   INR  supra-therapeutic today, will require dose adjust ment today to prevent bleeding complications  and closer follow up.   Tonight  2.5 mg then continue current regimen      Pt  educated to contact our clinic with any changes in medications or s/s of bleeding or thrombosis. Pt is aware to seek immediate medical attention for falls, head injury or deep cuts    Follow up appointment in 2 week(s) to reduce risk of adverse events from warfarin  Sakshi Mcconnell, PharmD

## 2021-08-02 ENCOUNTER — ANTICOAGULATION VISIT (OUTPATIENT)
Dept: MEDICAL GROUP | Facility: MEDICAL CENTER | Age: 71
End: 2021-08-02
Payer: MEDICARE

## 2021-08-02 DIAGNOSIS — Z98.890 HISTORY OF MITRAL VALVE REPAIR: ICD-10-CM

## 2021-08-02 DIAGNOSIS — Z79.01 LONG TERM (CURRENT) USE OF ANTICOAGULANTS: Primary | ICD-10-CM

## 2021-08-02 LAB — INR PPP: 2.5 (ref 2–3.5)

## 2021-08-02 PROCEDURE — 93793 ANTICOAG MGMT PT WARFARIN: CPT | Performed by: FAMILY MEDICINE

## 2021-08-02 PROCEDURE — 85610 PROTHROMBIN TIME: CPT | Performed by: FAMILY MEDICINE

## 2021-08-02 NOTE — PROGRESS NOTES
OP Anticoagulation Service Note    Date: 2021  There were no vitals filed for this visit.   pt declined vitals    Anticoagulation Summary  As of 2021    INR goal:  2.0-3.0   TTR:  50.7 % (4.4 y)   INR used for dosin.50 (2021)   Warfarin maintenance plan:  7.5 mg (5 mg x 1.5) every Sun, Tue, Thu; 5 mg (5 mg x 1) all other days   Weekly warfarin total:  42.5 mg   Plan last modified:  Sakshi Mcconnell, PharmD (2020)   Next INR check:  2021   Target end date:  Indefinite    Indications    Atrial fibrillation (HCC) [I48.91]  History of mitral valve repair [Z98.890]             Anticoagulation Episode Summary     INR check location:      Preferred lab:      Send INR reminders to:      Comments:        Anticoagulation Care Providers     Provider Role Specialty Phone number    Zeferino Almaraz A.P.N. Referring Family Medicine [phone]    [facility] Anticoagulation Services Responsible  [phone]    Kenneth Salas, PharmD Responsible              HPI:   Salma Lees seen in clinic today, on anticoagulation therapy with warfarin (a high risk medication) for atrial fibrillation s/p mitral valve repair.      Pt is here today to evaluate anticoagulation therapy    Previous INR was  3.4 on     Pt was instructed to decrease dose x1    Anticoagulation Patient Findings  Patient Findings     Negatives:  Signs/symptoms of thrombosis, Signs/symptoms of bleeding, Laboratory test error suspected, Change in health, Change in alcohol use, Change in activity, Upcoming invasive procedure, Emergency department visit, Upcoming dental procedure, Missed doses, Extra doses, Change in medications, Change in diet/appetite, Hospital admission, Bruising, Other complaints          Confirmed warfarin dosing regimen    Pt is not on antiplatelet/NSAID therapy    Falls or accidents since last visit No        A/P   INR therapeutic today    Continue warfarin regimen.    Dispensed Ozempic pen #2    Pt educated to  contact our clinic with any changes in medications or s/s of bleeding or thrombosis. Pt is aware to seek immediate medical attention for falls, head injury or deep cuts    Follow up appointment in 3 week(s) to reduce risk of adverse events from warfarin    Nati Nieto, Pharmacy Intern  Sakshi Mcconnell, PharmD

## 2021-08-04 PROBLEM — D68.59 THROMBOPHILIA (HCC): Status: ACTIVE | Noted: 2021-08-04

## 2021-08-04 NOTE — NON-PROVIDER
Outcome: Comprehensive Health Assessment appointment.    Mbr scheduled Comprehensive Health Assessment appointment. No to Covid, Advised to wear mask. Mbr placed on recall list for Leah Bonilla.  information provided. No further needs at this time.     Attempt # 1

## 2021-08-06 PROBLEM — D68.69 SECONDARY HYPERCOAGULABLE STATE (HCC): Status: ACTIVE | Noted: 2021-08-06

## 2021-08-17 ENCOUNTER — PATIENT MESSAGE (OUTPATIENT)
Dept: SLEEP MEDICINE | Facility: MEDICAL CENTER | Age: 71
End: 2021-08-17
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
## 2021-08-19 ENCOUNTER — SLEEP CENTER VISIT (OUTPATIENT)
Dept: SLEEP MEDICINE | Facility: MEDICAL CENTER | Age: 71
End: 2021-08-19
Payer: MEDICARE

## 2021-08-19 VITALS
BODY MASS INDEX: 32.1 KG/M2 | RESPIRATION RATE: 16 BRPM | OXYGEN SATURATION: 93 % | WEIGHT: 188 LBS | HEIGHT: 64 IN | SYSTOLIC BLOOD PRESSURE: 108 MMHG | DIASTOLIC BLOOD PRESSURE: 60 MMHG | HEART RATE: 45 BPM

## 2021-08-19 DIAGNOSIS — Z99.81 CHRONIC RESPIRATORY FAILURE WITH HYPOXIA, ON HOME OXYGEN THERAPY (HCC): ICD-10-CM

## 2021-08-19 DIAGNOSIS — J44.9 CHRONIC OBSTRUCTIVE PULMONARY DISEASE, UNSPECIFIED COPD TYPE (HCC): ICD-10-CM

## 2021-08-19 DIAGNOSIS — I27.20 PULMONARY HYPERTENSION (HCC): ICD-10-CM

## 2021-08-19 DIAGNOSIS — J96.11 CHRONIC RESPIRATORY FAILURE WITH HYPOXIA, ON HOME OXYGEN THERAPY (HCC): ICD-10-CM

## 2021-08-19 PROCEDURE — 99214 OFFICE O/P EST MOD 30 MIN: CPT | Performed by: NURSE PRACTITIONER

## 2021-08-19 ASSESSMENT — FIBROSIS 4 INDEX: FIB4 SCORE: 1.84

## 2021-08-19 NOTE — PATIENT INSTRUCTIONS
1.  Given sample of Trelegy 100 today.  Please use in place of Anoro.  Continue to use Xopenex (rescue inhaler) as needed every 4-6 hours for chest tightness, cough, wheezing, or increased shortness of breath.  After using Trelegy, if chest soreness does not improve, consider contacting cardiologist or going to ER.  We did discuss follow-up appointment.  At this time patient has scheduled appointment in December, she will reach out before then if she would like to be seen sooner.  I recommended after a week and a half if Trelegy does not improve, consider coming in sooner.  Reach out via [employer] with any questions or concerns before next appointment.
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
## 2021-08-23 ENCOUNTER — APPOINTMENT (OUTPATIENT)
Dept: MEDICAL GROUP | Facility: MEDICAL CENTER | Age: 71
End: 2021-08-23
Payer: MEDICARE

## 2021-08-23 ENCOUNTER — TELEPHONE (OUTPATIENT)
Dept: SLEEP MEDICINE | Facility: MEDICAL CENTER | Age: 71
End: 2021-08-23

## 2021-08-30 ENCOUNTER — ANTICOAGULATION VISIT (OUTPATIENT)
Dept: MEDICAL GROUP | Facility: MEDICAL CENTER | Age: 71
End: 2021-08-30
Payer: MEDICARE

## 2021-08-30 DIAGNOSIS — Z98.890 HISTORY OF MITRAL VALVE REPAIR: ICD-10-CM

## 2021-08-30 LAB — INR PPP: 2.1 (ref 2–3.5)

## 2021-08-30 PROCEDURE — 93793 ANTICOAG MGMT PT WARFARIN: CPT | Performed by: INTERNAL MEDICINE

## 2021-08-30 PROCEDURE — 85610 PROTHROMBIN TIME: CPT | Performed by: INTERNAL MEDICINE

## 2021-08-30 NOTE — PROGRESS NOTES
OP Anticoagulation Service Note    Date: 2021  There were no vitals filed for this visit.   pt declined vitals    Anticoagulation Summary  As of 2021    INR goal:  2.0-3.0   TTR:  51.6 % (4.5 y)   INR used for dosin.10 (2021)   Warfarin maintenance plan:  7.5 mg (5 mg x 1.5) every Sun, Tue, Thu; 5 mg (5 mg x 1) all other days   Weekly warfarin total:  42.5 mg   Plan last modified:  Sakshi Mcconnell, PharmD (2020)   Next INR check:  2021   Target end date:  Indefinite    Indications    Atrial fibrillation (HCC) [I48.91]  History of mitral valve repair [Z98.890]             Anticoagulation Episode Summary     INR check location:      Preferred lab:      Send INR reminders to:      Comments:        Anticoagulation Care Providers     Provider Role Specialty Phone number    Zeferino Almaraz, A.P.N. Referring [facility] Medicine [phone]    [facility] Anticoagulation Services Responsible  [phone]    Kenneth Salas, PharmD Responsible              HPI:   Salma Lees seen in clinic today, on anticoagulation therapy with warfarin (a high risk medication) for atrial fibrillation,     Pt is here today to evaluate anticoagulation therapy    Previous INR was  2.5 on 2021    Pt was instructed to continue current regimen    Anticoagulation Patient Findings  Patient Findings     Negatives:  Signs/symptoms of thrombosis, Signs/symptoms of bleeding, Laboratory test error suspected, Change in health, Change in alcohol use, Change in activity, Upcoming invasive procedure, Emergency department visit, Upcoming dental procedure, Missed doses, Extra doses, Change in medications, Change in diet/appetite, Hospital admission, Bruising, Other complaints          Confirmed warfarin dosing regimen    Pt is not on antiplatelet/NSAID therapy    Falls or accidents since last visit No        A/P   INR  therapeutic today,   Continue current warfarin regimen        2022 check referral    Pt educated to  contact our clinic with any changes in medications or s/s of bleeding or thrombosis. Pt is aware to seek immediate medical attention for falls, head injury or deep cuts    Follow up appointment in 4 week(s) to reduce risk of adverse events from warfarin  Sakshi Mcconnell, PharmD

## 2021-09-08 NOTE — PROGRESS NOTES
"History of Present Illness  71 year old female presents to clinic for diabetes management.  She continues with Ozempic 1 mg weekly, but needs paperwork completed to get this at a more affordable rate.  Globin A1c 6/2/2021 was 9.9, at that time we increased her Ozempic from 0.25 mg weekly to 1 mg weekly. She has an intolerance to Metformin due to GI side effects. She does check her blood glucose on a regular basis, and states she has been noticing they are trending downwards.  Fasting is usually in the 110-120 range.  She denies any signs or symptoms of hypo or hyperglycemia.  She continues with her atorvastatin for associated dyslipidemia.  She is doing well with this, no side effects such as GI upset or myalgias.    She denies any other questions or concerns at this time.    Current problem list, current medication, and past medical/surgical history were reviewed.     ROS  See HPI    Physical Exam  /72 (BP Location: Left arm, Patient Position: Sitting, BP Cuff Size: Adult)   Pulse 85   Temp 36.6 °C (97.8 °F) (Temporal)   Ht 1.626 m (5' 4\")   Wt 86.5 kg (190 lb 11.2 oz)   SpO2 90%   BMI 32.73 kg/m²   Physical Exam  Constitutional:       General: She is not in acute distress.     Appearance: She is not diaphoretic.   Cardiovascular:      Rate and Rhythm: Normal rate and regular rhythm.      Heart sounds: Normal heart sounds.   Pulmonary:      Effort: Pulmonary effort is normal. No respiratory distress.      Breath sounds: Normal breath sounds.   Abdominal:      General: Bowel sounds are normal.      Palpations: Abdomen is soft.   Skin:     General: Skin is warm and dry.   Neurological:      Mental Status: She is alert.   Psychiatric:         Mood and Affect: Affect normal.         Judgment: Judgment normal.       Assessment & Plan  1. Type 2 diabetes mellitus without complication, without long-term current use of insulin (HCC)  2. Dyslipidemia associated with type 2 diabetes mellitus (HCC)  Hemoglobin A1c " has improved from 9.9 down to 7.1 today in office.  We will plan to continue with Ozempic 1 mg weekly.  I have completed the paperwork for her insurance to give her a more affordable rate.  - POCT Hemoglobin A1C  Hgb A1C is at goal of < 7.0 - no, it was 7.1 today in office  No components found for: HGBA1C  -Blood pressure is controlled at goal of less than 140/90 - yes  -On Aspirin: no, however is on Warfarin  -On Statin: yes, atorvastatin 20 mg nightly  Lab Results   Component Value Date/Time    LDL 78 06/02/2021 1203   -Smoking: No  -On ACEI/ARB: no  -Eye exam within 1 year: yes  -Foot exam within 1 year: yes    Return if symptoms worsen or fail to improve.    Ivy Sparks M.D.

## 2021-09-09 ENCOUNTER — OFFICE VISIT (OUTPATIENT)
Dept: MEDICAL GROUP | Facility: MEDICAL CENTER | Age: 71
End: 2021-09-09
Payer: MEDICARE

## 2021-09-09 VITALS
BODY MASS INDEX: 32.56 KG/M2 | HEIGHT: 64 IN | SYSTOLIC BLOOD PRESSURE: 122 MMHG | OXYGEN SATURATION: 90 % | HEART RATE: 85 BPM | WEIGHT: 190.7 LBS | TEMPERATURE: 97.8 F | DIASTOLIC BLOOD PRESSURE: 72 MMHG

## 2021-09-09 DIAGNOSIS — E11.69 DYSLIPIDEMIA ASSOCIATED WITH TYPE 2 DIABETES MELLITUS (HCC): ICD-10-CM

## 2021-09-09 DIAGNOSIS — J44.9 CHRONIC OBSTRUCTIVE PULMONARY DISEASE, UNSPECIFIED COPD TYPE (HCC): ICD-10-CM

## 2021-09-09 DIAGNOSIS — E11.9 TYPE 2 DIABETES MELLITUS WITHOUT COMPLICATION, WITHOUT LONG-TERM CURRENT USE OF INSULIN (HCC): ICD-10-CM

## 2021-09-09 DIAGNOSIS — E78.5 DYSLIPIDEMIA ASSOCIATED WITH TYPE 2 DIABETES MELLITUS (HCC): ICD-10-CM

## 2021-09-09 LAB
HBA1C MFR BLD: 7.1 % (ref 0–5.6)
INT CON NEG: NEGATIVE
INT CON POS: POSITIVE

## 2021-09-09 PROCEDURE — 99214 OFFICE O/P EST MOD 30 MIN: CPT | Performed by: FAMILY MEDICINE

## 2021-09-09 PROCEDURE — 83036 HEMOGLOBIN GLYCOSYLATED A1C: CPT | Performed by: FAMILY MEDICINE

## 2021-09-09 RX ORDER — SEMAGLUTIDE 1.34 MG/ML
INJECTION, SOLUTION SUBCUTANEOUS
Qty: 3 ML | Refills: 3 | Status: SHIPPED | OUTPATIENT
Start: 2021-09-09 | End: 2022-02-09

## 2021-09-09 ASSESSMENT — FIBROSIS 4 INDEX: FIB4 SCORE: 1.84

## 2021-09-09 NOTE — TELEPHONE ENCOUNTER
Received request via: Pharmacy    Was the patient seen in the last year in this department? Yes    Does the patient have an active prescription (recently filled or refills available) for medication(s) requested? No    Requested Prescriptions     Pending Prescriptions Disp Refills   • OZEMPIC, 1 MG/DOSE, 4 MG/3ML Solution Pen-injector [Pharmacy Med Name: OZEMPIC 1MG PER DOSE (1X4MG PEN)] 3 mL 3     Sig: INJECT 1MG UNDER THE SKIN EVERY 7 DAYS

## 2021-09-09 NOTE — TELEPHONE ENCOUNTER
Have we ever prescribed this med? Yes.  If yes, what date? 06/30/2021    Last OV: 08/19/2021 - Shawn Wesley     Next OV: 12/17/2021 - Peggy Shannon    DX: COPD    Medications: Anoro    PT request Anoro

## 2021-09-13 ENCOUNTER — HOSPITAL ENCOUNTER (OUTPATIENT)
Dept: LAB | Facility: MEDICAL CENTER | Age: 71
End: 2021-09-13
Attending: INTERNAL MEDICINE
Payer: MEDICARE

## 2021-09-13 LAB
ALBUMIN SERPL BCP-MCNC: 4.2 G/DL (ref 3.2–4.9)
ALBUMIN/GLOB SERPL: 1.2 G/DL
ALP SERPL-CCNC: 88 U/L (ref 30–99)
ALT SERPL-CCNC: 11 U/L (ref 2–50)
ANION GAP SERPL CALC-SCNC: 13 MMOL/L (ref 7–16)
AST SERPL-CCNC: 14 U/L (ref 12–45)
BILIRUB SERPL-MCNC: 0.6 MG/DL (ref 0.1–1.5)
BUN SERPL-MCNC: 35 MG/DL (ref 8–22)
CALCIUM SERPL-MCNC: 9.6 MG/DL (ref 8.4–10.2)
CHLORIDE SERPL-SCNC: 101 MMOL/L (ref 96–112)
CHOLEST SERPL-MCNC: 234 MG/DL (ref 100–199)
CO2 SERPL-SCNC: 24 MMOL/L (ref 20–33)
CREAT SERPL-MCNC: 1.52 MG/DL (ref 0.5–1.4)
GLOBULIN SER CALC-MCNC: 3.6 G/DL (ref 1.9–3.5)
GLUCOSE SERPL-MCNC: 195 MG/DL (ref 65–99)
HDLC SERPL-MCNC: 36 MG/DL
LDLC SERPL CALC-MCNC: 123 MG/DL
POTASSIUM SERPL-SCNC: 4.4 MMOL/L (ref 3.6–5.5)
PROT SERPL-MCNC: 7.8 G/DL (ref 6–8.2)
SODIUM SERPL-SCNC: 138 MMOL/L (ref 135–145)
TRIGL SERPL-MCNC: 375 MG/DL (ref 0–149)

## 2021-09-13 PROCEDURE — 36415 COLL VENOUS BLD VENIPUNCTURE: CPT

## 2021-09-13 PROCEDURE — 80061 LIPID PANEL: CPT

## 2021-09-13 PROCEDURE — 80053 COMPREHEN METABOLIC PANEL: CPT

## 2021-09-27 ENCOUNTER — ANTICOAGULATION VISIT (OUTPATIENT)
Dept: MEDICAL GROUP | Facility: MEDICAL CENTER | Age: 71
End: 2021-09-27
Payer: MEDICARE

## 2021-09-27 ENCOUNTER — PATIENT MESSAGE (OUTPATIENT)
Dept: HEALTH INFORMATION MANAGEMENT | Facility: OTHER | Age: 71
End: 2021-09-27

## 2021-09-27 DIAGNOSIS — Z79.01 CHRONIC ANTICOAGULATION: Primary | ICD-10-CM

## 2021-09-27 DIAGNOSIS — Z98.890 HISTORY OF MITRAL VALVE REPAIR: ICD-10-CM

## 2021-09-27 LAB — INR PPP: 2 (ref 2–3.5)

## 2021-09-27 PROCEDURE — 85610 PROTHROMBIN TIME: CPT | Performed by: INTERNAL MEDICINE

## 2021-09-27 PROCEDURE — 93793 ANTICOAG MGMT PT WARFARIN: CPT | Performed by: INTERNAL MEDICINE

## 2021-10-01 ENCOUNTER — NON-PROVIDER VISIT (OUTPATIENT)
Dept: MEDICAL GROUP | Facility: MEDICAL CENTER | Age: 71
End: 2021-10-01
Payer: MEDICARE

## 2021-10-01 DIAGNOSIS — Z23 IMMUNIZATION DUE: ICD-10-CM

## 2021-10-01 PROCEDURE — 90471 IMMUNIZATION ADMIN: CPT | Performed by: FAMILY MEDICINE

## 2021-10-01 PROCEDURE — 90750 HZV VACC RECOMBINANT IM: CPT | Performed by: FAMILY MEDICINE

## 2021-10-01 NOTE — PROGRESS NOTES
"Salma Lees is a 71 y.o. female here for a non-provider visit for:   SHINGRIX (Shingles)    Reason for immunization: continue or complete series started at the office  Immunization records indicate need for vaccine: Yes, confirmed with Epic  Minimum interval has been met for this vaccine: Yes  ABN completed: Yes    VIS Dated  2020 was given to patient: Yes  All IAC Questionnaire questions were answered \"No.\"    Patient tolerated injection and no adverse effects were observed or reported: Yes    Pt scheduled for next dose in series: No  "

## 2021-10-08 ENCOUNTER — TELEPHONE (OUTPATIENT)
Dept: MEDICAL GROUP | Facility: PHYSICIAN GROUP | Age: 71
End: 2021-10-08

## 2021-10-08 NOTE — TELEPHONE ENCOUNTER
NEW PATIENT VISIT PRE-VISIT PLANNING    1.  [employer] Patient is checked in Patient Demographics?Yes    2.  Immunizations were updated in Epic using Reconcile Outside Information activity? Yes         3.  Is this appointment scheduled as a Hospital Follow-Up? No    4.  Patient is due for the following Health Maintenance Topics:   Health Maintenance Due   Topic Date Due   • BONE DENSITY  Never done   • URINE ACR / MICROALBUMIN  12/03/2020   • IMM INFLUENZA (1) 09/01/2021       5.  Reviewed/Updated the following with patient:       •   Preferred Pharmacy? Yes       •   Preferred Lab? Yes       •   Preferred Communication? Yes       •   Allergies? Yes       •   Medications? YES. Was Abstract Encounter opened and chart updated? YES       •   Social History? No       •   Family History (document living status of immediate family members and if + hx of  cancer, diabetes, hypertension, hyperlipidemia, heart attack, stroke) No    6.  Updated Care Team?       •   DME Company (gait device, O2, CPAP, etc.) NO       •   Other Specialists (eye doctor, derm, GYN, cardiology, endo, etc): NO    7.  AHA (Puls8) form printed for Provider? No, already completed

## 2021-10-12 ENCOUNTER — OFFICE VISIT (OUTPATIENT)
Dept: MEDICAL GROUP | Facility: PHYSICIAN GROUP | Age: 71
End: 2021-10-12
Payer: MEDICARE

## 2021-10-12 VITALS
RESPIRATION RATE: 15 BRPM | TEMPERATURE: 98.7 F | OXYGEN SATURATION: 92 % | BODY MASS INDEX: 31.92 KG/M2 | HEIGHT: 64 IN | HEART RATE: 63 BPM | WEIGHT: 187 LBS | SYSTOLIC BLOOD PRESSURE: 130 MMHG | DIASTOLIC BLOOD PRESSURE: 60 MMHG

## 2021-10-12 DIAGNOSIS — I48.21 PERMANENT ATRIAL FIBRILLATION (HCC): ICD-10-CM

## 2021-10-12 DIAGNOSIS — E78.5 DYSLIPIDEMIA ASSOCIATED WITH TYPE 2 DIABETES MELLITUS (HCC): ICD-10-CM

## 2021-10-12 DIAGNOSIS — N39.0 E. COLI UTI: ICD-10-CM

## 2021-10-12 DIAGNOSIS — E11.9 TYPE 2 DIABETES MELLITUS WITHOUT COMPLICATION, WITHOUT LONG-TERM CURRENT USE OF INSULIN (HCC): ICD-10-CM

## 2021-10-12 DIAGNOSIS — Z23 NEED FOR INFLUENZA VACCINATION: ICD-10-CM

## 2021-10-12 DIAGNOSIS — E11.69 DYSLIPIDEMIA ASSOCIATED WITH TYPE 2 DIABETES MELLITUS (HCC): ICD-10-CM

## 2021-10-12 DIAGNOSIS — D64.9 NORMOCYTIC ANEMIA: ICD-10-CM

## 2021-10-12 DIAGNOSIS — E55.9 VITAMIN D DEFICIENCY: ICD-10-CM

## 2021-10-12 DIAGNOSIS — N18.32 STAGE 3B CHRONIC KIDNEY DISEASE: ICD-10-CM

## 2021-10-12 DIAGNOSIS — R80.9 TYPE 2 DIABETES MELLITUS WITH MICROALBUMINURIA, WITHOUT LONG-TERM CURRENT USE OF INSULIN (HCC): ICD-10-CM

## 2021-10-12 DIAGNOSIS — B96.20 E. COLI UTI: ICD-10-CM

## 2021-10-12 DIAGNOSIS — E11.29 TYPE 2 DIABETES MELLITUS WITH MICROALBUMINURIA, WITHOUT LONG-TERM CURRENT USE OF INSULIN (HCC): ICD-10-CM

## 2021-10-12 PROBLEM — Z79.01 ANTICOAGULATED ON WARFARIN: Status: RESOLVED | Noted: 2017-02-13 | Resolved: 2021-10-12

## 2021-10-12 PROCEDURE — G0008 ADMIN INFLUENZA VIRUS VAC: HCPCS | Performed by: INTERNAL MEDICINE

## 2021-10-12 PROCEDURE — 90662 IIV NO PRSV INCREASED AG IM: CPT | Performed by: INTERNAL MEDICINE

## 2021-10-12 PROCEDURE — 99215 OFFICE O/P EST HI 40 MIN: CPT | Mod: 25 | Performed by: INTERNAL MEDICINE

## 2021-10-12 ASSESSMENT — FIBROSIS 4 INDEX: FIB4 SCORE: 1.94

## 2021-10-12 NOTE — PROGRESS NOTES
Subjective:   Chief Complaint/History of Present Illness:  Salma Lees is a 71 y.o. female established patient who presents today to discuss medical problems as listed below. Salma is unaccompanied for today's visit.    Problem   Dyslipidemia Associated With Type 2 Diabetes Mellitus (Hcc)       Ref. Range 6/2/2021 12:03 9/13/2021 08:43   Cholesterol,Tot Latest Ref Range: 100 - 199 mg/dL 175 234 (H)   Triglycerides Latest Ref Range: 0 - 149 mg/dL 279 (H) 375 (H)   HDL Latest Ref Range: >=40 mg/dL 41 36 (A)   LDL Latest Ref Range: <100 mg/dL 78 123 (H)     She appreciated vague discomfort in her back and was worried that the atorvastatin was causing kidney irritation so she stopped taking her 20 mg dose in June 2021 and then added back 10 mg in September 2021.    Current regimen: Atorvastatin 10 mg daily     Ckd (Chronic Kidney Disease) Stage 3, Gfr 30-59 Ml/Min ([facility])       Ref. Range 9/13/2021 08:43   Bun Latest Ref Range: 8 - 22 mg/dL 35 (H)   Creatinine Latest Ref Range: 0.50 - 1.40 mg/dL 1.52 (H)   GFR If Non  Latest Ref Range: >60 mL/min/1.73 m 2 34 (A)     She has a history of chronic kidney disease likely secondary to heart disease, nephrolithiasis, and diabetes.  She is on several renally excreted medications including spironolactone, Lasix, and digoxin. No secondary evaluation completed. GFR previously in the high 50s and now in the low 30s.     Normocytic Anemia       Ref. Range 12/3/2019 10:41   Hemoglobin Latest Ref Range: 12.0 - 16.0 g/dL 11.7 (L)   Hematocrit Latest Ref Range: 37.0 - 47.0 % 38.6   MCV Latest Ref Range: 81.4 - 97.8 fL 90.4     She has a history of normocytic anemia likely secondary to anemia of chronic disease from chronic kidney disease and heart disease.  Not currently using using any iron supplementation.     Atrial Fibrillation (Hcc)    Diagnosed in 2014 following repair of her mitral valve.  repair of her mitral valve.  She required hospitalization in  February 2015 due to CHF and A. fib with RVR.  She experienced significant bradycardia necessitating permanent pacemaker.  She is in permanent A. fib at this time.  She is using anticoagulation in the form of warfarin, she denies any bleeding issues.  Rate control achieved through diltiazem, coreg, and digoxin.     Current regimen: Diltiazem 30 mg 3 times daily, carvedilol 50 mg at bedtime, and digoxin 125 mcg daily, warfarin dosed per pharmacy     Type 2 Diabetes Mellitus With Microalbuminuria, Without Long-Term Current Use of Insulin ([facility])       Ref. Range 9/9/2021 10:33   Glycohemoglobin Latest Ref Range: 0.0 - 5.6 % 7.1 (A)      Ref. Range 12/3/2019 10:42   Micro Alb Creat Ratio Latest Ref Range: 0 - 30 mg/g 56 (H)     She has had diabetes since at least 2017 with A1c ranging 7-14. She did not tolerate metformin. She has microalbuminuria. No known neuropathy or retinopathy.    Ozempic is cost prohibitive, interested in looking into Trulicity with assistance instead.     Anticoagulated On Warfarin (Resolved)        Current Medications:  Current Outpatient Medications Ordered in Epic   Medication Sig Dispense Refill   • ANORO ELLIPTA 62.5-25 MCG/INH AEROSOL POWDER, BREATH ACTIVATED inhaler INHALE 1 PUFF EVERY DAY AS DIRECTED 1 Each 5   • OZEMPIC, 1 MG/DOSE, 4 MG/3ML Solution Pen-injector INJECT 1MG UNDER THE SKIN EVERY 7 DAYS 3 mL 3   • umeclidinium-vilanterol (ANORO ELLIPTA) 62.5-25 MCG/INH AEROSOL POWDER, BREATH ACTIVATED inhaler Inhale 1 Puff every day. 1 Each 0   • warfarin (COUMADIN) 5 MG Tab take 1 and 1/2 tablets by mouth daily or as directed by anticoagulation clinic 135 tablet 3   • levalbuterol (XOPENEX HFA) 45 MCG/ACT inhaler Inhale 2 Puffs every four hours as needed. FOR SHORTNESS OF BREATH 1 Each 2   • spironolactone (ALDACTONE) 25 MG Tab Take 25 mg by mouth every day.     • atorvastatin (LIPITOR) 20 MG Tab Take 10 mg by mouth every evening.     • carvedilol (COREG) 25 MG Tab Take 25 mg by mouth 2  "times a day.     • furosemide (LASIX) 20 MG Tab Take 1 Tab by mouth every day. 90 Tab 1   • diltiazem (CARDIZEM) 30 MG Tab Take 30 mg by mouth 3 times a day.     • digoxin (LANOXIN) 125 MCG Tab Take 125 mcg by mouth every day.       No current [facility]-ordered facility-administered medications on file.          Objective:   Physical Exam:    Vitals: /60 (BP Location: Left arm, Patient Position: Sitting, BP Cuff Size: Adult)   Pulse 63   Temp 37.1 °C (98.7 °F) (Temporal)   Resp 15   Ht 1.626 m (5' 4\")   Wt 84.8 kg (187 lb)   SpO2 92%    BMI: Body mass index is 32.1 kg/m².  Physical Exam  Constitutional:       Appearance: She is obese.      Comments: Frail phenotype   HENT:      Right Ear: There is impacted cerumen.      Left Ear: Tympanic membrane and ear canal normal. There is no impacted cerumen.   Eyes:      Conjunctiva/sclera: Conjunctivae normal.   Cardiovascular:      Rate and Rhythm: Normal rate and regular rhythm.   Pulmonary:      Comments: Decreased aeration bibasilar, supplemental oxygen in place  Musculoskeletal:      Comments: Lymphedema in bilateral lower extremities, nonpitting   Skin:     General: Skin is warm and dry.      Comments: Venous prominence in chest/upper extremities   Psychiatric:         Mood and Affect: Mood normal.         Behavior: Behavior normal.         Thought Content: Thought content normal.         Judgment: Judgment normal.               Assessment and Plan:   Salma is a 71 y.o. female with the following:  Problem List Items Addressed This Visit     Atrial fibrillation (HCC)     Chronic and ongoing problem.  We will update kidney function but I suspect her digoxin needs to be reduced due to GFR in the low 30s.  Unclear why she has both calcium channel blocker and beta-blocker, could be due to history of CHF.  Will likely need to reach out to cardiology, she does not have a follow-up scheduled until January 2022.         Type 2 diabetes mellitus with microalbuminuria, " without long-term current use of insulin (HCC)     Chronic and ongoing problem.  A1c looks great at 7.1.  Ozempic unfortunately is cost prohibitive.  Will reach out to our pharmacist when they are in on Thursday to see if we can start the process of Trulicity instead.  We will also get her in touch with a representative at [facility] to assist with prescription drug coverage.  She has a month left of Trulicity so she will continue that at this point.  We will update urine microalbumin due to history of microalbuminuria.  She is not on ACE inhibitor or ARB.         Normocytic anemia     Previous problem that requires follow-up.  We will update blood counts to see if anemia has progressed.  Check iron levels to see if she requires replacement.         Relevant Orders    CBC WITH DIFFERENTIAL    IRON/TOTAL IRON BIND    FERRITIN    CKD (chronic kidney disease) stage 3, GFR 30-59 ml/min ([facility])     Chronic and decompensated problem.  GFR was significant drop in June 2021.  No work-up completed yet.  Will check SPEP, PTH, urine microalbumin, and urinalysis.  Pending repeat GFR will need to reduce dose of digoxin.  Would prefer to get her off either Lasix or spironolactone.  Encouraged her to have good oral hydration with water.         Relevant Orders    URINALYSIS,CULTURE IF INDICATED    MICROALBUMIN CREAT RATIO URINE    SPEP W/REFLEX TO DAYAN, A, G, M    PTH INTACT (PTH ONLY)    Comp Metabolic Panel    Dyslipidemia associated with type 2 diabetes mellitus (HCC)     Chronic and ongoing problem, held atorvastatin due to concern for kidney irritation.  She has since resumed atorvastatin 10 mg daily, will recheck levels at this time and adjust dose as needed.           Other Visit Diagnoses     Need for influenza vaccination        Relevant Orders    INFLUENZA VACCINE, HIGH DOSE (65+ ONLY) (Completed)    Vitamin D deficiency        Relevant Orders    VITAMIN D,25 HYDROXY           RTC: Return in about 2 months (around  12/12/2021).    I spent a total of 45 minutes with record review, exam, communication with the patient, communication with other providers, and documentation of this encounter.    PLEASE NOTE: This dictation was created using voice recognition software. I have made every reasonable attempt to correct obvious errors, but I expect that there are errors of grammar and possibly content that I did not discover before finalizing the note.      Leah Bonilla, DO  Geriatric and Internal Medicine  [facility]

## 2021-10-13 ENCOUNTER — PATIENT MESSAGE (OUTPATIENT)
Dept: HEALTH INFORMATION MANAGEMENT | Facility: OTHER | Age: 71
End: 2021-10-13

## 2021-10-13 NOTE — ASSESSMENT & PLAN NOTE
Previous problem that requires follow-up.  We will update blood counts to see if anemia has progressed.  Check iron levels to see if she requires replacement.

## 2021-10-13 NOTE — ASSESSMENT & PLAN NOTE
Chronic and ongoing problem.  We will update kidney function but I suspect her digoxin needs to be reduced due to GFR in the low 30s.  Unclear why she has both calcium channel blocker and beta-blocker, could be due to history of CHF.  Will likely need to reach out to cardiology, she does not have a follow-up scheduled until January 2022.

## 2021-10-13 NOTE — ASSESSMENT & PLAN NOTE
Chronic and ongoing problem, held atorvastatin due to concern for kidney irritation.  She has since resumed atorvastatin 10 mg daily, will recheck levels at this time and adjust dose as needed.

## 2021-10-13 NOTE — ASSESSMENT & PLAN NOTE
Chronic and ongoing problem.  A1c looks great at 7.1.  Ozempic unfortunately is cost prohibitive.  Will reach out to our pharmacist when they are in on Thursday to see if we can start the process of Trulicity instead.  We will also get her in touch with a representative at [facility] to assist with prescription drug coverage.  She has a month left of Trulicity so she will continue that at this point.  We will update urine microalbumin due to history of microalbuminuria.  She is not on ACE inhibitor or ARB.

## 2021-10-13 NOTE — ASSESSMENT & PLAN NOTE
Chronic and decompensated problem.  GFR was significant drop in June 2021.  No work-up completed yet.  Will check SPEP, PTH, urine microalbumin, and urinalysis.  Pending repeat GFR will need to reduce dose of digoxin.  Would prefer to get her off either Lasix or spironolactone.  Encouraged her to have good oral hydration with water.

## 2021-10-19 ENCOUNTER — HOSPITAL ENCOUNTER (OUTPATIENT)
Facility: MEDICAL CENTER | Age: 71
End: 2021-10-19
Attending: INTERNAL MEDICINE
Payer: MEDICARE

## 2021-10-19 ENCOUNTER — NON-PROVIDER VISIT (OUTPATIENT)
Dept: MEDICAL GROUP | Facility: PHYSICIAN GROUP | Age: 71
End: 2021-10-19
Payer: MEDICARE

## 2021-10-19 DIAGNOSIS — E11.9 TYPE 2 DIABETES MELLITUS WITHOUT COMPLICATION, WITHOUT LONG-TERM CURRENT USE OF INSULIN (HCC): ICD-10-CM

## 2021-10-19 DIAGNOSIS — D64.9 NORMOCYTIC ANEMIA: ICD-10-CM

## 2021-10-19 DIAGNOSIS — E55.9 VITAMIN D DEFICIENCY: ICD-10-CM

## 2021-10-19 DIAGNOSIS — N18.32 STAGE 3B CHRONIC KIDNEY DISEASE: ICD-10-CM

## 2021-10-19 PROCEDURE — 82043 UR ALBUMIN QUANTITATIVE: CPT

## 2021-10-19 PROCEDURE — 82607 VITAMIN B-12: CPT

## 2021-10-19 PROCEDURE — 87077 CULTURE AEROBIC IDENTIFY: CPT

## 2021-10-19 PROCEDURE — 85025 COMPLETE CBC W/AUTO DIFF WBC: CPT

## 2021-10-19 PROCEDURE — 36415 COLL VENOUS BLD VENIPUNCTURE: CPT | Performed by: INTERNAL MEDICINE

## 2021-10-19 PROCEDURE — 82728 ASSAY OF FERRITIN: CPT

## 2021-10-19 PROCEDURE — 81001 URINALYSIS AUTO W/SCOPE: CPT

## 2021-10-19 PROCEDURE — 84165 PROTEIN E-PHORESIS SERUM: CPT

## 2021-10-19 PROCEDURE — 82570 ASSAY OF URINE CREATININE: CPT

## 2021-10-19 PROCEDURE — 80053 COMPREHEN METABOLIC PANEL: CPT

## 2021-10-19 PROCEDURE — 83540 ASSAY OF IRON: CPT

## 2021-10-19 PROCEDURE — 99000 SPECIMEN HANDLING OFFICE-LAB: CPT | Performed by: INTERNAL MEDICINE

## 2021-10-19 PROCEDURE — 84155 ASSAY OF PROTEIN SERUM: CPT

## 2021-10-19 PROCEDURE — 87186 SC STD MICRODIL/AGAR DIL: CPT

## 2021-10-19 PROCEDURE — 83970 ASSAY OF PARATHORMONE: CPT

## 2021-10-19 PROCEDURE — 82306 VITAMIN D 25 HYDROXY: CPT

## 2021-10-19 PROCEDURE — 87086 URINE CULTURE/COLONY COUNT: CPT

## 2021-10-19 PROCEDURE — 83550 IRON BINDING TEST: CPT

## 2021-10-19 NOTE — PROGRESS NOTES
Patient arrived for blood draw.   Verified patient's name/date-of-birth and reason for visit before procedure was started. Patient's right antecubital cleansed. Venipuncture attempts X1. Blood draw completed on patient's right AC. Applied pressure afterwards and placed Coban on site of venipuncture with directions for patient to remove within the next hour. Patient tolerated procedure well, no adverse reactions. Patient ambulated safely upon leaving clinic.   Completed labels were placed on blood samples in draw room with patient present. Appropriate blood samples were centrifuged. Blood samples were then placed in locked lab box for  pick-up.

## 2021-10-20 LAB
25(OH)D3 SERPL-MCNC: 26 NG/ML (ref 30–100)
ALBUMIN SERPL BCP-MCNC: 4.4 G/DL (ref 3.2–4.9)
ALBUMIN/GLOB SERPL: 1.1 G/DL
ALP SERPL-CCNC: 87 U/L (ref 30–99)
ALT SERPL-CCNC: 12 U/L (ref 2–50)
ANION GAP SERPL CALC-SCNC: 14 MMOL/L (ref 7–16)
APPEARANCE UR: CLEAR
AST SERPL-CCNC: 14 U/L (ref 12–45)
BACTERIA #/AREA URNS HPF: ABNORMAL /HPF
BASOPHILS # BLD AUTO: 0.5 % (ref 0–1.8)
BASOPHILS # BLD: 0.06 K/UL (ref 0–0.12)
BILIRUB SERPL-MCNC: 1.2 MG/DL (ref 0.1–1.5)
BILIRUB UR QL STRIP.AUTO: NEGATIVE
BUN SERPL-MCNC: 35 MG/DL (ref 8–22)
CALCIUM SERPL-MCNC: 10 MG/DL (ref 8.5–10.5)
CHLORIDE SERPL-SCNC: 99 MMOL/L (ref 96–112)
CO2 SERPL-SCNC: 22 MMOL/L (ref 20–33)
COLOR UR: YELLOW
CREAT SERPL-MCNC: 1.59 MG/DL (ref 0.5–1.4)
CREAT UR-MCNC: 272.81 MG/DL
EOSINOPHIL # BLD AUTO: 0.34 K/UL (ref 0–0.51)
EOSINOPHIL NFR BLD: 3.1 % (ref 0–6.9)
EPI CELLS #/AREA URNS HPF: ABNORMAL /HPF
ERYTHROCYTE [DISTWIDTH] IN BLOOD BY AUTOMATED COUNT: 55.5 FL (ref 35.9–50)
FERRITIN SERPL-MCNC: 255 NG/ML (ref 10–291)
GLOBULIN SER CALC-MCNC: 4.1 G/DL (ref 1.9–3.5)
GLUCOSE SERPL-MCNC: 162 MG/DL (ref 65–99)
GLUCOSE UR STRIP.AUTO-MCNC: NEGATIVE MG/DL
HCT VFR BLD AUTO: 42.7 % (ref 37–47)
HGB BLD-MCNC: 12.9 G/DL (ref 12–16)
HYALINE CASTS #/AREA URNS LPF: ABNORMAL /LPF
IMM GRANULOCYTES # BLD AUTO: 0.08 K/UL (ref 0–0.11)
IMM GRANULOCYTES NFR BLD AUTO: 0.7 % (ref 0–0.9)
IRON SATN MFR SERPL: 21 % (ref 15–55)
IRON SERPL-MCNC: 66 UG/DL (ref 40–170)
KETONES UR STRIP.AUTO-MCNC: ABNORMAL MG/DL
LEUKOCYTE ESTERASE UR QL STRIP.AUTO: ABNORMAL
LYMPHOCYTES # BLD AUTO: 0.88 K/UL (ref 1–4.8)
LYMPHOCYTES NFR BLD: 7.9 % (ref 22–41)
MCH RBC QN AUTO: 28.4 PG (ref 27–33)
MCHC RBC AUTO-ENTMCNC: 30.2 G/DL (ref 33.6–35)
MCV RBC AUTO: 94.1 FL (ref 81.4–97.8)
MICRO URNS: ABNORMAL
MICROALBUMIN UR-MCNC: 5.2 MG/DL
MICROALBUMIN/CREAT UR: 19 MG/G (ref 0–30)
MONOCYTES # BLD AUTO: 0.51 K/UL (ref 0–0.85)
MONOCYTES NFR BLD AUTO: 4.6 % (ref 0–13.4)
NEUTROPHILS # BLD AUTO: 9.21 K/UL (ref 2–7.15)
NEUTROPHILS NFR BLD: 83.2 % (ref 44–72)
NITRITE UR QL STRIP.AUTO: NEGATIVE
NRBC # BLD AUTO: 0 K/UL
NRBC BLD-RTO: 0 /100 WBC
PH UR STRIP.AUTO: 5 [PH] (ref 5–8)
PLATELET # BLD AUTO: 199 K/UL (ref 164–446)
PMV BLD AUTO: 12.8 FL (ref 9–12.9)
POTASSIUM SERPL-SCNC: 4.9 MMOL/L (ref 3.6–5.5)
PROT SERPL-MCNC: 8.5 G/DL (ref 6–8.2)
PROT UR QL STRIP: NEGATIVE MG/DL
PTH-INTACT SERPL-MCNC: 58.1 PG/ML (ref 14–72)
RBC # BLD AUTO: 4.54 M/UL (ref 4.2–5.4)
RBC # URNS HPF: ABNORMAL /HPF
RBC UR QL AUTO: NEGATIVE
SODIUM SERPL-SCNC: 135 MMOL/L (ref 135–145)
SP GR UR STRIP.AUTO: 1.02
TIBC SERPL-MCNC: 311 UG/DL (ref 250–450)
UIBC SERPL-MCNC: 245 UG/DL (ref 110–370)
UROBILINOGEN UR STRIP.AUTO-MCNC: 0.2 MG/DL
VIT B12 SERPL-MCNC: 383 PG/ML (ref 211–911)
WBC # BLD AUTO: 11.1 K/UL (ref 4.8–10.8)
WBC #/AREA URNS HPF: ABNORMAL /HPF

## 2021-10-21 RX ORDER — CEFDINIR 300 MG/1
300 CAPSULE ORAL 2 TIMES DAILY
Qty: 10 CAPSULE | Refills: 0 | Status: SHIPPED | OUTPATIENT
Start: 2021-10-21 | End: 2021-10-26 | Stop reason: SDUPTHER

## 2021-10-22 ENCOUNTER — TELEPHONE (OUTPATIENT)
Dept: MEDICAL GROUP | Facility: PHYSICIAN GROUP | Age: 71
End: 2021-10-22

## 2021-10-22 NOTE — TELEPHONE ENCOUNTER
----- Message from Leah Bonilla D.O. sent at 10/21/2021  7:17 PM PDT -----  Let patient know she has E.coli in her urine and elevated WBC. I am going to send antibiotic for her pharmacy for her to take for a few days. Remaining labs we will go through at follow up.

## 2021-10-22 NOTE — TELEPHONE ENCOUNTER
Phone Number Called: [phone] (home)       Call outcome: Left detailed message for patient. Informed to call back with any additional questions.    Message: lab results. Lm

## 2021-10-23 LAB
ALBUMIN SERPL ELPH-MCNC: 3.91 G/DL (ref 3.75–5.01)
ALPHA1 GLOB SERPL ELPH-MCNC: 0.39 G/DL (ref 0.19–0.46)
ALPHA2 GLOB SERPL ELPH-MCNC: 0.82 G/DL (ref 0.48–1.05)
B-GLOBULIN SERPL ELPH-MCNC: 1.11 G/DL (ref 0.48–1.1)
GAMMA GLOB SERPL ELPH-MCNC: 1.46 G/DL (ref 0.62–1.51)
INTERPRETATION SERPL IFE-IMP: ABNORMAL
MONOCLON BAND OBS SERPL: ABNORMAL
PATHOLOGY STUDY: ABNORMAL
PROT SERPL-MCNC: 7.7 G/DL (ref 6.3–8.2)

## 2021-10-26 DIAGNOSIS — N39.0 E. COLI UTI: ICD-10-CM

## 2021-10-26 DIAGNOSIS — B96.20 E. COLI UTI: ICD-10-CM

## 2021-10-26 RX ORDER — CEFDINIR 300 MG/1
300 CAPSULE ORAL 2 TIMES DAILY
Qty: 10 CAPSULE | Refills: 0 | Status: SHIPPED | OUTPATIENT
Start: 2021-10-26 | End: 2022-01-06

## 2021-10-26 NOTE — TELEPHONE ENCOUNTER
Received request via: Patient    Was the patient seen in the last year in this department? Yes    Does the patient have an active prescription (recently filled or refills available) for medication(s) requested? No       Send to [facility] not [facility]

## 2021-11-08 ENCOUNTER — ANTICOAGULATION VISIT (OUTPATIENT)
Dept: MEDICAL GROUP | Facility: MEDICAL CENTER | Age: 71
End: 2021-11-08
Payer: MEDICARE

## 2021-11-08 DIAGNOSIS — Z98.890 HISTORY OF MITRAL VALVE REPAIR: ICD-10-CM

## 2021-11-08 LAB — INR PPP: 2.6 (ref 2–3.5)

## 2021-11-08 PROCEDURE — 85610 PROTHROMBIN TIME: CPT | Performed by: FAMILY MEDICINE

## 2021-11-08 PROCEDURE — 93793 ANTICOAG MGMT PT WARFARIN: CPT | Performed by: FAMILY MEDICINE

## 2021-11-08 PROCEDURE — 99999 PR NO CHARGE: CPT | Performed by: FAMILY MEDICINE

## 2021-11-08 NOTE — PROGRESS NOTES
Anticoagulation Summary  As of 2021    INR goal:  2.0-3.0   TTR:  53.6 % (4.6 y)   INR used for dosin.60 (2021)   Warfarin maintenance plan:  7.5 mg (5 mg x 1.5) every Sun, e, Thu; 5 mg (5 mg x 1) all other days   Weekly warfarin total:  42.5 mg   Plan last modified:  Sakshi Mcconnell PharmD (2020)   Next INR check:  1/3/2022   Target end date:  Indefinite    Indications    Atrial fibrillation (HCC) [I48.91]  History of mitral valve repair [Z98.890]             Anticoagulation Episode Summary     INR check location:      Preferred lab:      Send INR reminders to:      Comments:        Anticoagulation Care Providers     Provider Role Specialty Phone number    [email or web address] [email or web address] Referring Family Medicine [phone]    [facility] Responsible  [phone]    Joleen BoxD Responsible                  Refer to Patient Findings for HPI:  Patient Findings     Negatives:  Signs/symptoms of thrombosis, Signs/symptoms of bleeding, Laboratory test error suspected, Change in health, Change in alcohol use, Change in activity, Upcoming invasive procedure, Emergency department visit, Upcoming dental procedure, Missed doses, Extra doses, Change in medications, Change in diet/appetite, Hospital admission, Bruising, Other complaints          There were no vitals filed for this visit.   pt declined vitals    Verified current warfarin dosing schedule.    Medications reconciled   Pt is not on antiplatelet therapy      A/P   INR  therapeutic.     Warfarin dosing recommendation: Pt is to continue with current warfarin dosing regimen.    Pt educated to contact our clinic with any changes in medications or s/s of bleeding or thrombosis. Pt is aware to seek immediate medical attention for falls, head injury or deep cuts.    Follow up appointment in 8 week(s).    Kaylin Fiore, PharmD

## 2021-12-06 ENCOUNTER — APPOINTMENT (OUTPATIENT)
Dept: PULMONOLOGY | Facility: MEDICAL CENTER | Age: 71
End: 2021-12-06
Payer: MEDICARE

## 2021-12-07 ENCOUNTER — APPOINTMENT (OUTPATIENT)
Dept: RADIOLOGY | Facility: MEDICAL CENTER | Age: 71
End: 2021-12-07
Attending: FAMILY MEDICINE
Payer: MEDICARE

## 2021-12-13 ENCOUNTER — APPOINTMENT (OUTPATIENT)
Dept: MEDICAL GROUP | Facility: PHYSICIAN GROUP | Age: 71
End: 2021-12-13
Payer: MEDICARE

## 2021-12-17 ENCOUNTER — APPOINTMENT (OUTPATIENT)
Dept: SLEEP MEDICINE | Facility: MEDICAL CENTER | Age: 71
End: 2021-12-17
Payer: MEDICARE

## 2022-01-03 ENCOUNTER — ANTICOAGULATION VISIT (OUTPATIENT)
Dept: MEDICAL GROUP | Facility: MEDICAL CENTER | Age: 72
End: 2022-01-03
Payer: MEDICARE

## 2022-01-03 DIAGNOSIS — Z79.01 CHRONIC ANTICOAGULATION: Primary | ICD-10-CM

## 2022-01-03 DIAGNOSIS — Z98.890 HISTORY OF MITRAL VALVE REPAIR: ICD-10-CM

## 2022-01-03 LAB — INR PPP: 1.9 (ref 2–3.5)

## 2022-01-03 PROCEDURE — 85610 PROTHROMBIN TIME: CPT | Performed by: INTERNAL MEDICINE

## 2022-01-03 PROCEDURE — 93793 ANTICOAG MGMT PT WARFARIN: CPT | Performed by: INTERNAL MEDICINE

## 2022-01-03 NOTE — PROGRESS NOTES
OP Anticoagulation Service Note    Date: 1/3/2022  There were no vitals filed for this visit.   pt declined vitals    Anticoagulation Summary  As of 1/3/2022    INR goal:  2.0-3.0   TTR:  53.6 % (4.6 y)   INR used for dosing:     Warfarin maintenance plan:  7.5 mg (5 mg x 1.5) every Sun, Tue, Thu; 5 mg (5 mg x 1) all other days   Weekly warfarin total:  42.5 mg   Plan last modified:  Sakshi Mcconnell, PharmD (8/6/2020)   Next INR check:     Target end date:  Indefinite    Indications    Atrial fibrillation (HCC) [I48.91]  History of mitral valve repair [Z98.890]             Anticoagulation Episode Summary     INR check location:      Preferred lab:      Send INR reminders to:      Comments:        Anticoagulation Care Providers     Provider Role Specialty Phone number    LILIYA Pearson Referring Family Medicine [phone]    [facility] Responsible  [phone]    Kenneth Salas, PharmD Responsible Pharmacy             HPI:   Salma Lees seen in clinic today, on anticoagulation therapy with warfarin (a high risk medication) for atrial fibrillation, hx of MV repair      Pt is here today to evaluate anticoagulation therapy    Previous INR was  2.6 on 11/8/2021    Pt was instructed to continue current regimen    Anticoagulation Patient Findings  Patient Findings     Negatives:  Signs/symptoms of thrombosis, Signs/symptoms of bleeding, Laboratory test error suspected, Change in health, Change in alcohol use, Change in activity, Upcoming invasive procedure, Emergency department visit, Upcoming dental procedure, Missed doses, Extra doses, Change in medications, Change in diet/appetite, Hospital admission, Bruising, Other complaints          Confirmed warfarin dosing regimen    Pt is not on antiplatelet/NSAID therapy    Falls or accidents since last visit No        A/P   INR  - sub therapeutic today, but close to range. INR likely low d/t missed dose last week.   Continue current  warfarin regimen            Pt educated to contact our clinic with any changes in medications or s/s of bleeding or thrombosis. Pt is aware to seek immediate medical attention for falls, head injury or deep cuts    Follow up appointment in 8 week(s) to reduce risk of adverse events from warfarin  Sakshi Mcconnell, PharmD

## 2022-01-06 ENCOUNTER — OFFICE VISIT (OUTPATIENT)
Dept: MEDICAL GROUP | Facility: PHYSICIAN GROUP | Age: 72
End: 2022-01-06
Payer: MEDICARE

## 2022-01-06 VITALS
DIASTOLIC BLOOD PRESSURE: 60 MMHG | RESPIRATION RATE: 16 BRPM | WEIGHT: 186 LBS | HEART RATE: 58 BPM | HEIGHT: 64 IN | SYSTOLIC BLOOD PRESSURE: 128 MMHG | TEMPERATURE: 97 F | OXYGEN SATURATION: 96 % | BODY MASS INDEX: 31.76 KG/M2

## 2022-01-06 DIAGNOSIS — I48.21 PERMANENT ATRIAL FIBRILLATION (HCC): ICD-10-CM

## 2022-01-06 DIAGNOSIS — E78.5 DYSLIPIDEMIA ASSOCIATED WITH TYPE 2 DIABETES MELLITUS (HCC): ICD-10-CM

## 2022-01-06 DIAGNOSIS — D68.69 SECONDARY HYPERCOAGULABLE STATE (HCC): ICD-10-CM

## 2022-01-06 DIAGNOSIS — N18.32 TYPE 2 DIABETES MELLITUS WITH STAGE 3B CHRONIC KIDNEY DISEASE, WITHOUT LONG-TERM CURRENT USE OF INSULIN (HCC): ICD-10-CM

## 2022-01-06 DIAGNOSIS — E11.69 DYSLIPIDEMIA ASSOCIATED WITH TYPE 2 DIABETES MELLITUS (HCC): ICD-10-CM

## 2022-01-06 DIAGNOSIS — E11.29 TYPE 2 DIABETES MELLITUS WITH MICROALBUMINURIA, WITHOUT LONG-TERM CURRENT USE OF INSULIN (HCC): ICD-10-CM

## 2022-01-06 DIAGNOSIS — E11.22 TYPE 2 DIABETES MELLITUS WITH STAGE 3B CHRONIC KIDNEY DISEASE, WITHOUT LONG-TERM CURRENT USE OF INSULIN (HCC): ICD-10-CM

## 2022-01-06 DIAGNOSIS — N18.32 STAGE 3B CHRONIC KIDNEY DISEASE: ICD-10-CM

## 2022-01-06 DIAGNOSIS — R80.9 TYPE 2 DIABETES MELLITUS WITH MICROALBUMINURIA, WITHOUT LONG-TERM CURRENT USE OF INSULIN (HCC): ICD-10-CM

## 2022-01-06 LAB
HBA1C MFR BLD: 10.7 % (ref 0–5.6)
INT CON NEG: ABNORMAL
INT CON POS: ABNORMAL

## 2022-01-06 PROCEDURE — 83036 HEMOGLOBIN GLYCOSYLATED A1C: CPT | Performed by: INTERNAL MEDICINE

## 2022-01-06 PROCEDURE — 99214 OFFICE O/P EST MOD 30 MIN: CPT | Performed by: INTERNAL MEDICINE

## 2022-01-06 ASSESSMENT — PATIENT HEALTH QUESTIONNAIRE - PHQ9: CLINICAL INTERPRETATION OF PHQ2 SCORE: 0

## 2022-01-06 ASSESSMENT — FIBROSIS 4 INDEX: FIB4 SCORE: 1.46

## 2022-01-07 NOTE — ASSESSMENT & PLAN NOTE
Chronic and decompensated problem.  She cannot afford the Ozempic.  Will submit paperwork for patient assistance with Trulicity.  We will set her up to discuss with Tashia Wesley to see if there are other medicine options or coverage assistance available to her.  Urine microalbumin has normalized. GFR remains in the low 30s.

## 2022-01-07 NOTE — ASSESSMENT & PLAN NOTE
Chronic and ongoing problem, continue warfarin, dosing per pharmacist with anticoagulation clinic.

## 2022-01-07 NOTE — ASSESSMENT & PLAN NOTE
Chronic and ongoing problem, worsened when she reduced her atorvastatin dose. Continue follow up with cardiology as recommended.

## 2022-01-07 NOTE — ASSESSMENT & PLAN NOTE
Chronic and ongoing problem.  Believes that she will require a battery exchange in the near future and her cardiologist at [place] has asked her to arrange this at [facility].  She has follow-up on January 13 with Dr. Prabhakar.  Asked her to let me know when she knows rough timing for the battery change and I can set her up with one of our electrophysiologists.

## 2022-01-07 NOTE — PROGRESS NOTES
Subjective:   Chief Complaint/History of Present Illness:  Salma Lees is a 71 y.o. female established patient who presents today to discuss medical problems as listed below. Salma is unaccompanied for today's visit.    Problem   Dyslipidemia Associated With Type 2 Diabetes Mellitus (Hcc)       Ref. Range 6/2/2021 12:03 9/13/2021 08:43   Cholesterol,Tot Latest Ref Range: 100 - 199 mg/dL 175 234 (H)   Triglycerides Latest Ref Range: 0 - 149 mg/dL 279 (H) 375 (H)   HDL Latest Ref Range: >=40 mg/dL 41 36 (A)   LDL Latest Ref Range: <100 mg/dL 78 123 (H)     She appreciated vague discomfort in her back and was worried that the atorvastatin was causing kidney irritation so she stopped taking her 20 mg dose in June 2021 and then added back 10 mg in September 2021.    Current regimen: Atorvastatin 10 mg daily     Secondary Hypercoagulable State (Hcc)    She is on coumadin for stroke prophylaxis due to atrial fibrillation. No signs or symptoms of bruising/bleeding. She follows with the anticoagulation clinic.    Current regimen: warfarin dosed by pharmacist     Ckd (Chronic Kidney Disease) Stage 3, Gfr 30-59 Ml/Min ([facility])       Ref. Range 10/19/2021 13:10   Bun Latest Ref Range: 8 - 22 mg/dL 35 (H)   Creatinine Latest Ref Range: 0.50 - 1.40 mg/dL 1.59 (H)   GFR If Non  Latest Ref Range: >60 mL/min/1.73 m 2 32 (A)     She has a history of chronic kidney disease likely secondary to heart disease, nephrolithiasis, and diabetes.  She is on several renally excreted medications including spironolactone, Lasix, and digoxin. No secondary evaluation completed. GFR previously in the high 50s and now in the low 30s.     Atrial Fibrillation (Hcc)    Diagnosed in 2014 following repair of her mitral valve.  repair of her mitral valve.  She required hospitalization in February 2015 due to CHF and A. fib with RVR.  She experienced significant bradycardia necessitating permanent pacemaker.  She is in permanent A.  fib at this time.  She is using anticoagulation in the form of warfarin, she denies any bleeding issues.  Rate control achieved through diltiazem, coreg, and digoxin.     Current regimen: Diltiazem 30 mg 3 times daily, carvedilol 50 mg at bedtime, and digoxin 125 mcg daily, warfarin dosed per pharmacy     Type 2 Diabetes Mellitus With Stage 3b Chronic Kidney Disease, Without Long-Term Current Use of Insulin ([facility])       Ref. Range 9/9/2021 10:33 1/6/2022 16:57   Glycohemoglobin Latest Ref Range: 0.0 - 5.6 % 7.1 (A) 10.7 (A)      Ref. Range 10/19/2021 13:10   Micro Alb Creat Ratio Latest Ref Range: 0 - 30 mg/g 19     She has had diabetes since at least 2017 with A1c ranging 7-14. She did not tolerate metformin. She previously had microalbuminuria, currently resolved. No known neuropathy or retinopathy.    Ozempic is cost prohibitive, interested in looking into Trulicity with assistance instead. She brought in paperwork today.          Current Medications:  Current Outpatient Medications Ordered in Epic   Medication Sig Dispense Refill   • umeclidinium-vilanterol (ANORO ELLIPTA) 62.5-25 MCG/INH AEROSOL POWDER, BREATH ACTIVATED inhaler Inhale 1 Puff every day. 1 Each 0   • warfarin (COUMADIN) 5 MG Tab take 1 and 1/2 tablets by mouth daily or as directed by anticoagulation clinic 135 tablet 3   • levalbuterol (XOPENEX HFA) 45 MCG/ACT inhaler Inhale 2 Puffs every four hours as needed. FOR SHORTNESS OF BREATH 1 Each 2   • spironolactone (ALDACTONE) 25 MG Tab Take 25 mg by mouth every day.     • atorvastatin (LIPITOR) 20 MG Tab Take 10 mg by mouth every evening.     • carvedilol (COREG) 25 MG Tab Take 25 mg by mouth 2 times a day.     • furosemide (LASIX) 20 MG Tab Take 1 Tab by mouth every day. 90 Tab 1   • digoxin (LANOXIN) 125 MCG Tab Take 125 mcg by mouth every day.     • ANORO ELLIPTA 62.5-25 MCG/INH AEROSOL POWDER, BREATH ACTIVATED inhaler INHALE 1 PUFF EVERY DAY AS DIRECTED 1 Each 5   • OZEMPIC, 1 MG/DOSE, 4 MG/3ML  "Solution Pen-injector INJECT 1MG UNDER THE SKIN EVERY 7 DAYS (Patient not taking: Reported on 1/6/2022) 3 mL 3   • diltiazem (CARDIZEM) 30 MG Tab Take 30 mg by mouth 3 times a day. (Patient not taking: Reported on 1/3/2022)       No current [facility]-ordered facility-administered medications on file.          Objective:   Physical Exam:    Vitals: /60 (BP Location: Left arm, Patient Position: Sitting, BP Cuff Size: Adult)   Pulse (!) 58   Temp 36.1 °C (97 °F) (Temporal)   Resp 16   Ht 1.626 m (5' 4\")   Wt 84.4 kg (186 lb)   SpO2 96%    BMI: Body mass index is 31.93 kg/m².  Physical Exam  Constitutional:       General: She is not in acute distress.     Appearance: Normal appearance. She is normal weight.   Eyes:      General: No scleral icterus.     Conjunctiva/sclera: Conjunctivae normal.   Cardiovascular:      Comments: Paced rhythm  Pulmonary:      Effort: Pulmonary effort is normal. No respiratory distress.      Comments: Supplemental oxygen in place  Skin:     General: Skin is warm and dry.      Findings: No bruising or rash.   Psychiatric:         Mood and Affect: Mood normal.         Behavior: Behavior normal.         Thought Content: Thought content normal.         Judgment: Judgment normal.          Assessment and Plan:   Salma is a 71 y.o. female with the following:  Problem List Items Addressed This Visit     Atrial fibrillation (HCC)     Chronic and ongoing problem.  Believes that she will require a battery exchange in the near future and her cardiologist at [facility] has asked her to arrange this at [facility].  She has follow-up on January 13 with Dr. Prabhakar.  Asked her to let me know when she knows rough timing for the battery change and I can set her up with one of our electrophysiologists.         Type 2 diabetes mellitus with stage 3b chronic kidney disease, without long-term current use of insulin (HCC)     Chronic and decompensated problem.  She cannot afford the Ozempic.  Will submit " paperwork for patient assistance with Trulicity.  We will set her up to discuss with Tashia Wesley to see if there are other medicine options or coverage assistance available to her.  Urine microalbumin has normalized. GFR remains in the low 30s.         Relevant Orders    POCT A1C (Completed)    CKD (chronic kidney disease) stage 3, GFR 30-59 ml/min (HCC)     Chronic and ongoing problem, steady drop in GFR likely related to heart disease, medications, nephrolithiasis, and diabetes. Also worry about her BP being over treated, she will talk to cardiology on 1/13 about reducing her regimen. Continue with periodic follow up.         Secondary hypercoagulable state (HCC)     Chronic and ongoing problem, continue warfarin, dosing per pharmacist with anticoagulation clinic.         Dyslipidemia associated with type 2 diabetes mellitus (HCC)     Chronic and ongoing problem, worsened when she reduced her atorvastatin dose. Continue follow up with cardiology as recommended.                  Annual Health Assessment Questions:    1.  Are you currently engaging in any exercise or physical activity? No  Complex gym has people not wearing masks and she won't go   2.  How would you describe your mood or emotional well-being today? good    3.  Have you had any falls in the last year? No    4.  Have you noticed any problems with your balance or had difficulty walking? No    5.  In the last six months have you experienced any leakage of urine? Yes  Lasix   Wears pad  6. DPA/Advanced Directive: Patient has Advanced Directive, but it is not on file. Instructed to bring in a copy to scan into their chart.       RTC: Return in about 3 months (around 4/6/2022).    I spent a total of 34 minutes with record review, exam, communication with the patient, communication with other providers, and documentation of this encounter.    PLEASE NOTE: This dictation was created using voice recognition software. I have made every reasonable attempt to  correct obvious errors, but I expect that there are errors of grammar and possibly content that I did not discover before finalizing the note.      Leah Bonilla, DO  Geriatric and Internal Medicine  [facility]

## 2022-01-07 NOTE — ASSESSMENT & PLAN NOTE
Chronic and ongoing problem, steady drop in GFR likely related to heart disease, medications, nephrolithiasis, and diabetes. Also worry about her BP being over treated, she will talk to cardiology on 1/13 about reducing her regimen. Continue with periodic follow up.

## 2022-02-02 ENCOUNTER — TELEPHONE (OUTPATIENT)
Dept: MEDICAL GROUP | Facility: PHYSICIAN GROUP | Age: 72
End: 2022-02-02

## 2022-02-02 NOTE — TELEPHONE ENCOUNTER
Called pt to question whether she received Trulicity pens from [facility]? No answer, LVM asking pt to call me back.   We were to increase pen dose if pt tolerated the first 4 weeks of 0.75mg. Awaiting pt response.

## 2022-02-02 NOTE — TELEPHONE ENCOUNTER
Pt returned call, she received Trulicity pens and injected first pen last Sunday (1/30/22). So far she denies any s/e. Salma will send in MyChart msg to update us on medication toleration. Updated Dr. Bonilla.

## 2022-02-07 DIAGNOSIS — Z79.01 CHRONIC ANTICOAGULATION: ICD-10-CM

## 2022-02-09 ENCOUNTER — TELEPHONE (OUTPATIENT)
Dept: MEDICAL GROUP | Facility: PHYSICIAN GROUP | Age: 72
End: 2022-02-09

## 2022-02-09 DIAGNOSIS — N18.32 TYPE 2 DIABETES MELLITUS WITH STAGE 3B CHRONIC KIDNEY DISEASE, WITHOUT LONG-TERM CURRENT USE OF INSULIN (HCC): ICD-10-CM

## 2022-02-09 DIAGNOSIS — E11.22 TYPE 2 DIABETES MELLITUS WITH STAGE 3B CHRONIC KIDNEY DISEASE, WITHOUT LONG-TERM CURRENT USE OF INSULIN (HCC): ICD-10-CM

## 2022-02-09 NOTE — TELEPHONE ENCOUNTER
Incoming massage from pt stating she is tolerating the Trulicity 0.75mg she started injecting on Sundays- her starting date was 1/30/2022.    Irene wants to increase dosage monthly if tolerated. Sent new dosage increase to [facility] to dispense

## 2022-02-28 ENCOUNTER — ANTICOAGULATION VISIT (OUTPATIENT)
Dept: MEDICAL GROUP | Facility: MEDICAL CENTER | Age: 72
End: 2022-02-28
Payer: MEDICARE

## 2022-02-28 DIAGNOSIS — Z98.890 HISTORY OF MITRAL VALVE REPAIR: ICD-10-CM

## 2022-02-28 DIAGNOSIS — Z79.01 CHRONIC ANTICOAGULATION: Primary | ICD-10-CM

## 2022-02-28 LAB — INR PPP: 1.6 (ref 2–3.5)

## 2022-02-28 PROCEDURE — 93793 ANTICOAG MGMT PT WARFARIN: CPT | Performed by: FAMILY MEDICINE

## 2022-02-28 PROCEDURE — 85610 PROTHROMBIN TIME: CPT | Performed by: FAMILY MEDICINE

## 2022-02-28 RX ORDER — POTASSIUM CHLORIDE 1500 MG/1
10 TABLET, EXTENDED RELEASE ORAL DAILY
COMMUNITY
End: 2022-03-04 | Stop reason: SDUPTHER

## 2022-02-28 NOTE — PROGRESS NOTES
OP Anticoagulation Service Note    Date: 2022  There were no vitals filed for this visit.   pt declined vitals    Anticoagulation Summary  As of 2022    INR goal:  2.0-3.0   TTR:  52.9 % (5 y)   INR used for dosin.60 (2022)   Warfarin maintenance plan:  7.5 mg (5 mg x 1.5) every Sun, Tue, Thu; 5 mg (5 mg x 1) all other days   Weekly warfarin total:  42.5 mg   Plan last modified:  Sakshi Mcconnell, PharmD (2020)   Next INR check:  3/14/2022   Target end date:  Indefinite    Indications    Atrial fibrillation (HCC) [I48.91]  History of mitral valve repair [Z98.890]             Anticoagulation Episode Summary     INR check location:      Preferred lab:      Send INR reminders to:      Comments:        Anticoagulation Care Providers     Provider Role Specialty Phone number    Zeferino Almaraz, A.P.N. Referring [facility] Medicine [phone]    [facility] Anticoagulation Services Responsible  [phone]    Kenneth Salas, PharmD Responsible Pharmacy             HPI:   Salma Lees seen in clinic today, on anticoagulation therapy with warfarin (a high risk medication) for atrial fibrillation      Pt is here today to evaluate anticoagulation therapy    Previous INR was  1.9 on 1/3/22    Pt was instructed to continue current regimen    Anticoagulation Patient Findings  Patient Findings     Positives:  Missed doses (maybe missed a dose last week), Change in medications (diltiazem stopped, started trulicity)    Negatives:  Signs/symptoms of thrombosis, Signs/symptoms of bleeding, Laboratory test error suspected, Change in health, Change in alcohol use, Change in activity, Upcoming invasive procedure, Emergency department visit, Upcoming dental procedure, Extra doses, Change in diet/appetite, Hospital admission, Bruising, Other complaints          Confirmed warfarin dosing regimen    Pt is not on antiplatelet/NSAID therapy    Falls or accidents since last visit No        A/P   INR  SUB-therapeutic  today, will require dose adjust ment today to prevent stroke and closer follow up.  Tonight 7.5 mg then resume usual regimen         Pt educated to contact our clinic with any changes in medications or s/s of bleeding or thrombosis. Pt is aware to seek immediate medical attention for falls, head injury or deep cuts    Follow up appointment in 2 week(s) to reduce risk of adverse events from warfarin  Sakshi Mcconnell, PharmD

## 2022-03-03 DIAGNOSIS — E78.5 DYSLIPIDEMIA ASSOCIATED WITH TYPE 2 DIABETES MELLITUS (HCC): ICD-10-CM

## 2022-03-03 DIAGNOSIS — E11.69 DYSLIPIDEMIA ASSOCIATED WITH TYPE 2 DIABETES MELLITUS (HCC): ICD-10-CM

## 2022-03-03 RX ORDER — ATORVASTATIN CALCIUM 10 MG/1
10 TABLET, FILM COATED ORAL NIGHTLY
Qty: 100 TABLET | Refills: 3 | Status: SHIPPED | OUTPATIENT
Start: 2022-03-03 | End: 2022-09-01 | Stop reason: SDUPTHER

## 2022-03-06 RX ORDER — POTASSIUM CHLORIDE 1500 MG/1
10 TABLET, EXTENDED RELEASE ORAL DAILY
Qty: 100 TABLET | Refills: 3 | Status: SHIPPED | OUTPATIENT
Start: 2022-03-06 | End: 2022-05-18 | Stop reason: SDUPTHER

## 2022-03-14 ENCOUNTER — ANTICOAGULATION VISIT (OUTPATIENT)
Dept: MEDICAL GROUP | Facility: MEDICAL CENTER | Age: 72
End: 2022-03-14
Payer: MEDICARE

## 2022-03-14 DIAGNOSIS — Z98.890 HISTORY OF MITRAL VALVE REPAIR: ICD-10-CM

## 2022-03-14 LAB — INR PPP: 2.1 (ref 2–3.5)

## 2022-03-14 PROCEDURE — 99999 PR NO CHARGE: CPT | Performed by: PHYSICIAN ASSISTANT

## 2022-03-14 PROCEDURE — 93793 ANTICOAG MGMT PT WARFARIN: CPT | Performed by: PHYSICIAN ASSISTANT

## 2022-03-14 PROCEDURE — 85610 PROTHROMBIN TIME: CPT | Performed by: PHYSICIAN ASSISTANT

## 2022-03-14 NOTE — PROGRESS NOTES
OP Anticoagulation Service Note    Date: 3/14/2022  There were no vitals filed for this visit.   pt declined vitals    Anticoagulation Summary  As of 3/14/2022    INR goal:  2.0-3.0   TTR:  52.6 % (5 y)   INR used for dosin.10 (3/14/2022)   Warfarin maintenance plan:  7.5 mg (5 mg x 1.5) every Sun, Tue, Thu; 5 mg (5 mg x 1) all other days   Weekly warfarin total:  42.5 mg   Plan last modified:  Sakshi Mcconnell, PharmD (2020)   Next INR check:  2022   Target end date:  Indefinite    Indications    Atrial fibrillation (HCC) [I48.91]  History of mitral valve repair [Z98.890]             Anticoagulation Episode Summary     INR check location:      Preferred lab:      Send INR reminders to:      Comments:        Anticoagulation Care Providers     Provider Role Specialty Phone number    Zeferino Almaraz, A.P.N. Referring [facility] Medicine [phone]    [facility] Anticoagulation Services Responsible  [phone]    Kenneth Salas, PharmD Responsible Pharmacy             HPI:   Salma Lees seen in clinic today, on anticoagulation therapy with warfarin (a high risk medication) for atrial fibrillation    Pt is here today to evaluate anticoagulation therapy    Previous INR was  1.6 on 22    Pt was instructed to bolus x 1 day then continue regimen    Anticoagulation Patient Findings      Confirmed warfarin dosing regimen    Pt is not on antiplatelet/NSAID therapy    Falls or accidents since last visit No        A/P   INR  therapeutic today    Pt is to continue with current warfarin dosing regimen.      check referral    Pt educated to contact our clinic with any changes in medications or s/s of bleeding or thrombosis. Pt is aware to seek immediate medical attention for falls, head injury or deep cuts    Follow up appointment in 8 week(s) to reduce risk of adverse events from warfarin  Kaylin Encinas, PharmD

## 2022-03-29 DIAGNOSIS — N18.32 TYPE 2 DIABETES MELLITUS WITH STAGE 3B CHRONIC KIDNEY DISEASE, WITHOUT LONG-TERM CURRENT USE OF INSULIN (HCC): ICD-10-CM

## 2022-03-29 DIAGNOSIS — E11.22 TYPE 2 DIABETES MELLITUS WITH STAGE 3B CHRONIC KIDNEY DISEASE, WITHOUT LONG-TERM CURRENT USE OF INSULIN (HCC): ICD-10-CM

## 2022-03-29 PROCEDURE — RXMED WILLOW AMBULATORY MEDICATION CHARGE: Performed by: FAMILY MEDICINE

## 2022-03-31 ENCOUNTER — TELEPHONE (OUTPATIENT)
Dept: MEDICAL GROUP | Facility: PHYSICIAN GROUP | Age: 72
End: 2022-03-31
Payer: MEDICARE

## 2022-03-31 ENCOUNTER — PHARMACY VISIT (OUTPATIENT)
Dept: PHARMACY | Facility: MEDICAL CENTER | Age: 72
End: 2022-03-31
Payer: COMMERCIAL

## 2022-03-31 NOTE — TELEPHONE ENCOUNTER
----- Message from Leah Bonilla D.O. sent at 3/31/2022  8:08 AM PDT -----  Regarding: FW: Blood sugar readings      ----- Message -----  From: Sakshi Mcconnell PharmD  Sent: 3/31/2022   7:49 AM PDT  To: Leah Bonilla D.O.  Subject: FW: Blood sugar readings                         Hi Shantelle,     I will reach out to her. I have some samples of Trulicity 1.5 mg available if she can get someone to pick them up for her. The [facility] can mail samples but they go through them very quickly and often don't have enough for pt which sounds like the the situation currently.     Sakshi   ----- Message -----  From: Leah Bonilla D.O.  Sent: 3/31/2022   7:42 AM PDT  To: Sakshi Mcconnell PharmD  Subject: FW: Blood sugar readings                             ----- Message -----  From: Natasha Santizo, Med Ass't  Sent: 3/29/2022  11:17 AM PDT  To: Leah Bonilla D.O.  Subject: FW: Blood sugar readings                         Does she get samples at [place]?    ----- Message -----  From: Salma Lees  Sent: 3/29/2022  10:45 AM PDT  To: Natasha Santizo, Med Ass't  Subject: Blood sugar readings                             Good Day Natasha:  I don’t know.  Angelica and Dr Bonilla had me fill out some paperwork (I  Was unable to afford my meds the last quarter of 2021) to receive assistance from the manufacture. Leydi took up the matter and has been managing the matter since.  My dosage was increased last month but that’s all I know.

## 2022-03-31 NOTE — TELEPHONE ENCOUNTER
1. Caller Name: Salma Lees                          Call Back Number: [phone] (home) [phone] (work)        How would the patient prefer to be contacted with a response: Phone call OK to leave a detailed message    Left msg on phone for Salma to check her My Chart...and Sakshi has samples for her

## 2022-04-05 ENCOUNTER — HOSPITAL ENCOUNTER (OUTPATIENT)
Dept: PULMONOLOGY | Facility: MEDICAL CENTER | Age: 72
End: 2022-04-05
Attending: INTERNAL MEDICINE
Payer: MEDICARE

## 2022-04-05 PROCEDURE — 94726 PLETHYSMOGRAPHY LUNG VOLUMES: CPT

## 2022-04-05 PROCEDURE — 94060 EVALUATION OF WHEEZING: CPT

## 2022-04-05 PROCEDURE — 94726 PLETHYSMOGRAPHY LUNG VOLUMES: CPT | Mod: 26 | Performed by: INTERNAL MEDICINE

## 2022-04-05 PROCEDURE — 700101 HCHG RX REV CODE 250: Performed by: INTERNAL MEDICINE

## 2022-04-05 PROCEDURE — 94060 EVALUATION OF WHEEZING: CPT | Mod: 26 | Performed by: INTERNAL MEDICINE

## 2022-04-05 PROCEDURE — 94729 DIFFUSING CAPACITY: CPT | Mod: 26 | Performed by: INTERNAL MEDICINE

## 2022-04-05 PROCEDURE — 94729 DIFFUSING CAPACITY: CPT

## 2022-04-05 RX ORDER — LEVALBUTEROL INHALATION SOLUTION 1.25 MG/3ML
1.25 SOLUTION RESPIRATORY (INHALATION)
Status: DISCONTINUED | OUTPATIENT
Start: 2022-04-05 | End: 2022-04-06 | Stop reason: HOSPADM

## 2022-04-05 RX ADMIN — LEVALBUTEROL 1.25 MG: 1.25 SOLUTION RESPIRATORY (INHALATION) at 11:23

## 2022-04-05 ASSESSMENT — PULMONARY FUNCTION TESTS
FVC_PERCENT_PREDICTED: 75
FEV1/FVC_PERCENT_PREDICTED: 83
FVC: 2.08
FEV1/FVC: 61.14
FVC_PREDICTED: 2.75
FEV1_LLN: 1.77
FVC_PERCENT_PREDICTED: 76
FEV1/FVC_PERCENT_CHANGE: 5
FEV1/FVC: 64.67
FEV1/FVC_PERCENT_PREDICTED: 79
FEV1/FVC_PERCENT_CHANGE: 600
FVC: 2.1
FEV1/FVC_PERCENT_PREDICTED: 83
FVC_LLN: 2.30
FEV1_LLN: 1.77
FEV1_PERCENT_CHANGE: 1
FEV1_PERCENT_PREDICTED: 59
FEV1/FVC_PERCENT_PREDICTED: 77
FEV1: 1.27
FEV1/FVC: 64.76
FEV1/FVC_PERCENT_LLN: 64.94
FVC_LLN: 2.30
FEV1/FVC: 61
FEV1_PREDICTED: 2.12
FEV1_PERCENT_PREDICTED: 63
FEV1/FVC_PERCENT_PREDICTED: 78
FEV1_PERCENT_CHANGE: 6
FEV1/FVC_PERCENT_LLN: 64.94
FEV1/FVC_PREDICTED: 77.77
FEV1: 1.36

## 2022-04-06 NOTE — PROCEDURES
DATE OF SERVICE:  04/05/2022     PULMONARY FUNCTION TEST INTERPRETATION     REFERRING PROVIDER:  ____, PALEVY     INTERPRETING PROVIDER:  Kristofer Triplett III, MD     INTERPRETATION:    1.  There is a moderately severe obstructive ventilatory defect evidenced on   spirometry.  There is no significant response to bronchodilators.  2.  Lung volumes are consistent with air trapping and hyperinflation,   evidenced by the elevated RV and RV/TLC ratio.  3.  There is a moderate diffusion impairment.  The reduced DLCO and DL/VA   ratio are consistent with pulmonary emphysema, pulmonary vascular disease or   interstitial lung disease.  4.  Flow volume loop is consistent with the above interpretation of an   obstructive ventilatory defect.  5.  Compared to prior testing in 2020, the FEV1 has declined slightly from   1.41 liters to 1.27 L or 59% predicted.        ______________________________  MD CANDY Howard III/GURINDER/ZEENAT    DD:  04/05/2022 19:03  DT:  04/05/2022 19:35    Job#:  [id number]

## 2022-04-08 ENCOUNTER — OFFICE VISIT (OUTPATIENT)
Dept: SLEEP MEDICINE | Facility: MEDICAL CENTER | Age: 72
End: 2022-04-08
Payer: MEDICARE

## 2022-04-08 VITALS
BODY MASS INDEX: 32.78 KG/M2 | DIASTOLIC BLOOD PRESSURE: 78 MMHG | SYSTOLIC BLOOD PRESSURE: 114 MMHG | HEART RATE: 80 BPM | RESPIRATION RATE: 19 BRPM | WEIGHT: 192 LBS | HEIGHT: 64 IN | OXYGEN SATURATION: 92 %

## 2022-04-08 DIAGNOSIS — Z99.81 CHRONIC RESPIRATORY FAILURE WITH HYPOXIA, ON HOME OXYGEN THERAPY (HCC): ICD-10-CM

## 2022-04-08 DIAGNOSIS — J96.11 CHRONIC RESPIRATORY FAILURE WITH HYPOXIA, ON HOME OXYGEN THERAPY (HCC): ICD-10-CM

## 2022-04-08 DIAGNOSIS — J44.9 CHRONIC OBSTRUCTIVE PULMONARY DISEASE, UNSPECIFIED COPD TYPE (HCC): ICD-10-CM

## 2022-04-08 DIAGNOSIS — I27.20 PULMONARY HYPERTENSION (HCC): ICD-10-CM

## 2022-04-08 PROCEDURE — 99214 OFFICE O/P EST MOD 30 MIN: CPT | Performed by: PHYSICIAN ASSISTANT

## 2022-04-08 ASSESSMENT — ENCOUNTER SYMPTOMS
ORTHOPNEA: 0
DIZZINESS: 0
SPUTUM PRODUCTION: 0
HEARTBURN: 1
CHILLS: 0
COUGH: 0
PALPITATIONS: 1
SORE THROAT: 0
HEADACHES: 0
SINUS PAIN: 0
INSOMNIA: 0
WEIGHT LOSS: 0
SHORTNESS OF BREATH: 1
TREMORS: 0
WHEEZING: 0
FEVER: 0

## 2022-04-08 ASSESSMENT — FIBROSIS 4 INDEX: FIB4 SCORE: 1.48

## 2022-04-08 NOTE — PROGRESS NOTES
CC: Follow-up COPD    HPI:  Salma Lees is a 72 y.o. year old female here today for follow-up on COPD and chronic respiratory failure with hypoxia. Last seen in clinic 8/19/2021 by SHANNON Gauthier.  She has a brief remote smoking history reported as 5 pack years with quit date 1990.    Pertinent past medical history includes atrial fibrillation, hypertension, CHF, mitral valve repair, pacemaker in situ, CKD, asthma and pulmonary hypertension.  She did struggle last summer with the poor air quality.    Reviewed in clinic vitals including /78, HR 80, O2 sat 92% on 2 L and BMI of 32.96 kg/m².    Reviewed home medication regimen includes alternating spironolactone and Lasix with potassium, Anoro, Xopenex HFA.    No recent chest imaging.  Echocardiogram obtained 6/2/2021 demonstrating left ventricular chamber size, mild concentric left ventricular hypertrophy, normal systolic function and indeterminant diastolic function due to mitral valve disease.  LVEF estimated 60%.  Mildly dilated right ventricle with normal right ventricular systolic function.  Pacer wire seen, moderately dilated left atrium.  Known mitral valve repair functioning normally, mild mitral regurgitation, aortic sclerosis without stenosis, mild aortic insufficiency.  Moderate tricuspid regurgitation with estimated RVSP of 45 to 50 mmHg.  Trace pulmonic insufficiency.    Chest x-ray obtained 11/12/2018 demonstrated pacing device in place, median sternotomy and MVR changes, no acute cardiopulmonary disease.    Reviewed pulmonary function testing obtained 4/5/2022 demonstrating an FEV1 of 1.27 L or 59% predicted, FVC of 2.08 L or 75% predicted, FEV1/FVC ratio of 61.14, residual volume 174% predicted, % predicted, DLCO 59% predicted.  Per pulmonologist interpretation moderate obstruction, no response to bronchodilator, evidence of air trapping and moderate reduction in gas transfer as estimated by DLCO consistent with underlying  "COPD.    Review of Systems   Constitutional: Negative for chills, fever, malaise/fatigue and weight loss.   HENT: Positive for congestion (at night ). Negative for hearing loss, nosebleeds, sinus pain, sore throat and tinnitus.    Eyes:        Recent surgery   Respiratory: Positive for shortness of breath (with exertion). Negative for cough, sputum production and wheezing.    Cardiovascular: Positive for palpitations. Negative for chest pain, orthopnea and leg swelling.   Gastrointestinal: Positive for heartburn (occasional).        Has 5 teeth left, no difficulty swallowing    Neurological: Negative for dizziness, tremors and headaches.   Psychiatric/Behavioral: The patient does not have insomnia.        Past Medical History:   Diagnosis Date   • A-fib ([facility])    • Asthma    • Breath shortness 09/07/2017    uses oxygen at 3 liters/min cont. with \"[facility]\"   • Calculus of ureter 9/18/2017   • Congestive heart failure ([facility])    • COPD (chronic obstructive pulmonary disease) ([facility])    • Diabetes ([facility])    • Hypertension    • Infectious disease 09/07/2017    UTI/hx. MRSA   • Pacemaker 2014   • Renal disorder 09/07/2017    has stent   • Shortness of breath 10/18/2017       Past Surgical History:   Procedure Laterality Date   • URETEROSCOPY Left 9/18/2017    Procedure: URETEROSCOPY;  Surgeon: Edgardo Richter M.D.;  Location: [facility];  Service: Urology   • LASERTRIPSY Left 9/18/2017    Procedure: LASERTRIPSY LITHO  ;  Surgeon: Edgardo Richter M.D.;  Location: [facility];  Service: Urology   • STENT REMOVAL Left 9/18/2017    Procedure: STENT REMOVAL;  Surgeon: Edgardo Richter M.D.;  Location: [facility];  Service: Urology   • CYSTOSCOPY STENT PLACEMENT Left 6/25/2017    Procedure: CYSTOSCOPY, LEFT URETERAL STENT PLACEMENT;  Surgeon: Edgardo Richter M.D.;  Location: [facility];  Service:    • PACEMAKER INSERTION  2015   • KNEE " REPLACEMENT, TOTAL Left    • MITRAL VALVE REPAIR         Family History   Problem Relation Age of Onset   • Lung Disease Mother         asthma   • Cancer Mother         ovarian   • Heart Disease Father    • Stroke Father    • Thyroid Sister    • Cancer Brother         pancreas   • Cancer Paternal Grandmother         colon   • Heart Disease Brother    • No Known Problems Brother    • No Known Problems Brother    • Heart Disease Son         afib       Social History     Socioeconomic History   • Marital status:      Spouse name: Not on file   • Number of children: Not on file   • Years of education: Not on file   • Highest education level: Not on file   Occupational History   • Not on file   Tobacco Use   • Smoking status: Former Smoker     Packs/day: 1.00     Years: 5.00     Pack years: 5.00     Types: Cigarettes     Quit date: 1990     Years since quittin.2   • Smokeless tobacco: Never Used   • Tobacco comment: Continued abstinence   Vaping Use   • Vaping Use: Never used   Substance and Sexual Activity   • Alcohol use: No     Alcohol/week: 0.0 oz   • Drug use: No   • Sexual activity: Not Currently   Other Topics Concern   • Not on file   Social History Narrative    She has a son. She had previously experience in [employer].     Social Determinants of Health     Financial Resource Strain: Not on file   Food Insecurity: Not on file   Transportation Needs: Not on file   Physical Activity: Not on file   Stress: Not on file   Social Connections: Not on file   Intimate Partner Violence: Not on file   Housing Stability: Not on file       Allergies as of 2022 - Reviewed 2022   Allergen Reaction Noted   • Pcn [penicillins] Swelling 2017   • Amlodipine besylate-valsartan Unspecified 2017   • Amoxicillin Anaphylaxis and Shortness of Breath 2017   • Etodolac Hives and Nausea 2017   • Food Hives 2017   • Lisinopril Unspecified 2017   • Other drug Rash 2017  "  • Other misc  09/18/2017   • Tape  09/18/2017   • Eliquis [apixaban] Shortness of Breath 01/13/2020        @Vital signs for this encounter:  /78   Pulse 80   Resp 19   Ht 1.626 m (5' 4\")   Wt 87.1 kg (192 lb)   SpO2 92%     Current medications as of today   Current Outpatient Medications   Medication Sig Dispense Refill   • potassium Chloride ER (K-TAB) 20 MEQ Tab CR tablet Take 0.5 Tablets by mouth every day. 100 Tablet 3   • atorvastatin (LIPITOR) 10 MG Tab Take 1 Tablet by mouth every evening. 100 Tablet 3   • warfarin (COUMADIN) 5 MG Tab TAKE 1 AND 1/2 TABLETS BY MOUTH DAILY OR AS DIRECTED BY ANTICOAGULATION CLINIC 135 Tablet 3   • umeclidinium-vilanterol (ANORO ELLIPTA) 62.5-25 MCG/INH AEROSOL POWDER, BREATH ACTIVATED inhaler Inhale 1 Puff every day. 3 Each 3   • levalbuterol (XOPENEX HFA) 45 MCG/ACT inhaler Inhale 2 Puffs every four hours as needed. FOR SHORTNESS OF BREATH 1 Each 2   • spironolactone (ALDACTONE) 25 MG Tab Take 25 mg by mouth every day.     • carvedilol (COREG) 25 MG Tab Take 25 mg by mouth 2 times a day.     • furosemide (LASIX) 20 MG Tab Take 1 Tab by mouth every day. 90 Tab 1   • digoxin (LANOXIN) 125 MCG Tab Take 125 mcg by mouth every day.     • Dulaglutide 1.5 MG/0.5ML Solution Pen-injector Inject 0.5 mL under the skin every 7 days. Every Sunday 1 mL 0     No current facility-administered medications for this visit.         Physical Exam:   Gen:           Alert and oriented, No apparent distress. Mood and affect     appropriate, normal interaction with provider.  Eyes:          sclere white, conjunctive moist.  Hearing:     Grossly intact.  Dentition:    Edentulous  Oropharynx:   Tongue normal, posterior pharynx without erythema or exudate.  Neck:        Supple, trachea midline, no masses.  Respiratory Effort: No intercostal retractions or use of accessory muscles.   Lung Auscultation:      Diminished throughout; no rales, rhonchi or wheezing.  CV:            Regular rate and " rhythm.  Trace lower extremity edema. No murmurs, rubs or gallops.  Digits, Nails, Ext: No clubbing, cyanosis, petechiae, or nodes.   Skin:        No rashes, lesions or ulcers noted on exposed skin surfaces.                     Assessment:  1. Chronic obstructive pulmonary disease, unspecified COPD type (HCC)     2. Chronic respiratory failure with hypoxia, on home oxygen therapy (HCC)     3. BMI 32.0-32.9,adult     4. Pulmonary hypertension (HCC)         Immunizations:    Flu: 10/12/2021  Pneumovax 23: 9/16/2019  Prevnar 13: 10/6/2016  PCV 7: 10/25/2016  Pfizer SARS-CoV-2 vaccine: 9/27/2021, 2/9/2021, 1/19/2021    Plan:  72-year-old female here today for follow-up on her COPD and chronic respiratory failure with hypoxia.  Brief remote smoking history.    COPD: Patient states benefit with continued Anoro use, reviewed pulmonary function testing.  Needs Xopenex due to her cardiac history, will work on prior authorization.     Chronic respiratory failure with hypoxia: Continues to use and benefit from O2 use at 2 L ATC.    Elevated BMI: Encouraged activity as tolerated, monitoring nutritional intake.  Weight down slightly from last visit.    Pulmonary hypertension: Noted on echocardiogram, patient does have multi system dysfunction including A. fib, type 2 diabetes, chronic kidney disease, elevated BMI and COPD.  Consider overnight oximetry to evaluate adequacy of nocturnal oxygen saturations.    Follow-up in 6 months.      This dictation was created using voice recognition software. The accuracy of the dictation is limited to the abilities of the software. I expect there may be some errors of grammar and possibly content.

## 2022-04-08 NOTE — PATIENT INSTRUCTIONS
1-seeing primary on Monday  2-some blood pressure issues  3-reviewed pulmonary function test  4-work on prior authorization xopenex  5-follow up in 6 months

## 2022-04-11 ENCOUNTER — OFFICE VISIT (OUTPATIENT)
Dept: MEDICAL GROUP | Facility: PHYSICIAN GROUP | Age: 72
End: 2022-04-11
Payer: MEDICARE

## 2022-04-11 ENCOUNTER — ANTICOAGULATION MONITORING (OUTPATIENT)
Dept: MEDICAL GROUP | Facility: MEDICAL CENTER | Age: 72
End: 2022-04-11

## 2022-04-11 VITALS
WEIGHT: 194 LBS | SYSTOLIC BLOOD PRESSURE: 126 MMHG | DIASTOLIC BLOOD PRESSURE: 60 MMHG | TEMPERATURE: 96.9 F | HEART RATE: 68 BPM | BODY MASS INDEX: 33.12 KG/M2 | OXYGEN SATURATION: 88 % | RESPIRATION RATE: 16 BRPM | HEIGHT: 64 IN

## 2022-04-11 DIAGNOSIS — Z12.31 ENCOUNTER FOR SCREENING MAMMOGRAM FOR BREAST CANCER: ICD-10-CM

## 2022-04-11 DIAGNOSIS — Z98.890 HISTORY OF MITRAL VALVE REPAIR: ICD-10-CM

## 2022-04-11 DIAGNOSIS — Z12.11 COLON CANCER SCREENING: ICD-10-CM

## 2022-04-11 DIAGNOSIS — Z78.0 MENOPAUSE: ICD-10-CM

## 2022-04-11 DIAGNOSIS — I70.0 ATHEROSCLEROSIS OF AORTA (HCC): ICD-10-CM

## 2022-04-11 DIAGNOSIS — I27.20 PULMONARY HYPERTENSION (HCC): ICD-10-CM

## 2022-04-11 DIAGNOSIS — N18.32 TYPE 2 DIABETES MELLITUS WITH STAGE 3B CHRONIC KIDNEY DISEASE, WITHOUT LONG-TERM CURRENT USE OF INSULIN (HCC): Primary | ICD-10-CM

## 2022-04-11 DIAGNOSIS — E11.22 TYPE 2 DIABETES MELLITUS WITH STAGE 3B CHRONIC KIDNEY DISEASE, WITHOUT LONG-TERM CURRENT USE OF INSULIN (HCC): Primary | ICD-10-CM

## 2022-04-11 DIAGNOSIS — I50.22 CHRONIC SYSTOLIC CONGESTIVE HEART FAILURE (HCC): ICD-10-CM

## 2022-04-11 LAB — GLUCOSE BLD-MCNC: 394 MG/DL (ref 70–100)

## 2022-04-11 PROCEDURE — 99214 OFFICE O/P EST MOD 30 MIN: CPT | Performed by: INTERNAL MEDICINE

## 2022-04-11 PROCEDURE — 82962 GLUCOSE BLOOD TEST: CPT | Performed by: INTERNAL MEDICINE

## 2022-04-11 ASSESSMENT — FIBROSIS 4 INDEX: FIB4 SCORE: 1.48

## 2022-04-11 NOTE — PROGRESS NOTES
Subjective:   Chief Complaint/History of Present Illness:  Salma Lees is a 72 y.o. female established patient who presents today to discuss medical problems as listed below. Salma is unaccompanied for today's visit.    Problem   Chronic Systolic Congestive Heart Failure (Hcc)    Echocardiogram completed August 2016 showed ejection fraction 45 to 50%, mild left atrial and right atrial enlargement, mild to moderate MR, moderate TR, RSVP 63 mmHg.  Echocardiogram completed October 2018 showed ejection fraction 60%, mildly enlarged RV, severe TR, RSVP 60 mmHg.    She has a history of heart failure related to valvular disease.  She follows with Dr. 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  Current regimen includes spironolactone, Lasix with potassium, digoxin, carvedilol, and diltiazem.     Atherosclerosis of Aorta (Hcc)    Incidental finding seen on CT scan in 2017    She is on atorvastatin and warfarin.     Pulmonary Hypertension ([facility])    She has pulmonary hypertension on her echocardiogram with right heart pressures between 60 and 63 mmHg.  She also has chronic lung disease on oxygenation at all times.        Type 2 Diabetes Mellitus With Stage 3b Chronic Kidney Disease, Without Long-Term Current Use of Insulin ([facility])       Ref. Range 9/9/2021 10:33 1/6/2022 16:57   Glycohemoglobin Latest Ref Range: 0.0 - 5.6 % 7.1 (A) 10.7 (A)      Ref. Range 10/19/2021 13:10   Micro Alb Creat Ratio Latest Ref Range: 0 - 30 mg/g 19      Ref. Range 10/19/2021 13:10   Bun Latest Ref Range: 8 - 22 mg/dL 35 (H)   Creatinine Latest Ref Range: 0.50 - 1.40 mg/dL 1.59 (H)   GFR If Non  Latest Ref Range: >60 mL/min/1.73 m 2 32 (A)       She has had diabetes since at least 2017 with A1c ranging 7-14. She did not tolerate metformin. She previously had microalbuminuria, currently resolved. No known neuropathy or retinopathy.    Ozempic is cost prohibitive, interested in looking into  "Trulicity with assistance instead, currently using 1.5 mg dose.     Benign Essential Hypertension        Current Medications:  Current Outpatient Medications Ordered in Epic   Medication Sig Dispense Refill   • DILTIAZem (CARDIZEM) 30 MG Tab Take 30 mg by mouth 2 times a day.     • Dulaglutide 1.5 MG/0.5ML Solution Pen-injector Inject 0.5 mL under the skin every 7 days. Every Sunday 1 mL 0   • potassium Chloride ER (K-TAB) 20 MEQ Tab CR tablet Take 0.5 Tablets by mouth every day. 100 Tablet 3   • atorvastatin (LIPITOR) 10 MG Tab Take 1 Tablet by mouth every evening. 100 Tablet 3   • warfarin (COUMADIN) 5 MG Tab TAKE 1 AND 1/2 TABLETS BY MOUTH DAILY OR AS DIRECTED BY ANTICOAGULATION CLINIC 135 Tablet 3   • umeclidinium-vilanterol (ANORO ELLIPTA) 62.5-25 MCG/INH AEROSOL POWDER, BREATH ACTIVATED inhaler Inhale 1 Puff every day. 3 Each 3   • levalbuterol (XOPENEX HFA) 45 MCG/ACT inhaler Inhale 2 Puffs every four hours as needed. FOR SHORTNESS OF BREATH 1 Each 2   • spironolactone (ALDACTONE) 25 MG Tab Take 25 mg by mouth every day.     • carvedilol (COREG) 25 MG Tab Take 25 mg by mouth 2 times a day.     • furosemide (LASIX) 20 MG Tab Take 1 Tab by mouth every day. 90 Tab 1   • digoxin (LANOXIN) 125 MCG Tab Take 125 mcg by mouth every day.       No current [facility]-ordered facility-administered medications on file.          Objective:   Physical Exam:    Vitals: /60 (BP Location: Right arm, Patient Position: Sitting, BP Cuff Size: Adult)   Pulse 68   Temp 36.1 °C (96.9 °F) (Temporal)   Resp 16   Ht 1.626 m (5' 4\")   Wt 88 kg (194 lb)   SpO2 88%    BMI: Body mass index is 33.3 kg/m².  Physical Exam  Constitutional:       General: She is not in acute distress.     Appearance: Normal appearance. She is not toxic-appearing.   HENT:      Right Ear: There is no impacted cerumen.      Left Ear: There is no impacted cerumen.   Eyes:      General: No scleral icterus.     Conjunctiva/sclera: Conjunctivae normal. "   Cardiovascular:      Rate and Rhythm: Normal rate and regular rhythm.      Pulses: Normal pulses.      Heart sounds: Murmur heard.   Pulmonary:      Effort: Pulmonary effort is normal. No respiratory distress.      Breath sounds: No rhonchi.      Comments: Supplemental oxygen in place  Musculoskeletal:      Right lower leg: No edema.      Left lower leg: No edema.      Comments: Diabetic Foot Exam: No ulcers/laceration/blisters present on bilateral feet, normal gross anatomy of bilateral feet without abnormal curvature or arch, +hammertoe left 2nd digit and +swan neck deformity right 2nd digit, bilateral greater toenail thickness, no ingrown toenails, no increase in skin temperature bilaterally, no skin erythema to bilateral feet, bilateral dorsalis pedis and posterior tibial pulses 2+ and equal, Refill less than 2 seconds bilaterally, Luigi 10 g monofilament testing normal in bilateral great toes, bilateral balls of feet at medial/lateral/mid ball of foot   Skin:     General: Skin is warm and dry.      Findings: No bruising or rash.   Psychiatric:         Mood and Affect: Mood normal.         Behavior: Behavior normal.         Thought Content: Thought content normal.         Judgment: Judgment normal.          Assessment and Plan:   Salma is a 72 y.o. female with the following:  Problem List Items Addressed This Visit     Type 2 diabetes mellitus with stage 3b chronic kidney disease, without long-term current use of insulin ([facility]) - Primary     Chronic and ongoing problem, continue titrating up Trulicity as her blood sugar remains out of control.  Cannot complete A1c testing today.  Blood sugar testing in clinic was 394.  Notified pharmacist at [facility] and she will dispense patient enough to increase to 3 mg of Trulicity weekly.  She has orders from her cardiologist to update her kidney function.  No microalbuminuria or neuropathy on exam.  Will obtain retinal screen records, she saw her eye doctor in  March 2022.         Relevant Orders    Diabetic Monofilament LE Exam (Completed)    POCT Glucose (Completed)    HEMOGLOBIN A1C    VITAMIN D,25 HYDROXY    VITAMIN B12    Chronic systolic congestive heart failure (HCC)     Chronic ongoing problem.  Continue follow-up with cardiology as recommended.  Her cardiologist is moving to his own practice, she will let me know if she needs an updated referral.  She already has lab orders for him including CBC, CMP, and lipid panel.  Try going off diltiazem but I breakthrough palpitation so she added it back at 30 mg twice daily.  Continue digoxin, carvedilol, Lasix with potassium, and spironolactone.         Relevant Medications    DILTIAZem (CARDIZEM) 30 MG Tab    Atherosclerosis of aorta (HCC)     Chronic and ongoing problem.  Continue statin and warfarin, continue follow-up with cardiology as recommended.         Relevant Medications    DILTIAZem (CARDIZEM) 30 MG Tab    Pulmonary hypertension (HCC)     Chronic and ongoing problem.  Continue maintaining euvolemia and good control of blood pressure.  Continue current medical regiment and follow-up with cardiology as recommended.         Relevant Medications    DILTIAZem (CARDIZEM) 30 MG Tab      Other Visit Diagnoses     Menopause        Relevant Orders    DS-BONE DENSITY STUDY (DEXA)    Encounter for screening mammogram for breast cancer        Relevant Orders    MA-SCREENING MAMMO BILAT W/TOMOSYNTHESIS W/CAD    Colon cancer screening        Relevant Orders    OCCULT BLOOD FECES IMMUNOASSAY             Annual Health Assessment Questions:    1.  Are you currently engaging in any exercise or physical activity? No    2.  How would you describe your mood or emotional well-being today? good    3.  Have you had any falls in the last year? No    4.  Have you noticed any problems with your balance or had difficulty walking? Yes   Right knee pain  Needs replacement     5.  In the last six months have you experienced any leakage of  urine? Yes    6. DPA/Advanced Directive: Patient has Advanced Directive, but it is not on file. Instructed to bring in a copy to scan into their chart.       RTC: Return in about 6 months (around 10/11/2022).    I spent a total of 32 minutes with record review, exam, communication with the patient, communication with other providers, and documentation of this encounter.    PLEASE NOTE: This dictation was created using voice recognition software. I have made every reasonable attempt to correct obvious errors, but I expect that there are errors of grammar and possibly content that I did not discover before finalizing the note.      Leah Bonilla, DO  Geriatric and Internal Medicine  [facility]

## 2022-04-11 NOTE — ASSESSMENT & PLAN NOTE
Chronic and ongoing problem.  Continue maintaining euvolemia and good control of blood pressure.  Continue current medical regiment and follow-up with cardiology as recommended.

## 2022-04-11 NOTE — LETTER
[employer]  Leah Bonilla D.O.  [address]  [place] [address]  Fax: [phone]   Authorization for Release/Disclosure of   Protected Health Information   Name: CHARLENE GARCIA : 1950 SSN: [id number]   Address: [address] [place] [address]  [address]  [place] [address] Phone:    [phone] (home) [phone] (work)   I authorize the entity listed below to release/disclose the PHI below to:   [employer]/Leah Bonilla D.O. and Leah Bonilla D.O.   Provider or Entity Name:  Dr. Saurabh Oconnor       Address   City, [facility], [facility]   Phone:      Fax:     Reason for request: continuity of care   Information to be released:    [  ] LAST COLONOSCOPY,  including any PATH REPORT and follow-up  [  ] LAST FIT/COLOGUARD RESULT [  ] LAST DEXA  [  ] LAST MAMMOGRAM  [  ] LAST PAP  [  ] LAST LABS [  ] RETINA EXAM REPORT  [  ] IMMUNIZATION RECORDS  [  ] Release all info      [  ] Check here and initial the line next to each item to release ALL health information INCLUDING  _____ Care and treatment for drug and / or alcohol abuse  _____ HIV testing, infection status, or AIDS  _____ Genetic Testing    DATES OF SERVICE OR TIME PERIOD TO BE DISCLOSED: _____________  I understand and acknowledge that:  * This Authorization may be revoked at any time by you in writing, except if your health information has already been used or disclosed.  * Your health information that will be used or disclosed as a result of you signing this authorization could be re-disclosed by the recipient. If this occurs, your re-disclosed health information may no longer be protected by State or Federal laws.  * You may refuse to sign this Authorization. Your refusal will not affect your ability to obtain treatment.  * This Authorization becomes effective upon signing and will  on (date) __________.      If no date is indicated, this Authorization will  one (1) year from the signature date.    Name: Charlene Hdz  Nabeel    Signature:   Date:     4/11/2022       PLEASE FAX REQUESTED RECORDS BACK TO: [phone]

## 2022-04-11 NOTE — ASSESSMENT & PLAN NOTE
Chronic ongoing problem.  Continue follow-up with cardiology as recommended.  Her cardiologist is moving to his own practice, she will let me know if she needs an updated referral.  She already has lab orders for him including CBC, CMP, and lipid panel.  Try going off diltiazem but I breakthrough palpitation so she added it back at 30 mg twice daily.  Continue digoxin, carvedilol, Lasix with potassium, and spironolactone.

## 2022-04-11 NOTE — ASSESSMENT & PLAN NOTE
Chronic and ongoing problem, continue titrating up Trulicity as her blood sugar remains out of control.  Cannot complete A1c testing today.  Blood sugar testing in clinic was 394.  Notified pharmacist at [facility] and she will dispense patient enough to increase to 3 mg of Trulicity weekly.  She has orders from her cardiologist to update her kidney function.  No microalbuminuria or neuropathy on exam.  Will obtain retinal screen records, she saw her eye doctor in March 2022.

## 2022-04-11 NOTE — ASSESSMENT & PLAN NOTE
Chronic and ongoing problem.  Continue statin and warfarin, continue follow-up with cardiology as recommended.

## 2022-04-13 ENCOUNTER — TELEPHONE (OUTPATIENT)
Dept: SLEEP MEDICINE | Facility: MEDICAL CENTER | Age: 72
End: 2022-04-13

## 2022-04-13 NOTE — TELEPHONE ENCOUNTER
MEDICATION PRIOR AUTHORIZATION NEEDED:    1. Name of Medication: Levalbuterol HFA 45 mcg    2. Requested By (Name of Pharmacy): [facility]     3. Is insurance on file current? yes    4. What is the name & phone number of the 3rd party payor? [facility] [phone]

## 2022-05-09 ENCOUNTER — ANTICOAGULATION VISIT (OUTPATIENT)
Dept: MEDICAL GROUP | Facility: MEDICAL CENTER | Age: 72
End: 2022-05-09
Payer: MEDICARE

## 2022-05-09 DIAGNOSIS — Z79.01 CHRONIC ANTICOAGULATION: Primary | ICD-10-CM

## 2022-05-09 DIAGNOSIS — Z98.890 HISTORY OF MITRAL VALVE REPAIR: ICD-10-CM

## 2022-05-09 LAB — INR PPP: 2.1 (ref 2–3.5)

## 2022-05-09 PROCEDURE — 93793 ANTICOAG MGMT PT WARFARIN: CPT | Performed by: FAMILY MEDICINE

## 2022-05-09 PROCEDURE — 85610 PROTHROMBIN TIME: CPT | Performed by: FAMILY MEDICINE

## 2022-05-09 NOTE — Clinical Note
Dashawn Holley,   Have you been working on her PAP for Trulicity? She still hasn't rc'd her Trulicity. Let me know. I can try to help if needed.   Hope you are well!   Sakshi

## 2022-05-12 ENCOUNTER — TELEPHONE (OUTPATIENT)
Dept: MEDICAL GROUP | Facility: PHYSICIAN GROUP | Age: 72
End: 2022-05-12
Payer: MEDICARE

## 2022-05-12 DIAGNOSIS — Z12.11 COLON CANCER SCREENING: Primary | ICD-10-CM

## 2022-05-18 ENCOUNTER — OFFICE VISIT (OUTPATIENT)
Dept: MEDICAL GROUP | Facility: PHYSICIAN GROUP | Age: 72
End: 2022-05-18
Payer: MEDICARE

## 2022-05-18 VITALS
OXYGEN SATURATION: 90 % | HEART RATE: 53 BPM | WEIGHT: 195.6 LBS | TEMPERATURE: 98 F | BODY MASS INDEX: 33.39 KG/M2 | SYSTOLIC BLOOD PRESSURE: 118 MMHG | RESPIRATION RATE: 12 BRPM | HEIGHT: 64 IN | DIASTOLIC BLOOD PRESSURE: 60 MMHG

## 2022-05-18 DIAGNOSIS — I48.21 PERMANENT ATRIAL FIBRILLATION (HCC): ICD-10-CM

## 2022-05-18 DIAGNOSIS — N18.32 TYPE 2 DIABETES MELLITUS WITH STAGE 3B CHRONIC KIDNEY DISEASE, WITHOUT LONG-TERM CURRENT USE OF INSULIN (HCC): ICD-10-CM

## 2022-05-18 DIAGNOSIS — E11.22 TYPE 2 DIABETES MELLITUS WITH STAGE 3B CHRONIC KIDNEY DISEASE, WITHOUT LONG-TERM CURRENT USE OF INSULIN (HCC): ICD-10-CM

## 2022-05-18 DIAGNOSIS — Z02.89 ENCOUNTER FOR COMPLETION OF FORM WITH PATIENT: ICD-10-CM

## 2022-05-18 DIAGNOSIS — I50.22 CHRONIC SYSTOLIC CONGESTIVE HEART FAILURE (HCC): ICD-10-CM

## 2022-05-18 PROCEDURE — 99214 OFFICE O/P EST MOD 30 MIN: CPT | Performed by: INTERNAL MEDICINE

## 2022-05-18 RX ORDER — DIGOXIN 125 MCG
125 TABLET ORAL DAILY
Qty: 100 TABLET | Refills: 3 | Status: SHIPPED | OUTPATIENT
Start: 2022-05-18 | End: 2023-01-03 | Stop reason: SDUPTHER

## 2022-05-18 RX ORDER — POTASSIUM CHLORIDE 1500 MG/1
10 TABLET, EXTENDED RELEASE ORAL DAILY
Qty: 50 TABLET | Refills: 3 | Status: SHIPPED | OUTPATIENT
Start: 2022-05-18 | End: 2022-05-24 | Stop reason: SDUPTHER

## 2022-05-18 RX ORDER — CARVEDILOL 25 MG/1
25 TABLET ORAL 2 TIMES DAILY
Qty: 200 TABLET | Refills: 3 | Status: SHIPPED | OUTPATIENT
Start: 2022-05-18 | End: 2022-09-01 | Stop reason: SDUPTHER

## 2022-05-18 RX ORDER — FUROSEMIDE 20 MG/1
20 TABLET ORAL DAILY
Qty: 100 TABLET | Refills: 3 | Status: SHIPPED | OUTPATIENT
Start: 2022-05-18 | End: 2024-01-02

## 2022-05-18 RX ORDER — SPIRONOLACTONE 25 MG/1
25 TABLET ORAL DAILY
Qty: 100 TABLET | Refills: 3 | Status: SHIPPED | OUTPATIENT
Start: 2022-05-18 | End: 2023-01-03 | Stop reason: SDUPTHER

## 2022-05-18 ASSESSMENT — FIBROSIS 4 INDEX: FIB4 SCORE: 1.48

## 2022-05-18 NOTE — PROGRESS NOTES
Subjective:   Chief Complaint/History of Present Illness:  Salma Lees is a 72 y.o. female established patient who presents today to discuss medical problems as listed below. Salma is unaccompanied for today's visit.    Problem   Chronic Systolic Congestive Heart Failure (Hcc)    Echocardiogram (2016) EF 45 to 50%, mild left atrial and right atrial enlargement, mild to moderate MR, moderate TR, RSVP 63 mmHg.  Echocardiogram (2018) EF 60%, mildly enlarged RV, severe TR, RSVP 60 mmHg.  Echocardiogram (2021) EF 60%, mitral valve repair functioning normally, mild MR, mild AI, RVSP 45-50 mmHg      She has a history of heart failure related to valvular disease.  She follows with Dr. Prabhakar previously at [place] but now opening his own private practice.  Current regimen includes spironolactone, Lasix with potassium, digoxin, carvedilol, and diltiazem.     Encounter for completion of form with patient- DMV form and [employer] patient assistance    She brings in 2 forms to complete in clinic today. The first is a patient assistance form from [employer] for trulicity. The second form is to reinstate her 's license from the [employer] after she was repeating questions to one of the workers who became frustrated and put a medical hold on her license.     Atrial Fibrillation (Hcc)    Diagnosed in 2014 following repair of her mitral valve.  repair of her mitral valve.  She required hospitalization in February 2015 due to CHF and A. fib with RVR.  She experienced significant bradycardia necessitating permanent pacemaker.  She is in permanent A. fib at this time.  She is using anticoagulation in the form of warfarin, she denies any bleeding issues.  Rate control achieved through diltiazem, coreg, and digoxin.     Current regimen: Diltiazem 30 mg 2 times daily, carvedilol 25 mg twice daily, and digoxin 125 mcg daily, warfarin dosed per pharmacy     Type 2 Diabetes Mellitus With Stage 3b Chronic Kidney Disease, Without  Long-Term Current Use of Insulin (Hcc)       Ref. Range 9/9/2021 10:33 1/6/2022 16:57   Glycohemoglobin Latest Ref Range: 0.0 - 5.6 % 7.1 (A) 10.7 (A)      Ref. Range 10/19/2021 13:10   Micro Alb Creat Ratio Latest Ref Range: 0 - 30 mg/g 19      Ref. Range 10/19/2021 13:10   Bun Latest Ref Range: 8 - 22 mg/dL 35 (H)   Creatinine Latest Ref Range: 0.50 - 1.40 mg/dL 1.59 (H)   GFR If Non  Latest Ref Range: >60 mL/min/1.73 m 2 32 (A)       She has had diabetes since at least 2017 with A1c ranging 7-14. She did not tolerate metformin. She previously had microalbuminuria, currently resolved. No known neuropathy or retinopathy.    Current regimen: Trulicity 1.5 mg weekly on Sundays          Current Medications:  Current Outpatient Medications Ordered in Epic   Medication Sig Dispense Refill   • spironolactone (ALDACTONE) 25 MG Tab Take 1 Tablet by mouth every day. 100 Tablet 3   • furosemide (LASIX) 20 MG Tab Take 1 Tablet by mouth every day. 100 Tablet 3   • DILTIAZem (CARDIZEM) 30 MG Tab Take 1 Tablet by mouth 2 times a day. 200 Tablet 3   • digoxin (LANOXIN) 125 MCG Tab Take 1 Tablet by mouth every day. 100 Tablet 3   • carvedilol (COREG) 25 MG Tab Take 1 Tablet by mouth 2 times a day. 200 Tablet 3   • potassium Chloride ER (K-TAB) 20 MEQ Tab CR tablet Take 0.5 Tablets by mouth every day. Take with Lasix 50 Tablet 3   • Dulaglutide 1.5 MG/0.5ML Solution Pen-injector Inject 0.5 mL under the skin every 7 days. Every Sunday 1 mL 0   • atorvastatin (LIPITOR) 10 MG Tab Take 1 Tablet by mouth every evening. 100 Tablet 3   • warfarin (COUMADIN) 5 MG Tab TAKE 1 AND 1/2 TABLETS BY MOUTH DAILY OR AS DIRECTED BY ANTICOAGULATION CLINIC 135 Tablet 3   • umeclidinium-vilanterol (ANORO ELLIPTA) 62.5-25 MCG/INH AEROSOL POWDER, BREATH ACTIVATED inhaler Inhale 1 Puff every day. 3 Each 3   • levalbuterol (XOPENEX HFA) 45 MCG/ACT inhaler Inhale 2 Puffs every four hours as needed. FOR SHORTNESS OF BREATH 1 Each 2     No  "current [facility]-ordered facility-administered medications on file.          Objective:   Physical Exam:    Vitals: /60 (BP Location: Right arm, Patient Position: Sitting, BP Cuff Size: Adult)   Pulse (!) 53   Temp 36.7 °C (98 °F)   Resp 12   Ht 1.626 m (5' 4\")   Wt 88.7 kg (195 lb 9.6 oz)   SpO2 90%    BMI: Body mass index is 33.57 kg/m².  Physical Exam  Constitutional:       General: She is not in acute distress.     Appearance: Normal appearance. She is not ill-appearing.   HENT:      Mouth/Throat:      Comments: Normal phonation  Eyes:      General: No scleral icterus.     Conjunctiva/sclera: Conjunctivae normal.   Cardiovascular:      Rate and Rhythm: Normal rate. Rhythm irregular.      Pulses: Normal pulses.      Heart sounds: Murmur heard.   Pulmonary:      Effort: Pulmonary effort is normal. No respiratory distress.      Breath sounds: No wheezing.      Comments: Supplemental oxygen in place  Musculoskeletal:      Comments: Stable mild edema, pretibial   Skin:     General: Skin is warm and dry.      Findings: No bruising or rash.   Psychiatric:         Mood and Affect: Mood normal.         Behavior: Behavior normal.         Thought Content: Thought content normal.         Judgment: Judgment normal.          Assessment and Plan:   Salma is a 72 y.o. female with the following:  Problem List Items Addressed This Visit     Encounter for completion of form with patient- DMV form and [employer] patient assistance     New problem, we completed 2 forms in clinic today. The first was for patient assistance through [employer] for Trulicity, will send in for a 1 year supply at the 1.5 mg dose. The second was a confidential physician report for the DMV. She was given this by a worker at the [employer] after she was repeating a question and not getting a straight answer, I filled it out with no restrictions at this time. Both of these forms have been scanned into her chart.           Atrial fibrillation (HCC)     " Chronic and stable problem, continue current regimen, she is waiting for her cardiologist to open his new practice and will transition care back to him. She continues on rate control and oral anticoagulation.           Relevant Medications    spironolactone (ALDACTONE) 25 MG Tab    furosemide (LASIX) 20 MG Tab    DILTIAZem (CARDIZEM) 30 MG Tab    digoxin (LANOXIN) 125 MCG Tab    carvedilol (COREG) 25 MG Tab    Type 2 diabetes mellitus with stage 3b chronic kidney disease, without long-term current use of insulin (HCC)     Chronic and ongoing problem, she will go to lab to update A1c. Continue Trulicity 1.5 mg weekly on Sundays, may need to increase to 3 mg if A1c still > 8. No retinopathy on latest eye exam. Continue statin and warfarin.           Chronic systolic congestive heart failure (HCC)     Chronic and ongoing problem, EF remains stable on last echocardiogram. She is waiting for Dr. Prabhakar to start his own practice and then will transition back to him. Will make sure all her cardiac medicines remain refilled in the meantime. No angina or angina equivalents at this time.           Relevant Medications    spironolactone (ALDACTONE) 25 MG Tab    furosemide (LASIX) 20 MG Tab    DILTIAZem (CARDIZEM) 30 MG Tab    digoxin (LANOXIN) 125 MCG Tab    carvedilol (COREG) 25 MG Tab    potassium Chloride ER (K-TAB) 20 MEQ Tab CR tablet           RTC: Return in about 5 months (around 10/18/2022).    I spent a total of 38 minutes with record review, exam, communication with the patient, communication with other providers, and documentation of this encounter.    PLEASE NOTE: This dictation was created using voice recognition software. I have made every reasonable attempt to correct obvious errors, but I expect that there are errors of grammar and possibly content that I did not discover before finalizing the note.      Leah Bonilla, DO  Geriatric and Internal Medicine  [facility]

## 2022-05-18 NOTE — ASSESSMENT & PLAN NOTE
Chronic and stable problem, continue current regimen, she is waiting for her cardiologist to open his new practice and will transition care back to him. She continues on rate control and oral anticoagulation.

## 2022-05-18 NOTE — ASSESSMENT & PLAN NOTE
Chronic and ongoing problem, she will go to lab to update A1c. Continue Trulicity 1.5 mg weekly on Sundays, may need to increase to 3 mg if A1c still > 8. No retinopathy on latest eye exam. Continue statin and warfarin.

## 2022-05-18 NOTE — ASSESSMENT & PLAN NOTE
New problem, we completed 2 forms in clinic today. The first was for patient assistance through [employer] for Trulicity, will send in for a 1 year supply at the 1.5 mg dose. The second was a confidential physician report for the [facility]. She was given this by a worker at the [facility] after she was repeating a question and not getting a straight answer, I filled it out with no restrictions at this time. Both of these forms have been scanned into her chart.

## 2022-05-18 NOTE — ASSESSMENT & PLAN NOTE
Chronic and ongoing problem, EF remains stable on last echocardiogram. She is waiting for Dr. Prabhakar to start his own practice and then will transition back to him. Will make sure all her cardiac medicines remain refilled in the meantime. No angina or angina equivalents at this time.

## 2022-05-24 ENCOUNTER — TELEPHONE (OUTPATIENT)
Dept: MEDICAL GROUP | Facility: PHYSICIAN GROUP | Age: 72
End: 2022-05-24
Payer: MEDICARE

## 2022-05-24 DIAGNOSIS — I50.22 CHRONIC SYSTOLIC CONGESTIVE HEART FAILURE (HCC): ICD-10-CM

## 2022-05-24 RX ORDER — POTASSIUM CHLORIDE 750 MG/1
10 TABLET, FILM COATED, EXTENDED RELEASE ORAL DAILY
Qty: 100 TABLET | Refills: 3 | Status: SHIPPED
Start: 2022-05-24 | End: 2023-04-24

## 2022-05-24 NOTE — TELEPHONE ENCOUNTER
"Pharmacy needs clarification on administration and direction Potassium Chloride ER 20 MEQ tab.   \"Per Manuf: This ER wax matrix tab is not reccommended for splitting. The micro disperisble forms can be split in half or rx does come in 10 MEQ strength. Please Advise does, or really want to proceed with this order as is\"  Please Advise.   "

## 2022-07-18 ENCOUNTER — ANTICOAGULATION VISIT (OUTPATIENT)
Dept: MEDICAL GROUP | Facility: MEDICAL CENTER | Age: 72
End: 2022-07-18
Payer: MEDICARE

## 2022-07-18 DIAGNOSIS — I48.21 PERMANENT ATRIAL FIBRILLATION (HCC): ICD-10-CM

## 2022-07-18 DIAGNOSIS — Z79.01 CHRONIC ANTICOAGULATION: Primary | ICD-10-CM

## 2022-07-18 DIAGNOSIS — Z98.890 HISTORY OF MITRAL VALVE REPAIR: ICD-10-CM

## 2022-07-18 LAB — INR PPP: 2.2 (ref 2–3.5)

## 2022-07-18 PROCEDURE — 93793 ANTICOAG MGMT PT WARFARIN: CPT | Performed by: FAMILY MEDICINE

## 2022-07-18 PROCEDURE — 85610 PROTHROMBIN TIME: CPT | Performed by: FAMILY MEDICINE

## 2022-07-18 NOTE — PROGRESS NOTES
OP Anticoagulation Service Note    Date: 2022  There were no vitals filed for this visit.   pt declined vitals    Anticoagulation Summary  As of 2022    INR goal:  2.0-3.0   TTR:  55.7 % (5.3 y)   INR used for dosin.20 (2022)   Warfarin maintenance plan:  7.5 mg (5 mg x 1.5) every Sun, Tue, Thu; 5 mg (5 mg x 1) all other days   Weekly warfarin total:  42.5 mg   Plan last modified:  Sakshi Mcconnell, PharmD (2020)   Next INR check:  2022   Target end date:  Indefinite    Indications    Atrial fibrillation (HCC) [I48.91]  History of mitral valve repair [Z98.890]             Anticoagulation Episode Summary     INR check location:      Preferred lab:      Send INR reminders to:      Comments:        Anticoagulation Care Providers     Provider Role Specialty Phone number    Zeferino Almaraz, A.P.N. Referring Family Medicine [phone]    [facility] Anticoagulation Services Responsible  [phone]    Kenneth Salas, PharmD Responsible Pharmacy             HPI:   Salma Hdz Nabeel seen in clinic today, on anticoagulation therapy with warfarin (a high risk medication) for atrial fibrillation    Pt is here today to evaluate anticoagulation therapy    Previous INR was  2.1 on 22    Pt was instructed to continue current regimen    Anticoagulation Patient Findings  Patient Findings     Negatives:  Signs/symptoms of thrombosis, Signs/symptoms of bleeding, Laboratory test error suspected, Change in health, Change in alcohol use, Change in activity, Upcoming invasive procedure, Emergency department visit, Upcoming dental procedure, Missed doses, Extra doses, Change in medications, Change in diet/appetite, Hospital admission, Bruising, Other complaints          Confirmed warfarin dosing regimen    Pt is not on antiplatelet/NSAID therapy          A/P   INR  -therapeutic today,    Continue current warfarin regimen            Pt educated to contact our clinic with any changes in medications or s/s of  bleeding or thrombosis. Pt is aware to seek immediate medical attention for falls, head injury or deep cuts    Follow up appointment in 10 week(s) to reduce risk of adverse events from warfarin  Sakshi Mcconnell, PharmD

## 2022-09-01 DIAGNOSIS — E11.69 DYSLIPIDEMIA ASSOCIATED WITH TYPE 2 DIABETES MELLITUS (HCC): ICD-10-CM

## 2022-09-01 DIAGNOSIS — E78.5 DYSLIPIDEMIA ASSOCIATED WITH TYPE 2 DIABETES MELLITUS (HCC): ICD-10-CM

## 2022-09-01 DIAGNOSIS — I48.21 PERMANENT ATRIAL FIBRILLATION (HCC): ICD-10-CM

## 2022-09-01 RX ORDER — CARVEDILOL 25 MG/1
25 TABLET ORAL 2 TIMES DAILY
Qty: 200 TABLET | Refills: 0 | Status: SHIPPED | OUTPATIENT
Start: 2022-09-01 | End: 2023-01-03 | Stop reason: SDUPTHER

## 2022-09-01 RX ORDER — ATORVASTATIN CALCIUM 10 MG/1
10 TABLET, FILM COATED ORAL NIGHTLY
Qty: 100 TABLET | Refills: 0 | Status: SHIPPED | OUTPATIENT
Start: 2022-09-01 | End: 2023-01-03 | Stop reason: SDUPTHER

## 2022-09-26 ENCOUNTER — ANTICOAGULATION VISIT (OUTPATIENT)
Dept: MEDICAL GROUP | Facility: MEDICAL CENTER | Age: 72
End: 2022-09-26
Payer: MEDICARE

## 2022-09-26 DIAGNOSIS — Z98.890 HISTORY OF MITRAL VALVE REPAIR: ICD-10-CM

## 2022-09-26 DIAGNOSIS — I48.21 PERMANENT ATRIAL FIBRILLATION (HCC): ICD-10-CM

## 2022-09-26 LAB — INR PPP: 1.9 (ref 2–3.5)

## 2022-09-26 PROCEDURE — 85610 PROTHROMBIN TIME: CPT | Performed by: INTERNAL MEDICINE

## 2022-09-26 PROCEDURE — 99211 OFF/OP EST MAY X REQ PHY/QHP: CPT | Performed by: INTERNAL MEDICINE

## 2022-09-26 NOTE — PROGRESS NOTES
OP Anticoagulation Service Note    Date: 2022  There were no vitals filed for this visit.   pt declined vitals    Anticoagulation Summary  As of 2022      INR goal:  2.0-3.0   TTR:  56.1 % (5.5 y)   INR used for dosin.90 (2022)   Warfarin maintenance plan:  7.5 mg (5 mg x 1.5) every Sun, Tue, Thu; 5 mg (5 mg x 1) all other days   Weekly warfarin total:  42.5 mg   Plan last modified:  Sakshi Mcconnell, PharmD (2020)   Next INR check:  2022   Target end date:  Indefinite    Indications    Atrial fibrillation (HCC) [I48.91]  History of mitral valve repair [Z98.890]                 Anticoagulation Episode Summary       INR check location:      Preferred lab:      Send INR reminders to:      Comments:            Anticoagulation Care Providers       Provider Role Specialty Phone number    Zeferino Almaraz, A.P.N. Referring Family Medicine [phone]    [facility] Anticoagulation Services Responsible  [phone]    Kenneth Salas, PharmD Responsible Pharmacy               HPI:   Salma Lees seen in clinic today, on anticoagulation therapy with warfarin (a high risk medication) for atrial fibrillation    Pt is here today to evaluate anticoagulation therapy    Previous INR was  2.2 on 22    Pt was instructed to continue regimen    Anticoagulation Patient Findings  Patient Findings       Positives:  Missed doses    Negatives:  Signs/symptoms of thrombosis, Signs/symptoms of bleeding, Laboratory test error suspected, Change in health, Change in alcohol use, Change in activity, Upcoming invasive procedure, Emergency department visit, Upcoming dental procedure, Extra doses, Change in medications, Change in diet/appetite, Hospital admission, Bruising, Other complaints            Confirmed warfarin dosing regimen    Pt is not on antiplatelet/NSAID therapy    Falls or accidents since last visit No        A/P   INR  slightly subtherapeutic today    Bolus x 1 day then continue regimen      02/23 check referral    Pt educated to contact our clinic with any changes in medications or s/s of bleeding or thrombosis. Pt is aware to seek immediate medical attention for falls, head injury or deep cuts    Follow up appointment in 10 week(s)  as INR is 0.1 out of range and pt is historically therapeutic    Kaylin Encinas, PharmD

## 2022-09-28 ENCOUNTER — PHARMACY VISIT (OUTPATIENT)
Dept: PHARMACY | Facility: MEDICAL CENTER | Age: 72
End: 2022-09-28
Payer: COMMERCIAL

## 2022-09-28 PROCEDURE — RXMED WILLOW AMBULATORY MEDICATION CHARGE: Performed by: INTERNAL MEDICINE

## 2022-09-28 RX ORDER — BNT162B2 ORIGINAL AND OMICRON BA.4/BA.5 .1125; .1125 MG/2.25ML; MG/2.25ML
INJECTION, SUSPENSION INTRAMUSCULAR
Qty: 0.3 ML | Refills: 0 | OUTPATIENT
Start: 2022-09-23 | End: 2022-09-29

## 2022-10-04 ENCOUNTER — HOSPITAL ENCOUNTER (OUTPATIENT)
Dept: RADIOLOGY | Facility: MEDICAL CENTER | Age: 72
End: 2022-10-04
Attending: INTERNAL MEDICINE
Payer: MEDICARE

## 2022-10-04 DIAGNOSIS — Z78.0 MENOPAUSE: ICD-10-CM

## 2022-10-04 DIAGNOSIS — Z12.31 ENCOUNTER FOR SCREENING MAMMOGRAM FOR BREAST CANCER: ICD-10-CM

## 2022-10-04 PROCEDURE — 77080 DXA BONE DENSITY AXIAL: CPT

## 2022-10-04 PROCEDURE — 77063 BREAST TOMOSYNTHESIS BI: CPT

## 2022-10-05 ENCOUNTER — OFFICE VISIT (OUTPATIENT)
Dept: SLEEP MEDICINE | Facility: MEDICAL CENTER | Age: 72
End: 2022-10-05
Payer: MEDICARE

## 2022-10-05 VITALS
HEART RATE: 84 BPM | OXYGEN SATURATION: 93 % | SYSTOLIC BLOOD PRESSURE: 110 MMHG | WEIGHT: 190 LBS | HEIGHT: 64 IN | RESPIRATION RATE: 16 BRPM | DIASTOLIC BLOOD PRESSURE: 70 MMHG | BODY MASS INDEX: 32.44 KG/M2

## 2022-10-05 DIAGNOSIS — J96.11 CHRONIC RESPIRATORY FAILURE WITH HYPOXIA, ON HOME OXYGEN THERAPY (HCC): ICD-10-CM

## 2022-10-05 DIAGNOSIS — J44.9 CHRONIC OBSTRUCTIVE PULMONARY DISEASE, UNSPECIFIED COPD TYPE (HCC): ICD-10-CM

## 2022-10-05 DIAGNOSIS — I27.20 PULMONARY HYPERTENSION (HCC): ICD-10-CM

## 2022-10-05 DIAGNOSIS — Z99.81 CHRONIC RESPIRATORY FAILURE WITH HYPOXIA, ON HOME OXYGEN THERAPY (HCC): ICD-10-CM

## 2022-10-05 PROCEDURE — 99213 OFFICE O/P EST LOW 20 MIN: CPT | Performed by: PHYSICIAN ASSISTANT

## 2022-10-05 RX ORDER — LEVALBUTEROL TARTRATE 45 UG/1
2 AEROSOL, METERED ORAL EVERY 4 HOURS PRN
Qty: 1 EACH | Refills: 2 | Status: SHIPPED | OUTPATIENT
Start: 2022-10-05

## 2022-10-05 ASSESSMENT — ENCOUNTER SYMPTOMS
ORTHOPNEA: 0
WEIGHT LOSS: 0
HEARTBURN: 0
SINUS PAIN: 0
DIZZINESS: 0
TREMORS: 0
CHILLS: 0
HEADACHES: 1
SORE THROAT: 0
PALPITATIONS: 1
WHEEZING: 0
FEVER: 0
SHORTNESS OF BREATH: 1
COUGH: 0
SPUTUM PRODUCTION: 0
INSOMNIA: 0

## 2022-10-05 ASSESSMENT — FIBROSIS 4 INDEX: FIB4 SCORE: 1.48

## 2022-10-05 NOTE — PROGRESS NOTES
CC: Follow-up COPD    HPI:  Salma Lees is a 72 y.o. year old female here today for follow-up on COPD and chronic respiratory failure with hypoxia.  Patient last seen in clinic 4/8/2022 by Peggy Shannon PA-C.  Brief remote smoking history reported as 5 pack years with quit date 1990, secondhand smoke exposure.  Previous patient of Dr. Ivy Galloway.    Pertinent past medical history includes atrial fibrillation, hypertension, CHF, mitral valve repair, pacemaker in situ, CKD, asthma and pulmonary hypertension.  She did struggle last summer with the poor air quality.    Reviewed in clinic vitals including /78, HR 84, O2 sat of 93% on 2 L at rest and BMI of 32.61 kg/m².    Reviewed home medication regimen Anoro, Xopenex, spironolactone alternating with Lasix and potassium.  Patient reports using Xopenex 2-3 times per year.  Requesting prior authorization for refill.     No recent chest imaging.    Echocardiogram obtained 6/2/2021 demonstrating left ventricular chamber size, mild concentric left ventricular hypertrophy, normal systolic function and indeterminant diastolic function due to mitral valve disease.  LVEF estimated 60%.  Mildly dilated right ventricle with normal right ventricular systolic function.  Pacer wire seen, moderately dilated left atrium.  Known mitral valve repair functioning normally, mild mitral regurgitation, aortic sclerosis without stenosis, mild aortic insufficiency.  Moderate tricuspid regurgitation with estimated RVSP of 45 to 50 mmHg.  Trace pulmonic insufficiency.     Chest x-ray obtained 11/12/2018 demonstrated pacing device in place, median sternotomy and MVR changes, no acute cardiopulmonary disease.     Reviewed pulmonary function testing obtained 4/5/2022 demonstrating an FEV1 of 1.27 L or 59% predicted, FVC of 2.08 L or 75% predicted, FEV1/FVC ratio of 61.14, residual volume 174% predicted, % predicted, DLCO 59% predicted.  Per pulmonologist interpretation  "moderate obstruction, no response to bronchodilator, evidence of air trapping and moderate reduction in gas transfer as estimated by DLCO consistent with underlying COPD.    Review of Systems   Constitutional:  Negative for chills, fever, malaise/fatigue and weight loss.   HENT:  Positive for congestion (greater at night). Negative for hearing loss, nosebleeds, sinus pain, sore throat and tinnitus.    Eyes:         Presc glasses   Respiratory:  Positive for shortness of breath (with exertion). Negative for cough, sputum production and wheezing.    Cardiovascular:  Positive for palpitations. Negative for chest pain, orthopnea and leg swelling.   Gastrointestinal:  Negative for heartburn.        Missing most of teeth, no trouble swallowing    Neurological:  Positive for headaches. Negative for dizziness and tremors.   Psychiatric/Behavioral:  The patient does not have insomnia.      Past Medical History:   Diagnosis Date    A-fib ([facility])     Asthma     Breath shortness 09/07/2017    uses oxygen at 3 liters/min cont. with \"[facility]\"    Calculus of ureter 9/18/2017    Congestive heart failure ([facility])     COPD (chronic obstructive pulmonary disease) ([facility])     Diabetes ([facility])     Hypertension     Infectious disease 09/07/2017    UTI/hx. MRSA    Pacemaker 2014    Renal disorder 09/07/2017    has stent    Shortness of breath 10/18/2017       Past Surgical History:   Procedure Laterality Date    URETEROSCOPY Left 9/18/2017    Procedure: URETEROSCOPY;  Surgeon: Edgardo Richter M.D.;  Location: [facility];  Service: Urology    LASERTRIPSY Left 9/18/2017    Procedure: LASERTRIPSY LITHO  ;  Surgeon: Edgardo Richter M.D.;  Location: [facility];  Service: Urology    STENT REMOVAL Left 9/18/2017    Procedure: STENT REMOVAL;  Surgeon: Edgardo Richter M.D.;  Location: [facility];  Service: Urology    CYSTOSCOPY STENT PLACEMENT Left 6/25/2017    Procedure: CYSTOSCOPY, " LEFT URETERAL STENT PLACEMENT;  Surgeon: Edgardo Richter M.D.;  Location: [facility];  Service:     PACEMAKER INSERTION      KNEE REPLACEMENT, TOTAL Left 2015    MITRAL VALVE REPAIR         Family History   Problem Relation Age of Onset    Lung Disease Mother         asthma    Cancer Mother         ovarian    Heart Disease Father     Stroke Father     Thyroid Sister     Cancer Brother         pancreas    Cancer Paternal Grandmother         colon    Heart Disease Brother     No Known Problems Brother     No Known Problems Brother     Heart Disease Son         afib       Social History     Socioeconomic History    Marital status:      Spouse name: Not on file    Number of children: Not on file    Years of education: Not on file    Highest education level: Not on file   Occupational History    Not on file   Tobacco Use    Smoking status: Former     Packs/day: 1.00     Years: 5.00     Pack years: 5.00     Types: Cigarettes     Quit date: 1990     Years since quittin.7    Smokeless tobacco: Never    Tobacco comments:     Continued abstinence   Vaping Use    Vaping Use: Never used   Substance and Sexual Activity    Alcohol use: No     Alcohol/week: 0.0 oz    Drug use: No    Sexual activity: Not Currently   Other Topics Concern    Not on file   Social History Narrative    She has a son. She had previously experience in [employer].     Social Determinants of Health     Financial Resource Strain: Not on file   Food Insecurity: Not on file   Transportation Needs: Not on file   Physical Activity: Not on file   Stress: Not on file   Social Connections: Not on file   Intimate Partner Violence: Not on file   Housing Stability: Not on file       Allergies as of 10/05/2022 - Reviewed 10/05/2022   Allergen Reaction Noted    Nitrofurantoin  2022    Pcn [penicillins] Swelling 2017    Amlodipine besylate-valsartan Unspecified 2017    Amoxicillin Anaphylaxis and Shortness of  "Breath 02/13/2017    Etodolac Hives and Nausea 02/13/2017    Food Hives 09/07/2017    Lisinopril Unspecified 02/13/2017    Other drug Rash 02/13/2017    Other misc  09/18/2017    Tape  09/18/2017    Eliquis [apixaban] Shortness of Breath 01/13/2020        @Vital signs for this encounter:  /70   Pulse 84   Resp 16   Ht 1.626 m (5' 4\")   Wt 86.2 kg (190 lb)   SpO2 93%     Current medications as of today   Current Outpatient Medications   Medication Sig Dispense Refill    carvedilol (COREG) 25 MG Tab Take 1 Tablet by mouth 2 times a day. 200 Tablet 0    atorvastatin (LIPITOR) 10 MG Tab Take 1 Tablet by mouth every evening. 100 Tablet 0    potassium Chloride ER (KLOR-CON) 10 MEQ tablet Take 1 Tablet by mouth every day. Take with Lasix 100 Tablet 3    spironolactone (ALDACTONE) 25 MG Tab Take 1 Tablet by mouth every day. 100 Tablet 3    furosemide (LASIX) 20 MG Tab Take 1 Tablet by mouth every day. 100 Tablet 3    DILTIAZem (CARDIZEM) 30 MG Tab Take 1 Tablet by mouth 2 times a day. 200 Tablet 3    digoxin (LANOXIN) 125 MCG Tab Take 1 Tablet by mouth every day. 100 Tablet 3    Dulaglutide 1.5 MG/0.5ML Solution Pen-injector Inject 0.5 mL under the skin every 7 days. Every Sunday 1 mL 0    warfarin (COUMADIN) 5 MG Tab TAKE 1 AND 1/2 TABLETS BY MOUTH DAILY OR AS DIRECTED BY ANTICOAGULATION CLINIC 135 Tablet 3    umeclidinium-vilanterol (ANORO ELLIPTA) 62.5-25 MCG/INH AEROSOL POWDER, BREATH ACTIVATED inhaler Inhale 1 Puff every day. 3 Each 3    levalbuterol (XOPENEX HFA) 45 MCG/ACT inhaler Inhale 2 Puffs every four hours as needed. FOR SHORTNESS OF BREATH 1 Each 2     No current facility-administered medications for this visit.         Physical Exam:   Gen:           Alert and oriented, No apparent distress. Mood and affect appropriate, normal interaction with provider.  Eyes:          sclere white, conjunctive moist.  Hearing:     Grossly intact.  Dentition:    Poor dentition.  Oropharynx:   Tongue normal, " posterior pharynx without erythema or exudate.  Neck:        Supple, trachea midline, no masses.  Respiratory Effort: No intercostal retractions or use of accessory muscles.   Lung Auscultation:      Diminished throughout bilaterally; no rales, rhonchi or wheezing.  CV:            Regular rate and rhythm. No edema. No murmurs, rubs or gallops.  Digits, Nails, Ext: No clubbing, cyanosis, petechiae, or nodes.   Skin:        No rashes, lesions or ulcers noted on back or exposed skin    surfaces.                     Assessment:  1. Chronic obstructive pulmonary disease, unspecified COPD type ([facility])  levalbuterol (XOPENEX HFA) 45 MCG/ACT inhaler      2. Chronic respiratory failure with hypoxia, on home oxygen therapy ([facility])        3. BMI 32.0-32.9,adult        4. Pulmonary hypertension ([facility])            Immunizations:    Flu: 10/12/2021  Pneumovax 23: 9/16/2019, 9/21/2011  Prevnar 13: 10/6/2016  PCV 7: 10/25/2016  Pfizer SARS-CoV-2 vaccine: 9/28/2022, 6/7/2022, 9/27/2021, 2/9/2021, 1/19/2021    Plan:  72-year-old female here to follow-up on COPD and chronic respiratory failure with hypoxia.    Former smoker: Brief remote smoking history.  Complete and/or continued abstinence advised.      Chronic respiratory failure with hypoxia: Continues to use and benefit from oxygen use.  Patient is requiring 2 L ATC increasing up to 5 L with strenuous activity such as stairs.  Obtain overnight oximetry to assess adequacy of current nighttime oxygen use.    COPD: On Anoro for maintenance therapy, requesting refill on Xopenex, reviewed indications for use. mMRC 2-3    Pulmonary hypertension: Noted on echocardiogram, does have multisystem dysfunction including A. fib, type 2 diabetes, CKD, elevated BMI and COPD.  Obtain overnight oximetry on oxygen.    Elevated BMI: Discussion regarding impact central adiposity on pulmonary function.  Encouraged to increase activity as tolerated and monitor nutritional intake.      Follow-up in 6 months,  sooner if needed.    This dictation was created using voice recognition software. The accuracy of the dictation is limited to the abilities of the software. I expect there may be some errors of grammar and possibly content.

## 2022-10-05 NOTE — PATIENT INSTRUCTIONS
1-using spirometer about 1500 ml  2-refill on xopenex  3-reviewed indications for use   -shortness of breath   -increased work of breathing   -cough   -wheezing  4-will work on prior authorization   5-continues to use and benefit from oxygen   6-follow up in 6 months

## 2022-10-10 ENCOUNTER — DOCUMENTATION (OUTPATIENT)
Dept: HEALTH INFORMATION MANAGEMENT | Facility: OTHER | Age: 72
End: 2022-10-10
Payer: MEDICARE

## 2022-10-11 ENCOUNTER — HOSPITAL ENCOUNTER (OUTPATIENT)
Facility: MEDICAL CENTER | Age: 72
End: 2022-10-11
Attending: INTERNAL MEDICINE
Payer: MEDICARE

## 2022-10-11 PROCEDURE — 82274 ASSAY TEST FOR BLOOD FECAL: CPT

## 2022-10-12 ENCOUNTER — TELEPHONE (OUTPATIENT)
Dept: SLEEP MEDICINE | Facility: MEDICAL CENTER | Age: 72
End: 2022-10-12

## 2022-10-12 NOTE — TELEPHONE ENCOUNTER
MEDICATION PRIOR AUTHORIZATION NEEDED:    1. Name of Medication: Xopenex 45 mcg    2. Requested By (Name of Pharmacy): [facility]     3. Is insurance on file current? yes    4. What is the name & phone number of the 3rd party payor? [facility] [phone]

## 2022-10-14 ENCOUNTER — TELEPHONE (OUTPATIENT)
Dept: HEALTH INFORMATION MANAGEMENT | Facility: OTHER | Age: 72
End: 2022-10-14
Payer: MEDICARE

## 2022-10-17 DIAGNOSIS — Z12.11 COLON CANCER SCREENING: ICD-10-CM

## 2022-10-18 ENCOUNTER — APPOINTMENT (OUTPATIENT)
Dept: MEDICAL GROUP | Facility: PHYSICIAN GROUP | Age: 72
End: 2022-10-18
Payer: MEDICARE

## 2022-10-18 ENCOUNTER — PATIENT MESSAGE (OUTPATIENT)
Dept: SLEEP MEDICINE | Facility: MEDICAL CENTER | Age: 72
End: 2022-10-18

## 2022-10-19 ENCOUNTER — OFFICE VISIT (OUTPATIENT)
Dept: MEDICAL GROUP | Facility: PHYSICIAN GROUP | Age: 72
End: 2022-10-19
Payer: MEDICARE

## 2022-10-19 ENCOUNTER — PATIENT OUTREACH (OUTPATIENT)
Dept: HEALTH INFORMATION MANAGEMENT | Facility: OTHER | Age: 72
End: 2022-10-19

## 2022-10-19 ENCOUNTER — HOSPITAL ENCOUNTER (OUTPATIENT)
Facility: MEDICAL CENTER | Age: 72
End: 2022-10-19
Attending: INTERNAL MEDICINE
Payer: MEDICARE

## 2022-10-19 VITALS
BODY MASS INDEX: 32.68 KG/M2 | SYSTOLIC BLOOD PRESSURE: 100 MMHG | DIASTOLIC BLOOD PRESSURE: 50 MMHG | HEART RATE: 82 BPM | HEIGHT: 64 IN | WEIGHT: 191.4 LBS | TEMPERATURE: 96.5 F | RESPIRATION RATE: 12 BRPM | OXYGEN SATURATION: 89 %

## 2022-10-19 DIAGNOSIS — N18.32 STAGE 3B CHRONIC KIDNEY DISEASE: ICD-10-CM

## 2022-10-19 DIAGNOSIS — N95.8 GENITOURINARY SYNDROME OF MENOPAUSE: ICD-10-CM

## 2022-10-19 DIAGNOSIS — Z23 NEED FOR VACCINATION: Primary | ICD-10-CM

## 2022-10-19 DIAGNOSIS — E11.22 TYPE 2 DIABETES MELLITUS WITH STAGE 3B CHRONIC KIDNEY DISEASE, WITHOUT LONG-TERM CURRENT USE OF INSULIN (HCC): ICD-10-CM

## 2022-10-19 DIAGNOSIS — Z99.81 CHRONIC RESPIRATORY FAILURE WITH HYPOXIA, ON HOME OXYGEN THERAPY (HCC): ICD-10-CM

## 2022-10-19 DIAGNOSIS — E78.5 DYSLIPIDEMIA ASSOCIATED WITH TYPE 2 DIABETES MELLITUS (HCC): ICD-10-CM

## 2022-10-19 DIAGNOSIS — N18.32 TYPE 2 DIABETES MELLITUS WITH STAGE 3B CHRONIC KIDNEY DISEASE, WITHOUT LONG-TERM CURRENT USE OF INSULIN (HCC): ICD-10-CM

## 2022-10-19 DIAGNOSIS — J96.11 CHRONIC RESPIRATORY FAILURE WITH HYPOXIA, ON HOME OXYGEN THERAPY (HCC): ICD-10-CM

## 2022-10-19 DIAGNOSIS — J44.9 CHRONIC OBSTRUCTIVE PULMONARY DISEASE, UNSPECIFIED COPD TYPE (HCC): ICD-10-CM

## 2022-10-19 DIAGNOSIS — I50.22 CHRONIC SYSTOLIC CONGESTIVE HEART FAILURE (HCC): ICD-10-CM

## 2022-10-19 DIAGNOSIS — E11.69 DYSLIPIDEMIA ASSOCIATED WITH TYPE 2 DIABETES MELLITUS (HCC): ICD-10-CM

## 2022-10-19 LAB
CREAT UR-MCNC: 176.39 MG/DL
HBA1C MFR BLD: 9.4 % (ref 0–5.6)
INT CON NEG: NEGATIVE
INT CON POS: POSITIVE
MICROALBUMIN UR-MCNC: 19.8 MG/DL
MICROALBUMIN/CREAT UR: 112 MG/G (ref 0–30)

## 2022-10-19 PROCEDURE — 82043 UR ALBUMIN QUANTITATIVE: CPT

## 2022-10-19 PROCEDURE — 90662 IIV NO PRSV INCREASED AG IM: CPT | Performed by: INTERNAL MEDICINE

## 2022-10-19 PROCEDURE — G0008 ADMIN INFLUENZA VIRUS VAC: HCPCS | Performed by: INTERNAL MEDICINE

## 2022-10-19 PROCEDURE — 82570 ASSAY OF URINE CREATININE: CPT

## 2022-10-19 PROCEDURE — 99215 OFFICE O/P EST HI 40 MIN: CPT | Mod: 25 | Performed by: INTERNAL MEDICINE

## 2022-10-19 PROCEDURE — 83036 HEMOGLOBIN GLYCOSYLATED A1C: CPT | Performed by: INTERNAL MEDICINE

## 2022-10-19 ASSESSMENT — FIBROSIS 4 INDEX: FIB4 SCORE: 1.48

## 2022-10-19 NOTE — LETTER
[employer]  Leah Bonilla D.O.  [address]  [place] [address]  Fax: [phone]   Authorization for Release/Disclosure of   Protected Health Information   Name: CHARLENE GARCIA : 1950 SSN: [id number]   Address: [address] [place] [address]  [address]  [place] [address] Phone:    [phone] (home) [phone] (work)   I authorize the entity listed below to release/disclose the PHI below to:   [employer]/Leah Bonilla D.O. and Leah Bonilla D.O.   Provider or Entity Name:  Dr. Prabhakar   Address   [facility], [place]   Phone:      Fax:     Reason for request: continuity of care   Information to be released:    [  ] LAST COLONOSCOPY,  including any PATH REPORT and follow-up  [  ] LAST FIT/COLOGUARD RESULT [  ] LAST DEXA  [  ] LAST MAMMOGRAM  [  ] LAST PAP  [  ] LAST LABS [  ] RETINA EXAM REPORT  [  ] IMMUNIZATION RECORDS  [ x ] Release all info      [ x ] Check here and initial the line next to each item to release ALL health information INCLUDING  _____ Care and treatment for drug and / or alcohol abuse  _____ HIV testing, infection status, or AIDS  _____ Genetic Testing    DATES OF SERVICE OR TIME PERIOD TO BE DISCLOSED: __-___________  I understand and acknowledge that:  * This Authorization may be revoked at any time by you in writing, except if your health information has already been used or disclosed.  * Your health information that will be used or disclosed as a result of you signing this authorization could be re-disclosed by the recipient. If this occurs, your re-disclosed health information may no longer be protected by State or Federal laws.  * You may refuse to sign this Authorization. Your refusal will not affect your ability to obtain treatment.  * This Authorization becomes effective upon signing and will  on (date) __________.      If no date is indicated, this Authorization will  one (1) year from the signature date.    Name: Charlene Hdz  Nabeel    Signature:   Date:     10/19/2022       PLEASE FAX REQUESTED RECORDS BACK TO: [phone]

## 2022-10-19 NOTE — PROGRESS NOTES
Nurse CHAYA met with patient prior to PCP appointment today. Provided information and brochure on CCM program. Pt states she feels like she doesn't need to follow in CCM program at this time. States she is following with her providers and pharmacist and doesn't need any additional resources or help managing her care at this time. Pt will contact nurse CHAYA if she decides she would like to participate in program.

## 2022-10-19 NOTE — PROGRESS NOTES
Subjective:   Chief Complaint/History of Present Illness:  Salma Lees is a 72 y.o. female established patient who presents today to discuss medical problems as listed below. Salma is unaccompanied for today's visit.    Problem   Genitourinary Syndrome of Menopause    She reports vaginal dryness with history remotely of hysterectomy.  She is wondering about taking an over-the-counter supplement as she is worried the topical lubricants and applications will be messy and not absorbed well.     Dyslipidemia Associated With Type 2 Diabetes Mellitus ([facility])     Latest Reference Range & Units 9/13/21 08:43   Cholesterol,Tot 100 - 199 mg/dL 234 (H)   Triglycerides 0 - 149 mg/dL 375 (H)   HDL >=40 mg/dL 36 !   LDL <100 mg/dL 123 (H)     She appreciated vague discomfort in her back and was worried that the atorvastatin was causing kidney irritation so she stopped taking her 20 mg dose in June 2021 and then added back 10 mg in September 2021.    Current regimen: Atorvastatin 10 mg daily     Chronic Respiratory Failure With Hypoxia, On Home Oxygen Therapy ([facility])    She reports chronic oxygen use initiated on 2014.  She uses 2 L continuously and 5 L with exertion. She does follow with pulmonary medicine and carries a diagnosis of both asthma as well as obstructive lung disease.     Ckd (Chronic Kidney Disease) Stage 3, Gfr 30-59 Ml/Min ([facility])     Latest Reference Range & Units 10/19/21 13:10   Bun 8 - 22 mg/dL 35 (H)   Creatinine 0.50 - 1.40 mg/dL 1.59 (H)   GFR If Non African American >60 mL/min/1.73 m 2 32 !     She has a history of chronic kidney disease likely secondary to heart disease, nephrolithiasis, and diabetes.  She is on several renally excreted medications including spironolactone, Lasix, and digoxin. No secondary evaluation completed. GFR previously in the high 50s and now in the low 30s.     Type 2 Diabetes Mellitus With Stage 3b Chronic Kidney Disease, Without Long-Term Current Use of Insulin ([facility])      "Latest Reference Range & Units 10/19/22 11:30   Glycohemoglobin 0.0 - 5.6 % 9.4 !       She has had diabetes since at least 2017 with A1c ranging 7-14. She did not tolerate metformin and has CKDIV. She previously had microalbuminuria, currently resolved. No known neuropathy or retinopathy.    Current regimen: Trulicity 1.5 mg weekly on Sundays          Current Medications:  Current Outpatient Medications Ordered in Epic   Medication Sig Dispense Refill    levalbuterol (XOPENEX HFA) 45 MCG/ACT inhaler Inhale 2 Puffs every four hours as needed for Shortness of Breath. 1 Each 2    carvedilol (COREG) 25 MG Tab Take 1 Tablet by mouth 2 times a day. 200 Tablet 0    atorvastatin (LIPITOR) 10 MG Tab Take 1 Tablet by mouth every evening. 100 Tablet 0    potassium Chloride ER (KLOR-CON) 10 MEQ tablet Take 1 Tablet by mouth every day. Take with Lasix 100 Tablet 3    spironolactone (ALDACTONE) 25 MG Tab Take 1 Tablet by mouth every day. 100 Tablet 3    furosemide (LASIX) 20 MG Tab Take 1 Tablet by mouth every day. (Patient taking differently: Take 20 mg by mouth every day. Every other day with Spironolactone) 100 Tablet 3    DILTIAZem (CARDIZEM) 30 MG Tab Take 1 Tablet by mouth 2 times a day. 200 Tablet 3    digoxin (LANOXIN) 125 MCG Tab Take 1 Tablet by mouth every day. 100 Tablet 3    Dulaglutide 1.5 MG/0.5ML Solution Pen-injector Inject 0.5 mL under the skin every 7 days. Every Sunday 1 mL 0    warfarin (COUMADIN) 5 MG Tab TAKE 1 AND 1/2 TABLETS BY MOUTH DAILY OR AS DIRECTED BY ANTICOAGULATION CLINIC 135 Tablet 3    umeclidinium-vilanterol (ANORO ELLIPTA) 62.5-25 MCG/INH AEROSOL POWDER, BREATH ACTIVATED inhaler Inhale 1 Puff every day. 3 Each 3     No current Epic-ordered facility-administered medications on file.          Objective:   Physical Exam:    Vitals: /50 (BP Location: Left arm, Patient Position: Sitting, BP Cuff Size: Adult)   Pulse 82   Temp 35.8 °C (96.5 °F) (Temporal)   Resp 12   Ht 1.626 m (5' 4\")   " Wt 86.8 kg (191 lb 6.4 oz)   SpO2 89%    BMI: Body mass index is 32.85 kg/m².  Physical Exam  Constitutional:       General: She is not in acute distress.     Appearance: Normal appearance. She is not ill-appearing.   HENT:      Ears:      Comments: Hearing grossly normal     Mouth/Throat:      Comments: Normal phonation  Eyes:      General: No scleral icterus.     Conjunctiva/sclera: Conjunctivae normal.   Cardiovascular:      Rate and Rhythm: Normal rate.      Pulses: Normal pulses.   Pulmonary:      Effort: No respiratory distress.      Comments: Supplemental oxygen in place, no cough  Musculoskeletal:      Right lower leg: No edema.      Left lower leg: No edema.   Lymphadenopathy:      Cervical: No cervical adenopathy.   Skin:     General: Skin is warm and dry.      Findings: No rash.   Psychiatric:         Mood and Affect: Mood normal.         Behavior: Behavior normal.         Thought Content: Thought content normal.         Judgment: Judgment normal.        Assessment and Plan:   Salma is a 72 y.o. female with the following:  Problem List Items Addressed This Visit       Type 2 diabetes mellitus with stage 3b chronic kidney disease, without long-term current use of insulin (HCC)     Chronic and decompensated problem.  A1c continues to be above goal however she has brought it down by 1.3 since last evaluation.  She is interested in a continuous glucose monitor but when we reviewed the cost she does not think this will be affordable.  She is going to get strict again with her diet avoiding processed foods, previous an A1c of 7.1 when she was strict with her diet.  Discussed that if her A1c is not below 8 at the next check that we are going to either increase dulaglutide to 3 mg weekly or add basal insulin.  She agrees and we will plan to see her in clinic in about 3 months to recheck.  Update kidney function and urine microalbumin.  Retinal screening and diabetic foot exam are up-to-date.         Relevant  Orders    Comp Metabolic Panel    CBC WITH DIFFERENTIAL    MICROALBUMIN CREAT RATIO URINE    POCT  A1C (Completed)    VITAMIN B12    VITAMIN D,25 HYDROXY (DEFICIENCY)    Chronic respiratory failure with hypoxia, on home oxygen therapy ([facility])     Chronic and ongoing problem, she is going to reach out to the pulmonary rehab program to get back in to help with rebuilding her lung endurance.  She continues with supplemental oxygen at all times with 2 L at rest and 5 L with exertion.         Chronic systolic congestive heart failure (HCC)    Relevant Orders    proBrain Natriuretic Peptide, NT    CKD (chronic kidney disease) stage 3, GFR 30-59 ml/min ([facility])     Chronic and ongoing problem, update GFR to see where she is at right now, she has a few nephrotoxic medicines which we could revisit in the future.  Continue with good oral hydration.         Dyslipidemia associated with type 2 diabetes mellitus (HCC)     Chronic and compensated problem, she went off pharmacotherapy due to concerns of is hurting her kidneys, she has now been back on the atorvastatin 10 mg daily for the past year, update lipid panel to ensure LDL is below 100.         Relevant Orders    Lipid Profile    Genitourinary syndrome of menopause     New problem, reviewed the over-the-counter supplements she is interested in taking, do not see any harm in trying with her current regiment, if this is ineffective then we can send in for topical estradiol for her to use.          Other Visit Diagnoses       Need for vaccination    -  Primary    Relevant Orders    Influenza Vaccine, High Dose (65+ Only) (Completed)             RTC: Return in about 3 months (around 1/19/2023).    I spent a total of 42 minutes with record review, exam, communication with the patient, communication with other providers, and documentation of this encounter.    PLEASE NOTE: This dictation was created using voice recognition software. I have made every reasonable attempt to correct  obvious errors, but I expect that there are errors of grammar and possibly content that I did not discover before finalizing the note.      Leah Bonilla, DO  Geriatric and Internal Medicine  [facility]

## 2022-10-19 NOTE — ASSESSMENT & PLAN NOTE
Chronic and compensated problem, she went off pharmacotherapy due to concerns of is hurting her kidneys, she has now been back on the atorvastatin 10 mg daily for the past year, update lipid panel to ensure LDL is below 100.  
Chronic and decompensated problem.  A1c continues to be above goal however she has brought it down by 1.3 since last evaluation.  She is interested in a continuous glucose monitor but when we reviewed the cost she does not think this will be affordable.  She is going to get strict again with her diet avoiding processed foods, previous an A1c of 7.1 when she was strict with her diet.  Discussed that if her A1c is not below 8 at the next check that we are going to either increase dulaglutide to 3 mg weekly or add basal insulin.  She agrees and we will plan to see her in clinic in about 3 months to recheck.  Update kidney function and urine microalbumin.  Retinal screening and diabetic foot exam are up-to-date.  
Chronic and ongoing problem, she is going to reach out to the pulmonary rehab program to get back in to help with rebuilding her lung endurance.  She continues with supplemental oxygen at all times with 2 L at rest and 5 L with exertion.  
Chronic and ongoing problem, update GFR to see where she is at right now, she has a few nephrotoxic medicines which we could revisit in the future.  Continue with good oral hydration.  
New problem, reviewed the over-the-counter supplements she is interested in taking, do not see any harm in trying with her current regiment, if this is ineffective then we can send in for topical estradiol for her to use.  
English

## 2022-10-20 LAB — IMM ASSAY OCC BLD FITOB: NEGATIVE

## 2022-10-26 ENCOUNTER — HOSPITAL ENCOUNTER (OUTPATIENT)
Dept: LAB | Facility: MEDICAL CENTER | Age: 72
End: 2022-10-26
Attending: INTERNAL MEDICINE
Payer: MEDICARE

## 2022-10-26 DIAGNOSIS — E11.69 DYSLIPIDEMIA ASSOCIATED WITH TYPE 2 DIABETES MELLITUS (HCC): ICD-10-CM

## 2022-10-26 DIAGNOSIS — I50.22 CHRONIC SYSTOLIC CONGESTIVE HEART FAILURE (HCC): ICD-10-CM

## 2022-10-26 DIAGNOSIS — E11.22 TYPE 2 DIABETES MELLITUS WITH STAGE 3B CHRONIC KIDNEY DISEASE, WITHOUT LONG-TERM CURRENT USE OF INSULIN (HCC): ICD-10-CM

## 2022-10-26 DIAGNOSIS — E78.5 DYSLIPIDEMIA ASSOCIATED WITH TYPE 2 DIABETES MELLITUS (HCC): ICD-10-CM

## 2022-10-26 DIAGNOSIS — N18.32 TYPE 2 DIABETES MELLITUS WITH STAGE 3B CHRONIC KIDNEY DISEASE, WITHOUT LONG-TERM CURRENT USE OF INSULIN (HCC): ICD-10-CM

## 2022-10-26 LAB
25(OH)D3 SERPL-MCNC: 21 NG/ML (ref 30–100)
ALBUMIN SERPL BCP-MCNC: 4.2 G/DL (ref 3.2–4.9)
ALBUMIN/GLOB SERPL: 1.2 G/DL
ALP SERPL-CCNC: 65 U/L (ref 30–99)
ALT SERPL-CCNC: 12 U/L (ref 2–50)
ANION GAP SERPL CALC-SCNC: 11 MMOL/L (ref 7–16)
AST SERPL-CCNC: 18 U/L (ref 12–45)
BASOPHILS # BLD AUTO: 0.8 % (ref 0–1.8)
BASOPHILS # BLD: 0.04 K/UL (ref 0–0.12)
BILIRUB SERPL-MCNC: 1.2 MG/DL (ref 0.1–1.5)
BUN SERPL-MCNC: 34 MG/DL (ref 8–22)
CALCIUM SERPL-MCNC: 9.4 MG/DL (ref 8.5–10.5)
CHLORIDE SERPL-SCNC: 103 MMOL/L (ref 96–112)
CHOLEST SERPL-MCNC: 155 MG/DL (ref 100–199)
CO2 SERPL-SCNC: 24 MMOL/L (ref 20–33)
CREAT SERPL-MCNC: 1.81 MG/DL (ref 0.5–1.4)
EOSINOPHIL # BLD AUTO: 0.34 K/UL (ref 0–0.51)
EOSINOPHIL NFR BLD: 6.6 % (ref 0–6.9)
ERYTHROCYTE [DISTWIDTH] IN BLOOD BY AUTOMATED COUNT: 58.9 FL (ref 35.9–50)
FASTING STATUS PATIENT QL REPORTED: NORMAL
GFR SERPLBLD CREATININE-BSD FMLA CKD-EPI: 29 ML/MIN/1.73 M 2
GLOBULIN SER CALC-MCNC: 3.4 G/DL (ref 1.9–3.5)
GLUCOSE SERPL-MCNC: 197 MG/DL (ref 65–99)
HCT VFR BLD AUTO: 35.3 % (ref 37–47)
HDLC SERPL-MCNC: 39 MG/DL
HGB BLD-MCNC: 10.6 G/DL (ref 12–16)
IMM GRANULOCYTES # BLD AUTO: 0.03 K/UL (ref 0–0.11)
IMM GRANULOCYTES NFR BLD AUTO: 0.6 % (ref 0–0.9)
LDLC SERPL CALC-MCNC: 73 MG/DL
LYMPHOCYTES # BLD AUTO: 0.89 K/UL (ref 1–4.8)
LYMPHOCYTES NFR BLD: 17.3 % (ref 22–41)
MCH RBC QN AUTO: 27.7 PG (ref 27–33)
MCHC RBC AUTO-ENTMCNC: 30 G/DL (ref 33.6–35)
MCV RBC AUTO: 92.2 FL (ref 81.4–97.8)
MONOCYTES # BLD AUTO: 0.31 K/UL (ref 0–0.85)
MONOCYTES NFR BLD AUTO: 6 % (ref 0–13.4)
NEUTROPHILS # BLD AUTO: 3.54 K/UL (ref 2–7.15)
NEUTROPHILS NFR BLD: 68.7 % (ref 44–72)
NRBC # BLD AUTO: 0 K/UL
NRBC BLD-RTO: 0 /100 WBC
NT-PROBNP SERPL IA-MCNC: 661 PG/ML (ref 0–125)
PLATELET # BLD AUTO: 146 K/UL (ref 164–446)
PMV BLD AUTO: 12.2 FL (ref 9–12.9)
POTASSIUM SERPL-SCNC: 4.9 MMOL/L (ref 3.6–5.5)
PROT SERPL-MCNC: 7.6 G/DL (ref 6–8.2)
RBC # BLD AUTO: 3.83 M/UL (ref 4.2–5.4)
SODIUM SERPL-SCNC: 138 MMOL/L (ref 135–145)
TRIGL SERPL-MCNC: 213 MG/DL (ref 0–149)
VIT B12 SERPL-MCNC: 497 PG/ML (ref 211–911)
WBC # BLD AUTO: 5.2 K/UL (ref 4.8–10.8)

## 2022-10-26 PROCEDURE — 85025 COMPLETE CBC W/AUTO DIFF WBC: CPT

## 2022-10-26 PROCEDURE — 82607 VITAMIN B-12: CPT

## 2022-10-26 PROCEDURE — 82306 VITAMIN D 25 HYDROXY: CPT

## 2022-10-26 PROCEDURE — 83880 ASSAY OF NATRIURETIC PEPTIDE: CPT

## 2022-10-26 PROCEDURE — 36415 COLL VENOUS BLD VENIPUNCTURE: CPT

## 2022-10-26 PROCEDURE — 80053 COMPREHEN METABOLIC PANEL: CPT

## 2022-10-26 PROCEDURE — 80061 LIPID PANEL: CPT

## 2022-10-31 ENCOUNTER — TELEPHONE (OUTPATIENT)
Dept: CARDIOLOGY | Facility: MEDICAL CENTER | Age: 72
End: 2022-10-31
Payer: MEDICARE

## 2022-10-31 DIAGNOSIS — Z45.010 ELECTIVE REPLACEMENT INDICATED FOR CARDIAC PACEMAKER BATTERY AT END OF LIFESPAN: ICD-10-CM

## 2022-10-31 NOTE — TELEPHONE ENCOUNTER
Dr. Galloway,    This patient saw Antonette ORTEGA today. Her pacemaker needs to be replaced as of 10-26-22. Would you be ok if I just schedule the gen change with you or would you prefer to see her first?    If ok to schedule, this patient is taking Coumadin. Would you like her to hold her Coumadin for this procedure or continue taking it?    Antonette's note to be put in your in basket to review.    Please advise.    Thank You,  Gia

## 2022-11-01 NOTE — TELEPHONE ENCOUNTER
I called and spoke with Mary at Antonette's office. She will fax Antonette's note and device interrogation right now.

## 2022-11-03 NOTE — TELEPHONE ENCOUNTER
Patient scheduled for PM gen change on 11-8-22 with Dr. Galloway. Patient has been instructed to check in at 8:00 for 10:00 case time. Hold Coumadin 1 day. Message sent to denis Stokes with [facility] notified.

## 2022-11-08 ENCOUNTER — HOSPITAL ENCOUNTER (OUTPATIENT)
Facility: MEDICAL CENTER | Age: 72
End: 2022-11-08
Attending: INTERNAL MEDICINE | Admitting: INTERNAL MEDICINE
Payer: MEDICARE

## 2022-11-08 ENCOUNTER — APPOINTMENT (OUTPATIENT)
Dept: CARDIOLOGY | Facility: MEDICAL CENTER | Age: 72
End: 2022-11-08
Attending: INTERNAL MEDICINE
Payer: MEDICARE

## 2022-11-08 VITALS
HEIGHT: 65 IN | TEMPERATURE: 97.3 F | OXYGEN SATURATION: 96 % | SYSTOLIC BLOOD PRESSURE: 138 MMHG | WEIGHT: 194.45 LBS | HEART RATE: 71 BPM | DIASTOLIC BLOOD PRESSURE: 63 MMHG | RESPIRATION RATE: 18 BRPM | BODY MASS INDEX: 32.4 KG/M2

## 2022-11-08 DIAGNOSIS — Z45.010 ELECTIVE REPLACEMENT INDICATED FOR CARDIAC PACEMAKER BATTERY AT END OF LIFESPAN: ICD-10-CM

## 2022-11-08 LAB
ALBUMIN SERPL BCP-MCNC: 3.9 G/DL (ref 3.2–4.9)
ALBUMIN/GLOB SERPL: 1.1 G/DL
ALP SERPL-CCNC: 75 U/L (ref 30–99)
ALT SERPL-CCNC: 12 U/L (ref 2–50)
ANION GAP SERPL CALC-SCNC: 12 MMOL/L (ref 7–16)
AST SERPL-CCNC: 18 U/L (ref 12–45)
BILIRUB SERPL-MCNC: 1.3 MG/DL (ref 0.1–1.5)
BUN SERPL-MCNC: 27 MG/DL (ref 8–22)
CALCIUM SERPL-MCNC: 9 MG/DL (ref 8.5–10.5)
CHLORIDE SERPL-SCNC: 107 MMOL/L (ref 96–112)
CO2 SERPL-SCNC: 22 MMOL/L (ref 20–33)
CREAT SERPL-MCNC: 1.4 MG/DL (ref 0.5–1.4)
EKG IMPRESSION: NORMAL
EKG IMPRESSION: NORMAL
ERYTHROCYTE [DISTWIDTH] IN BLOOD BY AUTOMATED COUNT: 57.2 FL (ref 35.9–50)
GFR SERPLBLD CREATININE-BSD FMLA CKD-EPI: 40 ML/MIN/1.73 M 2
GLOBULIN SER CALC-MCNC: 3.5 G/DL (ref 1.9–3.5)
GLUCOSE BLD STRIP.AUTO-MCNC: 246 MG/DL (ref 65–99)
GLUCOSE SERPL-MCNC: 257 MG/DL (ref 65–99)
HCT VFR BLD AUTO: 34.2 % (ref 37–47)
HGB BLD-MCNC: 10.8 G/DL (ref 12–16)
INR PPP: 1.42 (ref 0.87–1.13)
MCH RBC QN AUTO: 28.7 PG (ref 27–33)
MCHC RBC AUTO-ENTMCNC: 31.6 G/DL (ref 33.6–35)
MCV RBC AUTO: 91 FL (ref 81.4–97.8)
PLATELET # BLD AUTO: 143 K/UL (ref 164–446)
PMV BLD AUTO: 10.8 FL (ref 9–12.9)
POTASSIUM SERPL-SCNC: 4.6 MMOL/L (ref 3.6–5.5)
PROT SERPL-MCNC: 7.4 G/DL (ref 6–8.2)
PROTHROMBIN TIME: 17.1 SEC (ref 12–14.6)
RBC # BLD AUTO: 3.76 M/UL (ref 4.2–5.4)
SODIUM SERPL-SCNC: 141 MMOL/L (ref 135–145)
WBC # BLD AUTO: 7.3 K/UL (ref 4.8–10.8)

## 2022-11-08 PROCEDURE — 36415 COLL VENOUS BLD VENIPUNCTURE: CPT

## 2022-11-08 PROCEDURE — 93010 ELECTROCARDIOGRAM REPORT: CPT | Mod: 59 | Performed by: INTERNAL MEDICINE

## 2022-11-08 PROCEDURE — 700111 HCHG RX REV CODE 636 W/ 250 OVERRIDE (IP)

## 2022-11-08 PROCEDURE — 82962 GLUCOSE BLOOD TEST: CPT

## 2022-11-08 PROCEDURE — 99153 MOD SED SAME PHYS/QHP EA: CPT

## 2022-11-08 PROCEDURE — 93005 ELECTROCARDIOGRAM TRACING: CPT | Performed by: INTERNAL MEDICINE

## 2022-11-08 PROCEDURE — 160046 HCHG PACU - 1ST 60 MINS PHASE II

## 2022-11-08 PROCEDURE — 33228 REMV&REPLC PM GEN DUAL LEAD: CPT | Performed by: INTERNAL MEDICINE

## 2022-11-08 PROCEDURE — 160035 HCHG PACU - 1ST 60 MINS PHASE I

## 2022-11-08 PROCEDURE — 85027 COMPLETE CBC AUTOMATED: CPT

## 2022-11-08 PROCEDURE — 700101 HCHG RX REV CODE 250

## 2022-11-08 PROCEDURE — 99152 MOD SED SAME PHYS/QHP 5/>YRS: CPT | Performed by: INTERNAL MEDICINE

## 2022-11-08 PROCEDURE — 80053 COMPREHEN METABOLIC PANEL: CPT

## 2022-11-08 PROCEDURE — 85610 PROTHROMBIN TIME: CPT

## 2022-11-08 PROCEDURE — 160036 HCHG PACU - EA ADDL 30 MINS PHASE I

## 2022-11-08 PROCEDURE — 160002 HCHG RECOVERY MINUTES (STAT)

## 2022-11-08 RX ORDER — MIDAZOLAM HYDROCHLORIDE 1 MG/ML
INJECTION INTRAMUSCULAR; INTRAVENOUS
Status: COMPLETED
Start: 2022-11-08 | End: 2022-11-08

## 2022-11-08 RX ORDER — CEFAZOLIN SODIUM 1 G/3ML
INJECTION, POWDER, FOR SOLUTION INTRAMUSCULAR; INTRAVENOUS
Status: COMPLETED
Start: 2022-11-08 | End: 2022-11-08

## 2022-11-08 RX ORDER — DOXYCYCLINE 100 MG/1
100 CAPSULE ORAL 2 TIMES DAILY
Qty: 8 CAPSULE | Refills: 0 | Status: SHIPPED | OUTPATIENT
Start: 2022-11-08 | End: 2023-01-23

## 2022-11-08 RX ORDER — LIDOCAINE HYDROCHLORIDE 20 MG/ML
INJECTION, SOLUTION INFILTRATION; PERINEURAL
Status: COMPLETED
Start: 2022-11-08 | End: 2022-11-08

## 2022-11-08 RX ADMIN — FENTANYL CITRATE 25 MCG: 50 INJECTION, SOLUTION INTRAMUSCULAR; INTRAVENOUS at 10:37

## 2022-11-08 RX ADMIN — FENTANYL CITRATE 100 MCG: 50 INJECTION, SOLUTION INTRAMUSCULAR; INTRAVENOUS at 10:24

## 2022-11-08 RX ADMIN — LIDOCAINE HYDROCHLORIDE: 20 INJECTION, SOLUTION INFILTRATION; PERINEURAL at 10:18

## 2022-11-08 RX ADMIN — CEFAZOLIN 3000 MG: 330 INJECTION, POWDER, FOR SOLUTION INTRAMUSCULAR; INTRAVENOUS at 10:17

## 2022-11-08 RX ADMIN — MIDAZOLAM 2 MG: 1 INJECTION, SOLUTION INTRAMUSCULAR; INTRAVENOUS at 10:24

## 2022-11-08 ASSESSMENT — FIBROSIS 4 INDEX: FIB4 SCORE: 2.59807621135331594

## 2022-11-08 NOTE — DISCHARGE INSTRUCTIONS
HOME CARE INSTRUCTIONS    ACTIVITY: Rest and take it easy for the first 24 hours.  A responsible adult is recommended to remain with you during that time.  It is normal to feel sleepy.  We encourage you to not do anything that requires balance, judgment or coordination.    FOR 24 HOURS DO NOT:  Drive, operate machinery or run household appliances.  Drink beer or alcoholic beverages.  Make important decisions or sign legal documents.    SPECIAL INSTRUCTIONS:     Pacemaker Battery Change, Care After  This sheet gives you information about how to care for yourself after your procedure. Your health care provider may also give you more specific instructions. If you have problems or questions, contact your health care provider.  What can I expect after the procedure?  After your procedure, it is common to have:  Pain or soreness at the site where the pacemaker was inserted.  Swelling at the site where the pacemaker was inserted.  Follow these instructions at home:  Incision care  Keep the incision clean and dry.  Do not take baths, swim, or use a hot tub until your health care provider approves.  You may shower the day after your procedure, or as directed by your health care provider.  Pat the area dry with a clean towel. Do not rub the area. This may cause bleeding.  Follow instructions from your health care provider about how to take care of your incision. Make sure you:  Wash your hands with soap and water before you change your bandage (dressing). If soap and water are not available, use hand .  Change your dressing as told by your health care provider.  Leave stitches (sutures), skin glue, or adhesive strips in place. These skin closures may need to stay in place for 2 weeks or longer. If adhesive strip edges start to loosen and curl up, you may trim the loose edges. Do not remove adhesive strips completely unless your health care provider tells you to do that.  Check your incision area every day for signs  of infection. Check for:  More redness, swelling, or pain.  More fluid or blood.  Warmth.  Pus or a bad smell.  Activity  Do not lift anything that is heavier than 10 lb (4.5 kg) until your health care provider says it is okay to do so.  For the first 2 weeks, or as long as told by your health care provider:  Avoid lifting your left arm higher than your shoulder.  Be gentle when you move your arms over your head. It is okay to raise your arm to comb your hair.  Avoid strenuous exercise.  Ask your health care provider when it is okay to:  Resume your normal activities.  Return to work or school.  Resume sexual activity.  Eating and drinking  Eat a heart-healthy diet. This should include plenty of fresh fruits and vegetables, whole grains, low-fat dairy products, and lean protein like chicken and fish.  Limit alcohol intake to no more than 1 drink a day for non-pregnant women and 2 drinks a day for men. One drink equals 12 oz of beer, 5 oz of wine, or 1½ oz of hard liquor.  Check ingredients and nutrition facts on packaged foods and beverages. Avoid the following types of food:  Food that is high in salt (sodium).  Food that is high in saturated fat, like full-fat dairy or red meat.  Food that is high in trans fat, like fried food.  Food and drinks that are high in sugar.  Lifestyle  Do not use any products that contain nicotine or tobacco, such as cigarettes and e-cigarettes. If you need help quitting, ask your health care provider.  Take steps to manage and control your weight.  Get regular exercise. Aim for 150 minutes of moderate-intensity exercise (such as walking or yoga) or 75 minutes of vigorous exercise (such as running or swimming) each week.  Manage other health problems, such as diabetes or high blood pressure. Ask your health care provider how you can manage these conditions.  General instructions  Do not drive for 24 hours after your procedure if you were given a medicine to help you relax  (sedative).  Take over-the-counter and prescription medicines only as told by your health care provider.  Avoid putting pressure on the area where the pacemaker was placed.  If you need an MRI after your pacemaker has been placed, be sure to tell the health care provider who orders the MRI that you have a pacemaker.  Avoid close and prolonged exposure to electrical devices that have strong magnetic fields. These include:  Cell phones. Avoid keeping them in a pocket near the pacemaker, and try using the ear opposite the pacemaker.  MailLift3 players.  Household appliances, like microwaves.  Metal detectors.  Electric generators.  High-tension wires.  Keep all follow-up visits as directed by your health care provider. This is important.  Contact a health care provider if:  You have pain at the incision site that is not relieved by over-the-counter or prescription medicines.  You have any of these around your incision site or coming from it:  More redness, swelling, or pain.  Fluid or blood.  Warmth to the touch.  Pus or a bad smell.  You have a fever.  You feel brief, occasional palpitations, light-headedness, or any symptoms that you think might be related to your heart.  Get help right away if:  You experience chest pain that is different from the pain at the pacemaker site.  You develop a red streak that extends above or below the incision site.  You experience shortness of breath.  You have palpitations or an irregular heartbeat.  You have light-headedness that does not go away quickly.  You faint or have dizzy spells.  Your pulse suddenly drops or increases rapidly and does not return to normal.  You begin to gain weight and your legs and ankles swell.  Summary  After your procedure, it is common to have pain, soreness, and some swelling where the pacemaker was inserted.  Make sure to keep your incision clean and dry. Follow instructions from your health care provider about how to take care of your incision.  Check  your incision every day for signs of infection, such as more pain or swelling, pus or a bad smell, warmth, or leaking fluid and blood.  Avoid strenuous exercise and lifting your left arm higher than your shoulder for 2 weeks, or as long as told by your health care provider.      DIET: To avoid nausea, slowly advance diet as tolerated, avoiding spicy or greasy foods for the first day.  Add more substantial food to your diet according to your physician's instructions.  Babies can be fed formula or breast milk as soon as they are hungry.  INCREASE FLUIDS AND FIBER TO AVOID CONSTIPATION.    SURGICAL DRESSING/BATHING: Do not submerge under water until cleared by your doctor. You may shower tomorrow when dressing is removed. Do not scrub surgical site, pat dry only.     MEDICATIONS: Resume taking daily medication.  Take prescribed pain medication with food.  If no medication is prescribed, you may take non-aspirin pain medication if needed.  PAIN MEDICATION CAN BE VERY CONSTIPATING.  Take a stool softener or laxative such as senokot, pericolace, or milk of magnesia if needed.    Prescription given for  .  Last pain medication given at  .    A follow-up appointment should be arranged with your doctor in 1 week in device clinic; call to schedule.    You should CALL YOUR PHYSICIAN if you develop:  Fever greater than 101 degrees F.  Pain not relieved by medication, or persistent nausea or vomiting.  Excessive bleeding (blood soaking through dressing) or unexpected drainage from the wound.  Extreme redness or swelling around the incision site, drainage of pus or foul smelling drainage.  Inability to urinate or empty your bladder within 8 hours.  Problems with breathing or chest pain.    You should call 911 if you develop problems with breathing or chest pain.  If you are unable to contact your doctor or surgical center, you should go to the nearest emergency room or urgent care center.  Physician's telephone #:  [phone]    MILD FLU-LIKE SYMPTOMS ARE NORMAL.  YOU MAY EXPERIENCE GENERALIZED MUSCLE ACHES, THROAT IRRITATION, HEADACHE AND/OR SOME NAUSEA.    If any questions arise, call your doctor.  If your doctor is not available, please feel free to call the Surgical Center at [phone].  The Center is open Monday through Friday from 7AM to 7PM.      A registered nurse may call you a few days after your surgery to see how you are doing after your procedure.    You may also receive a survey in the mail within the next two weeks and we ask that you take a few moments to complete the survey and return it to us.  Our goal is to provide you with very good care and we value your comments.     Depression / Suicide Risk    As you are [facility], it is important to learn how to keep safe from harming yourself.    Recognize the warning signs:  Abrupt changes in personality, positive or negative- including increase in energy   Giving away possessions  Change in eating patterns- significant weight changes-  positive or negative  Change in sleeping patterns- unable to sleep or sleeping all the time   Unwillingness or inability to communicate  Depression  Unusual sadness, discouragement and loneliness  Talk of wanting to die  Neglect of personal appearance   Rebelliousness- reckless behavior  Withdrawal from people/activities they love  Confusion- inability to concentrate     If you or a loved one observes any of these behaviors or has concerns about self-harm, here's what you can do:  Talk about it- your feelings and reasons for harming yourself  Remove any means that you might use to hurt yourself (examples: pills, rope, extension cords, firearm)  Get professional help from the community (Mental Health, Substance Abuse, psychological counseling)  Do not be alone:Call your Safe Contact- someone whom you trust who will be there for you.  Call your local CRISIS HOTLINE 031-4533 or 787-988-1454  Call  your local Children's Mobile Crisis Response Team [place] (300) 818-0956 or [email or web address]  Call the toll free National Suicide Prevention Hotlines   National Suicide Prevention Lifeline 585-244-TAOV (0395)  [place] StatSheet Line Network 800-SUICIDE (579-8962)    I acknowledge receipt and understanding of these Home Care instructions.

## 2022-11-08 NOTE — H&P
Physician H&P    Patient ID:  Salma Lees  [id number]  72 y.o. female  1950    History:  Primary Diagnosis: Permanent pacemaker at [facility] for generator change    HPI:  Placed at [place].  Mild shortness of breath.  Atrial fibrillation atrial flutter.  On Coumadin.  1 dose held.  For generator change    Past Medical History:  has a past medical history of A-fib ([facility]), Asthma, Breath shortness (09/07/2017), Calculus of ureter (9/18/2017), Congestive heart failure ([facility]), COPD (chronic obstructive pulmonary disease) ([facility]), Diabetes ([facility]), Hypertension, Infectious disease (09/07/2017), Pacemaker (2014), Renal disorder (09/07/2017), and Shortness of breath (10/18/2017).    She has no past medical history of Encounter for long-term (current) use of other medications.  Past Surgical History:  has a past surgical history that includes mitral valve repair (2009); pacemaker insertion (2015); knee replacement, total (Left, 2015); cystoscopy stent placement (Left, 6/25/2017); ureteroscopy (Left, 9/18/2017); lasertripsy (Left, 9/18/2017); and stent removal (Left, 9/18/2017).  Past Social History:  reports that she quit smoking about 32 years ago. Her smoking use included cigarettes. She has a 5.00 pack-year smoking history. She has never used smokeless tobacco. She reports that she does not drink alcohol and does not use drugs.  Past Family History:   Family History   Problem Relation Age of Onset    Lung Disease Mother         asthma    Cancer Mother         ovarian    Heart Disease Father     Stroke Father     Thyroid Sister     Cancer Brother         pancreas    Cancer Paternal Grandmother         colon    Heart Disease Brother     No Known Problems Brother     No Known Problems Brother     Heart Disease Son         afib     Allergies: Nitrofurantoin, Pcn [penicillins], Amlodipine besylate-valsartan, Amoxicillin, Etodolac, Food, Lisinopril, Other drug, Other misc, Tape, and Eliquis  [apixaban]    Current Medications:  Prior to Admission medications    Medication Sig Start Date End Date Taking? Authorizing Provider   levalbuterol (XOPENEX HFA) 45 MCG/ACT inhaler Inhale 2 Puffs every four hours as needed for Shortness of Breath. 10/5/22   Peggy Shannon P.A.-C.   carvedilol (COREG) 25 MG Tab Take 1 Tablet by mouth 2 times a day. 9/1/22   Ximena Stoner M.D.   atorvastatin (LIPITOR) 10 MG Tab Take 1 Tablet by mouth every evening. 9/1/22   Ximena Stoner M.D.   potassium Chloride ER (KLOR-CON) 10 MEQ tablet Take 1 Tablet by mouth every day. Take with Lasix 5/24/22   Leah Bonilla D.O.   spironolactone (ALDACTONE) 25 MG Tab Take 1 Tablet by mouth every day. 5/18/22   Leah Bonilla D.O.   furosemide (LASIX) 20 MG Tab Take 1 Tablet by mouth every day.  Patient taking differently: Take 20 mg by mouth every day. Every other day with Spironolactone 5/18/22   Leah Bonilla D.O.   DILTIAZem (CARDIZEM) 30 MG Tab Take 1 Tablet by mouth 2 times a day. 5/18/22   Leah Bonilla D.O.   digoxin (LANOXIN) 125 MCG Tab Take 1 Tablet by mouth every day. 5/18/22   Leah Bonilla D.O.   Dulaglutide 1.5 MG/0.5ML Solution Pen-injector Inject 0.5 mL under the skin every 7 days. Every Jason 3/29/22   EDYTA GouldRRosalineN.   warfarin (COUMADIN) 5 MG Tab TAKE 1 AND 1/2 TABLETS BY MOUTH DAILY OR AS DIRECTED BY ANTICOAGULATION CLINIC 3/1/22   Leah Bonilla D.O.   umeclidinium-vilanterol (ANORO ELLIPTA) 62.5-25 MCG/INH AEROSOL POWDER, BREATH ACTIVATED inhaler Inhale 1 Puff every day. 2/7/22   Peggy Shannon P.A.-C.       Review of Systems:  ROS  There were no vitals taken for this visit.    Physical Examination:  Physical Exam  Vitals reviewed.   Constitutional:       General: She is not in acute distress.     Appearance: She is well-developed. She is not diaphoretic.   Eyes:      Conjunctiva/sclera: Conjunctivae normal.   Neck:      Thyroid: No thyroid mass or thyromegaly.       Vascular: No JVD.      Trachea: No tracheal deviation.   Cardiovascular:      Rate and Rhythm: Normal rate. Rhythm irregular.      Heart sounds: No murmur heard.  Pulmonary:      Effort: Pulmonary effort is normal. No respiratory distress.      Breath sounds: Normal breath sounds.   Chest:      Chest wall: No tenderness.   Abdominal:      Palpations: Abdomen is soft.      Tenderness: There is no abdominal tenderness.   Musculoskeletal:         General: Normal range of motion.   Skin:     General: Skin is warm and dry.   Neurological:      Mental Status: She is alert and oriented to person, place, and time.      Motor: No tremor.   Psychiatric:         Behavior: Behavior normal.       Impression:  1.  Permanent pacemaker at [facility] for generator change.  The risks, benefits, and alternatives to permanent pacemaker replacement were discussed in great detail.  Specific risks mentioned to the patient including bleeding, cardiac perforation with possible tamponade possibly requiring pericardiocentesis or open heart surgery.  In addition the possibility of lead dislodgment (2-3%), pneumothorax (3%), hemothorax, infection were discussed.  Also, mentioned were the risk of death, stroke, and myocardial infarction.  The patient verbalized understanding of the potential complications and wishes to proceed with the procedure.

## 2022-11-08 NOTE — PROGRESS NOTES
Med Rec Complete per patient  Allergies Reviewed with patient  No antibiotics within the last 30 days  Patient's Preferred Pharmacy: [facility] on [address].       Patient takes Warfarin 5mg, daily.

## 2022-11-08 NOTE — OR NURSING
Pre-op assessment complete, VSS stable, updated on POC. Call light within reach. Denies needs at this time.

## 2022-11-08 NOTE — OP REPORT
Electrophysiology Procedure Note  [facility]    PROCEDURE: Dual-chamber pacemaker generator change,   moderate sedation administered by RN and supervised by physician    : Jhon Galloway M.D.    ANESTHESIA: Moderate sedation,  start time 1016, stop time 1040  the moderate sedation document has been reviewed, signed and scanned into media     ESTIMATED BLOOD LOSS: 20 cc.    SPECIMENS: None.    COMPLICATIONS: None    INDICATION: [facility]    PRE-PROCEDURE ECG: Atrial flutter with controlled ventricular response    POST-PROCEDURE ECG: Atrial flutter with controlled ventricular response    DESCRIPTION OF PROCEDURE: After informed written consent, the patient was brought to the electrophysiology lab in the fasting, unsedated state. The patient was prepped and draped in the usual sterile fashion. The procedure was performed under moderate sedation with local anesthetic. An incision was made with a scalpel along the old scar. Access to the device pocket was made using a combination of blunt dissection and electrocautery. The old generator and leads were freed from adhesions and the generator disconnected from the leads. Leads tested.  RV lead had elevated impedance greater than 3000 in bipolar without any obvious fracture seen on lead.  Good function in unipolar without muscle stimulation.  Right atrial lead tested normally.  The pocket was irrigated with antibiotic solution, and the new generator was connected to the leads and inserted back in the pocket. The wound was closed with three layers of absorbable sutures and covered with Steri-Strips.   I personally supervised the administration of moderate sedation by the RN and observed the level of consciousness and physiologic status throughout the procedure.  Following recovery from sedation, the patient was transferred to a monitored bed.    IMPLANTED DEVICE INFORMATION:    Pulse generator is a [place] model L311  Serial number [id number]  LEAD  INFORMATION:  1. Right atrial lead is a [place] model 4469, serial number [id number], P wave 5 millivolts, threshold atrial flutter volts, pacing impedance 442 ohms, implant date 2/12/2015  2. Right ventricular lead is a [place] model 4136, serial number [id number], R wave 12 millivolts, threshold 1 volts (unipolar, bipolar with high impedence) pacing impedance 396 ohms, implant date 2/12/2015      DEVICE PROGRAMMING:    As previous programmed.  Unipolar pacing in the ventricle with bipolar sensing    IMPRESSIONS:  1. Successful dual-chamber pacemaker generator change.  2. Consider flutter ablation.    RECOMMENDATIONS:  1. Transfer to PPU.  2. Follow-up in device clinic for wound check and device interrogation.

## 2022-11-08 NOTE — OR NURSING
1056: Pt arrived from cath lab, handoff received from anesthesiologist and RN. Patient awake, moving all extremities. Patient denies numbness, tingling, pain and nausea. Dressing to left side of chest w/ steri strips, gauze and tegaderm, C/D/I. Paced rhythm , EKG aware of order. Patient states baseline 2L O2 at home.     1115: EKG at bedside.     1145: Patient sitting up, drinking water. Denies pain. Dressing remains C/D/I. Patient states ride home is neighbor, does not recall number -has info on personal cell phone. Declines to have son updated at this time.     1155: Report given to phase 2 RNSalma. Oxygen tank 1/2 full.

## 2022-12-05 ENCOUNTER — ANTICOAGULATION VISIT (OUTPATIENT)
Dept: MEDICAL GROUP | Facility: MEDICAL CENTER | Age: 72
End: 2022-12-05
Payer: MEDICARE

## 2022-12-05 DIAGNOSIS — I48.21 PERMANENT ATRIAL FIBRILLATION (HCC): ICD-10-CM

## 2022-12-05 DIAGNOSIS — Z98.890 HISTORY OF MITRAL VALVE REPAIR: ICD-10-CM

## 2022-12-05 DIAGNOSIS — Z79.01 CHRONIC ANTICOAGULATION: ICD-10-CM

## 2022-12-05 LAB — INR PPP: 2.7 (ref 2–3.5)

## 2022-12-05 PROCEDURE — 85610 PROTHROMBIN TIME: CPT | Performed by: INTERNAL MEDICINE

## 2022-12-05 PROCEDURE — 93793 ANTICOAG MGMT PT WARFARIN: CPT

## 2022-12-05 PROCEDURE — 99999 PR NO CHARGE: CPT | Performed by: INTERNAL MEDICINE

## 2022-12-05 RX ORDER — WARFARIN SODIUM 5 MG/1
5 TABLET ORAL DAILY
Qty: 135 TABLET | Refills: 3 | Status: SHIPPED | OUTPATIENT
Start: 2022-12-05 | End: 2024-01-16

## 2022-12-05 NOTE — PROGRESS NOTES
Anticoagulation Summary  As of 2022      INR goal:  2.0-3.0   TTR:  54.9 % (5.7 y)   INR used for dosin.70 (2022)   Warfarin maintenance plan:  7.5 mg (5 mg x 1.5) every Sun, e, Thu; 5 mg (5 mg x 1) all other days; Starting 2022   Weekly warfarin total:  42.5 mg   Plan last modified:  Sakshi Mcconnell PharmD (2020)   Next INR check:  2023   Target end date:  Indefinite    Indications    Atrial fibrillation (HCC) [I48.91]  History of mitral valve repair [Z98.890]                 Anticoagulation Episode Summary       INR check location:      Preferred lab:      Send INR reminders to:      Comments:            Anticoagulation Care Providers       Provider Role Specialty Phone number    LILIYA Pearson Referring Family Medicine [phone]    [facility] Responsible  [phone]    Joleen BoxD Responsible Pharmacy                   Refer to Patient Findings for HPI:      There were no vitals filed for this visit.   pt declined vitals    Verified current warfarin dosing schedule.    Medications reconciled   Pt is not on antiplatelet therapy      A/P   INR  -therapeutic.     Warfarin dosing recommendation: Continue current warfarin regimen as above without changes      Pt educated to contact our clinic with any changes in medications or s/s of bleeding or thrombosis. Pt is aware to seek immediate medical attention for falls, head injury or deep cuts.    Follow up appointment in 10 week(s).    Joleen HallmanD

## 2023-01-23 ENCOUNTER — OFFICE VISIT (OUTPATIENT)
Dept: MEDICAL GROUP | Facility: PHYSICIAN GROUP | Age: 73
End: 2023-01-23
Payer: MEDICARE

## 2023-01-23 VITALS
WEIGHT: 186.5 LBS | RESPIRATION RATE: 12 BRPM | OXYGEN SATURATION: 94 % | TEMPERATURE: 97.4 F | BODY MASS INDEX: 31.07 KG/M2 | HEIGHT: 65 IN | DIASTOLIC BLOOD PRESSURE: 70 MMHG | HEART RATE: 79 BPM | SYSTOLIC BLOOD PRESSURE: 124 MMHG

## 2023-01-23 DIAGNOSIS — I27.20 PULMONARY HYPERTENSION (HCC): ICD-10-CM

## 2023-01-23 DIAGNOSIS — J44.9 CHRONIC OBSTRUCTIVE PULMONARY DISEASE, UNSPECIFIED COPD TYPE (HCC): ICD-10-CM

## 2023-01-23 DIAGNOSIS — N18.32 STAGE 3B CHRONIC KIDNEY DISEASE: ICD-10-CM

## 2023-01-23 DIAGNOSIS — D68.69 SECONDARY HYPERCOAGULABLE STATE (HCC): ICD-10-CM

## 2023-01-23 DIAGNOSIS — E78.5 DYSLIPIDEMIA ASSOCIATED WITH TYPE 2 DIABETES MELLITUS (HCC): ICD-10-CM

## 2023-01-23 DIAGNOSIS — R80.9 MICROALBUMINURIA DUE TO TYPE 2 DIABETES MELLITUS (HCC): ICD-10-CM

## 2023-01-23 DIAGNOSIS — I50.22 CHRONIC SYSTOLIC CONGESTIVE HEART FAILURE (HCC): ICD-10-CM

## 2023-01-23 DIAGNOSIS — D50.9 IRON DEFICIENCY ANEMIA, UNSPECIFIED IRON DEFICIENCY ANEMIA TYPE: ICD-10-CM

## 2023-01-23 DIAGNOSIS — Z99.81 CHRONIC RESPIRATORY FAILURE WITH HYPOXIA, ON HOME OXYGEN THERAPY (HCC): ICD-10-CM

## 2023-01-23 DIAGNOSIS — E11.22 TYPE 2 DIABETES MELLITUS WITH STAGE 3B CHRONIC KIDNEY DISEASE, WITHOUT LONG-TERM CURRENT USE OF INSULIN (HCC): ICD-10-CM

## 2023-01-23 DIAGNOSIS — I70.0 ATHEROSCLEROSIS OF AORTA (HCC): ICD-10-CM

## 2023-01-23 DIAGNOSIS — I48.21 PERMANENT ATRIAL FIBRILLATION (HCC): ICD-10-CM

## 2023-01-23 DIAGNOSIS — J96.11 CHRONIC RESPIRATORY FAILURE WITH HYPOXIA, ON HOME OXYGEN THERAPY (HCC): ICD-10-CM

## 2023-01-23 DIAGNOSIS — E11.69 DYSLIPIDEMIA ASSOCIATED WITH TYPE 2 DIABETES MELLITUS (HCC): ICD-10-CM

## 2023-01-23 DIAGNOSIS — E11.29 MICROALBUMINURIA DUE TO TYPE 2 DIABETES MELLITUS (HCC): ICD-10-CM

## 2023-01-23 DIAGNOSIS — N18.32 TYPE 2 DIABETES MELLITUS WITH STAGE 3B CHRONIC KIDNEY DISEASE, WITHOUT LONG-TERM CURRENT USE OF INSULIN (HCC): ICD-10-CM

## 2023-01-23 PROBLEM — Z79.01 CHRONIC ANTICOAGULATION: Status: RESOLVED | Noted: 2017-02-13 | Resolved: 2023-01-23

## 2023-01-23 LAB
HBA1C MFR BLD: 7.3 % (ref 0–5.6)
INT CON NEG: ABNORMAL
INT CON POS: ABNORMAL

## 2023-01-23 PROCEDURE — 83036 HEMOGLOBIN GLYCOSYLATED A1C: CPT | Performed by: INTERNAL MEDICINE

## 2023-01-23 PROCEDURE — 99215 OFFICE O/P EST HI 40 MIN: CPT | Performed by: INTERNAL MEDICINE

## 2023-01-23 ASSESSMENT — PATIENT HEALTH QUESTIONNAIRE - PHQ9: CLINICAL INTERPRETATION OF PHQ2 SCORE: 0

## 2023-01-23 ASSESSMENT — FIBROSIS 4 INDEX: FIB4 SCORE: 2.65

## 2023-01-23 NOTE — LETTER
tagga  Leah Bonilla D.O.  [address]  [place] [address]  Fax: [phone]   Authorization for Release/Disclosure of   Protected Health Information   Name: CHARLENE GARCIA : 1950 SSN: [id number]   Address: [address] [place] [address] [address]  [address]  [place] [address] Phone:    [phone] (home) [phone] (work)   I authorize the entity listed below to release/disclose the PHI below to:   tagga/Leah Bonilla D.O. and Leah Bonilla D.O.   Provider or Entity Name:  Dr. Darrian Prabhakar- Cardiology, Gabi Moreira NP   Address   City, State, Zip  [address] [place] [address] Phone:   [phone]    Fax:     Reason for request: continuity of care   Information to be released:    [  ] LAST COLONOSCOPY,  including any PATH REPORT and follow-up  [  ] LAST FIT/COLOGUARD RESULT [  ] LAST DEXA  [  ] LAST MAMMOGRAM  [  ] LAST PAP  [  ] LAST LABS [  ] RETINA EXAM REPORT  [  ] IMMUNIZATION RECORDS  [ x ] Release all info      [ x ] Check here and initial the line next to each item to release ALL health information INCLUDING  _____ Care and treatment for drug and / or alcohol abuse  _____ HIV testing, infection status, or AIDS  _____ Genetic Testing    DATES OF SERVICE OR TIME PERIOD TO BE DISCLOSED: __-___________  I understand and acknowledge that:  * This Authorization may be revoked at any time by you in writing, except if your health information has already been used or disclosed.  * Your health information that will be used or disclosed as a result of you signing this authorization could be re-disclosed by the recipient. If this occurs, your re-disclosed health information may no longer be protected by State or Federal laws.  * You may refuse to sign this Authorization. Your refusal will not affect your ability to obtain treatment.  * This Authorization becomes effective upon signing and will  on (date) __________.      If no date is indicated,  this Authorization will  one (1) year from the signature date.    Name: Salmalissett Hdz Nabeel    Signature:   Date:     2023       PLEASE FAX REQUESTED RECORDS BACK TO: [phone]

## 2023-01-24 NOTE — ASSESSMENT & PLAN NOTE
Chronic stable problem, continue current regiment with digoxin 125 mcg daily, diltiazem 30 mg twice daily and carvedilol 25 mg twice daily for rate control.  Will request most recent cardiology notes echocardiogram, she follows with Dr. Prabhakar.  Continue follow-up with anticoagulation pharmacist for warfarin, he takes this for stroke prophylaxis.

## 2023-01-24 NOTE — ASSESSMENT & PLAN NOTE
Chronic ongoing problem.  Her cardiologist was worried about pulmonary hypertension and thought she should see a specialist, I suspect they want her to complete a right heart catheterization for consideration of additional medical therapy.  Dr. Alas no longer at [place], if he is still in the area and takes [facility] and we could refer to him as he is a specialist in this area.  This time continue with approach to keep her euvolemic and control blood pressure.

## 2023-01-24 NOTE — ASSESSMENT & PLAN NOTE
Chronic ongoing problem.  Continue Trulicity 1.5 mg weekly, room to go up if needed in the future.  Has already lost 8 pounds since getting back on this medicine.  A1c much better at 7.3.  Will likely need completion of her patient assistance paperwork around May 2023.

## 2023-01-24 NOTE — ASSESSMENT & PLAN NOTE
Chronic ongoing problem, update GFR and check PTH and SPEP to ensure no secondary contributors.  On several medications that could worsen kidney function.  Following with cardiology but not nephrology.  Blood pressure well controlled.  Diabetes under better control.

## 2023-01-24 NOTE — ASSESSMENT & PLAN NOTE
Chronic ongoing problem.  We will request records from cardiology including most recent echocardiogram and check BNP to ensure stability.  Check kidney function to ensure safety with current medications.  Is reestablished with her previous cardiology who is now open a private practice, Dr. Prabhakar.

## 2023-01-24 NOTE — ASSESSMENT & PLAN NOTE
Chronic ongoing problem.  Continue statin and warfarin, continue follow-up with cardiology as recommended.

## 2023-01-24 NOTE — ASSESSMENT & PLAN NOTE
Chronic ongoing problem.  Continue follow-up with pulmonary medicine as recommended.  She wears oxygen both during the day and at night.  Needs overnight pulse oximetry to make sure her nighttime oxygen is adequate.  Continue current regiment with an oral Ellipta and as needed lev albuterol.

## 2023-01-24 NOTE — ASSESSMENT & PLAN NOTE
Chronic ongoing problem, continue oxygen therapy during the day and at night, recheck overnight pulse oximetry to ensure she has adequate oxygen while sleeping.

## 2023-01-24 NOTE — ASSESSMENT & PLAN NOTE
Chronic stable problem, triglycerides and HDL are not at goal but her total cholesterol and LDL are both at goal.  Continue atorvastatin 10 mg daily.

## 2023-01-24 NOTE — ASSESSMENT & PLAN NOTE
New problem, microalbuminuria likely worsen when her diabetes was poorly controlled, now that she is back on Trulicity with A1c at 7.3 I suspect her microalbuminuria should be better.  Would be challenging to add ACE inhibitor or ARB due to low running GFR, recheck urine microalbumin and then can revisit that as an option at a recheck in 3 months.

## 2023-01-24 NOTE — PROGRESS NOTES
Subjective:   Chief Complaint/History of Present Illness:  Salma Lees is a 72 y.o. female established patient who presents today to discuss medical problems as listed below. Salma is unaccompanied for today's visit.    Problem   Microalbuminuria Due to Type 2 Diabetes Mellitus ([facility])     Latest Reference Range & Units 10/19/22 11:35   Micro Alb Creat Ratio 0 - 30 mg/g 112 (H)     She has longstanding history of both diabetes and kidney disease.  Not currently taking ACE inhibitor or ARB due to reduced kidney function.  Recheck urine microalbumin now that A1c is better.     Dyslipidemia Associated With Type 2 Diabetes Mellitus ([facility])     Latest Reference Range & Units 10/26/22 13:56   Cholesterol,Tot 100 - 199 mg/dL 155   Triglycerides 0 - 149 mg/dL 213 (H)   HDL >=40 mg/dL 39 !   LDL <100 mg/dL 73     She appreciated vague discomfort in her back and was worried that the atorvastatin was causing kidney irritation so she stopped taking her 20 mg dose in June 2021 and then added back 10 mg in September 2021.    Current regimen: Atorvastatin 10 mg daily     Secondary Hypercoagulable State ([facility])    She is on coumadin for stroke prophylaxis due to atrial fibrillation. No signs or symptoms of bruising/bleeding. She follows with the anticoagulation clinic.    Current regimen: warfarin dosed by pharmacist     Chronic Respiratory Failure With Hypoxia, On Home Oxygen Therapy ([facility])    She reports chronic oxygen use initiated on 2014.  She uses 2 L continuously and 5 L with exertion. She does follow with pulmonary medicine and carries a diagnosis of both asthma as well as obstructive lung disease.     Chronic Systolic Congestive Heart Failure ([facility])    Echocardiogram (2016) EF 45 to 50%, mild left atrial and right atrial enlargement, mild to moderate MR, moderate TR, RSVP 63 mmHg.  Echocardiogram (2018) EF 60%, mildly enlarged RV, severe TR, RSVP 60 mmHg.  Echocardiogram (2021) EF 60%, mitral valve repair functioning  normally, mild MR, mild AI, RVSP 45-50 mmHg      She has a history of heart failure related to valvular disease.  She follows with Dr. Prabhakar previously at [place] but now opening his own private practice.  Current regimen includes spironolactone, Lasix with potassium, digoxin, carvedilol, and diltiazem.     Atherosclerosis of Aorta (Hcc)    Incidental finding seen on CT scan in 2017    She is on atorvastatin and warfarin.     Pulmonary Hypertension (Hcc)    She has pulmonary hypertension on her echocardiogram with right heart pressures between 60 and 63 mmHg.  She also has chronic lung disease on oxygenation at all times.        Ckd (Chronic Kidney Disease) Stage 3, Gfr 30-59 Ml/Min ([facility])     Latest Reference Range & Units 11/08/22 09:22   Bun 8 - 22 mg/dL 27 (H)   Creatinine 0.50 - 1.40 mg/dL 1.40   GFR (CKD-EPI) >60 mL/min/1.73 m 2 40 !     She has a history of chronic kidney disease likely secondary to heart disease, nephrolithiasis, and diabetes.  She is on several renally excreted medications including spironolactone, Lasix, and digoxin. No secondary evaluation completed. GFR previously in the high 50s and now in the low 30s.     Chronic Obstructive Pulmonary Disease (Hcc)    She follows with pulmonary medicine.    Last pulmonary function testing 4/2022  1.  There is a moderately severe obstructive ventilatory defect evidenced on spirometry.  There is no significant response to bronchodilators.  2.  Lung volumes are consistent with air trapping and hyperinflation, evidenced by the elevated RV and RV/TLC ratio.  3.  There is a moderate diffusion impairment.  The reduced DLCO and DL/VA ratio are consistent with pulmonary emphysema, pulmonary vascular disease or interstitial lung disease.  4.  Flow volume loop is consistent with the above interpretation of an obstructive ventilatory defect.  5.  Compared to prior testing in 2020, the FEV1 has declined slightly from 1.41 liters to 1.27 L or 59%  predicted.    Current regimen includes Anoro Ellipta and as needed levalbuterol.  Previously trialed on Symbicort however this was still quite expensive and caused scratchy throat.    She has chronic oxygen and wears 2 L all the time and 5 L with exertion.  She does endorse dyspnea with exertion, this is at baseline.     Atrial Fibrillation (Hcc)    Diagnosed in 2014 following repair of her mitral valve.  repair of her mitral valve.  She required hospitalization in February 2015 due to CHF and A. fib with RVR.  She experienced significant bradycardia necessitating permanent pacemaker.  She is in permanent A. fib at this time.  She is using anticoagulation in the form of warfarin, she denies any bleeding issues.  Rate control achieved through diltiazem, coreg, and digoxin.     Current regimen: Diltiazem 30 mg 2 times daily, carvedilol 25 mg twice daily, and digoxin 125 mcg daily, warfarin dosed per pharmacy     Type 2 Diabetes Mellitus With Stage 3b Chronic Kidney Disease, Without Long-Term Current Use of Insulin ([facility])     Latest Reference Range & Units 01/06/22 16:57 10/19/22 11:30 01/23/23 15:03   Glycohemoglobin 0.0 - 5.6 % 10.7 ! 9.4 ! 7.3 !     She has had diabetes since at least 2017 with A1c ranging 7-14. She did not tolerate metformin and has CKDIII/IV. She previously had microalbuminuria, currently resolved. No known neuropathy or retinopathy.    Current regimen: Trulicity 1.5 mg weekly on Sundays     Bmi 35.0-35.9,Adult (Resolved)   Chronic Anticoagulation (Resolved)        Current Medications:  Current Outpatient Medications Ordered in Epic   Medication Sig Dispense Refill    carvedilol (COREG) 25 MG Tab Take 1 Tablet by mouth 2 times a day. 200 Tablet 3    atorvastatin (LIPITOR) 10 MG Tab Take 1 Tablet by mouth every evening. 100 Tablet 3    digoxin (LANOXIN) 125 MCG Tab Take 1 Tablet by mouth every day. 100 Tablet 3    dilTIAZem (CARDIZEM) 30 MG Tab Take 1 Tablet by mouth 2 times a day. 200 Tablet 3     "spironolactone (ALDACTONE) 25 MG Tab Take 1 Tablet by mouth every day. 100 Tablet 3    warfarin (COUMADIN) 5 MG Tab Take 1 Tablet by mouth every day. take 1 and 1/2 tablets by mouth daily or as directed by anticoagulation clinic 135 Tablet 3    levalbuterol (XOPENEX HFA) 45 MCG/ACT inhaler Inhale 2 Puffs every four hours as needed for Shortness of Breath. 1 Each 2    potassium Chloride ER (KLOR-CON) 10 MEQ tablet Take 1 Tablet by mouth every day. Take with Lasix 100 Tablet 3    furosemide (LASIX) 20 MG Tab Take 1 Tablet by mouth every day. 100 Tablet 3    Dulaglutide 1.5 MG/0.5ML Solution Pen-injector Inject 0.5 mL under the skin every 7 days. Every Sunday 1 mL 0    umeclidinium-vilanterol (ANORO ELLIPTA) 62.5-25 MCG/INH AEROSOL POWDER, BREATH ACTIVATED inhaler Inhale 1 Puff every day. 3 Each 3     No current Epic-ordered facility-administered medications on file.          Objective:   Physical Exam:    Vitals: /70   Pulse 79   Temp 36.3 °C (97.4 °F) (Temporal)   Resp 12   Ht 1.638 m (5' 4.5\")   Wt 84.6 kg (186 lb 8 oz)   SpO2 94%    BMI: Body mass index is 31.52 kg/m².  Physical Exam  Constitutional:       Appearance: Normal appearance.   HENT:      Right Ear: External ear normal.      Left Ear: External ear normal.   Eyes:      General: No scleral icterus.     Conjunctiva/sclera: Conjunctivae normal.   Cardiovascular:      Rate and Rhythm: Normal rate and regular rhythm.      Pulses: Normal pulses.   Pulmonary:      Effort: No respiratory distress.      Breath sounds: No rhonchi.      Comments: Decreased air movement, no active wheezing, supplemental oxygen in place  Abdominal:      General: Bowel sounds are normal. There is no distension.      Palpations: Abdomen is soft.   Musculoskeletal:      Right lower leg: No edema.      Left lower leg: No edema.   Lymphadenopathy:      Cervical: No cervical adenopathy.   Skin:     General: Skin is warm and dry.      Findings: No rash.   Neurological:      Gait: " Gait normal.   Psychiatric:         Mood and Affect: Mood normal.         Behavior: Behavior normal.         Thought Content: Thought content normal.         Judgment: Judgment normal.        Assessment and Plan:   Salma is a 72 y.o. female with the following:  Problem List Items Addressed This Visit       Chronic obstructive pulmonary disease (HCC)     Chronic ongoing problem.  Continue follow-up with pulmonary medicine as recommended.  She wears oxygen both during the day and at night.  Needs overnight pulse oximetry to make sure her nighttime oxygen is adequate.  Continue current regiment with an oral Ellipta and as needed lev albuterol.         Atrial fibrillation (HCC)     Chronic stable problem, continue current regiment with digoxin 125 mcg daily, diltiazem 30 mg twice daily and carvedilol 25 mg twice daily for rate control.  Will request most recent cardiology notes echocardiogram, she follows with Dr. Prabhakar.  Continue follow-up with anticoagulation pharmacist for warfarin, he takes this for stroke prophylaxis.         Type 2 diabetes mellitus with stage 3b chronic kidney disease, without long-term current use of insulin (HCC)     Chronic ongoing problem.  Continue Trulicity 1.5 mg weekly, room to go up if needed in the future.  Has already lost 8 pounds since getting back on this medicine.  A1c much better at 7.3.  Will likely need completion of her patient assistance paperwork around May 2023.         Relevant Orders    POCT  A1C (Completed)    CBC WITH DIFFERENTIAL    Comp Metabolic Panel    FRUCTOSAMINE    VITAMIN B12    MICROALBUMIN CREAT RATIO URINE    Chronic respiratory failure with hypoxia, on home oxygen therapy (HCC)     Chronic ongoing problem, continue oxygen therapy during the day and at night, recheck overnight pulse oximetry to ensure she has adequate oxygen while sleeping.         Chronic systolic congestive heart failure (HCC)     Chronic ongoing problem.  We will request records from  cardiology including most recent echocardiogram and check BNP to ensure stability.  Check kidney function to ensure safety with current medications.  Is reestablished with her previous cardiology who is now open a private practice, Dr. Prabhakar.         Relevant Orders    CBC WITH DIFFERENTIAL    Comp Metabolic Panel    proBrain Natriuretic Peptide, NT    Atherosclerosis of aorta (HCC)     Chronic ongoing problem.  Continue statin and warfarin, continue follow-up with cardiology as recommended.         Pulmonary hypertension (HCC)     Chronic ongoing problem.  Her cardiologist was worried about pulmonary hypertension and thought she should see a specialist, I suspect they want her to complete a right heart catheterization for consideration of additional medical therapy.  Dr. Alas no longer at [place], if he is still in the area and takes [facility] and we could refer to him as he is a specialist in this area.  This time continue with approach to keep her euvolemic and control blood pressure.         CKD (chronic kidney disease) stage 3, GFR 30-59 ml/min (HCC)     Chronic ongoing problem, update GFR and check PTH and SPEP to ensure no secondary contributors.  On several medications that could worsen kidney function.  Following with cardiology but not nephrology.  Blood pressure well controlled.  Diabetes under better control.         Relevant Orders    CBC WITH DIFFERENTIAL    Comp Metabolic Panel    VITAMIN D,25 HYDROXY (DEFICIENCY)    MAGNESIUM    PTH INTACT (PTH ONLY)    Secondary hypercoagulable state (HCC)     Chronic ongoing problem.  Continue warfarin dosed by clinical pharmacist.         Dyslipidemia associated with type 2 diabetes mellitus (HCC)     Chronic stable problem, triglycerides and HDL are not at goal but her total cholesterol and LDL are both at goal.  Continue atorvastatin 10 mg daily.         Relevant Orders    CBC WITH DIFFERENTIAL    Comp Metabolic Panel    Lipid Profile     Microalbuminuria due to type 2 diabetes mellitus (HCC)     New problem, microalbuminuria likely worsen when her diabetes was poorly controlled, now that she is back on Trulicity with A1c at 7.3 I suspect her microalbuminuria should be better.  Would be challenging to add ACE inhibitor or ARB due to low running GFR, recheck urine microalbumin and then can revisit that as an option at a recheck in 3 months.          Other Visit Diagnoses       Iron deficiency anemia, unspecified iron deficiency anemia type        Relevant Orders    IRON/TOTAL IRON BIND    FERRITIN               RTC: Return in about 3 months (around 4/23/2023) for complete [facility] if not yet done.    I spent a total of 44 minutes with record review, exam, communication with the patient, communication with other providers, and documentation of this encounter.    PLEASE NOTE: This dictation was created using voice recognition software. I have made every reasonable attempt to correct obvious errors, but I expect that there are errors of grammar and possibly content that I did not discover before finalizing the note.      Leah Bonilla, DO  Geriatric and Internal Medicine  [facility]

## 2023-02-13 ENCOUNTER — ANTICOAGULATION VISIT (OUTPATIENT)
Dept: MEDICAL GROUP | Facility: MEDICAL CENTER | Age: 73
End: 2023-02-13
Payer: MEDICARE

## 2023-02-13 DIAGNOSIS — Z98.890 HISTORY OF MITRAL VALVE REPAIR: ICD-10-CM

## 2023-02-13 LAB — INR PPP: 2.5 (ref 2–3.5)

## 2023-02-13 PROCEDURE — 93793 ANTICOAG MGMT PT WARFARIN: CPT

## 2023-02-13 PROCEDURE — 85610 PROTHROMBIN TIME: CPT | Performed by: INTERNAL MEDICINE

## 2023-02-13 PROCEDURE — 99999 PR NO CHARGE: CPT | Performed by: INTERNAL MEDICINE

## 2023-02-13 NOTE — PROGRESS NOTES
Anticoagulation Summary  As of 2023      INR goal:  2.0-3.0   TTR:  56.4 % (5.9 y)   INR used for dosin.50 (2023)   Warfarin maintenance plan:  7.5 mg (5 mg x 1.5) every Sun, e, Thu; 5 mg (5 mg x 1) all other days   Weekly warfarin total:  42.5 mg   Plan last modified:  Sakshi Mcconnell PharmD (2020)   Next INR check:  2023   Target end date:  Indefinite    Indications    Atrial fibrillation (HCC) [I48.91]  History of mitral valve repair [Z98.890]                 Anticoagulation Episode Summary       INR check location:      Preferred lab:      Send INR reminders to:      Comments:            Anticoagulation Care Providers       Provider Role Specialty Phone number    SUSANNE [email or web address]. Referring Family Medicine [phone]    [facility] Responsible  [phone]    Kenneth Salas PharmD Responsible Pharmacy                   Refer to Patient Findings for HPI:  Patient Findings       Negatives:  Signs/symptoms of thrombosis, Signs/symptoms of bleeding, Laboratory test error suspected, Change in health, Change in alcohol use, Change in activity, Upcoming invasive procedure, Emergency department visit, Upcoming dental procedure, Missed doses, Extra doses, Change in medications, Change in diet/appetite, Hospital admission, Bruising, Other complaints            There were no vitals filed for this visit.  pt declined vitals    Verified current warfarin dosing schedule.    Medications reconciled   Pt is not on antiplatelet therapy      A/P   INR  -therapeutic. Last INR therapeutic.      Warfarin dosing recommendation: Continue current warfarin regimen as above without changes      Pt educated to contact our clinic with any changes in medications or s/s of bleeding or thrombosis. Pt is aware to seek immediate medical attention for falls, head injury or deep cuts.    Follow up appointment in 10 week(s).    Joleen HallmanD

## 2023-04-24 ENCOUNTER — ANTICOAGULATION VISIT (OUTPATIENT)
Dept: MEDICAL GROUP | Facility: MEDICAL CENTER | Age: 73
End: 2023-04-24
Payer: MEDICARE

## 2023-04-24 DIAGNOSIS — I48.21 PERMANENT ATRIAL FIBRILLATION (HCC): ICD-10-CM

## 2023-04-24 DIAGNOSIS — Z98.890 HISTORY OF MITRAL VALVE REPAIR: ICD-10-CM

## 2023-04-24 LAB — INR PPP: 2.6 (ref 2–3.5)

## 2023-04-24 PROCEDURE — 93793 ANTICOAG MGMT PT WARFARIN: CPT | Performed by: INTERNAL MEDICINE

## 2023-04-24 PROCEDURE — 99999 PR NO CHARGE: CPT | Performed by: INTERNAL MEDICINE

## 2023-04-24 PROCEDURE — 85610 PROTHROMBIN TIME: CPT | Performed by: INTERNAL MEDICINE

## 2023-04-24 NOTE — PROGRESS NOTES
Anticoagulation Summary  As of 2023      INR goal:  2.0-3.0   TTR:  57.8 % (6.1 y)   INR used for dosin.60 (2023)   Warfarin maintenance plan:  7.5 mg (5 mg x 1.5) every Sun, e, Thu; 5 mg (5 mg x 1) all other days   Weekly warfarin total:  42.5 mg   Plan last modified:  Sakshi Mcconnell PharmD (2020)   Next INR check:  7/3/2023   Target end date:  Indefinite    Indications    Atrial fibrillation (HCC) [I48.91]  History of mitral valve repair [Z98.890]                 Anticoagulation Episode Summary       INR check location:      Preferred lab:      Send INR reminders to:      Comments:            Anticoagulation Care Providers       Provider Role Specialty Phone number    SUSANNE [email or web address]. Referring Family Medicine [phone]    [facility] Responsible  [phone]    Joleen BoxD Responsible Pharmacy                   Refer to Patient Findings for HPI:  Patient Findings       Negatives:  Signs/symptoms of thrombosis, Signs/symptoms of bleeding, Laboratory test error suspected, Change in health, Change in alcohol use, Change in activity, Upcoming invasive procedure, Emergency department visit, Upcoming dental procedure, Missed doses, Extra doses, Change in medications, Change in diet/appetite, Hospital admission, Bruising, Other complaints            There were no vitals filed for this visit.  pt declined vitals    Verified current warfarin dosing schedule.    Medications reconciled   Pt is not on antiplatelet therapy      A/P   INR  -therapeutic.     Warfarin dosing recommendation: Continue current warfarin regimen as above without changes      Pt educated to contact our clinic with any changes in medications or s/s of bleeding or thrombosis. Pt is aware to seek immediate medical attention for falls, head injury or deep cuts.    Follow up appointment in 11 week(s).    Joleen HallmanD

## 2023-04-25 ENCOUNTER — HOSPITAL ENCOUNTER (OUTPATIENT)
Dept: LAB | Facility: MEDICAL CENTER | Age: 73
End: 2023-04-25
Attending: INTERNAL MEDICINE
Payer: MEDICARE

## 2023-04-25 DIAGNOSIS — E11.22 TYPE 2 DIABETES MELLITUS WITH STAGE 3B CHRONIC KIDNEY DISEASE, WITHOUT LONG-TERM CURRENT USE OF INSULIN (HCC): ICD-10-CM

## 2023-04-25 DIAGNOSIS — E11.69 DYSLIPIDEMIA ASSOCIATED WITH TYPE 2 DIABETES MELLITUS (HCC): ICD-10-CM

## 2023-04-25 DIAGNOSIS — D50.9 IRON DEFICIENCY ANEMIA, UNSPECIFIED IRON DEFICIENCY ANEMIA TYPE: ICD-10-CM

## 2023-04-25 DIAGNOSIS — I50.22 CHRONIC SYSTOLIC CONGESTIVE HEART FAILURE (HCC): ICD-10-CM

## 2023-04-25 DIAGNOSIS — E78.5 DYSLIPIDEMIA ASSOCIATED WITH TYPE 2 DIABETES MELLITUS (HCC): ICD-10-CM

## 2023-04-25 DIAGNOSIS — N18.32 STAGE 3B CHRONIC KIDNEY DISEASE: ICD-10-CM

## 2023-04-25 DIAGNOSIS — N18.32 TYPE 2 DIABETES MELLITUS WITH STAGE 3B CHRONIC KIDNEY DISEASE, WITHOUT LONG-TERM CURRENT USE OF INSULIN (HCC): ICD-10-CM

## 2023-04-25 LAB
25(OH)D3 SERPL-MCNC: 17 NG/ML (ref 30–100)
BASOPHILS # BLD AUTO: 0.6 % (ref 0–1.8)
BASOPHILS # BLD: 0.05 K/UL (ref 0–0.12)
EOSINOPHIL # BLD AUTO: 0.57 K/UL (ref 0–0.51)
EOSINOPHIL NFR BLD: 7.2 % (ref 0–6.9)
ERYTHROCYTE [DISTWIDTH] IN BLOOD BY AUTOMATED COUNT: 55.9 FL (ref 35.9–50)
FERRITIN SERPL-MCNC: 241 NG/ML (ref 10–291)
HCT VFR BLD AUTO: 40.4 % (ref 37–47)
HGB BLD-MCNC: 12.3 G/DL (ref 12–16)
IMM GRANULOCYTES # BLD AUTO: 0.05 K/UL (ref 0–0.11)
IMM GRANULOCYTES NFR BLD AUTO: 0.6 % (ref 0–0.9)
LYMPHOCYTES # BLD AUTO: 0.8 K/UL (ref 1–4.8)
LYMPHOCYTES NFR BLD: 10.2 % (ref 22–41)
MCH RBC QN AUTO: 27.9 PG (ref 27–33)
MCHC RBC AUTO-ENTMCNC: 30.4 G/DL (ref 33.6–35)
MCV RBC AUTO: 91.6 FL (ref 81.4–97.8)
MONOCYTES # BLD AUTO: 0.52 K/UL (ref 0–0.85)
MONOCYTES NFR BLD AUTO: 6.6 % (ref 0–13.4)
NEUTROPHILS # BLD AUTO: 5.89 K/UL (ref 2–7.15)
NEUTROPHILS NFR BLD: 74.8 % (ref 44–72)
NRBC # BLD AUTO: 0 K/UL
NRBC BLD-RTO: 0 /100 WBC
PLATELET # BLD AUTO: 198 K/UL (ref 164–446)
PMV BLD AUTO: 12.1 FL (ref 9–12.9)
PTH-INTACT SERPL-MCNC: 123 PG/ML (ref 14–72)
RBC # BLD AUTO: 4.41 M/UL (ref 4.2–5.4)
VIT B12 SERPL-MCNC: 313 PG/ML (ref 211–911)
WBC # BLD AUTO: 7.9 K/UL (ref 4.8–10.8)

## 2023-04-25 PROCEDURE — 83880 ASSAY OF NATRIURETIC PEPTIDE: CPT

## 2023-04-25 PROCEDURE — 83540 ASSAY OF IRON: CPT

## 2023-04-25 PROCEDURE — 82728 ASSAY OF FERRITIN: CPT

## 2023-04-25 PROCEDURE — 82607 VITAMIN B-12: CPT

## 2023-04-25 PROCEDURE — 80061 LIPID PANEL: CPT

## 2023-04-25 PROCEDURE — 83970 ASSAY OF PARATHORMONE: CPT

## 2023-04-25 PROCEDURE — 82306 VITAMIN D 25 HYDROXY: CPT

## 2023-04-25 PROCEDURE — 83735 ASSAY OF MAGNESIUM: CPT

## 2023-04-25 PROCEDURE — 36415 COLL VENOUS BLD VENIPUNCTURE: CPT

## 2023-04-25 PROCEDURE — 83550 IRON BINDING TEST: CPT

## 2023-04-25 PROCEDURE — 85025 COMPLETE CBC W/AUTO DIFF WBC: CPT

## 2023-04-25 PROCEDURE — 80053 COMPREHEN METABOLIC PANEL: CPT

## 2023-04-25 PROCEDURE — 82985 ASSAY OF GLYCATED PROTEIN: CPT

## 2023-04-26 LAB
ALBUMIN SERPL BCP-MCNC: 4.1 G/DL (ref 3.2–4.9)
ALBUMIN/GLOB SERPL: 1.1 G/DL
ALP SERPL-CCNC: 80 U/L (ref 30–99)
ALT SERPL-CCNC: 14 U/L (ref 2–50)
ANION GAP SERPL CALC-SCNC: 13 MMOL/L (ref 7–16)
AST SERPL-CCNC: 19 U/L (ref 12–45)
BILIRUB SERPL-MCNC: 1.1 MG/DL (ref 0.1–1.5)
BUN SERPL-MCNC: 37 MG/DL (ref 8–22)
CALCIUM ALBUM COR SERPL-MCNC: 9.2 MG/DL (ref 8.5–10.5)
CALCIUM SERPL-MCNC: 9.3 MG/DL (ref 8.5–10.5)
CHLORIDE SERPL-SCNC: 103 MMOL/L (ref 96–112)
CHOLEST SERPL-MCNC: 181 MG/DL (ref 100–199)
CO2 SERPL-SCNC: 22 MMOL/L (ref 20–33)
CREAT SERPL-MCNC: 1.76 MG/DL (ref 0.5–1.4)
FASTING STATUS PATIENT QL REPORTED: NORMAL
GFR SERPLBLD CREATININE-BSD FMLA CKD-EPI: 30 ML/MIN/1.73 M 2
GLOBULIN SER CALC-MCNC: 3.7 G/DL (ref 1.9–3.5)
GLUCOSE SERPL-MCNC: 243 MG/DL (ref 65–99)
HDLC SERPL-MCNC: 42 MG/DL
IRON SATN MFR SERPL: 29 % (ref 15–55)
IRON SERPL-MCNC: 87 UG/DL (ref 40–170)
LDLC SERPL CALC-MCNC: 97 MG/DL
MAGNESIUM SERPL-MCNC: 2 MG/DL (ref 1.5–2.5)
NT-PROBNP SERPL IA-MCNC: 1620 PG/ML (ref 0–125)
POTASSIUM SERPL-SCNC: 5 MMOL/L (ref 3.6–5.5)
PROT SERPL-MCNC: 7.8 G/DL (ref 6–8.2)
SODIUM SERPL-SCNC: 138 MMOL/L (ref 135–145)
TIBC SERPL-MCNC: 299 UG/DL (ref 250–450)
TRIGL SERPL-MCNC: 210 MG/DL (ref 0–149)
UIBC SERPL-MCNC: 212 UG/DL (ref 110–370)

## 2023-04-27 LAB — FRUCTOSAMINE SERPL-SCNC: 402 UMOL/L (ref 205–285)

## 2023-05-01 ENCOUNTER — OFFICE VISIT (OUTPATIENT)
Dept: MEDICAL GROUP | Facility: PHYSICIAN GROUP | Age: 73
End: 2023-05-01
Payer: MEDICARE

## 2023-05-01 VITALS
OXYGEN SATURATION: 92 % | HEIGHT: 64 IN | HEART RATE: 60 BPM | TEMPERATURE: 97.5 F | BODY MASS INDEX: 32.1 KG/M2 | WEIGHT: 188 LBS | DIASTOLIC BLOOD PRESSURE: 60 MMHG | SYSTOLIC BLOOD PRESSURE: 118 MMHG | RESPIRATION RATE: 16 BRPM

## 2023-05-01 DIAGNOSIS — N25.81 HYPERPARATHYROIDISM DUE TO RENAL INSUFFICIENCY (HCC): ICD-10-CM

## 2023-05-01 DIAGNOSIS — Z23 NEED FOR DIPHTHERIA-TETANUS-PERTUSSIS (TDAP) VACCINE: ICD-10-CM

## 2023-05-01 DIAGNOSIS — E55.9 VITAMIN D DEFICIENCY: ICD-10-CM

## 2023-05-01 DIAGNOSIS — N18.32 STAGE 3B CHRONIC KIDNEY DISEASE: ICD-10-CM

## 2023-05-01 DIAGNOSIS — E11.29 MICROALBUMINURIA DUE TO TYPE 2 DIABETES MELLITUS (HCC): ICD-10-CM

## 2023-05-01 DIAGNOSIS — R80.9 MICROALBUMINURIA DUE TO TYPE 2 DIABETES MELLITUS (HCC): ICD-10-CM

## 2023-05-01 DIAGNOSIS — J44.9 CHRONIC OBSTRUCTIVE PULMONARY DISEASE, UNSPECIFIED COPD TYPE (HCC): ICD-10-CM

## 2023-05-01 DIAGNOSIS — E11.22 TYPE 2 DIABETES MELLITUS WITH STAGE 3B CHRONIC KIDNEY DISEASE, WITHOUT LONG-TERM CURRENT USE OF INSULIN (HCC): ICD-10-CM

## 2023-05-01 DIAGNOSIS — N18.32 TYPE 2 DIABETES MELLITUS WITH STAGE 3B CHRONIC KIDNEY DISEASE, WITHOUT LONG-TERM CURRENT USE OF INSULIN (HCC): ICD-10-CM

## 2023-05-01 LAB
HBA1C MFR BLD: 8.1 % (ref ?–5.8)
POCT INT CON NEG: NEGATIVE
POCT INT CON POS: POSITIVE

## 2023-05-01 PROCEDURE — 83036 HEMOGLOBIN GLYCOSYLATED A1C: CPT | Performed by: INTERNAL MEDICINE

## 2023-05-01 PROCEDURE — 99214 OFFICE O/P EST MOD 30 MIN: CPT | Mod: 25 | Performed by: INTERNAL MEDICINE

## 2023-05-01 PROCEDURE — 90715 TDAP VACCINE 7 YRS/> IM: CPT | Performed by: INTERNAL MEDICINE

## 2023-05-01 PROCEDURE — 90471 IMMUNIZATION ADMIN: CPT | Performed by: INTERNAL MEDICINE

## 2023-05-01 RX ORDER — CALCITRIOL 0.25 UG/1
0.25 CAPSULE, LIQUID FILLED ORAL DAILY
Qty: 100 CAPSULE | Refills: 3 | Status: SHIPPED | OUTPATIENT
Start: 2023-05-01

## 2023-05-01 ASSESSMENT — FIBROSIS 4 INDEX: FIB4 SCORE: 1.9

## 2023-05-01 NOTE — LETTER
[facility]  Leah Bonilla D.O.  [address]  [place] [address]  Fax: [phone]   Authorization for Release/Disclosure of   Protected Health Information   Name: CHARLENE GARCIA : 1950 SSN: [id number]   Address: [address] [place] [address]  [address]  [place] [address] Phone:    [phone] (home) [phone] (work)   I authorize the entity listed below to release/disclose the PHI below to:   [facility]/Leah Bonilla D.O. and Leah Bonilla D.O.   Provider or Entity Name: Dr. Boby Estrada MD      Address   [facility]   Phone:  [phone]    Fax:     Reason for request: continuity of care   Information to be released:    [  ] LAST COLONOSCOPY,  including any PATH REPORT and follow-up  [  ] LAST FIT/COLOGUARD RESULT [  ] LAST DEXA  [  ] LAST MAMMOGRAM  [  ] LAST PAP  [  ] LAST LABS [  ] RETINA EXAM REPORT  [  ] IMMUNIZATION RECORDS  [  ] Release all info      [  ] Check here and initial the line next to each item to release ALL health information INCLUDING  _____ Care and treatment for drug and / or alcohol abuse  _____ HIV testing, infection status, or AIDS  _____ Genetic Testing    DATES OF SERVICE OR TIME PERIOD TO BE DISCLOSED: _____________  I understand and acknowledge that:  * This Authorization may be revoked at any time by you in writing, except if your health information has already been used or disclosed.  * Your health information that will be used or disclosed as a result of you signing this authorization could be re-disclosed by the recipient. If this occurs, your re-disclosed health information may no longer be protected by State or Federal laws.  * You may refuse to sign this Authorization. Your refusal will not affect your ability to obtain treatment.  * This Authorization becomes effective upon signing and will  on (date) __________.      If no date is indicated, this Authorization will  one (1) year from the signature date.    Name:  Salma Lees  Signature: Date:   5/1/2023     PLEASE FAX REQUESTED RECORDS BACK TO: [phone]

## 2023-05-01 NOTE — ASSESSMENT & PLAN NOTE
Chronic ongoing issue, currently holding inhaler therapy and has not noticed any worsening of breathing such as bronchoconstriction.  She will call pulmonary medicine to establish care with one of the pulmonologists and make sure she has regular follow-up.  Breathing status feels comfortable otherwise.

## 2023-05-01 NOTE — ASSESSMENT & PLAN NOTE
Chronic decompensated problem.  A1c has trended up with recent dietary discretion.  She is back on the Trulicity 1.5 mg weekly and is trying to take that consistently.  She will also reduce the sugar and carbohydrate intake.  We will plan to arrange for 3-month follow-up to recheck A1c to ensure it is trending back down.  She has occasional anemia which is why checked fructosamine which is consistent with A1c.  Foot exam completed in clinic today and reassuring.  Retinal exam done last month, formal records requested.

## 2023-05-01 NOTE — PROGRESS NOTES
Subjective:   Chief Complaint/History of Present Illness:  Salma Lees is a 73 y.o. female established patient who presents today to discuss medical problems as listed below. Salma is unaccompanied for today's visit.    Problem   Hyperparathyroidism Due to Renal Insufficiency ([facility])     Latest Reference Range & Units 10/19/21 13:10 04/25/23 11:58   Pth, Intact 14.0 - 72.0 pg/mL 58.1 123.0 (H)        Vitamin D Deficiency     Latest Reference Range & Units 04/25/23 11:58   25-Hydroxy   Vitamin D 25 30 - 100 ng/mL 17 (L)        Microalbuminuria Due to Type 2 Diabetes Mellitus ([facility])     Latest Reference Range & Units 10/19/22 11:35   Micro Alb Creat Ratio 0 - 30 mg/g 112 (H)     She has longstanding history of both diabetes and kidney disease.  Not currently taking ACE inhibitor or ARB due to reduced kidney function.  Recheck urine microalbumin now that A1c is better.     Ckd (Chronic Kidney Disease) Stage 3, Gfr 30-59 Ml/Min ([facility])     Latest Reference Range & Units 10/26/22 13:56 11/08/22 09:22 04/25/23 11:58   GFR (CKD-EPI) >60 mL/min/1.73 m 2 29 ! 40 ! 30 !     She has a history of chronic kidney disease likely secondary to heart disease, nephrolithiasis, and diabetes.  She is on several renally excreted medications including spironolactone, Lasix, and digoxin. No secondary evaluation completed. GFR previously in the high 50s and now in the low 30s.  She has contributing hyperparathyroidism likely related to longstanding chronic kidney disease.  Not currently seeing a nephrologist.     Chronic Obstructive Pulmonary Disease (Hcc)    She follows with pulmonary medicine.    Last pulmonary function testing 4/2022  1.  There is a moderately severe obstructive ventilatory defect evidenced on spirometry.  There is no significant response to bronchodilators.  2.  Lung volumes are consistent with air trapping and hyperinflation, evidenced by the elevated RV and RV/TLC ratio.  3.  There is a moderate diffusion  impairment.  The reduced DLCO and DL/VA ratio are consistent with pulmonary emphysema, pulmonary vascular disease or interstitial lung disease.  4.  Flow volume loop is consistent with the above interpretation of an obstructive ventilatory defect.  5.  Compared to prior testing in 2020, the FEV1 has declined slightly from 1.41 liters to 1.27 L or 59% predicted.    Current regimen includes Anoro Ellipta and as needed levalbuterol.  Previously trialed on Symbicort however this was still quite expensive and caused scratchy throat.    She has chronic oxygen and wears 2 L all the time and 5 L with exertion.  She does endorse dyspnea with exertion, this is at baseline.     Type 2 Diabetes Mellitus With Stage 3b Chronic Kidney Disease, Without Long-Term Current Use of Insulin ([facility])     Latest Reference Range & Units 01/23/23 15:03 05/01/23 13:55   Glycohemoglobin 5.8 % 7.3 ! 8.1 !      Latest Reference Range & Units 04/25/23 11:58   Fructosamine 205 - 285 umol/L 402 (H)     She has had diabetes since at least 2017 with A1c ranging 7-14. She did not tolerate metformin and has CKDIII/IV. She previously had microalbuminuria, currently resolved. No known neuropathy or retinopathy.    Current regimen: Trulicity 1.5 mg weekly on Sundays          Current Medications:  Current Outpatient Medications Ordered in Epic   Medication Sig Dispense Refill    calcitRIOL (ROCALTROL) 0.25 MCG Cap Take 1 Capsule by mouth every day. 100 Capsule 3    carvedilol (COREG) 25 MG Tab Take 1 Tablet by mouth 2 times a day. 200 Tablet 3    atorvastatin (LIPITOR) 10 MG Tab Take 1 Tablet by mouth every evening. 100 Tablet 3    digoxin (LANOXIN) 125 MCG Tab Take 1 Tablet by mouth every day. 100 Tablet 3    dilTIAZem (CARDIZEM) 30 MG Tab Take 1 Tablet by mouth 2 times a day. 200 Tablet 3    spironolactone (ALDACTONE) 25 MG Tab Take 1 Tablet by mouth every day. (Patient taking differently: Take 25 mg by mouth every day. Three times per week) 100 Tablet 3     "warfarin (COUMADIN) 5 MG Tab Take 1 Tablet by mouth every day. take 1 and 1/2 tablets by mouth daily or as directed by anticoagulation clinic 135 Tablet 3    levalbuterol (XOPENEX HFA) 45 MCG/ACT inhaler Inhale 2 Puffs every four hours as needed for Shortness of Breath. 1 Each 2    furosemide (LASIX) 20 MG Tab Take 1 Tablet by mouth every day. (Patient taking differently: Take 20 mg by mouth every day. 3x's per week) 100 Tablet 3    Dulaglutide 1.5 MG/0.5ML Solution Pen-injector Inject 0.5 mL under the skin every 7 days. Every Sunday 1 mL 0     No current Epic-ordered facility-administered medications on file.          Objective:   Physical Exam:    Vitals: /60 (BP Location: Left arm, Patient Position: Sitting, BP Cuff Size: Adult)   Pulse 60   Temp 36.4 °C (97.5 °F) (Temporal)   Resp 16   Ht 1.626 m (5' 4\")   Wt 85.3 kg (188 lb)   SpO2 92%    BMI: Body mass index is 32.27 kg/m².  Physical Exam  Constitutional:       General: She is not in acute distress.     Appearance: Normal appearance. She is not toxic-appearing.   Eyes:      General: No scleral icterus.     Conjunctiva/sclera: Conjunctivae normal.   Cardiovascular:      Rate and Rhythm: Normal rate.      Pulses: Normal pulses.   Pulmonary:      Effort: No respiratory distress.      Breath sounds: No rhonchi.      Comments: Mildly decreased aeration bibasilar, prolonged expiratory phase, nasal cannula supplemental oxygen in place, no bronchoconstriction/wheezing  Skin:     General: Skin is warm and dry.   Neurological:      Gait: Gait abnormal.      Comments: Carrying a portable oxygen concentrator    Psychiatric:         Mood and Affect: Mood normal.         Behavior: Behavior normal.         Thought Content: Thought content normal.         Judgment: Judgment normal.             Assessment and Plan:   Salma is a 73 y.o. female with the following:  Problem List Items Addressed This Visit       Chronic obstructive pulmonary disease (HCC)     Chronic " ongoing issue, currently holding inhaler therapy and has not noticed any worsening of breathing such as bronchoconstriction.  She will call pulmonary medicine to establish care with one of the pulmonologists and make sure she has regular follow-up.  Breathing status feels comfortable otherwise.         CKD (chronic kidney disease) stage 3, GFR 30-59 ml/min ([facility])     Chronic ongoing problem, need to continue with regular blood evaluation of kidney function every 3 months to ensure stability.  To limit nephrotoxic agents and will focus on getting her diabetes under better control again.         Relevant Orders    HEMOGLOBIN A1C    Comp Metabolic Panel    Hyperparathyroidism due to renal insufficiency ([facility])     She has low vitamin D levels and elevated PTH and history of CKD, will initiate calcitriol 0.25 mcg daily.  Recheck levels in 3 months.         Relevant Medications    calcitRIOL (ROCALTROL) 0.25 MCG Cap    Other Relevant Orders    PTH INTACT (PTH ONLY)    Microalbuminuria due to type 2 diabetes mellitus ([facility])     Chronic ongoing issue, A1c has trended up again slightly, will plan to recheck urine microalbumin in the fall when we recheck her A1c.         Relevant Orders    POCT  A1C (Completed)    Diabetic Monofilament Lower Extremity Exam (Completed)    Type 2 diabetes mellitus with stage 3b chronic kidney disease, without long-term current use of insulin ([facility])     Chronic decompensated problem.  A1c has trended up with recent dietary discretion.  She is back on the Trulicity 1.5 mg weekly and is trying to take that consistently.  She will also reduce the sugar and carbohydrate intake.  We will plan to arrange for 3-month follow-up to recheck A1c to ensure it is trending back down.  She has occasional anemia which is why checked fructosamine which is consistent with A1c.  Foot exam completed in clinic today and reassuring.  Retinal exam done last month, formal records requested.         Vitamin D deficiency      She has low vitamin D levels and elevated PTH and history of CKD, will initiate calcitriol 0.25 mcg daily.  Recheck levels in 3 months.         Relevant Orders    VITAMIN D,25 HYDROXY (DEFICIENCY)     Other Visit Diagnoses       Need for diphtheria-tetanus-pertussis (Tdap) vaccine        Relevant Orders    Tdap =>6yo IM (Completed)            RTC: Return in about 3 months (around 8/1/2023).    I spent a total of 34 minutes with record review, exam, communication with the patient, communication with other providers, and documentation of this encounter.    PLEASE NOTE: This dictation was created using voice recognition software. I have made every reasonable attempt to correct obvious errors, but I expect that there are errors of grammar and possibly content that I did not discover before finalizing the note.      Leah Bonilla, DO  Geriatric and Internal Medicine  [facility]

## 2023-05-01 NOTE — ASSESSMENT & PLAN NOTE
Chronic ongoing issue, A1c has trended up again slightly, will plan to recheck urine microalbumin in the fall when we recheck her A1c.

## 2023-05-01 NOTE — ASSESSMENT & PLAN NOTE
Chronic ongoing problem, need to continue with regular blood evaluation of kidney function every 3 months to ensure stability.  To limit nephrotoxic agents and will focus on getting her diabetes under better control again.

## 2023-05-01 NOTE — ASSESSMENT & PLAN NOTE
She has low vitamin D levels and elevated PTH and history of CKD, will initiate calcitriol 0.25 mcg daily.  Recheck levels in 3 months.

## 2023-06-20 ENCOUNTER — DOCUMENTATION (OUTPATIENT)
Dept: HEALTH INFORMATION MANAGEMENT | Facility: OTHER | Age: 73
End: 2023-06-20
Payer: MEDICARE

## 2023-07-10 ENCOUNTER — ANTICOAGULATION VISIT (OUTPATIENT)
Dept: MEDICAL GROUP | Facility: MEDICAL CENTER | Age: 73
End: 2023-07-10
Payer: MEDICARE

## 2023-07-10 DIAGNOSIS — Z98.890 HISTORY OF MITRAL VALVE REPAIR: ICD-10-CM

## 2023-07-10 LAB — INR PPP: 2.5 (ref 2–3.5)

## 2023-07-10 PROCEDURE — 93793 ANTICOAG MGMT PT WARFARIN: CPT | Performed by: INTERNAL MEDICINE

## 2023-07-10 PROCEDURE — 85610 PROTHROMBIN TIME: CPT | Performed by: INTERNAL MEDICINE

## 2023-07-10 NOTE — PROGRESS NOTES
Anticoagulation Summary  As of 7/10/2023      INR goal:  2.0-3.0   TTR:  59.2 % (6.3 y)   INR used for dosin.50 (7/10/2023)   Warfarin maintenance plan:  7.5 mg (5 mg x 1.5) every Sun, e, Thu; 5 mg (5 mg x 1) all other days   Weekly warfarin total:  42.5 mg   Plan last modified:  Sakshi Mcconnell PharmD (2020)   Next INR check:  10/2/2023   Target end date:  Indefinite    Indications    Atrial fibrillation (HCC) [I48.91]  History of mitral valve repair [Z98.890]                 Anticoagulation Episode Summary       INR check location:      Preferred lab:      Send INR reminders to:      Comments:            Anticoagulation Care Providers       Provider Role Specialty Phone number    [email or web address] [email or web address] Referring Family Medicine [phone]    [facility] Responsible  [phone]    Joleen BoxD Responsible Pharmacy                   Refer to Patient Findings for HPI:  Patient Findings       Negatives:  Signs/symptoms of thrombosis, Signs/symptoms of bleeding, Laboratory test error suspected, Change in health, Change in alcohol use, Change in activity, Upcoming invasive procedure, Emergency department visit, Upcoming dental procedure, Missed doses, Extra doses, Change in medications, Change in diet/appetite, Hospital admission, Bruising, Other complaints            There were no vitals filed for this visit.  pt declined vitals    Verified current warfarin dosing schedule.    Medications reconciled   Pt is not on antiplatelet therapy      A/P   INR  -therapeutic.     Warfarin dosing recommendation: Continue current warfarin regimen as above without changes      Pt educated to contact our clinic with any changes in medications or s/s of bleeding or thrombosis. Pt is aware to seek immediate medical attention for falls, head injury or deep cuts.    Follow up appointment in 12 week(s).    Joleen HallmanD

## 2023-07-17 DIAGNOSIS — E11.69 DYSLIPIDEMIA ASSOCIATED WITH TYPE 2 DIABETES MELLITUS (HCC): ICD-10-CM

## 2023-07-17 DIAGNOSIS — E78.5 DYSLIPIDEMIA ASSOCIATED WITH TYPE 2 DIABETES MELLITUS (HCC): ICD-10-CM

## 2023-07-18 NOTE — PROGRESS NOTES
OP Anticoagulation Service Note    Date: 2022  There were no vitals filed for this visit.   pt declined vitals    Anticoagulation Summary  As of 2022    INR goal:  2.0-3.0   TTR:  54.1 % (5.1 y)   INR used for dosin.10 (2022)   Warfarin maintenance plan:  7.5 mg (5 mg x 1.5) every Sun, Tue, Thu; 5 mg (5 mg x 1) all other days   Weekly warfarin total:  42.5 mg   Plan last modified:  Sakshi Mcconnell, PharmD (2020)   Next INR check:  2022   Target end date:  Indefinite    Indications    Atrial fibrillation (HCC) [I48.91]  History of mitral valve repair [Z98.890]             Anticoagulation Episode Summary     INR check location:      Preferred lab:      Send INR reminders to:      Comments:        Anticoagulation Care Providers     Provider Role Specialty Phone number    Zeferino Almaraz A.P.N. Referring Family Medicine [phone]    [facility] Responsible  [phone]    Kenneth Salas, PharmD Responsible Pharmacy             HPI:   Salma Lees seen in clinic today, on anticoagulation therapy with warfarin (a high risk medication) for atrial fibrillation    Pt is here today to evaluate anticoagulation therapy    Previous INR was  2.1 on 22    Pt was instructed to continue current regimen    Anticoagulation Patient Findings  Patient Findings     Negatives:  Signs/symptoms of thrombosis, Signs/symptoms of bleeding, Laboratory test error suspected, Change in health, Change in alcohol use, Change in activity, Upcoming invasive procedure, Emergency department visit, Upcoming dental procedure, Missed doses, Extra doses, Change in medications, Change in diet/appetite, Hospital admission, Bruising, Other complaints          Confirmed warfarin dosing regimen    Pt is not on antiplatelet/NSAID therapy  Falls or accidents since last visit No        A/P   INR  -therapeutic today,  Continue current warfarin regimen        Dispensed Trulicity 1.5 mg #4 via  samples    Pt educated to contact our clinic with any changes in medications or s/s of bleeding or thrombosis. Pt is aware to seek immediate medical attention for falls, head injury or deep cuts    Follow up appointment in 10 week(s) to reduce risk of adverse events from warfarin  Sakshi Mcconnell, PharmD     Detail Level: Detailed

## 2023-08-03 ENCOUNTER — HOSPITAL ENCOUNTER (OUTPATIENT)
Dept: LAB | Facility: MEDICAL CENTER | Age: 73
End: 2023-08-03
Attending: INTERNAL MEDICINE
Payer: MEDICARE

## 2023-08-03 DIAGNOSIS — E55.9 VITAMIN D DEFICIENCY: ICD-10-CM

## 2023-08-03 DIAGNOSIS — N18.32 STAGE 3B CHRONIC KIDNEY DISEASE: ICD-10-CM

## 2023-08-03 DIAGNOSIS — E78.5 DYSLIPIDEMIA ASSOCIATED WITH TYPE 2 DIABETES MELLITUS (HCC): ICD-10-CM

## 2023-08-03 DIAGNOSIS — E11.69 DYSLIPIDEMIA ASSOCIATED WITH TYPE 2 DIABETES MELLITUS (HCC): ICD-10-CM

## 2023-08-03 DIAGNOSIS — N25.81 HYPERPARATHYROIDISM DUE TO RENAL INSUFFICIENCY (HCC): ICD-10-CM

## 2023-08-03 LAB
25(OH)D3 SERPL-MCNC: 19 NG/ML (ref 30–100)
ALBUMIN SERPL BCP-MCNC: 4.2 G/DL (ref 3.2–4.9)
ALBUMIN/GLOB SERPL: 1.2 G/DL
ALP SERPL-CCNC: 74 U/L (ref 30–99)
ALT SERPL-CCNC: 11 U/L (ref 2–50)
ANION GAP SERPL CALC-SCNC: 11 MMOL/L (ref 7–16)
AST SERPL-CCNC: 18 U/L (ref 12–45)
BASOPHILS # BLD AUTO: 0.7 % (ref 0–1.8)
BASOPHILS # BLD: 0.05 K/UL (ref 0–0.12)
BILIRUB SERPL-MCNC: 1.1 MG/DL (ref 0.1–1.5)
BUN SERPL-MCNC: 27 MG/DL (ref 8–22)
CALCIUM ALBUM COR SERPL-MCNC: 9.7 MG/DL (ref 8.5–10.5)
CALCIUM SERPL-MCNC: 9.9 MG/DL (ref 8.5–10.5)
CHLORIDE SERPL-SCNC: 102 MMOL/L (ref 96–112)
CO2 SERPL-SCNC: 25 MMOL/L (ref 20–33)
CREAT SERPL-MCNC: 1.43 MG/DL (ref 0.5–1.4)
EOSINOPHIL # BLD AUTO: 0.86 K/UL (ref 0–0.51)
EOSINOPHIL NFR BLD: 11.8 % (ref 0–6.9)
ERYTHROCYTE [DISTWIDTH] IN BLOOD BY AUTOMATED COUNT: 50.5 FL (ref 35.9–50)
EST. AVERAGE GLUCOSE BLD GHB EST-MCNC: 154 MG/DL
FASTING STATUS PATIENT QL REPORTED: NORMAL
GFR SERPLBLD CREATININE-BSD FMLA CKD-EPI: 38 ML/MIN/1.73 M 2
GLOBULIN SER CALC-MCNC: 3.4 G/DL (ref 1.9–3.5)
GLUCOSE SERPL-MCNC: 213 MG/DL (ref 65–99)
HBA1C MFR BLD: 7 % (ref 4–5.6)
HCT VFR BLD AUTO: 39.7 % (ref 37–47)
HGB BLD-MCNC: 12.4 G/DL (ref 12–16)
IMM GRANULOCYTES # BLD AUTO: 0.05 K/UL (ref 0–0.11)
IMM GRANULOCYTES NFR BLD AUTO: 0.7 % (ref 0–0.9)
LYMPHOCYTES # BLD AUTO: 0.69 K/UL (ref 1–4.8)
LYMPHOCYTES NFR BLD: 9.5 % (ref 22–41)
MCH RBC QN AUTO: 28.3 PG (ref 27–33)
MCHC RBC AUTO-ENTMCNC: 31.2 G/DL (ref 32.2–35.5)
MCV RBC AUTO: 90.6 FL (ref 81.4–97.8)
MONOCYTES # BLD AUTO: 0.44 K/UL (ref 0–0.85)
MONOCYTES NFR BLD AUTO: 6 % (ref 0–13.4)
NEUTROPHILS # BLD AUTO: 5.19 K/UL (ref 1.82–7.42)
NEUTROPHILS NFR BLD: 71.3 % (ref 44–72)
NRBC # BLD AUTO: 0 K/UL
NRBC BLD-RTO: 0 /100 WBC (ref 0–0.2)
PLATELET # BLD AUTO: 175 K/UL (ref 164–446)
PMV BLD AUTO: 12.3 FL (ref 9–12.9)
POTASSIUM SERPL-SCNC: 4.7 MMOL/L (ref 3.6–5.5)
PROT SERPL-MCNC: 7.6 G/DL (ref 6–8.2)
PTH-INTACT SERPL-MCNC: 48 PG/ML (ref 14–72)
RBC # BLD AUTO: 4.38 M/UL (ref 4.2–5.4)
SODIUM SERPL-SCNC: 138 MMOL/L (ref 135–145)
TSH SERPL DL<=0.005 MIU/L-ACNC: 2.41 UIU/ML (ref 0.38–5.33)
VIT B12 SERPL-MCNC: 361 PG/ML (ref 211–911)
WBC # BLD AUTO: 7.3 K/UL (ref 4.8–10.8)

## 2023-08-03 PROCEDURE — 82607 VITAMIN B-12: CPT

## 2023-08-03 PROCEDURE — 83036 HEMOGLOBIN GLYCOSYLATED A1C: CPT

## 2023-08-03 PROCEDURE — 84443 ASSAY THYROID STIM HORMONE: CPT

## 2023-08-03 PROCEDURE — 82306 VITAMIN D 25 HYDROXY: CPT

## 2023-08-03 PROCEDURE — 83970 ASSAY OF PARATHORMONE: CPT

## 2023-08-03 PROCEDURE — 80053 COMPREHEN METABOLIC PANEL: CPT

## 2023-08-03 PROCEDURE — 36415 COLL VENOUS BLD VENIPUNCTURE: CPT

## 2023-08-03 PROCEDURE — 85025 COMPLETE CBC W/AUTO DIFF WBC: CPT

## 2023-08-07 ENCOUNTER — OFFICE VISIT (OUTPATIENT)
Dept: MEDICAL GROUP | Facility: PHYSICIAN GROUP | Age: 73
End: 2023-08-07
Payer: MEDICARE

## 2023-08-07 ENCOUNTER — HOSPITAL ENCOUNTER (OUTPATIENT)
Dept: RADIOLOGY | Facility: MEDICAL CENTER | Age: 73
End: 2023-08-07
Attending: INTERNAL MEDICINE
Payer: MEDICARE

## 2023-08-07 VITALS
SYSTOLIC BLOOD PRESSURE: 120 MMHG | DIASTOLIC BLOOD PRESSURE: 50 MMHG | WEIGHT: 183.8 LBS | HEART RATE: 58 BPM | RESPIRATION RATE: 16 BRPM | BODY MASS INDEX: 31.55 KG/M2 | OXYGEN SATURATION: 95 % | TEMPERATURE: 97.5 F

## 2023-08-07 DIAGNOSIS — E11.22 TYPE 2 DIABETES MELLITUS WITH STAGE 3B CHRONIC KIDNEY DISEASE, WITHOUT LONG-TERM CURRENT USE OF INSULIN (HCC): ICD-10-CM

## 2023-08-07 DIAGNOSIS — R09.89 BRUIT: ICD-10-CM

## 2023-08-07 DIAGNOSIS — E11.29 MICROALBUMINURIA DUE TO TYPE 2 DIABETES MELLITUS (HCC): ICD-10-CM

## 2023-08-07 DIAGNOSIS — R80.9 MICROALBUMINURIA DUE TO TYPE 2 DIABETES MELLITUS (HCC): ICD-10-CM

## 2023-08-07 DIAGNOSIS — E11.69 DYSLIPIDEMIA ASSOCIATED WITH TYPE 2 DIABETES MELLITUS (HCC): ICD-10-CM

## 2023-08-07 DIAGNOSIS — G45.3 AMAUROSIS FUGAX OF LEFT EYE: ICD-10-CM

## 2023-08-07 DIAGNOSIS — N18.32 TYPE 2 DIABETES MELLITUS WITH STAGE 3B CHRONIC KIDNEY DISEASE, WITHOUT LONG-TERM CURRENT USE OF INSULIN (HCC): ICD-10-CM

## 2023-08-07 DIAGNOSIS — E78.5 DYSLIPIDEMIA ASSOCIATED WITH TYPE 2 DIABETES MELLITUS (HCC): ICD-10-CM

## 2023-08-07 DIAGNOSIS — E55.9 VITAMIN D DEFICIENCY: ICD-10-CM

## 2023-08-07 DIAGNOSIS — D72.10 EOSINOPHILIA, UNSPECIFIED TYPE: ICD-10-CM

## 2023-08-07 PROCEDURE — 3074F SYST BP LT 130 MM HG: CPT | Performed by: INTERNAL MEDICINE

## 2023-08-07 PROCEDURE — 3078F DIAST BP <80 MM HG: CPT | Performed by: INTERNAL MEDICINE

## 2023-08-07 PROCEDURE — 99214 OFFICE O/P EST MOD 30 MIN: CPT | Performed by: INTERNAL MEDICINE

## 2023-08-07 PROCEDURE — 93880 EXTRACRANIAL BILAT STUDY: CPT

## 2023-08-07 ASSESSMENT — FIBROSIS 4 INDEX: FIB4 SCORE: 2.29

## 2023-08-07 NOTE — ASSESSMENT & PLAN NOTE
Chronic ongoing problem, A1c doing much better, recheck urine microalbumin as this should also be improved.

## 2023-08-07 NOTE — ASSESSMENT & PLAN NOTE
New decompensated problem.  We will work to expedite carotid ultrasound evaluation to rule out contributing carotid stenosis due to recent monocular symptoms.

## 2023-08-07 NOTE — ASSESSMENT & PLAN NOTE
New problem, could be related to allergies and environmental exposures however will check stool ova and parasites to ensure there is nothing else contributing.

## 2023-08-07 NOTE — ASSESSMENT & PLAN NOTE
Chronic ongoing problem, PTH no longer elevated, continue calcitriol 0.25 mcg daily.  May need to adjust dose if deficiency is ongoing.

## 2023-08-07 NOTE — ASSESSMENT & PLAN NOTE
Chronic ongoing problem, LDL had increased on last check so she is trying to be more strict with her diet.  Continue atorvastatin 10 mg daily and update lipid panel before next visit in 3 months.

## 2023-08-07 NOTE — ASSESSMENT & PLAN NOTE
Chronic ongoing problem, A1c much better with consistent duty, continue Trulicity 1.5 mg weekly.  Discussed some of the postprandial stool urgency could be related to this medicine, she feels that she can tolerate okay for now.

## 2023-08-07 NOTE — ASSESSMENT & PLAN NOTE
New problem, with associated amaurosis fugax recommend stat ultrasound carotid artery to ensure there is no symptomatic stenosis that would necessitate urgent vascular surgery referral.

## 2023-08-07 NOTE — PROGRESS NOTES
Subjective:   Chief Complaint/History of Present Illness:  Salma Lees is a 73 y.o. female established patient who presents today to discuss medical problems as listed below. Salma is unaccompanied for today's visit.    Problem   Eosinophilia     Latest Reference Range & Units 04/25/23 11:58 08/03/23 11:29   Eosinophils 0.00 - 6.90 % 7.20 (H) 11.80 (H)   Eos (Absolute) 0.00 - 0.51 K/uL 0.57 (H) 0.86 (H)     She has mild eosinophilia on last 2 blood draws.  She has some history with allergies which has worsened since going off antihistamine.  No known parasitic exposure however that levels continuing to increase.  She also has some associated bowel changes unclear if that is related to medicine or something else.     Bruit    There is a bruit of the right carotid artery.  This was also noted by her cardiologist in May but she has been unable to get echocardiogram scheduled until October 2023.  She had a few episodes of amaurosis fugax which is very concerning, she is compliant with warfarin therapy.     Amaurosis Fugax of Left Eye    She reports episode of transient monocular blindness of the left eye.  She follows with an ophthalmologist but has not yet seen them for this problem.  She takes warfarin for history of paroxysmal atrial fibrillation.  New bruit of the right carotid artery noted by her cardiologist in May, concern for symptomatic carotid stenosis.     Vitamin D Deficiency     Latest Reference Range & Units 04/25/23 11:58   25-Hydroxy   Vitamin D 25 30 - 100 ng/mL 17 (L)     She has low running vitamin D levels, she reports she is taking calcitriol 0.25 mcg daily.     Microalbuminuria Due to Type 2 Diabetes Mellitus (Hcc)     Latest Reference Range & Units 10/19/22 11:35   Micro Alb Creat Ratio 0 - 30 mg/g 112 (H)     She has longstanding history of both diabetes and kidney disease.  Not currently taking ACE inhibitor or ARB due to reduced kidney function.  Recheck urine microalbumin now that  A1c is better.     Dyslipidemia Associated With Type 2 Diabetes Mellitus ([facility])     Latest Reference Range & Units 04/25/23 11:58   Cholesterol,Tot 100 - 199 mg/dL 181   Triglycerides 0 - 149 mg/dL 210 (H)   HDL >=40 mg/dL 42   LDL <100 mg/dL 97     She appreciated vague discomfort in her back and was worried that the atorvastatin was causing kidney irritation so she stopped taking her 20 mg dose in June 2021 and then added back 10 mg in September 2021.    Current regimen: Atorvastatin 10 mg daily     Type 2 Diabetes Mellitus With Stage 3b Chronic Kidney Disease, Without Long-Term Current Use of Insulin ([facility])     Latest Reference Range & Units 08/03/23 11:29   Glycohemoglobin 4.0 - 5.6 % 7.0 (H)   Estim. Avg Glu mg/dL 154     She has had diabetes since at least 2017 with A1c ranging 7-14. She did not tolerate metformin and has CKDIII/IV. She previously had microalbuminuria, currently resolved. No known neuropathy or retinopathy.    Current regimen: Trulicity 1.5 mg weekly on Sundays          Current Medications:  Current Outpatient Medications Ordered in Epic   Medication Sig Dispense Refill    calcitRIOL (ROCALTROL) 0.25 MCG Cap Take 1 Capsule by mouth every day. 100 Capsule 3    carvedilol (COREG) 25 MG Tab Take 1 Tablet by mouth 2 times a day. 200 Tablet 3    atorvastatin (LIPITOR) 10 MG Tab Take 1 Tablet by mouth every evening. 100 Tablet 3    digoxin (LANOXIN) 125 MCG Tab Take 1 Tablet by mouth every day. 100 Tablet 3    dilTIAZem (CARDIZEM) 30 MG Tab Take 1 Tablet by mouth 2 times a day. 200 Tablet 3    spironolactone (ALDACTONE) 25 MG Tab Take 1 Tablet by mouth every day. (Patient taking differently: Take 25 mg by mouth every day. Three times per week) 100 Tablet 3    warfarin (COUMADIN) 5 MG Tab Take 1 Tablet by mouth every day. take 1 and 1/2 tablets by mouth daily or as directed by anticoagulation clinic 135 Tablet 3    levalbuterol (XOPENEX HFA) 45 MCG/ACT inhaler Inhale 2 Puffs every four hours as needed  for Shortness of Breath. 1 Each 2    furosemide (LASIX) 20 MG Tab Take 1 Tablet by mouth every day. (Patient taking differently: Take 20 mg by mouth every day. 3x's per week) 100 Tablet 3    Dulaglutide 1.5 MG/0.5ML Solution Pen-injector Inject 0.5 mL under the skin every 7 days. Every Sunday 1 mL 0     No current Epic-ordered facility-administered medications on file.          Objective:   Physical Exam:    Vitals: /50 (BP Location: Left arm, Patient Position: Sitting, BP Cuff Size: Adult)   Pulse (!) 58   Temp 36.4 °C (97.5 °F) (Temporal)   Resp 16   Wt 83.4 kg (183 lb 12.8 oz)   SpO2 95%    BMI: Body mass index is 31.06 kg/m² (pended).  Physical Exam  Constitutional:       General: She is not in acute distress.     Appearance: Normal appearance. She is not ill-appearing.   HENT:      Right Ear: External ear normal. There is impacted cerumen.      Left Ear: External ear normal. There is impacted cerumen.   Eyes:      General: No scleral icterus.     Conjunctiva/sclera: Conjunctivae normal.   Cardiovascular:      Rate and Rhythm: Normal rate. Rhythm irregular.      Pulses: Normal pulses.      Heart sounds: Murmur heard.   Pulmonary:      Effort: Pulmonary effort is normal. No respiratory distress.      Breath sounds: Wheezing present.   Abdominal:      General: Bowel sounds are normal. There is no distension.      Palpations: Abdomen is soft.      Tenderness: There is no abdominal tenderness.   Musculoskeletal:      Right lower leg: No edema.      Left lower leg: No edema.      Comments: Mild fullness left axilla, nontender   Lymphadenopathy:      Cervical: Cervical adenopathy present.   Skin:     General: Skin is warm and dry.      Findings: No rash.   Neurological:      Gait: Gait normal.   Psychiatric:         Mood and Affect: Mood normal.         Behavior: Behavior normal.         Thought Content: Thought content normal.         Judgment: Judgment normal.               Assessment and Plan:   Salma  is a 73 y.o. female with the following:  Problem List Items Addressed This Visit       Amaurosis fugax of left eye     New decompensated problem.  We will work to expedite carotid ultrasound evaluation to rule out contributing carotid stenosis due to recent monocular symptoms.         Relevant Orders    US-CAROTID DOPPLER BILAT    Bruit     New problem, with associated amaurosis fugax recommend stat ultrasound carotid artery to ensure there is no symptomatic stenosis that would necessitate urgent vascular surgery referral.         Relevant Orders    US-CAROTID DOPPLER BILAT    Dyslipidemia associated with type 2 diabetes mellitus (HCC)     Chronic ongoing problem, LDL had increased on last check so she is trying to be more strict with her diet.  Continue atorvastatin 10 mg daily and update lipid panel before next visit in 3 months.         Relevant Orders    FREE THYROXINE    TSH    VITAMIN B12    VITAMIN D,25 HYDROXY (DEFICIENCY)    Lipid Profile    Comp Metabolic Panel    CBC WITH DIFFERENTIAL    Eosinophilia     New problem, could be related to allergies and environmental exposures however will check stool ova and parasites to ensure there is nothing else contributing.         Relevant Orders    Complete O&P    Microalbuminuria due to type 2 diabetes mellitus (HCC)     Chronic ongoing problem, A1c doing much better, recheck urine microalbumin as this should also be improved.         Type 2 diabetes mellitus with stage 3b chronic kidney disease, without long-term current use of insulin (HCC)     Chronic ongoing problem, A1c much better with consistent duty, continue Trulicity 1.5 mg weekly.  Discussed some of the postprandial stool urgency could be related to this medicine, she feels that she can tolerate okay for now.         Relevant Orders    HEMOGLOBIN A1C    MICROALBUMIN CREAT RATIO URINE    Vitamin D deficiency     Chronic ongoing problem, PTH no longer elevated, continue calcitriol 0.25 mcg daily.  May need  to adjust dose if deficiency is ongoing.               RTC: Return in about 3 months (around 11/7/2023).    I spent a total of 32 minutes with record review, exam, communication with the patient, communication with other providers, and documentation of this encounter.    PLEASE NOTE: This dictation was created using voice recognition software. I have made every reasonable attempt to correct obvious errors, but I expect that there are errors of grammar and possibly content that I did not discover before finalizing the note.      Leah Bonilla, DO  Geriatric and Internal Medicine  [facility]

## 2023-08-07 NOTE — LETTER
[employer]  Leah Bonilla D.O.  [address]  [place] [address]  Fax: [phone]   Authorization for Release/Disclosure of   Protected Health Information   Name: CHARLENE GARCIA : 1950 SSN: [id number]   Address: [address] [place] [address] [address]  [address]  [place] [address] Phone:    [phone] (home) [phone] (work)   I authorize the entity listed below to release/disclose the PHI below to:   [facility]/Leah Bonilla D.O. and Leah Bonilla D.O.   Provider or Entity Name:  Dr Boby Estrada  [address]   Phone:  [phone]    Fax:     Reason for request: continuity of care   Information to be released:    [  ] LAST COLONOSCOPY,  including any PATH REPORT and follow-up  [  ] LAST FIT/COLOGUARD RESULT [  ] LAST DEXA  [  ] LAST MAMMOGRAM  [  ] LAST PAP  [  ] LAST LABS [ X ] RETINA EXAM REPORT  [  ] IMMUNIZATION RECORDS  [  ] Release all info      [ X ] Check here and initial the line next to each item to release ALL health information INCLUDING  _____ Care and treatment for drug and / or alcohol abuse  _____ HIV testing, infection status, or AIDS  _____ Genetic Testing    DATES OF SERVICE OR TIME PERIOD TO BE DISCLOSED: _____________  I understand and acknowledge that:  * This Authorization may be revoked at any time by you in writing, except if your health information has already been used or disclosed.  * Your health information that will be used or disclosed as a result of you signing this authorization could be re-disclosed by the recipient. If this occurs, your re-disclosed health information may no longer be protected by State or Federal laws.  * You may refuse to sign this Authorization. Your refusal will not affect your ability to obtain treatment.  * This Authorization becomes effective upon signing and will  on (date) __________.      If no date is indicated, this Authorization will  one (1) year from  the signature date.    Name: Salma Lees  Signature: Date:   8/7/2023     PLEASE FAX REQUESTED RECORDS BACK TO: [phone]

## 2023-08-07 NOTE — LETTER
[facility]     August 7, 2023    Patient: Salma Lees   YOB: 1950   Date of Visit: 8/7/2023       To Whom It May Concern:    Salma Lees was seen and treated in our department on 8/7/2023.     Sincerely,     Natasha Santizo, Med Ass't

## 2023-08-16 ENCOUNTER — OFFICE VISIT (OUTPATIENT)
Dept: CARDIOLOGY | Facility: MEDICAL CENTER | Age: 73
End: 2023-08-16
Attending: INTERNAL MEDICINE
Payer: MEDICARE

## 2023-08-16 ENCOUNTER — TELEPHONE (OUTPATIENT)
Dept: CARDIOLOGY | Facility: PHYSICIAN GROUP | Age: 73
End: 2023-08-16
Payer: MEDICARE

## 2023-08-16 ENCOUNTER — TELEPHONE (OUTPATIENT)
Dept: CARDIOLOGY | Facility: MEDICAL CENTER | Age: 73
End: 2023-08-16
Payer: MEDICARE

## 2023-08-16 VITALS
DIASTOLIC BLOOD PRESSURE: 72 MMHG | SYSTOLIC BLOOD PRESSURE: 104 MMHG | OXYGEN SATURATION: 93 % | WEIGHT: 184 LBS | BODY MASS INDEX: 30.66 KG/M2 | HEIGHT: 65 IN | RESPIRATION RATE: 20 BRPM | HEART RATE: 162 BPM

## 2023-08-16 DIAGNOSIS — I48.21 PERMANENT ATRIAL FIBRILLATION (HCC): ICD-10-CM

## 2023-08-16 DIAGNOSIS — Z95.0 PACEMAKER: ICD-10-CM

## 2023-08-16 DIAGNOSIS — I10 BENIGN ESSENTIAL HYPERTENSION: ICD-10-CM

## 2023-08-16 DIAGNOSIS — Z98.890 HISTORY OF MITRAL VALVE REPAIR: ICD-10-CM

## 2023-08-16 DIAGNOSIS — I48.92 ATRIAL FLUTTER, UNSPECIFIED TYPE (HCC): ICD-10-CM

## 2023-08-16 DIAGNOSIS — D68.69 SECONDARY HYPERCOAGULABLE STATE (HCC): ICD-10-CM

## 2023-08-16 LAB — EKG IMPRESSION: NORMAL

## 2023-08-16 PROCEDURE — 93280 PM DEVICE PROGR EVAL DUAL: CPT | Mod: 26 | Performed by: INTERNAL MEDICINE

## 2023-08-16 PROCEDURE — 93005 ELECTROCARDIOGRAM TRACING: CPT | Performed by: INTERNAL MEDICINE

## 2023-08-16 PROCEDURE — 99204 OFFICE O/P NEW MOD 45 MIN: CPT | Mod: 25 | Performed by: INTERNAL MEDICINE

## 2023-08-16 PROCEDURE — 99213 OFFICE O/P EST LOW 20 MIN: CPT | Performed by: INTERNAL MEDICINE

## 2023-08-16 PROCEDURE — 93010 ELECTROCARDIOGRAM REPORT: CPT | Mod: 59 | Performed by: INTERNAL MEDICINE

## 2023-08-16 PROCEDURE — 93280 PM DEVICE PROGR EVAL DUAL: CPT | Performed by: INTERNAL MEDICINE

## 2023-08-16 PROCEDURE — 3074F SYST BP LT 130 MM HG: CPT | Performed by: INTERNAL MEDICINE

## 2023-08-16 PROCEDURE — 3078F DIAST BP <80 MM HG: CPT | Performed by: INTERNAL MEDICINE

## 2023-08-16 RX ORDER — AMIODARONE HYDROCHLORIDE 200 MG/1
200 TABLET ORAL DAILY
Qty: 90 TABLET | Refills: 3 | Status: SHIPPED
Start: 2023-08-16 | End: 2024-03-01

## 2023-08-16 ASSESSMENT — FIBROSIS 4 INDEX: FIB4 SCORE: 2.29

## 2023-08-16 NOTE — TELEPHONE ENCOUNTER
Kendra,    Can you please enter the orders for this procedure?    Afib/flutter ablation w/DARLENE    Thank You,  Gia

## 2023-08-16 NOTE — PROGRESS NOTES
"Chief Complaint   Patient presents with    New Patient     NP Dx: Atrial Flutter       Subjective     Salma Lees is a 73 y.o. female who presents today with recent generator change by me for sick sinus syndrome.  Insulation break on the RV lead pacing unipolar.  Frequent atrial tachycardia/flutter.  Sent by Gabi Block for evaluation.  In atrial tachycardia today 330 ms with one-to-one conduction.  Paced terminated today.  On Coumadin.  Original pacemaker at [place] 2014.  Mitral valve repair 2009 [place].  [place] device.    Past Medical History:   Diagnosis Date    A-fib (HCC)     Asthma     BMI 35.0-35.9,adult 11/12/2018    Breath shortness 09/07/2017    uses oxygen at 3 liters/min cont. with [facility]    Calculus of ureter 9/18/2017    Chronic anticoagulation 2/13/2017    Congestive heart failure (HCC)     COPD (chronic obstructive pulmonary disease) (HCC)     Diabetes ([facility])     Hypertension     Infectious disease 09/07/2017    UTI/hx. MRSA    Pacemaker 2014    Renal disorder 09/07/2017    has stent    Shortness of breath 10/18/2017     Past Surgical History:   Procedure Laterality Date    URETEROSCOPY Left 09/18/2017    Procedure: URETEROSCOPY;  Surgeon: Edgardo Richter M.D.;  Location: SURGERY [facility];  Service: Urology    LASERTRIPSY Left 09/18/2017    Procedure: LASERTRIPSY LITHO  ;  Surgeon: Edgardo Richter M.D.;  Location: [facility];  Service: Urology    STENT REMOVAL Left 09/18/2017    Procedure: STENT REMOVAL;  Surgeon: Edgardo Richter M.D.;  Location: SURGERY [facility];  Service: Urology    CYSTOSCOPY STENT PLACEMENT Left 06/25/2017    Procedure: CYSTOSCOPY, LEFT URETERAL STENT PLACEMENT;  Surgeon: Edgardo Richter M.D.;  Location: SURGERY [facility];  Service:     PACEMAKER INSERTION  01/01/2015    KNEE REPLACEMENT, TOTAL Left 01/01/2015    MITRAL VALVE REPAIR  01/01/2009    HYSTERECTOMY, VAGINAL  1994     Family " History   Problem Relation Age of Onset    Lung Disease Mother         asthma    Cancer Mother         ovarian    Heart Disease Father     Stroke Father     Thyroid Sister     Cancer Brother         pancreas    Cancer Paternal Grandmother         colon    Heart Disease Brother     No Known Problems Brother     No Known Problems Brother     Heart Disease Son         afib     Social History     Socioeconomic History    Marital status:      Spouse name: Not on file    Number of children: Not on file    Years of education: Not on file    Highest education level: Not on file   Occupational History    Not on file   Tobacco Use    Smoking status: Former     Packs/day: 1.00     Years: 5.00     Additional pack years: 0.00     Total pack years: 5.00     Types: Cigarettes     Quit date: 1990     Years since quittin.6    Smokeless tobacco: Never    Tobacco comments:     Continued abstinence   Vaping Use    Vaping Use: Never used   Substance and Sexual Activity    Alcohol use: No     Alcohol/week: 0.0 oz    Drug use: No    Sexual activity: Not Currently   Other Topics Concern    Not on file   Social History Narrative    She has a son. She had previously experience in [employer].     Social Determinants of Health     Financial Resource Strain: Not on file   Food Insecurity: Not on file   Transportation Needs: Not on file   Physical Activity: Not on file   Stress: Not on file   Social Connections: Not on file   Intimate Partner Violence: Not on file   Housing Stability: Not on file     Allergies   Allergen Reactions    Amoxicillin Anaphylaxis and Shortness of Breath    Nitrofurantoin     Pcn [Penicillins] Swelling    Amlodipine Besylate-Valsartan Unspecified     Chest pain and dizziness     Etodolac Hives and Nausea     Dark stools    Food Hives     Plums    Eliquis [Apixaban] Shortness of Breath     Pt reports SOB and dizzy when she took    Lisinopril Unspecified     Dizziness     Other Drug Rash     Metal silver     "Other Misc Runny Nose     Mold, dust, and feathers, adhesives, plums    Tape Rash     Paper tape ok     Outpatient Encounter Medications as of 8/16/2023   Medication Sig Dispense Refill    amiodarone (CORDARONE) 200 MG Tab Take 1 Tablet by mouth every day. 90 Tablet 3    calcitRIOL (ROCALTROL) 0.25 MCG Cap Take 1 Capsule by mouth every day. 100 Capsule 3    carvedilol (COREG) 25 MG Tab Take 1 Tablet by mouth 2 times a day. 200 Tablet 3    atorvastatin (LIPITOR) 10 MG Tab Take 1 Tablet by mouth every evening. 100 Tablet 3    digoxin (LANOXIN) 125 MCG Tab Take 1 Tablet by mouth every day. 100 Tablet 3    dilTIAZem (CARDIZEM) 30 MG Tab Take 1 Tablet by mouth 2 times a day. 200 Tablet 3    spironolactone (ALDACTONE) 25 MG Tab Take 1 Tablet by mouth every day. (Patient taking differently: Take 25 mg by mouth every day. Three times per week) 100 Tablet 3    warfarin (COUMADIN) 5 MG Tab Take 1 Tablet by mouth every day. take 1 and 1/2 tablets by mouth daily or as directed by anticoagulation clinic 135 Tablet 3    levalbuterol (XOPENEX HFA) 45 MCG/ACT inhaler Inhale 2 Puffs every four hours as needed for Shortness of Breath. 1 Each 2    furosemide (LASIX) 20 MG Tab Take 1 Tablet by mouth every day. (Patient taking differently: Take 20 mg by mouth every day. 3x's per week) 100 Tablet 3    Dulaglutide 1.5 MG/0.5ML Solution Pen-injector Inject 0.5 mL under the skin every 7 days. Every Sunday 1 mL 0     No facility-administered encounter medications on file as of 8/16/2023.     ROS           Objective     /72 (BP Location: Left arm, Patient Position: Sitting, BP Cuff Size: Adult)   Pulse (!) 162   Resp 20   Ht 1.638 m (5' 4.5\")   Wt 83.5 kg (184 lb)   SpO2 93%   BMI 31.10 kg/m²     Physical Exam  Constitutional:       Appearance: She is well-developed.   HENT:      Head: Normocephalic and atraumatic.   Eyes:      Pupils: Pupils are equal, round, and reactive to light.   Cardiovascular:      Rate and Rhythm: Regular " rhythm. Tachycardia present.      Heart sounds: Normal heart sounds. No murmur heard.     No friction rub. No gallop.   Pulmonary:      Effort: Pulmonary effort is normal.      Breath sounds: Normal breath sounds.   Abdominal:      General: Bowel sounds are normal.      Palpations: Abdomen is soft.   Musculoskeletal:         General: Normal range of motion.      Cervical back: Normal range of motion and neck supple.   Skin:     General: Skin is warm.   Neurological:      Mental Status: She is alert and oriented to person, place, and time.      Cranial Nerves: No cranial nerve deficit.   Psychiatric:         Behavior: Behavior normal.         Thought Content: Thought content normal.         Judgment: Judgment normal.                Assessment & Plan     1. History of mitral valve repair        2. Permanent atrial fibrillation (HCC)  EKG    amiodarone (CORDARONE) 200 MG Tab      3. Benign essential hypertension        4. Secondary hypercoagulable state (HCC)        5. Pacemaker            Medical Decision Making: Today's Assessment/Status/Plan:   1.  Atrial arrhythmias with atrial tachycardia/atrial flutter.  Start low-dose amiodarone.  Schedule catheter ablation.  Adjust Coumadin dosing to 5 mg each day.  INR in 1 to 2 weeks.  2.  Permanent pacemaker normal function.  Uni pacing on RV lead.  The risks, benefits,and alternatives to atrial fibrillation/flutter ablation with general anesthesia were discussed in great detail. Specific risks mentioned including bleeding, infection, arteriovenous fistula/pseudoaneurysm related to sheath placement, cardiac perforation with possible tamponade requiring pericardiocentesis or possible open heart surgery were discussed. In addition,we discussed atrial fibrillation ablation specific complications including pulmonary vein stenosis, left atrial perforation (2-4%), and nerve damage involving the recurrent laryngeal nerve, the vagus nerve with resulting gastroparesis, and the  phrenic nerve.  Also mentioned was a 1/400 (0.25%) occurrence of atrial esophageal fistula, which is an abnormal communication between the esophagus and the left atrium; this complication is often fatal. Lastly the risks of death, myocardial infarction, stroke, DVT and PE were discussed. The patient verbalized understanding of these potential complications and wishes to proceed with this procedure.   The risks, benefits, and alternatives to transesophageal echocardiogram with IV sedation were discussed with the patient in specific detail, including oropharyngeal and esophageal traumas including hoarseness and dysphagia after the procedure. Rare cases demonstrating serious or fatal complications associated with transesophageal echocardiogram have been reported in the adult population, including cardiac, pulmonary and bleeding complications in less than 1% of people. Patients with an identified intracardiac thrombus are at increased risk for embolic events and this appears to be reduced with anticoagulant therapy. The patient verbalized understandings about these  possible complications and wishes to proceed with this procedure

## 2023-08-16 NOTE — TELEPHONE ENCOUNTER
Patient started on amiodarone. Scheduled patient for INR at [facility] 8/28/23. Called patient LVM.    Joleen HallmanD

## 2023-08-16 NOTE — LETTER
"     [facility]   [address], [address]  [place] [address]  Phone: [phone]  Fax: [phone]              Salma Lees  1950    Encounter Date: 8/16/2023    Jhon Galloway M.D.          PROGRESS NOTE:  Chief Complaint   Patient presents with   • New Patient     NP Dx: Atrial Flutter       Subjective    Slama Lees is a 73 y.o. female who presents today with recent generator change by me for sick sinus syndrome.  Insulation break on the RV lead pacing unipolar.  Frequent atrial tachycardia/flutter.  Sent by Gabi Block for evaluation.  In atrial tachycardia today 330 ms with one-to-one conduction.  Paced terminated today.  On Coumadin.  Original pacemaker at [place] 2014.  Mitral valve repair 2009 [place].  [place] device.    Past Medical History:   Diagnosis Date   • A-fib ([facility])    • Asthma    • BMI 35.0-35.9,adult 11/12/2018   • Breath shortness 09/07/2017    uses oxygen at 3 liters/min cont. with [facility]   • Calculus of ureter 9/18/2017   • Chronic anticoagulation 2/13/2017   • Congestive heart failure ([facility])    • COPD (chronic obstructive pulmonary disease) ([facility])    • Diabetes ([facility])    • Hypertension    • Infectious disease 09/07/2017    UTI/hx. MRSA   • Pacemaker 2014   • Renal disorder 09/07/2017    has stent   • Shortness of breath 10/18/2017     Past Surgical History:   Procedure Laterality Date   • URETEROSCOPY Left 09/18/2017    Procedure: URETEROSCOPY;  Surgeon: Edgardo Richter M.D.;  Location: SURGERY [facility];  Service: Urology   • LASERTRIPSY Left 09/18/2017    Procedure: LASERTRIPSY LITHO  ;  Surgeon: Edgardo Richter M.D.;  Location: SURGERY [facility];  Service: Urology   • STENT REMOVAL Left 09/18/2017    Procedure: STENT REMOVAL;  Surgeon: Edgardo Richter M.D.;  Location: SURGERY [facility];  Service: Urology   • CYSTOSCOPY STENT " PLACEMENT Left 2017    Procedure: CYSTOSCOPY, LEFT URETERAL STENT PLACEMENT;  Surgeon: Edgardo Richter M.D.;  Location: SURGERY [facility];  Service:    • PACEMAKER INSERTION  2015   • KNEE REPLACEMENT, TOTAL Left 2015   • MITRAL VALVE REPAIR  2009   • HYSTERECTOMY, VAGINAL  1994     Family History   Problem Relation Age of Onset   • Lung Disease Mother         asthma   • Cancer Mother         ovarian   • Heart Disease Father    • Stroke Father    • Thyroid Sister    • Cancer Brother         pancreas   • Cancer Paternal Grandmother         colon   • Heart Disease Brother    • No Known Problems Brother    • No Known Problems Brother    • Heart Disease Son         afib     Social History     Socioeconomic History   • Marital status:      Spouse name: Not on file   • Number of children: Not on file   • Years of education: Not on file   • Highest education level: Not on file   Occupational History   • Not on file   Tobacco Use   • Smoking status: Former     Packs/day: 1.00     Years: 5.00     Additional pack years: 0.00     Total pack years: 5.00     Types: Cigarettes     Quit date: 1990     Years since quittin.6   • Smokeless tobacco: Never   • Tobacco comments:     Continued abstinence   Vaping Use   • Vaping Use: Never used   Substance and Sexual Activity   • Alcohol use: No     Alcohol/week: 0.0 oz   • Drug use: No   • Sexual activity: Not Currently   Other Topics Concern   • Not on file   Social History Narrative    She has a son. She had previously experience in [employer].     Social Determinants of Health     Financial Resource Strain: Not on file   Food Insecurity: Not on file   Transportation Needs: Not on file   Physical Activity: Not on file   Stress: Not on file   Social Connections: Not on file   Intimate Partner Violence: Not on file   Housing Stability: Not on file     Allergies   Allergen Reactions   • Amoxicillin Anaphylaxis and Shortness of Breath   •  Nitrofurantoin    • Pcn [Penicillins] Swelling   • Amlodipine Besylate-Valsartan Unspecified     Chest pain and dizziness    • Etodolac Hives and Nausea     Dark stools   • Food Hives     Plums   • Eliquis [Apixaban] Shortness of Breath     Pt reports SOB and dizzy when she took   • Lisinopril Unspecified     Dizziness    • Other Drug Rash     Metal silver   • Other Misc Runny Nose     Mold, dust, and feathers, adhesives, plums   • Tape Rash     Paper tape ok     Outpatient Encounter Medications as of 8/16/2023   Medication Sig Dispense Refill   • amiodarone (CORDARONE) 200 MG Tab Take 1 Tablet by mouth every day. 90 Tablet 3   • calcitRIOL (ROCALTROL) 0.25 MCG Cap Take 1 Capsule by mouth every day. 100 Capsule 3   • carvedilol (COREG) 25 MG Tab Take 1 Tablet by mouth 2 times a day. 200 Tablet 3   • atorvastatin (LIPITOR) 10 MG Tab Take 1 Tablet by mouth every evening. 100 Tablet 3   • digoxin (LANOXIN) 125 MCG Tab Take 1 Tablet by mouth every day. 100 Tablet 3   • dilTIAZem (CARDIZEM) 30 MG Tab Take 1 Tablet by mouth 2 times a day. 200 Tablet 3   • spironolactone (ALDACTONE) 25 MG Tab Take 1 Tablet by mouth every day. (Patient taking differently: Take 25 mg by mouth every day. Three times per week) 100 Tablet 3   • warfarin (COUMADIN) 5 MG Tab Take 1 Tablet by mouth every day. take 1 and 1/2 tablets by mouth daily or as directed by anticoagulation clinic 135 Tablet 3   • levalbuterol (XOPENEX HFA) 45 MCG/ACT inhaler Inhale 2 Puffs every four hours as needed for Shortness of Breath. 1 Each 2   • furosemide (LASIX) 20 MG Tab Take 1 Tablet by mouth every day. (Patient taking differently: Take 20 mg by mouth every day. 3x's per week) 100 Tablet 3   • Dulaglutide 1.5 MG/0.5ML Solution Pen-injector Inject 0.5 mL under the skin every 7 days. Every Sunday 1 mL 0     No facility-administered encounter medications on file as of 8/16/2023.     ROS           Objective    /72 (BP Location: Left arm, Patient Position:  "Sitting, BP Cuff Size: Adult)   Pulse (!) 162   Resp 20   Ht 1.638 m (5' 4.5\")   Wt 83.5 kg (184 lb)   SpO2 93%   BMI 31.10 kg/m²     Physical Exam  Constitutional:       Appearance: She is well-developed.   HENT:      Head: Normocephalic and atraumatic.   Eyes:      Pupils: Pupils are equal, round, and reactive to light.   Cardiovascular:      Rate and Rhythm: Regular rhythm. Tachycardia present.      Heart sounds: Normal heart sounds. No murmur heard.     No friction rub. No gallop.   Pulmonary:      Effort: Pulmonary effort is normal.      Breath sounds: Normal breath sounds.   Abdominal:      General: Bowel sounds are normal.      Palpations: Abdomen is soft.   Musculoskeletal:         General: Normal range of motion.      Cervical back: Normal range of motion and neck supple.   Skin:     General: Skin is warm.   Neurological:      Mental Status: She is alert and oriented to person, place, and time.      Cranial Nerves: No cranial nerve deficit.   Psychiatric:         Behavior: Behavior normal.         Thought Content: Thought content normal.         Judgment: Judgment normal.                Assessment & Plan    1. History of mitral valve repair        2. Permanent atrial fibrillation (HCC)  EKG    amiodarone (CORDARONE) 200 MG Tab      3. Benign essential hypertension        4. Secondary hypercoagulable state (HCC)        5. Pacemaker            Medical Decision Making: Today's Assessment/Status/Plan:   1.  Atrial arrhythmias with atrial tachycardia/atrial flutter.  Start low-dose amiodarone.  Schedule catheter ablation.  Adjust Coumadin dosing to 5 mg each day.  INR in 1 to 2 weeks.  2.  Permanent pacemaker normal function.  Uni pacing on RV lead.  The risks, benefits,and alternatives to atrial fibrillation/flutter ablation with general anesthesia were discussed in great detail. Specific risks mentioned including bleeding, infection, arteriovenous fistula/pseudoaneurysm related to sheath placement, " cardiac perforation with possible tamponade requiring pericardiocentesis or possible open heart surgery were discussed. In addition,we discussed atrial fibrillation ablation specific complications including pulmonary vein stenosis, left atrial perforation (2-4%), and nerve damage involving the recurrent laryngeal nerve, the vagus nerve with resulting gastroparesis, and the phrenic nerve.  Also mentioned was a 1/400 (0.25%) occurrence of atrial esophageal fistula, which is an abnormal communication between the esophagus and the left atrium; this complication is often fatal. Lastly the risks of death, myocardial infarction, stroke, DVT and PE were discussed. The patient verbalized understanding of these potential complications and wishes to proceed with this procedure.   The risks, benefits, and alternatives to transesophageal echocardiogram with IV sedation were discussed with the patient in specific detail, including oropharyngeal and esophageal traumas including hoarseness and dysphagia after the procedure. Rare cases demonstrating serious or fatal complications associated with transesophageal echocardiogram have been reported in the adult population, including cardiac, pulmonary and bleeding complications in less than 1% of people. Patients with an identified intracardiac thrombus are at increased risk for embolic events and this appears to be reduced with anticoagulant therapy. The patient verbalized understandings about these  possible complications and wishes to proceed with this procedure                          Darrian Prabhakar M.D.  [address] [facility] [place] [address]  Via Fax: [phone]

## 2023-08-16 NOTE — TELEPHONE ENCOUNTER
Needs a fib/flutter rfa. No meds to hold, Needs an INR in 1 to 2 weeks thru coumadin clinic. Please arrange. Just added josh.

## 2023-08-18 NOTE — TELEPHONE ENCOUNTER
Patient scheduled for afib/flutter ablation w/DARLENE on 11-8-23 with Dr. Galloway. Patient has been instructed to check in at 6:00 for 7:30 case time. Message sent to [email or web address]. EVER Alvarez with [employer]. Emailed [facility]

## 2023-08-28 ENCOUNTER — ANTICOAGULATION VISIT (OUTPATIENT)
Dept: MEDICAL GROUP | Facility: MEDICAL CENTER | Age: 73
End: 2023-08-28
Payer: MEDICARE

## 2023-08-28 VITALS — HEART RATE: 61 BPM | SYSTOLIC BLOOD PRESSURE: 136 MMHG | DIASTOLIC BLOOD PRESSURE: 55 MMHG

## 2023-08-28 DIAGNOSIS — Z98.890 HISTORY OF MITRAL VALVE REPAIR: ICD-10-CM

## 2023-08-28 DIAGNOSIS — I48.21 PERMANENT ATRIAL FIBRILLATION (HCC): ICD-10-CM

## 2023-08-28 LAB — INR PPP: 1.7 (ref 2–3.5)

## 2023-08-28 PROCEDURE — 3078F DIAST BP <80 MM HG: CPT | Performed by: PHYSICIAN ASSISTANT

## 2023-08-28 PROCEDURE — 3075F SYST BP GE 130 - 139MM HG: CPT | Performed by: PHYSICIAN ASSISTANT

## 2023-08-28 PROCEDURE — 85610 PROTHROMBIN TIME: CPT | Performed by: INTERNAL MEDICINE

## 2023-08-28 PROCEDURE — 99211 OFF/OP EST MAY X REQ PHY/QHP: CPT | Performed by: PHYSICIAN ASSISTANT

## 2023-08-28 NOTE — PROGRESS NOTES
Anticoagulation Summary  As of 2023      INR goal:  2.0-3.0   TTR:  59.2 % (6.4 y)   INR used for dosin.70 (2023)   Warfarin maintenance plan:  5 mg (5 mg x 1) every day   Weekly warfarin total:  35 mg   Plan last modified:  Joleen HoangD (2023)   Next INR check:  2023   Target end date:  Indefinite    Indications    Atrial fibrillation (HCC) [I48.91]  History of mitral valve repair [Z98.890]                 Anticoagulation Episode Summary       INR check location:      Preferred lab:      Send INR reminders to:      Comments:            Anticoagulation Care Providers       Provider Role Specialty Phone number    Zeferino Almaraz A.P.YASMANY. Referring Family Medicine [phone]    [facility]ticoagulation [facility] Responsible  [phone]    Kenneth Salas, PharmD Responsible Pharmacy           Anticoagulation Patient Findings  Patient Findings       Positives:  Missed doses (Reports missed dose on Sat), Change in medications (Initiated amiodarone 200 mg daily on 23)    Negatives:  Signs/symptoms of thrombosis, Signs/symptoms of bleeding, Laboratory test error suspected, Change in health, Change in alcohol use, Change in activity, Upcoming invasive procedure, Emergency department visit, Upcoming dental procedure, Extra doses, Change in diet/appetite, Hospital admission, Bruising, Other complaints             HPI:   Salma Lees seen in clinic today, on anticoagulation therapy with warfarin due to history of atrial fibrillation.    Patient's previous INR was therapeutic at 2.5 on 07/10/23, at which time patient was instructed to continue regimen.  Patient was seen by cardiology on 23 at which time amiodarone was initiated and patient was advised to decrease warfarin regimen to 5 mg daily. Patient returns to clinic today to recheck INR to ensure it is therapeutic and thus preventing possible clotting and/or bleeding/bruising complications.    CHADS-VASc =  4  (unadjusted ischemic stroke risk/year:  4%, which is low risk)    Does patient have any changes to current medical/health status since last appt: No  Does patient have any signs/symptoms of bleeding and/or thrombosis since the last appt: No  Does patient have any interval changes to diet or medications since last appt: Yes, as noted above  Are there any complications or cost restrictions with current therapy: No     Does patient have [facility] PCP? Yes, Leah Bonilla D.O. (If not, please document discussion that patient must be seen at [facility])     Vitals:   Vitals:    08/28/23 1321   BP: 136/55   Pulse: 61        Asssessment:      INR subtherapeutic at 1.7, therefore at increased risk of stroke or VTE  Reason(s) for out of range INR today:  likely due to missed dose. Regimen was pre-emptively adjusted upon initiation of warfarin which was appropriate.       Pt is not on antiplatelet therapy    Medication reconciliation completed today.    Plan:  Take 7.5 mg today, then Continue regimen as listed above.     Follow up:  Because warfarin is a high risk medication and current CHEST guidelines recommend regular monitoring intervals (few days up to 12 weeks), will have patient return to clinic in 2.5 weeks to recheck INR.    Michelle Cassidy, PharmD, BCACP

## 2023-08-29 NOTE — PROGRESS NOTES
"Pulmonary Clinic Note    Date of Visit: 9/12/2023     Chief Complaint:  No chief complaint on file.    HPI:   Salma Lees is a very pleasant 73 y.o. year old female former smoker (5 pack-years, quit in 1990), with a PMHx of COPD, chronic respiratory failure, CHF, A-fib, HTN, mitral valve repair who presented to the Pulmonary Clinic for a regular follow up. Last seen in the office on 10/5/2022 with HÉCTOR Jensen.     Patient is followed by the pulmonary office for COPD and chronic respiratory failure.  PFTs in 2022 show an FVC of 2.08 L or 75%, FEV1 1.27 L or 59%, FEV1/FVC 61.14%, %, %, DLCO 59% predicted, without positive bronchodilator response.  Patient does not have any recent imaging.  Patient is currently on Anoro and Xopenex as needed.  Patient also uses 2 LPM 24/7.    Interval events:  9/12/2023-  ***      Exacerbations this year:    Current medication regimen: Anoro and Xopenex    Oxygen use: 2 LPM 24/7    MMRC Grade:   0- Breathless only during strenuous exercise  1- Short of breath when hurrying or going up a small hill  2- Walks slower than friends due to breathlessness, has to stop at own pace  3- Stops to catch breath on level ground after 100m  4- Breathless with ambulating around house or ADLs        Past Medical History:   Diagnosis Date    A-fib (HCC)     Asthma     BMI 35.0-35.9,adult 11/12/2018    Breath shortness 09/07/2017    uses oxygen at 3 liters/min cont. with \"[facility]    Calculus of ureter 9/18/2017    Chronic anticoagulation 2/13/2017    Congestive heart failure (HCC)     COPD (chronic obstructive pulmonary disease) (HCC)     Diabetes (HCC)     Hypertension     Infectious disease 09/07/2017    UTI/hx. MRSA    Pacemaker 2014    Renal disorder 09/07/2017    has stent    Shortness of breath 10/18/2017     Past Surgical History:   Procedure Laterality Date    URETEROSCOPY Left 09/18/2017    Procedure: URETEROSCOPY;  Surgeon: Edgardo Richter M.D.;  " Location: SURGERY [facility];  Service: Urology    LASERTRIPSY Left 2017    Procedure: LASERTRIPSY LITHO  ;  Surgeon: Edgardo Richter M.D.;  Location: SURGERY [facility];  Service: Urology    STENT REMOVAL Left 2017    Procedure: STENT REMOVAL;  Surgeon: Edgardo Richter M.D.;  Location: SURGERY [facility];  Service: Urology    CYSTOSCOPY STENT PLACEMENT Left 2017    Procedure: CYSTOSCOPY, LEFT URETERAL STENT PLACEMENT;  Surgeon: Edgardo Richter M.D.;  Location: SURGERY [facility];  Service:     PACEMAKER INSERTION  2015    KNEE REPLACEMENT, TOTAL Left 2015    MITRAL VALVE REPAIR  2009    HYSTERECTOMY, VAGINAL  1994     Social History     Socioeconomic History    Marital status:      Spouse name: Not on file    Number of children: Not on file    Years of education: Not on file    Highest education level: Not on file   Occupational History    Not on file   Tobacco Use    Smoking status: Former     Current packs/day: 0.00     Average packs/day: 1 pack/day for 5.0 years (5.0 ttl pk-yrs)     Types: Cigarettes     Start date: 1985     Quit date: 1990     Years since quittin.6    Smokeless tobacco: Never    Tobacco comments:     Continued abstinence   Vaping Use    Vaping Use: Never used   Substance and Sexual Activity    Alcohol use: No     Alcohol/week: 0.0 oz    Drug use: No    Sexual activity: Not Currently   Other Topics Concern    Not on file   Social History Narrative    She has a son. She had previously experience in [employer].     Social Determinants of Health     Financial Resource Strain: Not on file   Food Insecurity: Not on file   Transportation Needs: Not on file   Physical Activity: Not on file   Stress: Not on file   Social Connections: Not on file   Intimate Partner Violence: Not on file   Housing Stability: Not on file        Family History   Problem Relation Age of Onset    Lung Disease Mother         asthma     Cancer Mother         ovarian    Heart Disease Father     Stroke Father     Thyroid Sister     Cancer Brother         pancreas    Cancer Paternal Grandmother         colon    Heart Disease Brother     No Known Problems Brother     No Known Problems Brother     Heart Disease Son         afib     Current Outpatient Medications on File Prior to Visit   Medication Sig Dispense Refill    amiodarone (CORDARONE) 200 MG Tab Take 1 Tablet by mouth every day. 90 Tablet 3    calcitRIOL (ROCALTROL) 0.25 MCG Cap Take 1 Capsule by mouth every day. 100 Capsule 3    carvedilol (COREG) 25 MG Tab Take 1 Tablet by mouth 2 times a day. 200 Tablet 3    atorvastatin (LIPITOR) 10 MG Tab Take 1 Tablet by mouth every evening. 100 Tablet 3    digoxin (LANOXIN) 125 MCG Tab Take 1 Tablet by mouth every day. 100 Tablet 3    dilTIAZem (CARDIZEM) 30 MG Tab Take 1 Tablet by mouth 2 times a day. 200 Tablet 3    spironolactone (ALDACTONE) 25 MG Tab Take 1 Tablet by mouth every day. (Patient taking differently: Take 25 mg by mouth every day. Three times per week) 100 Tablet 3    warfarin (COUMADIN) 5 MG Tab Take 1 Tablet by mouth every day. take 1 and 1/2 tablets by mouth daily or as directed by anticoagulation clinic 135 Tablet 3    levalbuterol (XOPENEX HFA) 45 MCG/ACT inhaler Inhale 2 Puffs every four hours as needed for Shortness of Breath. 1 Each 2    furosemide (LASIX) 20 MG Tab Take 1 Tablet by mouth every day. (Patient taking differently: Take 20 mg by mouth every day. 3x's per week) 100 Tablet 3    Dulaglutide 1.5 MG/0.5ML Solution Pen-injector Inject 0.5 mL under the skin every 7 days. Every Sunday 1 mL 0     No current facility-administered medications on file prior to visit.     Allergies: Amoxicillin, Nitrofurantoin, Pcn [penicillins], Amlodipine besylate-valsartan, Etodolac, Food, Eliquis [apixaban], Lisinopril, Other drug, Other misc, and Tape    ROS:   ROS    Vitals:  There were no vitals taken for this visit.    Physical  Exam:  Physical Exam    Laboratory Data:  Multioximetry (Date: ***)-       PFTs: (Date: 4/5/2022)-      Impression:  1.  There is a moderately severe obstructive ventilatory defect evidenced on   spirometry.  There is no significant response to bronchodilators.  2.  Lung volumes are consistent with air trapping and hyperinflation,   evidenced by the elevated RV and RV/TLC ratio.  3.  There is a moderate diffusion impairment.  The reduced DLCO and DL/VA   ratio are consistent with pulmonary emphysema, pulmonary vascular disease or   interstitial lung disease.  4.  Flow volume loop is consistent with the above interpretation of an   obstructive ventilatory defect.  5.  Compared to prior testing in 2020, the FEV1 has declined slightly from   1.41 liters to 1.27 L or 59% predicted.      Assessment and Plan:    Problem List Items Addressed This Visit    None      Diagnostic studies have been reviewed with the patient.    No follow-ups on file.     This note was generated using voice recognition software which has a chance of producing errors of grammar and possibly content.  I have made every reasonable attempt to find and correct any obvious errors, but it should be expected that some may not be found prior to finalization of this note.    Time spent in record review prior to patient arrival, reviewing results, and in face-to-face encounter totaled *** min.  __________  SHANNON Javier  Pulmonary Medicine  [facility]

## 2023-08-30 ENCOUNTER — APPOINTMENT (OUTPATIENT)
Dept: ADMISSIONS | Facility: MEDICAL CENTER | Age: 73
End: 2023-08-30
Attending: INTERNAL MEDICINE
Payer: MEDICARE

## 2023-09-12 ENCOUNTER — APPOINTMENT (OUTPATIENT)
Dept: SLEEP MEDICINE | Facility: MEDICAL CENTER | Age: 73
End: 2023-09-12
Payer: MEDICARE

## 2023-09-14 ENCOUNTER — ANTICOAGULATION VISIT (OUTPATIENT)
Dept: MEDICAL GROUP | Facility: MEDICAL CENTER | Age: 73
End: 2023-09-14
Payer: MEDICARE

## 2023-09-14 DIAGNOSIS — I48.21 PERMANENT ATRIAL FIBRILLATION (HCC): ICD-10-CM

## 2023-09-14 DIAGNOSIS — Z98.890 HISTORY OF MITRAL VALVE REPAIR: ICD-10-CM

## 2023-09-14 LAB — INR PPP: 1.9 (ref 2–3.5)

## 2023-09-14 PROCEDURE — 85610 PROTHROMBIN TIME: CPT | Performed by: NURSE PRACTITIONER

## 2023-09-14 PROCEDURE — 99211 OFF/OP EST MAY X REQ PHY/QHP: CPT | Performed by: FAMILY MEDICINE

## 2023-09-14 NOTE — PROGRESS NOTES
OP Anticoagulation Service Note    Date: 2023    Anticoagulation Summary  As of 2023      INR goal:  2.0-3.0   TTR:  58.8 % (6.5 y)   INR used for dosin.90 (2023)   Warfarin maintenance plan:  7.5 mg (5 mg x 1.5) every Thu; 5 mg (5 mg x 1) all other days   Weekly warfarin total:  37.5 mg   Plan last modified:  Souleymane Goldman, PharmD (2023)   Next INR check:  10/2/2023   Target end date:  Indefinite    Indications    Atrial fibrillation (HCC) [I48.91]  History of mitral valve repair [Z98.890]                 Anticoagulation Episode Summary       INR check location:      Preferred lab:      Send INR reminders to:      Comments:            Anticoagulation Care Providers       Provider Role Specialty Phone number    LILIYA Pearson Referring Family Medicine [phone]    [facility] Anticoagulation Services Responsible  [phone]    Kenneth Salas, PharmD Responsible Pharmacy           Anticoagulation Patient Findings  Patient Findings       Positives:  Missed doses    Negatives:  Signs/symptoms of thrombosis, Signs/symptoms of bleeding, Laboratory test error suspected, Change in health, Change in alcohol use, Change in activity, Upcoming invasive procedure, Emergency department visit, Upcoming dental procedure, Extra doses, Change in medications, Change in diet/appetite, Hospital admission, Bruising, Other complaints            HPI:   CHARLENE GARCIA is in the Anticoagulation Clinic today for an INR check on their anticoagulation therapy.     The reason for today's visit is to prevent morbidity and mortality from a blood clot and/or stroke and to reduce the risk of bleeding while on a anticoagulant.     PCP:  Leah Bonilla D.O.  [address]  [place] [id number] [phone]    3 vitals included with today's appt-unless patient declined:  (BP, HR, weight, ht, RR)   There were no vitals filed for this visit.    Verified current warfarin dosing schedule.    Medications  reconciled:   Pt is not on antiplatelet therapy    Assessment:   INR is subtherapeutic    Plan:  Instructed pt to begin newly increased regimen of 7.5 mg Thurs and 5 mg ROW.    Follow up:  Follow up appointment in 2.5 week(s).       Other info:  Pt educated to contact our clinic with any changes in medications or s/s of bleeding or thrombosis.  Education was provided today regarding tips to reduce their bleed risk and dietary constraints while on a anticoagulant.    National Recommendations:  The CHEST guidelines recommends frequent INR monitoring at regular intervals (a few days up to a max of 12 weeks) to ensure patients are on the proper dose of warfarin, and patients are not having any complications from therapy.  INRs can dramatically change over a short time period due to diet, medications, and medical conditions.     Souleymane Goldman, PharmD, BCACP    [facility]  Phone [phone] fax [phone]

## 2023-09-19 ENCOUNTER — TELEPHONE (OUTPATIENT)
Dept: MEDICAL GROUP | Facility: PHYSICIAN GROUP | Age: 73
End: 2023-09-19
Payer: MEDICARE

## 2023-09-20 ENCOUNTER — APPOINTMENT (OUTPATIENT)
Dept: MEDICAL GROUP | Facility: PHYSICIAN GROUP | Age: 73
End: 2023-09-20
Payer: MEDICARE

## 2023-09-20 NOTE — TELEPHONE ENCOUNTER
Can we add her on tomorrow? She doesn't have to wait til 5 PM to come in, can let me know her availability and can see her sooner to get urine test and medicine sent in if needed

## 2023-09-20 NOTE — TELEPHONE ENCOUNTER
Patient LVM about having a UTI, starting today in the AM. Urinary urgency. No burning, abdominal or low back pain. Confirmed pharmacy on file.

## 2023-09-25 NOTE — PROGRESS NOTES
OP Anticoagulation Service Note    Date: 2021  There were no vitals filed for this visit.  pt declined vitals    Anticoagulation Summary  As of 2021    INR goal:  2.0-3.0   TTR:  49.7 % (4.2 y)   INR used for dosin.90 (2021)   Warfarin maintenance plan:  7.5 mg (5 mg x 1.5) every Sun, Tue, Thu; 5 mg (5 mg x 1) all other days   Weekly warfarin total:  42.5 mg   Plan last modified:  Sakshi Mcconnell, PharmD (2020)   Next INR check:  2021   Target end date:  Indefinite    Indications    Atrial fibrillation (HCC) [I48.91]  History of mitral valve repair [Z98.890]             Anticoagulation Episode Summary     INR check location:      Preferred lab:      Send INR reminders to:      Comments:        Anticoagulation Care Providers     Provider Role Specialty Phone number    Zeferino Almaraz A.P.N. Referring [facility] Medicine [phone]    [facility] Anticoagulation Services Responsible  [phone]    Kenneth Salas, PharmD Responsible          Anticoagulation Patient Findings      HPI:   Salma Lees seen in clinic today, on anticoagulation therapy with warfarin (a high risk medication) for Atrial Fibrillation; s/p mitral valve repair      Pt is here today to evaluate anticoagulation therapy    Previous INR was  3.3 on 21    Pt was instructed to hold dose 1x then continue regimen    Confirmed warfarin dosing regimen, denies missed or extra doses of coumadin.   Diet has been consistent with foods rich in vitamin K: Yes  Changes in ETOH:  No  Changes in smoking status: No  Changes in medication: No   Pt is not on antiplatelet/NSAID therapy  Pt NOT on antiplatelet therapy   Cost restriction: No  S/s of bleeding:  No  Falls or accidents since last visit No  Signs/symptoms  thrombosis since the last appt: No      A/P   INR  therapeutic today  Pt is to continue with current warfarin dosing regimen.        check referral    Pt educated to contact our clinic with any changes in  medications or s/s of bleeding or thrombosis. Pt is aware to seek immediate medical attention for falls, head injury or deep cuts    Follow up appointment in 4 week(s) to reduce risk of adverse events from warfarin  Joseph Herrera, JoleenD     1

## 2023-10-02 ENCOUNTER — ANTICOAGULATION VISIT (OUTPATIENT)
Dept: MEDICAL GROUP | Facility: MEDICAL CENTER | Age: 73
End: 2023-10-02
Payer: MEDICARE

## 2023-10-02 DIAGNOSIS — I48.21 PERMANENT ATRIAL FIBRILLATION (HCC): ICD-10-CM

## 2023-10-02 DIAGNOSIS — Z98.890 HISTORY OF MITRAL VALVE REPAIR: ICD-10-CM

## 2023-10-02 LAB — INR PPP: 4.3 (ref 2–3.5)

## 2023-10-02 PROCEDURE — 99211 OFF/OP EST MAY X REQ PHY/QHP: CPT | Performed by: STUDENT IN AN ORGANIZED HEALTH CARE EDUCATION/TRAINING PROGRAM

## 2023-10-02 PROCEDURE — 85610 PROTHROMBIN TIME: CPT | Performed by: INTERNAL MEDICINE

## 2023-10-02 NOTE — PROGRESS NOTES
Anticoagulation Summary  As of 10/2/2023      INR goal:  2.0-3.0   TTR:  58.7 % (6.5 y)   INR used for dosin.30 (10/2/2023)   Warfarin maintenance plan:  5 mg (5 mg x 1) every day   Weekly warfarin total:  35 mg   Plan last modified:  Ivy Galloway PharmD (10/2/2023)   Next INR check:  10/9/2023   Target end date:  Indefinite    Indications    Atrial fibrillation (HCC) [I48.91]  History of mitral valve repair [Z98.890]                 Anticoagulation Episode Summary       INR check location:      Preferred lab:      Send INR reminders to:      Comments:            Anticoagulation Care Providers       Provider Role Specialty Phone number    Zeferino Almaraz, A.P.N. Referring Family Medicine [phone]    [facility] Anticoagulation [facility] Responsible  [phone]    Kenneth Salas, PharmD Responsible Pharmacy                   Refer to Patient Findings for HPI:  Patient Findings       Positives:  Change in diet/appetite (decreased amount of salads w/ weather change)    Negatives:  Signs/symptoms of thrombosis, Signs/symptoms of bleeding, Laboratory test error suspected, Change in health, Change in alcohol use, Change in activity, Upcoming invasive procedure, Emergency department visit, Upcoming dental procedure, Missed doses, Extra doses, Change in medications, Hospital admission, Bruising, Other complaints            There were no vitals filed for this visit.  pt declined vitals    Verified current warfarin dosing schedule.    Medications reconciled: No  Pt is not on antiplatelet therapy.      A/P   INR is supratherapeutic    Warfarin dosing recommendation: Hold tomorrow 5 mg, then reduce weekly reigmen to 5 mg daily.     Pt educated to contact our clinic with any changes in medications or s/s of bleeding or thrombosis. Pt is aware to seek immediate medical attention for falls, head injury or deep cuts.    Follow up appointment in 1 week(s).    Ivy Galloway, JoleenD

## 2023-10-09 ENCOUNTER — APPOINTMENT (OUTPATIENT)
Dept: MEDICAL GROUP | Facility: MEDICAL CENTER | Age: 73
End: 2023-10-09
Payer: MEDICARE

## 2023-10-09 ENCOUNTER — PRE-ADMISSION TESTING (OUTPATIENT)
Dept: ADMISSIONS | Facility: MEDICAL CENTER | Age: 73
End: 2023-10-09
Attending: INTERNAL MEDICINE
Payer: MEDICARE

## 2023-10-09 DIAGNOSIS — Z01.812 PRE-OPERATIVE LABORATORY EXAMINATION: ICD-10-CM

## 2023-10-16 ENCOUNTER — ANTICOAGULATION VISIT (OUTPATIENT)
Dept: MEDICAL GROUP | Facility: MEDICAL CENTER | Age: 73
End: 2023-10-16
Payer: MEDICARE

## 2023-10-16 ENCOUNTER — TELEPHONE (OUTPATIENT)
Dept: HEALTH INFORMATION MANAGEMENT | Facility: OTHER | Age: 73
End: 2023-10-16

## 2023-10-16 DIAGNOSIS — I48.21 PERMANENT ATRIAL FIBRILLATION (HCC): ICD-10-CM

## 2023-10-16 DIAGNOSIS — Z98.890 HISTORY OF MITRAL VALVE REPAIR: ICD-10-CM

## 2023-10-16 LAB — INR PPP: 3.1 (ref 2–3.5)

## 2023-10-16 PROCEDURE — 99211 OFF/OP EST MAY X REQ PHY/QHP: CPT | Performed by: STUDENT IN AN ORGANIZED HEALTH CARE EDUCATION/TRAINING PROGRAM

## 2023-10-16 PROCEDURE — 85610 PROTHROMBIN TIME: CPT | Performed by: INTERNAL MEDICINE

## 2023-10-16 NOTE — PROGRESS NOTES
Anticoagulation Summary  As of 10/16/2023      INR goal:  2.0-3.0   TTR:  58.3 % (6.6 y)   INR used for dosing:  3.10 (10/16/2023)   Warfarin maintenance plan:  5 mg (5 mg x 1) every day   Weekly warfarin total:  35 mg   Plan last modified:  Ivy Galloway PharmD (10/2/2023)   Next INR check:  10/23/2023   Target end date:  Indefinite    Indications    Atrial fibrillation (HCC) [I48.91]  History of mitral valve repair [Z98.890]                 Anticoagulation Episode Summary       INR check location:      Preferred lab:      Send INR reminders to:      Comments:            Anticoagulation Care Providers       Provider Role Specialty Phone number    Zeferino Almaraz, A.P.N. Referring Family Medicine [phone]    [facility] Responsible  [phone]    Kenneth Salas, PharmD Responsible Pharmacy                   Refer to Patient Findings for HPI:  Patient Findings       Positives:  Change in diet/appetite (eating more greens)    Negatives:  Signs/symptoms of thrombosis, Signs/symptoms of bleeding, Laboratory test error suspected, Change in health, Change in alcohol use, Change in activity, Upcoming invasive procedure, Emergency department visit, Upcoming dental procedure, Missed doses, Extra doses, Change in medications, Hospital admission, Bruising, Other complaints            There were no vitals filed for this visit.  pt declined vitals    Verified current warfarin dosing schedule.    Medications reconciled: No  Pt is not on antiplatelet therapy.      A/P   INR is supratherapeutic    Warfarin dosing recommendation: Take 2.5 mg tomorrow, then Continue current warfarin regimen as above without changes      Pt educated to contact our clinic with any changes in medications or s/s of bleeding or thrombosis. Pt is aware to seek immediate medical attention for falls, head injury or deep cuts.    Follow up appointment in 1 week(s).    Ivy Galloway PharmD

## 2023-10-18 ENCOUNTER — OFFICE VISIT (OUTPATIENT)
Dept: SLEEP MEDICINE | Facility: MEDICAL CENTER | Age: 73
End: 2023-10-18
Attending: STUDENT IN AN ORGANIZED HEALTH CARE EDUCATION/TRAINING PROGRAM
Payer: MEDICARE

## 2023-10-18 VITALS
HEIGHT: 64 IN | OXYGEN SATURATION: 93 % | WEIGHT: 180 LBS | BODY MASS INDEX: 30.73 KG/M2 | DIASTOLIC BLOOD PRESSURE: 54 MMHG | HEART RATE: 60 BPM | SYSTOLIC BLOOD PRESSURE: 142 MMHG

## 2023-10-18 DIAGNOSIS — Z99.81 CHRONIC RESPIRATORY FAILURE WITH HYPOXIA, ON HOME OXYGEN THERAPY (HCC): ICD-10-CM

## 2023-10-18 DIAGNOSIS — J96.11 CHRONIC RESPIRATORY FAILURE WITH HYPOXIA, ON HOME OXYGEN THERAPY (HCC): ICD-10-CM

## 2023-10-18 PROCEDURE — 99417 PROLNG OP E/M EACH 15 MIN: CPT | Performed by: STUDENT IN AN ORGANIZED HEALTH CARE EDUCATION/TRAINING PROGRAM

## 2023-10-18 PROCEDURE — 99215 OFFICE O/P EST HI 40 MIN: CPT | Performed by: STUDENT IN AN ORGANIZED HEALTH CARE EDUCATION/TRAINING PROGRAM

## 2023-10-18 PROCEDURE — 3077F SYST BP >= 140 MM HG: CPT | Performed by: STUDENT IN AN ORGANIZED HEALTH CARE EDUCATION/TRAINING PROGRAM

## 2023-10-18 PROCEDURE — 99212 OFFICE O/P EST SF 10 MIN: CPT | Performed by: STUDENT IN AN ORGANIZED HEALTH CARE EDUCATION/TRAINING PROGRAM

## 2023-10-18 PROCEDURE — 3078F DIAST BP <80 MM HG: CPT | Performed by: STUDENT IN AN ORGANIZED HEALTH CARE EDUCATION/TRAINING PROGRAM

## 2023-10-18 ASSESSMENT — FIBROSIS 4 INDEX: FIB4 SCORE: 2.29

## 2023-10-20 ASSESSMENT — ENCOUNTER SYMPTOMS
SHORTNESS OF BREATH: 1
EYE DISCHARGE: 0
EYE REDNESS: 0
VOMITING: 0
FEVER: 0
FOCAL WEAKNESS: 0
CHILLS: 0
ABDOMINAL PAIN: 0

## 2023-10-21 NOTE — PROGRESS NOTES
"Pulmonary Clinic Note    Chief Complaint:  Chief Complaint   Patient presents with    Follow-Up     Last seen 10/5/22 with Peggy Shannon for COPD       HPI:   Salma Lees is a very pleasant 73 y.o. female with history of tobacco smoking (5pkyr, quit 1990), secondhand smoke exposure, chronic hypoxic respiratory failure, atrial fibrillation, MV repair, HTN, CHF, CKD, asthma and pulmonary hypertension who presents to pulmonary clinic for follow-up.  She was previously managed by Yuli Shannon.     Patient reports ongoing dyspnea with exertion. She is currently on Anoro, prn xopenex which she rarely uses. She sees cardiology for her CHF and pulmonary hypertension - currently on diuretic regimen.  Patient reports history of Fen-Phen for several years when she was younger.       Past Medical History:   Diagnosis Date    A-fib (HCC)     Asthma     BMI 35.0-35.9,adult 11/12/2018    Breath shortness 09/07/2017    uses oxygen at 2 liters/min cont. with [facility]    Calculus of ureter 09/18/2017    Cataract     codey IOL    Chronic anticoagulation 02/13/2017    CKD (chronic kidney disease) stage 3, GFR 30-59 ml/min ([facility])     Congestive heart failure (HCC)     COPD (chronic obstructive pulmonary disease) ([facility])     Diabetes (HCC)     High cholesterol     History of mitral valve repair     Hypertension     Infectious disease 2014    UTI/hx. MRSA    Pacemaker 2014    Pulmonary hypertension ([facility])     Renal disorder 09/07/2017    has stent    Shortness of breath 10/18/2017    Urinary incontinence        Past Surgical History:   Procedure Laterality Date    URETEROSCOPY Left 09/18/2017    Procedure: URETEROSCOPY;  Surgeon: Edgardo Richter M.D.;  Location: SURGERY [facility];  Service: Urology    LASERTRIPSY Left 09/18/2017    Procedure: LASERTRIPSY LITHO  ;  Surgeon: Edgardo Richter M.D.;  Location: SURGERY [facility];  Service: Urology    STENT REMOVAL Left 09/18/2017    Procedure: STENT " REMOVAL;  Surgeon: Edgardo Richter M.D.;  Location: SURGERY [facility];  Service: Urology    CYSTOSCOPY STENT PLACEMENT Left 2017    Procedure: CYSTOSCOPY, LEFT URETERAL STENT PLACEMENT;  Surgeon: Edgardo Richter M.D.;  Location: SURGERY [facility];  Service:     PACEMAKER INSERTION  2015    KNEE REPLACEMENT, TOTAL Left 2015    MITRAL VALVE REPAIR  2009    HYSTERECTOMY, VAGINAL  1994    TONSILLECTOMY         Social History     Socioeconomic History    Marital status:      Spouse name: Not on file    Number of children: Not on file    Years of education: Not on file    Highest education level: Not on file   Occupational History    Not on file   Tobacco Use    Smoking status: Former     Current packs/day: 0.00     Average packs/day: 1 pack/day for 5.0 years (5.0 ttl pk-yrs)     Types: Cigarettes     Start date: 1985     Quit date: 1990     Years since quittin.8    Smokeless tobacco: Never    Tobacco comments:     Continued abstinence   Vaping Use    Vaping Use: Never used   Substance and Sexual Activity    Alcohol use: No     Alcohol/week: 0.0 oz    Drug use: No    Sexual activity: Not Currently   Other Topics Concern    Not on file   Social History Narrative    She has a son. She had previously experience in [employer].     Social Determinants of Health     Financial Resource Strain: Not on file   Food Insecurity: Not on file   Transportation Needs: Not on file   Physical Activity: Not on file   Stress: Not on file   Social Connections: Not on file   Intimate Partner Violence: Not on file   Housing Stability: Not on file          Family History   Problem Relation Age of Onset    Lung Disease Mother         asthma    Cancer Mother         ovarian    Heart Disease Father     Stroke Father     Thyroid Sister     Cancer Brother         pancreas    Cancer Paternal Grandmother         colon    Heart Disease Brother     No Known Problems Brother     No Known  Problems Brother     Heart Disease Son         afib       Current Outpatient Medications on File Prior to Visit   Medication Sig Dispense Refill    diphenhydrAMINE-APAP, sleep,  MG Tab Take 0.5 Tablets by mouth at bedtime as needed.      amiodarone (CORDARONE) 200 MG Tab Take 1 Tablet by mouth every day. (Patient taking differently: Take 200 mg by mouth every evening.) 90 Tablet 3    calcitRIOL (ROCALTROL) 0.25 MCG Cap Take 1 Capsule by mouth every day. (Patient taking differently: Take 0.25 mcg by mouth every morning.) 100 Capsule 3    carvedilol (COREG) 25 MG Tab Take 1 Tablet by mouth 2 times a day. 200 Tablet 3    atorvastatin (LIPITOR) 10 MG Tab Take 1 Tablet by mouth every evening. 100 Tablet 3    digoxin (LANOXIN) 125 MCG Tab Take 1 Tablet by mouth every day. (Patient taking differently: Take 125 mcg by mouth every morning.) 100 Tablet 3    dilTIAZem (CARDIZEM) 30 MG Tab Take 1 Tablet by mouth 2 times a day. 200 Tablet 3    spironolactone (ALDACTONE) 25 MG Tab Take 1 Tablet by mouth every day. (Patient taking differently: Take 25 mg by mouth every day. Three times per week (Sunday, Tuesday and Thursday)) 100 Tablet 3    warfarin (COUMADIN) 5 MG Tab Take 1 Tablet by mouth every day. take 1 and 1/2 tablets by mouth daily or as directed by anticoagulation clinic (Patient taking differently: Take 5 mg by mouth every morning. take 1 tablets daily) 135 Tablet 3    levalbuterol (XOPENEX HFA) 45 MCG/ACT inhaler Inhale 2 Puffs every four hours as needed for Shortness of Breath. 1 Each 2    furosemide (LASIX) 20 MG Tab Take 1 Tablet by mouth every day. (Patient taking differently: Take 20 mg by mouth as needed.  ) 100 Tablet 3    Dulaglutide 1.5 MG/0.5ML Solution Pen-injector Inject 0.5 mL under the skin every 7 days. Every Sunday 1 mL 0     No current facility-administered medications on file prior to visit.       Allergies: Amoxicillin, Nitrofurantoin, Pcn [penicillins], Amlodipine besylate-valsartan, Etodolac,  "Food, Eliquis [apixaban], Lisinopril, Other drug, Other misc, and Tape      ROS:   Review of Systems   Constitutional:  Negative for chills and fever.   HENT:  Negative for congestion.    Eyes:  Negative for discharge and redness.   Respiratory:  Positive for shortness of breath.    Cardiovascular:  Negative for chest pain.   Gastrointestinal:  Negative for abdominal pain and vomiting.   Skin:  Negative for rash.   Neurological:  Negative for focal weakness.       Vitals:  BP (!) 142/54 (BP Location: Left arm, Patient Position: Sitting, BP Cuff Size: Large adult)   Pulse 60   Ht 1.626 m (5' 4\")   Wt 81.6 kg (180 lb)   SpO2 93%     Physical Exam:  Physical Exam  Vitals and nursing note reviewed.   Constitutional:       General: She is not in acute distress.     Appearance: Normal appearance. She is not ill-appearing or toxic-appearing.   HENT:      Head: Normocephalic and atraumatic.      Nose: Nose normal.      Mouth/Throat:      Mouth: Mucous membranes are moist.   Eyes:      General: No scleral icterus.     Conjunctiva/sclera: Conjunctivae normal.   Cardiovascular:      Rate and Rhythm: Normal rate and regular rhythm.   Pulmonary:      Effort: Pulmonary effort is normal. No respiratory distress.      Breath sounds: No wheezing, rhonchi or rales.   Abdominal:      Palpations: Abdomen is soft.   Musculoskeletal:         General: No deformity or signs of injury. Normal range of motion.      Cervical back: Normal range of motion.   Skin:     General: Skin is warm and dry.   Neurological:      General: No focal deficit present.      Mental Status: She is alert. Mental status is at baseline.   Psychiatric:         Mood and Affect: Mood normal.         Behavior: Behavior normal.         Data:  TTE 6/2/21  Left ventricular ejection fraction is visually estimated to be 60%.  Known mitral valve repair functioning normally,  mean transvalvular   gradient is 6  mmHg at a heart rate of 83 BPM.  Mild mitral " regurgitation.  Mild aortic insufficiency.  Estimated right ventricular systolic pressure is 45-50 mmHg.  Compared to the images of the prior study done on 12/04/19 no   significant changes in prosthetic mitral valve function    TTE 12/4/19  Normal left ventricular chamber size.  Normal left ventricular chamber size.  Left ventricular ejection fraction is visually estimated to be 50%.  Distal inferior wal lhypokinesis.  Abnormal septal motion consistent with ventricular pacing.  Known mitral valve repair with mild mitral regurgitation.  Thickened mitral valve leaflets.  Mild to moderate mitral stenosis.  Mean transvalvular gradient is 6.2 mmHg with 70 BPM.  Moderate  aortic insufficiency.  No aortic stenosis.  Moderate tricuspid regurgitation.  Dilated inferior vena cava with inspiratory collapse.  Right ventricular systolic pressure is estimated to be 75 mmHg.  Reduced right ventricular systolic function.  Pacer/ICD wire seen in right ventricle.  Normal pericardium without effusion.  Compared to the images of the study done on 10/25/2018   there has been   an increase in RV systolic pressure .     PFT 4/5/22: moderately severe obstruction w/o a significant BD response. +hyperinflation. Reduced DLCO and DL/VA.     Assessment/Plan:    Problem List Items Addressed This Visit       Chronic respiratory failure with hypoxia, on home oxygen therapy (HCC)     Unclear etiology of her respiratory failure - she has been labeled as COPD, however she only had a 5 year smoking history and quit in 1990. She also has hx of PH but her RV pressures were improved on last TTE - she does have LHD but also has hx of fen-phen use that could contribute.     - PFTs  - 6MWT - will be able to see how much O2 she needs while getting a better idea of her functional level and meters walked  - repeat TTE  - based on above studies, will consider chest imaging such as CT chest to further evaluate etiology of her respiratory failure  - continue  supplemental O2 - patient is compliant with and benefiting from supplemental O2  - continue Anoro and PRN albuterol at this time - based on PFTs, might switch this to ICS-LABA to see if patient has more benefit         Relevant Orders    PULMONARY FUNCTION TESTS -Test requested: Complete Pulmonary Function Test    Exercise Test for Bronchospasm / 6-Minute Walk    EC-ECHOCARDIOGRAM COMPLETE W/O CONT       Return 6-8 weeks.     This note was generated using voice recognition software which has a chance of producing errors of grammar and possibly content.  I have made every reasonable attempt to find and correct any obvious errors, but it should be expected that some may not be found prior to finalization of this note.    Time spent in record review prior to patient arrival, reviewing results, and in face-to-face encounter totaled 70 min, excluding any procedures if performed.    Magalis Gonzalez MD  Pulmonary and Critical Care Medicine  [facility]

## 2023-10-21 NOTE — ASSESSMENT & PLAN NOTE
Unclear etiology of her respiratory failure - she has been labeled as COPD, however she only had a 5 year smoking history and quit in 1990. She also has hx of PH but her RV pressures were improved on last TTE - she does have LHD but also has hx of fen-phen use that could contribute.     - PFTs  - 6MWT - will be able to see how much O2 she needs while getting a better idea of her functional level and meters walked  - repeat TTE  - based on above studies, will consider chest imaging such as CT chest to further evaluate etiology of her respiratory failure  - continue supplemental O2 - patient is compliant with and benefiting from supplemental O2  - continue Anoro and PRN albuterol at this time - based on PFTs, might switch this to ICS-LABA to see if patient has more benefit

## 2023-10-23 ENCOUNTER — ANTICOAGULATION VISIT (OUTPATIENT)
Dept: MEDICAL GROUP | Facility: MEDICAL CENTER | Age: 73
End: 2023-10-23
Payer: MEDICARE

## 2023-10-23 DIAGNOSIS — Z98.890 HISTORY OF MITRAL VALVE REPAIR: ICD-10-CM

## 2023-10-23 LAB — INR PPP: 2.9 (ref 2–3.5)

## 2023-10-23 PROCEDURE — 93793 ANTICOAG MGMT PT WARFARIN: CPT | Performed by: STUDENT IN AN ORGANIZED HEALTH CARE EDUCATION/TRAINING PROGRAM

## 2023-10-23 PROCEDURE — 85610 PROTHROMBIN TIME: CPT | Performed by: STUDENT IN AN ORGANIZED HEALTH CARE EDUCATION/TRAINING PROGRAM

## 2023-10-23 NOTE — PROGRESS NOTES
Anticoagulation Summary  As of 10/23/2023      INR goal:  2.0-3.0   TTR:  58.3 % (6.6 y)   INR used for dosin.90 (10/23/2023)   Warfarin maintenance plan:  5 mg (5 mg x 1) every day   Weekly warfarin total:  35 mg   Plan last modified:  Ivy Galloway PharmD (10/2/2023)   Next INR check:  10/30/2023   Target end date:  Indefinite    Indications    Atrial fibrillation (HCC) [I48.91]  History of mitral valve repair [Z98.890]                 Anticoagulation Episode Summary       INR check location:      Preferred lab:      Send INR reminders to:      Comments:            Anticoagulation Care Providers       Provider Role Specialty Phone number    Zeferino Almaraz, A.P.N. Referring Family Medicine [phone]    [facility] Responsible  [phone]    Kenneth Salas, PharmD Responsible Pharmacy                   Refer to Patient Findings for HPI:  Patient Findings       Negatives:  Signs/symptoms of thrombosis, Signs/symptoms of bleeding, Laboratory test error suspected, Change in health, Change in alcohol use, Change in activity, Upcoming invasive procedure, Emergency department visit, Upcoming dental procedure, Missed doses, Extra doses, Change in medications, Change in diet/appetite, Hospital admission, Bruising, Other complaints            There were no vitals filed for this visit.  pt declined vitals    Verified current warfarin dosing schedule.    Medications reconciled: No  Pt is not on antiplatelet therapy.      A/P   INR is therapeutic    Warfarin dosing recommendation: Decrease to 2.5 mg then resume 5 mg daily    Pt educated to contact our clinic with any changes in medications or s/s of bleeding or thrombosis. Pt is aware to seek immediate medical attention for falls, head injury or deep cuts.    Follow up appointment in 1 week(s).    Ivy Galloway, JoleenD

## 2023-10-30 ENCOUNTER — ANTICOAGULATION VISIT (OUTPATIENT)
Dept: MEDICAL GROUP | Facility: MEDICAL CENTER | Age: 73
End: 2023-10-30
Payer: MEDICARE

## 2023-10-30 DIAGNOSIS — I48.21 PERMANENT ATRIAL FIBRILLATION (HCC): ICD-10-CM

## 2023-10-30 DIAGNOSIS — Z98.890 HISTORY OF MITRAL VALVE REPAIR: ICD-10-CM

## 2023-10-30 LAB — INR PPP: 2.8 (ref 2–3.5)

## 2023-10-30 PROCEDURE — 93793 ANTICOAG MGMT PT WARFARIN: CPT | Performed by: STUDENT IN AN ORGANIZED HEALTH CARE EDUCATION/TRAINING PROGRAM

## 2023-10-30 PROCEDURE — 85610 PROTHROMBIN TIME: CPT | Performed by: STUDENT IN AN ORGANIZED HEALTH CARE EDUCATION/TRAINING PROGRAM

## 2023-10-30 NOTE — PROGRESS NOTES
Anticoagulation Summary  As of 10/30/2023      INR goal:  2.0-3.0   TTR:  58.4 % (6.6 y)   INR used for dosin.80 (10/30/2023)   Warfarin maintenance plan:  2.5 mg (5 mg x 0.5) every Tue; 5 mg (5 mg x 1) all other days   Weekly warfarin total:  32.5 mg   Plan last modified:  Kaylin Encinas, PharmD (10/30/2023)   Next INR check:  2023   Target end date:  Indefinite    Indications    Atrial fibrillation (HCC) [I48.91]  History of mitral valve repair [Z98.890]                 Anticoagulation Episode Summary       INR check location:      Preferred lab:      Send INR reminders to:      Comments:            Anticoagulation Care Providers       Provider Role Specialty Phone number    Zeferino Almaraz, A.P.N. Referring Family Medicine [phone]    [facility] Responsible  [phone]    Kenneth Salas, PharmD Responsible Pharmacy           Anticoagulation Patient Findings  Patient Findings       Positives:  Upcoming invasive procedure (ablation )    Negatives:  Signs/symptoms of thrombosis, Signs/symptoms of bleeding, Laboratory test error suspected, Change in health, Change in alcohol use, Change in activity, Emergency department visit, Upcoming dental procedure, Missed doses, Extra doses, Change in medications, Change in diet/appetite, Hospital admission, Bruising, Other complaints                  HPI:   Salma Lees seen in clinic today, on anticoagulation therapy with warfarin due to history of atrial fibrillation and mitral valve repair .    Patient's previous INR was therapeutic at 2.9 on 10/23/23, at which time patient was instructed to reduce regimen. Patient returns to clinic today to recheck INR to ensure it is therapeutic and thus preventing possible clotting and/or bleeding/bruising complications.    CHADS-VASc = n/a (valvular)    Does patient have any changes to current medical/health status since last appt: Yes  Does patient have any signs/symptoms of bleeding  and/or thrombosis since the last appt: No  Does patient have any interval changes to diet or medications since last appt: No  Are there any complications or cost restrictions with current therapy: No     Does patient have [facility] PCP? Yes, Leah Bonilla D.O. (If not, please document discussion that patient must be seen at [facility])     Vitals:      There were no vitals filed for this visit.     Asssessment:      INR is therapeutic at 2.8, therefore decreasing risk of stroke/VTE and bleeding  Reason(s) for out of range INR today: N/A      Pt is not on antiplatelet therapy.    Medication reconciliation completed today.    Warfarin dosing recommendation: Decrease to 2.5mg Tues, 5mg AOD     Follow up:  Because warfarin is a high risk medication and current CHEST guidelines recommend regular monitoring intervals (few days up to 12 weeks), will have patient return to clinic in 2 weeks to recheck INR.    Kaylin Encinas, PharmD

## 2023-11-01 ENCOUNTER — PATIENT MESSAGE (OUTPATIENT)
Dept: CARDIOLOGY | Facility: MEDICAL CENTER | Age: 73
End: 2023-11-01
Payer: MEDICARE

## 2023-11-02 PROBLEM — D50.9 IRON DEFICIENCY ANEMIA, UNSPECIFIED: Status: ACTIVE | Noted: 2023-11-02

## 2023-11-06 ENCOUNTER — PRE-ADMISSION TESTING (OUTPATIENT)
Dept: ADMISSIONS | Facility: MEDICAL CENTER | Age: 73
End: 2023-11-06
Attending: INTERNAL MEDICINE
Payer: MEDICARE

## 2023-11-06 DIAGNOSIS — Z01.810 PRE-OPERATIVE CARDIOVASCULAR EXAMINATION: ICD-10-CM

## 2023-11-06 DIAGNOSIS — Z12.11 COLON CANCER SCREENING: ICD-10-CM

## 2023-11-06 DIAGNOSIS — Z01.812 PRE-OPERATIVE LABORATORY EXAMINATION: ICD-10-CM

## 2023-11-06 LAB
ALBUMIN SERPL BCP-MCNC: 4 G/DL (ref 3.2–4.9)
ALBUMIN/GLOB SERPL: 1.2 G/DL
ALP SERPL-CCNC: 50 U/L (ref 30–99)
ALT SERPL-CCNC: 14 U/L (ref 2–50)
ANION GAP SERPL CALC-SCNC: 8 MMOL/L (ref 7–16)
AST SERPL-CCNC: 19 U/L (ref 12–45)
BASOPHILS # BLD AUTO: 0.4 % (ref 0–1.8)
BASOPHILS # BLD: 0.03 K/UL (ref 0–0.12)
BILIRUB SERPL-MCNC: 0.8 MG/DL (ref 0.1–1.5)
BUN SERPL-MCNC: 47 MG/DL (ref 8–22)
CALCIUM ALBUM COR SERPL-MCNC: 9.4 MG/DL (ref 8.5–10.5)
CALCIUM SERPL-MCNC: 9.4 MG/DL (ref 8.5–10.5)
CHLORIDE SERPL-SCNC: 102 MMOL/L (ref 96–112)
CO2 SERPL-SCNC: 26 MMOL/L (ref 20–33)
CREAT SERPL-MCNC: 2.02 MG/DL (ref 0.5–1.4)
EKG IMPRESSION: NORMAL
EOSINOPHIL # BLD AUTO: 0.43 K/UL (ref 0–0.51)
EOSINOPHIL NFR BLD: 6.4 % (ref 0–6.9)
ERYTHROCYTE [DISTWIDTH] IN BLOOD BY AUTOMATED COUNT: 54.3 FL (ref 35.9–50)
EST. AVERAGE GLUCOSE BLD GHB EST-MCNC: 140 MG/DL
GFR SERPLBLD CREATININE-BSD FMLA CKD-EPI: 25 ML/MIN/1.73 M 2
GLOBULIN SER CALC-MCNC: 3.4 G/DL (ref 1.9–3.5)
GLUCOSE SERPL-MCNC: 108 MG/DL (ref 65–99)
HBA1C MFR BLD: 6.5 % (ref 4–5.6)
HCT VFR BLD AUTO: 35.5 % (ref 37–47)
HGB BLD-MCNC: 11.1 G/DL (ref 12–16)
IMM GRANULOCYTES # BLD AUTO: 0.02 K/UL (ref 0–0.11)
IMM GRANULOCYTES NFR BLD AUTO: 0.3 % (ref 0–0.9)
INR PPP: 3.42 (ref 0.87–1.13)
LYMPHOCYTES # BLD AUTO: 0.86 K/UL (ref 1–4.8)
LYMPHOCYTES NFR BLD: 12.7 % (ref 22–41)
MCH RBC QN AUTO: 29.3 PG (ref 27–33)
MCHC RBC AUTO-ENTMCNC: 31.3 G/DL (ref 32.2–35.5)
MCV RBC AUTO: 93.7 FL (ref 81.4–97.8)
MONOCYTES # BLD AUTO: 0.47 K/UL (ref 0–0.85)
MONOCYTES NFR BLD AUTO: 7 % (ref 0–13.4)
NEUTROPHILS # BLD AUTO: 4.94 K/UL (ref 1.82–7.42)
NEUTROPHILS NFR BLD: 73.2 % (ref 44–72)
NRBC # BLD AUTO: 0 K/UL
NRBC BLD-RTO: 0 /100 WBC (ref 0–0.2)
PLATELET # BLD AUTO: 171 K/UL (ref 164–446)
PMV BLD AUTO: 11.7 FL (ref 9–12.9)
POTASSIUM SERPL-SCNC: 4.8 MMOL/L (ref 3.6–5.5)
PROT SERPL-MCNC: 7.4 G/DL (ref 6–8.2)
PROTHROMBIN TIME: 35.1 SEC (ref 12–14.6)
RBC # BLD AUTO: 3.79 M/UL (ref 4.2–5.4)
SODIUM SERPL-SCNC: 136 MMOL/L (ref 135–145)
TSH SERPL DL<=0.005 MIU/L-ACNC: 1.85 UIU/ML (ref 0.38–5.33)
WBC # BLD AUTO: 6.8 K/UL (ref 4.8–10.8)

## 2023-11-06 PROCEDURE — 84443 ASSAY THYROID STIM HORMONE: CPT

## 2023-11-06 PROCEDURE — 80053 COMPREHEN METABOLIC PANEL: CPT

## 2023-11-06 PROCEDURE — 93010 ELECTROCARDIOGRAM REPORT: CPT | Performed by: INTERNAL MEDICINE

## 2023-11-06 PROCEDURE — 83036 HEMOGLOBIN GLYCOSYLATED A1C: CPT

## 2023-11-06 PROCEDURE — 85610 PROTHROMBIN TIME: CPT

## 2023-11-06 PROCEDURE — 36415 COLL VENOUS BLD VENIPUNCTURE: CPT

## 2023-11-06 PROCEDURE — 85025 COMPLETE CBC W/AUTO DIFF WBC: CPT

## 2023-11-06 PROCEDURE — 93005 ELECTROCARDIOGRAM TRACING: CPT

## 2023-11-08 ENCOUNTER — HOSPITAL ENCOUNTER (OUTPATIENT)
Facility: MEDICAL CENTER | Age: 73
End: 2023-11-08
Attending: INTERNAL MEDICINE | Admitting: INTERNAL MEDICINE
Payer: MEDICARE

## 2023-11-08 ENCOUNTER — ANESTHESIA EVENT (OUTPATIENT)
Dept: CARDIOLOGY | Facility: MEDICAL CENTER | Age: 73
End: 2023-11-08
Payer: MEDICARE

## 2023-11-08 ENCOUNTER — APPOINTMENT (OUTPATIENT)
Dept: CARDIOLOGY | Facility: MEDICAL CENTER | Age: 73
End: 2023-11-08
Attending: INTERNAL MEDICINE
Payer: MEDICARE

## 2023-11-08 ENCOUNTER — ANESTHESIA (OUTPATIENT)
Dept: CARDIOLOGY | Facility: MEDICAL CENTER | Age: 73
End: 2023-11-08
Payer: MEDICARE

## 2023-11-08 VITALS
HEIGHT: 64 IN | DIASTOLIC BLOOD PRESSURE: 61 MMHG | WEIGHT: 177.25 LBS | TEMPERATURE: 96.8 F | HEART RATE: 62 BPM | OXYGEN SATURATION: 95 % | BODY MASS INDEX: 30.26 KG/M2 | SYSTOLIC BLOOD PRESSURE: 127 MMHG | RESPIRATION RATE: 17 BRPM

## 2023-11-08 DIAGNOSIS — I48.21 PERMANENT ATRIAL FIBRILLATION (HCC): ICD-10-CM

## 2023-11-08 DIAGNOSIS — I48.92 ATRIAL FLUTTER, UNSPECIFIED TYPE (HCC): ICD-10-CM

## 2023-11-08 LAB
EKG IMPRESSION: NORMAL
GLUCOSE BLD STRIP.AUTO-MCNC: 136 MG/DL (ref 65–99)
GLUCOSE BLD STRIP.AUTO-MCNC: 156 MG/DL (ref 65–99)
INR PPP: 4.22 (ref 0.87–1.13)
PROTHROMBIN TIME: 41.3 SEC (ref 12–14.6)

## 2023-11-08 PROCEDURE — 700105 HCHG RX REV CODE 258: Performed by: INTERNAL MEDICINE

## 2023-11-08 PROCEDURE — 82962 GLUCOSE BLOOD TEST: CPT | Mod: 91

## 2023-11-08 PROCEDURE — 700111 HCHG RX REV CODE 636 W/ 250 OVERRIDE (IP)

## 2023-11-08 PROCEDURE — 93005 ELECTROCARDIOGRAM TRACING: CPT | Performed by: INTERNAL MEDICINE

## 2023-11-08 PROCEDURE — 93286 PERI-PX EVAL PM/LDLS PM IP: CPT | Mod: 26 | Performed by: INTERNAL MEDICINE

## 2023-11-08 PROCEDURE — 93312 ECHO TRANSESOPHAGEAL: CPT | Mod: 26 | Performed by: INTERNAL MEDICINE

## 2023-11-08 PROCEDURE — 700105 HCHG RX REV CODE 258: Performed by: STUDENT IN AN ORGANIZED HEALTH CARE EDUCATION/TRAINING PROGRAM

## 2023-11-08 PROCEDURE — 85610 PROTHROMBIN TIME: CPT

## 2023-11-08 PROCEDURE — 160047 HCHG PACU  - EA ADDL 30 MINS PHASE II

## 2023-11-08 PROCEDURE — 93321 DOPPLER ECHO F-UP/LMTD STD: CPT | Mod: 26,DUPCHRG | Performed by: INTERNAL MEDICINE

## 2023-11-08 PROCEDURE — 160036 HCHG PACU - EA ADDL 30 MINS PHASE I

## 2023-11-08 PROCEDURE — 93010 ELECTROCARDIOGRAM REPORT: CPT | Performed by: STUDENT IN AN ORGANIZED HEALTH CARE EDUCATION/TRAINING PROGRAM

## 2023-11-08 PROCEDURE — 160035 HCHG PACU - 1ST 60 MINS PHASE I

## 2023-11-08 PROCEDURE — 160002 HCHG RECOVERY MINUTES (STAT)

## 2023-11-08 PROCEDURE — 93653 COMPRE EP EVAL TX SVT: CPT | Performed by: INTERNAL MEDICINE

## 2023-11-08 PROCEDURE — 93325 DOPPLER ECHO COLOR FLOW MAPG: CPT

## 2023-11-08 PROCEDURE — 700111 HCHG RX REV CODE 636 W/ 250 OVERRIDE (IP): Performed by: STUDENT IN AN ORGANIZED HEALTH CARE EDUCATION/TRAINING PROGRAM

## 2023-11-08 PROCEDURE — C1730 CATH, EP, 19 OR FEW ELECT: HCPCS

## 2023-11-08 PROCEDURE — 93312 ECHO TRANSESOPHAGEAL: CPT | Mod: DUPCHRG | Performed by: INTERNAL MEDICINE

## 2023-11-08 PROCEDURE — 160046 HCHG PACU - 1ST 60 MINS PHASE II

## 2023-11-08 PROCEDURE — 700101 HCHG RX REV CODE 250

## 2023-11-08 PROCEDURE — 93655 ICAR CATH ABLTJ DSCRT ARRHYT: CPT | Performed by: INTERNAL MEDICINE

## 2023-11-08 PROCEDURE — 93325 DOPPLER ECHO COLOR FLOW MAPG: CPT | Mod: 26,DUPCHRG | Performed by: INTERNAL MEDICINE

## 2023-11-08 PROCEDURE — 700101 HCHG RX REV CODE 250: Performed by: STUDENT IN AN ORGANIZED HEALTH CARE EDUCATION/TRAINING PROGRAM

## 2023-11-08 RX ORDER — PHENYLEPHRINE HYDROCHLORIDE 10 MG/ML
INJECTION, SOLUTION INTRAMUSCULAR; INTRAVENOUS; SUBCUTANEOUS
Status: COMPLETED
Start: 2023-11-08 | End: 2023-11-08

## 2023-11-08 RX ORDER — PHENYLEPHRINE HYDROCHLORIDE 10 MG/ML
INJECTION, SOLUTION INTRAMUSCULAR; INTRAVENOUS; SUBCUTANEOUS PRN
Status: DISCONTINUED | OUTPATIENT
Start: 2023-11-08 | End: 2023-11-08 | Stop reason: SURG

## 2023-11-08 RX ORDER — HALOPERIDOL 5 MG/ML
1 INJECTION INTRAMUSCULAR
Status: DISCONTINUED | OUTPATIENT
Start: 2023-11-08 | End: 2023-11-08 | Stop reason: HOSPADM

## 2023-11-08 RX ORDER — LIDOCAINE HYDROCHLORIDE 10 MG/ML
INJECTION, SOLUTION INFILTRATION; PERINEURAL
Status: COMPLETED
Start: 2023-11-08 | End: 2023-11-08

## 2023-11-08 RX ORDER — LIDOCAINE HYDROCHLORIDE 40 MG/ML
SOLUTION TOPICAL PRN
Status: DISCONTINUED | OUTPATIENT
Start: 2023-11-08 | End: 2023-11-08 | Stop reason: SURG

## 2023-11-08 RX ORDER — OXYCODONE HCL 5 MG/5 ML
10 SOLUTION, ORAL ORAL
Status: DISCONTINUED | OUTPATIENT
Start: 2023-11-08 | End: 2023-11-08 | Stop reason: HOSPADM

## 2023-11-08 RX ORDER — PHENYLEPHRINE HCL IN 0.9% NACL 0.5 MG/5ML
SYRINGE (ML) INTRAVENOUS
Status: DISCONTINUED
Start: 2023-11-08 | End: 2023-11-08 | Stop reason: HOSPADM

## 2023-11-08 RX ORDER — ONDANSETRON 2 MG/ML
INJECTION INTRAMUSCULAR; INTRAVENOUS PRN
Status: DISCONTINUED | OUTPATIENT
Start: 2023-11-08 | End: 2023-11-08 | Stop reason: SURG

## 2023-11-08 RX ORDER — OXYCODONE HCL 5 MG/5 ML
5 SOLUTION, ORAL ORAL
Status: DISCONTINUED | OUTPATIENT
Start: 2023-11-08 | End: 2023-11-08 | Stop reason: HOSPADM

## 2023-11-08 RX ORDER — CLINDAMYCIN PHOSPHATE 600 MG/50ML
INJECTION, SOLUTION INTRAVENOUS
Status: DISCONTINUED
Start: 2023-11-08 | End: 2023-11-08 | Stop reason: HOSPADM

## 2023-11-08 RX ORDER — ACETAMINOPHEN 325 MG/1
650 TABLET ORAL EVERY 4 HOURS PRN
Status: DISCONTINUED | OUTPATIENT
Start: 2023-11-08 | End: 2023-11-08 | Stop reason: HOSPADM

## 2023-11-08 RX ORDER — VANCOMYCIN HYDROCHLORIDE 1 G/20ML
INJECTION, POWDER, LYOPHILIZED, FOR SOLUTION INTRAVENOUS
Status: COMPLETED
Start: 2023-11-08 | End: 2023-11-08

## 2023-11-08 RX ORDER — LIDOCAINE HYDROCHLORIDE 40 MG/ML
SOLUTION TOPICAL
Status: COMPLETED
Start: 2023-11-08 | End: 2023-11-08

## 2023-11-08 RX ORDER — EPINEPHRINE 1 MG/ML(1)
AMPUL (ML) INJECTION PRN
Status: DISCONTINUED | OUTPATIENT
Start: 2023-11-08 | End: 2023-11-08 | Stop reason: SURG

## 2023-11-08 RX ORDER — SODIUM CHLORIDE, SODIUM LACTATE, POTASSIUM CHLORIDE, CALCIUM CHLORIDE 600; 310; 30; 20 MG/100ML; MG/100ML; MG/100ML; MG/100ML
INJECTION, SOLUTION INTRAVENOUS CONTINUOUS
Status: DISCONTINUED | OUTPATIENT
Start: 2023-11-08 | End: 2023-11-08

## 2023-11-08 RX ORDER — HEPARIN SODIUM 1000 [USP'U]/ML
INJECTION, SOLUTION INTRAVENOUS; SUBCUTANEOUS
Status: COMPLETED
Start: 2023-11-08 | End: 2023-11-08

## 2023-11-08 RX ORDER — EPHEDRINE SULFATE 50 MG/ML
INJECTION, SOLUTION INTRAVENOUS PRN
Status: DISCONTINUED | OUTPATIENT
Start: 2023-11-08 | End: 2023-11-08 | Stop reason: SURG

## 2023-11-08 RX ORDER — IPRATROPIUM BROMIDE AND ALBUTEROL SULFATE 2.5; .5 MG/3ML; MG/3ML
3 SOLUTION RESPIRATORY (INHALATION)
Status: DISCONTINUED | OUTPATIENT
Start: 2023-11-08 | End: 2023-11-08 | Stop reason: HOSPADM

## 2023-11-08 RX ORDER — HEPARIN SODIUM 200 [USP'U]/100ML
INJECTION, SOLUTION INTRAVENOUS
Status: COMPLETED
Start: 2023-11-08 | End: 2023-11-08

## 2023-11-08 RX ORDER — DEXAMETHASONE SODIUM PHOSPHATE 4 MG/ML
INJECTION, SOLUTION INTRA-ARTICULAR; INTRALESIONAL; INTRAMUSCULAR; INTRAVENOUS; SOFT TISSUE PRN
Status: DISCONTINUED | OUTPATIENT
Start: 2023-11-08 | End: 2023-11-08 | Stop reason: SURG

## 2023-11-08 RX ORDER — MIDAZOLAM HYDROCHLORIDE 1 MG/ML
INJECTION INTRAMUSCULAR; INTRAVENOUS
Status: COMPLETED
Start: 2023-11-08 | End: 2023-11-08

## 2023-11-08 RX ORDER — ONDANSETRON 2 MG/ML
4 INJECTION INTRAMUSCULAR; INTRAVENOUS
Status: DISCONTINUED | OUTPATIENT
Start: 2023-11-08 | End: 2023-11-08 | Stop reason: HOSPADM

## 2023-11-08 RX ADMIN — EPHEDRINE SULFATE 10 MG: 50 INJECTION INTRAVENOUS at 08:10

## 2023-11-08 RX ADMIN — HEPARIN SODIUM: 1000 INJECTION, SOLUTION INTRAVENOUS; SUBCUTANEOUS at 08:19

## 2023-11-08 RX ADMIN — PROPOFOL 20 MG: 10 INJECTION, EMULSION INTRAVENOUS at 09:18

## 2023-11-08 RX ADMIN — ROCURONIUM BROMIDE 60 MG: 10 INJECTION, SOLUTION INTRAVENOUS at 07:53

## 2023-11-08 RX ADMIN — PHENYLEPHRINE HYDROCHLORIDE 200 MCG: 10 INJECTION INTRAVENOUS at 07:58

## 2023-11-08 RX ADMIN — SODIUM CHLORIDE, POTASSIUM CHLORIDE, SODIUM LACTATE AND CALCIUM CHLORIDE: 600; 310; 30; 20 INJECTION, SOLUTION INTRAVENOUS at 07:42

## 2023-11-08 RX ADMIN — FENTANYL CITRATE 50 MCG: 50 INJECTION, SOLUTION INTRAMUSCULAR; INTRAVENOUS at 07:53

## 2023-11-08 RX ADMIN — ONDANSETRON 4 MG: 2 INJECTION INTRAMUSCULAR; INTRAVENOUS at 09:15

## 2023-11-08 RX ADMIN — EPINEPHRINE 10 MCG: 1 INJECTION INTRAMUSCULAR; INTRAVENOUS; SUBCUTANEOUS at 08:10

## 2023-11-08 RX ADMIN — EPHEDRINE SULFATE 10 MG: 50 INJECTION INTRAVENOUS at 08:54

## 2023-11-08 RX ADMIN — EPHEDRINE SULFATE 10 MG: 50 INJECTION INTRAVENOUS at 08:04

## 2023-11-08 RX ADMIN — PROPOFOL 80 MG: 10 INJECTION, EMULSION INTRAVENOUS at 07:53

## 2023-11-08 RX ADMIN — HEPARIN SODIUM 6000 UNITS: 200 INJECTION, SOLUTION INTRAVENOUS at 07:49

## 2023-11-08 RX ADMIN — CLINDAMYCIN PHOSPHATE 600 MG: 150 INJECTION, SOLUTION INTRAMUSCULAR; INTRAVENOUS at 08:48

## 2023-11-08 RX ADMIN — VANCOMYCIN HYDROCHLORIDE 100 MG: 1 INJECTION, POWDER, LYOPHILIZED, FOR SOLUTION INTRAVENOUS at 08:07

## 2023-11-08 RX ADMIN — DEXAMETHASONE SODIUM PHOSPHATE 8 MG: 4 INJECTION INTRA-ARTICULAR; INTRALESIONAL; INTRAMUSCULAR; INTRAVENOUS; SOFT TISSUE at 08:29

## 2023-11-08 RX ADMIN — FENTANYL CITRATE 25 MCG: 50 INJECTION, SOLUTION INTRAMUSCULAR; INTRAVENOUS at 09:15

## 2023-11-08 RX ADMIN — VANCOMYCIN HYDROCHLORIDE 100 MG: 1 INJECTION, POWDER, LYOPHILIZED, FOR SOLUTION INTRAVENOUS at 07:53

## 2023-11-08 RX ADMIN — SUGAMMADEX 200 MG: 100 INJECTION, SOLUTION INTRAVENOUS at 09:19

## 2023-11-08 RX ADMIN — PHENYLEPHRINE HYDROCHLORIDE 50 MCG/MIN: 10 INJECTION INTRAVENOUS at 07:58

## 2023-11-08 RX ADMIN — LIDOCAINE HYDROCHLORIDE 4 ML: 40 SOLUTION TOPICAL at 07:56

## 2023-11-08 RX ADMIN — LIDOCAINE HYDROCHLORIDE: 10 INJECTION, SOLUTION INFILTRATION; PERINEURAL at 07:49

## 2023-11-08 RX ADMIN — MIDAZOLAM HYDROCHLORIDE 2 MG: 2 INJECTION, SOLUTION INTRAMUSCULAR; INTRAVENOUS at 07:53

## 2023-11-08 ASSESSMENT — FIBROSIS 4 INDEX: FIB4 SCORE: 2.2

## 2023-11-08 NOTE — H&P
Physician H&P    Patient ID:  Salma Lees  [id number]  73 y.o. female  1950    History:  Primary Diagnosis: Recurrent atrial arrhythmias for catheter ablation, atrial tachycardia/atrial flutter.  Treated with amiodarone.    HPI:  Previous mitral valve repair 2009 [place]  [employer] device gen change here.  On Coumadin.    Past Medical History:  has a past medical history of A-fib ([facility]), Asthma, BMI 35.0-35.9,adult (11/12/2018), Breath shortness (09/07/2017), Calculus of ureter (09/18/2017), Cataract, Chronic anticoagulation (02/13/2017), CKD (chronic kidney disease) stage 3, GFR 30-59 ml/min ([facility]), Congestive heart failure ([facility]), COPD (chronic obstructive pulmonary disease) ([facility]), Diabetes ([facility]), High cholesterol, History of mitral valve repair, Hypertension, Infectious disease (2014), Pacemaker (2014), Pulmonary hypertension ([facility]), Renal disorder (09/07/2017), Shortness of breath (10/18/2017), and Urinary incontinence.    She has no past medical history of Cough, Encounter for long-term (current) use of other medications, Painful breathing, Sputum production, or Wheezing.  Past Surgical History:  has a past surgical history that includes mitral valve repair (01/01/2009); pacemaker insertion (01/01/2015); knee replacement, total (Left, 01/01/2015); cystoscopy stent placement (Left, 06/25/2017); ureteroscopy (Left, 09/18/2017); lasertripsy (Left, 09/18/2017); stent removal (Left, 09/18/2017); hysterectomy, vaginal (1994); and tonsillectomy.  Past Social History:  reports that she quit smoking about 33 years ago. Her smoking use included cigarettes. She started smoking about 38 years ago. She has a 5.0 pack-year smoking history. She has never used smokeless tobacco. She reports that she does not drink alcohol and does not use drugs.  Past Family History:   Family History   Problem Relation Age of Onset    Lung Disease Mother         asthma    Cancer Mother         ovarian    Heart Disease Father      Stroke Father     Thyroid Sister     Cancer Brother         pancreas    Cancer Paternal Grandmother         colon    Heart Disease Brother     No Known Problems Brother     No Known Problems Brother     Heart Disease Son         afib     Allergies: Amoxicillin, Nitrofurantoin, Pcn [penicillins], Amlodipine besylate-valsartan, Etodolac, Food, Eliquis [apixaban], Lisinopril, Other drug, Other misc, and Tape    Current Medications:  Prior to Admission medications    Medication Sig Start Date End Date Taking? Authorizing Provider   diphenhydrAMINE-APAP, sleep,  MG Tab Take 0.5 Tablets by mouth at bedtime as needed.    Physician Outpatient   amiodarone (CORDARONE) 200 MG Tab Take 1 Tablet by mouth every day.  Patient taking differently: Take 200 mg by mouth every evening. 8/16/23   Jhon Galloway M.D.   calcitRIOL (ROCALTROL) 0.25 MCG Cap Take 1 Capsule by mouth every day.  Patient taking differently: Take 0.25 mcg by mouth every morning. 5/1/23   Leah Bonilla D.O.   carvedilol (COREG) 25 MG Tab Take 1 Tablet by mouth 2 times a day. 1/3/23   Leah Bonilla D.O.   atorvastatin (LIPITOR) 10 MG Tab Take 1 Tablet by mouth every evening. 1/3/23   Leah Bonilla D.O.   digoxin (LANOXIN) 125 MCG Tab Take 1 Tablet by mouth every day.  Patient taking differently: Take 125 mcg by mouth every morning. 1/3/23   Leah Bonilla D.O.   dilTIAZem (CARDIZEM) 30 MG Tab Take 1 Tablet by mouth 2 times a day. 1/3/23   Leah Bonilla D.O.   spironolactone (ALDACTONE) 25 MG Tab Take 1 Tablet by mouth every day.  Patient taking differently: Take 25 mg by mouth every day. Three times per week (Sunday, Tuesday and Thursday) 1/3/23   Leah Bonilla D.O.   warfarin (COUMADIN) 5 MG Tab Take 1 Tablet by mouth every day. take 1 and 1/2 tablets by mouth daily or as directed by anticoagulation clinic  Patient taking differently: Take 5 mg by mouth every morning. take 1 tablets daily 12/5/22   Leah SKINNER  "OMID Bonilla   levalbuterol (XOPENEX HFA) 45 MCG/ACT inhaler Inhale 2 Puffs every four hours as needed for Shortness of Breath. 10/5/22   Peggy Shannon P.A.-C.   furosemide (LASIX) 20 MG Tab Take 1 Tablet by mouth every day.  Patient taking differently: Take 20 mg by mouth as needed.   5/18/22   Leah Bonilla D.O.   Dulaglutide 1.5 MG/0.5ML Solution Pen-injector Inject 0.5 mL under the skin every 7 days. Every Jason 3/29/22   EDYTA GouldRRosalineN.       Review of Systems:  ROS  BP 90/53   Pulse 60   Temp 36.3 °C (97.3 °F) (Temporal)   Resp 18   Ht 1.626 m (5' 4\")   Wt 80.4 kg (177 lb 4 oz)   SpO2 95%     Physical Examination:  Physical Exam  Vitals reviewed.   Constitutional:       General: She is not in acute distress.     Appearance: She is well-developed. She is not diaphoretic.   Eyes:      Conjunctiva/sclera: Conjunctivae normal.   Neck:      Thyroid: No thyroid mass or thyromegaly.      Vascular: No JVD.      Trachea: No tracheal deviation.   Cardiovascular:      Rate and Rhythm: Normal rate and regular rhythm.      Heart sounds: No murmur heard.  Pulmonary:      Effort: Pulmonary effort is normal. No respiratory distress.      Breath sounds: Normal breath sounds.   Chest:      Chest wall: No tenderness.   Abdominal:      Palpations: Abdomen is soft.      Tenderness: There is no abdominal tenderness.   Musculoskeletal:         General: Normal range of motion.   Skin:     General: Skin is warm and dry.   Neurological:      Mental Status: She is alert and oriented to person, place, and time.      Motor: No tremor.   Psychiatric:         Behavior: Behavior normal.         Impression:  1.  Recurrent atrial arrhythmias for catheter ablation;  The risks, benefits,and alternatives to atrial fibrillation/flutter ablation with general anesthesia were discussed in great detail. Specific risks mentioned including bleeding, infection, arteriovenous fistula/pseudoaneurysm related to sheath placement, " cardiac perforation with possible tamponade requiring pericardiocentesis or possible open heart surgery were discussed. In addition,we discussed atrial fibrillation ablation specific complications including pulmonary vein stenosis, left atrial perforation (2-4%), and nerve damage involving the recurrent laryngeal nerve, the vagus nerve with resulting gastroparesis, and the phrenic nerve.  Also mentioned was a 1/400 (0.25%) occurrence of atrial esophageal fistula, which is an abnormal communication between the esophagus and the left atrium; this complication is often fatal. Lastly the risks of death, myocardial infarction, stroke, DVT and PE were discussed. The patient verbalized understanding of these potential complications and wishes to proceed with this procedure.  The risks, benefits, and alternatives to transesophageal echocardiogram with IV sedation were discussed with the patient in specific detail, including oropharyngeal and esophageal traumas including hoarseness and dysphagia after the procedure. Rare cases demonstrating serious or fatal complications associated with transesophageal echocardiogram have been reported in the adult population, including cardiac, pulmonary and bleeding complications in less than 1% of people. Patients with an identified intracardiac thrombus are at increased risk for embolic events and this appears to be reduced with anticoagulant therapy. The patient verbalized understandings about these  possible complications and wishes to proceed with this procedure

## 2023-11-08 NOTE — DISCHARGE INSTRUCTIONS
What to Expect Post Anesthesia    Rest and take it easy for the first 24 hours.  A responsible adult is recommended to remain with you during that time.  It is normal to feel sleepy.  We encourage you to not do anything that requires balance, judgment or coordination.    FOR 24 HOURS DO NOT:  Drive, operate machinery or run household appliances.  Drink beer or alcoholic beverages.  Make important decisions or sign legal documents.    To avoid nausea, slowly advance diet as tolerated, avoiding spicy or greasy foods for the first day.  Add more substantial food to your diet according to your provider's instructions.  Babies can be fed formula or breast milk as soon as they are hungry.  INCREASE FLUIDS AND FIBER TO AVOID CONSTIPATION.    MILD FLU-LIKE SYMPTOMS ARE NORMAL.  YOU MAY EXPERIENCE GENERALIZED MUSCLE ACHES, THROAT IRRITATION, HEADACHE AND/OR SOME NAUSEA.    [facility] Post Ablation Patient Instructions:  No lifting > 10 lbs x 1 week.      No soaking in baths, hot tubs, pools x 1 week.  May shower the day after discharge and take off groin dressings and leave  sites uncovered.  Continue to monitor sites daily for warmth, redness, discolored drainage.  It is common to have a small lump in the area where the cather was (usually the size of a marble); this will go away but takes approximately 6 weeks to normalize.     3.   Please take all medications as prescribed to you; please do not stop any medications prescribed post ablation unless directed by your healthcare provider.      4.   Please do not miss any doses of your blood thinner (if you have been started on, or take chronic blood thinners) without discussion with your healthcare provider first.     5.   Please walk and take deep breaths after discharge.  After discharge, if you experience neurological changes/signs of stroke or high fever you should be seen in the emergency dept.     6.   It is possible you may experience  some chest discomfort or chest tightness post ablation.  This is usually secondary to inflammation and irritation of the tissues at the area of the ablation.  If this occurs, it is advised to try 400 mg of Ibuprofen with food as needed up to three times a day for a maximum of two days.  This should help to decrease pain and tissue inflammation.          **Please notify the office ([phone]) if this occurs.         ** DO NOT TAKE Ibuprofen IF HISTORY OF ALLERGY, SIGNIFICANT BLEEDING OR KIDNEY DISEASE WITHOUT DISCUSSING WITH YOUR CARDIOLOGY PROVIDER FIRST.          ** If pain becomes severe or you have additional symptoms you may need to be medically evaluated; please contact the cardiology office ([phone]) for further direction.     7. It is possible that you may experience arrhythmia/Atrial Fibrillation post ablation.  This is secondary to irritation and inflammation of the cardiac tissues from the ablation.  If you have atrial fibrillation all day or feel poorly with it, please notify your cardiologist's via phone ([phone]) or [employer].      8.  Please contact call our office ([phone]) or message via [employer] message if you have any questions or concerns post procedurally.    9. You need to be seen for post ablation follow up 3-4 weeks post procedure. An appointment is scheduled for you.  Please contact the office ([phone]) if you need to change your appointment.

## 2023-11-08 NOTE — ANESTHESIA POSTPROCEDURE EVALUATION
Patient: Salma Lees    Procedure Summary       Date: 11/08/23 Room / Location: [facility] - Cath Lab [facility]    Anesthesia Start: 0742 Anesthesia Stop: 0931    Procedure: CL-EP ABLATION AFIB-AFLUTTER Diagnosis:       Permanent atrial fibrillation (HCC)      Atrial flutter, unspecified type (HCC)      (See Associated Dx)    Scheduled Providers: Jhon Galloway M.D.; Anselmo Middleton M.D. Responsible Provider: Anselmo Middleton M.D.    Anesthesia Type: general ASA Status: 4            Final Anesthesia Type: general  Last vitals  BP   Blood Pressure : 121/49    Temp   36 °C (96.8 °F)    Pulse   62   Resp   17    SpO2   95 %      Anesthesia Post Evaluation    Patient location during evaluation: PACU  Patient participation: complete - patient participated  Level of consciousness: awake and alert    Airway patency: patent  Anesthetic complications: no  Cardiovascular status: hemodynamically stable  Respiratory status: acceptable  Hydration status: euvolemic    PONV: none          No notable events documented.     Nurse Pain Score: 0 (NPRS)

## 2023-11-08 NOTE — ANESTHESIA PREPROCEDURE EVALUATION
Date/Time: 11/08/23 0800    Scheduled providers: Jhon Galloway M.D.; Anselmo Middleton M.D.    Procedure: CL-EP ABLATION AFIB-AFLUTTER    Diagnosis:       Permanent atrial fibrillation (HCC) [I48.21]      Atrial flutter, unspecified type (HCC) [I48.92]    Indications: See Associated Dx    Location: [facility] - Cath Lab - [facility]            Relevant Problems   PULMONARY   (positive) Chronic obstructive pulmonary disease (HCC)      NEURO   (positive) Amaurosis fugax of left eye      CARDIAC   (positive) Amaurosis fugax of left eye   (positive) Atherosclerosis of aorta (HCC)   (positive) Atrial fibrillation (HCC)   (positive) Benign essential hypertension   (positive) Pacemaker   (positive) Pulmonary hypertension (HCC)         (positive) CKD (chronic kidney disease) stage 3, GFR 30-59 ml/min (HCC)   (positive) Microalbuminuria due to type 2 diabetes mellitus (HCC)      ENDO   (positive) Dyslipidemia associated with type 2 diabetes mellitus (HCC)   (positive) Type 2 diabetes mellitus with stage 3b chronic kidney disease, without long-term current use of insulin (HCC)       Physical Exam    Anesthesia Plan    ASA 4   ASA physical status 4 criteria: other (comment)    Plan - general       Airway plan will be ETT                    Informed Consent:

## 2023-11-08 NOTE — OR NURSING
0928: Pt arrived from cath lab, handoff received from anesthesiologist and RN. Patient drowsy, moving all extremities. Vitals stable on 8L via mask. Right groin site soft, dressing C/D/I. Pedal pulse 1+.     0955: EKG at bedside.     1000: Patient 's son updated on status.     1010: Doctor Galloway at bedside discussing procedure with patient.     1030: Son at bedside.     1130: Bedrest completed.    1150: Patient ambulated, tolerated well.     1215: APRN at bedside discussing procedure with patient and son. Discharge instructions reviewed with patient and family,. PIV removed , patient dressed.     1300: Patient transported to [facility] via wheelchair.

## 2023-11-08 NOTE — ANESTHESIA TIME REPORT
Anesthesia Start and Stop Event Times       Date Time Event    11/8/2023 0736 Ready for Procedure     0742 Anesthesia Start     0931 Anesthesia Stop          Responsible Staff  11/08/23      Name Role Begin End    Min JOSE Middleton M.D. Anesth 0742 0931          Overtime Reason:  no overtime (within assigned shift)    Comments:

## 2023-11-08 NOTE — ANESTHESIA PROCEDURE NOTES
Airway    Date/Time: 11/8/2023 7:56 AM    Performed by: Anselmo Middleton M.D.  Authorized by: Anselmo Middleton M.D.    Location:  OR  Urgency:  Elective  Indications for Airway Management:  Anesthesia      Spontaneous Ventilation: absent    Sedation Level:  Deep  Preoxygenated: Yes    Patient Position:  Sniffing  Mask Difficulty Assessment:  1 - vent by mask  Final Airway Type:  Endotracheal airway  Final Endotracheal Airway:  ETT  Cuffed: Yes    Technique Used for Successful ETT Placement:  Direct laryngoscopy  Devices/Methods Used in Placement:  Intubating stylet    Insertion Site:  Oral  Blade Type:  Jamari  Laryngoscope Blade/Videolaryngoscope Blade Size:  3  ETT Size (mm):  6.5  Measured from:  Teeth  ETT to Teeth (cm):  21  Placement Verified by: capnometry    Cormack-Lehane Classification:  Grade IIa - partial view of glottis  Number of Attempts at Approach:  1

## 2023-11-08 NOTE — CATH LAB
Electrophysiology Procedure Note  [facility]    Procedure(s) Performed:   Supraventricular tachycardia ablation (atrial flutter ablation)   Electroanatomical 3D mapping with CARTO  CS/LA pacing and recording  Transesophageal echocardiogram  Ablation of secondary cause of arrhythmia  Pre and post reprogramming dual-chamber pacemaker    Indication: Recurrent atrial flutter    : Jhon Galloway M.D.    Anesthesia: General anesthesia    Anesthesiologist: Dr Anselmo Middleton    Specimen(s) Removed: None    Estimated Blood Loss:  20cc    Complications: None    Pre-procedure ECG: Atrial pacing    Post Procedure ECG: Atrial pacing    Description of Procedure:    After informed written consent, the patient was brought to the EP lab in the fasting, non-sedated state. General anaesthesia given to the patient. DARLENE excluded left atrial and appendage thrombus. The patient was prepped and draped in the usual sterile fashion. Femoral venous access was obtained using the modified Seldinger technique. In the right femoral vein, 3 sheaths (6,7,8 Fr) were inserted over 0.35” guidewires. A deflectable decapolar catheter was advanced to the CS position. A OctaRay catheter was advanced into the right atrium for mapping. One 8Fr saline irrigated ablation catheter with 3.5mm distal electrode and location sensor for the CARTO system (Biosense Navistar ST SF F/J) was inserted into an 8Fr sheath for electroanatomical 3D mapping and radiofrequency ablation.  Inducible typical atrial flutter counterclockwise.  During ablation we saw bilateral isthmus block.  Inducible atrial tachycardia was seen medial to the line, ablation with termination at best map location.  Post ablation we waited 20 minutes and bilateral isthmus block continued.    At the end of the procedure, the catheter and sheaths were removed, and hemostasis was achieved by Vascade.  Following recovery from anesthesia, the patient was transferred to  recovery.    Baseline Rhythm: Atrial pacing  CL 1000 msec    Intervals:   msec   HV 60 msec    Arrhythmias:  1.  Inducible sustained typical counterclockwise atrial flutter,  ms with intact AV conduction.   2.  Inducible atrial tachycardia medial to the flutter line.  Cycle length 330 ms    Mapping:  Electroanatomical 3D (CARTO FAM) map of CS and right atrium around the cavotricuspid isthmus was created during CS pacing. His cloud performed    Ablation:  Radiofrequency energy was applied using 3.5 mm saline irrigated catheter, power 40 W, total 14 RF applications, RF time 466 seconds to create a cavotricuspid isthmus line between the tricuspid annulus and Eustachian ridge/inferior vena cava to produce bidirectional isthmus conduction block.  Ablation RA tachycardia medial to isthmus line.    Post Ablation Testin.  Non inducible for atrial flutter with CS pacing post ablation.  2. Trans-isthmus conduction time pacing from proximal  msec.  3. Trans-isthmus conduction time pacing from lateral to the isthmus line 150 msec.      Fluoroscopy Time: 6.7 min, 28 mGy    Impressions/Plan:   1. Baseline inducible typical counterclockwise right atrial flutter.  2. Successful catheter mediated ablation of right atrial flutter with bilateral isthmus block.  3. Inducible RA tachycardia medial to isthmus line. Ablated.

## 2023-11-13 ENCOUNTER — APPOINTMENT (OUTPATIENT)
Dept: MEDICAL GROUP | Facility: MEDICAL CENTER | Age: 73
End: 2023-11-13
Payer: MEDICARE

## 2023-11-15 ENCOUNTER — APPOINTMENT (OUTPATIENT)
Dept: MEDICAL GROUP | Facility: PHYSICIAN GROUP | Age: 73
End: 2023-11-15
Payer: MEDICARE

## 2023-11-15 ENCOUNTER — OFFICE VISIT (OUTPATIENT)
Dept: CARDIOLOGY | Facility: MEDICAL CENTER | Age: 73
End: 2023-11-15
Attending: INTERNAL MEDICINE
Payer: MEDICARE

## 2023-11-15 VITALS
WEIGHT: 176 LBS | SYSTOLIC BLOOD PRESSURE: 126 MMHG | OXYGEN SATURATION: 90 % | HEART RATE: 60 BPM | HEIGHT: 64 IN | BODY MASS INDEX: 30.05 KG/M2 | DIASTOLIC BLOOD PRESSURE: 58 MMHG | RESPIRATION RATE: 20 BRPM

## 2023-11-15 DIAGNOSIS — Z86.79 S/P ABLATION OF ATRIAL FLUTTER: ICD-10-CM

## 2023-11-15 DIAGNOSIS — E11.69 DYSLIPIDEMIA ASSOCIATED WITH TYPE 2 DIABETES MELLITUS (HCC): ICD-10-CM

## 2023-11-15 DIAGNOSIS — J44.9 CHRONIC OBSTRUCTIVE PULMONARY DISEASE, UNSPECIFIED COPD TYPE (HCC): ICD-10-CM

## 2023-11-15 DIAGNOSIS — I10 BENIGN ESSENTIAL HYPERTENSION: ICD-10-CM

## 2023-11-15 DIAGNOSIS — Z95.0 PACEMAKER: ICD-10-CM

## 2023-11-15 DIAGNOSIS — I48.21 PERMANENT ATRIAL FIBRILLATION (HCC): ICD-10-CM

## 2023-11-15 DIAGNOSIS — Z98.890 S/P ABLATION OF ATRIAL FLUTTER: ICD-10-CM

## 2023-11-15 DIAGNOSIS — Z79.01 ANTICOAGULATED: ICD-10-CM

## 2023-11-15 DIAGNOSIS — D68.69 SECONDARY HYPERCOAGULABLE STATE (HCC): ICD-10-CM

## 2023-11-15 DIAGNOSIS — E78.5 DYSLIPIDEMIA ASSOCIATED WITH TYPE 2 DIABETES MELLITUS (HCC): ICD-10-CM

## 2023-11-15 DIAGNOSIS — Z98.890 HISTORY OF MITRAL VALVE REPAIR: ICD-10-CM

## 2023-11-15 DIAGNOSIS — I27.20 PULMONARY HYPERTENSION (HCC): ICD-10-CM

## 2023-11-15 PROCEDURE — 93280 PM DEVICE PROGR EVAL DUAL: CPT | Performed by: INTERNAL MEDICINE

## 2023-11-15 PROCEDURE — 99214 OFFICE O/P EST MOD 30 MIN: CPT | Mod: 25 | Performed by: INTERNAL MEDICINE

## 2023-11-15 PROCEDURE — 3074F SYST BP LT 130 MM HG: CPT | Performed by: INTERNAL MEDICINE

## 2023-11-15 PROCEDURE — 3078F DIAST BP <80 MM HG: CPT | Performed by: INTERNAL MEDICINE

## 2023-11-15 PROCEDURE — 99212 OFFICE O/P EST SF 10 MIN: CPT | Performed by: INTERNAL MEDICINE

## 2023-11-15 PROCEDURE — 93280 PM DEVICE PROGR EVAL DUAL: CPT | Mod: 26 | Performed by: INTERNAL MEDICINE

## 2023-11-15 ASSESSMENT — FIBROSIS 4 INDEX: FIB4 SCORE: 2.2

## 2023-11-15 NOTE — PROGRESS NOTES
"Chief Complaint   Patient presents with    Atrial Fibrillation       Subjective     Salma Lees is a 73 y.o. female who presents today status post right atrial flutter ablation and AT ablation.  No significant palpitations.  Device interrogated no significant arrhythmias.  On low-dose amiodarone.  Did get a hematoma in the right groin is improving.  No bruit heard over the area.    Past Medical History:   Diagnosis Date    A-fib (Formerly [facility])     Asthma     BMI 35.0-35.9,adult 11/12/2018    Breath shortness 09/07/2017    uses oxygen at 2 liters/min cont. with \"[facility]\"    Calculus of ureter 09/18/2017    Cataract     codey IOL    Chronic anticoagulation 02/13/2017    CKD (chronic kidney disease) stage 3, GFR 30-59 ml/min (Formerly [facility])     Congestive heart failure (Formerly [facility])     COPD (chronic obstructive pulmonary disease) (Formerly [facility])     Diabetes (Formerly [facility])     High cholesterol     History of mitral valve repair     Hypertension     Infectious disease 2014    UTI/hx. MRSA    Pacemaker 2014    Pulmonary hypertension (Formerly [facility])     Renal disorder 09/07/2017    has stent    Shortness of breath 10/18/2017    Urinary incontinence      Past Surgical History:   Procedure Laterality Date    URETEROSCOPY Left 09/18/2017    Procedure: URETEROSCOPY;  Surgeon: Edgardo Richter M.D.;  Location: [facility];  Service: Urology    LASERTRIPSY Left 09/18/2017    Procedure: LASERTRIPSY LITHO  ;  Surgeon: Edgardo Richter M.D.;  Location: [facility];  Service: Urology    STENT REMOVAL Left 09/18/2017    Procedure: STENT REMOVAL;  Surgeon: Edgardo Richter M.D.;  Location: [facility];  Service: Urology    CYSTOSCOPY STENT PLACEMENT Left 06/25/2017    Procedure: CYSTOSCOPY, LEFT URETERAL STENT PLACEMENT;  Surgeon: Edgardo Richter M.D.;  Location: [facility];  Service:     PACEMAKER INSERTION  01/01/2015    KNEE REPLACEMENT, TOTAL Left 01/01/2015    MITRAL VALVE REPAIR  01/01/2009 "    HYSTERECTOMY, VAGINAL  1994    TONSILLECTOMY       Family History   Problem Relation Age of Onset    Lung Disease Mother         asthma    Cancer Mother         ovarian    Heart Disease Father     Stroke Father     Thyroid Sister     Cancer Brother         pancreas    Cancer Paternal Grandmother         colon    Heart Disease Brother     No Known Problems Brother     No Known Problems Brother     Heart Disease Son         afib     Social History     Socioeconomic History    Marital status:      Spouse name: Not on file    Number of children: Not on file    Years of education: Not on file    Highest education level: Not on file   Occupational History    Not on file   Tobacco Use    Smoking status: Former     Current packs/day: 0.00     Average packs/day: 1 pack/day for 5.0 years (5.0 ttl pk-yrs)     Types: Cigarettes     Start date: 1985     Quit date: 1990     Years since quittin.8    Smokeless tobacco: Never    Tobacco comments:     Continued abstinence   Vaping Use    Vaping Use: Never used   Substance and Sexual Activity    Alcohol use: No     Alcohol/week: 0.0 oz    Drug use: No    Sexual activity: Not Currently   Other Topics Concern    Not on file   Social History Narrative    She has a son. She had previously experience in [employer].     Social Determinants of Health     Financial Resource Strain: Not on file   Food Insecurity: Not on file   Transportation Needs: Not on file   Physical Activity: Not on file   Stress: Not on file   Social Connections: Not on file   Intimate Partner Violence: Not on file   Housing Stability: Not on file     Allergies   Allergen Reactions    Amoxicillin Anaphylaxis and Shortness of Breath    Nitrofurantoin      swelling    Pcn [Penicillins] Swelling    Vancomycin Shortness of Breath and Unspecified     Severe hypotension and bronchospasm observed by anesthesia while giving  vanco during procedure    Amlodipine Besylate-Valsartan Unspecified     Chest pain and  dizziness     Etodolac Hives and Nausea     Dark stools    Food Hives     Plums    Eliquis [Apixaban] Shortness of Breath     Pt reports SOB and dizzy when she took    Lisinopril Unspecified     Dizziness     Other Drug Rash     Metal silver    Other Misc Runny Nose     Mold, dust, and feathers, adhesives, plums    Tape Rash     Paper tape ok     Outpatient Encounter Medications as of 11/15/2023   Medication Sig Dispense Refill    diphenhydrAMINE-APAP, sleep,  MG Tab Take 0.5 Tablets by mouth at bedtime as needed.      amiodarone (CORDARONE) 200 MG Tab Take 1 Tablet by mouth every day. (Patient taking differently: Take 200 mg by mouth every evening.) 90 Tablet 3    calcitRIOL (ROCALTROL) 0.25 MCG Cap Take 1 Capsule by mouth every day. (Patient taking differently: Take 0.25 mcg by mouth every morning.) 100 Capsule 3    carvedilol (COREG) 25 MG Tab Take 1 Tablet by mouth 2 times a day. 200 Tablet 3    atorvastatin (LIPITOR) 10 MG Tab Take 1 Tablet by mouth every evening. 100 Tablet 3    spironolactone (ALDACTONE) 25 MG Tab Take 1 Tablet by mouth every day. 100 Tablet 3    warfarin (COUMADIN) 5 MG Tab Take 1 Tablet by mouth every day. take 1 and 1/2 tablets by mouth daily or as directed by anticoagulation clinic 135 Tablet 3    levalbuterol (XOPENEX HFA) 45 MCG/ACT inhaler Inhale 2 Puffs every four hours as needed for Shortness of Breath. 1 Each 2    furosemide (LASIX) 20 MG Tab Take 1 Tablet by mouth every day. 100 Tablet 3    Dulaglutide 1.5 MG/0.5ML Solution Pen-injector Inject 0.5 mL under the skin every 7 days. Every Sunday 1 mL 0    [DISCONTINUED] digoxin (LANOXIN) 125 MCG Tab Take 1 Tablet by mouth every day. (Patient taking differently: Take 125 mcg by mouth every morning.) 100 Tablet 3    [DISCONTINUED] dilTIAZem (CARDIZEM) 30 MG Tab Take 1 Tablet by mouth 2 times a day. 200 Tablet 3     No facility-administered encounter medications on file as of 11/15/2023.     ROS           Objective     /58 (BP  "Location: Left arm, Patient Position: Sitting, BP Cuff Size: Adult)   Pulse 60   Resp 20   Ht 1.626 m (5' 4\")   Wt 79.8 kg (176 lb)   SpO2 90%   BMI 30.21 kg/m²     Physical Exam  Constitutional:       Appearance: She is well-developed.   HENT:      Head: Normocephalic and atraumatic.   Eyes:      Pupils: Pupils are equal, round, and reactive to light.   Cardiovascular:      Rate and Rhythm: Normal rate and regular rhythm.      Heart sounds: Normal heart sounds. No murmur heard.     No friction rub. No gallop.      Comments: Moderate groin hematoma right  Pulmonary:      Effort: Pulmonary effort is normal.      Breath sounds: Normal breath sounds.   Abdominal:      General: Bowel sounds are normal.      Palpations: Abdomen is soft.   Musculoskeletal:         General: Normal range of motion.      Cervical back: Normal range of motion and neck supple.   Skin:     General: Skin is warm.   Neurological:      Mental Status: She is alert and oriented to person, place, and time.      Cranial Nerves: No cranial nerve deficit.   Psychiatric:         Behavior: Behavior normal.         Thought Content: Thought content normal.         Judgment: Judgment normal.                Assessment & Plan     1. Pulmonary hypertension (HCC)        2. Secondary hypercoagulable state (HCC)        3. Permanent atrial fibrillation (HCC)        4. Anticoagulated        5. Pacemaker        6. Dyslipidemia associated with type 2 diabetes mellitus (HCC)        7. Chronic obstructive pulmonary disease, unspecified COPD type (HCC)        8. Benign essential hypertension        9. History of mitral valve repair        10. S/P ablation of atrial flutter/AT            Medical Decision Making: Today's Assessment/Status/Plan:   1.  Status post ablation.  Hematoma continue to monitor this.  Improving as per the patient.  If worsens consider ultrasound.  Doubt pseudoaneurysm or AV fistula.  2.  Mitral valve repair.  3.  Anticoagulation continue.  4.  " Pacemaker normal function.  Switch back to DDDR with search.  5.  DC diltiazem and digoxin.  6.  Follow-up in 3 months.

## 2023-11-20 ENCOUNTER — ANTICOAGULATION VISIT (OUTPATIENT)
Dept: MEDICAL GROUP | Facility: MEDICAL CENTER | Age: 73
End: 2023-11-20
Payer: MEDICARE

## 2023-11-20 DIAGNOSIS — I48.21 PERMANENT ATRIAL FIBRILLATION (HCC): ICD-10-CM

## 2023-11-20 DIAGNOSIS — Z98.890 H/O MITRAL VALVE REPAIR: ICD-10-CM

## 2023-11-20 LAB — INR PPP: 7.2 (ref 2–3.5)

## 2023-11-20 PROCEDURE — 99211 OFF/OP EST MAY X REQ PHY/QHP: CPT | Performed by: INTERNAL MEDICINE

## 2023-11-20 PROCEDURE — 85610 PROTHROMBIN TIME: CPT | Performed by: STUDENT IN AN ORGANIZED HEALTH CARE EDUCATION/TRAINING PROGRAM

## 2023-11-20 NOTE — PROGRESS NOTES
Anticoagulation Summary  As of 2023      INR goal:  2.0-3.0   TTR:  58.0 % (6.7 y)   INR used for dosin.20 (2023)   Warfarin maintenance plan:  2.5 mg (5 mg x 0.5) every Tue; 5 mg (5 mg x 1) all other days   Weekly warfarin total:  32.5 mg   Plan last modified:  Kaylin Encinas, PharmD (10/30/2023)   Next INR check:  2023   Target end date:  Indefinite    Indications    Atrial fibrillation (HCC) [I48.91]  H/O mitral valve repair [Z98.890]                 Anticoagulation Episode Summary       INR check location:      Preferred lab:      Send INR reminders to:      Comments:            Anticoagulation Care Providers       Provider Role Specialty Phone number    Zeferino Almaraz, A.P.N. Referring Family Medicine [phone]    [facility] Responsible  [phone]    Kenneth Salas, PharmD Responsible Pharmacy           Anticoagulation Patient Findings  Patient Findings       Positives:  Change in medications (d/c diltiazem and digoxin after recent ablation), Bruising (bruise and pain on L leg following recent procedure. No redness/warmth noted.)    Negatives:  Signs/symptoms of thrombosis, Signs/symptoms of bleeding, Laboratory test error suspected, Change in health, Change in alcohol use, Change in activity, Upcoming invasive procedure, Emergency department visit, Upcoming dental procedure, Missed doses, Extra doses, Change in diet/appetite, Hospital admission, Other complaints              HPI:   Salma Lees seen in clinic today, on anticoagulation therapy with warfarin due to history of atrial fibrillation and mitral valve repair .    Patient's previous INR was therapeutic at 2.8 on 10/30/23, at which time patient was instructed to reduce regimen. Patient returns to clinic today to recheck INR to ensure it is therapeutic and thus preventing possible clotting and/or bleeding/bruising complications.    CHADS-VASc = n/a (valvular)    Does patient have any changes to  current medical/health status since last appt: Yes  Does patient have any signs/symptoms of bleeding and/or thrombosis since the last appt: No  Does patient have any interval changes to diet or medications since last appt: No  Are there any complications or cost restrictions with current therapy: No     Does patient have [facility] PCP? Yes, Leah Bonilla D.O. (If not, please document discussion that patient must be seen at [facility])     Vitals:    There were no vitals filed for this visit.     Asssessment:      INR is supratherapeutic at  7.2, therefore increasing risk of bleeding. No unusual s/sx of bleeding.  Reason(s) for out of range INR today:  no obvious causes except for pain     Offered to confirm INR via lab draw, but pt declined.    Pt is not on antiplatelet therapy.    Medication reconciliation completed today.    Warfarin dosing recommendation:  Hold Tue/Wed/Thu, then take 2.5 mg Fri, 5 mg Sat/Sun     Follow up:  Because warfarin is a high risk medication and current CHEST guidelines recommend regular monitoring intervals (few days up to 12 weeks), will have patient return to clinic in 1 week to recheck INR (due to holiday closure)    Michelle Cassidy, JoleenD   No

## 2023-11-27 ENCOUNTER — ANTICOAGULATION VISIT (OUTPATIENT)
Dept: MEDICAL GROUP | Facility: MEDICAL CENTER | Age: 73
End: 2023-11-27
Payer: MEDICARE

## 2023-11-27 DIAGNOSIS — Z98.890 H/O MITRAL VALVE REPAIR: ICD-10-CM

## 2023-11-27 DIAGNOSIS — I48.21 PERMANENT ATRIAL FIBRILLATION (HCC): ICD-10-CM

## 2023-11-27 LAB — INR PPP: 1.5 (ref 2–3.5)

## 2023-11-27 PROCEDURE — 99211 OFF/OP EST MAY X REQ PHY/QHP: CPT | Performed by: INTERNAL MEDICINE

## 2023-11-27 PROCEDURE — 85610 PROTHROMBIN TIME: CPT | Performed by: STUDENT IN AN ORGANIZED HEALTH CARE EDUCATION/TRAINING PROGRAM

## 2023-11-27 NOTE — PROGRESS NOTES
Anticoagulation Summary  As of 2023      INR goal:  2.0-3.0   TTR:  57.9 % (6.7 y)   INR used for dosin.50 (2023)   Warfarin maintenance plan:  2.5 mg (5 mg x 0.5) every Tue, Fri; 5 mg (5 mg x 1) all other days   Weekly warfarin total:  30 mg   Plan last modified:  Michelle Cassidy PharmD (2023)   Next INR check:  2023   Target end date:  Indefinite    Indications    Atrial fibrillation (HCC) [I48.91]  H/O mitral valve repair [Z98.890]                 Anticoagulation Episode Summary       INR check location:      Preferred lab:      Send INR reminders to:      Comments:            Anticoagulation Care Providers       Provider Role Specialty Phone number    Zeferino Almaraz A.P.YASMANY. Referring Family Medicine [phone]    [facility] Responsible  [phone]    Kenneth Salas, PharmD Responsible Pharmacy           Anticoagulation Patient Findings  Patient Findings       Positives:  Change in medications (Resumed diltiazem), Bruising (Reports some improvement L leg pain and bruising)    Negatives:  Signs/symptoms of thrombosis, Signs/symptoms of bleeding, Laboratory test error suspected, Change in health, Change in alcohol use, Change in activity, Upcoming invasive procedure, Emergency department visit, Upcoming dental procedure, Missed doses, Extra doses, Change in diet/appetite, Hospital admission, Other complaints               HPI:   Salma Lees seen in clinic today, on anticoagulation therapy with warfarin due to history of atrial fibrillation and mitral valve repair .    Patient's previous INR was supratherapeutic at 7.2 on 23, at which time patient was instructed to hold Tue/Wed/Thu, then take 2.5 mg Fri, 5 mg Sat/Sun.  Patient returns to clinic today to recheck INR to ensure it is therapeutic and thus preventing possible clotting and/or bleeding/bruising complications.    CHADS-VASc = n/a (valvular)    Does patient have any changes to current  medical/health status since last appt: No  Does patient have any signs/symptoms of bleeding and/or thrombosis since the last appt: No  Does patient have any interval changes to diet or medications since last appt: No  Are there any complications or cost restrictions with current therapy: No     Does patient have [facility] PCP? Yes, Leah Bonilla D.O. (If not, please document discussion that patient must be seen at [facility])     Vitals:    There were no vitals filed for this visit.     Asssessment:      INR subtherapeutic at 1.5, therefore at increased risk of stroke or VTE.  Reason(s) for out of range INR today:  held/reduced doses after supratherapeutic INR last week Based on previous trends,  32.5 mg/week may cause INR to trend back up above goal, therefore will reduce regimen.    Pt is not on antiplatelet therapy    Medication reconciliation completed today.    Plan:  Decrease to 2.5 mg Tue/Fri, 5 mg all other days    Request pt to return in 3 day(s). Pt declines despite warning of extending their follow up interval.    Michelle Cassidy, PharmD, BCACP

## 2023-12-04 ENCOUNTER — APPOINTMENT (OUTPATIENT)
Dept: MEDICAL GROUP | Facility: MEDICAL CENTER | Age: 73
End: 2023-12-04
Payer: MEDICARE

## 2023-12-04 DIAGNOSIS — Z79.01 CHRONIC ANTICOAGULATION: ICD-10-CM

## 2023-12-05 ENCOUNTER — OFFICE VISIT (OUTPATIENT)
Dept: MEDICAL GROUP | Facility: PHYSICIAN GROUP | Age: 73
End: 2023-12-05
Payer: MEDICARE

## 2023-12-05 ENCOUNTER — HOSPITAL ENCOUNTER (OUTPATIENT)
Facility: MEDICAL CENTER | Age: 73
End: 2023-12-05
Attending: INTERNAL MEDICINE
Payer: MEDICARE

## 2023-12-05 VITALS
DIASTOLIC BLOOD PRESSURE: 46 MMHG | HEART RATE: 60 BPM | TEMPERATURE: 97.6 F | WEIGHT: 177 LBS | RESPIRATION RATE: 18 BRPM | SYSTOLIC BLOOD PRESSURE: 86 MMHG | OXYGEN SATURATION: 95 % | BODY MASS INDEX: 30.22 KG/M2 | HEIGHT: 64 IN

## 2023-12-05 DIAGNOSIS — R80.9 MICROALBUMINURIA DUE TO TYPE 2 DIABETES MELLITUS (HCC): ICD-10-CM

## 2023-12-05 DIAGNOSIS — E11.22 TYPE 2 DIABETES MELLITUS WITH STAGE 4 CHRONIC KIDNEY DISEASE, WITHOUT LONG-TERM CURRENT USE OF INSULIN (HCC): ICD-10-CM

## 2023-12-05 DIAGNOSIS — Z86.79 S/P ABLATION OF ATRIAL FLUTTER: ICD-10-CM

## 2023-12-05 DIAGNOSIS — E11.29 MICROALBUMINURIA DUE TO TYPE 2 DIABETES MELLITUS (HCC): ICD-10-CM

## 2023-12-05 DIAGNOSIS — N18.4 TYPE 2 DIABETES MELLITUS WITH STAGE 4 CHRONIC KIDNEY DISEASE, WITHOUT LONG-TERM CURRENT USE OF INSULIN (HCC): ICD-10-CM

## 2023-12-05 DIAGNOSIS — N18.4 CKD (CHRONIC KIDNEY DISEASE) STAGE 4, GFR 15-29 ML/MIN (HCC): ICD-10-CM

## 2023-12-05 DIAGNOSIS — Z98.890 S/P ABLATION OF ATRIAL FLUTTER: ICD-10-CM

## 2023-12-05 DIAGNOSIS — Z00.00 MEDICARE ANNUAL WELLNESS VISIT, SUBSEQUENT: Primary | ICD-10-CM

## 2023-12-05 DIAGNOSIS — Z23 NEED FOR INFLUENZA VACCINATION: ICD-10-CM

## 2023-12-05 LAB
AMBIGUOUS DTTM AMBI4: NORMAL
CREAT UR-MCNC: 313.73 MG/DL
MICROALBUMIN UR-MCNC: 7.3 MG/DL
MICROALBUMIN/CREAT UR: 23 MG/G (ref 0–30)

## 2023-12-05 PROCEDURE — 82570 ASSAY OF URINE CREATININE: CPT

## 2023-12-05 PROCEDURE — 82043 UR ALBUMIN QUANTITATIVE: CPT

## 2023-12-05 PROCEDURE — G0439 PPPS, SUBSEQ VISIT: HCPCS | Mod: 25 | Performed by: INTERNAL MEDICINE

## 2023-12-05 PROCEDURE — 99214 OFFICE O/P EST MOD 30 MIN: CPT | Mod: 25 | Performed by: INTERNAL MEDICINE

## 2023-12-05 PROCEDURE — 90662 IIV NO PRSV INCREASED AG IM: CPT | Performed by: INTERNAL MEDICINE

## 2023-12-05 PROCEDURE — G0008 ADMIN INFLUENZA VIRUS VAC: HCPCS | Performed by: INTERNAL MEDICINE

## 2023-12-05 PROCEDURE — 3074F SYST BP LT 130 MM HG: CPT | Performed by: INTERNAL MEDICINE

## 2023-12-05 PROCEDURE — 3078F DIAST BP <80 MM HG: CPT | Performed by: INTERNAL MEDICINE

## 2023-12-05 RX ORDER — POTASSIUM CHLORIDE 20 MEQ/1
20 TABLET, EXTENDED RELEASE ORAL DAILY
COMMUNITY
Start: 2023-10-31 | End: 2024-01-02

## 2023-12-05 ASSESSMENT — FIBROSIS 4 INDEX: FIB4 SCORE: 2.2

## 2023-12-05 ASSESSMENT — PATIENT HEALTH QUESTIONNAIRE - PHQ9: CLINICAL INTERPRETATION OF PHQ2 SCORE: 0

## 2023-12-05 ASSESSMENT — ENCOUNTER SYMPTOMS: GENERAL WELL-BEING: GOOD

## 2023-12-05 ASSESSMENT — ACTIVITIES OF DAILY LIVING (ADL): BATHING_REQUIRES_ASSISTANCE: 0

## 2023-12-05 NOTE — PROGRESS NOTES
Chief Complaint   Patient presents with    Annual Wellness Visit       HPI:  Salma Lees is a 73 y.o. here for Medicare Annual Wellness Visit     Problem   S/P ablation of atrial flutter/AT    She underwent ablation for a flutter/atrial tachycardia by Dr. Galloway of EP in mid November 2023, she has follow-up in 3 months but will see her general cardiologist early next week.  She is having some low blood pressure readings.  She tried going off digoxin and diltiazem but had recurrence of tachycardia.     Microalbuminuria Due to Type 2 Diabetes Mellitus ([facility])     Latest Reference Range & Units 10/19/22 11:35   Micro Alb Creat Ratio 0 - 30 mg/g 112 (H)     She has longstanding history of both diabetes and kidney disease.  Not currently taking ACE inhibitor or ARB due to reduced kidney function.  Recheck urine microalbumin now that A1c is better.     Ckd (Chronic Kidney Disease) Stage 4, Gfr 15-29 Ml/Min ([facility])     Latest Reference Range & Units 11/06/23 14:16   Bun 8 - 22 mg/dL 47 (H)   Creatinine 0.50 - 1.40 mg/dL 2.02 (H)   GFR (CKD-EPI) >60 mL/min/1.73 m 2 25 !       She has a history of chronic kidney disease likely secondary to heart disease, nephrolithiasis, and diabetes.  She is on several renally excreted medications including spironolactone, Lasix, and digoxin. No secondary evaluation completed. GFR previously in the high 50s and now in the low 30s.  She has contributing hyperparathyroidism likely related to longstanding chronic kidney disease.  Not currently seeing a nephrologist.     Type 2 Diabetes Mellitus With Stage 4 Chronic Kidney Disease, Without Long-Term Current Use of Insulin ([facility])     Latest Reference Range & Units 11/06/23 14:16   Glycohemoglobin 4.0 - 5.6 % 6.5 (H)   Estim. Avg Glu mg/dL 140       She has had diabetes since at least 2017 with A1c ranging 7-14. She did not tolerate metformin and has CKDIII/IV. She previously had microalbuminuria, currently resolved. No known neuropathy or  retinopathy.    Current regimen: Trulicity 1.5 mg weekly on Sundays          Patient Active Problem List    Diagnosis Date Noted    S/P ablation of atrial flutter/AT 11/15/2023    Iron deficiency anemia, unspecified 11/02/2023    Eosinophilia 08/07/2023    Bruit 08/07/2023    Amaurosis fugax of left eye 08/07/2023    Hyperparathyroidism due to renal insufficiency ([facility]) 05/01/2023    Vitamin D deficiency 05/01/2023    Microalbuminuria due to type 2 diabetes mellitus ([facility]) 01/23/2023    Genitourinary syndrome of menopause 10/19/2022    Dyslipidemia associated with type 2 diabetes mellitus ([facility]) 09/09/2021    Secondary hypercoagulable state ([facility]) 08/06/2021    Chronic respiratory failure with hypoxia, on home oxygen therapy ([facility]) 11/19/2019    Atherosclerosis of aorta ([facility]) 11/19/2019    Pulmonary hypertension ([facility]) 11/19/2019    CKD (chronic kidney disease) stage 4, GFR 15-29 ml/min ([facility]) 11/19/2019    Normocytic anemia 06/26/2017    Encounter for completion of form with patient- DMV form and [employer] patient assistance 02/13/2017    H/O mitral valve repair 02/13/2017    Chronic obstructive pulmonary disease ([facility]) 02/13/2017    Atrial fibrillation ([facility]) 02/13/2017    Type 2 diabetes mellitus with stage 4 chronic kidney disease, without long-term current use of insulin ([facility]) 02/13/2017    Anticoagulated 02/13/2017    Benign essential hypertension 03/19/2015    Pacemaker 2014       Current Outpatient Medications   Medication Sig Dispense Refill    dilTIAZem (CARDIZEM) 30 MG Tab Take 30 mg by mouth 2 (two) times a day.      potassium chloride SA (KDUR) 20 MEQ Tab CR Take 20 mEq by mouth every day.      diphenhydrAMINE-APAP, sleep,  MG Tab Take 0.5 Tablets by mouth at bedtime as needed.      amiodarone (CORDARONE) 200 MG Tab Take 1 Tablet by mouth every day. (Patient taking differently: Take 200 mg by mouth every evening.) 90 Tablet 3    calcitRIOL (ROCALTROL) 0.25 MCG Cap Take 1 Capsule by mouth every day.  (Patient taking differently: Take 0.25 mcg by mouth every morning.) 100 Capsule 3    carvedilol (COREG) 25 MG Tab Take 1 Tablet by mouth 2 times a day. 200 Tablet 3    atorvastatin (LIPITOR) 10 MG Tab Take 1 Tablet by mouth every evening. 100 Tablet 3    spironolactone (ALDACTONE) 25 MG Tab Take 1 Tablet by mouth every day. 100 Tablet 3    warfarin (COUMADIN) 5 MG Tab Take 1 Tablet by mouth every day. take 1 and 1/2 tablets by mouth daily or as directed by anticoagulation clinic 135 Tablet 3    levalbuterol (XOPENEX HFA) 45 MCG/ACT inhaler Inhale 2 Puffs every four hours as needed for Shortness of Breath. 1 Each 2    furosemide (LASIX) 20 MG Tab Take 1 Tablet by mouth every day. 100 Tablet 3    Dulaglutide 1.5 MG/0.5ML Solution Pen-injector Inject 0.5 mL under the skin every 7 days. Every Sunday 1 mL 0     No current facility-administered medications for this visit.          Current supplements as per medication list.     Allergies: Amoxicillin, Nitrofurantoin, Pcn [penicillins], Vancomycin, Amlodipine besylate-valsartan, Etodolac, Food, Eliquis [apixaban], Lisinopril, Other drug, Other misc, and Tape    Current social contact/activities: virtual     She  reports that she quit smoking about 33 years ago. Her smoking use included cigarettes. She started smoking about 38 years ago. She has a 5.0 pack-year smoking history. She has never used smokeless tobacco. She reports that she does not drink alcohol and does not use drugs.  Counseling given: Not Answered  Tobacco comments: Continued abstinence      ROS:    Gait: Uses no assistive device  Ostomy: No  Other tubes: No  Amputations: No  Chronic oxygen use: Yes  Last eye exam: 4/2023  Wears hearing aids: No   : Reports urinary leakage during the last 6 months that has somewhat interfered with their daily activities or sleep.      Depression Screening  Little interest or pleasure in doing things?  0 - not at all  Feeling down, depressed , or hopeless? 0 - not at  all  Patient Health Questionnaire Score: 0     If depressive symptoms identified deferred to follow up visit unless specifically addressed in assessment and plan.    Interpretation of PHQ-9 Total Score   Score Severity   1-4 No Depression   5-9 Mild Depression   10-14 Moderate Depression   15-19 Moderately Severe Depression   20-27 Severe Depression    Screening for Cognitive Impairment  Do you or any of your friends or family members have any concern about your memory?  Yes Son  Three Minute Recall (Banana, Sunrise, Chair) 3/3    Dave clock face with all 12 numbers and set the hands to show 20 past 8.  Yes 5/5  Cognitive concerns identified deferred for follow up unless specifically addressed in assessment and plan.    Fall Risk Assessment  Has the patient had two or more falls in the last year or any fall with injury in the last year?  No    Safety Assessment  Do you always wear your seatbelt?  Yes  Any changes to home needed to function safely? No  Difficulty hearing.  No  Patient counseled about all safety risks that were identified.    Functional Assessment ADLs  Are there any barriers preventing you from cooking for yourself or meeting nutritional needs?  No.    Are there any barriers preventing you from driving safely or obtaining transportation?  No.    Are there any barriers preventing you from using a telephone or calling for help?  No    Are there any barriers preventing you from shopping?  No.    Are there any barriers preventing you from taking care of your own finances?  No    Are there any barriers preventing you from managing your medications?  No    Are there any barriers preventing you from showering, bathing or dressing yourself? No    Are there any barriers preventing you from doing housework or laundry? No  Are there any barriers preventing you from using the toilet?No  Are you currently engaging in any exercise or physical activity?  Yes. Once and a while    Self-Assessment of Health  What is  your perception of your health? Good  Do you sleep more than six hours a night? Yes  In the past 7 days, how much did pain keep you from doing your normal work? None  Do you spend quality time with family or friends (virtually or in person)? Yes  Do you usually eat a heart healthy diet that constists of a variety of fruits, vegetables, whole grains and fiber? Yes  Do you eat foods high in fat and/or Fast Food more than three times per week? No    Advance Care Planning  Do you have an Advance Directive, Living Will, Durable Power of , or POLST? Yes    Living Will     is not on file - instructed patient to bring in a copy to scan into their chart      Health Maintenance Summary            Overdue - COVID-19 Vaccine (6 - Pfizer series) Overdue since 1/28/2023 09/28/2022  Imm Admin: PFIZER BIVALENT SARS-COV-2 VACCINE (12+)    06/07/2022  Imm Admin: PFIZER MOORE CAP SARS-COV-2 VACCINATION (12+)    09/27/2021  Imm Admin: PFIZER PURPLE CAP SARS-COV-2 VACCINATION (12+)    02/09/2021  Imm Admin: PFIZER PURPLE CAP SARS-COV-2 VACCINATION (12+)    01/19/2021  Imm Admin: PFIZER PURPLE CAP SARS-COV-2 VACCINATION (12+)              Ordered - Annual Pulmonary Function Test / Spirometry (Yearly) Ordered on 10/18/2023      04/05/2022  PFT DICTATED RESULTS    12/22/2020  PFT DICTATED RESULTS    11/12/2018  PULMONARY FUNCTION TESTS -Test requested: Complete Pulmonary Function Test              Ordered - Colorectal Cancer Screening (Colon Cancer Screening Annual FIT - Yearly) Ordered on 11/6/2023      10/11/2022  OCCULT BLOOD FECES IMMUNOASSAY    06/14/2021  OCCULT BLOOD FECES IMMUNOASSAY              Ordered - Diabetes: Urine Protein Screening (Yearly) Ordered on 12/5/2023      10/19/2022  MICROALBUMIN CREAT RATIO URINE    10/19/2021  MICROALBUMIN CREAT RATIO URINE    12/03/2019  MICROALBUMIN CREAT RATIO URINE              Ordered - Fasting Lipid Profile (Yearly) Ordered on 8/7/2023 04/25/2023  Lipid Profile     10/26/2022  Lipid Profile    09/13/2021  Lipid Profile    06/02/2021  Lipid Profile    10/19/2018  LIPID PROFILE    Only the first 5 history entries have been loaded, but more history exists.              Diabetes: Monofilament / LE Exam (Yearly) Tentatively due on 5/1/2024 05/01/2023  Diabetic Monofilament Lower Extremity Exam    04/11/2022  Diabetic Monofilament LE Exam    01/18/2021  [employer]: WORKFLOW - DIABETES - DIABETIC FOOT EXAM PERFORMED    01/18/2021  Diabetic Monofilament Lower Extremity Exam              A1c Screening (Every 6 Months) Next due on 5/6/2024 11/06/2023  Hemoglobin A1c    08/03/2023  HEMOGLOBIN A1C    05/01/2023  POCT  A1C    01/23/2023  POCT  A1C    10/19/2022  POCT  A1C    Only the first 5 history entries have been loaded, but more history exists.              Diabetes: Retinopathy Screening (Yearly) Next due on 5/16/2024 05/16/2023  RETINAL SCREENING RESULTS    05/01/2023  Done - 4/18/2023- records requested    04/19/2023  AMB EXTERNAL RETINAL SCREENING RESULTS    04/19/2023  AMB EXTERNAL RETINAL SCREENING RESULTS    03/29/2022  REFERRAL FOR RETINAL SCREENING EXAM    Only the first 5 history entries have been loaded, but more history exists.              Mammogram (Every 2 Years) Next due on 10/4/2024      10/04/2022  MA-SCREENING MAMMO BILAT W/TOMOSYNTHESIS W/CAD    07/01/2021  MA-SCREENING MAMMO BILAT W/TOMOSYNTHESIS W/CAD              Ordered - SERUM CREATININE (Yearly) Ordered on 12/5/2023 11/06/2023  Comp Metabolic Panel    08/03/2023  Comp Metabolic Panel    04/25/2023  Comp Metabolic Panel    11/08/2022  COMP METABOLIC PANEL    10/26/2022  Comp Metabolic Panel    Only the first 5 history entries have been loaded, but more history exists.              Annual Wellness Visit (Every 366 Days) Next due on 12/5/2024 12/05/2023  Visit Dx: Medicare annual wellness visit, subsequent    12/05/2023  Subsequent Annual Wellness Visit - Includes PPPS ()     2021  Level of Service: DE ANNUAL WELLNESS VISIT-INCLUDES PPPS SUBSEQUE*    2021  Level of Service: DE ANNUAL WELLNESS VISIT-INCLUDES PPPS SUBSEQUE*              Bone Density Scan (Every 5 Years) Next due on 10/4/2027      10/04/2022  DS-BONE DENSITY STUDY (DEXA)              IMM DTaP/Tdap/Td Vaccine (3 - Td or Tdap) Next due on 2023  Imm Admin: Tdap Vaccine    11/15/2012  Imm Admin: Tdap Vaccine              Pneumococcal Vaccine: 65+ Years (Series Information) Completed      2019  Imm Admin: Pneumococcal polysaccharide vaccine (PPSV-23)    10/25/2016  Imm Admin: Pneumococcal Vaccine (PCV7) - HISTORICAL DATA    10/06/2016  Imm Admin: Pneumococcal Conjugate Vaccine (Prevnar/PCV-13)    2011  Imm Admin: Pneumococcal polysaccharide vaccine (PPSV-23)              Hepatitis C Screening  Tentatively Complete      2021  Hepatitis C Antibody component of HCV Scrn ( 4710-5616 1xLife)              Zoster (Shingles) Vaccines (Series Information) Completed      10/01/2021  Imm Admin: Zoster Vaccine Recombinant (RZV) (SHINGRIX)    2021  Imm Admin: Zoster Vaccine Recombinant (RZV) (SHINGRIX)              Influenza Vaccine (Series Information) Completed      2023  Imm Admin: Influenza Vaccine Adult HD    10/19/2022  Imm Admin: Influenza Vaccine Adult HD    10/12/2021  Imm Admin: Influenza Vaccine Adult HD    2020  Imm Admin: Influenza Vaccine Adult HD    2019  Imm Admin: Influenza Vaccine Adult HD    Only the first 5 history entries have been loaded, but more history exists.              Hepatitis A Vaccine (Hep A) (Series Information) Aged Out      No completion history exists for this topic.              HPV Vaccines (Series Information) Aged Out      No completion history exists for this topic.              Polio Vaccine (Inactivated Polio) (Series Information) Aged Out      No completion history exists for this topic.              Meningococcal  Immunization (Series Information) Aged Out      No completion history exists for this topic.              Discontinued - Hepatitis B Vaccine (Hep B)  Discontinued      No completion history exists for this topic.                    Patient Care Team:  Leah Bonilla D.O. as PCP - General (Internal Medicine)  Ivy Galloway M.D. (Pulmonary Medicine)  PREFERRED (DME Supplier)        Social History     Tobacco Use    Smoking status: Former     Current packs/day: 0.00     Average packs/day: 1 pack/day for 5.0 years (5.0 ttl pk-yrs)     Types: Cigarettes     Start date: 1985     Quit date: 1990     Years since quittin.9    Smokeless tobacco: Never    Tobacco comments:     Continued abstinence   Vaping Use    Vaping Use: Never used   Substance Use Topics    Alcohol use: No     Alcohol/week: 0.0 oz    Drug use: No     Family History   Problem Relation Age of Onset    Lung Disease Mother         asthma    Cancer Mother         ovarian    Heart Disease Father     Stroke Father     Thyroid Sister     Cancer Brother         pancreas    Cancer Paternal Grandmother         colon    Heart Disease Brother     No Known Problems Brother     No Known Problems Brother     Heart Disease Son         afib     She  has a past medical history of A-fib (HCC), Asthma, BMI 35.0-35.9,adult (2018), Breath shortness (2017), Calculus of ureter (2017), Cataract, Chronic anticoagulation (2017), CKD (chronic kidney disease) stage 3, GFR 30-59 ml/min ([facility]), Congestive heart failure (HCC), COPD (chronic obstructive pulmonary disease) ([facility]), Diabetes ([facility]), High cholesterol, History of mitral valve repair, Hypertension, Infectious disease (), Pacemaker (), Pulmonary hypertension (HCC), Renal disorder (2017), Shortness of breath (10/18/2017), and Urinary incontinence.    She has no past medical history of Cough, Encounter for long-term (current) use of other medications, Painful breathing, Sputum  "production, or Wheezing.   Past Surgical History:   Procedure Laterality Date    URETEROSCOPY Left 09/18/2017    Procedure: URETEROSCOPY;  Surgeon: Edgardo Richter M.D.;  Location: SURGERY [facility];  Service: Urology    LASERTRIPSY Left 09/18/2017    Procedure: LASERTRIPSY LITHO  ;  Surgeon: Edgardo Richter M.D.;  Location: SURGERY [facility];  Service: Urology    STENT REMOVAL Left 09/18/2017    Procedure: STENT REMOVAL;  Surgeon: Edgardo Richter M.D.;  Location: SURGERY [facility];  Service: Urology    CYSTOSCOPY STENT PLACEMENT Left 06/25/2017    Procedure: CYSTOSCOPY, LEFT URETERAL STENT PLACEMENT;  Surgeon: Edgardo Richter M.D.;  Location: SURGERY [facility];  Service:     PACEMAKER INSERTION  01/01/2015    KNEE REPLACEMENT, TOTAL Left 01/01/2015    MITRAL VALVE REPAIR  01/01/2009    HYSTERECTOMY, VAGINAL  1994    TONSILLECTOMY         Exam:   BP (!) 86/46 (BP Location: Right arm, Patient Position: Sitting, BP Cuff Size: Large adult)   Pulse 60   Temp 36.4 °C (97.6 °F) (Temporal)   Resp 18   Ht 1.626 m (5' 4\")   Wt 80.3 kg (177 lb)   SpO2 95%  Body mass index is 30.38 kg/m².    Hearing good.    Dentition absent lower front teeth  Alert, oriented in no acute distress.  Eye contact is good, speech goal directed, affect calm    Assessment and Plan. The following treatment and monitoring plan is recommended:   Problem List Items Addressed This Visit       CKD (chronic kidney disease) stage 4, GFR 15-29 ml/min ([facility])     Chronic decompensated issue, GFR down to 25, previously at 38 with last check, could be related to relative hypotension following ablation a few weeks ago.  She will see her cardiologist on Monday, advised her to update me with recommendations for her cardiac medicines and will check GFR 1 more time before that visit.         Microalbuminuria due to type 2 diabetes mellitus (HCC)     Chronic ongoing issue, secondary to diabetes, chronic kidney " disease, and history of congestive heart failure.  Recheck urine microalbumin, blood pressure would not tolerate ACEI or ARB at this time.         Relevant Orders    Referral to Nutrition Services    S/P ablation of atrial flutter/AT     Recent problem, recommend she get a cardia machine so she can follow-up when she has the elevated heart rates whether this is normal rhythm or irregular rhythm.  She will see Dr. Prabhakar on Monday, blood pressure running low right now which is can make her GFR worse, need to discuss what medicines she will be taking moving forward, advised her to hold Lasix and spironolactone right now and discuss adjustments of amiodarone, digoxin, diltiazem, and carvedilol with her cardiology team.         Type 2 diabetes mellitus with stage 4 chronic kidney disease, without long-term current use of insulin (HCC)     Chronic ongoing problem, A1c well-controlled on current regimen, will liberalize diabetes diet she is having hard time finding food compliant with heart healthy diet, diabetes diet, and renal diet.  Recheck A1c again in 3 to 4 months, continue Trulicity 1.5 mg weekly.         Relevant Orders    MICROALBUMIN CREAT RATIO URINE    Referral to Nutrition Services    Comp Metabolic Panel     Other Visit Diagnoses       Medicare annual wellness visit, subsequent    -  Primary    Relevant Orders    Subsequent Annual Wellness Visit - Includes PPPS () (Completed)    Need for influenza vaccination        Relevant Orders    INFLUENZA VACCINE, HIGH DOSE (65+ ONLY) (Completed)              Services suggested: No services needed at this time  Health Care Screening: Age-appropriate preventive services recommended by USPTF and ACIP covered by Medicare were discussed today. Services ordered if indicated and agreed upon by the patient.  Referrals offered: Community-based lifestyle interventions to reduce health risks and promote self-management and wellness, fall prevention, nutrition, physical  activity, tobacco-use cessation, weight loss, and mental health services as per orders if indicated.    Discussion today about general wellness and lifestyle habits:    Prevent falls and reduce trip hazards; Cautioned about securing or removing rugs.  Have a working fire alarm and carbon monoxide detector;   Engage in regular physical activity and social activities     Follow-up: Return in about 3 months (around 3/5/2024).    I spent a total of 34 minutes with record review, exam, communication with the patient, communication with other providers, and documentation of this encounter.      Leah Bonilla, DO  Geriatric and Internal Medicine  [facility]

## 2023-12-05 NOTE — ASSESSMENT & PLAN NOTE
Chronic ongoing problem, A1c well-controlled on current regimen, will liberalize diabetes diet she is having hard time finding food compliant with heart healthy diet, diabetes diet, and renal diet.  Recheck A1c again in 3 to 4 months, continue Trulicity 1.5 mg weekly.

## 2023-12-05 NOTE — ASSESSMENT & PLAN NOTE
Chronic ongoing issue, secondary to diabetes, chronic kidney disease, and history of congestive heart failure.  Recheck urine microalbumin, blood pressure would not tolerate ACEI or ARB at this time.

## 2023-12-05 NOTE — ASSESSMENT & PLAN NOTE
Recent problem, recommend she get a cardia machine so she can follow-up when she has the elevated heart rates whether this is normal rhythm or irregular rhythm.  She will see Dr. Prabhakar on Monday, blood pressure running low right now which is can make her GFR worse, need to discuss what medicines she will be taking moving forward, advised her to hold Lasix and spironolactone right now and discuss adjustments of amiodarone, digoxin, diltiazem, and carvedilol with her cardiology team.

## 2023-12-07 ENCOUNTER — ANTICOAGULATION VISIT (OUTPATIENT)
Dept: MEDICAL GROUP | Facility: MEDICAL CENTER | Age: 73
End: 2023-12-07
Payer: MEDICARE

## 2023-12-07 ENCOUNTER — HOSPITAL ENCOUNTER (OUTPATIENT)
Dept: LAB | Facility: MEDICAL CENTER | Age: 73
End: 2023-12-07
Attending: INTERNAL MEDICINE
Payer: MEDICARE

## 2023-12-07 DIAGNOSIS — N18.4 TYPE 2 DIABETES MELLITUS WITH STAGE 4 CHRONIC KIDNEY DISEASE, WITHOUT LONG-TERM CURRENT USE OF INSULIN (HCC): ICD-10-CM

## 2023-12-07 DIAGNOSIS — Z98.890 H/O MITRAL VALVE REPAIR: ICD-10-CM

## 2023-12-07 DIAGNOSIS — E11.22 TYPE 2 DIABETES MELLITUS WITH STAGE 4 CHRONIC KIDNEY DISEASE, WITHOUT LONG-TERM CURRENT USE OF INSULIN (HCC): ICD-10-CM

## 2023-12-07 DIAGNOSIS — I48.21 PERMANENT ATRIAL FIBRILLATION (HCC): ICD-10-CM

## 2023-12-07 LAB
ALBUMIN SERPL BCP-MCNC: 3.8 G/DL (ref 3.2–4.9)
ALBUMIN/GLOB SERPL: 1.1 G/DL
ALP SERPL-CCNC: 61 U/L (ref 30–99)
ALT SERPL-CCNC: 13 U/L (ref 2–50)
ANION GAP SERPL CALC-SCNC: 11 MMOL/L (ref 7–16)
AST SERPL-CCNC: 16 U/L (ref 12–45)
BILIRUB SERPL-MCNC: 1.3 MG/DL (ref 0.1–1.5)
BUN SERPL-MCNC: 30 MG/DL (ref 8–22)
CALCIUM ALBUM COR SERPL-MCNC: 9.5 MG/DL (ref 8.5–10.5)
CALCIUM SERPL-MCNC: 9.3 MG/DL (ref 8.5–10.5)
CHLORIDE SERPL-SCNC: 104 MMOL/L (ref 96–112)
CO2 SERPL-SCNC: 25 MMOL/L (ref 20–33)
CREAT SERPL-MCNC: 1.78 MG/DL (ref 0.5–1.4)
GFR SERPLBLD CREATININE-BSD FMLA CKD-EPI: 29 ML/MIN/1.73 M 2
GLOBULIN SER CALC-MCNC: 3.5 G/DL (ref 1.9–3.5)
GLUCOSE SERPL-MCNC: 158 MG/DL (ref 65–99)
INR PPP: 1.9 (ref 2–3.5)
POTASSIUM SERPL-SCNC: 4.6 MMOL/L (ref 3.6–5.5)
PROT SERPL-MCNC: 7.3 G/DL (ref 6–8.2)
SODIUM SERPL-SCNC: 140 MMOL/L (ref 135–145)

## 2023-12-07 PROCEDURE — 85610 PROTHROMBIN TIME: CPT | Performed by: NURSE PRACTITIONER

## 2023-12-07 PROCEDURE — 36415 COLL VENOUS BLD VENIPUNCTURE: CPT

## 2023-12-07 PROCEDURE — 80053 COMPREHEN METABOLIC PANEL: CPT

## 2023-12-07 PROCEDURE — 99211 OFF/OP EST MAY X REQ PHY/QHP: CPT | Performed by: FAMILY MEDICINE

## 2023-12-07 NOTE — PROGRESS NOTES
OP Anticoagulation Service Note    Date: 2023    Anticoagulation Summary  As of 2023      INR goal:  2.0-3.0   TTR:  57.7 % (6.7 y)   INR used for dosin.90 (2023)   Warfarin maintenance plan:  2.5 mg (5 mg x 0.5) every Tue; 5 mg (5 mg x 1) all other days   Weekly warfarin total:  32.5 mg   Plan last modified:  Souleymane Goldman, PharmD (2023)   Next INR check:  2023   Target end date:  Indefinite    Indications    Atrial fibrillation (HCC) [I48.91]  H/O mitral valve repair [Z98.890]                 Anticoagulation Episode Summary       INR check location:      Preferred lab:      Send INR reminders to:      Comments:            Anticoagulation Care Providers       Provider Role Specialty Phone number    LILIYA Pearson Referring Family Medicine [phone]    [facility] Anticoagulation Services Responsible  [phone]    Kenneth Salas, PharmD Responsible Pharmacy           Anticoagulation Patient Findings  Patient Findings       Negatives:  Signs/symptoms of thrombosis, Signs/symptoms of bleeding, Laboratory test error suspected, Change in health, Change in alcohol use, Change in activity, Upcoming invasive procedure, Emergency department visit, Upcoming dental procedure, Missed doses, Extra doses, Change in medications, Change in diet/appetite, Hospital admission, Bruising, Other complaints            HPI:   Salma Lees is in the Anticoagulation Clinic today for an INR check on their anticoagulation therapy.     The reason for today's visit is to prevent morbidity and mortality from a blood clot and/or stroke and to reduce the risk of bleeding while on a anticoagulant.     PCP:  Leah Bonilla D.O.  [address]  [place] [id number] [phone]    3 vitals included with today's appt-unless patient declined:  (BP, HR, weight, ht, RR)   There were no vitals filed for this visit.    Verified current warfarin dosing schedule.    Medications reconciled:  Yes  Pt is not on antiplatelet therapy    Assessment:   INR is subtherapeutic    Plan:  Instructed pt to BOLUS x 1 dose w/ 7.5 mg today and to then begin newly increased regimen of 2.5 mg Tues and 5 mg ROW.    Follow up:  Follow up appointment in 2 week(s).       Other info:  Pt educated to contact our clinic with any changes in medications or s/s of bleeding or thrombosis.  Education was provided today regarding tips to reduce their bleed risk and dietary constraints while on a anticoagulant.    National Recommendations:  The CHEST guidelines recommends frequent INR monitoring at regular intervals (a few days up to a max of 12 weeks) to ensure patients are on the proper dose of warfarin, and patients are not having any complications from therapy.  INRs can dramatically change over a short time period due to diet, medications, and medical conditions.     Souleymane Goldman, PharmD, BCACP    [facility]  Phone [phone] fax [phone]

## 2023-12-21 ENCOUNTER — ANTICOAGULATION VISIT (OUTPATIENT)
Dept: MEDICAL GROUP | Facility: MEDICAL CENTER | Age: 73
End: 2023-12-21
Payer: MEDICARE

## 2023-12-21 DIAGNOSIS — I48.21 PERMANENT ATRIAL FIBRILLATION (HCC): ICD-10-CM

## 2023-12-21 DIAGNOSIS — Z98.890 H/O MITRAL VALVE REPAIR: ICD-10-CM

## 2023-12-21 LAB — INR PPP: 2.7 (ref 2–3.5)

## 2023-12-21 PROCEDURE — 93793 ANTICOAG MGMT PT WARFARIN: CPT | Performed by: STUDENT IN AN ORGANIZED HEALTH CARE EDUCATION/TRAINING PROGRAM

## 2023-12-21 PROCEDURE — 85610 PROTHROMBIN TIME: CPT | Performed by: STUDENT IN AN ORGANIZED HEALTH CARE EDUCATION/TRAINING PROGRAM

## 2023-12-21 NOTE — PROGRESS NOTES
OP Anticoagulation Service Note    Date: 2023    Anticoagulation Summary  As of 2023      INR goal:  2.0-3.0   TTR:  57.8 % (6.8 y)   INR used for dosin.70 (2023)   Warfarin maintenance plan:  2.5 mg (5 mg x 0.5) every Tue; 5 mg (5 mg x 1) all other days   Weekly warfarin total:  32.5 mg   Plan last modified:  Souleymane Goldman, PharmD (2023)   Next INR check:  2024   Target end date:  Indefinite    Indications    Atrial fibrillation (HCC) [I48.91]  H/O mitral valve repair [Z98.890]                 Anticoagulation Episode Summary       INR check location:      Preferred lab:      Send INR reminders to:      Comments:            Anticoagulation Care Providers       Provider Role Specialty Phone number    LILIYA Pearson Referring Family Medicine [phone]    [facility] Anticoagulation [facility] Responsible  [phone]    Kenneth Salas, PharmD Responsible Pharmacy           Anticoagulation Patient Findings  Patient Findings       Negatives:  Signs/symptoms of thrombosis, Signs/symptoms of bleeding, Laboratory test error suspected, Change in health, Change in alcohol use, Change in activity, Upcoming invasive procedure, Emergency department visit, Upcoming dental procedure, Missed doses, Extra doses, Change in medications, Change in diet/appetite, Hospital admission, Bruising, Other complaints            HPI:   Salma Lees is in the Anticoagulation Clinic today for an INR check on their anticoagulation therapy.     The reason for today's visit is to prevent morbidity and mortality from a blood clot and/or stroke and to reduce the risk of bleeding while on a anticoagulant.     PCP:  Leah Bonilla D.O.  [address]  [place] [id number] [phone]    3 vitals included with today's appt-unless patient declined:  (BP, HR, weight, ht, RR)   There were no vitals filed for this visit.    Verified current warfarin dosing schedule.    Medications reconciled:  Yes  Pt is not on antiplatelet therapy    Assessment:   INR is therapeutic    Plan:  Instructed pt to continue on with current regimen.    Follow up:  Follow up appointment in 3 week(s).       Other info:  Pt educated to contact our clinic with any changes in medications or s/s of bleeding or thrombosis.  Education was provided today regarding tips to reduce their bleed risk and dietary constraints while on a anticoagulant.    National Recommendations:  The CHEST guidelines recommends frequent INR monitoring at regular intervals (a few days up to a max of 12 weeks) to ensure patients are on the proper dose of warfarin, and patients are not having any complications from therapy.  INRs can dramatically change over a short time period due to diet, medications, and medical conditions.     Souleymane Goldman, PharmD, BCACP    [facility]  Phone [phone] fax [phone]

## 2024-01-02 ENCOUNTER — HOSPITAL ENCOUNTER (EMERGENCY)
Facility: MEDICAL CENTER | Age: 74
End: 2024-01-02
Attending: EMERGENCY MEDICINE
Payer: MEDICARE

## 2024-01-02 ENCOUNTER — APPOINTMENT (OUTPATIENT)
Dept: RADIOLOGY | Facility: MEDICAL CENTER | Age: 74
End: 2024-01-02
Attending: EMERGENCY MEDICINE
Payer: MEDICARE

## 2024-01-02 VITALS
SYSTOLIC BLOOD PRESSURE: 167 MMHG | HEART RATE: 62 BPM | RESPIRATION RATE: 20 BRPM | DIASTOLIC BLOOD PRESSURE: 68 MMHG | WEIGHT: 177.91 LBS | OXYGEN SATURATION: 100 % | HEIGHT: 65 IN | BODY MASS INDEX: 29.64 KG/M2 | TEMPERATURE: 97.3 F

## 2024-01-02 DIAGNOSIS — R05.1 ACUTE COUGH: ICD-10-CM

## 2024-01-02 DIAGNOSIS — R06.02 SHORTNESS OF BREATH: ICD-10-CM

## 2024-01-02 LAB
ALBUMIN SERPL BCP-MCNC: 4.1 G/DL (ref 3.2–4.9)
ALBUMIN/GLOB SERPL: 1.1 G/DL
ALP SERPL-CCNC: 65 U/L (ref 30–99)
ALT SERPL-CCNC: 12 U/L (ref 2–50)
ANION GAP SERPL CALC-SCNC: 12 MMOL/L (ref 7–16)
ANISOCYTOSIS BLD QL SMEAR: ABNORMAL
APPEARANCE UR: ABNORMAL
AST SERPL-CCNC: 18 U/L (ref 12–45)
BACTERIA #/AREA URNS HPF: ABNORMAL /HPF
BASOPHILS # BLD AUTO: 0.5 % (ref 0–1.8)
BASOPHILS # BLD: 0.03 K/UL (ref 0–0.12)
BILIRUB SERPL-MCNC: 0.7 MG/DL (ref 0.1–1.5)
BILIRUB UR QL STRIP.AUTO: ABNORMAL
BUN SERPL-MCNC: 24 MG/DL (ref 8–22)
CALCIUM ALBUM COR SERPL-MCNC: 8.8 MG/DL (ref 8.5–10.5)
CALCIUM SERPL-MCNC: 8.9 MG/DL (ref 8.4–10.2)
CHLORIDE SERPL-SCNC: 102 MMOL/L (ref 96–112)
CO2 SERPL-SCNC: 25 MMOL/L (ref 20–33)
COLOR UR: YELLOW
CREAT SERPL-MCNC: 1.56 MG/DL (ref 0.5–1.4)
EKG IMPRESSION: NORMAL
EOSINOPHIL # BLD AUTO: 0.5 K/UL (ref 0–0.51)
EOSINOPHIL NFR BLD: 7.7 % (ref 0–6.9)
EPI CELLS #/AREA URNS HPF: ABNORMAL /HPF
ERYTHROCYTE [DISTWIDTH] IN BLOOD BY AUTOMATED COUNT: 56.3 FL (ref 35.9–50)
FLUAV RNA SPEC QL NAA+PROBE: NEGATIVE
FLUBV RNA SPEC QL NAA+PROBE: NEGATIVE
GFR SERPLBLD CREATININE-BSD FMLA CKD-EPI: 34 ML/MIN/1.73 M 2
GLOBULIN SER CALC-MCNC: 3.7 G/DL (ref 1.9–3.5)
GLUCOSE SERPL-MCNC: 186 MG/DL (ref 65–99)
GLUCOSE UR STRIP.AUTO-MCNC: NEGATIVE MG/DL
GRAN CASTS #/AREA URNS LPF: ABNORMAL /LPF
HCT VFR BLD AUTO: 36.9 % (ref 37–47)
HGB BLD-MCNC: 10.8 G/DL (ref 12–16)
HYALINE CASTS #/AREA URNS LPF: ABNORMAL /LPF
HYPOCHROMIA BLD QL SMEAR: ABNORMAL
IMM GRANULOCYTES # BLD AUTO: 0.02 K/UL (ref 0–0.11)
IMM GRANULOCYTES NFR BLD AUTO: 0.3 % (ref 0–0.9)
INR PPP: 3.41 (ref 0.87–1.13)
KETONES UR STRIP.AUTO-MCNC: NEGATIVE MG/DL
LACTATE SERPL-SCNC: 0.7 MMOL/L (ref 0.5–2)
LEUKOCYTE ESTERASE UR QL STRIP.AUTO: NEGATIVE
LG PLATELETS BLD QL SMEAR: NORMAL
LYMPHOCYTES # BLD AUTO: 0.68 K/UL (ref 1–4.8)
LYMPHOCYTES NFR BLD: 10.4 % (ref 22–41)
MCH RBC QN AUTO: 28.3 PG (ref 27–33)
MCHC RBC AUTO-ENTMCNC: 29.3 G/DL (ref 32.2–35.5)
MCV RBC AUTO: 96.6 FL (ref 81.4–97.8)
MICRO URNS: ABNORMAL
MICROCYTES BLD QL SMEAR: ABNORMAL
MONOCYTES # BLD AUTO: 0.49 K/UL (ref 0–0.85)
MONOCYTES NFR BLD AUTO: 7.5 % (ref 0–13.4)
MUCOUS THREADS #/AREA URNS HPF: ABNORMAL /HPF
NEUTROPHILS # BLD AUTO: 4.8 K/UL (ref 1.82–7.42)
NEUTROPHILS NFR BLD: 73.6 % (ref 44–72)
NITRITE UR QL STRIP.AUTO: NEGATIVE
NRBC # BLD AUTO: 0 K/UL
NRBC BLD-RTO: 0 /100 WBC (ref 0–0.2)
NT-PROBNP SERPL IA-MCNC: 2531 PG/ML (ref 0–125)
PH UR STRIP.AUTO: 5.5 [PH] (ref 5–8)
PLATELET # BLD AUTO: 173 K/UL (ref 164–446)
PLATELET BLD QL SMEAR: NORMAL
PMV BLD AUTO: 11.3 FL (ref 9–12.9)
POLYCHROMASIA BLD QL SMEAR: NORMAL
POTASSIUM SERPL-SCNC: 4.7 MMOL/L (ref 3.6–5.5)
PROCALCITONIN SERPL-MCNC: 0.04 NG/ML
PROT SERPL-MCNC: 7.8 G/DL (ref 6–8.2)
PROT UR QL STRIP: >=300 MG/DL
PROTHROMBIN TIME: 35.7 SEC (ref 12–14.6)
RBC # BLD AUTO: 3.82 M/UL (ref 4.2–5.4)
RBC # URNS HPF: ABNORMAL /HPF
RBC BLD AUTO: PRESENT
RBC UR QL AUTO: ABNORMAL
RSV RNA SPEC QL NAA+PROBE: NEGATIVE
SARS-COV-2 RNA RESP QL NAA+PROBE: NOTDETECTED
SODIUM SERPL-SCNC: 139 MMOL/L (ref 135–145)
SP GR UR STRIP.AUTO: >=1.03
SPECIMEN SOURCE: NORMAL
TROPONIN T SERPL-MCNC: 15 NG/L (ref 6–19)
UNIDENT CRYS URNS QL MICRO: ABNORMAL /HPF
WBC # BLD AUTO: 6.5 K/UL (ref 4.8–10.8)
WBC #/AREA URNS HPF: ABNORMAL /HPF

## 2024-01-02 PROCEDURE — 36415 COLL VENOUS BLD VENIPUNCTURE: CPT

## 2024-01-02 PROCEDURE — 84145 PROCALCITONIN (PCT): CPT

## 2024-01-02 PROCEDURE — 81001 URINALYSIS AUTO W/SCOPE: CPT

## 2024-01-02 PROCEDURE — 80053 COMPREHEN METABOLIC PANEL: CPT

## 2024-01-02 PROCEDURE — 700111 HCHG RX REV CODE 636 W/ 250 OVERRIDE (IP): Performed by: EMERGENCY MEDICINE

## 2024-01-02 PROCEDURE — 83880 ASSAY OF NATRIURETIC PEPTIDE: CPT

## 2024-01-02 PROCEDURE — 87040 BLOOD CULTURE FOR BACTERIA: CPT | Mod: 91

## 2024-01-02 PROCEDURE — 93005 ELECTROCARDIOGRAM TRACING: CPT | Performed by: EMERGENCY MEDICINE

## 2024-01-02 PROCEDURE — 99285 EMERGENCY DEPT VISIT HI MDM: CPT

## 2024-01-02 PROCEDURE — 93005 ELECTROCARDIOGRAM TRACING: CPT

## 2024-01-02 PROCEDURE — 0241U HCHG SARS-COV-2 COVID-19 NFCT DS RESP RNA 4 TRGT MIC: CPT

## 2024-01-02 PROCEDURE — 85610 PROTHROMBIN TIME: CPT

## 2024-01-02 PROCEDURE — 96374 THER/PROPH/DIAG INJ IV PUSH: CPT

## 2024-01-02 PROCEDURE — 84484 ASSAY OF TROPONIN QUANT: CPT

## 2024-01-02 PROCEDURE — 71045 X-RAY EXAM CHEST 1 VIEW: CPT

## 2024-01-02 PROCEDURE — 85025 COMPLETE CBC W/AUTO DIFF WBC: CPT

## 2024-01-02 PROCEDURE — 83605 ASSAY OF LACTIC ACID: CPT

## 2024-01-02 RX ORDER — FUROSEMIDE 10 MG/ML
40 INJECTION INTRAMUSCULAR; INTRAVENOUS ONCE
Status: COMPLETED | OUTPATIENT
Start: 2024-01-02 | End: 2024-01-02

## 2024-01-02 RX ORDER — CARVEDILOL 25 MG/1
12.5-25 TABLET ORAL 2 TIMES DAILY WITH MEALS
COMMUNITY

## 2024-01-02 RX ADMIN — FUROSEMIDE 40 MG: 10 INJECTION, SOLUTION INTRAMUSCULAR; INTRAVENOUS at 18:44

## 2024-01-02 ASSESSMENT — FIBROSIS 4 INDEX: FIB4 SCORE: 1.92

## 2024-01-02 NOTE — ED NOTES
ERP at bedside. Pt agrees with plan of care discussed by ERP. AIDET acknowledged with patient. Carl in low position, side rail up for pt safety. Call light within reach. Will continue to monitor.      IV established. Blood sent to lab.    CXR at bedside.      O2 decreased to 2 L, pt satting 99% at  this time.

## 2024-01-02 NOTE — ED TRIAGE NOTES
Patient presents with complaints of shortness of breath and chest tightness since yesterday.  Normally wears 2.5L of O2 but has increased herself to 5L due to feeling so short of breath. Increased dyspnea with exertion or any activity.  
87.9

## 2024-01-11 ENCOUNTER — APPOINTMENT (OUTPATIENT)
Dept: MEDICAL GROUP | Facility: MEDICAL CENTER | Age: 74
End: 2024-01-11
Payer: MEDICARE

## 2024-01-16 DIAGNOSIS — Z79.01 CHRONIC ANTICOAGULATION: ICD-10-CM

## 2024-01-16 RX ORDER — WARFARIN SODIUM 5 MG/1
TABLET ORAL
Qty: 135 TABLET | Refills: 0 | Status: SHIPPED | OUTPATIENT
Start: 2024-01-16

## 2024-01-24 ENCOUNTER — OFFICE VISIT (OUTPATIENT)
Dept: MEDICAL GROUP | Facility: PHYSICIAN GROUP | Age: 74
End: 2024-01-24
Payer: MEDICARE

## 2024-01-24 VITALS
OXYGEN SATURATION: 93 % | DIASTOLIC BLOOD PRESSURE: 60 MMHG | HEART RATE: 64 BPM | SYSTOLIC BLOOD PRESSURE: 128 MMHG | WEIGHT: 182.4 LBS | BODY MASS INDEX: 30.39 KG/M2 | TEMPERATURE: 96.8 F | RESPIRATION RATE: 16 BRPM | HEIGHT: 65 IN

## 2024-01-24 DIAGNOSIS — E11.22 TYPE 2 DIABETES MELLITUS WITH STAGE 4 CHRONIC KIDNEY DISEASE, WITHOUT LONG-TERM CURRENT USE OF INSULIN (HCC): ICD-10-CM

## 2024-01-24 DIAGNOSIS — D68.69 SECONDARY HYPERCOAGULABLE STATE (HCC): ICD-10-CM

## 2024-01-24 DIAGNOSIS — N18.4 TYPE 2 DIABETES MELLITUS WITH STAGE 4 CHRONIC KIDNEY DISEASE, WITHOUT LONG-TERM CURRENT USE OF INSULIN (HCC): ICD-10-CM

## 2024-01-24 DIAGNOSIS — I48.21 PERMANENT ATRIAL FIBRILLATION (HCC): ICD-10-CM

## 2024-01-24 DIAGNOSIS — I27.20 PULMONARY HYPERTENSION (HCC): ICD-10-CM

## 2024-01-24 DIAGNOSIS — N18.4 CKD (CHRONIC KIDNEY DISEASE) STAGE 4, GFR 15-29 ML/MIN (HCC): ICD-10-CM

## 2024-01-24 DIAGNOSIS — E11.29 MICROALBUMINURIA DUE TO TYPE 2 DIABETES MELLITUS (HCC): ICD-10-CM

## 2024-01-24 DIAGNOSIS — E78.5 DYSLIPIDEMIA ASSOCIATED WITH TYPE 2 DIABETES MELLITUS (HCC): ICD-10-CM

## 2024-01-24 DIAGNOSIS — E11.69 DYSLIPIDEMIA ASSOCIATED WITH TYPE 2 DIABETES MELLITUS (HCC): ICD-10-CM

## 2024-01-24 DIAGNOSIS — J44.9 CHRONIC OBSTRUCTIVE PULMONARY DISEASE, UNSPECIFIED COPD TYPE (HCC): ICD-10-CM

## 2024-01-24 DIAGNOSIS — N25.81 HYPERPARATHYROIDISM DUE TO RENAL INSUFFICIENCY (HCC): ICD-10-CM

## 2024-01-24 DIAGNOSIS — I70.0 ATHEROSCLEROSIS OF AORTA (HCC): ICD-10-CM

## 2024-01-24 DIAGNOSIS — R80.9 MICROALBUMINURIA DUE TO TYPE 2 DIABETES MELLITUS (HCC): ICD-10-CM

## 2024-01-24 DIAGNOSIS — Z99.81 CHRONIC RESPIRATORY FAILURE WITH HYPOXIA, ON HOME OXYGEN THERAPY (HCC): ICD-10-CM

## 2024-01-24 DIAGNOSIS — I50.22 CHRONIC SYSTOLIC CONGESTIVE HEART FAILURE (HCC): ICD-10-CM

## 2024-01-24 DIAGNOSIS — J96.11 CHRONIC RESPIRATORY FAILURE WITH HYPOXIA, ON HOME OXYGEN THERAPY (HCC): ICD-10-CM

## 2024-01-24 PROCEDURE — 3078F DIAST BP <80 MM HG: CPT | Performed by: INTERNAL MEDICINE

## 2024-01-24 PROCEDURE — 3074F SYST BP LT 130 MM HG: CPT | Performed by: INTERNAL MEDICINE

## 2024-01-24 PROCEDURE — 99214 OFFICE O/P EST MOD 30 MIN: CPT | Performed by: INTERNAL MEDICINE

## 2024-01-24 ASSESSMENT — PATIENT HEALTH QUESTIONNAIRE - PHQ9: CLINICAL INTERPRETATION OF PHQ2 SCORE: 0

## 2024-01-24 ASSESSMENT — FIBROSIS 4 INDEX: FIB4 SCORE: 2.22

## 2024-01-24 NOTE — ASSESSMENT & PLAN NOTE
Chronic ongoing problem, GFR improved at ER evaluation earlier this month, generally runs from the high 20s to high 30s.  Continue with good hydration and good control blood pressure and avoid nephrotoxic agents.

## 2024-01-24 NOTE — ASSESSMENT & PLAN NOTE
Ongoing problem, continues to be appropriate for continuous oxygen use, typically 2 L at rest and 5 L with exertion.  Continue follow-up with pulmonary medicine.

## 2024-01-24 NOTE — ASSESSMENT & PLAN NOTE
Previous problem, needs follow up, needs updated echocardiogram, last in chart is from 3 years ago.  Will establish with a new cardiologist at [facility].

## 2024-01-24 NOTE — PROGRESS NOTES
Subjective:   Chief Complaint/History of Present Illness:  Salma Lees is a 73 y.o. female established patient who presents today to discuss medical problems as listed below. Salma is unaccompanied for today's visit.    Problem   Chronic Systolic Congestive Heart Failure (Hcc)    Has history of congestive heart failure, established with cardiology but needs a new cardiologist.  She is on beta-blocker.     Hyperparathyroidism Due to Renal Insufficiency ([facility])     Latest Reference Range & Units 10/19/21 13:10 04/25/23 11:58   Pth, Intact 14.0 - 72.0 pg/mL 58.1 123.0 (H)        Microalbuminuria Due to Type 2 Diabetes Mellitus ([facility])    Microalbumin= 23    She has longstanding history of both diabetes and kidney disease.  Not currently taking ACE inhibitor or ARB due to reduced kidney function.  Recheck urine microalbumin now that A1c is better.     Dyslipidemia Associated With Type 2 Diabetes Mellitus ([facility])     Latest Reference Range & Units 04/25/23 11:58   Cholesterol,Tot 100 - 199 mg/dL 181   Triglycerides 0 - 149 mg/dL 210 (H)   HDL >=40 mg/dL 42   LDL <100 mg/dL 97     She appreciated vague discomfort in her back and was worried that the atorvastatin was causing kidney irritation so she stopped taking her 20 mg dose in June 2021 and then added back 10 mg in September 2021.    Current regimen: Atorvastatin 10 mg daily     Secondary Hypercoagulable State ([facility])    She is on coumadin for stroke prophylaxis due to atrial fibrillation. No signs or symptoms of bruising/bleeding. She follows with the anticoagulation clinic.    Current regimen: warfarin dosed by pharmacist     Chronic Respiratory Failure With Hypoxia, On Home Oxygen Therapy ([facility])    She reports chronic oxygen use initiated on 2014.  She uses 2 L continuously and 5 L with exertion. She does follow with pulmonary medicine and carries a diagnosis of both asthma as well as obstructive lung disease.     Atherosclerosis of Aorta ([facility])    Incidental  finding seen on CT scan in 2017    She is on atorvastatin and warfarin.     Pulmonary Hypertension (Hcc)    She has pulmonary hypertension on her echocardiogram, RVSP 68 mmHg in 11/2022.  She also has chronic lung disease on oxygenation at all times.        Ckd (Chronic Kidney Disease) Stage 4, Gfr 15-29 Ml/Min ([facility])     Latest Reference Range & Units 01/02/24 15:25   Bun 8 - 22 mg/dL 24 (H)   Creatinine 0.50 - 1.40 mg/dL 1.56 (H)   GFR (CKD-EPI) >60 mL/min/1.73 m 2 34 !     She has a history of chronic kidney disease likely secondary to heart disease, nephrolithiasis, and diabetes.  She is on several renally excreted medications including spironolactone, Lasix, and digoxin. No secondary evaluation completed. GFR previously in the high 50s and now in the low 30s.  She has contributing hyperparathyroidism likely related to longstanding chronic kidney disease.  Not currently seeing a nephrologist.     Chronic Obstructive Pulmonary Disease (Hcc)    She follows with pulmonary medicine.    Last pulmonary function testing 4/2022  1.  There is a moderately severe obstructive ventilatory defect evidenced on spirometry.  There is no significant response to bronchodilators.  2.  Lung volumes are consistent with air trapping and hyperinflation, evidenced by the elevated RV and RV/TLC ratio.  3.  There is a moderate diffusion impairment.  The reduced DLCO and DL/VA ratio are consistent with pulmonary emphysema, pulmonary vascular disease or interstitial lung disease.  4.  Flow volume loop is consistent with the above interpretation of an obstructive ventilatory defect.  5.  Compared to prior testing in 2020, the FEV1 has declined slightly from 1.41 liters to 1.27 L or 59% predicted.    Current regimen includes Anoro Ellipta and as needed levalbuterol.  Previously trialed on Symbicort however this was still quite expensive and caused scratchy throat.    She has chronic oxygen and wears 2 L all the time and 5 L with exertion.   She does endorse dyspnea with exertion, this is at baseline.     Atrial Fibrillation (Hcc)    Diagnosed in 2014 following repair of her mitral valve.  repair of her mitral valve.  She required hospitalization in February 2015 due to CHF and A. fib with RVR.  She experienced significant bradycardia necessitating permanent pacemaker.  She is in permanent A. fib at this time.  She is using anticoagulation in the form of warfarin, she denies any bleeding issues.  Rate control achieved through diltiazem and coreg and rhythm control with amiodarone.    Current regimen: Diltiazem 30 mg 2 times daily, carvedilol 25 mg twice daily, and noted on 200 mg daily, warfarin dosed per pharmacy     Type 2 Diabetes Mellitus With Stage 4 Chronic Kidney Disease, Without Long-Term Current Use of Insulin ([facility])     Latest Reference Range & Units 11/06/23 14:16   Glycohemoglobin 4.0 - 5.6 % 6.5 (H)   Estim. Avg Glu mg/dL 140       She has had diabetes since at least 2017 with A1c ranging 7-14. She did not tolerate metformin and has CKDIII/IV. She previously had microalbuminuria, currently resolved. No known neuropathy or retinopathy.    Current regimen: Trulicity 1.5 mg weekly on Sundays          Current Medications:  Current Outpatient Medications Ordered in Epic   Medication Sig Dispense Refill    warfarin (COUMADIN) 5 MG Tab TAKE ONE AND ONE-HALF TABLETS BY MOUTH DAILY OR AS DIRECTED BY ANTICOAGULANT CLINIC 135 Tablet 0    carvedilol (COREG) 25 MG Tab Take 12.5-25 mg by mouth 2 times a day with meals. 12.5 MG IN THE MORNING AND 25 MG AT NIGHT      dilTIAZem (CARDIZEM) 30 MG Tab Take 30 mg by mouth 2 (two) times a day.      amiodarone (CORDARONE) 200 MG Tab Take 1 Tablet by mouth every day. 90 Tablet 3    calcitRIOL (ROCALTROL) 0.25 MCG Cap Take 1 Capsule by mouth every day. 100 Capsule 3    atorvastatin (LIPITOR) 10 MG Tab Take 1 Tablet by mouth every evening. 100 Tablet 3    levalbuterol (XOPENEX HFA) 45 MCG/ACT inhaler Inhale 2 Puffs  "every four hours as needed for Shortness of Breath. 1 Each 2    Dulaglutide 1.5 MG/0.5ML Solution Pen-injector Inject 0.5 mL under the skin every 7 days. Every Sunday 1 mL 0     No current Epic-ordered facility-administered medications on file.          Objective:   Physical Exam:    Vitals: /60 (BP Location: Right arm, Patient Position: Sitting, BP Cuff Size: Adult)   Pulse 64   Temp 36 °C (96.8 °F) (Temporal)   Resp 16   Ht 1.638 m (5' 4.5\")   Wt 82.7 kg (182 lb 6.4 oz)   SpO2 93%    BMI: Body mass index is 30.83 kg/m².  Physical Exam  Constitutional:       General: She is not in acute distress.     Appearance: Normal appearance. She is not ill-appearing.   HENT:      Right Ear: External ear normal.      Left Ear: External ear normal.   Eyes:      General: No scleral icterus.     Conjunctiva/sclera: Conjunctivae normal.   Cardiovascular:      Rate and Rhythm: Normal rate and regular rhythm.      Pulses: Normal pulses.   Pulmonary:      Effort: No respiratory distress.      Breath sounds: No wheezing or rhonchi.      Comments: Supplemental oxygen in place  Abdominal:      General: Bowel sounds are normal.      Palpations: Abdomen is soft.   Lymphadenopathy:      Cervical: No cervical adenopathy.   Skin:     General: Skin is warm and dry.      Findings: No rash.   Neurological:      Gait: Gait abnormal.      Comments: Uses 4WW to ambulate   Psychiatric:         Mood and Affect: Mood normal.         Behavior: Behavior normal.         Thought Content: Thought content normal.         Judgment: Judgment normal.               Assessment and Plan:   Salma is a 73 y.o. female with the following:  Problem List Items Addressed This Visit       Atherosclerosis of aorta (HCC)     Chronic ongoing problem, continue medical therapy with atorvastatin 10 mg daily and warfarin.         Atrial fibrillation (HCC)     Chronic ongoing problem, now established with Dr. Galloway through [facility] status post recent ablative " procedure.  Continue amiodarone for rhythm control, diltiazem and carvedilol per cardiology for rate control, and warfarin.         Relevant Orders    REFERRAL TO CARDIOLOGY    Chronic obstructive pulmonary disease (HCC)     Chronic ongoing problem, overdue to follow-up with pulmonary medicine, had respiratory infection last month and we need to feel improved before she updates her 6-minute walk test and arrange his follow-up.         Chronic respiratory failure with hypoxia, on home oxygen therapy (HCC)     Ongoing problem, continues to be appropriate for continuous oxygen use, typically 2 L at rest and 5 L with exertion.  Continue follow-up with pulmonary medicine.         Chronic systolic congestive heart failure (HCC)     Previous problem, needs follow up, needs updated echocardiogram, last in chart is from 3 years ago.  Will establish with a new cardiologist at [facility].         Relevant Orders    REFERRAL TO CARDIOLOGY    CKD (chronic kidney disease) stage 4, GFR 15-29 ml/min (HCC)     Chronic ongoing problem, GFR improved at ER evaluation earlier this month, generally runs from the high 20s to high 30s.  Continue with good hydration and good control blood pressure and avoid nephrotoxic agents.         Dyslipidemia associated with type 2 diabetes mellitus (HCC)     Chronic ongoing problem, continue medical therapy with atorvastatin 10 mg daily.         Hyperparathyroidism due to renal insufficiency (HCC)     Chronic ongoing problem, continue with periodic assessment to ensure stability.         Microalbuminuria due to type 2 diabetes mellitus (HCC)     Chronic improved problem, not A1c is better controlled micro [place] areas currently resolved.         Pulmonary hypertension (HCC)     Chronic ongoing problem, continue follow-up with pulmonary medicine and cardiology.         Relevant Orders    REFERRAL TO CARDIOLOGY    Secondary hypercoagulable state (HCC)     Chronic ongoing problem, continue warfarin for stroke  prophylaxis.         Relevant Orders    REFERRAL TO CARDIOLOGY    Type 2 diabetes mellitus with stage 4 chronic kidney disease, without long-term current use of insulin (HCC)     Chronic improved problem, completed patient assistance paperwork for Trulicity, continue checking A1c every 3 to 6 months to ensure stability.               RTC: Return in about 3 months (around 4/24/2024).    I spent a total of 36 minutes with record review, exam, communication with the patient, communication with other providers, and documentation of this encounter.    PLEASE NOTE: This dictation was created using voice recognition software. I have made every reasonable attempt to correct obvious errors, but I expect that there are errors of grammar and possibly content that I did not discover before finalizing the note.      Leah Bonilla, DO  Geriatric and Internal Medicine  [facility]

## 2024-01-24 NOTE — ASSESSMENT & PLAN NOTE
Chronic ongoing problem, overdue to follow-up with pulmonary medicine, had respiratory infection last month and we need to feel improved before she updates her 6-minute walk test and arrange his follow-up.

## 2024-01-24 NOTE — PATIENT INSTRUCTIONS
[facility]  [address] [address]  Phone: [phone]

## 2024-01-24 NOTE — ASSESSMENT & PLAN NOTE
Chronic improved problem, completed patient assistance paperwork for Trulicity, continue checking A1c every 3 to 6 months to ensure stability.

## 2024-01-24 NOTE — ASSESSMENT & PLAN NOTE
Chronic ongoing problem, now established with Dr. Galloway through [facility] status post recent ablative procedure.  Continue amiodarone for rhythm control, diltiazem and carvedilol per cardiology for rate control, and warfarin.

## 2024-02-01 ENCOUNTER — ANTICOAGULATION VISIT (OUTPATIENT)
Dept: MEDICAL GROUP | Facility: MEDICAL CENTER | Age: 74
End: 2024-02-01
Payer: MEDICARE

## 2024-02-01 DIAGNOSIS — I48.21 PERMANENT ATRIAL FIBRILLATION (HCC): ICD-10-CM

## 2024-02-01 DIAGNOSIS — Z98.890 H/O MITRAL VALVE REPAIR: ICD-10-CM

## 2024-02-01 LAB — INR PPP: 4.1 (ref 2–3.5)

## 2024-02-01 PROCEDURE — 85610 PROTHROMBIN TIME: CPT

## 2024-02-01 PROCEDURE — 99211 OFF/OP EST MAY X REQ PHY/QHP: CPT | Performed by: FAMILY MEDICINE

## 2024-02-01 NOTE — PROGRESS NOTES
OP Anticoagulation Service Note    Date: 2024    Anticoagulation Summary  As of 2024      INR goal:  2.0-3.0   TTR:  57.1 % (6.9 y)   INR used for dosin.10 (2024)   Warfarin maintenance plan:  2.5 mg (5 mg x 0.5) every Tue, Thu, Sat; 5 mg (5 mg x 1) all other days   Weekly warfarin total:  27.5 mg   Plan last modified:  Souleymane Goldman, PharmD (2024)   Next INR check:  2/15/2024   Target end date:  Indefinite    Indications    Atrial fibrillation (HCC) [I48.91]  H/O mitral valve repair [Z98.890]                 Anticoagulation Episode Summary       INR check location:      Preferred lab:      Send INR reminders to:      Comments:            Anticoagulation Care Providers       Provider Role Specialty Phone number    LILIYA Pearson Referring Family Medicine [phone]    [facility] Responsible  [phone]    Kenneth Salas, PharmD Responsible Pharmacy           Anticoagulation Patient Findings  Patient Findings       Positives:  Emergency department visit (D/t SOB/CP)    Negatives:  Signs/symptoms of thrombosis, Signs/symptoms of bleeding, Laboratory test error suspected, Change in health, Change in alcohol use, Change in activity, Upcoming invasive procedure, Upcoming dental procedure, Missed doses, Extra doses, Change in medications, Change in diet/appetite, Hospital admission, Bruising, Other complaints            HPI:   Salma Lees is in the Anticoagulation Clinic today for an INR check on their anticoagulation therapy.     The reason for today's visit is to prevent morbidity and mortality from a blood clot and/or stroke and to reduce the risk of bleeding while on a anticoagulant.     PCP:  Leah Bonilla D.O.  [address]  [facility] [id number] [phone]    3 vitals included with today's appt-unless patient declined:  (BP, HR, weight, ht, RR)   There were no vitals filed for this visit.    Verified current warfarin dosing schedule -  pt has been taking 2.5 mg on Tues/Thurs and 5 mg ROW. Pt states she is unsure if she's been taking 2.5 mg once or twice weekly.    Medications reconciled: Yes  Pt is not on antiplatelet therapy    Assessment:   INR is supratherapeutic    Plan:  Instructed pt to HOLD x 1 dose tomorrow (took dose already this AM) and to then begin newly decreased regimen of 2.5 mg Tues/Thurs/Sat and 5 mg ROW (~ 15% decrease).    Follow up:  Follow up appointment in 2 week(s).       Other info:  Pt educated to contact our clinic with any changes in medications or s/s of bleeding or thrombosis.  Education was provided today regarding tips to reduce their bleed risk and dietary constraints while on an anticoagulant.    National Recommendations:  The CHEST guidelines recommends frequent INR monitoring at regular intervals (a few days up to a max of 12 weeks) to ensure patients are on the proper dose of warfarin, and patients are not having any complications from therapy.  INRs can dramatically change over a short time period due to diet, medications, and medical conditions.     Souleymane Goldman, PharmD, BCACP    [facility]  Phone [phone] fax [phone]

## 2024-02-15 ENCOUNTER — ANTICOAGULATION VISIT (OUTPATIENT)
Dept: MEDICAL GROUP | Facility: MEDICAL CENTER | Age: 74
End: 2024-02-15
Payer: MEDICARE

## 2024-02-15 DIAGNOSIS — I48.21 PERMANENT ATRIAL FIBRILLATION (HCC): ICD-10-CM

## 2024-02-15 DIAGNOSIS — Z98.890 H/O MITRAL VALVE REPAIR: ICD-10-CM

## 2024-02-15 LAB — INR PPP: 3.7 (ref 2–3.5)

## 2024-02-15 PROCEDURE — 85610 PROTHROMBIN TIME: CPT | Performed by: STUDENT IN AN ORGANIZED HEALTH CARE EDUCATION/TRAINING PROGRAM

## 2024-02-15 PROCEDURE — 99211 OFF/OP EST MAY X REQ PHY/QHP: CPT | Performed by: FAMILY MEDICINE

## 2024-02-15 NOTE — PROGRESS NOTES
OP Anticoagulation Service Note    Date: 2/15/2024    Anticoagulation Summary  As of 2/15/2024      INR goal:  2.0-3.0   TTR:  56.8% (6.9 y)   INR used for dosing:  3.70 (2/15/2024)   Warfarin maintenance plan:  5 mg (5 mg x 1) every Mon, Wed, Fri; 2.5 mg (5 mg x 0.5) all other days   Weekly warfarin total:  25 mg   Plan last modified:  Souleymane Goldman, PharmD (2/15/2024)   Next INR check:  3/4/2024   Target end date:  Indefinite    Indications    Atrial fibrillation (HCC) [I48.91]  H/O mitral valve repair [Z98.890]                 Anticoagulation Episode Summary       INR check location:      Preferred lab:      Send INR reminders to:      Comments:            Anticoagulation Care Providers       Provider Role Specialty Phone number    LILIYA Pearson Referring Family Medicine [phone]    [facility] Responsible  [phone]    Kenneth Salas, PharmD Responsible Pharmacy           Anticoagulation Patient Findings  Patient Findings       Positives:  Missed doses, Extra doses (D/t forgetting medication day prior), Change in medications (More APAP lately)    Negatives:  Signs/symptoms of thrombosis, Signs/symptoms of bleeding, Laboratory test error suspected, Change in health, Change in alcohol use, Change in activity, Upcoming invasive procedure, Emergency department visit, Upcoming dental procedure, Change in diet/appetite, Hospital admission, Bruising, Other complaints            HPI:   Salma Lees is in the Anticoagulation Clinic today for an INR check on their anticoagulation therapy.     The reason for today's visit is to prevent morbidity and mortality from a blood clot and/or stroke and to reduce the risk of bleeding while on a anticoagulant.     PCP:  Leah Bonilla D.O.  [address]  [place] [id number] [phone]    3 vitals included with today's appt-unless patient declined:  (BP, HR, weight, ht, RR)   There were no vitals filed for this  visit.    Verified current warfarin dosing schedule.    Medications reconciled: Yes  Pt is not on antiplatelet therapy    Assessment:   INR is supratherapeutic    Plan:  Instructed pt to HOLD x 1 dose today and to then begin newly decreased regimen of 5 mg M/W/F and 2.5 mg ROW.    Follow up:  Follow up appointment in 2.5 week(s).       Other info:  Pt educated to contact our clinic with any changes in medications or s/s of bleeding or thrombosis.  Education was provided today regarding tips to reduce their bleed risk and dietary constraints while on an anticoagulant.    National Recommendations:  The CHEST guidelines recommends frequent INR monitoring at regular intervals (a few days up to a max of 12 weeks) to ensure patients are on the proper dose of warfarin, and patients are not having any complications from therapy.  INRs can dramatically change over a short time period due to diet, medications, and medical conditions.     Souleymane Goldman, PharmD, BCACP    [facility]  Phone [phone] fax [phone]

## 2024-03-01 ENCOUNTER — OFFICE VISIT (OUTPATIENT)
Dept: CARDIOLOGY | Facility: MEDICAL CENTER | Age: 74
End: 2024-03-01
Attending: INTERNAL MEDICINE
Payer: MEDICARE

## 2024-03-01 VITALS
WEIGHT: 187 LBS | BODY MASS INDEX: 31.92 KG/M2 | OXYGEN SATURATION: 90 % | DIASTOLIC BLOOD PRESSURE: 52 MMHG | HEART RATE: 68 BPM | RESPIRATION RATE: 16 BRPM | SYSTOLIC BLOOD PRESSURE: 106 MMHG | HEIGHT: 64 IN

## 2024-03-01 DIAGNOSIS — E11.22 TYPE 2 DIABETES MELLITUS WITH STAGE 4 CHRONIC KIDNEY DISEASE, WITHOUT LONG-TERM CURRENT USE OF INSULIN (HCC): ICD-10-CM

## 2024-03-01 DIAGNOSIS — Z98.890 H/O MITRAL VALVE REPAIR: ICD-10-CM

## 2024-03-01 DIAGNOSIS — D68.69 SECONDARY HYPERCOAGULABLE STATE (HCC): ICD-10-CM

## 2024-03-01 DIAGNOSIS — Z79.01 ANTICOAGULATED: ICD-10-CM

## 2024-03-01 DIAGNOSIS — Z98.890 S/P ABLATION OF ATRIAL FLUTTER: ICD-10-CM

## 2024-03-01 DIAGNOSIS — I10 BENIGN ESSENTIAL HYPERTENSION: ICD-10-CM

## 2024-03-01 DIAGNOSIS — N18.4 TYPE 2 DIABETES MELLITUS WITH STAGE 4 CHRONIC KIDNEY DISEASE, WITHOUT LONG-TERM CURRENT USE OF INSULIN (HCC): ICD-10-CM

## 2024-03-01 DIAGNOSIS — I27.20 PULMONARY HYPERTENSION (HCC): ICD-10-CM

## 2024-03-01 DIAGNOSIS — Z95.0 PACEMAKER: ICD-10-CM

## 2024-03-01 DIAGNOSIS — Z86.79 S/P ABLATION OF ATRIAL FLUTTER: ICD-10-CM

## 2024-03-01 DIAGNOSIS — N18.4 CKD (CHRONIC KIDNEY DISEASE) STAGE 4, GFR 15-29 ML/MIN (HCC): ICD-10-CM

## 2024-03-01 PROCEDURE — 99214 OFFICE O/P EST MOD 30 MIN: CPT | Mod: 25 | Performed by: INTERNAL MEDICINE

## 2024-03-01 PROCEDURE — 99212 OFFICE O/P EST SF 10 MIN: CPT | Performed by: INTERNAL MEDICINE

## 2024-03-01 PROCEDURE — 3074F SYST BP LT 130 MM HG: CPT | Performed by: INTERNAL MEDICINE

## 2024-03-01 PROCEDURE — 3078F DIAST BP <80 MM HG: CPT | Performed by: INTERNAL MEDICINE

## 2024-03-01 PROCEDURE — 93280 PM DEVICE PROGR EVAL DUAL: CPT | Mod: 26 | Performed by: INTERNAL MEDICINE

## 2024-03-01 PROCEDURE — 93280 PM DEVICE PROGR EVAL DUAL: CPT | Performed by: INTERNAL MEDICINE

## 2024-03-01 RX ORDER — SPIRONOLACTONE 25 MG/1
25 TABLET ORAL DAILY
Qty: 30 TABLET | Refills: 3
Start: 2024-03-01

## 2024-03-01 ASSESSMENT — FIBROSIS 4 INDEX: FIB4 SCORE: 2.25

## 2024-03-01 NOTE — PROGRESS NOTES
"Chief Complaint   Patient presents with    Atrial Fibrillation       Subjective     Salma Lees is a 74 y.o. female who presents today status post atrial flutter ablation.  No recurrence.  Having some lower extremity edema, fatigue and some shortness of breath.  Permanent pacemaker without any recurrence of atrial arrhythmias.  Has been taking Lasix and potassium.  Has not been taking her spironolactone.  Previous history mitral valve repair 2009 [place].  Minimal RV pacing    Past Medical History:   Diagnosis Date    A-fib (HCC)     Asthma     BMI 35.0-35.9,adult 11/12/2018    Breath shortness 09/07/2017    uses oxygen at 2 liters/min cont. with \"[facility]\"    Calculus of ureter 09/18/2017    Cataract     codey IOL    Chronic anticoagulation 02/13/2017    CKD (chronic kidney disease) stage 3, GFR 30-59 ml/min ([facility])     Congestive heart failure (HCC)     COPD (chronic obstructive pulmonary disease) ([facility])     Diabetes ([facility])     High cholesterol     History of mitral valve repair     Hypertension     Infectious disease 2014    UTI/hx. MRSA    Pacemaker 2014    Pulmonary hypertension ([facility])     Renal disorder 09/07/2017    has stent    Shortness of breath 10/18/2017    Urinary incontinence      Past Surgical History:   Procedure Laterality Date    URETEROSCOPY Left 09/18/2017    Procedure: URETEROSCOPY;  Surgeon: Edgardo Richter M.D.;  Location: [facility];  Service: Urology    LASERTRIPSY Left 09/18/2017    Procedure: LASERTRIPSY LITHO  ;  Surgeon: Edgardo Richter M.D.;  Location: [facility];  Service: Urology    STENT REMOVAL Left 09/18/2017    Procedure: STENT REMOVAL;  Surgeon: Edgardo Richter M.D.;  Location: [facility];  Service: Urology    CYSTOSCOPY STENT PLACEMENT Left 06/25/2017    Procedure: CYSTOSCOPY, LEFT URETERAL STENT PLACEMENT;  Surgeon: Edgardo Richter M.D.;  Location: [facility];  Service:     PACEMAKER " INSERTION  2015    KNEE REPLACEMENT, TOTAL Left 2015    MITRAL VALVE REPAIR  2009    HYSTERECTOMY, VAGINAL  1994    TONSILLECTOMY       Family History   Problem Relation Age of Onset    Lung Disease Mother         asthma    Cancer Mother         ovarian    Heart Disease Father     Stroke Father     Thyroid Sister     Cancer Brother         pancreas    Cancer Paternal Grandmother         colon    Heart Disease Brother     No Known Problems Brother     No Known Problems Brother     Heart Disease Son         afib     Social History     Socioeconomic History    Marital status:      Spouse name: Not on file    Number of children: Not on file    Years of education: Not on file    Highest education level: Not on file   Occupational History    Not on file   Tobacco Use    Smoking status: Former     Current packs/day: 0.00     Average packs/day: 1 pack/day for 5.0 years (5.0 ttl pk-yrs)     Types: Cigarettes     Start date: 1985     Quit date: 1990     Years since quittin.1    Smokeless tobacco: Never    Tobacco comments:     Continued abstinence   Vaping Use    Vaping Use: Never used   Substance and Sexual Activity    Alcohol use: No     Alcohol/week: 0.0 oz    Drug use: No    Sexual activity: Not Currently   Other Topics Concern    Not on file   Social History Narrative    She has a son. She had previously experience in [employer].     Social Determinants of Health     Financial Resource Strain: Not on file   Food Insecurity: Not on file   Transportation Needs: Not on file   Physical Activity: Not on file   Stress: Not on file   Social Connections: Not on file   Intimate Partner Violence: Not on file   Housing Stability: Not on file     Allergies   Allergen Reactions    Amoxicillin Anaphylaxis and Shortness of Breath    Nitrofurantoin      swelling    Pcn [Penicillins] Swelling    Vancomycin Shortness of Breath and Unspecified     Severe hypotension and bronchospasm observed by anesthesia  "while giving  vanco during procedure    Amlodipine Besylate-Valsartan Unspecified     Chest pain and dizziness     Etodolac Hives and Nausea     Dark stools    Food Hives     Plums    Eliquis [Apixaban] Shortness of Breath     Pt reports SOB and dizzy when she took    Lisinopril Unspecified     Dizziness     Other Drug Rash     Metal silver    Other Misc Runny Nose     Mold, dust, and feathers, adhesives, plums    Tape Rash     Paper tape ok     Outpatient Encounter Medications as of 3/1/2024   Medication Sig Dispense Refill    spironolactone (ALDACTONE) 25 MG Tab Take 1 Tablet by mouth every day. 30 Tablet 3    warfarin (COUMADIN) 5 MG Tab TAKE ONE AND ONE-HALF TABLETS BY MOUTH DAILY OR AS DIRECTED BY ANTICOAGULANT CLINIC 135 Tablet 0    carvedilol (COREG) 25 MG Tab Take 12.5-25 mg by mouth 2 times a day with meals. 12.5 MG IN THE MORNING AND 25 MG AT NIGHT      calcitRIOL (ROCALTROL) 0.25 MCG Cap Take 1 Capsule by mouth every day. 100 Capsule 3    atorvastatin (LIPITOR) 10 MG Tab Take 1 Tablet by mouth every evening. 100 Tablet 3    levalbuterol (XOPENEX HFA) 45 MCG/ACT inhaler Inhale 2 Puffs every four hours as needed for Shortness of Breath. 1 Each 2    Dulaglutide 1.5 MG/0.5ML Solution Pen-injector Inject 0.5 mL under the skin every 7 days. Every Sunday 1 mL 0    [DISCONTINUED] dilTIAZem (CARDIZEM) 30 MG Tab Take 30 mg by mouth 2 (two) times a day.      [DISCONTINUED] amiodarone (CORDARONE) 200 MG Tab Take 1 Tablet by mouth every day. 90 Tablet 3     No facility-administered encounter medications on file as of 3/1/2024.     ROS           Objective     /52 (BP Location: Left arm, Patient Position: Sitting, BP Cuff Size: Adult)   Pulse 68   Resp 16   Ht 1.626 m (5' 4\")   Wt 84.8 kg (187 lb)   SpO2 90%   BMI 32.10 kg/m²     Physical Exam  Constitutional:       Appearance: She is well-developed.   HENT:      Head: Normocephalic and atraumatic.   Eyes:      Pupils: Pupils are equal, round, and reactive " to light.   Cardiovascular:      Rate and Rhythm: Normal rate and regular rhythm.      Heart sounds: Normal heart sounds. No murmur heard.     No friction rub. No gallop.   Pulmonary:      Effort: Pulmonary effort is normal.      Breath sounds: Normal breath sounds.   Abdominal:      General: Bowel sounds are normal.      Palpations: Abdomen is soft.   Musculoskeletal:         General: Normal range of motion.      Cervical back: Normal range of motion and neck supple.   Skin:     General: Skin is warm.   Neurological:      Mental Status: She is alert and oriented to person, place, and time.      Cranial Nerves: No cranial nerve deficit.   Psychiatric:         Behavior: Behavior normal.         Thought Content: Thought content normal.         Judgment: Judgment normal.                Assessment & Plan     1. Benign essential hypertension        2. H/O mitral valve repair        3. Pacemaker        4. S/P ablation of atrial flutter/AT        5. Secondary hypercoagulable state ([facility])        6. Pulmonary hypertension ([facility])        7. CKD (chronic kidney disease) stage 4, GFR 15-29 ml/min ([facility])        8. Anticoagulated        9. Type 2 diabetes mellitus with stage 4 chronic kidney disease, without long-term current use of insulin ([facility])  TSH+FREE T4    Comp Metabolic Panel    Lipid Profile    proBrain Natriuretic Peptide, NT    EC-ECHOCARDIOGRAM COMPLETE W/O CONT    spironolactone (ALDACTONE) 25 MG Tab          Medical Decision Making: Today's Assessment/Status/Plan:   1.  Fatigue, lower extremity edema and some shortness of breath DC amiodarone.  DC diltiazem.  Check CMP, BNP.  Referral to general cardiology.  Order echocardiogram.  Patient wants to follow-up with general cardiology here.  2.  Diabetes mellitus on statins and appropriate medications.  3.  Anticoagulation on Coumadin.  4.  Status post mitral valve repair.  5.  Status post ablation for atrial arrhythmias no recurrence.  6.  Renal insufficiency recheck.  7.   Check TFTs.  8.  Follow-up with me in 3 months.

## 2024-03-07 ENCOUNTER — ANTICOAGULATION VISIT (OUTPATIENT)
Dept: MEDICAL GROUP | Facility: MEDICAL CENTER | Age: 74
End: 2024-03-07
Payer: MEDICARE

## 2024-03-07 DIAGNOSIS — I48.21 PERMANENT ATRIAL FIBRILLATION (HCC): ICD-10-CM

## 2024-03-07 DIAGNOSIS — Z98.890 H/O MITRAL VALVE REPAIR: ICD-10-CM

## 2024-03-07 LAB — INR PPP: 2.7 (ref 2–3.5)

## 2024-03-07 PROCEDURE — 85610 PROTHROMBIN TIME: CPT | Performed by: STUDENT IN AN ORGANIZED HEALTH CARE EDUCATION/TRAINING PROGRAM

## 2024-03-07 PROCEDURE — 93793 ANTICOAG MGMT PT WARFARIN: CPT | Performed by: STUDENT IN AN ORGANIZED HEALTH CARE EDUCATION/TRAINING PROGRAM

## 2024-03-07 RX ORDER — FUROSEMIDE 20 MG/1
20 TABLET ORAL
COMMUNITY

## 2024-03-07 RX ORDER — POTASSIUM CHLORIDE 1500 MG/1
20 TABLET, EXTENDED RELEASE ORAL
COMMUNITY

## 2024-03-07 NOTE — PROGRESS NOTES
OP Anticoagulation Service Note    Date: 3/7/2024    Anticoagulation Summary  As of 3/7/2024      INR goal:  2.0-3.0   TTR:  56.5% (7 y)   INR used for dosin.70 (3/7/2024)   Warfarin maintenance plan:  5 mg (5 mg x 1) every Mon, Wed, Fri; 2.5 mg (5 mg x 0.5) all other days   Weekly warfarin total:  25 mg   Plan last modified:  Souleymane Goldman, PharmD (2/15/2024)   Next INR check:  3/21/2024   Target end date:  Indefinite    Indications    Atrial fibrillation (HCC) [I48.91]  H/O mitral valve repair [Z98.890]                 Anticoagulation Episode Summary       INR check location:      Preferred lab:      Send INR reminders to:      Comments:            Anticoagulation Care Providers       Provider Role Specialty Phone number    MARTA Pearson. Referring Family Medicine [phone]    [facility] Anticoagulation Services Responsible  [phone]    Kenneth Salas, PharmD Responsible Pharmacy           Anticoagulation Patient Findings  Patient Findings       Positives:  Change in medications (DC'd amiodarone and diltiazem, started spironolactone per cards ~ 7 days ago. Started K+ and furosemide.)    Negatives:  Signs/symptoms of thrombosis, Signs/symptoms of bleeding, Laboratory test error suspected, Change in health, Change in alcohol use, Change in activity, Upcoming invasive procedure, Emergency department visit, Upcoming dental procedure, Missed doses, Extra doses, Change in diet/appetite, Hospital admission, Bruising, Other complaints            HPI:   Salma Lees is in the Anticoagulation Clinic today for an INR check on their anticoagulation therapy.     The reason for today's visit is to prevent morbidity and mortality from a blood clot and/or stroke and to reduce the risk of bleeding while on a anticoagulant.     PCP:  Leah Bonilla D.O.  [address]  [place] [address] [phone]    3 vitals included with today's appt-unless patient declined:  (BP, HR, weight, ht, RR)    There were no vitals filed for this visit.    Verified current warfarin dosing schedule.    Medications reconciled: Yes  Pt is not on antiplatelet therapy    Assessment:   INR is therapeutic    Plan:  Instructed pt to continue on with current regimen.    Follow up:  Follow up appointment in 2 week(s) - sooner given amiodarone was Dc'd recently.       Other info:  Pt educated to contact our clinic with any changes in medications or s/s of bleeding or thrombosis.  Education was provided today regarding tips to reduce their bleed risk and dietary constraints while on an anticoagulant.    National Recommendations:  The CHEST guidelines recommends frequent INR monitoring at regular intervals (a few days up to a max of 12 weeks) to ensure patients are on the proper dose of warfarin, and patients are not having any complications from therapy.  INRs can dramatically change over a short time period due to diet, medications, and medical conditions.     Souleymane Goldman, PharmD, BCACP    [facility]  Phone [phone] fax [phone]

## 2024-03-21 ENCOUNTER — ANTICOAGULATION VISIT (OUTPATIENT)
Dept: MEDICAL GROUP | Facility: MEDICAL CENTER | Age: 74
End: 2024-03-21
Payer: MEDICARE

## 2024-03-21 DIAGNOSIS — I48.21 PERMANENT ATRIAL FIBRILLATION (HCC): ICD-10-CM

## 2024-03-21 DIAGNOSIS — Z98.890 H/O MITRAL VALVE REPAIR: ICD-10-CM

## 2024-03-21 LAB — INR PPP: 2.9 (ref 2–3.5)

## 2024-03-21 PROCEDURE — 85610 PROTHROMBIN TIME: CPT | Performed by: STUDENT IN AN ORGANIZED HEALTH CARE EDUCATION/TRAINING PROGRAM

## 2024-03-21 PROCEDURE — 93793 ANTICOAG MGMT PT WARFARIN: CPT | Performed by: STUDENT IN AN ORGANIZED HEALTH CARE EDUCATION/TRAINING PROGRAM

## 2024-03-21 NOTE — PROGRESS NOTES
OP Anticoagulation Service Note    Date: 3/21/2024    Anticoagulation Summary  As of 3/21/2024      INR goal:  2.0-3.0   TTR:  56.8% (7 y)   INR used for dosin.90 (3/21/2024)   Warfarin maintenance plan:  5 mg (5 mg x 1) every Mon, Wed, Fri; 2.5 mg (5 mg x 0.5) all other days   Weekly warfarin total:  25 mg   Plan last modified:  Souleymane Goldman, PharmD (2/15/2024)   Next INR check:  4/15/2024   Target end date:  Indefinite    Indications    Atrial fibrillation (HCC) [I48.91]  H/O mitral valve repair [Z98.890]                 Anticoagulation Episode Summary       INR check location:      Preferred lab:      Send INR reminders to:      Comments:            Anticoagulation Care Providers       Provider Role Specialty Phone number    LILIYA Pearson Referring Family Medicine [phone]    [facility] Anticoagulation Services Responsible  [phone]    Kenneth Salas, PharmD Responsible Pharmacy           Anticoagulation Patient Findings  Patient Findings       Negatives:  Signs/symptoms of thrombosis, Signs/symptoms of bleeding, Laboratory test error suspected, Change in health, Change in alcohol use, Change in activity, Upcoming invasive procedure, Emergency department visit, Upcoming dental procedure, Missed doses, Extra doses, Change in medications, Change in diet/appetite, Hospital admission, Bruising, Other complaints            HPI:   Salma Lees is in the Anticoagulation Clinic today for an INR check on their anticoagulation therapy.     The reason for today's visit is to prevent morbidity and mortality from a blood clot and/or stroke and to reduce the risk of bleeding while on a anticoagulant.     PCP:  Leah Bonilla D.O.  [address]  [place] [id number] [phone]    3 vitals included with today's appt-unless patient declined:  (BP, HR, weight, ht, RR)   There were no vitals filed for this visit.    Verified current warfarin dosing schedule.    Medications reconciled:  Yes  Pt is not on antiplatelet therapy    Assessment:   INR is therapeutic    Plan:  Instructed pt to continue on with current regimen.    Follow up:  Follow up appointment in 3.5 week(s).       Other info:  Pt educated to contact our clinic with any changes in medications or s/s of bleeding or thrombosis.  Education was provided today regarding tips to reduce their bleed risk and dietary constraints while on an anticoagulant.    National Recommendations:  The CHEST guidelines recommends frequent INR monitoring at regular intervals (a few days up to a max of 12 weeks) to ensure patients are on the proper dose of warfarin, and patients are not having any complications from therapy.  INRs can dramatically change over a short time period due to diet, medications, and medical conditions.     Souleymane Goldman, PharmD, BCACP    [facility]  Phone [phone] fax [phone]

## 2024-03-25 ENCOUNTER — OFFICE VISIT (OUTPATIENT)
Dept: MEDICAL GROUP | Facility: PHYSICIAN GROUP | Age: 74
End: 2024-03-25
Payer: MEDICARE

## 2024-03-25 VITALS
RESPIRATION RATE: 16 BRPM | WEIGHT: 175 LBS | OXYGEN SATURATION: 91 % | DIASTOLIC BLOOD PRESSURE: 60 MMHG | HEIGHT: 64 IN | TEMPERATURE: 97.4 F | HEART RATE: 60 BPM | BODY MASS INDEX: 29.88 KG/M2 | SYSTOLIC BLOOD PRESSURE: 128 MMHG

## 2024-03-25 DIAGNOSIS — N18.32 TYPE 2 DIABETES MELLITUS WITH STAGE 3B CHRONIC KIDNEY DISEASE, WITHOUT LONG-TERM CURRENT USE OF INSULIN (HCC): ICD-10-CM

## 2024-03-25 DIAGNOSIS — E11.22 TYPE 2 DIABETES MELLITUS WITH STAGE 3B CHRONIC KIDNEY DISEASE, WITHOUT LONG-TERM CURRENT USE OF INSULIN (HCC): ICD-10-CM

## 2024-03-25 DIAGNOSIS — S91.116A LACERATION OF FOURTH TOE: ICD-10-CM

## 2024-03-25 PROCEDURE — 99213 OFFICE O/P EST LOW 20 MIN: CPT | Performed by: INTERNAL MEDICINE

## 2024-03-25 PROCEDURE — 3074F SYST BP LT 130 MM HG: CPT | Performed by: INTERNAL MEDICINE

## 2024-03-25 PROCEDURE — 3078F DIAST BP <80 MM HG: CPT | Performed by: INTERNAL MEDICINE

## 2024-03-25 ASSESSMENT — FIBROSIS 4 INDEX: FIB4 SCORE: 2.25

## 2024-03-25 NOTE — PROGRESS NOTES
Subjective:   Chief Complaint/History of Present Illness:  Salma Lees is a 74 y.o. female established patient who presents today to discuss medical problems as listed below. Salma is unaccompanied for today's visit.    Problem   Laceration of Fourth Toe    She has a laceration on her right foot fourth digit on the dorsal aspect.  It is from a metal can lid which fell down and hit perpendicular on the toe causing the laceration.  She is on warfarin and noted significant bleeding from the site which worried her.  Fortunately her tetanus shot was updated in May 2023 so this does not need to be repeated currently.  She has been applying neosporin.  She did bleed through 2 bandages this morning which had her concerned so she skipped her most recent warfarin dose.          Current Medications:  Current Outpatient Medications Ordered in Epic   Medication Sig Dispense Refill    furosemide (LASIX) 20 MG Tab Take 20 mg by mouth.      potassium Chloride ER (K-TAB) 20 MEQ Tab CR tablet Take 20 mEq by mouth.      spironolactone (ALDACTONE) 25 MG Tab Take 1 Tablet by mouth every day. 30 Tablet 3    warfarin (COUMADIN) 5 MG Tab TAKE ONE AND ONE-HALF TABLETS BY MOUTH DAILY OR AS DIRECTED BY ANTICOAGULANT CLINIC 135 Tablet 0    carvedilol (COREG) 25 MG Tab Take 12.5-25 mg by mouth 2 times a day with meals. 12.5 MG IN THE MORNING AND 25 MG AT NIGHT      calcitRIOL (ROCALTROL) 0.25 MCG Cap Take 1 Capsule by mouth every day. 100 Capsule 3    atorvastatin (LIPITOR) 10 MG Tab Take 1 Tablet by mouth every evening. 100 Tablet 3    levalbuterol (XOPENEX HFA) 45 MCG/ACT inhaler Inhale 2 Puffs every four hours as needed for Shortness of Breath. 1 Each 2    Dulaglutide 1.5 MG/0.5ML Solution Pen-injector Inject 0.5 mL under the skin every 7 days. Every Sunday 1.5 mL 3     No current [facility]-ordered facility-administered medications on file.          Objective:   Physical Exam:    Vitals: /60 (BP Location: Right arm, Patient  "Position: Sitting, BP Cuff Size: Adult)   Pulse 60   Temp 36.3 °C (97.4 °F) (Temporal)   Resp 16   Ht 1.626 m (5' 4\")   Wt 79.4 kg (175 lb)   SpO2 91%    BMI: Body mass index is 30.04 kg/m².  Physical Exam  Musculoskeletal:         General: Tenderness and signs of injury present.        Feet:    Skin:     Comments: Laceration on top of 4th digit, well approximated   Psychiatric:         Mood and Affect: Mood normal.         Behavior: Behavior normal.         Thought Content: Thought content normal.         Judgment: Judgment normal.          Assessment and Plan:   Salma is a 74 y.o. female with the following:  Problem List Items Addressed This Visit       Laceration of fourth toe     New problem, no signs of infection. Closed the laceration with Dermabond. She will try to stay off the foot for the next day. She will monitor for recurrent bleeding. She held her warfarin today due to concern. She will keep me updated on further concerns for infection or bleeding moving forward this week.               RTC: Return if symptoms worsen or fail to improve.    I spent a total of 22 minutes with record review, exam, communication with the patient, communication with other providers, and documentation of this encounter.    PLEASE NOTE: This dictation was created using voice recognition software. I have made every reasonable attempt to correct obvious errors, but I expect that there are errors of grammar and possibly content that I did not discover before finalizing the note.      Leah Bonilla, DO  Geriatric and Internal Medicine  [facility]     "

## 2024-03-25 NOTE — ASSESSMENT & PLAN NOTE
DISCHARGE PLANNING    • Discharge to home or other facility with appropriate resources Progressing        Impaired Activities of Daily Living    • Achieve highest/safest level of independence in self care Progressing        Impaired Functional Mobility New problem, no signs of infection. Closed the laceration with Dermabond. She will try to stay off the foot for the next day. She will monitor for recurrent bleeding. She held her warfarin today due to concern. She will keep me updated on further concerns for infection or bleeding moving forward this week.

## 2024-03-25 NOTE — TELEPHONE ENCOUNTER
Received request via: Patient    Was the patient seen in the last year in this department? Yes    Does the patient have an active prescription (recently filled or refills available) for medication(s) requested? No    Pharmacy Name: [facility]    Does the patient have FPC Plus and need 100 day supply (blood pressure, diabetes and cholesterol meds only)? Yes, quantity updated to 100 days

## 2024-03-29 ENCOUNTER — RESEARCH ENCOUNTER (OUTPATIENT)
Dept: RESEARCH | Facility: MEDICAL CENTER | Age: 74
End: 2024-03-29
Payer: MEDICARE

## 2024-03-29 NOTE — RESEARCH NOTE
Patient responded to Cardiology Prospective Recruitment and has been scheduled to provide a saliva sample following Cardiologist appointment. Patient will need to sign consents during appointment and is currently ELF eligible;

## 2024-04-15 ENCOUNTER — ANTICOAGULATION VISIT (OUTPATIENT)
Dept: MEDICAL GROUP | Facility: MEDICAL CENTER | Age: 74
End: 2024-04-15
Payer: MEDICARE

## 2024-04-15 VITALS — BODY MASS INDEX: 29.52 KG/M2 | WEIGHT: 172 LBS

## 2024-04-15 DIAGNOSIS — I48.11 LONGSTANDING PERSISTENT ATRIAL FIBRILLATION (HCC): ICD-10-CM

## 2024-04-15 DIAGNOSIS — Z98.890 H/O MITRAL VALVE REPAIR: ICD-10-CM

## 2024-04-15 LAB — INR PPP: 1.9 (ref 2–3.5)

## 2024-04-15 PROCEDURE — 93793 ANTICOAG MGMT PT WARFARIN: CPT | Performed by: STUDENT IN AN ORGANIZED HEALTH CARE EDUCATION/TRAINING PROGRAM

## 2024-04-15 PROCEDURE — 85610 PROTHROMBIN TIME: CPT | Performed by: STUDENT IN AN ORGANIZED HEALTH CARE EDUCATION/TRAINING PROGRAM

## 2024-04-15 ASSESSMENT — FIBROSIS 4 INDEX: FIB4 SCORE: 2.25

## 2024-04-15 NOTE — PROGRESS NOTES
Anticoagulation Summary  As of 4/15/2024      INR goal:  2.0-3.0   TTR:  57.1% (7.1 y)   INR used for dosin.90 (4/15/2024)   Warfarin maintenance plan:  5 mg (5 mg x 1) every Mon, Wed, Fri; 2.5 mg (5 mg x 0.5) all other days   Weekly warfarin total:  25 mg   Plan last modified:  Joleen HeD (2/15/2024)   Next INR check:  2024   Target end date:  Indefinite    Indications    Atrial fibrillation (HCC) [I48.91]  H/O mitral valve repair [Z98.890]                 Anticoagulation Episode Summary       INR check location:      Preferred lab:      Send INR reminders to:      Comments:            Anticoagulation Care Providers       Provider Role Specialty Phone number    Zeferino Almaraz, A.P.N. Referring Family Medicine [phone]    [facility] Responsible  [phone]    Kenneth Salas, PharmD Responsible Pharmacy           Refer to Patient Findings for HPI:  Patient Findings       Negatives:  Signs/symptoms of thrombosis, Signs/symptoms of bleeding, Laboratory test error suspected, Change in health, Change in alcohol use, Change in activity, Upcoming invasive procedure, Emergency department visit, Upcoming dental procedure, Missed doses, Extra doses, Change in medications, Change in diet/appetite, Hospital admission, Bruising, Other complaints            Vitals:    04/15/24 1100   Weight: 78 kg (172 lb)     Patient denies vitals    Verified current warfarin dosing schedule.    Medications reconciled: Yes  Pt is not on antiplatelet therapy.      A/P   INR is slightly subtherapeutic    Warfarin dosing recommendation: Continue regimen as listed above.    Patient asks about starting norma supplements. Discussed with patient the risk of potentially increasing INR and bleed risk with OTC norma supplements. Patient verbalized understanding and will let the clinic know if she decides to start taking norma.     Pt educated to contact our clinic with any changes in medications or s/s  of bleeding or thrombosis. Pt is aware to seek immediate medical attention for falls, head injury or deep cuts.    Request pt to return in 2 week(s). Pt agrees.    Yehuda Nieto, PharmD

## 2024-04-16 ENCOUNTER — TELEPHONE (OUTPATIENT)
Dept: CARDIOLOGY | Facility: MEDICAL CENTER | Age: 74
End: 2024-04-16
Payer: MEDICARE

## 2024-04-16 NOTE — TELEPHONE ENCOUNTER
Caller: Salma Lees    Topic/issue: Patient called back. She was under the impression that she wasn't supposed to complete these labs until a week before her appointment with Jhon Galloway in June. Please advise.     Callback Number: [phone]    Thank you,  Aida SULLIVAN

## 2024-04-16 NOTE — TELEPHONE ENCOUNTER
Called pt in regards to lab work that was ordered at previous OV. LVM for call back to see if the labs were completed somewhere outside of [facility]. Pt has follow up appointment scheduled with Dr. Quiñones on 04/18/24.

## 2024-04-17 NOTE — TELEPHONE ENCOUNTER
Phone Number Called: [phone]    Call outcome: Spoke to patient regarding message below.    Message: Called to inform patient to complete labs prior to appointment with AL tomorrow so that he would have information on symptoms he is evaluating her for. Patient verbalizes understanding.

## 2024-04-18 ENCOUNTER — APPOINTMENT (OUTPATIENT)
Dept: CARDIOLOGY | Facility: MEDICAL CENTER | Age: 74
End: 2024-04-18
Attending: INTERNAL MEDICINE
Payer: MEDICARE

## 2024-04-18 DIAGNOSIS — N18.4 TYPE 2 DIABETES MELLITUS WITH STAGE 4 CHRONIC KIDNEY DISEASE, WITHOUT LONG-TERM CURRENT USE OF INSULIN (HCC): ICD-10-CM

## 2024-04-18 DIAGNOSIS — E11.22 TYPE 2 DIABETES MELLITUS WITH STAGE 4 CHRONIC KIDNEY DISEASE, WITHOUT LONG-TERM CURRENT USE OF INSULIN (HCC): ICD-10-CM

## 2024-04-18 RX ORDER — SPIRONOLACTONE 25 MG/1
25 TABLET ORAL DAILY
Qty: 30 TABLET | Refills: 3 | Status: SHIPPED | OUTPATIENT
Start: 2024-04-18

## 2024-04-19 ENCOUNTER — HOSPITAL ENCOUNTER (OUTPATIENT)
Dept: CARDIOLOGY | Facility: MEDICAL CENTER | Age: 74
End: 2024-04-19
Attending: INTERNAL MEDICINE
Payer: MEDICARE

## 2024-04-19 DIAGNOSIS — E11.22 TYPE 2 DIABETES MELLITUS WITH STAGE 4 CHRONIC KIDNEY DISEASE, WITHOUT LONG-TERM CURRENT USE OF INSULIN (HCC): ICD-10-CM

## 2024-04-19 DIAGNOSIS — N18.4 TYPE 2 DIABETES MELLITUS WITH STAGE 4 CHRONIC KIDNEY DISEASE, WITHOUT LONG-TERM CURRENT USE OF INSULIN (HCC): ICD-10-CM

## 2024-04-19 PROCEDURE — 93306 TTE W/DOPPLER COMPLETE: CPT

## 2024-04-25 ENCOUNTER — APPOINTMENT (OUTPATIENT)
Dept: CARDIOLOGY | Facility: MEDICAL CENTER | Age: 74
End: 2024-04-25
Payer: MEDICARE

## 2024-04-25 ENCOUNTER — APPOINTMENT (OUTPATIENT)
Dept: MEDICAL GROUP | Facility: PHYSICIAN GROUP | Age: 74
End: 2024-04-25
Payer: MEDICARE

## 2024-04-29 ENCOUNTER — TELEPHONE (OUTPATIENT)
Dept: CARDIOLOGY | Facility: MEDICAL CENTER | Age: 74
End: 2024-04-29

## 2024-04-29 ENCOUNTER — ANTICOAGULATION VISIT (OUTPATIENT)
Dept: MEDICAL GROUP | Facility: MEDICAL CENTER | Age: 74
End: 2024-04-29
Payer: MEDICARE

## 2024-04-29 DIAGNOSIS — Z98.890 HISTORY OF MITRAL VALVE REPAIR: ICD-10-CM

## 2024-04-29 DIAGNOSIS — R06.02 SHORTNESS OF BREATH: ICD-10-CM

## 2024-04-29 DIAGNOSIS — Z98.890 H/O MITRAL VALVE REPAIR: ICD-10-CM

## 2024-04-29 DIAGNOSIS — I48.21 PERMANENT ATRIAL FIBRILLATION (HCC): ICD-10-CM

## 2024-04-29 LAB
INR PPP: 2 (ref 2–3.5)
LV EJECT FRACT  99904: 50
LV EJECT FRACT MOD 2C 99903: 52.17
LV EJECT FRACT MOD 4C 99902: 46.06
LV EJECT FRACT MOD BP 99901: 49.49

## 2024-04-29 PROCEDURE — 93306 TTE W/DOPPLER COMPLETE: CPT | Mod: 26 | Performed by: INTERNAL MEDICINE

## 2024-04-29 NOTE — TELEPHONE ENCOUNTER
Referral placed for HF Clinic    Device Team: Patient states since being with DS,she didn't notify anybody about her [employer] device. I told her if we are monitoring her we probably have to info already, but she can bring the card in on 06/07 for Identification purpose. Thanks!    Bianca: Can you call the patient to get her scheduled  to establish with HF Cardiologist?   ========================  Phone Number Called: [phone]     Call outcome: Spoke to patient regarding message below.    Message: Called to advised about DS recommendations.  Pt is still experiencing SOB. I advised patient that referral will be placed to HR team; pt also mentioned wanting team to know about her [employer] device. She will make sure to bring the card in for the device for device team and her record. All patient questions answered. Pt appreciative of phone call.

## 2024-04-29 NOTE — PROGRESS NOTES
Anticoagulation Summary  As of 2024      INR goal:  2.0-3.0   TTR:  56.8% (7.1 y)   INR used for dosin.00 (2024)   Warfarin maintenance plan:  5 mg (5 mg x 1) every Mon, Wed, Fri; 2.5 mg (5 mg x 0.5) all other days   Weekly warfarin total:  25 mg   Plan last modified:  Souleymane Goldman, PharmD (2/15/2024)   Next INR check:  2024   Target end date:  Indefinite    Indications    Atrial fibrillation (HCC) [I48.91]  H/O mitral valve repair [Z98.890]                 Anticoagulation Episode Summary       INR check location:      Preferred lab:      Send INR reminders to:      Comments:            Anticoagulation Care Providers       Provider Role Specialty Phone number    SUSANNE Pearson.P.YASMANY. Referring Family Medicine [phone]    [facility] Responsible  [phone]    Kenneth Salas, PharmD Responsible Pharmacy           Anticoagulation Patient Findings  Patient Findings       Negatives:  Signs/symptoms of thrombosis, Signs/symptoms of bleeding, Laboratory test error suspected, Change in health, Change in alcohol use, Change in activity, Upcoming invasive procedure, Emergency department visit, Upcoming dental procedure, Missed doses, Extra doses, Change in medications, Change in diet/appetite, Hospital admission, Bruising, Other complaints                  HPI:   Salma Lees seen in clinic today, on anticoagulation therapy with warfarin due to history of atrial fibrillation and hx mitral valve repair .    Patient's previous INR was subtherapeutic at 1.9 on 04/15/24, at which time patient was instructed to continue regimen. Patient returns to clinic today to recheck INR to ensure it is therapeutic and thus preventing possible clotting and/or bleeding/bruising complications.    Does patient have [facility] PCP? Yes, Leah Bonilla D.O. (If not, please document discussion that patient must be seen at [facility])     Vitals:      There were no vitals filed  for this visit.     Asssessment:      INR is therapeutic at 2.0, therefore decreasing risk of stroke/VTE and bleeding  Reason(s) for out of range INR today: N/A      Pt is not on antiplatelet therapy.    Medication reconciliation completed today.    Warfarin dosing recommendation: Continue regimen as listed above.     Follow up:  Because warfarin is a high risk medication and current CHEST guidelines recommend regular monitoring intervals (few days up to 12 weeks), will have patient return to clinic in 4 weeks to recheck INR.    Kaylin Encinas, PharmD

## 2024-04-29 NOTE — TELEPHONE ENCOUNTER
----- Message from Jhon Galloway M.D. sent at 4/29/2024  4:11 PM PDT -----  Needs to be seen in CHF clinic if still sob

## 2024-04-30 ENCOUNTER — NON-PROVIDER VISIT (OUTPATIENT)
Dept: CARDIOLOGY | Facility: MEDICAL CENTER | Age: 74
End: 2024-04-30
Attending: INTERNAL MEDICINE
Payer: MEDICARE

## 2024-04-30 ENCOUNTER — NON-PROVIDER VISIT (OUTPATIENT)
Dept: CARDIOLOGY | Facility: MEDICAL CENTER | Age: 74
End: 2024-04-30

## 2024-04-30 VITALS
OXYGEN SATURATION: 97 % | HEIGHT: 64 IN | HEART RATE: 121 BPM | WEIGHT: 172.4 LBS | DIASTOLIC BLOOD PRESSURE: 81 MMHG | RESPIRATION RATE: 14 BRPM | SYSTOLIC BLOOD PRESSURE: 129 MMHG | BODY MASS INDEX: 29.43 KG/M2

## 2024-04-30 DIAGNOSIS — R00.0 TACHYCARDIA: ICD-10-CM

## 2024-04-30 DIAGNOSIS — I49.5 SICK SINUS SYNDROME (HCC): ICD-10-CM

## 2024-04-30 DIAGNOSIS — R06.09 DYSPNEA ON EXERTION: ICD-10-CM

## 2024-04-30 DIAGNOSIS — N18.4 TYPE 2 DIABETES MELLITUS WITH STAGE 4 CHRONIC KIDNEY DISEASE, WITHOUT LONG-TERM CURRENT USE OF INSULIN (HCC): ICD-10-CM

## 2024-04-30 DIAGNOSIS — Z98.890 S/P ABLATION OF ATRIAL FLUTTER: ICD-10-CM

## 2024-04-30 DIAGNOSIS — I48.21 PERMANENT ATRIAL FIBRILLATION (HCC): ICD-10-CM

## 2024-04-30 DIAGNOSIS — Z86.79 S/P ABLATION OF ATRIAL FLUTTER: ICD-10-CM

## 2024-04-30 DIAGNOSIS — I50.20 ACC/AHA STAGE C SYSTOLIC CONGESTIVE HEART FAILURE (HCC): ICD-10-CM

## 2024-04-30 DIAGNOSIS — E11.22 TYPE 2 DIABETES MELLITUS WITH STAGE 4 CHRONIC KIDNEY DISEASE, WITHOUT LONG-TERM CURRENT USE OF INSULIN (HCC): ICD-10-CM

## 2024-04-30 DIAGNOSIS — I51.89 LEFT VENTRICULAR DIASTOLIC DYSFUNCTION, NYHA CLASS 3: ICD-10-CM

## 2024-04-30 DIAGNOSIS — Z95.0 PACEMAKER: ICD-10-CM

## 2024-04-30 DIAGNOSIS — I27.20 PULMONARY HYPERTENSION (HCC): ICD-10-CM

## 2024-04-30 DIAGNOSIS — Z98.890 HISTORY OF MITRAL VALVE REPAIR: ICD-10-CM

## 2024-04-30 LAB — EKG IMPRESSION: NORMAL

## 2024-04-30 PROCEDURE — 99212 OFFICE O/P EST SF 10 MIN: CPT | Performed by: INTERNAL MEDICINE

## 2024-04-30 PROCEDURE — 93288 INTERROG EVL PM/LDLS PM IP: CPT | Performed by: INTERNAL MEDICINE

## 2024-04-30 PROCEDURE — 93005 ELECTROCARDIOGRAM TRACING: CPT | Performed by: INTERNAL MEDICINE

## 2024-04-30 ASSESSMENT — ENCOUNTER SYMPTOMS
FALLS: 0
PALPITATIONS: 0
FEVER: 0
EYE DISCHARGE: 0
BRUISES/BLEEDS EASILY: 0
CLAUDICATION: 0
PND: 0
DIZZINESS: 0
BLOOD IN STOOL: 0
MYALGIAS: 0
SENSORY CHANGE: 0
HEADACHES: 0
VOMITING: 0
SPEECH CHANGE: 0
HALLUCINATIONS: 0
LOSS OF CONSCIOUSNESS: 0
ABDOMINAL PAIN: 0
ORTHOPNEA: 0
SHORTNESS OF BREATH: 1
CHILLS: 0
COUGH: 0
BLURRED VISION: 0
DOUBLE VISION: 0
EYE PAIN: 0
WEIGHT LOSS: 0
NAUSEA: 0
DEPRESSION: 0

## 2024-04-30 ASSESSMENT — FIBROSIS 4 INDEX: FIB4 SCORE: 2.25

## 2024-04-30 NOTE — PROGRESS NOTES
Patient seen today per Dr. Simmons--patient in ST/AT @ rate of 120 bpm.  Attempted to pace terminate per Dr. Simmons--[employer] - [employer] rep unable to pace terminate today.    Patient will transmit via home monitor 5/3 or 5/6 for HR review.

## 2024-04-30 NOTE — PROGRESS NOTES
"Chief Complaint   Patient presents with    Hypertension     Benign essential hypertension        Atrial Fibrillation       Subjective     Slama Lees is a 74 y.o. female who presents today for cardiac care and evaluation due to persistent shortness of breath and lower extremities edema.  Patient does have reduced left ventricular systolic function.  She also has elevated NT proBNP at 2531, GFR of 34, potassium of 4.7, hemoglobin A1c of 6.5.    Patient still gets winded with daily living activities and exertion. No symptoms at rest.    LVEF of 40% with global hypokinesis. Moderate MR. I have independently interpreted and reviewed echocardiogram's actual images.     Today in clinic, her heart rate is in the 120.  I personally interpreted the EKG tracing which showed potential atrial tachycardia.  We do not interrogate her device which showed evidence of potential sinus tachycardia versus atrial tachycardia.  Patient is largely asymptomatic from her tachycardia.  She does not feel a palpitation or her heart rate being out of her chest.    Past Medical History:   Diagnosis Date    A-fib (Former[facility])     Asthma     BMI 35.0-35.9,adult 11/12/2018    Breath shortness 09/07/2017    uses oxygen at 2 liters/min cont. with [facility]    Calculus of ureter 09/18/2017    Cataract     codey IOL    Chronic anticoagulation 02/13/2017    CKD (chronic kidney disease) stage 3, GFR 30-59 ml/min     Congestive heart failure (Formerly [facility])     COPD (chronic obstructive pulmonary disease) (Formerly [facility])     Diabetes (Formerly [facility])     High cholesterol     History of mitral valve repair     Hypertension     Infectious disease 2014    UTI/hx. MRSA    Pacemaker 2014    Pulmonary hypertension (Formerly [facility])     Renal disorder 09/07/2017    has stent    Shortness of breath 10/18/2017    Urinary incontinence      Past Surgical History:   Procedure Laterality Date    URETEROSCOPY Left 09/18/2017    Procedure: URETEROSCOPY;  Surgeon: Edgardo Richter M.D.;  Location: " SURGERY [facility];  Service: Urology    LASERTRIPSY Left 2017    Procedure: LASERTRIPSY LITHO  ;  Surgeon: Edgardo Richter M.D.;  Location: SURGERY [facility];  Service: Urology    STENT REMOVAL Left 2017    Procedure: STENT REMOVAL;  Surgeon: Edgardo Richter M.D.;  Location: SURGERY [facility];  Service: Urology    CYSTOSCOPY STENT PLACEMENT Left 2017    Procedure: CYSTOSCOPY, LEFT URETERAL STENT PLACEMENT;  Surgeon: Edgardo Richter M.D.;  Location: SURGERY [facility];  Service:     PACEMAKER INSERTION  2015    KNEE REPLACEMENT, TOTAL Left 2015    MITRAL VALVE REPAIR  2009    HYSTERECTOMY, VAGINAL  1994    TONSILLECTOMY       Family History   Problem Relation Age of Onset    Lung Disease Mother         asthma    Cancer Mother         ovarian    Heart Disease Father     Stroke Father     Thyroid Sister     Cancer Brother         pancreas    Cancer Paternal Grandmother         colon    Heart Disease Brother     No Known Problems Brother     No Known Problems Brother     Heart Disease Son         afib     Social History     Socioeconomic History    Marital status:      Spouse name: Not on file    Number of children: Not on file    Years of education: Not on file    Highest education level: Not on file   Occupational History    Not on file   Tobacco Use    Smoking status: Former     Current packs/day: 0.00     Average packs/day: 1 pack/day for 5.0 years (5.0 ttl pk-yrs)     Types: Cigarettes     Start date: 1985     Quit date: 1990     Years since quittin.3    Smokeless tobacco: Never    Tobacco comments:     Continued abstinence   Vaping Use    Vaping Use: Never used   Substance and Sexual Activity    Alcohol use: No     Alcohol/week: 0.0 oz    Drug use: No    Sexual activity: Not Currently   Other Topics Concern    Not on file   Social History Narrative    She has a son. She had previously experience in [place].      Social Determinants of Health     Financial Resource Strain: Not on file   Food Insecurity: Not on file   Transportation Needs: Not on file   Physical Activity: Not on file   Stress: Not on file   Social Connections: Not on file   Intimate Partner Violence: Not on file   Housing Stability: Not on file     Allergies   Allergen Reactions    Amoxicillin Anaphylaxis and Shortness of Breath    Nitrofurantoin      swelling    Pcn [Penicillins] Swelling    Vancomycin Shortness of Breath and Unspecified     Severe hypotension and bronchospasm observed by anesthesia while giving  vanco during procedure    Amlodipine Besylate-Valsartan Unspecified     Chest pain and dizziness     Etodolac Hives and Nausea     Dark stools    Food Hives     Plums    Eliquis [Apixaban] Shortness of Breath     Pt reports SOB and dizzy when she took    Lisinopril Unspecified     Dizziness     Other Drug Rash     Metal silver    Other Misc Runny Nose     Mold, dust, and feathers, adhesives, plums    Tape Rash     Paper tape ok     Outpatient Encounter Medications as of 4/30/2024   Medication Sig Dispense Refill    spironolactone (ALDACTONE) 25 MG Tab Take 1 Tablet by mouth every day. 30 Tablet 3    Dulaglutide 1.5 MG/0.5ML Solution Pen-injector Inject 0.5 mL under the skin every 7 days. Every Sunday 1.5 mL 3    furosemide (LASIX) 20 MG Tab Take 20 mg by mouth.      potassium Chloride ER (K-TAB) 20 MEQ Tab CR tablet Take 20 mEq by mouth.      warfarin (COUMADIN) 5 MG Tab TAKE ONE AND ONE-HALF TABLETS BY MOUTH DAILY OR AS DIRECTED BY ANTICOAGULANT CLINIC 135 Tablet 0    carvedilol (COREG) 25 MG Tab Take 12.5-25 mg by mouth 2 times a day with meals. 12.5 MG IN THE MORNING AND 25 MG AT NIGHT      calcitRIOL (ROCALTROL) 0.25 MCG Cap Take 1 Capsule by mouth every day. 100 Capsule 3    atorvastatin (LIPITOR) 10 MG Tab Take 1 Tablet by mouth every evening. 100 Tablet 3    levalbuterol (XOPENEX HFA) 45 MCG/ACT inhaler Inhale 2 Puffs every four hours as  "needed for Shortness of Breath. 1 Each 2     No facility-administered encounter medications on file as of 4/30/2024.     Review of Systems   Constitutional:  Negative for chills, fever, malaise/fatigue and weight loss.   HENT:  Negative for ear discharge, ear pain, hearing loss and nosebleeds.    Eyes:  Negative for blurred vision, double vision, pain and discharge.   Respiratory:  Positive for shortness of breath. Negative for cough.    Cardiovascular:  Negative for chest pain, palpitations, orthopnea, claudication, leg swelling and PND.   Gastrointestinal:  Negative for abdominal pain, blood in stool, melena, nausea and vomiting.   Genitourinary:  Negative for dysuria and hematuria.   Musculoskeletal:  Negative for falls, joint pain and myalgias.   Skin:  Negative for itching and rash.   Neurological:  Negative for dizziness, sensory change, speech change, loss of consciousness and headaches.   Endo/Heme/Allergies:  Negative for environmental allergies. Does not bruise/bleed easily.   Psychiatric/Behavioral:  Negative for depression, hallucinations and suicidal ideas.               Objective     /81 (BP Location: Left arm, Patient Position: Sitting, BP Cuff Size: Adult)   Pulse (!) 121   Resp 14   Ht 1.626 m (5' 4\")   Wt 78.2 kg (172 lb 6.4 oz)   SpO2 97%   BMI 29.59 kg/m²     Physical Exam  Vitals and nursing note reviewed.   Constitutional:       General: She is not in acute distress.     Appearance: She is not diaphoretic.   HENT:      Head: Normocephalic and atraumatic.      Right Ear: External ear normal.      Left Ear: External ear normal.      Nose: No congestion or rhinorrhea.   Eyes:      General:         Right eye: No discharge.         Left eye: No discharge.   Neck:      Thyroid: No thyromegaly.      Vascular: No JVD.   Cardiovascular:      Rate and Rhythm: Normal rate and regular rhythm.      Pulses: Normal pulses.   Pulmonary:      Effort: No respiratory distress.   Abdominal:      " General: There is no distension.      Tenderness: There is no abdominal tenderness.   Musculoskeletal:         General: No swelling or tenderness.      Right lower leg: No edema.      Left lower leg: No edema.   Skin:     General: Skin is warm and dry.   Neurological:      Mental Status: She is alert and oriented to person, place, and time.      Cranial Nerves: No cranial nerve deficit.   Psychiatric:         Behavior: Behavior normal.                Assessment & Plan     1. ACC/AHA stage C systolic congestive heart failure (HCC)  EC-ECHOCARDIOGRAM LTD W/ CONT      2. Left ventricular diastolic dysfunction, NYHA class 3  EC-ECHOCARDIOGRAM LTD W/ CONT      3. History of mitral valve repair        4. Permanent atrial fibrillation ([facility])  EKG      5. Type 2 diabetes mellitus with stage 4 chronic kidney disease, without long-term current use of insulin ([facility])        6. S/P ablation of atrial flutter/AT        7. Pulmonary hypertension ([facility])        8. Pacemaker        9. Dyspnea on exertion  EC-ECHOCARDIOGRAM LTD W/ CONT          Medical Decision Making: Today's Assessment/Status/Plan:   Overall, patient does meet criteria for heart failure reduced ejection fraction with elevated NT proBNP and symptomatology of heart failure.    Today, based on physical examination findings, patient is euvolemic. No JVD, lungs are clear to auscultation, no pitting edema in bilateral lower extremities, no ascites.    Dry weight is 172 lbs.    At this time, I do think that we should focus on the tachycardia issue today.  Therefore, I will start her on ivabradine5 mg p.o. twice a day.    In the meantime, I will also continue carvedilol at 25 mg p.o. twice a day.    We will repeat transthoracic echocardiogram with contrast to further reassess her LV function. At that time, finalization of medical therapy will be implemented.    This visit encounter signifies the visit complexity inherent to evaluation and management associated with medical  care services that serve as the continuing focal point for all needed health care services and/or with medical care services that are part of ongoing care related to this patient's single, serious condition, complex cardiac condition.    Pelon Simmons M.D.

## 2024-05-01 ENCOUNTER — HOSPITAL ENCOUNTER (OUTPATIENT)
Dept: CARDIOLOGY | Facility: MEDICAL CENTER | Age: 74
End: 2024-05-01
Attending: INTERNAL MEDICINE
Payer: MEDICARE

## 2024-05-01 DIAGNOSIS — I51.89 LEFT VENTRICULAR DIASTOLIC DYSFUNCTION, NYHA CLASS 3: ICD-10-CM

## 2024-05-01 DIAGNOSIS — R06.09 DYSPNEA ON EXERTION: ICD-10-CM

## 2024-05-01 DIAGNOSIS — I50.20 ACC/AHA STAGE C SYSTOLIC CONGESTIVE HEART FAILURE (HCC): ICD-10-CM

## 2024-05-01 LAB
LV EJECT FRACT  99904: 25
LV EJECT FRACT MOD 2C 99903: 36.27
LV EJECT FRACT MOD 4C 99902: 24.22
LV EJECT FRACT MOD BP 99901: 34.65

## 2024-05-01 PROCEDURE — 93321 DOPPLER ECHO F-UP/LMTD STD: CPT | Mod: 26 | Performed by: INTERNAL MEDICINE

## 2024-05-01 PROCEDURE — 93308 TTE F-UP OR LMTD: CPT | Mod: 26 | Performed by: INTERNAL MEDICINE

## 2024-05-01 PROCEDURE — 93325 DOPPLER ECHO COLOR FLOW MAPG: CPT | Mod: 26 | Performed by: INTERNAL MEDICINE

## 2024-05-01 RX ADMIN — HUMAN ALBUMIN MICROSPHERES AND PERFLUTREN 3 ML: 10; .22 INJECTION, SOLUTION INTRAVENOUS at 16:00

## 2024-05-01 NOTE — CARDIAC REMOTE MONITOR - SCAN
Device transmission reviewed. Device demonstrated appropriate function.  See note      Electronically Signed by: Jhon Galloway M.D.    5/1/2024  10:08 AM

## 2024-05-02 ENCOUNTER — NON-PROVIDER VISIT (OUTPATIENT)
Dept: CARDIOLOGY | Facility: MEDICAL CENTER | Age: 74
End: 2024-05-02
Payer: MEDICARE

## 2024-05-02 ENCOUNTER — TELEPHONE (OUTPATIENT)
Dept: CARDIOLOGY | Facility: MEDICAL CENTER | Age: 74
End: 2024-05-02
Payer: MEDICARE

## 2024-05-02 PROCEDURE — 93294 REM INTERROG EVL PM/LDLS PM: CPT | Performed by: INTERNAL MEDICINE

## 2024-05-02 NOTE — CARDIAC REMOTE MONITOR - SCAN
Device transmission reviewed. Device demonstrated appropriate function. Presenting rhythm ST/AT.      Electronically Signed by: Migel Russo M.D.    5/2/2024  4:03 PM

## 2024-05-02 NOTE — RESULT ENCOUNTER NOTE
Dear Jenny,    Can you please let Salma Lees know that result is not entirely normal and I will see patient as scheduled to discuss?    Thank you,  Ag.

## 2024-05-02 NOTE — TELEPHONE ENCOUNTER
FYI - remote transmission received via home monitor, patient presenting in ST/AT w/ average rate of 120 BPM, per ov/pacer check note this is rate check, full report scanned into Media.

## 2024-05-03 ENCOUNTER — TELEPHONE (OUTPATIENT)
Dept: CARDIOLOGY | Facility: MEDICAL CENTER | Age: 74
End: 2024-05-03
Payer: MEDICARE

## 2024-05-03 NOTE — TELEPHONE ENCOUNTER
Patient called me back and let me know that she will just get 1 month supply of the Corlanor for now to make sure that she does not have any reactions to it. I let her know that if she does continue with it next month, to hold onto my phone number and we can absolutely look into financial assistance for it going forward. I did not screen her for the [employer] adela yet for income, so if she continues, I will screen her for the income requirement since she qualifies for the Cardiomyopathy DX. I also informed her of our [facility] services and let her know that if she would like to have all of her medications filled and delivered to her through [facility] for additional co-pay savings, to hold onto my phone number and let me know as well. She said that she will give me a call next month (June) and let me know how she will proceed with the Corlanor and with possibly transferring her scripts to [facility] for medication management going forward.

## 2024-05-03 NOTE — TELEPHONE ENCOUNTER
Prior Authorization for Corlanor 5mg tabs has been approved for a quantity of 180 , day supply 90    Prior Authorization reference number: (Key: [id number]) - [id number]  Insurance: [employer] / [employer] D  Effective dates: 05/03/24 - 05/03/25  Copay: $100 / 30 days and/or $250 / 90 days     Is patient eligible to fill with [facility]? Yes    Next Steps: The patient's copay exceeds $5.00. Proceed with contacting patient to offer financial assistance.    Left a voicemail for patient to screen for possible [employer].

## 2024-05-03 NOTE — TELEPHONE ENCOUNTER
Received ERX in Cardiology [facility], ran test claim and rejected for Prior Authorization is required. Prior Authorization for Corlanor 5mg tabs (Quantity: 180, Days: 90) has been submitted via Cover My Meds: Key ([id number]) PA Case ID #: [id number]    Insurance: [employer] / [employer] D    Will follow up in 24-48 business hours.

## 2024-05-06 ENCOUNTER — PATIENT MESSAGE (OUTPATIENT)
Dept: CARDIOLOGY | Facility: MEDICAL CENTER | Age: 74
End: 2024-05-06
Payer: MEDICARE

## 2024-05-06 NOTE — PROGRESS NOTES
Patient heart rate has normalized with hydration.  Stop lasix.  No need to  Ivabradine.    Pelon Simmons M.D.

## 2024-05-07 ENCOUNTER — TELEPHONE (OUTPATIENT)
Dept: CARDIOLOGY | Facility: MEDICAL CENTER | Age: 74
End: 2024-05-07
Payer: MEDICARE

## 2024-05-08 DIAGNOSIS — I48.21 PERMANENT ATRIAL FIBRILLATION (HCC): ICD-10-CM

## 2024-05-08 DIAGNOSIS — I10 BENIGN ESSENTIAL HYPERTENSION: ICD-10-CM

## 2024-05-08 DIAGNOSIS — I50.20 ACC/AHA STAGE C SYSTOLIC CONGESTIVE HEART FAILURE (HCC): ICD-10-CM

## 2024-05-08 RX ORDER — CARVEDILOL 25 MG/1
12.5-25 TABLET ORAL 2 TIMES DAILY WITH MEALS
Qty: 135 TABLET | Refills: 2 | Status: SHIPPED | OUTPATIENT
Start: 2024-05-08

## 2024-05-10 ENCOUNTER — PATIENT MESSAGE (OUTPATIENT)
Dept: CARDIOLOGY | Facility: MEDICAL CENTER | Age: 74
End: 2024-05-10
Payer: MEDICARE

## 2024-05-29 ENCOUNTER — HOSPITAL ENCOUNTER (OUTPATIENT)
Dept: LAB | Facility: MEDICAL CENTER | Age: 74
End: 2024-05-29
Payer: MEDICARE

## 2024-05-29 DIAGNOSIS — N18.4 TYPE 2 DIABETES MELLITUS WITH STAGE 4 CHRONIC KIDNEY DISEASE, WITHOUT LONG-TERM CURRENT USE OF INSULIN (HCC): ICD-10-CM

## 2024-05-29 DIAGNOSIS — E11.22 TYPE 2 DIABETES MELLITUS WITH STAGE 4 CHRONIC KIDNEY DISEASE, WITHOUT LONG-TERM CURRENT USE OF INSULIN (HCC): ICD-10-CM

## 2024-05-29 LAB
ALBUMIN SERPL BCP-MCNC: 3.8 G/DL (ref 3.2–4.9)
ALBUMIN/GLOB SERPL: 1.1 G/DL
ALP SERPL-CCNC: 65 U/L (ref 30–99)
ALT SERPL-CCNC: 32 U/L (ref 2–50)
ANION GAP SERPL CALC-SCNC: 12 MMOL/L (ref 7–16)
AST SERPL-CCNC: 21 U/L (ref 12–45)
BILIRUB SERPL-MCNC: 1.9 MG/DL (ref 0.1–1.5)
BUN SERPL-MCNC: 28 MG/DL (ref 8–22)
CALCIUM ALBUM COR SERPL-MCNC: 9.3 MG/DL (ref 8.5–10.5)
CALCIUM SERPL-MCNC: 9.1 MG/DL (ref 8.5–10.5)
CHLORIDE SERPL-SCNC: 103 MMOL/L (ref 96–112)
CHOLEST SERPL-MCNC: 119 MG/DL (ref 100–199)
CO2 SERPL-SCNC: 25 MMOL/L (ref 20–33)
CREAT SERPL-MCNC: 1.35 MG/DL (ref 0.5–1.4)
GFR SERPLBLD CREATININE-BSD FMLA CKD-EPI: 41 ML/MIN/1.73 M 2
GLOBULIN SER CALC-MCNC: 3.5 G/DL (ref 1.9–3.5)
GLUCOSE SERPL-MCNC: 110 MG/DL (ref 65–99)
HDLC SERPL-MCNC: 59 MG/DL
LDLC SERPL CALC-MCNC: 47 MG/DL
NT-PROBNP SERPL IA-MCNC: 2213 PG/ML (ref 0–125)
POTASSIUM SERPL-SCNC: 4.3 MMOL/L (ref 3.6–5.5)
PROT SERPL-MCNC: 7.3 G/DL (ref 6–8.2)
SODIUM SERPL-SCNC: 140 MMOL/L (ref 135–145)
T4 FREE SERPL-MCNC: 1.54 NG/DL (ref 0.93–1.7)
TRIGL SERPL-MCNC: 63 MG/DL (ref 0–149)
TSH SERPL DL<=0.005 MIU/L-ACNC: 1.81 UIU/ML (ref 0.38–5.33)

## 2024-05-30 ENCOUNTER — ANTICOAGULATION VISIT (OUTPATIENT)
Dept: MEDICAL GROUP | Facility: MEDICAL CENTER | Age: 74
End: 2024-05-30
Payer: MEDICARE

## 2024-05-30 VITALS
SYSTOLIC BLOOD PRESSURE: 117 MMHG | DIASTOLIC BLOOD PRESSURE: 64 MMHG | WEIGHT: 172 LBS | BODY MASS INDEX: 29.52 KG/M2 | HEART RATE: 118 BPM | OXYGEN SATURATION: 96 %

## 2024-05-30 DIAGNOSIS — Z98.890 H/O MITRAL VALVE REPAIR: ICD-10-CM

## 2024-05-30 LAB — INR PPP: 2.1 (ref 2–3.5)

## 2024-05-30 ASSESSMENT — FIBROSIS 4 INDEX: FIB4 SCORE: 1.61

## 2024-05-30 NOTE — PROGRESS NOTES
Anticoagulation Summary  As of 2024      INR goal:  2.0-3.0   TTR:  57.3% (7.2 y)   INR used for dosin.10 (2024)   Warfarin maintenance plan:  5 mg (5 mg x 1) every Mon, Wed, Fri; 2.5 mg (5 mg x 0.5) all other days   Weekly warfarin total:  25 mg   Plan last modified:  Joleen HeD (2/15/2024)   Next INR check:  2024   Target end date:  Indefinite    Indications    Atrial fibrillation (HCC) [I48.91]  H/O mitral valve repair [Z98.890]                 Anticoagulation Episode Summary       INR check location:      Preferred lab:      Send INR reminders to:      Comments:            Anticoagulation Care Providers       Provider Role Specialty Phone number    Zeferino Almaraz, A.P.N. Referring Family Medicine [phone]    [facility] Responsible  [phone]    Joleen BoxD Responsible Pharmacy                   Refer to Patient Findings for HPI:  Patient Findings       Negatives:  Signs/symptoms of thrombosis, Signs/symptoms of bleeding, Laboratory test error suspected, Change in health, Change in alcohol use, Change in activity, Upcoming invasive procedure, Emergency department visit, Upcoming dental procedure, Missed doses, Extra doses, Change in medications, Change in diet/appetite, Hospital admission, Bruising, Other complaints            Vitals:    24 1108   BP: 117/64   Pulse: (!) 118   SpO2: 96%   Weight: 78 kg (172 lb)       Verified current warfarin dosing schedule.    Medications reconciled: No  Pt is not on antiplatelet therapy.      A/P   INR is therapeutic    Warfarin dosing recommendation: Continue regimen as listed above.    Pt educated to contact our clinic with any changes in medications or s/s of bleeding or thrombosis. Pt is aware to seek immediate medical attention for falls, head injury or deep cuts.    Request pt to return in 4 week(s). Pt agrees.    Joleen HallmanD

## 2024-06-03 ENCOUNTER — APPOINTMENT (OUTPATIENT)
Dept: CARDIOLOGY | Facility: MEDICAL CENTER | Age: 74
End: 2024-06-03
Attending: INTERNAL MEDICINE
Payer: MEDICARE

## 2024-06-03 ENCOUNTER — TELEPHONE (OUTPATIENT)
Dept: CARDIOLOGY | Facility: MEDICAL CENTER | Age: 74
End: 2024-06-03

## 2024-06-03 VITALS
OXYGEN SATURATION: 90 % | WEIGHT: 178 LBS | DIASTOLIC BLOOD PRESSURE: 50 MMHG | BODY MASS INDEX: 30.39 KG/M2 | HEIGHT: 64 IN | SYSTOLIC BLOOD PRESSURE: 100 MMHG | HEART RATE: 60 BPM | RESPIRATION RATE: 16 BRPM

## 2024-06-03 DIAGNOSIS — R06.02 SHORTNESS OF BREATH: ICD-10-CM

## 2024-06-03 DIAGNOSIS — I51.89 LEFT VENTRICULAR SYSTOLIC DYSFUNCTION, NYHA CLASS 3: ICD-10-CM

## 2024-06-03 DIAGNOSIS — I51.89 LEFT VENTRICULAR DIASTOLIC DYSFUNCTION, NYHA CLASS 3: ICD-10-CM

## 2024-06-03 DIAGNOSIS — I48.21 PERMANENT ATRIAL FIBRILLATION (HCC): ICD-10-CM

## 2024-06-03 DIAGNOSIS — I48.0 PAROXYSMAL ATRIAL FIBRILLATION (HCC): ICD-10-CM

## 2024-06-03 DIAGNOSIS — Z98.890 HISTORY OF MITRAL VALVE REPAIR: ICD-10-CM

## 2024-06-03 DIAGNOSIS — I50.20 ACC/AHA STAGE C SYSTOLIC CONGESTIVE HEART FAILURE (HCC): ICD-10-CM

## 2024-06-03 DIAGNOSIS — Z79.899 HIGH RISK MEDICATION USE: ICD-10-CM

## 2024-06-03 DIAGNOSIS — R00.0 TACHYCARDIA: ICD-10-CM

## 2024-06-03 PROCEDURE — 99213 OFFICE O/P EST LOW 20 MIN: CPT | Performed by: INTERNAL MEDICINE

## 2024-06-03 PROCEDURE — 3078F DIAST BP <80 MM HG: CPT | Performed by: INTERNAL MEDICINE

## 2024-06-03 PROCEDURE — RXMED WILLOW AMBULATORY MEDICATION CHARGE: Performed by: INTERNAL MEDICINE

## 2024-06-03 PROCEDURE — 99214 OFFICE O/P EST MOD 30 MIN: CPT | Performed by: INTERNAL MEDICINE

## 2024-06-03 PROCEDURE — 3074F SYST BP LT 130 MM HG: CPT | Performed by: INTERNAL MEDICINE

## 2024-06-03 PROCEDURE — G2211 COMPLEX E/M VISIT ADD ON: HCPCS | Performed by: INTERNAL MEDICINE

## 2024-06-03 PROCEDURE — 99212 OFFICE O/P EST SF 10 MIN: CPT | Performed by: INTERNAL MEDICINE

## 2024-06-03 RX ORDER — SACUBITRIL AND VALSARTAN 24; 26 MG/1; MG/1
1 TABLET, FILM COATED ORAL 2 TIMES DAILY
Qty: 60 TABLET | Refills: 11 | Status: SHIPPED | OUTPATIENT
Start: 2024-06-03

## 2024-06-03 RX ORDER — EMPAGLIFLOZIN 10 MG/1
10 TABLET, FILM COATED ORAL DAILY
Qty: 90 TABLET | Refills: 4 | Status: SHIPPED | OUTPATIENT
Start: 2024-06-03

## 2024-06-03 RX ORDER — POTASSIUM CHLORIDE 1500 MG/1
20 TABLET, EXTENDED RELEASE ORAL DAILY
Qty: 30 TABLET | Refills: 3 | Status: SHIPPED | OUTPATIENT
Start: 2024-06-03

## 2024-06-03 RX ORDER — FUROSEMIDE 20 MG/1
20 TABLET ORAL DAILY
Qty: 30 TABLET | Refills: 3 | Status: SHIPPED | OUTPATIENT
Start: 2024-06-03 | End: 2024-06-13

## 2024-06-03 ASSESSMENT — ENCOUNTER SYMPTOMS
HEADACHES: 0
MYALGIAS: 0
EYE DISCHARGE: 0
BLURRED VISION: 0
DIZZINESS: 0
NAUSEA: 0
ORTHOPNEA: 0
DEPRESSION: 0
COUGH: 0
CLAUDICATION: 0
LOSS OF CONSCIOUSNESS: 0
WEIGHT LOSS: 0
PALPITATIONS: 0
ABDOMINAL PAIN: 0
SPEECH CHANGE: 0
HALLUCINATIONS: 0
CHILLS: 0
FALLS: 0
FEVER: 0
DOUBLE VISION: 0
EYE PAIN: 0
BLOOD IN STOOL: 0
SENSORY CHANGE: 0
BRUISES/BLEEDS EASILY: 0
SHORTNESS OF BREATH: 1
PND: 0
VOMITING: 0

## 2024-06-03 ASSESSMENT — FIBROSIS 4 INDEX: FIB4 SCORE: 1.61

## 2024-06-03 NOTE — PROGRESS NOTES
"Chief Complaint   Patient presents with    CHF (Systolic)    Hypertension    Dyslipidemia       Subjective     Salma Lees is a 74 y.o. female who presents today for cardiac care and evaluation due to persistent shortness of breath and lower extremities edema.  Patient does have reduced left ventricular systolic function.   She had mitral valve repair in 2009 in [place], [place].    04/2024 LVEF of 40% with global hypokinesis. Moderate MR. I have independently interpreted and reviewed echocardiogram's actual images.     05/2024 LVEF of 25 % with global hypokinesis. No significant valvular disease. I have independently interpreted and reviewed echocardiogram's actual images. Contrast was used with this study.    Her heart rate is much better today in clinic.    I have independently interpreted and reviewed blood tests results with patient in clinic which show GFR of 41, NT pro BNP of 2213, K of 4.3. hemoglobin A1c of 6.5.    Patient still gets winded with daily living activities and exertion. No symptoms at rest.    Past Medical History:   Diagnosis Date    A-fib ([facility])     Asthma     BMI 35.0-35.9,adult 11/12/2018    Breath shortness 09/07/2017    uses oxygen at 2 liters/min cont. with [facility]    Calculus of ureter 09/18/2017    Cataract     codey IOL    Chronic anticoagulation 02/13/2017    CKD (chronic kidney disease) stage 3, GFR 30-59 ml/min     Congestive heart failure (HCC)     COPD (chronic obstructive pulmonary disease) ([facility])     Diabetes ([facility])     High cholesterol     History of mitral valve repair     Hypertension     Infectious disease 2014    UTI/hx. MRSA    Pacemaker 2014    Pulmonary hypertension ([facility])     Renal disorder 09/07/2017    has stent    Shortness of breath 10/18/2017    Urinary incontinence      Past Surgical History:   Procedure Laterality Date    URETEROSCOPY Left 09/18/2017    Procedure: URETEROSCOPY;  Surgeon: Edgardo Richter M.D.;  Location: SURGERY [facility]" [facility];  Service: Urology    LASERTRIPSY Left 2017    Procedure: LASERTRIPSY LITHO  ;  Surgeon: Edgardo Richter M.D.;  Location: SURGERY [facility];  Service: Urology    STENT REMOVAL Left 2017    Procedure: STENT REMOVAL;  Surgeon: Edgardo Richter M.D.;  Location: SURGERY [facility];  Service: Urology    CYSTOSCOPY STENT PLACEMENT Left 2017    Procedure: CYSTOSCOPY, LEFT URETERAL STENT PLACEMENT;  Surgeon: Edgardo Richter M.D.;  Location: SURGERY [facility];  Service:     PACEMAKER INSERTION  2015    KNEE REPLACEMENT, TOTAL Left 2015    MITRAL VALVE REPAIR  2009    HYSTERECTOMY, VAGINAL  1994    TONSILLECTOMY       Family History   Problem Relation Age of Onset    Lung Disease Mother         asthma    Cancer Mother         ovarian    Heart Disease Father     Stroke Father     Thyroid Sister     Cancer Brother         pancreas    Cancer Paternal Grandmother         colon    Heart Disease Brother     No Known Problems Brother     No Known Problems Brother     Heart Disease Son         afib     Social History     Socioeconomic History    Marital status:      Spouse name: Not on file    Number of children: Not on file    Years of education: Not on file    Highest education level: Not on file   Occupational History    Not on file   Tobacco Use    Smoking status: Former     Current packs/day: 0.00     Average packs/day: 1 pack/day for 5.0 years (5.0 ttl pk-yrs)     Types: Cigarettes     Start date: 1985     Quit date: 1990     Years since quittin.4    Smokeless tobacco: Never    Tobacco comments:     Continued abstinence   Vaping Use    Vaping status: Never Used   Substance and Sexual Activity    Alcohol use: No     Alcohol/week: 0.0 oz    Drug use: No    Sexual activity: Not Currently   Other Topics Concern    Not on file   Social History Narrative    She has a son. She had previously experience in [employer]     Social Determinants of  Health     Financial Resource Strain: Not on file   Food Insecurity: Not on file   Transportation Needs: Not on file   Physical Activity: Not on file   Stress: Not on file   Social Connections: Not on file   Intimate Partner Violence: Not on file   Housing Stability: Not on file     Allergies   Allergen Reactions    Amoxicillin Anaphylaxis and Shortness of Breath    Nitrofurantoin      swelling    Pcn [Penicillins] Swelling    Vancomycin Shortness of Breath and Unspecified     Severe hypotension and bronchospasm observed by anesthesia while giving  vanco during procedure    Amlodipine Besylate-Valsartan Unspecified     Chest pain and dizziness     Etodolac Hives and Nausea     Dark stools    Food Hives     Plums    Eliquis [Apixaban] Shortness of Breath     Pt reports SOB and dizzy when she took    Lisinopril Unspecified     Dizziness     Other Drug Rash     Metal silver    Other Misc Runny Nose     Mold, dust, and feathers, adhesives, plums    Tape Rash     Paper tape ok     Outpatient Encounter Medications as of 6/3/2024   Medication Sig Dispense Refill    sacubitril-valsartan (ENTRESTO) 24-26 MG Tab Take 1 Tablet by mouth 2 times a day. 60 Tablet 11    Empagliflozin (JARDIANCE) 10 MG Tab tablet Take 1 Tablet by mouth every day. 90 Tablet 4    furosemide (LASIX) 20 MG Tab Take 1 Tablet by mouth every day. 30 Tablet 3    potassium Chloride ER (K-TAB) 20 MEQ Tab CR tablet Take 1 Tablet by mouth every day. 30 Tablet 3    carvedilol (COREG) 25 MG Tab Take 0.5-1 Tablets by mouth 2 times a day with meals. 12.5 MG IN THE MORNING AND 25 MG AT NIGHT 135 Tablet 2    ivabradine (CORLANOR) 5 MG Tab tablet Take 1 Tablet by mouth 2 times a day with meals. 60 Tablet 11    spironolactone (ALDACTONE) 25 MG Tab Take 1 Tablet by mouth every day. 30 Tablet 3    Dulaglutide 1.5 MG/0.5ML Solution Pen-injector Inject 0.5 mL under the skin every 7 days. Every Sunday 1.5 mL 3    warfarin (COUMADIN) 5 MG Tab TAKE ONE AND ONE-HALF TABLETS  "BY MOUTH DAILY OR AS DIRECTED BY ANTICOAGULANT CLINIC 135 Tablet 0    calcitRIOL (ROCALTROL) 0.25 MCG Cap Take 1 Capsule by mouth every day. 100 Capsule 3    atorvastatin (LIPITOR) 10 MG Tab Take 1 Tablet by mouth every evening. 100 Tablet 3    levalbuterol (XOPENEX HFA) 45 MCG/ACT inhaler Inhale 2 Puffs every four hours as needed for Shortness of Breath. 1 Each 2    [DISCONTINUED] furosemide (LASIX) 20 MG Tab Take 20 mg by mouth.      [DISCONTINUED] potassium Chloride ER (K-TAB) 20 MEQ Tab CR tablet Take 20 mEq by mouth.       No facility-administered encounter medications on file as of 6/3/2024.     Review of Systems   Constitutional:  Negative for chills, fever, malaise/fatigue and weight loss.   HENT:  Negative for ear discharge, ear pain, hearing loss and nosebleeds.    Eyes:  Negative for blurred vision, double vision, pain and discharge.   Respiratory:  Positive for shortness of breath. Negative for cough.    Cardiovascular:  Negative for chest pain, palpitations, orthopnea, claudication, leg swelling and PND.   Gastrointestinal:  Negative for abdominal pain, blood in stool, melena, nausea and vomiting.   Genitourinary:  Negative for dysuria and hematuria.   Musculoskeletal:  Negative for falls, joint pain and myalgias.   Skin:  Negative for itching and rash.   Neurological:  Negative for dizziness, sensory change, speech change, loss of consciousness and headaches.   Endo/Heme/Allergies:  Negative for environmental allergies. Does not bruise/bleed easily.   Psychiatric/Behavioral:  Negative for depression, hallucinations and suicidal ideas.               Objective     /50 (BP Location: Left arm, Patient Position: Sitting, BP Cuff Size: Adult)   Pulse 60   Resp 16   Ht 1.626 m (5' 4\")   Wt 80.7 kg (178 lb)   SpO2 90%   BMI 30.55 kg/m²     Physical Exam  Vitals and nursing note reviewed.   Constitutional:       General: She is not in acute distress.     Appearance: She is not diaphoretic.   HENT: "      Head: Normocephalic and atraumatic.      Right Ear: External ear normal.      Left Ear: External ear normal.      Nose: No congestion or rhinorrhea.   Eyes:      General:         Right eye: No discharge.         Left eye: No discharge.   Neck:      Thyroid: No thyromegaly.      Vascular: No JVD.   Cardiovascular:      Rate and Rhythm: Normal rate and regular rhythm.      Pulses: Normal pulses.   Pulmonary:      Effort: No respiratory distress.   Abdominal:      General: There is no distension.      Tenderness: There is no abdominal tenderness.   Musculoskeletal:         General: No swelling or tenderness.      Right lower leg: No edema.      Left lower leg: No edema.   Skin:     General: Skin is warm and dry.   Neurological:      Mental Status: She is alert and oriented to person, place, and time.      Cranial Nerves: No cranial nerve deficit.   Psychiatric:         Behavior: Behavior normal.                Assessment & Plan     1. History of mitral valve repair        2. Paroxysmal atrial fibrillation (HCC)        3. Shortness of breath  sacubitril-valsartan (ENTRESTO) 24-26 MG Tab    Empagliflozin (JARDIANCE) 10 MG Tab tablet    furosemide (LASIX) 20 MG Tab    potassium Chloride ER (K-TAB) 20 MEQ Tab CR tablet      4. ACC/AHA stage C systolic congestive heart failure (HCC)  sacubitril-valsartan (ENTRESTO) 24-26 MG Tab    Empagliflozin (JARDIANCE) 10 MG Tab tablet    EC-ECHOCARDIOGRAM COMPLETE W/O CONT    Basic Metabolic Panel    proBrain Natriuretic Peptide, NT    furosemide (LASIX) 20 MG Tab    potassium Chloride ER (K-TAB) 20 MEQ Tab CR tablet      5. Left ventricular systolic dysfunction, NYHA class 3  sacubitril-valsartan (ENTRESTO) 24-26 MG Tab    Empagliflozin (JARDIANCE) 10 MG Tab tablet    EC-ECHOCARDIOGRAM COMPLETE W/O CONT    Basic Metabolic Panel    proBrain Natriuretic Peptide, NT    furosemide (LASIX) 20 MG Tab    potassium Chloride ER (K-TAB) 20 MEQ Tab CR tablet      6. High risk medication use   Basic Metabolic Panel    proBrain Natriuretic Peptide, NT      7. Permanent atrial fibrillation (HCC) [I48.21]            Medical Decision Making: Today's Assessment/Status/Plan:   Overall, patient does meet criteria for heart failure reduced ejection fraction with elevated NT proBNP and symptomatology of heart failure.    Today, based on physical examination findings, patient is euvolemic. No JVD, lungs are clear to auscultation, no pitting edema in bilateral lower extremities, no ascites.    Dry weight is 178 lbs.    Based on recent data on SGLT2 and heart failure with reduced ejection fraction, patient will be benefited from Jardiance 10 mg p.o. once a day for further reduction in mortality and hospitalization with absolute risk reduction of 5.2%.  Therefore, I will start patient on Jardiance 10 mg p.o. once a day.  Risks and benefits were explained to patient and patient has agreed to proceed.    Will add Entresto 50 mg BID.    Her heart rate is much better today in clinic. I will continue her on ivabradine5 mg p.o. twice a day.    In the meantime, I will also continue carvedilol at 25 mg p.o. twice a day.    We will consider ICD placement for primary prevention in 3 months if her left ventricular systolic function remains less than 35% after optimization of her evidence based heart failure medical regimen.    Continue anticoagulation with Warfain. Will closely monitor side effect of systemic bleeding.    Continue Furosemide 20 mg daily with potassium.    This visit encounter signifies the visit complexity inherent to evaluation and management associated with medical care services that serve as the continuing focal point for all needed health care services and/or with medical care services that are part of ongoing care related to this patient's single, serious condition, complex cardiac condition.    This visit encounter signifies the visit complexity inherent to evaluation and management associated with  medical care services that serve as the continuing focal point for all needed health care services and/or with medical care services that are part of ongoing care related to this patient's single, serious condition, complex cardiac condition.    Pelon Simmons M.D.

## 2024-06-03 NOTE — TELEPHONE ENCOUNTER
Medication: Entresto,Jardiance, and Corlanor  Type of Insurance: Government funded (Medicare/Medicare Advantage)  Type of Financial assistance requested Foundation  Source: [employer]  Source Phone #: [phone]  Outcome: Approved  Effective dates: 05/04/24 until 05/03/25  Details/Billing Information:   [id number]  PCN: [id number]  GRP: [id number]  ID: [id number]  Max Award Amount: $10,000  Final Copay: $0

## 2024-06-03 NOTE — TELEPHONE ENCOUNTER
Patient called me because she was being prescribed 2 new medications (entresto and jardiance) and wanted to look into financial assistance. She consented to the [employer] and qualified financially. I applied for her and she was approved. The [employer] adela will cover all 3 medications (Entresto, Jardiance, and Corlanor) for a $0 co-pay for 11 months. We agreed on Wednesday  delivery between 10am and 1pm. Pharmacy is ordering Entresto and Corlanor for tomorrow, 06/04/24 then to be delivered on 06/05/24 for the pt. Called patient's [facility] pharmacy and requested for all active prescriptions to be transferred to [facility] going forward. She does not have any questions for the Pharmacist at this time.

## 2024-06-03 NOTE — TELEPHONE ENCOUNTER
Dashawn Gardner,    This patient is switching to our [facility] for her medication management and financial assistance. She is almost out of her corlanor at this time and transferring would take too long since we have to order the medication.    Can you please resend her Corlanor to our [facility] so we can order and deliver it ASAP?    Thank you!    Nevaeh GARCIA  Rx Coordinator

## 2024-06-05 ENCOUNTER — PHARMACY VISIT (OUTPATIENT)
Dept: PHARMACY | Facility: MEDICAL CENTER | Age: 74
End: 2024-06-05
Payer: COMMERCIAL

## 2024-06-07 ENCOUNTER — OFFICE VISIT (OUTPATIENT)
Dept: CARDIOLOGY | Facility: MEDICAL CENTER | Age: 74
End: 2024-06-07
Attending: INTERNAL MEDICINE
Payer: MEDICARE

## 2024-06-07 VITALS
DIASTOLIC BLOOD PRESSURE: 60 MMHG | SYSTOLIC BLOOD PRESSURE: 118 MMHG | HEART RATE: 61 BPM | RESPIRATION RATE: 16 BRPM | WEIGHT: 177 LBS | HEIGHT: 64 IN | OXYGEN SATURATION: 94 % | BODY MASS INDEX: 30.22 KG/M2

## 2024-06-07 DIAGNOSIS — I48.21 PERMANENT ATRIAL FIBRILLATION (HCC): ICD-10-CM

## 2024-06-07 DIAGNOSIS — Z98.890 S/P ABLATION OF ATRIAL FLUTTER: ICD-10-CM

## 2024-06-07 DIAGNOSIS — Z95.0 PACEMAKER: ICD-10-CM

## 2024-06-07 DIAGNOSIS — N25.81 HYPERPARATHYROIDISM DUE TO RENAL INSUFFICIENCY (HCC): ICD-10-CM

## 2024-06-07 DIAGNOSIS — I48.91 ATRIAL FIBRILLATION, UNSPECIFIED TYPE (HCC): ICD-10-CM

## 2024-06-07 DIAGNOSIS — Z98.890 H/O MITRAL VALVE REPAIR: ICD-10-CM

## 2024-06-07 DIAGNOSIS — I50.20 ACC/AHA STAGE C SYSTOLIC CONGESTIVE HEART FAILURE (HCC): ICD-10-CM

## 2024-06-07 DIAGNOSIS — E11.69 DYSLIPIDEMIA ASSOCIATED WITH TYPE 2 DIABETES MELLITUS (HCC): ICD-10-CM

## 2024-06-07 DIAGNOSIS — Z79.01 ANTICOAGULATED: ICD-10-CM

## 2024-06-07 DIAGNOSIS — E78.5 DYSLIPIDEMIA ASSOCIATED WITH TYPE 2 DIABETES MELLITUS (HCC): ICD-10-CM

## 2024-06-07 DIAGNOSIS — D68.69 SECONDARY HYPERCOAGULABLE STATE (HCC): ICD-10-CM

## 2024-06-07 DIAGNOSIS — J96.11 CHRONIC RESPIRATORY FAILURE WITH HYPOXIA, ON HOME OXYGEN THERAPY (HCC): ICD-10-CM

## 2024-06-07 DIAGNOSIS — N18.4 CKD (CHRONIC KIDNEY DISEASE) STAGE 4, GFR 15-29 ML/MIN (HCC): ICD-10-CM

## 2024-06-07 DIAGNOSIS — Z99.81 CHRONIC RESPIRATORY FAILURE WITH HYPOXIA, ON HOME OXYGEN THERAPY (HCC): ICD-10-CM

## 2024-06-07 DIAGNOSIS — Z86.79 S/P ABLATION OF ATRIAL FLUTTER: ICD-10-CM

## 2024-06-07 DIAGNOSIS — I10 BENIGN ESSENTIAL HYPERTENSION: ICD-10-CM

## 2024-06-07 PROCEDURE — 3078F DIAST BP <80 MM HG: CPT | Performed by: INTERNAL MEDICINE

## 2024-06-07 PROCEDURE — 93280 PM DEVICE PROGR EVAL DUAL: CPT | Performed by: INTERNAL MEDICINE

## 2024-06-07 PROCEDURE — 93280 PM DEVICE PROGR EVAL DUAL: CPT | Mod: 26 | Performed by: INTERNAL MEDICINE

## 2024-06-07 PROCEDURE — 99213 OFFICE O/P EST LOW 20 MIN: CPT | Performed by: INTERNAL MEDICINE

## 2024-06-07 PROCEDURE — 99214 OFFICE O/P EST MOD 30 MIN: CPT | Mod: 25 | Performed by: INTERNAL MEDICINE

## 2024-06-07 PROCEDURE — RXMED WILLOW AMBULATORY MEDICATION CHARGE: Performed by: INTERNAL MEDICINE

## 2024-06-07 PROCEDURE — 3074F SYST BP LT 130 MM HG: CPT | Performed by: INTERNAL MEDICINE

## 2024-06-07 RX ORDER — AMIODARONE HYDROCHLORIDE 200 MG/1
200 TABLET ORAL DAILY
Qty: 90 TABLET | Refills: 3 | Status: SHIPPED | OUTPATIENT
Start: 2024-06-07 | End: 2024-06-07

## 2024-06-07 RX ORDER — CALCITRIOL 0.25 UG/1
0.25 CAPSULE, LIQUID FILLED ORAL DAILY
Qty: 100 CAPSULE | Refills: 0 | Status: SHIPPED
Start: 2024-06-07 | End: 2024-06-13

## 2024-06-07 RX ORDER — AMIODARONE HYDROCHLORIDE 200 MG/1
100 TABLET ORAL DAILY
Qty: 90 TABLET | Refills: 3 | Status: SHIPPED | OUTPATIENT
Start: 2024-06-07

## 2024-06-07 RX ORDER — CARVEDILOL 12.5 MG/1
12.5 TABLET ORAL 2 TIMES DAILY WITH MEALS
Qty: 180 TABLET | Refills: 3 | Status: SHIPPED | OUTPATIENT
Start: 2024-06-07

## 2024-06-07 ASSESSMENT — FIBROSIS 4 INDEX: FIB4 SCORE: 1.61

## 2024-06-07 NOTE — PROGRESS NOTES
"Chief Complaint   Patient presents with    Hypertension       Subjective     Salma Lees is a 74 y.o. female who presents today with history of mitral valve repair.  Atrial tachycardia and atrial flutter ablation 2023.  LV dysfunction but was out of rhythm at the time.  Previous echocardiogram with low normal ejection fraction while in sinus rhythm.  Has been off amiodarone for some time.  Rhythm was often.  Visual disturbances with Ivabradine.  Some orthostatic hypotension.    Past Medical History:   Diagnosis Date    A-fib ([facility])     Asthma     BMI 35.0-35.9,adult 11/12/2018    Breath shortness 09/07/2017    uses oxygen at 2 liters/min cont. with [facility]\"    Calculus of ureter 09/18/2017    Cataract     codey IOL    Chronic anticoagulation 02/13/2017    CKD (chronic kidney disease) stage 3, GFR 30-59 ml/min     Congestive heart failure ([facility])     COPD (chronic obstructive pulmonary disease) ([facility])     Diabetes ([facility])     High cholesterol     History of mitral valve repair     Hypertension     Infectious disease 2014    UTI/hx. MRSA    Pacemaker 2014    Pulmonary hypertension ([facility])     Renal disorder 09/07/2017    has stent    Shortness of breath 10/18/2017    Urinary incontinence      Past Surgical History:   Procedure Laterality Date    URETEROSCOPY Left 09/18/2017    Procedure: URETEROSCOPY;  Surgeon: Edgardo Richter M.D.;  Location: [facility];  Service: Urology    LASERTRIPSY Left 09/18/2017    Procedure: LASERTRIPSY LITHO  ;  Surgeon: Edgardo Richter M.D.;  Location: [facility];  Service: Urology    STENT REMOVAL Left 09/18/2017    Procedure: STENT REMOVAL;  Surgeon: Edgardo Richter M.D.;  Location: [facility];  Service: Urology    CYSTOSCOPY STENT PLACEMENT Left 06/25/2017    Procedure: CYSTOSCOPY, LEFT URETERAL STENT PLACEMENT;  Surgeon: Edgardo Richter M.D.;  Location: [facility];  Service:     PACEMAKER INSERTION "  2015    KNEE REPLACEMENT, TOTAL Left 2015    MITRAL VALVE REPAIR  2009    HYSTERECTOMY, VAGINAL  1994    TONSILLECTOMY       Family History   Problem Relation Age of Onset    Lung Disease Mother         asthma    Cancer Mother         ovarian    Heart Disease Father     Stroke Father     Thyroid Sister     Cancer Brother         pancreas    Cancer Paternal Grandmother         colon    Heart Disease Brother     No Known Problems Brother     No Known Problems Brother     Heart Disease Son         afib     Social History     Socioeconomic History    Marital status:      Spouse name: Not on file    Number of children: Not on file    Years of education: Not on file    Highest education level: Not on file   Occupational History    Not on file   Tobacco Use    Smoking status: Former     Current packs/day: 0.00     Average packs/day: 1 pack/day for 5.0 years (5.0 ttl pk-yrs)     Types: Cigarettes     Start date: 1985     Quit date: 1990     Years since quittin.4    Smokeless tobacco: Never    Tobacco comments:     Continued abstinence   Vaping Use    Vaping status: Never Used   Substance and Sexual Activity    Alcohol use: No     Alcohol/week: 0.0 oz    Drug use: No    Sexual activity: Not Currently   Other Topics Concern    Not on file   Social History Narrative    She has a son. She had previously experience in [employer]     Social Determinants of Health     Financial Resource Strain: Not on file   Food Insecurity: Not on file   Transportation Needs: Not on file   Physical Activity: Not on file   Stress: Not on file   Social Connections: Not on file   Intimate Partner Violence: Not on file   Housing Stability: Not on file     Allergies   Allergen Reactions    Amoxicillin Anaphylaxis and Shortness of Breath    Nitrofurantoin      swelling    Pcn [Penicillins] Swelling    Vancomycin Shortness of Breath and Unspecified     Severe hypotension and bronchospasm observed by anesthesia while  giving  vanco during procedure    Amlodipine Besylate-Valsartan Unspecified     Chest pain and dizziness     Etodolac Hives and Nausea     Dark stools    Food Hives     Plums    Ivabradine     Eliquis [Apixaban] Shortness of Breath     Pt reports SOB and dizzy when she took    Lisinopril Unspecified     Dizziness     Other Drug Rash     Metal silver    Other Misc Runny Nose     Mold, dust, and feathers, adhesives, plums    Tape Rash     Paper tape ok     Outpatient Encounter Medications as of 6/7/2024   Medication Sig Dispense Refill    carvedilol (COREG) 12.5 MG Tab Take 1 Tablet by mouth 2 times a day with meals. 180 Tablet 3    amiodarone (CORDARONE) 200 MG Tab Take 0.5 Tablets by mouth every day. 90 Tablet 3    furosemide (LASIX) 20 MG Tab Take 1 Tablet by mouth every day. 100 Tablet 2    atorvastatin (LIPITOR) 10 MG Tab Take 1 Tablet by mouth every day. 100 Tablet 2    sacubitril-valsartan (ENTRESTO) 24-26 MG Tab Take 1 tablet by mouth 2 times a day. 60 Tablet 11    Empagliflozin (JARDIANCE) 10 MG Tab tablet Take 1 Tablet by mouth every day. 90 Tablet 4    furosemide (LASIX) 20 MG Tab Take 1 Tablet by mouth every day. (Patient taking differently: Take 20 mg by mouth every day. Pt stated every other day) 30 Tablet 3    potassium Chloride ER (K-TAB) 20 MEQ Tab CR tablet Take 1 Tablet by mouth every day. 30 Tablet 3    ivabradine (CORLANOR) 5 MG Tab tablet Take 1 tablet by mouth 2 times a day with meals. 60 Tablet 11    potassium chloride SA (KDUR) 20 MEQ Tab CR Take 1 Tablet by mouth every day. 50 Tablet 2    potassium chloride SA (KDUR) 20 MEQ Tab CR Take 1 Tablet by mouth every day. 30 Tablet 3    spironolactone (ALDACTONE) 25 MG Tab Take 1 Tablet by mouth every day. (Patient taking differently: Take 25 mg by mouth every day. Pt stated every other day) 30 Tablet 3    Dulaglutide 1.5 MG/0.5ML Solution Pen-injector Inject 0.5 mL under the skin every 7 days. Every Sunday 1.5 mL 3    warfarin (COUMADIN) 5 MG Tab  "TAKE ONE AND ONE-HALF TABLETS BY MOUTH DAILY OR AS DIRECTED BY ANTICOAGULANT CLINIC 135 Tablet 0    calcitRIOL (ROCALTROL) 0.25 MCG Cap Take 1 Capsule by mouth every day. 100 Capsule 3    levalbuterol (XOPENEX HFA) 45 MCG/ACT inhaler Inhale 2 Puffs every four hours as needed for Shortness of Breath. 1 Each 2    [DISCONTINUED] amiodarone (CORDARONE) 200 MG Tab Take 1 Tablet by mouth every day. 90 Tablet 3    [DISCONTINUED] amiodarone (CORDARONE) 200 MG Tab Take 1 Tablet by mouth every day. 90 Tablet 3    [DISCONTINUED] ivabradine (CORLANOR) 5 MG Tab tablet Take 1 Tablet by mouth 2 times a day with meals. 60 Tablet 11    [DISCONTINUED] carvedilol (COREG) 25 MG Tab Take 1/2 tab (12.5 MG) in the morning AND 1 tab (25 MG) at night with meals 135 Tablet 2    [DISCONTINUED] atorvastatin (LIPITOR) 10 MG Tab Take 1 Tablet by mouth every evening. 100 Tablet 3     No facility-administered encounter medications on file as of 6/7/2024.     ROS           Objective     /60 (BP Location: Left arm, Patient Position: Sitting, BP Cuff Size: Adult)   Pulse 61   Resp 16   Ht 1.626 m (5' 4\")   Wt 80.3 kg (177 lb)   SpO2 94%   BMI 30.38 kg/m²     Physical Exam  Constitutional:       Appearance: She is well-developed.      Comments: On O2.   HENT:      Head: Normocephalic and atraumatic.   Eyes:      Pupils: Pupils are equal, round, and reactive to light.   Cardiovascular:      Rate and Rhythm: Normal rate and regular rhythm.      Heart sounds: Normal heart sounds. No murmur heard.     No friction rub. No gallop.   Pulmonary:      Effort: Pulmonary effort is normal.      Breath sounds: Normal breath sounds.   Abdominal:      General: Bowel sounds are normal.      Palpations: Abdomen is soft.   Musculoskeletal:         General: Normal range of motion.      Cervical back: Normal range of motion and neck supple.   Skin:     General: Skin is warm.   Neurological:      Mental Status: She is alert and oriented to person, place, and " time.      Cranial Nerves: No cranial nerve deficit.   Psychiatric:         Behavior: Behavior normal.         Thought Content: Thought content normal.         Judgment: Judgment normal.                Assessment & Plan     1. S/P ablation of atrial flutter/AT        2. H/O mitral valve repair        3. Benign essential hypertension  carvedilol (COREG) 12.5 MG Tab      4. Chronic respiratory failure with hypoxia, on home oxygen therapy ([facility])        5. CKD (chronic kidney disease) stage 4, GFR 15-29 ml/min ([facility])        6. Dyslipidemia associated with type 2 diabetes mellitus ([facility])        7. Secondary hypercoagulable state ([facility])        8. Atrial fibrillation, unspecified type ([facility])  amiodarone (CORDARONE) 200 MG Tab    DISCONTINUED: amiodarone (CORDARONE) 200 MG Tab      9. Anticoagulated        10. Pacemaker        11. ACC/AHA stage C systolic congestive heart failure ([facility])  carvedilol (COREG) 12.5 MG Tab      12. Permanent atrial fibrillation ([facility])  carvedilol (COREG) 12.5 MG Tab    Holter Monitor Study          Medical Decision Making: Today's Assessment/Status/Plan:   1.  Atrial arrhythmias restart amiodarone low-dose.  Check Zio patch.  2.  Systolic dysfunction was in the setting of atrial tachycardia.  When in sinus rhythm as low normal ejection fraction.  3.  Permanent pacemaker normal function.  4.  Anticoagulation continue Coumadin.  5.  Orthostatic symptoms Coreg down.  6.  Side effects from Ivabradine.  DC.  7.  Follow-up in 6 weeks.

## 2024-06-10 ENCOUNTER — NON-PROVIDER VISIT (OUTPATIENT)
Dept: CARDIOLOGY | Facility: MEDICAL CENTER | Age: 74
End: 2024-06-10
Attending: INTERNAL MEDICINE
Payer: MEDICARE

## 2024-06-10 ENCOUNTER — PHARMACY VISIT (OUTPATIENT)
Dept: PHARMACY | Facility: MEDICAL CENTER | Age: 74
End: 2024-06-10
Payer: COMMERCIAL

## 2024-06-10 DIAGNOSIS — I48.21 PERMANENT ATRIAL FIBRILLATION (HCC): ICD-10-CM

## 2024-06-10 PROCEDURE — 93246 EXT ECG>7D<15D RECORDING: CPT

## 2024-06-10 NOTE — PROGRESS NOTES
Patient enrolled in the 14 day Zio Holter monitor per Jhon Galloway MD. In clinic hook up, monitor s/n [id number]. Pending EOS.

## 2024-06-11 ENCOUNTER — HOSPITAL ENCOUNTER (OUTPATIENT)
Dept: LAB | Facility: MEDICAL CENTER | Age: 74
End: 2024-06-11
Attending: INTERNAL MEDICINE
Payer: MEDICARE

## 2024-06-11 DIAGNOSIS — E78.5 DYSLIPIDEMIA ASSOCIATED WITH TYPE 2 DIABETES MELLITUS (HCC): ICD-10-CM

## 2024-06-11 DIAGNOSIS — E11.22 TYPE 2 DIABETES MELLITUS WITH STAGE 3B CHRONIC KIDNEY DISEASE, WITHOUT LONG-TERM CURRENT USE OF INSULIN (HCC): ICD-10-CM

## 2024-06-11 DIAGNOSIS — E11.69 DYSLIPIDEMIA ASSOCIATED WITH TYPE 2 DIABETES MELLITUS (HCC): ICD-10-CM

## 2024-06-11 DIAGNOSIS — N18.32 TYPE 2 DIABETES MELLITUS WITH STAGE 3B CHRONIC KIDNEY DISEASE, WITHOUT LONG-TERM CURRENT USE OF INSULIN (HCC): ICD-10-CM

## 2024-06-11 LAB
25(OH)D3 SERPL-MCNC: 17 NG/ML (ref 30–100)
ALBUMIN SERPL BCP-MCNC: 3.7 G/DL (ref 3.2–4.9)
ALBUMIN/GLOB SERPL: 0.9 G/DL
ALP SERPL-CCNC: 76 U/L (ref 30–99)
ALT SERPL-CCNC: 37 U/L (ref 2–50)
ANION GAP SERPL CALC-SCNC: 8 MMOL/L (ref 7–16)
AST SERPL-CCNC: 40 U/L (ref 12–45)
BASOPHILS # BLD AUTO: 0.7 % (ref 0–1.8)
BASOPHILS # BLD: 0.03 K/UL (ref 0–0.12)
BILIRUB SERPL-MCNC: 1.1 MG/DL (ref 0.1–1.5)
BUN SERPL-MCNC: 30 MG/DL (ref 8–22)
CALCIUM ALBUM COR SERPL-MCNC: 10.2 MG/DL (ref 8.5–10.5)
CALCIUM SERPL-MCNC: 10 MG/DL (ref 8.5–10.5)
CHLORIDE SERPL-SCNC: 105 MMOL/L (ref 96–112)
CHOLEST SERPL-MCNC: 116 MG/DL (ref 100–199)
CO2 SERPL-SCNC: 28 MMOL/L (ref 20–33)
COMMENT 1642: NORMAL
CREAT SERPL-MCNC: 1.46 MG/DL (ref 0.5–1.4)
EOSINOPHIL # BLD AUTO: 0.5 K/UL (ref 0–0.51)
EOSINOPHIL NFR BLD: 11.4 % (ref 0–6.9)
ERYTHROCYTE [DISTWIDTH] IN BLOOD BY AUTOMATED COUNT: 66 FL (ref 35.9–50)
EST. AVERAGE GLUCOSE BLD GHB EST-MCNC: 120 MG/DL
FASTING STATUS PATIENT QL REPORTED: NORMAL
GFR SERPLBLD CREATININE-BSD FMLA CKD-EPI: 37 ML/MIN/1.73 M 2
GLOBULIN SER CALC-MCNC: 3.9 G/DL (ref 1.9–3.5)
GLUCOSE SERPL-MCNC: 81 MG/DL (ref 65–99)
HBA1C MFR BLD: 5.8 % (ref 4–5.6)
HCT VFR BLD AUTO: 34.8 % (ref 37–47)
HDLC SERPL-MCNC: 57 MG/DL
HGB BLD-MCNC: 10.4 G/DL (ref 12–16)
IMM GRANULOCYTES # BLD AUTO: 0.01 K/UL (ref 0–0.11)
IMM GRANULOCYTES NFR BLD AUTO: 0.2 % (ref 0–0.9)
LDLC SERPL CALC-MCNC: 42 MG/DL
LYMPHOCYTES # BLD AUTO: 0.6 K/UL (ref 1–4.8)
LYMPHOCYTES NFR BLD: 13.6 % (ref 22–41)
MCH RBC QN AUTO: 26.5 PG (ref 27–33)
MCHC RBC AUTO-ENTMCNC: 29.9 G/DL (ref 32.2–35.5)
MCV RBC AUTO: 88.8 FL (ref 81.4–97.8)
MONOCYTES # BLD AUTO: 0.47 K/UL (ref 0–0.85)
MONOCYTES NFR BLD AUTO: 10.7 % (ref 0–13.4)
MORPHOLOGY BLD-IMP: NORMAL
NEUTROPHILS # BLD AUTO: 2.79 K/UL (ref 1.82–7.42)
NEUTROPHILS NFR BLD: 63.4 % (ref 44–72)
NRBC # BLD AUTO: 0 K/UL
NRBC BLD-RTO: 0 /100 WBC (ref 0–0.2)
OVALOCYTES BLD QL SMEAR: NORMAL
PLATELET # BLD AUTO: 144 K/UL (ref 164–446)
PLATELET BLD QL SMEAR: NORMAL
PMV BLD AUTO: 11.4 FL (ref 9–12.9)
POIKILOCYTOSIS BLD QL SMEAR: NORMAL
POTASSIUM SERPL-SCNC: 4.5 MMOL/L (ref 3.6–5.5)
PROT SERPL-MCNC: 7.6 G/DL (ref 6–8.2)
RBC # BLD AUTO: 3.92 M/UL (ref 4.2–5.4)
RBC BLD AUTO: PRESENT
SODIUM SERPL-SCNC: 141 MMOL/L (ref 135–145)
T4 FREE SERPL-MCNC: 1.46 NG/DL (ref 0.93–1.7)
TRIGL SERPL-MCNC: 83 MG/DL (ref 0–149)
TSH SERPL DL<=0.005 MIU/L-ACNC: 2.3 UIU/ML (ref 0.38–5.33)
VIT B12 SERPL-MCNC: 321 PG/ML (ref 211–911)
WBC # BLD AUTO: 4.4 K/UL (ref 4.8–10.8)

## 2024-06-11 PROCEDURE — 84439 ASSAY OF FREE THYROXINE: CPT

## 2024-06-11 PROCEDURE — 82306 VITAMIN D 25 HYDROXY: CPT

## 2024-06-11 PROCEDURE — 80061 LIPID PANEL: CPT

## 2024-06-11 PROCEDURE — 80053 COMPREHEN METABOLIC PANEL: CPT

## 2024-06-11 PROCEDURE — 84443 ASSAY THYROID STIM HORMONE: CPT

## 2024-06-11 PROCEDURE — 83036 HEMOGLOBIN GLYCOSYLATED A1C: CPT

## 2024-06-11 PROCEDURE — 85025 COMPLETE CBC W/AUTO DIFF WBC: CPT

## 2024-06-11 PROCEDURE — 82607 VITAMIN B-12: CPT

## 2024-06-11 PROCEDURE — 82043 UR ALBUMIN QUANTITATIVE: CPT

## 2024-06-11 PROCEDURE — 82570 ASSAY OF URINE CREATININE: CPT

## 2024-06-11 PROCEDURE — 36415 COLL VENOUS BLD VENIPUNCTURE: CPT

## 2024-06-12 LAB
CREAT UR-MCNC: 82.09 MG/DL
MICROALBUMIN UR-MCNC: 15.4 MG/DL
MICROALBUMIN/CREAT UR: 188 MG/G (ref 0–30)

## 2024-06-13 ENCOUNTER — TELEPHONE (OUTPATIENT)
Dept: MEDICAL GROUP | Facility: PHYSICIAN GROUP | Age: 74
End: 2024-06-13

## 2024-06-13 ENCOUNTER — APPOINTMENT (OUTPATIENT)
Dept: MEDICAL GROUP | Facility: PHYSICIAN GROUP | Age: 74
End: 2024-06-13
Payer: MEDICARE

## 2024-06-13 VITALS
DIASTOLIC BLOOD PRESSURE: 50 MMHG | RESPIRATION RATE: 16 BRPM | HEART RATE: 60 BPM | OXYGEN SATURATION: 93 % | HEIGHT: 64 IN | BODY MASS INDEX: 30.32 KG/M2 | SYSTOLIC BLOOD PRESSURE: 100 MMHG | TEMPERATURE: 97.4 F | WEIGHT: 177.6 LBS

## 2024-06-13 DIAGNOSIS — N25.81 HYPERPARATHYROIDISM DUE TO RENAL INSUFFICIENCY (HCC): ICD-10-CM

## 2024-06-13 DIAGNOSIS — E78.5 DYSLIPIDEMIA ASSOCIATED WITH TYPE 2 DIABETES MELLITUS (HCC): ICD-10-CM

## 2024-06-13 DIAGNOSIS — E55.9 VITAMIN D DEFICIENCY: ICD-10-CM

## 2024-06-13 DIAGNOSIS — G45.9 TIA (TRANSIENT ISCHEMIC ATTACK): ICD-10-CM

## 2024-06-13 DIAGNOSIS — E11.69 DYSLIPIDEMIA ASSOCIATED WITH TYPE 2 DIABETES MELLITUS (HCC): ICD-10-CM

## 2024-06-13 DIAGNOSIS — Z12.11 COLON CANCER SCREENING: ICD-10-CM

## 2024-06-13 DIAGNOSIS — E11.29 MICROALBUMINURIA DUE TO TYPE 2 DIABETES MELLITUS (HCC): ICD-10-CM

## 2024-06-13 DIAGNOSIS — R80.9 MICROALBUMINURIA DUE TO TYPE 2 DIABETES MELLITUS (HCC): ICD-10-CM

## 2024-06-13 DIAGNOSIS — N18.32 TYPE 2 DIABETES MELLITUS WITH STAGE 3B CHRONIC KIDNEY DISEASE, WITHOUT LONG-TERM CURRENT USE OF INSULIN (HCC): ICD-10-CM

## 2024-06-13 DIAGNOSIS — E11.22 TYPE 2 DIABETES MELLITUS WITH STAGE 3B CHRONIC KIDNEY DISEASE, WITHOUT LONG-TERM CURRENT USE OF INSULIN (HCC): ICD-10-CM

## 2024-06-13 DIAGNOSIS — N18.32 STAGE 3B CHRONIC KIDNEY DISEASE (CKD): ICD-10-CM

## 2024-06-13 DIAGNOSIS — E53.8 B12 DEFICIENCY: ICD-10-CM

## 2024-06-13 PROCEDURE — 3074F SYST BP LT 130 MM HG: CPT | Performed by: INTERNAL MEDICINE

## 2024-06-13 PROCEDURE — 3078F DIAST BP <80 MM HG: CPT | Performed by: INTERNAL MEDICINE

## 2024-06-13 PROCEDURE — 96372 THER/PROPH/DIAG INJ SC/IM: CPT | Performed by: INTERNAL MEDICINE

## 2024-06-13 PROCEDURE — 99214 OFFICE O/P EST MOD 30 MIN: CPT | Mod: 25 | Performed by: INTERNAL MEDICINE

## 2024-06-13 PROCEDURE — G2211 COMPLEX E/M VISIT ADD ON: HCPCS | Performed by: INTERNAL MEDICINE

## 2024-06-13 RX ORDER — CALCITRIOL 0.25 UG/1
0.5 CAPSULE, LIQUID FILLED ORAL DAILY
Qty: 200 CAPSULE | Refills: 3
Start: 2024-06-13

## 2024-06-13 RX ORDER — CYANOCOBALAMIN 1000 UG/ML
1000 INJECTION, SOLUTION INTRAMUSCULAR; SUBCUTANEOUS ONCE
Status: COMPLETED | OUTPATIENT
Start: 2024-06-13 | End: 2024-06-13

## 2024-06-13 RX ADMIN — CYANOCOBALAMIN 1000 MCG: 1000 INJECTION, SOLUTION INTRAMUSCULAR; SUBCUTANEOUS at 10:50

## 2024-06-13 ASSESSMENT — FIBROSIS 4 INDEX: FIB4 SCORE: 3.42

## 2024-06-13 NOTE — ASSESSMENT & PLAN NOTE
Previous problem, requires additional imaging, recommend brain MRIs to look for evidence of prior strokes.  Would give us more evidence to switch from warfarin to DOAC.  She agrees.  Due to pacemaker will probably need device rep to deactivate.

## 2024-06-13 NOTE — PROGRESS NOTES
Subjective:   Chief Complaint/History of Present Illness:  Salma Lees is a 74 y.o. female established patient who presents today to discuss medical problems as listed below. Salma is unaccompanied for today's visit.    Problem   B12 Deficiency     Latest Reference Range & Units 06/11/24 10:19   Vitamin B12 -True Cobalamin 211 - 911 pg/mL 321     She is vitamin B12 deficiency, may be secondary to her diabetes regiment.  She is agreeable to parental supplementation, will give injection today in clinic today.       Tia (Transient Ischemic Attack)    She reports over the past 9 months or so she has episodic episodes of pain in the head and vision loss in the left eye. Fleeting in nature.  She uses warfarin for stroke prophylaxis due to history of atrial fibrillation, discussed that brain MRI imaging would indicate proximal embolic nature and bilateral territories and we will need to consider changing to DOAC.     Hyperparathyroidism Due to Renal Insufficiency (Hcc)     Latest Reference Range & Units 08/03/23 11:29   Pth, Intact 14.0 - 72.0 pg/mL 48.0     History of elevated PTH, she is on calcitriol, history of CKD.     Vitamin D Deficiency     Latest Reference Range & Units 06/11/24 10:19   25-Hydroxy   Vitamin D 25 30 - 100 ng/mL 17 (L)     She has low running vitamin D levels, she reports she is taking calcitriol and agreeable to increasing the dose.    Current regimen: calcitriol 0.5 mcg daily  Previous regimen: calcitriol 0.25 mcg daily.     Microalbuminuria Due to Type 2 Diabetes Mellitus (Hcc)     Latest Reference Range & Units 06/11/24 10:18   Micro Alb Creat Ratio 0 - 30 mg/g 188 (H)     She has longstanding history of both diabetes and kidney disease.  Not currently taking ACE inhibitor or ARB due to reduced kidney function.  Recheck urine microalbumin now that A1c is better.     Dyslipidemia Associated With Type 2 Diabetes Mellitus (Hcc)     Latest Reference Range & Units 06/11/24 10:19    Cholesterol,Tot 100 - 199 mg/dL 116   Triglycerides 0 - 149 mg/dL 83   HDL >=40 mg/dL 57   LDL <100 mg/dL 42     She appreciated vague discomfort in her back and was worried that the atorvastatin was causing kidney irritation so she stopped taking her 20 mg dose in June 2021 and then added back 10 mg in September 2021.    Current regimen: Atorvastatin 10 mg daily     Stage 3b Chronic Kidney Disease (Ckd)     Latest Reference Range & Units 06/11/24 10:19   Bun 8 - 22 mg/dL 30 (H)   Creatinine 0.50 - 1.40 mg/dL 1.46 (H)   GFR (CKD-EPI) >60 mL/min/1.73 m 2 37 !     She has a history of chronic kidney disease likely secondary to heart disease, nephrolithiasis, and diabetes.  She is on several renally excreted medications including spironolactone, Lasix, and digoxin. No secondary evaluation completed. GFR previously in the high 50s and now in the low 30s.  She has contributing hyperparathyroidism likely related to longstanding chronic kidney disease.  Not currently seeing a nephrologist.     Type 2 Diabetes Mellitus With Stage 4 Chronic Kidney Disease, Without Long-Term Current Use of Insulin (Hcc)     Latest Reference Range & Units 06/11/24 10:19   Glycohemoglobin 4.0 - 5.6 % 5.8 (H)   Estim. Avg Glu mg/dL 120   She has had diabetes since at least 2017 with A1c ranging 7-14. She did not tolerate metformin and has CKDIII/IV. She previously had microalbuminuria, currently resolved. No known neuropathy or retinopathy.    Current regimen: Trulicity 1.5 mg weekly on Sundays and jardiance 10 mg daily          Current Medications:  Current Outpatient Medications Ordered in Epic   Medication Sig Dispense Refill    calcitRIOL (ROCALTROL) 0.25 MCG Cap Take 2 Capsules by mouth every day. 200 Capsule 3    carvedilol (COREG) 12.5 MG Tab Take 1 Tablet by mouth 2 times a day with meals. 180 Tablet 3    amiodarone (CORDARONE) 200 MG Tab Take 0.5 Tablets by mouth every day. 90 Tablet 3    furosemide (LASIX) 20 MG Tab Take 1 Tablet by  "mouth every day. 100 Tablet 2    atorvastatin (LIPITOR) 10 MG Tab Take 1 Tablet by mouth every day. 100 Tablet 2    sacubitril-valsartan (ENTRESTO) 24-26 MG Tab Take 1 tablet by mouth 2 times a day. 60 Tablet 11    Empagliflozin (JARDIANCE) 10 MG Tab tablet Take 1 Tablet by mouth every day. 90 Tablet 4    potassium Chloride ER (K-TAB) 20 MEQ Tab CR tablet Take 1 Tablet by mouth every day. 30 Tablet 3    spironolactone (ALDACTONE) 25 MG Tab Take 1 Tablet by mouth every day. (Patient taking differently: Take 25 mg by mouth every day. Pt stated every other day) 30 Tablet 3    Dulaglutide 1.5 MG/0.5ML Solution Pen-injector Inject 0.5 mL under the skin every 7 days. Every Sunday 1.5 mL 3    warfarin (COUMADIN) 5 MG Tab TAKE ONE AND ONE-HALF TABLETS BY MOUTH DAILY OR AS DIRECTED BY ANTICOAGULANT CLINIC 135 Tablet 0     No current Epic-ordered facility-administered medications on file.          Objective:   Physical Exam:    Vitals: /50 (BP Location: Right arm, Patient Position: Sitting, BP Cuff Size: Adult)   Pulse 60   Temp 36.3 °C (97.4 °F) (Temporal)   Resp 16   Ht 1.626 m (5' 4\")   Wt 80.6 kg (177 lb 9.6 oz)   SpO2 93%    BMI: Body mass index is 30.48 kg/m².  Physical Exam  Constitutional:       General: She is not in acute distress.     Appearance: She is not toxic-appearing.   HENT:      Right Ear: External ear normal.      Left Ear: External ear normal.   Eyes:      General: No scleral icterus.     Conjunctiva/sclera: Conjunctivae normal.   Cardiovascular:      Rate and Rhythm: Normal rate and regular rhythm.      Pulses: Normal pulses.   Pulmonary:      Effort: Pulmonary effort is normal. No respiratory distress.      Breath sounds: No wheezing or rhonchi.      Comments: Supplemental oxygen in place, decreased aeration bibasilar  Musculoskeletal:      Right lower leg: No edema.      Left lower leg: No edema.      Comments: Monofilament testing with a 10 gram force: sensation intact: intact " bilaterally  Visual Inspection: Feet without maceration, ulcers, fissures.  Pedal pulses: intact bilaterally     Skin:     General: Skin is warm and dry.      Findings: No rash.   Neurological:      Mental Status: She is alert.      Gait: Gait abnormal.      Comments: Dyspnea on exertion limits gait   Psychiatric:         Mood and Affect: Mood normal.         Behavior: Behavior normal.         Thought Content: Thought content normal.         Judgment: Judgment normal.          Assessment and Plan:   Salma is a 74 y.o. female with the following:  Problem List Items Addressed This Visit       B12 deficiency     New decompensated problem, will give B12 1000 mcg injection in clinic today for therapeutic and diagnostic trial and recommend that she start on sublingual supplementation in the interim with 100 mcg daily starting in about 30 days.         Dyslipidemia associated with type 2 diabetes mellitus (HCC)     Chronic and stable problem, continue medical therapy with atorvastatin 10 mg daily, lipids are at goal.         Hyperparathyroidism due to renal insufficiency (HCC)     Chronic ongoing problem, will increase calcitriol to 0.5 mcg daily.         Relevant Medications    calcitRIOL (ROCALTROL) 0.25 MCG Cap    Microalbuminuria due to type 2 diabetes mellitus (HCC)     Chronic ongoing problem, also related to underlying heart disease, continue with periodic monitoring, she is on jardiance and valsartan.         Stage 3b chronic kidney disease (CKD)     Chronic and improved problem, recent GFR doing better, continue with good oral hydration and current regimen. Follow up GFR periodically to ensure stability.          TIA (transient ischemic attack)     Previous problem, requires additional imaging, recommend brain MRIs to look for evidence of prior strokes.  Would give us more evidence to switch from warfarin to DOAC.  She agrees.  Due to pacemaker will probably need device rep to deactivate.         Relevant Orders     MR-BRAIN-W/O    Type 2 diabetes mellitus with stage 4 chronic kidney disease, without long-term current use of insulin (HCC)     Chronic improved problem, A1c well controlled, no noted side effects to regimen. Continue jardiance 10 mg daily and Trulicity 1.5 mg weekly.         Relevant Orders    Diabetic Monofilament Lower Extremity Exam (Completed)    Vitamin D deficiency     Chronic ongoing issue, agreeable to increasing calcitriol to 0.5 mcg daily, plan to recheck next blood work.          Other Visit Diagnoses       Colon cancer screening        Relevant Orders    OCCULT BLOOD FECES IMMUNOASSAY           Billing : secondary to the complexity of this patient's illnesses and their interactions.  All problems listed were discussed during the office visit, medications were evaluated and complexities were discussed as well as plan for the future.     RTC: Return in about 3 months (around 9/13/2024).    I spent a total of 34 minutes with record review, exam, communication with the patient, communication with other providers, and documentation of this encounter.    PLEASE NOTE: This dictation was created using voice recognition software. I have made every reasonable attempt to correct obvious errors, but I expect that there are errors of grammar and possibly content that I did not discover before finalizing the note.      Leah Bonilla, DO  Geriatric and Internal Medicine  [facility]

## 2024-06-13 NOTE — ASSESSMENT & PLAN NOTE
Chronic ongoing issue, agreeable to increasing calcitriol to 0.5 mcg daily, plan to recheck next blood work.

## 2024-06-13 NOTE — ASSESSMENT & PLAN NOTE
Chronic and improved problem, recent GFR doing better, continue with good oral hydration and current regimen. Follow up GFR periodically to ensure stability.

## 2024-06-13 NOTE — ASSESSMENT & PLAN NOTE
New decompensated problem, will give B12 1000 mcg injection in clinic today for therapeutic and diagnostic trial and recommend that she start on sublingual supplementation in the interim with 100 mcg daily starting in about 30 days.

## 2024-06-13 NOTE — TELEPHONE ENCOUNTER
1. Caller Name: Salma Lees                          Call Back Number: [phone] (home)         How would the patient prefer to be contacted with a response: Phone call OK to leave a detailed message    Called patient to update her on the possibility of a lightweight scooter. Unfortunately not good news.   She does not make enough money to buy her own.

## 2024-06-13 NOTE — ASSESSMENT & PLAN NOTE
Chronic improved problem, A1c well controlled, no noted side effects to regimen. Continue jardiance 10 mg daily and Trulicity 1.5 mg weekly.

## 2024-06-13 NOTE — ASSESSMENT & PLAN NOTE
Chronic and stable problem, continue medical therapy with atorvastatin 10 mg daily, lipids are at goal.

## 2024-06-13 NOTE — ASSESSMENT & PLAN NOTE
Chronic ongoing problem, also related to underlying heart disease, continue with periodic monitoring, she is on jardiance and valsartan.

## 2024-06-21 ENCOUNTER — HOSPITAL ENCOUNTER (OUTPATIENT)
Facility: MEDICAL CENTER | Age: 74
End: 2024-06-21
Attending: INTERNAL MEDICINE
Payer: MEDICARE

## 2024-06-21 DIAGNOSIS — D72.10 EOSINOPHILIA, UNSPECIFIED TYPE: ICD-10-CM

## 2024-06-21 PROCEDURE — 87329 GIARDIA AG IA: CPT

## 2024-06-21 PROCEDURE — 87328 CRYPTOSPORIDIUM AG IA: CPT

## 2024-06-22 LAB
C PARVUM AG STL QL IA.RAPID: NEGATIVE
G LAMBLIA AG STL QL IA.RAPID: NEGATIVE

## 2024-06-27 ENCOUNTER — HOSPITAL ENCOUNTER (OUTPATIENT)
Facility: MEDICAL CENTER | Age: 74
End: 2024-06-27
Attending: INTERNAL MEDICINE
Payer: MEDICARE

## 2024-06-27 ENCOUNTER — ANTICOAGULATION VISIT (OUTPATIENT)
Dept: MEDICAL GROUP | Facility: MEDICAL CENTER | Age: 74
End: 2024-06-27
Payer: MEDICARE

## 2024-06-27 VITALS — DIASTOLIC BLOOD PRESSURE: 51 MMHG | HEART RATE: 61 BPM | SYSTOLIC BLOOD PRESSURE: 151 MMHG

## 2024-06-27 DIAGNOSIS — Z98.890 H/O MITRAL VALVE REPAIR: ICD-10-CM

## 2024-06-27 DIAGNOSIS — I48.91 ATRIAL FIBRILLATION, UNSPECIFIED TYPE (HCC): ICD-10-CM

## 2024-06-27 LAB — INR PPP: 1.6 (ref 2–3.5)

## 2024-06-27 PROCEDURE — 99211 OFF/OP EST MAY X REQ PHY/QHP: CPT | Performed by: INTERNAL MEDICINE

## 2024-06-27 PROCEDURE — 3077F SYST BP >= 140 MM HG: CPT | Performed by: INTERNAL MEDICINE

## 2024-06-27 PROCEDURE — 82274 ASSAY TEST FOR BLOOD FECAL: CPT

## 2024-06-27 PROCEDURE — 3078F DIAST BP <80 MM HG: CPT | Performed by: INTERNAL MEDICINE

## 2024-06-27 PROCEDURE — 85610 PROTHROMBIN TIME: CPT | Performed by: STUDENT IN AN ORGANIZED HEALTH CARE EDUCATION/TRAINING PROGRAM

## 2024-06-27 NOTE — PROGRESS NOTES
OP Anticoagulation Service Note    Date: 2024    Anticoagulation Summary  As of 2024      INR goal:  2.0-3.0   TTR:  56.9% (7.3 y)   INR used for dosin.60 (2024)   Warfarin maintenance plan:  2.5 mg (5 mg x 0.5) every Sun, Tue, Thu; 5 mg (5 mg x 1) all other days   Weekly warfarin total:  27.5 mg   Plan last modified:  Michelle Cassidy, PharmD (2024)   Next INR check:  7/15/2024   Target end date:  Indefinite    Indications    Atrial fibrillation (HCC) [I48.91]  H/O mitral valve repair [Z98.890]                 Anticoagulation Episode Summary       INR check location:      Preferred lab:      Send INR reminders to:      Comments:            Anticoagulation Care Providers       Provider Role Specialty Phone number    Zeferino Almaraz A.P.N. Referring Family Medicine [phone]    [facility] Responsible  [phone]    Kenneth Salas, PharmD Responsible Pharmacy           Anticoagulation Patient Findings  Patient Findings       Positives:  Change in diet/appetite (increased salads/greens during summer)    Negatives:  Signs/symptoms of thrombosis, Signs/symptoms of bleeding, Laboratory test error suspected, Change in health, Change in alcohol use, Change in activity, Upcoming invasive procedure, Emergency department visit, Upcoming dental procedure, Missed doses, Extra doses, Change in medications, Hospital admission, Bruising, Other complaints            HPI:   Salma Lees is in the Anticoagulation Clinic today for an INR check on their anticoagulation therapy.     The reason for today's visit is to prevent morbidity and mortality from a blood clot and/or stroke and to reduce the risk of bleeding while on a anticoagulant.     PCP:  Leah Bonilla D.O.  [address] [facility] [address]  [place] [address] [phone]    3 vitals included with today's appt-unless patient declined:  (BP, HR, weight, ht, RR)   Vitals:    24 1141   BP: (!) 151/51   Pulse: 61    Declined repeat check. Reports no sx of CP, SOB, headaches, blurry vision.     Verified current warfarin dosing schedule.    Medications reconciled: Yes  Pt is not on antiplatelet therapy    Assessment:   INR is subtherapeutic due to increased vitamin k intake.    Plan:  Increase to 2.5 mg Sun/Tue/Thu, 5 mg all other days    Follow up:  Follow up appointment in 1.5 week(s). Pt declines despite warning of extending their follow up interval. Pt opts to RTC in 2.5 week(s).      Other info:  Pt educated to contact our clinic with any changes in medications or s/s of bleeding or thrombosis.  Education was provided today regarding tips to reduce their bleed risk and dietary constraints while on an anticoagulant.    National Recommendations:  The CHEST guidelines recommends frequent INR monitoring at regular intervals (a few days up to a max of 12 weeks) to ensure patients are on the proper dose of warfarin, and patients are not having any complications from therapy.  INRs can dramatically change over a short time period due to diet, medications, and medical conditions.     Michelle Cassidy, PharmD, BCACP    [facility]  Phone: [phone], Fax: [phone]

## 2024-06-28 ENCOUNTER — TELEPHONE (OUTPATIENT)
Dept: CARDIOLOGY | Facility: MEDICAL CENTER | Age: 74
End: 2024-06-28
Payer: MEDICARE

## 2024-06-28 NOTE — TELEPHONE ENCOUNTER
SHERLYN EOS to DS's nurse, Leah, on 6/28/2024    Preliminary findings:    <1% AFL  with an avg rate of 97 bpm    Sinus rhythm 58-97 with an avg rate of 61 bpm    Supraventricular ectopy: rare isolated and couplets, no triplets noted    Ventricular ectopy: rare isolated and couplets, no triplets noted    6 patient events:  -114  SR 60-76  VE(s)

## 2024-07-04 LAB — IMM ASSAY OCC BLD FITOB: NEGATIVE

## 2024-07-08 PROBLEM — E11.3292 MILD NONPROLIFERATIVE DIABETIC RETINOPATHY OF LEFT EYE WITHOUT MACULAR EDEMA ASSOCIATED WITH TYPE 2 DIABETES MELLITUS (HCC): Status: ACTIVE | Noted: 2024-07-08

## 2024-07-15 ENCOUNTER — ANTICOAGULATION VISIT (OUTPATIENT)
Dept: MEDICAL GROUP | Facility: MEDICAL CENTER | Age: 74
End: 2024-07-15
Payer: MEDICARE

## 2024-07-15 VITALS
HEART RATE: 60 BPM | DIASTOLIC BLOOD PRESSURE: 47 MMHG | OXYGEN SATURATION: 90 % | BODY MASS INDEX: 30.38 KG/M2 | SYSTOLIC BLOOD PRESSURE: 134 MMHG | WEIGHT: 177 LBS

## 2024-07-15 DIAGNOSIS — Z98.890 H/O MITRAL VALVE REPAIR: ICD-10-CM

## 2024-07-15 LAB — INR PPP: 1.7 (ref 2–3.5)

## 2024-07-15 PROCEDURE — 3078F DIAST BP <80 MM HG: CPT | Performed by: INTERNAL MEDICINE

## 2024-07-15 PROCEDURE — 3075F SYST BP GE 130 - 139MM HG: CPT | Performed by: INTERNAL MEDICINE

## 2024-07-15 PROCEDURE — 85610 PROTHROMBIN TIME: CPT | Performed by: INTERNAL MEDICINE

## 2024-07-15 PROCEDURE — 99211 OFF/OP EST MAY X REQ PHY/QHP: CPT | Performed by: INTERNAL MEDICINE

## 2024-07-15 ASSESSMENT — FIBROSIS 4 INDEX: FIB4 SCORE: 3.42

## 2024-07-30 ENCOUNTER — OFFICE VISIT (OUTPATIENT)
Dept: CARDIOLOGY | Facility: MEDICAL CENTER | Age: 74
End: 2024-07-30
Attending: INTERNAL MEDICINE
Payer: MEDICARE

## 2024-07-30 VITALS
BODY MASS INDEX: 31.76 KG/M2 | OXYGEN SATURATION: 88 % | SYSTOLIC BLOOD PRESSURE: 130 MMHG | WEIGHT: 186 LBS | RESPIRATION RATE: 12 BRPM | DIASTOLIC BLOOD PRESSURE: 66 MMHG | HEIGHT: 64 IN | HEART RATE: 60 BPM

## 2024-07-30 DIAGNOSIS — G45.9 TIA (TRANSIENT ISCHEMIC ATTACK): ICD-10-CM

## 2024-07-30 DIAGNOSIS — Z79.01 ANTICOAGULATED: ICD-10-CM

## 2024-07-30 DIAGNOSIS — N18.32 TYPE 2 DIABETES MELLITUS WITH STAGE 3B CHRONIC KIDNEY DISEASE, WITHOUT LONG-TERM CURRENT USE OF INSULIN (HCC): ICD-10-CM

## 2024-07-30 DIAGNOSIS — E11.22 TYPE 2 DIABETES MELLITUS WITH STAGE 3B CHRONIC KIDNEY DISEASE, WITHOUT LONG-TERM CURRENT USE OF INSULIN (HCC): ICD-10-CM

## 2024-07-30 DIAGNOSIS — Z98.890 S/P ABLATION OF ATRIAL FLUTTER: ICD-10-CM

## 2024-07-30 DIAGNOSIS — Z98.890 H/O MITRAL VALVE REPAIR: ICD-10-CM

## 2024-07-30 DIAGNOSIS — I48.91 ATRIAL FIBRILLATION, UNSPECIFIED TYPE (HCC): ICD-10-CM

## 2024-07-30 DIAGNOSIS — E11.69 DYSLIPIDEMIA ASSOCIATED WITH TYPE 2 DIABETES MELLITUS (HCC): ICD-10-CM

## 2024-07-30 DIAGNOSIS — E78.5 DYSLIPIDEMIA ASSOCIATED WITH TYPE 2 DIABETES MELLITUS (HCC): ICD-10-CM

## 2024-07-30 DIAGNOSIS — Z86.79 S/P ABLATION OF ATRIAL FLUTTER: ICD-10-CM

## 2024-07-30 PROCEDURE — 93280 PM DEVICE PROGR EVAL DUAL: CPT | Performed by: INTERNAL MEDICINE

## 2024-07-30 PROCEDURE — 99212 OFFICE O/P EST SF 10 MIN: CPT | Performed by: INTERNAL MEDICINE

## 2024-07-30 PROCEDURE — 99213 OFFICE O/P EST LOW 20 MIN: CPT | Performed by: INTERNAL MEDICINE

## 2024-07-30 ASSESSMENT — FIBROSIS 4 INDEX: FIB4 SCORE: 3.42

## 2024-08-01 ENCOUNTER — NON-PROVIDER VISIT (OUTPATIENT)
Dept: CARDIOLOGY | Facility: MEDICAL CENTER | Age: 74
End: 2024-08-01

## 2024-08-01 ENCOUNTER — APPOINTMENT (OUTPATIENT)
Dept: MEDICAL GROUP | Facility: MEDICAL CENTER | Age: 74
End: 2024-08-01
Payer: MEDICARE

## 2024-08-01 PROCEDURE — 93294 REM INTERROG EVL PM/LDLS PM: CPT | Performed by: INTERNAL MEDICINE

## 2024-08-01 PROCEDURE — RXMED WILLOW AMBULATORY MEDICATION CHARGE: Performed by: NURSE PRACTITIONER

## 2024-08-02 NOTE — CARDIAC REMOTE MONITOR - SCAN
Device transmission reviewed. Device demonstrated appropriate function.       Electronically Signed by: Santi Snell M.D.    8/29/2024  11:53 AM

## 2024-08-12 PROCEDURE — RXMED WILLOW AMBULATORY MEDICATION CHARGE: Performed by: INTERNAL MEDICINE

## 2024-08-14 ENCOUNTER — NON-PROVIDER VISIT (OUTPATIENT)
Dept: MEDICAL GROUP | Facility: PHYSICIAN GROUP | Age: 74
End: 2024-08-14
Payer: MEDICARE

## 2024-08-14 ENCOUNTER — PHARMACY VISIT (OUTPATIENT)
Dept: PHARMACY | Facility: MEDICAL CENTER | Age: 74
End: 2024-08-14
Payer: COMMERCIAL

## 2024-08-14 DIAGNOSIS — E53.8 B12 DEFICIENCY: ICD-10-CM

## 2024-08-14 PROCEDURE — RXMED WILLOW AMBULATORY MEDICATION CHARGE: Performed by: INTERNAL MEDICINE

## 2024-08-14 RX ORDER — CYANOCOBALAMIN 1000 UG/ML
1000 INJECTION, SOLUTION INTRAMUSCULAR; SUBCUTANEOUS ONCE
Status: COMPLETED | OUTPATIENT
Start: 2024-08-14 | End: 2024-08-14

## 2024-08-14 RX ADMIN — CYANOCOBALAMIN 1000 MCG: 1000 INJECTION, SOLUTION INTRAMUSCULAR; SUBCUTANEOUS at 11:27

## 2024-08-14 NOTE — PROGRESS NOTES
Salma Lees is a 74 y.o. female here for a non-provider visit for B-12 injection.    Reason for injection: Deficiency  Order in MAR?: Yes  Patient supplied?:No  Minimum interval has been met for this injection (per MAR order): Yes    Patient tolerated injection and no adverse effects were observed or reported: Yes

## 2024-08-21 DIAGNOSIS — N18.32 TYPE 2 DIABETES MELLITUS WITH STAGE 3B CHRONIC KIDNEY DISEASE, WITHOUT LONG-TERM CURRENT USE OF INSULIN (HCC): ICD-10-CM

## 2024-08-21 DIAGNOSIS — E11.22 TYPE 2 DIABETES MELLITUS WITH STAGE 3B CHRONIC KIDNEY DISEASE, WITHOUT LONG-TERM CURRENT USE OF INSULIN (HCC): ICD-10-CM

## 2024-08-21 NOTE — TELEPHONE ENCOUNTER
Received request via: Pharmacy    Was the patient seen in the last year in this department? Yes    Does the patient have an active prescription (recently filled or refills available) for medication(s) requested? No    Does the patient have long term Plus and need 100-day supply? (This applies to ALL medications) Yes, quantity updated to 100 days  
no

## 2024-08-30 ENCOUNTER — APPOINTMENT (OUTPATIENT)
Dept: CARDIOLOGY | Facility: MEDICAL CENTER | Age: 74
End: 2024-08-30
Payer: MEDICARE

## 2024-09-04 ENCOUNTER — TELEPHONE (OUTPATIENT)
Dept: CARDIOLOGY | Facility: MEDICAL CENTER | Age: 74
End: 2024-09-04

## 2024-09-04 ENCOUNTER — APPOINTMENT (OUTPATIENT)
Dept: CARDIOLOGY | Facility: MEDICAL CENTER | Age: 74
End: 2024-09-04
Payer: MEDICARE

## 2024-09-04 VITALS
BODY MASS INDEX: 32.1 KG/M2 | OXYGEN SATURATION: 88 % | SYSTOLIC BLOOD PRESSURE: 128 MMHG | HEART RATE: 60 BPM | RESPIRATION RATE: 18 BRPM | HEIGHT: 64 IN | DIASTOLIC BLOOD PRESSURE: 54 MMHG | WEIGHT: 188 LBS

## 2024-09-04 DIAGNOSIS — Z98.890 H/O MITRAL VALVE REPAIR: ICD-10-CM

## 2024-09-04 DIAGNOSIS — I50.22 CHRONIC HFREF (HEART FAILURE WITH REDUCED EJECTION FRACTION) (HCC): Primary | ICD-10-CM

## 2024-09-04 DIAGNOSIS — N18.32 TYPE 2 DIABETES MELLITUS WITH STAGE 3B CHRONIC KIDNEY DISEASE, WITHOUT LONG-TERM CURRENT USE OF INSULIN (HCC): ICD-10-CM

## 2024-09-04 DIAGNOSIS — Z98.890 S/P ABLATION OF ATRIAL FLUTTER: ICD-10-CM

## 2024-09-04 DIAGNOSIS — Z86.79 S/P ABLATION OF ATRIAL FLUTTER: ICD-10-CM

## 2024-09-04 DIAGNOSIS — I48.91 ATRIAL FIBRILLATION, UNSPECIFIED TYPE (HCC): ICD-10-CM

## 2024-09-04 DIAGNOSIS — I27.20 PULMONARY HYPERTENSION (HCC): ICD-10-CM

## 2024-09-04 DIAGNOSIS — E11.22 TYPE 2 DIABETES MELLITUS WITH STAGE 3B CHRONIC KIDNEY DISEASE, WITHOUT LONG-TERM CURRENT USE OF INSULIN (HCC): ICD-10-CM

## 2024-09-04 LAB — EKG IMPRESSION: NORMAL

## 2024-09-04 PROCEDURE — G2211 COMPLEX E/M VISIT ADD ON: HCPCS | Performed by: INTERNAL MEDICINE

## 2024-09-04 PROCEDURE — 93010 ELECTROCARDIOGRAM REPORT: CPT | Performed by: INTERNAL MEDICINE

## 2024-09-04 PROCEDURE — 99212 OFFICE O/P EST SF 10 MIN: CPT | Performed by: INTERNAL MEDICINE

## 2024-09-04 PROCEDURE — 3078F DIAST BP <80 MM HG: CPT | Performed by: INTERNAL MEDICINE

## 2024-09-04 PROCEDURE — 93005 ELECTROCARDIOGRAM TRACING: CPT | Performed by: INTERNAL MEDICINE

## 2024-09-04 PROCEDURE — 99214 OFFICE O/P EST MOD 30 MIN: CPT | Performed by: INTERNAL MEDICINE

## 2024-09-04 PROCEDURE — 3074F SYST BP LT 130 MM HG: CPT | Performed by: INTERNAL MEDICINE

## 2024-09-04 ASSESSMENT — FIBROSIS 4 INDEX: FIB4 SCORE: 3.42

## 2024-09-04 NOTE — PROGRESS NOTES
CARDIOLOGY established PATIENT:    PCP: Leah Bonilla D.O.    1. Chronic HFrEF (heart failure with reduced ejection fraction) ([facility])    2. Atrial fibrillation, unspecified type ([facility])    3. S/P ablation of atrial flutter/AT    4. Type 2 diabetes mellitus with stage 3b chronic kidney disease, without long-term current use of insulin ([facility])    5. H/O mitral valve repair    6. Pulmonary hypertension (HCC)        Salma Lees here for follow up HFrEF, PH, MV repair, AF, AT/AFL ablation on Coumadin.    Chief Complaint   Patient presents with    Atrial Fibrillation     F/V Dx: Atrial fibrillation (HCC)    Hypertension    Aortic Atherosclerosis       History: Salma Lees is a 74 y.o. female with nonischemic cardiomyopathy attributed to tachycardia induced cardiomyopathy, AT/AFL status post ablation, controlled type 2 diabetes with CKD stage III, PH, chronic hypoxic resp failure on chronic O2 therapy 2-5L/min ( since 2014), history of mitral valve repair (2009), permanent pacemaker in place (since 2014), followed by EP-Dr. Galloway.    Reportedly had a clean coronary angiography premitral valve repair surgery.  No repeat ever since    Clinic with Dr. Simmons 6/2024: Started on Jardiance 10 mg, Entresto.    Clinic Dr. Galloway 6/2024: resumed amiodarone 100mg, stopped ivabradine, decrease carvedilol due to orthostatic symptoms and off spironolactone.    Had worse fatigue and headaches / dizziness with Jardiance, hence stopped    Clinic Dr. Galloway 7/2024: TTE ordered to follow up on LVEF.    Keeps track of daily weights. Was down to 168lbs. Worse SERRA and more weight gain over last 1-2 months.  Occasional lightheadedness with no syncope.  Denies any chest pain, orthopnea or PND.    Normal TSH, ALT and AST 6/2024    Cr 1.46 6/2024  Cr 1.56 1/2024    A1C 5.8% 6/2024    Lipid 6/2024: on atorva 10mg  LDL 42, TG 83      Functional status:     NYHA Class: II-III  I: no symptoms with activity. Normal  II. Mild symptoms with  "normal physical activity and comfortable at rest.  III: Moderate symptoms with less than normal physical activity. Only comfortable at rest  IV: Severe symptoms with symptoms of heart failure, even at rest.      PE:  /54 (BP Location: Left arm, Patient Position: Sitting, BP Cuff Size: Adult)   Pulse 60   Resp 18   Ht 1.626 m (5' 4\")   Wt 85.3 kg (188 lb)   SpO2 88%   BMI 32.27 kg/m²     Gen: chronically ill appearing  HEENT: Symmetric face.  NECK: + JVD.   CARDIAC: Regular, Normal S1, S2, +systolic murmur  VASCULATURE: carotids are normal bilaterally without bruit  RESP: Diffuse decreased air entry with no crackles 9/4/2024  EXT: Varicose veins noted, 1+ bilateral pitting edema  SKIN: Warm and dry  NEURO: No gross deficits  PSYCH: Appropriate affect, participates in conversation    The ASCVD Risk score (Jose CUNNINGHAM, et al., 2019) failed to calculate.    Past Medical History:   Diagnosis Date    A-fib ([facility])     Asthma     BMI 35.0-35.9,adult 11/12/2018    Breath shortness 09/07/2017    uses oxygen at 2 liters/min cont. with \"[facility]\"    Calculus of ureter 09/18/2017    Cataract     codey IOL    Chronic anticoagulation 02/13/2017    CKD (chronic kidney disease) stage 3, GFR 30-59 ml/min     Congestive heart failure (HCC)     COPD (chronic obstructive pulmonary disease) ([facility])     Diabetes ([facility])     High cholesterol     History of mitral valve repair     Hypertension     Infectious disease 2014    UTI/hx. MRSA    Pacemaker 2014    Pulmonary hypertension ([facility])     Renal disorder 09/07/2017    has stent    Shortness of breath 10/18/2017    Urinary incontinence      Past Surgical History:   Procedure Laterality Date    URETEROSCOPY Left 09/18/2017    Procedure: URETEROSCOPY;  Surgeon: Edgardo Richter M.D.;  Location: SURGERY [facility];  Service: Urology    LASERTRIPSY Left 09/18/2017    Procedure: LASERTRIPSY LITHO  ;  Surgeon: Edgardo Richter M.D.;  Location: SURGERY [facility]; "  Service: Urology    STENT REMOVAL Left 09/18/2017    Procedure: STENT REMOVAL;  Surgeon: Edgardo Richter M.D.;  Location: SURGERY [facility];  Service: Urology    CYSTOSCOPY STENT PLACEMENT Left 06/25/2017    Procedure: CYSTOSCOPY, LEFT URETERAL STENT PLACEMENT;  Surgeon: Edgardo Richter M.D.;  Location: SURGERY [facility];  Service:     PACEMAKER INSERTION  01/01/2015    KNEE REPLACEMENT, TOTAL Left 01/01/2015    MITRAL VALVE REPAIR  01/01/2009    HYSTERECTOMY, VAGINAL  1994    TONSILLECTOMY       Allergies   Allergen Reactions    Amoxicillin Anaphylaxis and Shortness of Breath    Nitrofurantoin      swelling    Pcn [Penicillins] Swelling    Vancomycin Shortness of Breath and Unspecified     Severe hypotension and bronchospasm observed by anesthesia while giving  vanco during procedure    Amlodipine Besylate-Valsartan Unspecified     Chest pain and dizziness     Etodolac Hives and Nausea     Dark stools    Food Hives     Plums    Ivabradine     Jardiance [Empagliflozin] Unspecified     Headaches, dizziness    Eliquis [Apixaban] Shortness of Breath     Pt reports SOB and dizzy when she took    Lisinopril Unspecified     Dizziness     Other Drug Rash     Metal silver    Other Misc Runny Nose     Mold, dust, and feathers, adhesives, plums    Tape Rash     Paper tape ok     Outpatient Encounter Medications as of 9/4/2024   Medication Sig Dispense Refill    Dulaglutide 1.5 MG/0.5ML Solution Pen-injector Inject 0.5 mL under the skin every 7 days. Every Sunday 1.5 mL 3    calcitRIOL (ROCALTROL) 0.25 MCG Cap Take 2 Capsules by mouth every day. 200 Capsule 3    carvedilol (COREG) 12.5 MG Tab Take 1 Tablet by mouth 2 times a day with meals. 180 Tablet 3    amiodarone (CORDARONE) 200 MG Tab Take 0.5 Tablets by mouth every day. 90 Tablet 3    furosemide (LASIX) 20 MG Tab Take 1 Tablet by mouth every day. 100 Tablet 2    atorvastatin (LIPITOR) 10 MG Tab Take 1 Tablet by mouth every day. 100 Tablet 2     sacubitril-valsartan (ENTRESTO) 24-26 MG Tab Take 1 tablet by mouth 2 times a day. 60 Tablet 11    potassium Chloride ER (K-TAB) 20 MEQ Tab CR tablet Take 1 Tablet by mouth every day. 30 Tablet 3    warfarin (COUMADIN) 5 MG Tab TAKE ONE AND ONE-HALF TABLETS BY MOUTH DAILY OR AS DIRECTED BY ANTICOAGULANT CLINIC 135 Tablet 0     No facility-administered encounter medications on file as of 2024.     Social History     Socioeconomic History    Marital status:      Spouse name: Not on file    Number of children: Not on file    Years of education: Not on file    Highest education level: Not on file   Occupational History    Not on file   Tobacco Use    Smoking status: Former     Current packs/day: 0.00     Average packs/day: 1 pack/day for 5.0 years (5.0 ttl pk-yrs)     Types: Cigarettes     Start date: 1985     Quit date: 1990     Years since quittin.6    Smokeless tobacco: Never    Tobacco comments:     Continued abstinence   Vaping Use    Vaping status: Never Used   Substance and Sexual Activity    Alcohol use: No     Alcohol/week: 0.0 oz    Drug use: No    Sexual activity: Not Currently   Other Topics Concern    Not on file   Social History Narrative    She has a son. She had previously experience in [employer].     Social Determinants of Health     Financial Resource Strain: Not on file   Food Insecurity: Not on file   Transportation Needs: Not on file   Physical Activity: Not on file   Stress: Not on file   Social Connections: Not on file   Intimate Partner Violence: Not on file   Housing Stability: Not on file     Family History   Problem Relation Age of Onset    Lung Disease Mother         asthma    Cancer Mother         ovarian    Heart Disease Father     Stroke Father     Thyroid Sister     Cancer Brother         pancreas    Cancer Paternal Grandmother         colon    Heart Disease Brother     No Known Problems Brother     No Known Problems Brother     Heart Disease Son         afib          Studies    Lab Results   Component Value Date/Time    TSHULTRASEN 2.300 2024 1019        Lab Results   Component Value Date/Time    FREET4 1.46 2024 1019      Lab Results   Component Value Date/Time    HBA1C 5.8 (H) 2024 10:19 AM     Lab Results   Component Value Date/Time    CHOLSTRLTOT 116 2024 10:19 AM    LDL 42 2024 10:19 AM    HDL 57 2024 10:19 AM    TRIGLYCERIDE 83 2024 10:19 AM       Lab Results   Component Value Date/Time    SODIUM 141 2024 10:19 AM    POTASSIUM 4.5 2024 10:19 AM    CHLORIDE 105 2024 10:19 AM    CO2 28 2024 10:19 AM    GLUCOSE 81 2024 10:19 AM    BUN 30 (H) 2024 10:19 AM    CREATININE 1.46 (H) 2024 10:19 AM     Lab Results   Component Value Date/Time    ALKPHOSPHAT 76 2024 10:19 AM    ASTSGOT 40 2024 10:19 AM    ALTSGPT 37 2024 10:19 AM    TBILIRUBIN 1.1 2024 10:19 AM        Echocardiogram:  Results for orders placed or performed during the hospital encounter of 17   ECHOCARDIOGRAM COMP W/O CONT   Result Value Ref Range    Eject.Frac. MOD BP 57.46     Eject.Frac. MOD 4C 57.06     Eject.Frac. MOD 2C 64.61     Left Ventrical Ejection Fraction 55        EC2024 personally reviewed and interpreted  Atrial-paced rhythm   Borderline right axis deviation   Nonspecific T abnrm, anterolateral leads   LPFB   Compared to ECG 2024 13:50:42   AT no longer present   Electronically Signed On 2024 13:51:15 PDT by Zander Quiñones MD     Assessment and Recommendations:    Problem List Items Addressed This Visit          Cardiology Medicine Problems    Atrial fibrillation (HCC)    Relevant Orders    EKG (Completed)    Chronic HFrEF (heart failure with reduced ejection fraction) (HCC) - Primary    Relevant Orders    CL-RIGHT AND LEFT HEART CATHETERIZATION W/NITRIC OXIDE       Other    H/O mitral valve repair    Relevant Orders    CL-RIGHT AND LEFT HEART CATHETERIZATION  W/NITRIC OXIDE    Type 2 diabetes mellitus with stage 3b chronic kidney disease, without long-term current use of insulin (HCC)    Pulmonary hypertension (HCC)    Relevant Orders    CL-RIGHT AND LEFT HEART CATHETERIZATION W/NITRIC OXIDE    S/P ablation of atrial flutter/AT     Ms. Lees is fortunately overall stable from a cardiac standpoint given her complex cardiovascular history with PH; but with recent mild decompensation of her chronic HFrEF. TTE planned soon, to follow-up on LV systolic function while not in AT /atrial arrhythmia.  Ordered by Dr. Galloway.  A-paced today on EKG.  On amiodarone managed by EP.  High risk medication.    Discussed with her the risk and benefit and utility of proceeding with right heart catheterization with possible nitric oxide testing, left heart catheterization, coronary angiography and possible PCI given her underlying cardiac / pulmonary conditions and she agrees to proceed.  Will hold warfarin prep for the procedure based on the schedule. If signs of precal PH, will refer to Dr. Villasenor from pulmonary medicine.    Advised her to increase her Lasix and potassium to daily supplementation and we will contact her in about 1 week for follow-up.  Advised her to keep track of her daily morning weights in the interim.  Potential dry weight around 160lbs.     Blood pressure normal on current regimen    TG and LDL normal and at goal on current regimen of atorvastatin 10 mg daily    Thank you for the opportunity to be involved in Salma Lees 's  complex cardiovascular care; and please reach out with any questions or concerns.     () Today's E/M visit is associated with medical care services that serve as the continuing focal point for all needed health care services and/or with medical care services that  are part of ongoing cardiac care related to a patient's single, serious condition, or a complex condition: This includes  furnishing services to patients on an ongoing basis  that result in care that is personalized  to the patient. The services result in a comprehensive, longitudinal, and continuous  relationship with the patient and involve delivery of team-based care that is accessible, coordinated with other practitioners and providers, and integrated with the broader health  care landscape.     Return in about 2 months (around 11/4/2024).    Zander Quiñones MD, MPH [facility]  Interventional Cardiologist  [facility]   of Clinical Internal Medicine - [facility]    ~ Portions of this note were completed using voice recognition software (Dragon Naturally speaking software) . Occasional transcription errors may have escaped proof reading. I have made every reasonable attempt to correct obvious errors, but I expect that there are errors of grammar and possibly content that I did not discover before finalizing the note. ~

## 2024-09-04 NOTE — TELEPHONE ENCOUNTER
----- Message from Physician Zander Quiñones M.D. sent at 9/4/2024  1:46 PM PDT -----  Can u plz arrange her R/LHC with NO. With my next avail. Thank yoU!

## 2024-09-04 NOTE — PATIENT INSTRUCTIONS
Take lasix and potassium every day, keep track of your daily morning weight, we will contact you next week  Right and left heart catheterization will be scheduled

## 2024-09-09 NOTE — TELEPHONE ENCOUNTER
Patient is scheduled on 10-7-24 for a R&L Hrt w/nitric oxide with Dr. Zander Quiñones. Patient was told to hold Warfarin for 5 days prior, hold Dulaglutide for 7 days prior. Patient to check in at 9:30 for an 11:30 procedure. Updated H&P to be done on admit by NP. Pre admit to call patient. Resp notified my staff message to be at procedure.

## 2024-09-11 ENCOUNTER — TELEPHONE (OUTPATIENT)
Dept: CARDIOLOGY | Facility: MEDICAL CENTER | Age: 74
End: 2024-09-11
Payer: MEDICARE

## 2024-09-11 DIAGNOSIS — I51.89 LEFT VENTRICULAR SYSTOLIC DYSFUNCTION, NYHA CLASS 3: ICD-10-CM

## 2024-09-11 DIAGNOSIS — I50.20 ACC/AHA STAGE C SYSTOLIC CONGESTIVE HEART FAILURE (HCC): ICD-10-CM

## 2024-09-11 DIAGNOSIS — R06.02 SHORTNESS OF BREATH: ICD-10-CM

## 2024-09-11 NOTE — TELEPHONE ENCOUNTER
----- Message from Physician Zander Quiñones M.D. sent at 9/4/2024  1:45 PM PDT -----  Can u plz call her in 1 week to follow up on daily weights and daily lasix use. Thank you!

## 2024-09-11 NOTE — TELEPHONE ENCOUNTER
Phone Number Called: [phone]     Call outcome: Spoke to patient regarding message below.    Pt states she is taking her lasix daily, her daily weights as follows:  9/5 188.4 lbs  9/6 187.2 lbs  9/7 186.4 lbs  9/8 184.4 lbs  9/9 181.6 lbs  9/10 181.4 lbs  9/11 181.6 lbs    To AL as update. Thank you!

## 2024-09-12 ENCOUNTER — APPOINTMENT (OUTPATIENT)
Dept: CARDIOLOGY | Facility: MEDICAL CENTER | Age: 74
End: 2024-09-12
Attending: INTERNAL MEDICINE
Payer: MEDICARE

## 2024-09-12 RX ORDER — POTASSIUM CHLORIDE 1500 MG/1
TABLET, EXTENDED RELEASE ORAL
Qty: 120 TABLET | Refills: 1 | Status: SHIPPED | OUTPATIENT
Start: 2024-09-12

## 2024-09-12 RX ORDER — FUROSEMIDE 20 MG
TABLET ORAL
Qty: 120 TABLET | Refills: 1 | Status: SHIPPED | OUTPATIENT
Start: 2024-09-12

## 2024-09-12 NOTE — TELEPHONE ENCOUNTER
Phone Number Called: [phone]    Call outcome: Spoke to patient regarding message below.    Message: Called to relay AL recommendations. She wrote them down and will send us a mychart in 1 week with weights. Answered all questions and concerns, appreciative of call.     RX's ordered.

## 2024-09-13 ENCOUNTER — APPOINTMENT (OUTPATIENT)
Dept: ADMISSIONS | Facility: MEDICAL CENTER | Age: 74
End: 2024-09-13
Attending: INTERNAL MEDICINE
Payer: MEDICARE

## 2024-09-13 ENCOUNTER — TELEPHONE (OUTPATIENT)
Dept: HEALTH INFORMATION MANAGEMENT | Facility: OTHER | Age: 74
End: 2024-09-13

## 2024-09-19 ENCOUNTER — APPOINTMENT (OUTPATIENT)
Dept: MEDICAL GROUP | Facility: MEDICAL CENTER | Age: 74
End: 2024-09-19
Payer: MEDICARE

## 2024-09-20 ENCOUNTER — PRE-ADMISSION TESTING (OUTPATIENT)
Dept: ADMISSIONS | Facility: MEDICAL CENTER | Age: 74
End: 2024-09-20
Attending: INTERNAL MEDICINE
Payer: MEDICARE

## 2024-09-20 NOTE — OR NURSING
Pre admit appointment completed with Salma for procedure on 10/7/24.    Medication and fasting instructions given per cardiology. Confirmed with patient instructions provided on scheduling form from cardiology.     Pt verbalizes understanding of all instructions given. No further questions at this time. Pt scheduled for pre op testing on 10/3/24.

## 2024-09-23 ENCOUNTER — ANTICOAGULATION VISIT (OUTPATIENT)
Dept: MEDICAL GROUP | Facility: MEDICAL CENTER | Age: 74
End: 2024-09-23
Payer: MEDICARE

## 2024-09-23 VITALS
SYSTOLIC BLOOD PRESSURE: 124 MMHG | HEART RATE: 62 BPM | WEIGHT: 172.6 LBS | BODY MASS INDEX: 29.63 KG/M2 | OXYGEN SATURATION: 90 % | DIASTOLIC BLOOD PRESSURE: 45 MMHG

## 2024-09-23 DIAGNOSIS — I48.91 ATRIAL FIBRILLATION, UNSPECIFIED TYPE (HCC): ICD-10-CM

## 2024-09-23 DIAGNOSIS — Z98.890 H/O MITRAL VALVE REPAIR: ICD-10-CM

## 2024-09-23 LAB — INR PPP: 1.5 (ref 2–3.5)

## 2024-09-23 PROCEDURE — 3078F DIAST BP <80 MM HG: CPT | Performed by: INTERNAL MEDICINE

## 2024-09-23 PROCEDURE — 3074F SYST BP LT 130 MM HG: CPT | Performed by: INTERNAL MEDICINE

## 2024-09-23 PROCEDURE — 99211 OFF/OP EST MAY X REQ PHY/QHP: CPT | Performed by: INTERNAL MEDICINE

## 2024-09-23 PROCEDURE — 85610 PROTHROMBIN TIME: CPT

## 2024-09-23 ASSESSMENT — FIBROSIS 4 INDEX: FIB4 SCORE: 3.42

## 2024-09-23 NOTE — PROGRESS NOTES
Anticoagulation Summary  As of 2024      INR goal:  2.0-3.0   TTR:  55.1% (7.5 y)   INR used for dosin.50 (2024)   Warfarin maintenance plan:  2.5 mg (5 mg x 0.5) every Sun, Tue, Thu; 5 mg (5 mg x 1) all other days   Weekly warfarin total:  27.5 mg   Plan last modified:  Joleen HoangD (2024)   Next INR check:  2024   Target end date:  Indefinite    Indications    Atrial fibrillation (HCC) [I48.91]  H/O mitral valve repair [Z98.890]                 Anticoagulation Episode Summary       INR check location:  --    Preferred lab:  --    Send INR reminders to:  --    Comments:  --          Anticoagulation Care Providers       Provider Role Specialty Phone number    LILIYA Pearson Referring Family Medicine [phone]    [facility] Anticoagulation Services Responsible  [phone]    Kenneth Salas, PharmD Responsible Pharmacy                   Refer to Patient Findings for HPI:  Patient Findings       Positives:  Upcoming invasive procedure (10/7/24 cardiac procedure - cardiac cath)    Negatives:  Signs/symptoms of thrombosis, Signs/symptoms of bleeding, Laboratory test error suspected, Change in health, Change in alcohol use, Change in activity, Emergency department visit, Upcoming dental procedure, Missed doses, Extra doses, Change in medications, Change in diet/appetite, Hospital admission, Bruising, Other complaints            Vitals:    24 0822   BP: 124/45   Pulse: 62   SpO2: 90%   Weight: 78.3 kg (172 lb 9.6 oz)       Verified current warfarin dosing schedule.    Medications reconciled.  Pt is not on antiplatelet therapy.      A/P   INR is subtherapeutic  Reason(s) for out of range INR today: No obvious causes      Warfarin dosing recommendation: Increase to Warfarin 7.5 mg today and then resume previous warfarin dose.    Pt educated to contact our clinic with any changes in medications or s/s of bleeding or thrombosis. Pt is aware to seek immediate medical  attention for falls, head injury or deep cuts.    Request pt to return in 1 week(s). Pt agrees.    Joleen HallmanD

## 2024-09-27 PROCEDURE — RXMED WILLOW AMBULATORY MEDICATION CHARGE: Performed by: INTERNAL MEDICINE

## 2024-09-30 ENCOUNTER — ANTICOAGULATION VISIT (OUTPATIENT)
Dept: MEDICAL GROUP | Facility: MEDICAL CENTER | Age: 74
End: 2024-09-30
Payer: MEDICARE

## 2024-09-30 ENCOUNTER — PATIENT MESSAGE (OUTPATIENT)
Dept: CARDIOLOGY | Facility: MEDICAL CENTER | Age: 74
End: 2024-09-30

## 2024-09-30 VITALS
WEIGHT: 178 LBS | OXYGEN SATURATION: 95 % | HEART RATE: 125 BPM | SYSTOLIC BLOOD PRESSURE: 87 MMHG | BODY MASS INDEX: 30.55 KG/M2 | DIASTOLIC BLOOD PRESSURE: 59 MMHG

## 2024-09-30 DIAGNOSIS — Z98.890 H/O MITRAL VALVE REPAIR: ICD-10-CM

## 2024-09-30 LAB — INR PPP: 1.7 (ref 2–3.5)

## 2024-09-30 PROCEDURE — RXMED WILLOW AMBULATORY MEDICATION CHARGE: Performed by: NURSE PRACTITIONER

## 2024-09-30 RX ORDER — ENOXAPARIN SODIUM 150 MG/ML
1 INJECTION SUBCUTANEOUS DAILY
Qty: 7 EACH | Refills: 1 | Status: SHIPPED | OUTPATIENT
Start: 2024-09-30

## 2024-09-30 ASSESSMENT — FIBROSIS 4 INDEX: FIB4 SCORE: 3.42

## 2024-09-30 NOTE — PROGRESS NOTES
Anticoagulation Summary  As of 2024      INR goal:  2.0-3.0   TTR:  55.0% (7.5 y)   INR used for dosin.70 (2024)   Warfarin maintenance plan:  2.5 mg (5 mg x 0.5) every Sun, Tue, Thu; 5 mg (5 mg x 1) all other days   Weekly warfarin total:  27.5 mg   Plan last modified:  Joleen HoangD (2024)   Next INR check:  10/10/2024   Target end date:  Indefinite    Indications    Atrial fibrillation (HCC) [I48.91]  H/O mitral valve repair [Z98.890]                 Anticoagulation Episode Summary       INR check location:  --    Preferred lab:  --    Send INR reminders to:  --    Comments:  --          Anticoagulation Care Providers       Provider Role Specialty Phone number    LILIYA Perason Referring Family Medicine [phone]    [facility] Anticoagulation Services Responsible  [phone]    Kenneth Salas, PharmD Responsible Pharmacy                   Refer to Patient Findings for HPI:  Patient Findings       Positives:  Upcoming invasive procedure (Heart cath)    Negatives:  Signs/symptoms of thrombosis, Signs/symptoms of bleeding, Laboratory test error suspected, Change in health, Change in alcohol use, Change in activity, Emergency department visit, Upcoming dental procedure, Missed doses, Extra doses, Change in medications, Change in diet/appetite, Hospital admission, Bruising, Other complaints            Vitals:    24 0944   BP: (!) 87/59   Pulse: (!) 125   SpO2: 95%   Weight: 80.7 kg (178 lb)       Verified current warfarin dosing schedule.    Medications reconciled.  Pt is not on antiplatelet therapy.      A/P   INR is subtherapeutic  Reason(s) for out of range INR today: No obvious causes      Warfarin dosing recommendation: Follow dosing instructions below    Pt to have procedure on 10/7/24. Due to pt history lovenox bridging is indicated.   Pt to follow instructions as below, after procedure to ask operating MD when safe to resume anticoagulation, if no instructions  given, pt will follow as below.     CHADSvasc: 7  Clot history: MVR    Current weight:80.7  CrCl = 42 ml/min  Lab Results   Component Value Date/Time    BUN 30 (H) 06/11/2024 10:19 AM    CREATININE 1.46 (H) 06/11/2024 10:19 AM          Used Lovenox before: no     Date  Warfarin dosing PM Lovenox   5 Days before procedure 10/2 0mg NONE   4 Days before procedure 10/3 0mg Lovenox injection   3 Days before procedure 10/4 0mg Lovenox injection   2 Days before procedure 10/5 0mg Lovenox injection   1 Days before procedure 10/6 0mg NONE   PROCEDURE 10/7 7.5 mg NONE   1 Day after procedure 10/8 5 mg Restart Lovenox injections   2 Days after procedure 10/9 5 mg Lovenox injection   3 Days after procedure 10/10 2.5 Lovenox injection     Joleen HallmanD

## 2024-09-30 NOTE — PATIENT INSTRUCTIONS
Date  Warfarin dosing PM Lovenox   5 Days before procedure 10/2 0mg NONE   4 Days before procedure 10/3 0mg Lovenox injection   3 Days before procedure 10/4 0mg Lovenox injection   2 Days before procedure 10/5 0mg Lovenox injection   1 Days before procedure 10/6 0mg NONE   PROCEDURE 10/7 7.5 mg NONE   1 Day after procedure 10/8 5 mg Restart Lovenox injections   2 Days after procedure 10/9 5 mg Lovenox injection   3 Days after procedure 10/10 2.5 GET INR checked

## 2024-10-01 ENCOUNTER — APPOINTMENT (OUTPATIENT)
Dept: MEDICAL GROUP | Facility: PHYSICIAN GROUP | Age: 74
End: 2024-10-01
Payer: MEDICARE

## 2024-10-01 ENCOUNTER — PHARMACY VISIT (OUTPATIENT)
Dept: PHARMACY | Facility: MEDICAL CENTER | Age: 74
End: 2024-10-01
Payer: COMMERCIAL

## 2024-10-01 VITALS
SYSTOLIC BLOOD PRESSURE: 116 MMHG | WEIGHT: 179 LBS | HEART RATE: 124 BPM | DIASTOLIC BLOOD PRESSURE: 64 MMHG | TEMPERATURE: 98 F | RESPIRATION RATE: 16 BRPM | HEIGHT: 64 IN | BODY MASS INDEX: 30.56 KG/M2 | OXYGEN SATURATION: 92 %

## 2024-10-01 DIAGNOSIS — Z23 NEED FOR VACCINATION: ICD-10-CM

## 2024-10-01 DIAGNOSIS — E53.8 B12 DEFICIENCY: ICD-10-CM

## 2024-10-01 PROCEDURE — 3078F DIAST BP <80 MM HG: CPT | Performed by: INTERNAL MEDICINE

## 2024-10-01 PROCEDURE — G0008 ADMIN INFLUENZA VIRUS VAC: HCPCS | Performed by: INTERNAL MEDICINE

## 2024-10-01 PROCEDURE — 90662 IIV NO PRSV INCREASED AG IM: CPT | Performed by: INTERNAL MEDICINE

## 2024-10-01 PROCEDURE — 3074F SYST BP LT 130 MM HG: CPT | Performed by: INTERNAL MEDICINE

## 2024-10-01 PROCEDURE — 99214 OFFICE O/P EST MOD 30 MIN: CPT | Mod: 25 | Performed by: INTERNAL MEDICINE

## 2024-10-01 RX ORDER — CYANOCOBALAMIN 1000 UG/ML
1000 INJECTION, SOLUTION INTRAMUSCULAR; SUBCUTANEOUS ONCE
Status: COMPLETED | OUTPATIENT
Start: 2024-10-01 | End: 2024-10-01

## 2024-10-01 RX ADMIN — CYANOCOBALAMIN 1000 MCG: 1000 INJECTION, SOLUTION INTRAMUSCULAR; SUBCUTANEOUS at 08:51

## 2024-10-01 ASSESSMENT — FIBROSIS 4 INDEX: FIB4 SCORE: 3.42

## 2024-10-03 ENCOUNTER — APPOINTMENT (OUTPATIENT)
Dept: ADMISSIONS | Facility: MEDICAL CENTER | Age: 74
End: 2024-10-03
Attending: INTERNAL MEDICINE
Payer: MEDICARE

## 2024-10-03 ENCOUNTER — TELEPHONE (OUTPATIENT)
Dept: CARDIOLOGY | Facility: MEDICAL CENTER | Age: 74
End: 2024-10-03

## 2024-10-03 DIAGNOSIS — Z01.810 PRE-OPERATIVE CARDIOVASCULAR EXAMINATION: ICD-10-CM

## 2024-10-03 DIAGNOSIS — Z01.812 PRE-OPERATIVE LABORATORY EXAMINATION: ICD-10-CM

## 2024-10-03 LAB
ALBUMIN SERPL BCP-MCNC: 4 G/DL (ref 3.2–4.9)
ALBUMIN/GLOB SERPL: 1.1 G/DL
ALP SERPL-CCNC: 68 U/L (ref 30–99)
ALT SERPL-CCNC: 33 U/L (ref 2–50)
ANION GAP SERPL CALC-SCNC: 11 MMOL/L (ref 7–16)
APTT PPP: 31.6 SEC (ref 24.7–36)
AST SERPL-CCNC: 25 U/L (ref 12–45)
BILIRUB SERPL-MCNC: 1.5 MG/DL (ref 0.1–1.5)
BUN SERPL-MCNC: 44 MG/DL (ref 8–22)
CALCIUM ALBUM COR SERPL-MCNC: 9.1 MG/DL (ref 8.5–10.5)
CALCIUM SERPL-MCNC: 9.1 MG/DL (ref 8.5–10.5)
CHLORIDE SERPL-SCNC: 100 MMOL/L (ref 96–112)
CO2 SERPL-SCNC: 29 MMOL/L (ref 20–33)
CREAT SERPL-MCNC: 1.91 MG/DL (ref 0.5–1.4)
EKG IMPRESSION: NORMAL
ERYTHROCYTE [DISTWIDTH] IN BLOOD BY AUTOMATED COUNT: 57 FL (ref 35.9–50)
GFR SERPLBLD CREATININE-BSD FMLA CKD-EPI: 27 ML/MIN/1.73 M 2
GLOBULIN SER CALC-MCNC: 3.5 G/DL (ref 1.9–3.5)
GLUCOSE SERPL-MCNC: 130 MG/DL (ref 65–99)
HCT VFR BLD AUTO: 35.4 % (ref 37–47)
HGB BLD-MCNC: 10.9 G/DL (ref 12–16)
INR PPP: 1.89 (ref 0.87–1.13)
MCH RBC QN AUTO: 29.7 PG (ref 27–33)
MCHC RBC AUTO-ENTMCNC: 30.8 G/DL (ref 32.2–35.5)
MCV RBC AUTO: 96.5 FL (ref 81.4–97.8)
PLATELET # BLD AUTO: 121 K/UL (ref 164–446)
PMV BLD AUTO: 12.4 FL (ref 9–12.9)
POTASSIUM SERPL-SCNC: 4.3 MMOL/L (ref 3.6–5.5)
PROT SERPL-MCNC: 7.5 G/DL (ref 6–8.2)
PROTHROMBIN TIME: 21.8 SEC (ref 12–14.6)
RBC # BLD AUTO: 3.67 M/UL (ref 4.2–5.4)
SODIUM SERPL-SCNC: 140 MMOL/L (ref 135–145)
WBC # BLD AUTO: 6 K/UL (ref 4.8–10.8)

## 2024-10-03 PROCEDURE — 85027 COMPLETE CBC AUTOMATED: CPT

## 2024-10-03 PROCEDURE — 36415 COLL VENOUS BLD VENIPUNCTURE: CPT

## 2024-10-03 PROCEDURE — 85730 THROMBOPLASTIN TIME PARTIAL: CPT

## 2024-10-03 PROCEDURE — 80053 COMPREHEN METABOLIC PANEL: CPT

## 2024-10-03 PROCEDURE — 93005 ELECTROCARDIOGRAM TRACING: CPT

## 2024-10-03 PROCEDURE — 85610 PROTHROMBIN TIME: CPT

## 2024-10-03 PROCEDURE — 93010 ELECTROCARDIOGRAM REPORT: CPT | Performed by: INTERNAL MEDICINE

## 2024-10-07 ENCOUNTER — TELEPHONE (OUTPATIENT)
Dept: CARDIOLOGY | Facility: MEDICAL CENTER | Age: 74
End: 2024-10-07

## 2024-10-07 ENCOUNTER — HOSPITAL ENCOUNTER (OUTPATIENT)
Facility: MEDICAL CENTER | Age: 74
End: 2024-10-08
Attending: INTERNAL MEDICINE | Admitting: INTERNAL MEDICINE
Payer: MEDICARE

## 2024-10-07 ENCOUNTER — APPOINTMENT (OUTPATIENT)
Dept: CARDIOLOGY | Facility: MEDICAL CENTER | Age: 74
End: 2024-10-07
Attending: INTERNAL MEDICINE
Payer: MEDICARE

## 2024-10-07 DIAGNOSIS — I50.20 ACC/AHA STAGE C SYSTOLIC CONGESTIVE HEART FAILURE (HCC): ICD-10-CM

## 2024-10-07 DIAGNOSIS — I50.22 CHRONIC HFREF (HEART FAILURE WITH REDUCED EJECTION FRACTION) (HCC): ICD-10-CM

## 2024-10-07 DIAGNOSIS — I27.20 PULMONARY HYPERTENSION (HCC): ICD-10-CM

## 2024-10-07 DIAGNOSIS — I47.10 SVT (SUPRAVENTRICULAR TACHYCARDIA) (HCC): ICD-10-CM

## 2024-10-07 DIAGNOSIS — Z98.890 H/O MITRAL VALVE REPAIR: ICD-10-CM

## 2024-10-07 DIAGNOSIS — I50.20 SYSTOLIC HEART FAILURE, ACC/AHA STAGE C (HCC): ICD-10-CM

## 2024-10-07 DIAGNOSIS — R06.02 SHORTNESS OF BREATH: ICD-10-CM

## 2024-10-07 DIAGNOSIS — N18.32 STAGE 3B CHRONIC KIDNEY DISEASE (CKD): ICD-10-CM

## 2024-10-07 DIAGNOSIS — I51.89 LEFT VENTRICULAR SYSTOLIC DYSFUNCTION, NYHA CLASS 3: ICD-10-CM

## 2024-10-07 LAB
CALCULATED OXYGEN CONTENT: 10.1
CALCULATED OXYGEN CONTENT: 14.7
CALCULATED OXYGEN CONTENT: 9.5
EKG IMPRESSION: NORMAL
GLUCOSE BLD STRIP.AUTO-MCNC: 81 MG/DL (ref 65–99)
OXYHEMOGLOBIN: 66.1
OXYHEMOGLOBIN: 71.3
OXYHEMOGLOBIN: 96
TOTAL HEMOGLOBIN: 10.2
TOTAL HEMOGLOBIN: 10.3
TOTAL HEMOGLOBIN: 11

## 2024-10-07 PROCEDURE — 700111 HCHG RX REV CODE 636 W/ 250 OVERRIDE (IP)

## 2024-10-07 PROCEDURE — G0378 HOSPITAL OBSERVATION PER HR: HCPCS

## 2024-10-07 PROCEDURE — 160035 HCHG PACU - 1ST 60 MINS PHASE I

## 2024-10-07 PROCEDURE — 700102 HCHG RX REV CODE 250 W/ 637 OVERRIDE(OP)

## 2024-10-07 PROCEDURE — 93005 ELECTROCARDIOGRAM TRACING: CPT | Performed by: PHYSICIAN ASSISTANT

## 2024-10-07 PROCEDURE — 82962 GLUCOSE BLOOD TEST: CPT

## 2024-10-07 PROCEDURE — 85018 HEMOGLOBIN: CPT | Performed by: INTERNAL MEDICINE

## 2024-10-07 PROCEDURE — 700117 HCHG RX CONTRAST REV CODE 255: Performed by: INTERNAL MEDICINE

## 2024-10-07 PROCEDURE — 99153 MOD SED SAME PHYS/QHP EA: CPT

## 2024-10-07 PROCEDURE — A9270 NON-COVERED ITEM OR SERVICE: HCPCS

## 2024-10-07 PROCEDURE — A9270 NON-COVERED ITEM OR SERVICE: HCPCS | Performed by: PHYSICIAN ASSISTANT

## 2024-10-07 PROCEDURE — 700105 HCHG RX REV CODE 258

## 2024-10-07 PROCEDURE — 93010 ELECTROCARDIOGRAM REPORT: CPT | Performed by: INTERNAL MEDICINE

## 2024-10-07 PROCEDURE — 700102 HCHG RX REV CODE 250 W/ 637 OVERRIDE(OP): Performed by: PHYSICIAN ASSISTANT

## 2024-10-07 PROCEDURE — 700101 HCHG RX REV CODE 250

## 2024-10-07 PROCEDURE — 160036 HCHG PACU - EA ADDL 30 MINS PHASE I

## 2024-10-07 PROCEDURE — 93460 R&L HRT ART/VENTRICLE ANGIO: CPT | Mod: 26 | Performed by: INTERNAL MEDICINE

## 2024-10-07 PROCEDURE — 93463 DRUG ADMIN & HEMODYNMIC MEAS: CPT | Performed by: INTERNAL MEDICINE

## 2024-10-07 PROCEDURE — 160002 HCHG RECOVERY MINUTES (STAT)

## 2024-10-07 PROCEDURE — 160046 HCHG PACU - 1ST 60 MINS PHASE II

## 2024-10-07 PROCEDURE — 99152 MOD SED SAME PHYS/QHP 5/>YRS: CPT | Performed by: INTERNAL MEDICINE

## 2024-10-07 RX ORDER — SODIUM CHLORIDE 9 MG/ML
1.5 INJECTION, SOLUTION INTRAVENOUS CONTINUOUS
Status: DISCONTINUED | OUTPATIENT
Start: 2024-10-07 | End: 2024-10-07

## 2024-10-07 RX ORDER — SODIUM CHLORIDE 9 MG/ML
INJECTION, SOLUTION INTRAVENOUS CONTINUOUS
Status: ACTIVE | OUTPATIENT
Start: 2024-10-07 | End: 2024-10-07

## 2024-10-07 RX ORDER — CALCITRIOL 0.25 UG/1
0.25 CAPSULE, LIQUID FILLED ORAL EVERY MORNING
Status: DISCONTINUED | OUTPATIENT
Start: 2024-10-08 | End: 2024-10-08 | Stop reason: HOSPADM

## 2024-10-07 RX ORDER — ACETAMINOPHEN 325 MG/1
650 TABLET ORAL EVERY 6 HOURS PRN
Status: DISCONTINUED | OUTPATIENT
Start: 2024-10-07 | End: 2024-10-08 | Stop reason: HOSPADM

## 2024-10-07 RX ORDER — AMIODARONE HYDROCHLORIDE 200 MG/1
100 TABLET ORAL EVERY EVENING
Status: DISCONTINUED | OUTPATIENT
Start: 2024-10-07 | End: 2024-10-08 | Stop reason: HOSPADM

## 2024-10-07 RX ORDER — ATORVASTATIN CALCIUM 10 MG/1
10 TABLET, FILM COATED ORAL NIGHTLY
Status: DISCONTINUED | OUTPATIENT
Start: 2024-10-07 | End: 2024-10-08 | Stop reason: HOSPADM

## 2024-10-07 RX ORDER — LIDOCAINE HYDROCHLORIDE 20 MG/ML
INJECTION, SOLUTION INFILTRATION; PERINEURAL
Status: COMPLETED
Start: 2024-10-07 | End: 2024-10-07

## 2024-10-07 RX ORDER — MIDAZOLAM HYDROCHLORIDE 1 MG/ML
INJECTION INTRAMUSCULAR; INTRAVENOUS
Status: COMPLETED
Start: 2024-10-07 | End: 2024-10-07

## 2024-10-07 RX ORDER — WARFARIN SODIUM 2.5 MG/1
2.5-5 TABLET ORAL EVERY MORNING
Status: DISCONTINUED | OUTPATIENT
Start: 2024-10-07 | End: 2024-10-07

## 2024-10-07 RX ORDER — HEPARIN SODIUM 1000 [USP'U]/ML
INJECTION, SOLUTION INTRAVENOUS; SUBCUTANEOUS
Status: COMPLETED
Start: 2024-10-07 | End: 2024-10-07

## 2024-10-07 RX ORDER — ASPIRIN 81 MG/1
TABLET, CHEWABLE ORAL
Status: COMPLETED
Start: 2024-10-07 | End: 2024-10-07

## 2024-10-07 RX ORDER — VERAPAMIL HYDROCHLORIDE 2.5 MG/ML
INJECTION, SOLUTION INTRAVENOUS
Status: COMPLETED
Start: 2024-10-07 | End: 2024-10-07

## 2024-10-07 RX ORDER — WARFARIN SODIUM 7.5 MG/1
7.5 TABLET ORAL
Status: COMPLETED | OUTPATIENT
Start: 2024-10-07 | End: 2024-10-07

## 2024-10-07 RX ORDER — CARVEDILOL 12.5 MG/1
12.5 TABLET ORAL 2 TIMES DAILY WITH MEALS
Status: DISCONTINUED | OUTPATIENT
Start: 2024-10-07 | End: 2024-10-08 | Stop reason: HOSPADM

## 2024-10-07 RX ORDER — HEPARIN SODIUM 200 [USP'U]/100ML
INJECTION, SOLUTION INTRAVENOUS
Status: COMPLETED
Start: 2024-10-07 | End: 2024-10-07

## 2024-10-07 RX ADMIN — SACUBITRIL AND VALSARTAN 1 TABLET: 24; 26 TABLET, FILM COATED ORAL at 17:34

## 2024-10-07 RX ADMIN — MIDAZOLAM HYDROCHLORIDE 0.5 MG: 1 INJECTION, SOLUTION INTRAMUSCULAR; INTRAVENOUS at 11:43

## 2024-10-07 RX ADMIN — ASPIRIN 324 MG: 81 TABLET, CHEWABLE ORAL at 11:02

## 2024-10-07 RX ADMIN — ATORVASTATIN CALCIUM 10 MG: 10 TABLET, FILM COATED ORAL at 20:29

## 2024-10-07 RX ADMIN — CARVEDILOL 12.5 MG: 12.5 TABLET, FILM COATED ORAL at 17:34

## 2024-10-07 RX ADMIN — IOHEXOL 41 ML: 350 INJECTION, SOLUTION INTRAVENOUS at 11:59

## 2024-10-07 RX ADMIN — HEPARIN SODIUM: 1000 INJECTION, SOLUTION INTRAVENOUS; SUBCUTANEOUS at 10:48

## 2024-10-07 RX ADMIN — WARFARIN SODIUM 7.5 MG: 7.5 TABLET ORAL at 18:47

## 2024-10-07 RX ADMIN — HEPARIN SODIUM 2000 UNITS: 200 INJECTION, SOLUTION INTRAVENOUS at 10:48

## 2024-10-07 RX ADMIN — SODIUM CHLORIDE: 9 INJECTION, SOLUTION INTRAVENOUS at 16:24

## 2024-10-07 RX ADMIN — VERAPAMIL HYDROCHLORIDE 2.5 MG: 2.5 INJECTION, SOLUTION INTRAVENOUS at 10:48

## 2024-10-07 RX ADMIN — LIDOCAINE HYDROCHLORIDE: 20 INJECTION, SOLUTION INFILTRATION; PERINEURAL at 10:48

## 2024-10-07 RX ADMIN — FENTANYL CITRATE 25 MCG: 50 INJECTION, SOLUTION INTRAMUSCULAR; INTRAVENOUS at 11:45

## 2024-10-07 RX ADMIN — NITROGLYCERIN 10 ML: 20 INJECTION INTRAVENOUS at 10:48

## 2024-10-07 RX ADMIN — AMIODARONE HYDROCHLORIDE 100 MG: 200 TABLET ORAL at 17:34

## 2024-10-07 SDOH — ECONOMIC STABILITY: TRANSPORTATION INSECURITY
IN THE PAST 12 MONTHS, HAS THE LACK OF TRANSPORTATION KEPT YOU FROM MEDICAL APPOINTMENTS OR FROM GETTING MEDICATIONS?: NO

## 2024-10-07 SDOH — ECONOMIC STABILITY: TRANSPORTATION INSECURITY
IN THE PAST 12 MONTHS, HAS LACK OF RELIABLE TRANSPORTATION KEPT YOU FROM MEDICAL APPOINTMENTS, MEETINGS, WORK OR FROM GETTING THINGS NEEDED FOR DAILY LIVING?: NO

## 2024-10-07 ASSESSMENT — ENCOUNTER SYMPTOMS
DIZZINESS: 0
BRUISES/BLEEDS EASILY: 0
GASTROINTESTINAL NEGATIVE: 1
HEADACHES: 0
EYES NEGATIVE: 1
CHILLS: 0
CONSTITUTIONAL NEGATIVE: 1
DOUBLE VISION: 0
VOMITING: 0
LOSS OF CONSCIOUSNESS: 0
PALPITATIONS: 0
MYALGIAS: 0
COUGH: 0
PSYCHIATRIC NEGATIVE: 1
ORTHOPNEA: 0
BLURRED VISION: 0
CLAUDICATION: 0
MUSCULOSKELETAL NEGATIVE: 1
PND: 0
ABDOMINAL PAIN: 0
WEAKNESS: 0
NAUSEA: 0
FEVER: 0
NEUROLOGICAL NEGATIVE: 1
SHORTNESS OF BREATH: 1

## 2024-10-07 ASSESSMENT — COGNITIVE AND FUNCTIONAL STATUS - GENERAL
SUGGESTED CMS G CODE MODIFIER MOBILITY: CH
SUGGESTED CMS G CODE MODIFIER DAILY ACTIVITY: CH
MOBILITY SCORE: 24
DAILY ACTIVITIY SCORE: 24

## 2024-10-07 ASSESSMENT — SOCIAL DETERMINANTS OF HEALTH (SDOH)

## 2024-10-07 ASSESSMENT — LIFESTYLE VARIABLES
CONSUMPTION TOTAL: NEGATIVE
AVERAGE NUMBER OF DAYS PER WEEK YOU HAVE A DRINK CONTAINING ALCOHOL: 0
HAVE PEOPLE ANNOYED YOU BY CRITICIZING YOUR DRINKING: NO
DOES PATIENT WANT TO STOP DRINKING: NO
EVER FELT BAD OR GUILTY ABOUT YOUR DRINKING: NO
ALCOHOL_USE: NO
TOTAL SCORE: 0
TOTAL SCORE: 0
HAVE YOU EVER FELT YOU SHOULD CUT DOWN ON YOUR DRINKING: NO
ON A TYPICAL DAY WHEN YOU DRINK ALCOHOL HOW MANY DRINKS DO YOU HAVE: 1
TOTAL SCORE: 0
HOW MANY TIMES IN THE PAST YEAR HAVE YOU HAD 5 OR MORE DRINKS IN A DAY: 0
EVER HAD A DRINK FIRST THING IN THE MORNING TO STEADY YOUR NERVES TO GET RID OF A HANGOVER: NO

## 2024-10-07 ASSESSMENT — PAIN DESCRIPTION - PAIN TYPE
TYPE: ACUTE PAIN

## 2024-10-07 ASSESSMENT — CHA2DS2 SCORE
AGE 75 OR GREATER: YES
HYPERTENSION: NO
CHF OR LEFT VENTRICULAR DYSFUNCTION: YES
AGE 65 TO 74: NO
SEX: FEMALE
VASCULAR DISEASE: NO
CHA2DS2 VASC SCORE: 7
PRIOR STROKE OR TIA OR THROMBOEMBOLISM: YES
DIABETES: YES

## 2024-10-07 ASSESSMENT — PATIENT HEALTH QUESTIONNAIRE - PHQ9
2. FEELING DOWN, DEPRESSED, IRRITABLE, OR HOPELESS: NOT AT ALL
1. LITTLE INTEREST OR PLEASURE IN DOING THINGS: NOT AT ALL
SUM OF ALL RESPONSES TO PHQ9 QUESTIONS 1 AND 2: 0

## 2024-10-07 ASSESSMENT — FIBROSIS 4 INDEX
FIB4 SCORE: 2.7
FIB4 SCORE: 2.7

## 2024-10-08 VITALS
BODY MASS INDEX: 30.98 KG/M2 | HEART RATE: 118 BPM | OXYGEN SATURATION: 97 % | RESPIRATION RATE: 18 BRPM | WEIGHT: 181.44 LBS | SYSTOLIC BLOOD PRESSURE: 108 MMHG | DIASTOLIC BLOOD PRESSURE: 75 MMHG | HEIGHT: 64 IN | TEMPERATURE: 97.9 F

## 2024-10-08 LAB
ANION GAP SERPL CALC-SCNC: 7 MMOL/L (ref 7–16)
BASOPHILS # BLD AUTO: 0.3 % (ref 0–1.8)
BASOPHILS # BLD: 0.01 K/UL (ref 0–0.12)
BUN SERPL-MCNC: 35 MG/DL (ref 8–22)
CALCIUM SERPL-MCNC: 9.1 MG/DL (ref 8.5–10.5)
CHLORIDE SERPL-SCNC: 108 MMOL/L (ref 96–112)
CO2 SERPL-SCNC: 27 MMOL/L (ref 20–33)
CREAT SERPL-MCNC: 1.63 MG/DL (ref 0.5–1.4)
EOSINOPHIL # BLD AUTO: 0.74 K/UL (ref 0–0.51)
EOSINOPHIL NFR BLD: 19.1 % (ref 0–6.9)
ERYTHROCYTE [DISTWIDTH] IN BLOOD BY AUTOMATED COUNT: 56.7 FL (ref 35.9–50)
GFR SERPLBLD CREATININE-BSD FMLA CKD-EPI: 33 ML/MIN/1.73 M 2
GLUCOSE SERPL-MCNC: 87 MG/DL (ref 65–99)
HCT VFR BLD AUTO: 29.9 % (ref 37–47)
HGB BLD-MCNC: 9.4 G/DL (ref 12–16)
IMM GRANULOCYTES # BLD AUTO: 0.01 K/UL (ref 0–0.11)
IMM GRANULOCYTES NFR BLD AUTO: 0.3 % (ref 0–0.9)
INR PPP: 1.22 (ref 0.87–1.13)
LYMPHOCYTES # BLD AUTO: 0.76 K/UL (ref 1–4.8)
LYMPHOCYTES NFR BLD: 19.6 % (ref 22–41)
MCH RBC QN AUTO: 29.8 PG (ref 27–33)
MCHC RBC AUTO-ENTMCNC: 31.4 G/DL (ref 32.2–35.5)
MCV RBC AUTO: 94.9 FL (ref 81.4–97.8)
MONOCYTES # BLD AUTO: 0.33 K/UL (ref 0–0.85)
MONOCYTES NFR BLD AUTO: 8.5 % (ref 0–13.4)
NEUTROPHILS # BLD AUTO: 2.03 K/UL (ref 1.82–7.42)
NEUTROPHILS NFR BLD: 52.2 % (ref 44–72)
NRBC # BLD AUTO: 0 K/UL
NRBC BLD-RTO: 0 /100 WBC (ref 0–0.2)
PLATELET # BLD AUTO: 88 K/UL (ref 164–446)
PLATELETS.RETICULATED NFR BLD AUTO: 7 % (ref 0.6–13.1)
PMV BLD AUTO: 12.2 FL (ref 9–12.9)
POTASSIUM SERPL-SCNC: 4.5 MMOL/L (ref 3.6–5.5)
PROTHROMBIN TIME: 15.5 SEC (ref 12–14.6)
RBC # BLD AUTO: 3.15 M/UL (ref 4.2–5.4)
SODIUM SERPL-SCNC: 142 MMOL/L (ref 135–145)
WBC # BLD AUTO: 3.9 K/UL (ref 4.8–10.8)

## 2024-10-08 PROCEDURE — 80048 BASIC METABOLIC PNL TOTAL CA: CPT

## 2024-10-08 PROCEDURE — G0378 HOSPITAL OBSERVATION PER HR: HCPCS

## 2024-10-08 PROCEDURE — 85610 PROTHROMBIN TIME: CPT

## 2024-10-08 PROCEDURE — 700102 HCHG RX REV CODE 250 W/ 637 OVERRIDE(OP): Performed by: PHYSICIAN ASSISTANT

## 2024-10-08 PROCEDURE — 85025 COMPLETE CBC W/AUTO DIFF WBC: CPT

## 2024-10-08 PROCEDURE — A9270 NON-COVERED ITEM OR SERVICE: HCPCS | Performed by: PHYSICIAN ASSISTANT

## 2024-10-08 PROCEDURE — A9270 NON-COVERED ITEM OR SERVICE: HCPCS

## 2024-10-08 PROCEDURE — 700102 HCHG RX REV CODE 250 W/ 637 OVERRIDE(OP)

## 2024-10-08 PROCEDURE — 85055 RETICULATED PLATELET ASSAY: CPT

## 2024-10-08 RX ORDER — FUROSEMIDE 20 MG/1
20 TABLET ORAL DAILY
Qty: 90 TABLET | Refills: 0 | Status: SHIPPED | OUTPATIENT
Start: 2024-10-08

## 2024-10-08 RX ORDER — POTASSIUM CHLORIDE 1500 MG/1
20 TABLET, EXTENDED RELEASE ORAL DAILY
Qty: 90 TABLET | Refills: 0 | Status: SHIPPED | OUTPATIENT
Start: 2024-10-08

## 2024-10-08 RX ADMIN — CALCITRIOL CAPSULES 0.25 MCG 0.25 MCG: 0.25 CAPSULE ORAL at 05:08

## 2024-10-08 RX ADMIN — SACUBITRIL AND VALSARTAN 1 TABLET: 24; 26 TABLET, FILM COATED ORAL at 05:08

## 2024-10-08 RX ADMIN — CARVEDILOL 12.5 MG: 12.5 TABLET, FILM COATED ORAL at 07:58

## 2024-10-08 ASSESSMENT — FIBROSIS 4 INDEX: FIB4 SCORE: 3.71

## 2024-10-08 ASSESSMENT — PAIN DESCRIPTION - PAIN TYPE: TYPE: ACUTE PAIN

## 2024-10-09 ENCOUNTER — OFFICE VISIT (OUTPATIENT)
Dept: CARDIOLOGY | Facility: MEDICAL CENTER | Age: 74
End: 2024-10-09
Attending: NURSE PRACTITIONER
Payer: MEDICARE

## 2024-10-09 VITALS
HEIGHT: 64 IN | HEART RATE: 126 BPM | BODY MASS INDEX: 31.58 KG/M2 | SYSTOLIC BLOOD PRESSURE: 104 MMHG | WEIGHT: 185 LBS | RESPIRATION RATE: 16 BRPM | OXYGEN SATURATION: 90 % | DIASTOLIC BLOOD PRESSURE: 62 MMHG

## 2024-10-09 DIAGNOSIS — Z98.890 S/P ABLATION OF ATRIAL FLUTTER: ICD-10-CM

## 2024-10-09 DIAGNOSIS — Z95.0 PACEMAKER: ICD-10-CM

## 2024-10-09 DIAGNOSIS — Z86.79 S/P ABLATION OF ATRIAL FLUTTER: ICD-10-CM

## 2024-10-09 DIAGNOSIS — I50.22 CHRONIC HFREF (HEART FAILURE WITH REDUCED EJECTION FRACTION) (HCC): ICD-10-CM

## 2024-10-09 LAB — EKG IMPRESSION: NORMAL

## 2024-10-09 PROCEDURE — 3078F DIAST BP <80 MM HG: CPT | Performed by: NURSE PRACTITIONER

## 2024-10-09 PROCEDURE — 93010 ELECTROCARDIOGRAM REPORT: CPT | Performed by: INTERNAL MEDICINE

## 2024-10-09 PROCEDURE — 99213 OFFICE O/P EST LOW 20 MIN: CPT | Performed by: NURSE PRACTITIONER

## 2024-10-09 PROCEDURE — 93280 PM DEVICE PROGR EVAL DUAL: CPT | Performed by: NURSE PRACTITIONER

## 2024-10-09 PROCEDURE — 3074F SYST BP LT 130 MM HG: CPT | Performed by: NURSE PRACTITIONER

## 2024-10-09 PROCEDURE — 93280 PM DEVICE PROGR EVAL DUAL: CPT | Mod: 26 | Performed by: NURSE PRACTITIONER

## 2024-10-09 PROCEDURE — 93005 ELECTROCARDIOGRAM TRACING: CPT | Performed by: NURSE PRACTITIONER

## 2024-10-09 PROCEDURE — 99214 OFFICE O/P EST MOD 30 MIN: CPT | Mod: 25 | Performed by: NURSE PRACTITIONER

## 2024-10-09 ASSESSMENT — FIBROSIS 4 INDEX: FIB4 SCORE: 3.71

## 2024-10-09 ASSESSMENT — ENCOUNTER SYMPTOMS
CLAUDICATION: 0
HALLUCINATIONS: 0
PND: 0
WHEEZING: 0
SHORTNESS OF BREATH: 1
PALPITATIONS: 1
DEPRESSION: 0
GASTROINTESTINAL NEGATIVE: 1
NAUSEA: 0
SENSORY CHANGE: 0
MUSCULOSKELETAL NEGATIVE: 1
NERVOUS/ANXIOUS: 0
NEUROLOGICAL NEGATIVE: 1
DIZZINESS: 0
BRUISES/BLEEDS EASILY: 0
ORTHOPNEA: 0
EYES NEGATIVE: 1

## 2024-10-10 ENCOUNTER — ANTICOAGULATION VISIT (OUTPATIENT)
Dept: MEDICAL GROUP | Facility: MEDICAL CENTER | Age: 74
End: 2024-10-10
Payer: MEDICARE

## 2024-10-10 VITALS — SYSTOLIC BLOOD PRESSURE: 105 MMHG | HEART RATE: 122 BPM | DIASTOLIC BLOOD PRESSURE: 61 MMHG | OXYGEN SATURATION: 97 %

## 2024-10-10 DIAGNOSIS — N25.81 HYPERPARATHYROIDISM DUE TO RENAL INSUFFICIENCY (HCC): ICD-10-CM

## 2024-10-10 DIAGNOSIS — Z98.890 H/O MITRAL VALVE REPAIR: ICD-10-CM

## 2024-10-10 LAB — INR PPP: 1.4 (ref 2–3.5)

## 2024-10-10 PROCEDURE — 85610 PROTHROMBIN TIME: CPT | Performed by: NURSE PRACTITIONER

## 2024-10-10 PROCEDURE — 3078F DIAST BP <80 MM HG: CPT | Performed by: NURSE PRACTITIONER

## 2024-10-10 PROCEDURE — 3074F SYST BP LT 130 MM HG: CPT | Performed by: NURSE PRACTITIONER

## 2024-10-10 PROCEDURE — 99211 OFF/OP EST MAY X REQ PHY/QHP: CPT | Performed by: NURSE PRACTITIONER

## 2024-10-10 RX ORDER — CALCITRIOL 0.25 UG/1
0.5 CAPSULE, LIQUID FILLED ORAL DAILY
Qty: 200 CAPSULE | Refills: 3 | Status: SHIPPED | OUTPATIENT
Start: 2024-10-10

## 2024-10-14 ENCOUNTER — HOSPITAL ENCOUNTER (OUTPATIENT)
Dept: LAB | Facility: MEDICAL CENTER | Age: 74
End: 2024-10-14
Attending: INTERNAL MEDICINE
Payer: MEDICARE

## 2024-10-14 ENCOUNTER — APPOINTMENT (OUTPATIENT)
Dept: VASCULAR LAB | Facility: MEDICAL CENTER | Age: 74
End: 2024-10-14
Payer: MEDICARE

## 2024-10-14 ENCOUNTER — APPOINTMENT (OUTPATIENT)
Dept: CARDIOLOGY | Facility: MEDICAL CENTER | Age: 74
End: 2024-10-14
Attending: INTERNAL MEDICINE
Payer: MEDICARE

## 2024-10-14 ENCOUNTER — ANTICOAGULATION VISIT (OUTPATIENT)
Dept: MEDICAL GROUP | Facility: MEDICAL CENTER | Age: 74
End: 2024-10-14
Payer: MEDICARE

## 2024-10-14 DIAGNOSIS — N18.32 STAGE 3B CHRONIC KIDNEY DISEASE (CKD): ICD-10-CM

## 2024-10-14 DIAGNOSIS — Z98.890 H/O MITRAL VALVE REPAIR: ICD-10-CM

## 2024-10-14 DIAGNOSIS — I48.91 ATRIAL FIBRILLATION, UNSPECIFIED TYPE (HCC): ICD-10-CM

## 2024-10-14 DIAGNOSIS — I50.20 ACC/AHA STAGE C SYSTOLIC CONGESTIVE HEART FAILURE (HCC): ICD-10-CM

## 2024-10-14 LAB
ANION GAP SERPL CALC-SCNC: 13 MMOL/L (ref 7–16)
BUN SERPL-MCNC: 38 MG/DL (ref 8–22)
CALCIUM SERPL-MCNC: 10 MG/DL (ref 8.5–10.5)
CHLORIDE SERPL-SCNC: 105 MMOL/L (ref 96–112)
CO2 SERPL-SCNC: 23 MMOL/L (ref 20–33)
CREAT SERPL-MCNC: 1.33 MG/DL (ref 0.5–1.4)
GFR SERPLBLD CREATININE-BSD FMLA CKD-EPI: 42 ML/MIN/1.73 M 2
GLUCOSE SERPL-MCNC: 107 MG/DL (ref 65–99)
INR PPP: 2.6 (ref 2–3.5)
POTASSIUM SERPL-SCNC: 4.7 MMOL/L (ref 3.6–5.5)
SODIUM SERPL-SCNC: 141 MMOL/L (ref 135–145)

## 2024-10-14 PROCEDURE — 36415 COLL VENOUS BLD VENIPUNCTURE: CPT

## 2024-10-14 PROCEDURE — 93793 ANTICOAG MGMT PT WARFARIN: CPT | Performed by: INTERNAL MEDICINE

## 2024-10-14 PROCEDURE — 80048 BASIC METABOLIC PNL TOTAL CA: CPT

## 2024-10-14 PROCEDURE — 85610 PROTHROMBIN TIME: CPT | Performed by: INTERNAL MEDICINE

## 2024-10-24 ENCOUNTER — ANTICOAGULATION VISIT (OUTPATIENT)
Dept: MEDICAL GROUP | Facility: MEDICAL CENTER | Age: 74
End: 2024-10-24
Payer: MEDICARE

## 2024-10-24 VITALS
HEIGHT: 64 IN | BODY MASS INDEX: 31.58 KG/M2 | HEART RATE: 60 BPM | RESPIRATION RATE: 14 BRPM | WEIGHT: 184.97 LBS | OXYGEN SATURATION: 94 %

## 2024-10-24 DIAGNOSIS — Z98.890 H/O MITRAL VALVE REPAIR: ICD-10-CM

## 2024-10-24 LAB — INR PPP: 1.6 (ref 2–3.5)

## 2024-10-24 PROCEDURE — 99211 OFF/OP EST MAY X REQ PHY/QHP: CPT | Performed by: INTERNAL MEDICINE

## 2024-10-24 PROCEDURE — 85610 PROTHROMBIN TIME: CPT | Performed by: FAMILY MEDICINE

## 2024-10-24 ASSESSMENT — FIBROSIS 4 INDEX: FIB4 SCORE: 3.71

## 2024-10-25 PROCEDURE — RXMED WILLOW AMBULATORY MEDICATION CHARGE: Performed by: INTERNAL MEDICINE

## 2024-10-27 ENCOUNTER — PHARMACY VISIT (OUTPATIENT)
Dept: PHARMACY | Facility: MEDICAL CENTER | Age: 74
End: 2024-10-27
Payer: COMMERCIAL

## 2024-11-01 ENCOUNTER — OFFICE VISIT (OUTPATIENT)
Dept: CARDIOLOGY | Facility: MEDICAL CENTER | Age: 74
End: 2024-11-01
Attending: INTERNAL MEDICINE
Payer: MEDICARE

## 2024-11-01 VITALS
HEIGHT: 64 IN | BODY MASS INDEX: 31.76 KG/M2 | OXYGEN SATURATION: 97 % | WEIGHT: 186 LBS | HEART RATE: 128 BPM | SYSTOLIC BLOOD PRESSURE: 108 MMHG | DIASTOLIC BLOOD PRESSURE: 66 MMHG | RESPIRATION RATE: 12 BRPM

## 2024-11-01 DIAGNOSIS — I48.91 ATRIAL FIBRILLATION, UNSPECIFIED TYPE (HCC): ICD-10-CM

## 2024-11-01 DIAGNOSIS — Z95.0 PACEMAKER: ICD-10-CM

## 2024-11-01 DIAGNOSIS — N18.32 STAGE 3B CHRONIC KIDNEY DISEASE (CKD): ICD-10-CM

## 2024-11-01 DIAGNOSIS — Z86.79 S/P ABLATION OF ATRIAL FLUTTER: ICD-10-CM

## 2024-11-01 DIAGNOSIS — N18.32 TYPE 2 DIABETES MELLITUS WITH STAGE 3B CHRONIC KIDNEY DISEASE, WITHOUT LONG-TERM CURRENT USE OF INSULIN (HCC): ICD-10-CM

## 2024-11-01 DIAGNOSIS — Z98.890 S/P ABLATION OF ATRIAL FLUTTER: ICD-10-CM

## 2024-11-01 DIAGNOSIS — D68.69 SECONDARY HYPERCOAGULABLE STATE (HCC): ICD-10-CM

## 2024-11-01 DIAGNOSIS — E11.22 TYPE 2 DIABETES MELLITUS WITH STAGE 3B CHRONIC KIDNEY DISEASE, WITHOUT LONG-TERM CURRENT USE OF INSULIN (HCC): ICD-10-CM

## 2024-11-01 PROCEDURE — 93280 PM DEVICE PROGR EVAL DUAL: CPT | Performed by: INTERNAL MEDICINE

## 2024-11-01 PROCEDURE — 3078F DIAST BP <80 MM HG: CPT | Performed by: INTERNAL MEDICINE

## 2024-11-01 PROCEDURE — 99213 OFFICE O/P EST LOW 20 MIN: CPT | Performed by: INTERNAL MEDICINE

## 2024-11-01 PROCEDURE — 3074F SYST BP LT 130 MM HG: CPT | Performed by: INTERNAL MEDICINE

## 2024-11-01 PROCEDURE — 99214 OFFICE O/P EST MOD 30 MIN: CPT | Mod: 25 | Performed by: INTERNAL MEDICINE

## 2024-11-01 PROCEDURE — 93280 PM DEVICE PROGR EVAL DUAL: CPT | Mod: 26 | Performed by: INTERNAL MEDICINE

## 2024-11-01 ASSESSMENT — FIBROSIS 4 INDEX: FIB4 SCORE: 3.71

## 2024-11-01 NOTE — PROGRESS NOTES
"Chief Complaint   Patient presents with    Atrial Fibrillation     F/V DX: Atrial fibrillation (HCC)    Hypertension     F/V DX: Benign essential hypertension    Transient Ischemic Attack     F/V DX: TIA (transient ischemic attack)       Subjective     Salma Lees is a 74 y.o. female who presents today in incessant atrial tachycardia with probable tachycardia induced cardiomyopathy.  Cath without CAD.  History of previous mitral valve repair.  Previous flutter ablation SVT ablation.  On low-dose amiodarone.    Past Medical History:   Diagnosis Date    A-fib (HCC)     Asthma     BMI 35.0-35.9,adult 11/12/2018    Breath shortness 09/07/2017    uses oxygen at 2 liters/min cont. with \"[facility]\"    Calculus of ureter 09/18/2017    Cataract     codey IOL    Chronic anticoagulation 02/13/2017    CKD (chronic kidney disease) stage 3, GFR 30-59 ml/min     Congestive heart failure (HCC)     COPD (chronic obstructive pulmonary disease) ([facility])     Dental disorder Dentures    full dentures    Diabetes ([facility])     Heart valve disease 2009 Implant    mitral heart valve    Hemorrhagic disorder ([facility]) Warfarin    High cholesterol     History of mitral valve repair     Hypertension     Infectious disease 2014    UTI/hx. MRSA    Pacemaker 2014    Pulmonary hypertension (HCC)     Renal disorder 09/07/2017    hx kidney stones    Shortness of breath 10/18/2017    Urinary incontinence      Past Surgical History:   Procedure Laterality Date    URETEROSCOPY Left 09/18/2017    Procedure: URETEROSCOPY;  Surgeon: Edgardo Richter M.D.;  Location: [facility];  Service: Urology    LASERTRIPSY Left 09/18/2017    Procedure: LASERTRIPSY LITHO  ;  Surgeon: Edgardo Richter M.D.;  Location: SURGERY [facility];  Service: Urology    STENT REMOVAL Left 09/18/2017    Procedure: STENT REMOVAL;  Surgeon: Edgardo Richter M.D.;  Location: SURGERY [facility];  Service: Urology    CYSTOSCOPY STENT PLACEMENT " Left 2017    Procedure: CYSTOSCOPY, LEFT URETERAL STENT PLACEMENT;  Surgeon: Edgardo Richter M.D.;  Location: SURGERY [facility];  Service:     PACEMAKER INSERTION  2015    KNEE REPLACEMENT, TOTAL Left 2015    MITRAL VALVE REPAIR  2009    HYSTERECTOMY, VAGINAL      CATARACT EXTRACTION WITH IOL Bilateral     GYN SURGERY      Hysterectomy    OTHER      Left knee replacement//pacemaker battery    TONSILLECTOMY       Family History   Problem Relation Age of Onset    Lung Disease Mother         asthma    Cancer Mother         ovarian    Heart Disease Father     Stroke Father     Thyroid Sister     Cancer Brother         pancreas    Cancer Paternal Grandmother         colon    Heart Disease Brother     No Known Problems Brother     No Known Problems Brother     Heart Disease Son         afib     Social History     Socioeconomic History    Marital status:      Spouse name: Not on file    Number of children: Not on file    Years of education: Not on file    Highest education level: Not on file   Occupational History    Not on file   Tobacco Use    Smoking status: Former     Current packs/day: 0.00     Average packs/day: 1 pack/day for 5.0 years (5.0 ttl pk-yrs)     Types: Cigarettes     Start date: 1985     Quit date: 1990     Years since quittin.8    Smokeless tobacco: Never    Tobacco comments:     Continued abstinence   Vaping Use    Vaping status: Never Used   Substance and Sexual Activity    Alcohol use: No    Drug use: No    Sexual activity: Not Currently   Other Topics Concern    Not on file   Social History Narrative    She has a son. She had previously experience in [place].     Social Determinants of Health     Financial Resource Strain: Not on file   Food Insecurity: No Food Insecurity (10/7/2024)    Hunger Vital Sign     Worried About Running Out of Food in the Last Year: Never true     Ran Out of Food in the Last Year: Never true    Transportation Needs: No Transportation Needs (10/7/2024)    PRAPARE - Transportation     Lack of Transportation (Medical): No     Lack of Transportation (Non-Medical): No   Physical Activity: Not on file   Stress: Not on file   Social Connections: Not on file   Intimate Partner Violence: Not At Risk (10/7/2024)    Humiliation, Afraid, Rape, and Kick questionnaire     Fear of Current or Ex-Partner: No     Emotionally Abused: No     Physically Abused: No     Sexually Abused: No   Housing Stability: Low Risk  (10/7/2024)    Housing Stability Vital Sign     Unable to Pay for Housing in the Last Year: No     Number of Times Moved in the Last Year: 0     Homeless in the Last Year: No     Allergies   Allergen Reactions    Amoxicillin Anaphylaxis and Shortness of Breath    Nitrofurantoin      swelling    Pcn [Penicillins] Swelling    Vancomycin Shortness of Breath and Unspecified     Severe hypotension and bronchospasm observed by anesthesia while giving  vanco during procedure    Amlodipine Besylate-Valsartan Unspecified     Chest pain and dizziness     Etodolac Hives and Nausea     Dark stools    Food Hives     Plums    Ivabradine      unknown    Jardiance [Empagliflozin] Unspecified     Headaches, dizziness    Eliquis [Apixaban] Shortness of Breath     Pt reports SOB and dizzy when she took    Lisinopril Unspecified     Dizziness     Other Drug Rash     Metal silver    Other Misc Shortness of Breath and Runny Nose     Mold, dust, and feathers, adhesives, plums    Tape Rash     Paper tape ok     Outpatient Encounter Medications as of 11/1/2024   Medication Sig Dispense Refill    calcitRIOL (ROCALTROL) 0.25 MCG Cap Take 2 Capsules by mouth every day. 200 Capsule 3    furosemide (LASIX) 20 MG Tab Take 1 Tablet by mouth every day. 90 Tablet 0    potassium Chloride ER (K-TAB) 20 MEQ Tab CR tablet Take 1 Tablet by mouth every day. 90 Tablet 0    Multiple Vitamins-Minerals (HAIR SKIN NAILS PO) Take 2 Tablets by mouth at  "bedtime.      Dulaglutide 1.5 MG/0.5ML Solution Pen-injector Inject 0.5 mL under the skin every 7 days. Every Sunday 1.5 mL 3    carvedilol (COREG) 12.5 MG Tab Take 1 Tablet by mouth 2 times a day with meals. 180 Tablet 3    amiodarone (CORDARONE) 200 MG Tab Take 0.5 Tablets by mouth every day. 90 Tablet 3    atorvastatin (LIPITOR) 10 MG Tab Take 1 Tablet by mouth every day. 100 Tablet 2    sacubitril-valsartan (ENTRESTO) 24-26 MG Tab Take 1 tablet by mouth 2 times a day. 60 Tablet 11    warfarin (COUMADIN) 5 MG Tab TAKE ONE AND ONE-HALF TABLETS BY MOUTH DAILY OR AS DIRECTED BY ANTICOAGULANT CLINIC (Patient taking differently: Take 2.5-5 mg by mouth every morning. 2.5 mg Sunday/Tuesday/Thursday, 5 mg all other days) 135 Tablet 0    COVID-19 mRNA Vac-Kinza,Pfizer, (COMIRNATY) 30 MCG/0.3ML Suspension Prefilled Syringe injection Inject  into the shoulder, thigh, or buttocks. 0.3 mL 0    injection RSV Pre-Fusion F A&B Vac Rcmb (ABRYSVO) 120 MCG/0.5ML Recon Soln Inject  into the shoulder, thigh, or buttocks. 0.5 mL 0    enoxaparin (LOVENOX) 120 MG/0.8ML Solution Prefilled Syringe inj Inject 120 mg under the skin every day. 7 Each 1     No facility-administered encounter medications on file as of 11/1/2024.     ROS           Objective     /66 (BP Location: Left arm, Patient Position: Sitting, BP Cuff Size: Adult)   Pulse (!) 128   Resp 12   Ht 1.626 m (5' 4\")   Wt 84.4 kg (186 lb)   SpO2 97%   BMI 31.93 kg/m²     Physical Exam  Constitutional:       Appearance: She is well-developed.   HENT:      Head: Normocephalic and atraumatic.   Eyes:      Pupils: Pupils are equal, round, and reactive to light.   Cardiovascular:      Rate and Rhythm: Regular rhythm. Tachycardia present.      Heart sounds: Normal heart sounds. No murmur heard.     No friction rub. No gallop.   Pulmonary:      Effort: Pulmonary effort is normal.      Breath sounds: Normal breath sounds.   Abdominal:      General: Bowel sounds are normal.     "  Palpations: Abdomen is soft.   Musculoskeletal:         General: Normal range of motion.      Cervical back: Normal range of motion and neck supple.   Skin:     General: Skin is warm.   Neurological:      Mental Status: She is alert and oriented to person, place, and time.      Cranial Nerves: No cranial nerve deficit.   Psychiatric:         Behavior: Behavior normal.         Thought Content: Thought content normal.         Judgment: Judgment normal.                Assessment & Plan     1. Atrial fibrillation, unspecified type (HCC)  CANCELED: EKG          Medical Decision Making: Today's Assessment/Status/Plan:   1.  Atrial tachycardia.  Paced terminated today.  Scheduled for ablation December.  If recurrence prior to that can do pace termination.  2.  Tachycardia induced cardiomyopathy.  Normal coronaries.  3.  Status post mitral valve repair  4.  Anticoagulation continue.  5.  Normal pacemaker function.  The risks, benefits,and alternatives to atrial fibrillation/AT ablation with general anesthesia were discussed in great detail. Specific risks mentioned including bleeding, infection, arteriovenous fistula/pseudoaneurysm related to sheath placement, cardiac perforation with possible tamponade requiring pericardiocentesis or possible open heart surgery were discussed. In addition,we discussed atrial fibrillation ablation specific complications including pulmonary vein stenosis, left atrial perforation (2-4%), and nerve damage involving the recurrent laryngeal nerve, the vagus nerve with resulting gastroparesis, and the phrenic nerve.  Also mentioned was a 1/400 (0.25%) occurrence of atrial esophageal fistula, which is an abnormal communication between the esophagus and the left atrium; this complication is often fatal. Lastly the risks of death, myocardial infarction, stroke, DVT and PE were discussed. The patient verbalized understanding of these potential complications and wishes to proceed with this  procedure.  The risks, benefits, and alternatives to transesophageal echocardiogram with IV sedation were discussed with the patient in specific detail, including oropharyngeal and esophageal traumas including hoarseness and dysphagia after the procedure. Rare cases demonstrating serious or fatal complications associated with transesophageal echocardiogram have been reported in the adult population, including cardiac, pulmonary and bleeding complications in less than 1% of people. Patients with an identified intracardiac thrombus are at increased risk for embolic events and this appears to be reduced with anticoagulant therapy. The patient verbalized understandings about these  possible complications and wishes to proceed with this procedure

## 2024-11-02 ENCOUNTER — NON-PROVIDER VISIT (OUTPATIENT)
Dept: CARDIOLOGY | Facility: MEDICAL CENTER | Age: 74
End: 2024-11-02
Payer: MEDICARE

## 2024-11-04 DIAGNOSIS — Z79.01 CHRONIC ANTICOAGULATION: ICD-10-CM

## 2024-11-04 PROCEDURE — 93294 REM INTERROG EVL PM/LDLS PM: CPT | Performed by: INTERNAL MEDICINE

## 2024-11-04 RX ORDER — WARFARIN SODIUM 5 MG/1
2.5-5 TABLET ORAL EVERY MORNING
Qty: 90 TABLET | Refills: 0 | Status: SHIPPED | OUTPATIENT
Start: 2024-11-04

## 2024-11-04 NOTE — TELEPHONE ENCOUNTER
Received request via: Patient    Was the patient seen in the last year in this department? Yes    Does the patient have an active prescription (recently filled or refills available) for medication(s) requested? No    Pharmacy Name: [facility]     Does the patient have [facility] and need 100-day supply? (This applies to ALL medications) Yes, quantity updated to 100 days

## 2024-11-05 ENCOUNTER — APPOINTMENT (OUTPATIENT)
Dept: ADMISSIONS | Facility: MEDICAL CENTER | Age: 74
End: 2024-11-05
Attending: INTERNAL MEDICINE
Payer: MEDICARE

## 2024-11-07 ENCOUNTER — OFFICE VISIT (OUTPATIENT)
Dept: CARDIOLOGY | Facility: MEDICAL CENTER | Age: 74
End: 2024-11-07
Attending: INTERNAL MEDICINE
Payer: MEDICARE

## 2024-11-07 VITALS
OXYGEN SATURATION: 92 % | WEIGHT: 186 LBS | HEIGHT: 64 IN | DIASTOLIC BLOOD PRESSURE: 60 MMHG | BODY MASS INDEX: 31.76 KG/M2 | SYSTOLIC BLOOD PRESSURE: 122 MMHG | HEART RATE: 63 BPM | RESPIRATION RATE: 16 BRPM

## 2024-11-07 DIAGNOSIS — I27.20 PULMONARY HYPERTENSION (HCC): ICD-10-CM

## 2024-11-07 DIAGNOSIS — Z98.890 H/O MITRAL VALVE REPAIR: ICD-10-CM

## 2024-11-07 DIAGNOSIS — I42.8 NICM (NONISCHEMIC CARDIOMYOPATHY) (HCC): ICD-10-CM

## 2024-11-07 DIAGNOSIS — Z86.79 S/P ABLATION OF ATRIAL FLUTTER: ICD-10-CM

## 2024-11-07 DIAGNOSIS — I50.22 CHRONIC HFREF (HEART FAILURE WITH REDUCED EJECTION FRACTION) (HCC): Primary | ICD-10-CM

## 2024-11-07 DIAGNOSIS — Z98.890 S/P ABLATION OF ATRIAL FLUTTER: ICD-10-CM

## 2024-11-07 DIAGNOSIS — I47.19 ATRIAL TACHYCARDIA (HCC): ICD-10-CM

## 2024-11-07 PROCEDURE — 3074F SYST BP LT 130 MM HG: CPT | Performed by: INTERNAL MEDICINE

## 2024-11-07 PROCEDURE — G2211 COMPLEX E/M VISIT ADD ON: HCPCS | Performed by: INTERNAL MEDICINE

## 2024-11-07 PROCEDURE — 99214 OFFICE O/P EST MOD 30 MIN: CPT | Performed by: INTERNAL MEDICINE

## 2024-11-07 PROCEDURE — 3078F DIAST BP <80 MM HG: CPT | Performed by: INTERNAL MEDICINE

## 2024-11-07 PROCEDURE — 99213 OFFICE O/P EST LOW 20 MIN: CPT | Performed by: INTERNAL MEDICINE

## 2024-11-07 RX ORDER — FUROSEMIDE 20 MG/1
20 TABLET ORAL
Qty: 36 TABLET | Refills: 1 | Status: SHIPPED | OUTPATIENT
Start: 2024-11-08 | End: 2024-11-07 | Stop reason: SDUPTHER

## 2024-11-07 RX ORDER — FUROSEMIDE 20 MG/1
20 TABLET ORAL
Qty: 45 TABLET | Refills: 3 | Status: SHIPPED | OUTPATIENT
Start: 2024-11-07

## 2024-11-07 ASSESSMENT — FIBROSIS 4 INDEX: FIB4 SCORE: 3.71

## 2024-11-07 NOTE — PATIENT INSTRUCTIONS
Lasix 20mg every OTHER day with the potassium pill, so every 48 hours  Heart CT scan and then we can decide if you need a lung specialist for the pulmonary hypertension

## 2024-11-07 NOTE — PROGRESS NOTES
CARDIOLOGY established PATIENT:    PCP: eLah Bonilla D.O.    1. Chronic HFrEF (heart failure with reduced ejection fraction) (Formerly [facility])    2. Pulmonary hypertension (HCC)    3. S/P ablation of atrial flutter    4. H/O mitral valve repair    5. NICM (nonischemic cardiomyopathy) (Formerly [facility])    6. Atrial tachycardia (HCC)        Salma Lees is here for follow-up for PAH with concerns of chronic HFpEF, history of mitral valve repair and atrial arrhythmia ablations    Chief Complaint   Patient presents with    Atrial Fibrillation    Hypertension     F/V Dx; Benign essential hypertension    Aortic Atherosclerosis       History:     Salma Lees is a 74 y.o. female with nonischemic cardiomyopathy attributed to tachycardia induced cardiomyopathy, AT/AFL status post ablation (Right AFL and AT ablation 11/8/23), controlled type 2 diabetes with CKD stage III, PH, chronic hypoxic resp failure on chronic O2 therapy 2-5L/min ( since 2014), history of mitral valve repair (2009), permanent pacemaker in place (since 2014), followed by EP-Dr. Galloway., here for follow up.    Clinic with Dr. Simmons 6/2024: Started on Jardiance 10 mg, Entresto.     Clinic Dr. Galloway 6/2024: resumed amiodarone 100mg, stopped ivabradine, decrease carvedilol due to orthostatic symptoms and off spironolactone.     Had worse fatigue and headaches / dizziness with Jardiance, hence stopped     Clinic Dr. Galloway 7/2024: TTE ordered to follow up on LVEF.    Clinic established with me 9/2024: arranged RHC/LHC. Then decreased lasix to daily from BID. Cr improved.    Dr. Galloway clinic 11/1/24: AT ablation scheduled 12/3/24. Paced terminated in clinic    Now taking lasix only with DAGOBERTO, so PRN. Last used yesterday. Needing it about twice a week. Wt stable 186-189lbs , checked every am.  Does not check blood pressure at home, has a machine.  Reports ongoing stable dyspnea on exertion.  Does not feel recurrence of atrial arrhythmia ever since her recent visit with  "Dr. Galloway in clinic when the A. tach was terminated.  No bleeding concerns, back on warfarin with INR checks      RHC/LHC/CA 10/7/24: Personally performed  1.  Minimally elevated CVP 8 mmHg  2.  Normal resting LVEDP 10-12 mmHg with no significant transaortic gradient on pullback  3.  Significantly elevated PCWP ~ 30mmHg suggestive of potentially pulmonary venous hypertension due to normal LVEDP and appropriate transmitral gradients on recent echocardiogram.  4.  Pulmonary hypertension with PVR > 3WU using LVEDP 12mmHg instead of PCWP of 30mmHg  5.  No significant change in pulmonary pressure with nitric oxide administration  6.  Normal resting cardiac output per assumed Isaac 4.6 L/min and mildly reduced per TD 3.9 L/min  7.  Favorable Chriss and   8.  Mildly elevated SVR  9.  Mild nonobstructive epicardial CAD    Normal TSH, ALT and AST 6/2024     Cr 1.33 10/14/24.  Cr 1.91 10/3/2024  Cr 1.46 6/2024  Cr 1.56 1/2024     A1C 5.8% 6/2024     Lipid 6/2024: on atorva 10mg  LDL 42, TG 83    NYHA Class: II-III  I: no symptoms with activity. Normal  II. Mild symptoms with normal physical activity and comfortable at rest.  III: Moderate symptoms with less than normal physical activity. Only comfortable at rest  IV: Severe symptoms with symptoms of heart failure, even at rest.      PE:  /60 (BP Location: Left arm, Patient Position: Sitting, BP Cuff Size: Adult)   Pulse 63   Resp 16   Ht 1.626 m (5' 4\")   Wt 84.4 kg (186 lb)   SpO2 92%   BMI 31.93 kg/m²     Gen: well  HEENT: Symmetric face.  NECK: No JVD.   CARDIAC: Regular, Normal S1, S2, No murmur  VASCULATURE: carotids are normal bilaterally without bruit  RESP: Clear to auscultation bilaterally   EXT: Trace lower extremity edema  SKIN: Warm and dry  NEURO: No gross deficits  PSYCH: Appropriate affect, participates in conversation    The ASCVD Risk score (Jose CUNNINGHAM, et al., 2019) failed to calculate.    Past Medical History:   Diagnosis Date    A-fib (HCC)     " "Asthma     BMI 35.0-35.9,adult 11/12/2018    Breath shortness 09/07/2017    uses oxygen at 2 liters/min cont. with \"[facility]\"    Calculus of ureter 09/18/2017    Cataract     codey IOL    Chronic anticoagulation 02/13/2017    CKD (chronic kidney disease) stage 3, GFR 30-59 ml/min     Congestive heart failure (HCC)     COPD (chronic obstructive pulmonary disease) ([facility])     Dental disorder Dentures    full dentures    Diabetes (HCC)     Heart valve disease 2009 Implant    mitral heart valve    Hemorrhagic disorder ([facility]) Warfarin    High cholesterol     History of mitral valve repair     Hypertension     Infectious disease 2014    UTI/hx. MRSA    Pacemaker 2014    Pulmonary hypertension ([facility])     Renal disorder 09/07/2017    hx kidney stones    Shortness of breath 10/18/2017    Urinary incontinence      Past Surgical History:   Procedure Laterality Date    URETEROSCOPY Left 09/18/2017    Procedure: URETEROSCOPY;  Surgeon: Edgardo Richter M.D.;  Location: [facility];  Service: Urology    LASERTRIPSY Left 09/18/2017    Procedure: LASERTRIPSY LITHO  ;  Surgeon: Edgardo Richter M.D.;  Location: [facility];  Service: Urology    STENT REMOVAL Left 09/18/2017    Procedure: STENT REMOVAL;  Surgeon: Edgardo Richter M.D.;  Location: SURGERY [facility];  Service: Urology    CYSTOSCOPY STENT PLACEMENT Left 06/25/2017    Procedure: CYSTOSCOPY, LEFT URETERAL STENT PLACEMENT;  Surgeon: Edgardo Richter M.D.;  Location: SURGERY [facility];  Service:     PACEMAKER INSERTION  01/01/2015    KNEE REPLACEMENT, TOTAL Left 01/01/2015    MITRAL VALVE REPAIR  01/01/2009    HYSTERECTOMY, VAGINAL  1994    CATARACT EXTRACTION WITH IOL Bilateral     GYN SURGERY  1994    Hysterectomy    OTHER  2015//2022    Left knee replacement//pacemaker battery    TONSILLECTOMY       Allergies   Allergen Reactions    Amoxicillin Anaphylaxis and Shortness of Breath    Nitrofurantoin      " swelling    Pcn [Penicillins] Swelling    Vancomycin Shortness of Breath and Unspecified     Severe hypotension and bronchospasm observed by anesthesia while giving  vanco during procedure    Amlodipine Besylate-Valsartan Unspecified     Chest pain and dizziness     Etodolac Hives and Nausea     Dark stools    Food Hives     Plums    Ivabradine      unknown    Jardiance [Empagliflozin] Unspecified     Headaches, dizziness    Eliquis [Apixaban] Shortness of Breath     Pt reports SOB and dizzy when she took    Lisinopril Unspecified     Dizziness     Other Drug Rash     Metal silver    Other Misc Shortness of Breath and Runny Nose     Mold, dust, and feathers, adhesives, plums    Tape Rash     Paper tape ok     Outpatient Encounter Medications as of 11/7/2024   Medication Sig Dispense Refill    furosemide (LASIX) 20 MG Tab Take 1 Tablet by mouth every 48 hours. 45 Tablet 3    warfarin (COUMADIN) 5 MG Tab Take 0.5-1 Tablets by mouth every morning. 2.5 mg Sunday/Tuesday/Thursday, 5 mg all other days 90 Tablet 0    COVID-19 mRNA Vac-Kinza,Pfizer, (COMIRNATY) 30 MCG/0.3ML Suspension Prefilled Syringe injection Inject  into the shoulder, thigh, or buttocks. 0.3 mL 0    injection RSV Pre-Fusion F A&B Vac Rcmb (ABRYSVO) 120 MCG/0.5ML Recon Soln Inject  into the shoulder, thigh, or buttocks. 0.5 mL 0    calcitRIOL (ROCALTROL) 0.25 MCG Cap Take 2 Capsules by mouth every day. 200 Capsule 3    potassium Chloride ER (K-TAB) 20 MEQ Tab CR tablet Take 1 Tablet by mouth every day. 90 Tablet 0    Multiple Vitamins-Minerals (HAIR SKIN NAILS PO) Take 2 Tablets by mouth at bedtime.      Dulaglutide 1.5 MG/0.5ML Solution Pen-injector Inject 0.5 mL under the skin every 7 days. Every Sunday 1.5 mL 3    carvedilol (COREG) 12.5 MG Tab Take 1 Tablet by mouth 2 times a day with meals. 180 Tablet 3    amiodarone (CORDARONE) 200 MG Tab Take 0.5 Tablets by mouth every day. 90 Tablet 3    atorvastatin (LIPITOR) 10 MG Tab Take 1 Tablet by mouth  every day. 100 Tablet 2    sacubitril-valsartan (ENTRESTO) 24-26 MG Tab Take 1 tablet by mouth 2 times a day. 60 Tablet 11    [DISCONTINUED] furosemide (LASIX) 20 MG Tab Take 1 Tablet by mouth every Monday, Wednesday, and Friday. 36 Tablet 1    [DISCONTINUED] furosemide (LASIX) 20 MG Tab Take 1 Tablet by mouth every day. 90 Tablet 0    [DISCONTINUED] enoxaparin (LOVENOX) 120 MG/0.8ML Solution Prefilled Syringe inj Inject 120 mg under the skin every day. 7 Each 1     No facility-administered encounter medications on file as of 2024.     Social History     Socioeconomic History    Marital status:      Spouse name: Not on file    Number of children: Not on file    Years of education: Not on file    Highest education level: Not on file   Occupational History    Not on file   Tobacco Use    Smoking status: Former     Current packs/day: 0.00     Average packs/day: 1 pack/day for 5.0 years (5.0 ttl pk-yrs)     Types: Cigarettes     Start date: 1985     Quit date: 1990     Years since quittin.8    Smokeless tobacco: Never    Tobacco comments:     Continued abstinence   Vaping Use    Vaping status: Never Used   Substance and Sexual Activity    Alcohol use: No    Drug use: No    Sexual activity: Not Currently   Other Topics Concern    Not on file   Social History Narrative    She has a son. She had previously experience in [employer].     Social Drivers of Health     Financial Resource Strain: Not on file   Food Insecurity: No Food Insecurity (10/7/2024)    Hunger Vital Sign     Worried About Running Out of Food in the Last Year: Never true     Ran Out of Food in the Last Year: Never true   Transportation Needs: No Transportation Needs (10/7/2024)    PRAPARE - Transportation     Lack of Transportation (Medical): No     Lack of Transportation (Non-Medical): No   Physical Activity: Not on file   Stress: Not on file   Social Connections: Not on file   Intimate Partner Violence: Not At Risk (10/7/2024)     Humiliation, Afraid, Rape, and Kick questionnaire     Fear of Current or Ex-Partner: No     Emotionally Abused: No     Physically Abused: No     Sexually Abused: No   Housing Stability: Low Risk  (10/7/2024)    Housing Stability Vital Sign     Unable to Pay for Housing in the Last Year: No     Number of Times Moved in the Last Year: 0     Homeless in the Last Year: No     Family History   Problem Relation Age of Onset    Lung Disease Mother         asthma    Cancer Mother         ovarian    Heart Disease Father     Stroke Father     Thyroid Sister     Cancer Brother         pancreas    Cancer Paternal Grandmother         colon    Heart Disease Brother     No Known Problems Brother     No Known Problems Brother     Heart Disease Son         afib         Studies    Lab Results   Component Value Date/Time    TSHULTRASEN 2.300 06/11/2024 1019        Lab Results   Component Value Date/Time    FREET4 1.46 06/11/2024 1019      Lab Results   Component Value Date/Time    HBA1C 5.8 (H) 06/11/2024 10:19 AM     Lab Results   Component Value Date/Time    CHOLSTRLTOT 116 06/11/2024 10:19 AM    LDL 42 06/11/2024 10:19 AM    HDL 57 06/11/2024 10:19 AM    TRIGLYCERIDE 83 06/11/2024 10:19 AM       Lab Results   Component Value Date/Time    SODIUM 141 10/14/2024 11:17 AM    POTASSIUM 4.7 10/14/2024 11:17 AM    CHLORIDE 105 10/14/2024 11:17 AM    CO2 23 10/14/2024 11:17 AM    GLUCOSE 107 (H) 10/14/2024 11:17 AM    BUN 38 (H) 10/14/2024 11:17 AM    CREATININE 1.33 10/14/2024 11:17 AM     Lab Results   Component Value Date/Time    ALKPHOSPHAT 68 10/03/2024 10:59 AM    ASTSGOT 25 10/03/2024 10:59 AM    ALTSGPT 33 10/03/2024 10:59 AM    TBILIRUBIN 1.5 10/03/2024 10:59 AM        Echocardiogram:  Results for orders placed or performed during the hospital encounter of 09/18/17   ECHOCARDIOGRAM COMP W/O CONT    Collection Time: 09/18/17  8:21 AM   Result Value Ref Range    Eject.Frac. MOD BP 57.46     Eject.Frac. MOD 4C 57.06     Eject.Frac.  MOD 2C 64.61     Left Ventrical Ejection Fraction 55          Assessment and Recommendations:    Problem List Items Addressed This Visit       H/O mitral valve repair    Relevant Orders    CT-CTA HEART W/3D IMAGE    Pulmonary hypertension (HCC)    Relevant Medications    furosemide (LASIX) 20 MG Tab    Other Relevant Orders    CT-CTA HEART W/3D IMAGE    S/P ablation of atrial flutter/AT    Relevant Orders    CT-CTA HEART W/3D IMAGE    Chronic HFrEF (heart failure with reduced ejection fraction) (Former[facility]) - Primary    Relevant Medications    furosemide (LASIX) 20 MG Tab    NICM (nonischemic cardiomyopathy) (HCC)    Relevant Medications    furosemide (LASIX) 20 MG Tab    Atrial tachycardia (HCC)    Relevant Medications    furosemide (LASIX) 20 MG Tab     Ms. Lees is stable from a cardiac standpoint.    Remains in rhythm, plan for SVT/A. tach ablation 12/3/24.  Managed by EP.    Regarding her pulmonary hypertension, discussed with her the discrepancy between LVEDP and PCWP with concerns of pulmonary venous hypertension given no major concerns with mitral valve pathology with prior repair on recent echocardiograms.  We agreed to proceed with cardiac CTA to assess for any pulmonary venous stenoses, if none, will refer to pulmonary /PH clinic to assist with PH management.  If evidence of pulmonary venous stenosis, will discuss with structural cardiology for possible stenting.    Agreed to take Lasix with potassium every other day.    Thank you for the opportunity to be involved in Salma Lees 's  complex cardiovascular care; and please reach out with any questions or concerns.     () Today's E/M visit is associated with medical care services that serve as the continuing focal point for all needed health care services and/or with medical care services that  are part of ongoing cardiac care related to a patient's single, serious condition, or a complex condition: This includes  furnishing services to patients on  an ongoing basis that result in care that is personalized  to the patient. The services result in a comprehensive, longitudinal, and continuous  relationship with the patient and involve delivery of team-based care that is accessible, coordinated with other practitioners and providers, and integrated with the broader health  care landscape.     Return in about 6 months (around 5/7/2025).    Zander Quiñones MD, MPH [facility]  Interventional Cardiologist  [facility]   of Clinical Internal Medicine - [facility]    ~ Portions of this note were completed using voice recognition software (Dragon Naturally speaking software) . Occasional transcription errors may have escaped proof reading. I have made every reasonable attempt to correct obvious errors, but I expect that there are errors of grammar and possibly content that I did not discover before finalizing the note. ~

## 2024-11-11 ENCOUNTER — PRE-ADMISSION TESTING (OUTPATIENT)
Dept: ADMISSIONS | Facility: MEDICAL CENTER | Age: 74
End: 2024-11-11
Attending: INTERNAL MEDICINE
Payer: MEDICARE

## 2024-11-11 ENCOUNTER — ANTICOAGULATION VISIT (OUTPATIENT)
Dept: MEDICAL GROUP | Facility: MEDICAL CENTER | Age: 74
End: 2024-11-11
Payer: MEDICARE

## 2024-11-11 DIAGNOSIS — Z98.890 H/O MITRAL VALVE REPAIR: ICD-10-CM

## 2024-11-11 LAB — INR PPP: 2.4 (ref 2–3.5)

## 2024-11-11 PROCEDURE — 85610 PROTHROMBIN TIME: CPT | Performed by: PHYSICIAN ASSISTANT

## 2024-11-11 PROCEDURE — 93793 ANTICOAG MGMT PT WARFARIN: CPT | Performed by: PHYSICIAN ASSISTANT

## 2024-11-11 NOTE — PROGRESS NOTES
Anticoagulation Summary  As of 2024      INR goal:  2.0-3.0   TTR:  54.7% (7.7 y)   INR used for dosin.40 (2024)   Warfarin maintenance plan:  2.5 mg (5 mg x 0.5) every Sun, Thu; 5 mg (5 mg x 1) all other days   Weekly warfarin total:  30 mg   Plan last modified:  Jake Betancourt, PharmD (10/24/2024)   Next INR check:  2024   Target end date:  Indefinite    Indications    Atrial fibrillation (HCC) [I48.91]  H/O mitral valve repair [Z98.890]                 Anticoagulation Episode Summary       INR check location:  --    Preferred lab:  --    Send INR reminders to:  --    Comments:  --          Anticoagulation Care Providers       Provider Role Specialty Phone number    MARTA Pearson. Referring Family Medicine [phone]    [facility] Anticoagulation Services Responsible  [phone]    Kenneth Salas, PharmD Responsible Pharmacy                   Refer to Patient Findings for HPI:  Patient Findings       Positives:  Upcoming invasive procedure (ablation on 12/3)    Negatives:  Signs/symptoms of thrombosis, Signs/symptoms of bleeding, Laboratory test error suspected, Change in health, Change in alcohol use, Change in activity, Emergency department visit, Upcoming dental procedure, Missed doses, Extra doses, Change in medications, Change in diet/appetite, Hospital admission, Bruising, Other complaints            There were no vitals filed for this visit.  Pt declined vitals    Verified current warfarin dosing schedule.    Medications reconciled.  Pt is not on antiplatelet therapy.      A/P   INR is therapeutic - patient is to continue warfarin as usual for upcoming ablation per patient report.   Reason(s) for out of range INR today: N/A      Warfarin dosing recommendation: Continue regimen as listed above.    Pt educated to contact our clinic with any changes in medications or s/s of bleeding or thrombosis. Pt is aware to seek immediate medical attention for falls, head injury or deep  cuts.    Request pt to return in 3 week(s). Pt agrees.    Joleen HallmanD

## 2024-11-12 ENCOUNTER — PATIENT MESSAGE (OUTPATIENT)
Dept: MEDICAL GROUP | Facility: PHYSICIAN GROUP | Age: 74
End: 2024-11-12
Payer: MEDICARE

## 2024-11-12 DIAGNOSIS — N18.32 TYPE 2 DIABETES MELLITUS WITH STAGE 3B CHRONIC KIDNEY DISEASE, WITHOUT LONG-TERM CURRENT USE OF INSULIN (HCC): ICD-10-CM

## 2024-11-12 DIAGNOSIS — E11.22 TYPE 2 DIABETES MELLITUS WITH STAGE 3B CHRONIC KIDNEY DISEASE, WITHOUT LONG-TERM CURRENT USE OF INSULIN (HCC): ICD-10-CM

## 2024-11-12 PROCEDURE — RXMED WILLOW AMBULATORY MEDICATION CHARGE: Performed by: INTERNAL MEDICINE

## 2024-11-12 PROCEDURE — RXMED WILLOW AMBULATORY MEDICATION CHARGE: Performed by: FAMILY MEDICINE

## 2024-11-13 ENCOUNTER — PHARMACY VISIT (OUTPATIENT)
Dept: PHARMACY | Facility: MEDICAL CENTER | Age: 74
End: 2024-11-13
Payer: COMMERCIAL

## 2024-11-13 ENCOUNTER — TELEPHONE (OUTPATIENT)
Dept: CARDIOLOGY | Facility: MEDICAL CENTER | Age: 74
End: 2024-11-13
Payer: MEDICARE

## 2024-11-13 NOTE — PATIENT COMMUNICATION
Received request via: Patient    Was the patient seen in the last year in this department? Yes    Does the patient have an active prescription (recently filled or refills available) for medication(s) requested? No    Pharmacy Name: [facility]    Does the patient have [facility] and need 100-day supply? (This applies to ALL medications) Yes, quantity updated to 100 days

## 2024-11-14 NOTE — TELEPHONE ENCOUNTER
----- Message from Physician Zander Quiñones M.D. sent at 11/13/2024  3:22 PM PST -----  Adding RN team for this  ----- Message -----  From: Dinah Lantigua  Sent: 11/13/2024   3:19 PM PST  To: Zander Quiñones M.D.    Hello,     We faxed over the form to be filled out for the CTA on 11/8. At your convenience, can you send that back over so that the pt can have their exam?       Thanks!

## 2024-11-14 NOTE — TELEPHONE ENCOUNTER
CTA form completed to the best of this RNs ability and emailed lon Campa MA for Dr. Quiñones today 11/13/24 to obtain signature.    Awaiting for response

## 2024-11-14 NOTE — TELEPHONE ENCOUNTER
Received notification from [facility] that Dr. Quiñones is no longer in clinic at this time.  Will follow up the next time he is back in office.    form imported to media tab for reference.

## 2024-11-15 ENCOUNTER — PATIENT MESSAGE (OUTPATIENT)
Dept: CARDIOLOGY | Facility: MEDICAL CENTER | Age: 74
End: 2024-11-15
Payer: MEDICARE

## 2024-11-21 DIAGNOSIS — Z01.812 PRE-PROCEDURE LAB EXAM: ICD-10-CM

## 2024-11-21 NOTE — PROGRESS NOTES
Request received to review order/ protocol for coronary CTA on 11/18/24. Reviewed with cardiac imaging radiologist for appropriate protocol. Scan approved for [place] scanner, include LA and pulm vein. Upon review of available medical records, the following orders have been placed:    Order placed for outpatient, non fasting creatinine blood draw to check kidney function within 30 days prior to contrast administration for coronary CTA if determined medically necessary by CT staff. Instructions for timing of blood test to be given by scheduling/ CT facility team.    Patient should take usual dose of prescribed beta blocker.    This CT study may be scheduled through [facility] [employer] [facility] Scheduling at , option 2.

## 2024-12-02 ENCOUNTER — APPOINTMENT (OUTPATIENT)
Dept: ADMISSIONS | Facility: MEDICAL CENTER | Age: 74
End: 2024-12-02
Attending: INTERNAL MEDICINE
Payer: MEDICARE

## 2024-12-02 DIAGNOSIS — Z01.812 PRE-OPERATIVE LABORATORY EXAMINATION: ICD-10-CM

## 2024-12-02 DIAGNOSIS — Z01.810 PRE-OPERATIVE CARDIOVASCULAR EXAMINATION: ICD-10-CM

## 2024-12-02 LAB
ALBUMIN SERPL BCP-MCNC: 4.2 G/DL (ref 3.2–4.9)
ALBUMIN/GLOB SERPL: 1.2 G/DL
ALP SERPL-CCNC: 59 U/L (ref 30–99)
ALT SERPL-CCNC: 24 U/L (ref 2–50)
ANION GAP SERPL CALC-SCNC: 7 MMOL/L (ref 7–16)
AST SERPL-CCNC: 31 U/L (ref 12–45)
BASOPHILS # BLD AUTO: 0.7 % (ref 0–1.8)
BASOPHILS # BLD: 0.04 K/UL (ref 0–0.12)
BILIRUB SERPL-MCNC: 1.2 MG/DL (ref 0.1–1.5)
BUN SERPL-MCNC: 31 MG/DL (ref 8–22)
CALCIUM ALBUM COR SERPL-MCNC: 9.4 MG/DL (ref 8.5–10.5)
CALCIUM SERPL-MCNC: 9.6 MG/DL (ref 8.5–10.5)
CHLORIDE SERPL-SCNC: 103 MMOL/L (ref 96–112)
CO2 SERPL-SCNC: 30 MMOL/L (ref 20–33)
COMMENT 1642: NORMAL
CREAT SERPL-MCNC: 1.57 MG/DL (ref 0.5–1.4)
EKG IMPRESSION: NORMAL
EOSINOPHIL # BLD AUTO: 0.64 K/UL (ref 0–0.51)
EOSINOPHIL NFR BLD: 11.2 % (ref 0–6.9)
ERYTHROCYTE [DISTWIDTH] IN BLOOD BY AUTOMATED COUNT: 61.1 FL (ref 35.9–50)
EST. AVERAGE GLUCOSE BLD GHB EST-MCNC: 111 MG/DL
GFR SERPLBLD CREATININE-BSD FMLA CKD-EPI: 34 ML/MIN/1.73 M 2
GLOBULIN SER CALC-MCNC: 3.5 G/DL (ref 1.9–3.5)
GLUCOSE SERPL-MCNC: 131 MG/DL (ref 65–99)
HBA1C MFR BLD: 5.5 % (ref 4–5.6)
HCT VFR BLD AUTO: 36 % (ref 37–47)
HGB BLD-MCNC: 10.7 G/DL (ref 12–16)
IMM GRANULOCYTES # BLD AUTO: 0.02 K/UL (ref 0–0.11)
IMM GRANULOCYTES NFR BLD AUTO: 0.4 % (ref 0–0.9)
INR PPP: 1.39 (ref 0.87–1.13)
LYMPHOCYTES # BLD AUTO: 0.59 K/UL (ref 1–4.8)
LYMPHOCYTES NFR BLD: 10.3 % (ref 22–41)
MCH RBC QN AUTO: 29.4 PG (ref 27–33)
MCHC RBC AUTO-ENTMCNC: 29.7 G/DL (ref 32.2–35.5)
MCV RBC AUTO: 98.9 FL (ref 81.4–97.8)
MONOCYTES # BLD AUTO: 0.39 K/UL (ref 0–0.85)
MONOCYTES NFR BLD AUTO: 6.8 % (ref 0–13.4)
MORPHOLOGY BLD-IMP: NORMAL
NEUTROPHILS # BLD AUTO: 4.03 K/UL (ref 1.82–7.42)
NEUTROPHILS NFR BLD: 70.6 % (ref 44–72)
NRBC # BLD AUTO: 0 K/UL
NRBC BLD-RTO: 0 /100 WBC (ref 0–0.2)
OVALOCYTES BLD QL SMEAR: NORMAL
PLATELET # BLD AUTO: 115 K/UL (ref 164–446)
PLATELET BLD QL SMEAR: NORMAL
PMV BLD AUTO: 11.9 FL (ref 9–12.9)
POIKILOCYTOSIS BLD QL SMEAR: NORMAL
POTASSIUM SERPL-SCNC: 4.5 MMOL/L (ref 3.6–5.5)
PROT SERPL-MCNC: 7.7 G/DL (ref 6–8.2)
PROTHROMBIN TIME: 17.1 SEC (ref 12–14.6)
RBC # BLD AUTO: 3.64 M/UL (ref 4.2–5.4)
RBC BLD AUTO: PRESENT
SODIUM SERPL-SCNC: 140 MMOL/L (ref 135–145)
TSH SERPL-ACNC: 2.73 UIU/ML (ref 0.35–5.5)
WBC # BLD AUTO: 5.7 K/UL (ref 4.8–10.8)

## 2024-12-02 PROCEDURE — 93010 ELECTROCARDIOGRAM REPORT: CPT | Performed by: INTERNAL MEDICINE

## 2024-12-02 PROCEDURE — 93005 ELECTROCARDIOGRAM TRACING: CPT | Mod: TC

## 2024-12-02 PROCEDURE — 83036 HEMOGLOBIN GLYCOSYLATED A1C: CPT

## 2024-12-02 PROCEDURE — 80053 COMPREHEN METABOLIC PANEL: CPT

## 2024-12-02 PROCEDURE — 85610 PROTHROMBIN TIME: CPT

## 2024-12-02 PROCEDURE — 84443 ASSAY THYROID STIM HORMONE: CPT

## 2024-12-02 PROCEDURE — 36415 COLL VENOUS BLD VENIPUNCTURE: CPT

## 2024-12-02 PROCEDURE — 85025 COMPLETE CBC W/AUTO DIFF WBC: CPT

## 2024-12-03 ENCOUNTER — APPOINTMENT (OUTPATIENT)
Dept: CARDIOLOGY | Facility: MEDICAL CENTER | Age: 74
End: 2024-12-03
Attending: INTERNAL MEDICINE
Payer: MEDICARE

## 2024-12-03 ENCOUNTER — ANESTHESIA (OUTPATIENT)
Dept: CARDIOLOGY | Facility: MEDICAL CENTER | Age: 74
End: 2024-12-03
Payer: MEDICARE

## 2024-12-03 ENCOUNTER — ANESTHESIA EVENT (OUTPATIENT)
Dept: CARDIOLOGY | Facility: MEDICAL CENTER | Age: 74
End: 2024-12-03
Payer: MEDICARE

## 2024-12-03 ENCOUNTER — HOSPITAL ENCOUNTER (OUTPATIENT)
Facility: MEDICAL CENTER | Age: 74
End: 2024-12-04
Attending: INTERNAL MEDICINE | Admitting: INTERNAL MEDICINE
Payer: MEDICARE

## 2024-12-03 DIAGNOSIS — I47.10 SVT (SUPRAVENTRICULAR TACHYCARDIA) (HCC): ICD-10-CM

## 2024-12-03 DIAGNOSIS — I47.19 ATRIAL TACHYCARDIA (HCC): ICD-10-CM

## 2024-12-03 LAB
ACT BLD: 187 SEC (ref 74–137)
ACT BLD: 250 SEC (ref 74–137)
EKG IMPRESSION: NORMAL
INR PPP: 1.55 (ref 0.87–1.13)
PROTHROMBIN TIME: 18.7 SEC (ref 12–14.6)

## 2024-12-03 PROCEDURE — 160035 HCHG PACU - 1ST 60 MINS PHASE I

## 2024-12-03 PROCEDURE — 700101 HCHG RX REV CODE 250: Performed by: STUDENT IN AN ORGANIZED HEALTH CARE EDUCATION/TRAINING PROGRAM

## 2024-12-03 PROCEDURE — 700111 HCHG RX REV CODE 636 W/ 250 OVERRIDE (IP)

## 2024-12-03 PROCEDURE — 700111 HCHG RX REV CODE 636 W/ 250 OVERRIDE (IP): Mod: JZ | Performed by: STUDENT IN AN ORGANIZED HEALTH CARE EDUCATION/TRAINING PROGRAM

## 2024-12-03 PROCEDURE — 160036 HCHG PACU - EA ADDL 30 MINS PHASE I

## 2024-12-03 PROCEDURE — 700111 HCHG RX REV CODE 636 W/ 250 OVERRIDE (IP): Performed by: ANESTHESIOLOGY

## 2024-12-03 PROCEDURE — 93005 ELECTROCARDIOGRAM TRACING: CPT | Mod: TC | Performed by: INTERNAL MEDICINE

## 2024-12-03 PROCEDURE — 160002 HCHG RECOVERY MINUTES (STAT)

## 2024-12-03 PROCEDURE — 93308 TTE F-UP OR LMTD: CPT

## 2024-12-03 PROCEDURE — 700105 HCHG RX REV CODE 258: Performed by: STUDENT IN AN ORGANIZED HEALTH CARE EDUCATION/TRAINING PROGRAM

## 2024-12-03 PROCEDURE — 93010 ELECTROCARDIOGRAM REPORT: CPT | Mod: 59 | Performed by: INTERNAL MEDICINE

## 2024-12-03 PROCEDURE — 85347 COAGULATION TIME ACTIVATED: CPT

## 2024-12-03 PROCEDURE — A9270 NON-COVERED ITEM OR SERVICE: HCPCS | Performed by: INTERNAL MEDICINE

## 2024-12-03 PROCEDURE — 85610 PROTHROMBIN TIME: CPT

## 2024-12-03 PROCEDURE — 700101 HCHG RX REV CODE 250: Performed by: ANESTHESIOLOGY

## 2024-12-03 PROCEDURE — 700102 HCHG RX REV CODE 250 W/ 637 OVERRIDE(OP): Performed by: INTERNAL MEDICINE

## 2024-12-03 PROCEDURE — 700101 HCHG RX REV CODE 250

## 2024-12-03 PROCEDURE — G0378 HOSPITAL OBSERVATION PER HR: HCPCS

## 2024-12-03 PROCEDURE — 93653 COMPRE EP EVAL TX SVT: CPT

## 2024-12-03 RX ORDER — SODIUM CHLORIDE 9 MG/ML
INJECTION, SOLUTION INTRAVENOUS
Status: DISCONTINUED | OUTPATIENT
Start: 2024-12-03 | End: 2024-12-04 | Stop reason: SURG

## 2024-12-03 RX ORDER — DIPHENHYDRAMINE HYDROCHLORIDE 50 MG/ML
INJECTION INTRAMUSCULAR; INTRAVENOUS
Status: COMPLETED
Start: 2024-12-03 | End: 2024-12-03

## 2024-12-03 RX ORDER — DEXAMETHASONE SODIUM PHOSPHATE 4 MG/ML
INJECTION, SOLUTION INTRA-ARTICULAR; INTRALESIONAL; INTRAMUSCULAR; INTRAVENOUS; SOFT TISSUE PRN
Status: DISCONTINUED | OUTPATIENT
Start: 2024-12-03 | End: 2024-12-04 | Stop reason: SURG

## 2024-12-03 RX ORDER — ONDANSETRON 2 MG/ML
INJECTION INTRAMUSCULAR; INTRAVENOUS PRN
Status: DISCONTINUED | OUTPATIENT
Start: 2024-12-03 | End: 2024-12-04 | Stop reason: SURG

## 2024-12-03 RX ORDER — LIDOCAINE HYDROCHLORIDE 20 MG/ML
INJECTION, SOLUTION INFILTRATION; PERINEURAL
Status: COMPLETED
Start: 2024-12-03 | End: 2024-12-03

## 2024-12-03 RX ORDER — PHENYLEPHRINE HYDROCHLORIDE 10 MG/ML
INJECTION, SOLUTION INTRAMUSCULAR; INTRAVENOUS; SUBCUTANEOUS PRN
Status: DISCONTINUED | OUTPATIENT
Start: 2024-12-03 | End: 2024-12-04 | Stop reason: SURG

## 2024-12-03 RX ORDER — HEPARIN SODIUM 1000 [USP'U]/ML
INJECTION, SOLUTION INTRAVENOUS; SUBCUTANEOUS
Status: COMPLETED
Start: 2024-12-03 | End: 2024-12-03

## 2024-12-03 RX ORDER — CLINDAMYCIN PHOSPHATE 600 MG/50ML
INJECTION, SOLUTION INTRAVENOUS
Status: DISPENSED
Start: 2024-12-03 | End: 2024-12-03

## 2024-12-03 RX ORDER — AMIODARONE HYDROCHLORIDE 200 MG/1
100 TABLET ORAL DAILY
Status: DISCONTINUED | OUTPATIENT
Start: 2024-12-04 | End: 2024-12-04 | Stop reason: HOSPADM

## 2024-12-03 RX ORDER — EPHEDRINE SULFATE 50 MG/ML
INJECTION, SOLUTION INTRAVENOUS PRN
Status: DISCONTINUED | OUTPATIENT
Start: 2024-12-03 | End: 2024-12-04 | Stop reason: SURG

## 2024-12-03 RX ORDER — PROTAMINE SULFATE 10 MG/ML
INJECTION, SOLUTION INTRAVENOUS PRN
Status: DISCONTINUED | OUTPATIENT
Start: 2024-12-03 | End: 2024-12-04 | Stop reason: SURG

## 2024-12-03 RX ORDER — HYDRALAZINE HYDROCHLORIDE 20 MG/ML
5 INJECTION INTRAMUSCULAR; INTRAVENOUS
Status: DISCONTINUED | OUTPATIENT
Start: 2024-12-03 | End: 2024-12-03 | Stop reason: HOSPADM

## 2024-12-03 RX ORDER — OXYCODONE HCL 5 MG/5 ML
5 SOLUTION, ORAL ORAL
Status: DISCONTINUED | OUTPATIENT
Start: 2024-12-03 | End: 2024-12-03 | Stop reason: HOSPADM

## 2024-12-03 RX ORDER — DIPHENHYDRAMINE HYDROCHLORIDE 50 MG/ML
INJECTION INTRAMUSCULAR; INTRAVENOUS PRN
Status: DISCONTINUED | OUTPATIENT
Start: 2024-12-03 | End: 2024-12-04 | Stop reason: SURG

## 2024-12-03 RX ORDER — HYDROMORPHONE HYDROCHLORIDE 1 MG/ML
0.1 INJECTION, SOLUTION INTRAMUSCULAR; INTRAVENOUS; SUBCUTANEOUS
Status: DISCONTINUED | OUTPATIENT
Start: 2024-12-03 | End: 2024-12-03 | Stop reason: HOSPADM

## 2024-12-03 RX ORDER — ETOMIDATE 2 MG/ML
INJECTION INTRAVENOUS PRN
Status: DISCONTINUED | OUTPATIENT
Start: 2024-12-03 | End: 2024-12-04 | Stop reason: SURG

## 2024-12-03 RX ORDER — EPHEDRINE SULFATE 50 MG/ML
5 INJECTION, SOLUTION INTRAVENOUS
Status: DISCONTINUED | OUTPATIENT
Start: 2024-12-03 | End: 2024-12-03 | Stop reason: HOSPADM

## 2024-12-03 RX ORDER — LIDOCAINE HYDROCHLORIDE 40 MG/ML
SOLUTION TOPICAL PRN
Status: DISCONTINUED | OUTPATIENT
Start: 2024-12-03 | End: 2024-12-04 | Stop reason: SURG

## 2024-12-03 RX ORDER — ONDANSETRON 2 MG/ML
4 INJECTION INTRAMUSCULAR; INTRAVENOUS
Status: COMPLETED | OUTPATIENT
Start: 2024-12-03 | End: 2024-12-03

## 2024-12-03 RX ORDER — DIPHENHYDRAMINE HYDROCHLORIDE 50 MG/ML
12.5 INJECTION INTRAMUSCULAR; INTRAVENOUS
Status: DISCONTINUED | OUTPATIENT
Start: 2024-12-03 | End: 2024-12-03 | Stop reason: HOSPADM

## 2024-12-03 RX ORDER — ALBUTEROL SULFATE 5 MG/ML
2.5 SOLUTION RESPIRATORY (INHALATION)
Status: DISCONTINUED | OUTPATIENT
Start: 2024-12-03 | End: 2024-12-03 | Stop reason: HOSPADM

## 2024-12-03 RX ORDER — HEPARIN SODIUM 200 [USP'U]/100ML
INJECTION, SOLUTION INTRAVENOUS
Status: COMPLETED
Start: 2024-12-03 | End: 2024-12-03

## 2024-12-03 RX ORDER — PROTAMINE SULFATE 10 MG/ML
INJECTION, SOLUTION INTRAVENOUS
Status: COMPLETED
Start: 2024-12-03 | End: 2024-12-03

## 2024-12-03 RX ORDER — AMIODARONE HYDROCHLORIDE 200 MG/1
100 TABLET ORAL DAILY
Status: DISCONTINUED | OUTPATIENT
Start: 2024-12-03 | End: 2024-12-03

## 2024-12-03 RX ORDER — PHENYLEPHRINE HCL IN 0.9% NACL 1 MG/10 ML
SYRINGE (ML) INTRAVENOUS
Status: DISPENSED
Start: 2024-12-03 | End: 2024-12-03

## 2024-12-03 RX ORDER — HALOPERIDOL 5 MG/ML
1 INJECTION INTRAMUSCULAR
Status: DISCONTINUED | OUTPATIENT
Start: 2024-12-03 | End: 2024-12-03 | Stop reason: HOSPADM

## 2024-12-03 RX ORDER — OXYCODONE HCL 5 MG/5 ML
10 SOLUTION, ORAL ORAL
Status: DISCONTINUED | OUTPATIENT
Start: 2024-12-03 | End: 2024-12-03 | Stop reason: HOSPADM

## 2024-12-03 RX ORDER — ACETAMINOPHEN 325 MG/1
650 TABLET ORAL EVERY 4 HOURS PRN
Status: DISCONTINUED | OUTPATIENT
Start: 2024-12-03 | End: 2024-12-04 | Stop reason: HOSPADM

## 2024-12-03 RX ORDER — SODIUM CHLORIDE, SODIUM LACTATE, POTASSIUM CHLORIDE, CALCIUM CHLORIDE 600; 310; 30; 20 MG/100ML; MG/100ML; MG/100ML; MG/100ML
INJECTION, SOLUTION INTRAVENOUS CONTINUOUS
Status: DISCONTINUED | OUTPATIENT
Start: 2024-12-03 | End: 2024-12-04 | Stop reason: HOSPADM

## 2024-12-03 RX ORDER — LIDOCAINE HYDROCHLORIDE 40 MG/ML
SOLUTION TOPICAL
Status: COMPLETED
Start: 2024-12-03 | End: 2024-12-03

## 2024-12-03 RX ORDER — FUROSEMIDE 20 MG/1
20 TABLET ORAL
Status: DISCONTINUED | OUTPATIENT
Start: 2024-12-04 | End: 2024-12-04 | Stop reason: HOSPADM

## 2024-12-03 RX ORDER — POTASSIUM CHLORIDE 750 MG/1
20 TABLET, EXTENDED RELEASE ORAL DAILY
Status: DISCONTINUED | OUTPATIENT
Start: 2024-12-04 | End: 2024-12-04 | Stop reason: HOSPADM

## 2024-12-03 RX ORDER — ATORVASTATIN CALCIUM 10 MG/1
10 TABLET, FILM COATED ORAL
Status: DISCONTINUED | OUTPATIENT
Start: 2024-12-03 | End: 2024-12-04 | Stop reason: HOSPADM

## 2024-12-03 RX ORDER — HYDROMORPHONE HYDROCHLORIDE 1 MG/ML
0.4 INJECTION, SOLUTION INTRAMUSCULAR; INTRAVENOUS; SUBCUTANEOUS
Status: DISCONTINUED | OUTPATIENT
Start: 2024-12-03 | End: 2024-12-03 | Stop reason: HOSPADM

## 2024-12-03 RX ORDER — CARVEDILOL 12.5 MG/1
12.5 TABLET ORAL 2 TIMES DAILY WITH MEALS
Status: DISCONTINUED | OUTPATIENT
Start: 2024-12-03 | End: 2024-12-04 | Stop reason: HOSPADM

## 2024-12-03 RX ORDER — WARFARIN SODIUM 2.5 MG/1
5 TABLET ORAL ONCE
Status: DISCONTINUED | OUTPATIENT
Start: 2024-12-03 | End: 2024-12-04

## 2024-12-03 RX ORDER — HYDROMORPHONE HYDROCHLORIDE 1 MG/ML
0.2 INJECTION, SOLUTION INTRAMUSCULAR; INTRAVENOUS; SUBCUTANEOUS
Status: DISCONTINUED | OUTPATIENT
Start: 2024-12-03 | End: 2024-12-03 | Stop reason: HOSPADM

## 2024-12-03 RX ADMIN — CLINDAMYCIN PHOSPHATE 600 MG: 150 INJECTION, SOLUTION INTRAMUSCULAR; INTRAVENOUS at 11:17

## 2024-12-03 RX ADMIN — ETOMIDATE 20 MG: 2 INJECTION, SOLUTION INTRAVENOUS at 11:04

## 2024-12-03 RX ADMIN — PROPOFOL 10 MG: 10 INJECTION, EMULSION INTRAVENOUS at 11:04

## 2024-12-03 RX ADMIN — PHENYLEPHRINE HYDROCHLORIDE 200 MCG: 10 INJECTION INTRAVENOUS at 12:53

## 2024-12-03 RX ADMIN — FENTANYL CITRATE 50 MCG: 50 INJECTION, SOLUTION INTRAMUSCULAR; INTRAVENOUS at 11:01

## 2024-12-03 RX ADMIN — SUGAMMADEX 200 MG: 100 INJECTION, SOLUTION INTRAVENOUS at 12:47

## 2024-12-03 RX ADMIN — PHENYLEPHRINE HYDROCHLORIDE 100 MCG: 10 INJECTION INTRAVENOUS at 11:36

## 2024-12-03 RX ADMIN — HEPARIN SODIUM: 1000 INJECTION, SOLUTION INTRAVENOUS; SUBCUTANEOUS at 13:06

## 2024-12-03 RX ADMIN — ATORVASTATIN CALCIUM 10 MG: 10 TABLET, FILM COATED ORAL at 22:00

## 2024-12-03 RX ADMIN — PHENYLEPHRINE HYDROCHLORIDE 200 MCG: 10 INJECTION INTRAVENOUS at 11:18

## 2024-12-03 RX ADMIN — EPHEDRINE SULFATE 20 MG: 50 INJECTION, SOLUTION INTRAVENOUS at 12:54

## 2024-12-03 RX ADMIN — PHENYLEPHRINE HYDROCHLORIDE 200 MCG: 10 INJECTION INTRAVENOUS at 11:16

## 2024-12-03 RX ADMIN — FENTANYL CITRATE 50 MCG: 50 INJECTION, SOLUTION INTRAMUSCULAR; INTRAVENOUS at 12:36

## 2024-12-03 RX ADMIN — PHENYLEPHRINE HYDROCHLORIDE 80 MCG/MIN: 10 INJECTION INTRAVENOUS at 11:22

## 2024-12-03 RX ADMIN — LIDOCAINE HYDROCHLORIDE: 20 INJECTION, SOLUTION INFILTRATION; PERINEURAL at 13:06

## 2024-12-03 RX ADMIN — CARVEDILOL 12.5 MG: 12.5 TABLET, FILM COATED ORAL at 21:59

## 2024-12-03 RX ADMIN — PHENYLEPHRINE HYDROCHLORIDE 200 MCG: 10 INJECTION INTRAVENOUS at 11:21

## 2024-12-03 RX ADMIN — PROTAMINE SULFATE 40 MG: 10 INJECTION, SOLUTION INTRAVENOUS at 12:47

## 2024-12-03 RX ADMIN — ONDANSETRON 4 MG: 2 INJECTION INTRAMUSCULAR; INTRAVENOUS at 12:47

## 2024-12-03 RX ADMIN — ONDANSETRON 4 MG: 2 INJECTION INTRAMUSCULAR; INTRAVENOUS at 15:42

## 2024-12-03 RX ADMIN — DEXAMETHASONE SODIUM PHOSPHATE 4 MG: 4 INJECTION INTRA-ARTICULAR; INTRALESIONAL; INTRAMUSCULAR; INTRAVENOUS; SOFT TISSUE at 11:20

## 2024-12-03 RX ADMIN — PHENYLEPHRINE HYDROCHLORIDE 200 MCG: 10 INJECTION INTRAVENOUS at 12:50

## 2024-12-03 RX ADMIN — EPHEDRINE SULFATE 20 MG: 50 INJECTION, SOLUTION INTRAVENOUS at 12:51

## 2024-12-03 RX ADMIN — PHENYLEPHRINE HYDROCHLORIDE 200 MCG: 10 INJECTION INTRAVENOUS at 11:12

## 2024-12-03 RX ADMIN — SODIUM CHLORIDE: 9 INJECTION, SOLUTION INTRAVENOUS at 10:56

## 2024-12-03 RX ADMIN — PROPOFOL 20 MG: 10 INJECTION, EMULSION INTRAVENOUS at 13:02

## 2024-12-03 RX ADMIN — HEPARIN SODIUM 2000 UNITS: 200 INJECTION, SOLUTION INTRAVENOUS at 13:07

## 2024-12-03 RX ADMIN — DIPHENHYDRAMINE HYDROCHLORIDE 25 MG: 50 INJECTION, SOLUTION INTRAMUSCULAR; INTRAVENOUS at 11:20

## 2024-12-03 RX ADMIN — SACUBITRIL AND VALSARTAN 1 TABLET: 24; 26 TABLET, FILM COATED ORAL at 21:58

## 2024-12-03 RX ADMIN — ROCURONIUM BROMIDE 50 MG: 10 INJECTION, SOLUTION INTRAVENOUS at 11:06

## 2024-12-03 RX ADMIN — LIDOCAINE HYDROCHLORIDE 4 ML: 40 SOLUTION TOPICAL at 11:06

## 2024-12-03 SDOH — ECONOMIC STABILITY: TRANSPORTATION INSECURITY
IN THE PAST 12 MONTHS, HAS THE LACK OF TRANSPORTATION KEPT YOU FROM MEDICAL APPOINTMENTS OR FROM GETTING MEDICATIONS?: PATIENT DECLINED

## 2024-12-03 SDOH — ECONOMIC STABILITY: TRANSPORTATION INSECURITY
IN THE PAST 12 MONTHS, HAS LACK OF RELIABLE TRANSPORTATION KEPT YOU FROM MEDICAL APPOINTMENTS, MEETINGS, WORK OR FROM GETTING THINGS NEEDED FOR DAILY LIVING?: PATIENT DECLINED

## 2024-12-03 ASSESSMENT — COGNITIVE AND FUNCTIONAL STATUS - GENERAL
SUGGESTED CMS G CODE MODIFIER DAILY ACTIVITY: CH
DAILY ACTIVITIY SCORE: 24
SUGGESTED CMS G CODE MODIFIER MOBILITY: CH
MOBILITY SCORE: 24

## 2024-12-03 ASSESSMENT — LIFESTYLE VARIABLES
ALCOHOL_USE: NO
TOTAL SCORE: 0
HAVE YOU EVER FELT YOU SHOULD CUT DOWN ON YOUR DRINKING: NO
TOTAL SCORE: 0
TOTAL SCORE: 0
EVER HAD A DRINK FIRST THING IN THE MORNING TO STEADY YOUR NERVES TO GET RID OF A HANGOVER: NO
HAVE PEOPLE ANNOYED YOU BY CRITICIZING YOUR DRINKING: NO
DOES PATIENT WANT TO STOP DRINKING: NO
CONSUMPTION TOTAL: NEGATIVE
EVER FELT BAD OR GUILTY ABOUT YOUR DRINKING: NO
AVERAGE NUMBER OF DAYS PER WEEK YOU HAVE A DRINK CONTAINING ALCOHOL: 0
HOW MANY TIMES IN THE PAST YEAR HAVE YOU HAD 5 OR MORE DRINKS IN A DAY: 0
ON A TYPICAL DAY WHEN YOU DRINK ALCOHOL HOW MANY DRINKS DO YOU HAVE: 0

## 2024-12-03 ASSESSMENT — PAIN DESCRIPTION - PAIN TYPE
TYPE: SURGICAL PAIN
TYPE: ACUTE PAIN
TYPE: SURGICAL PAIN
TYPE: CHRONIC PAIN
TYPE: ACUTE PAIN
TYPE: ACUTE PAIN
TYPE: SURGICAL PAIN
TYPE: SURGICAL PAIN

## 2024-12-03 ASSESSMENT — CHA2DS2 SCORE
HYPERTENSION: YES
AGE 65 TO 74: NO
SEX: FEMALE
AGE 75 OR GREATER: YES
CHF OR LEFT VENTRICULAR DYSFUNCTION: YES
PRIOR STROKE OR TIA OR THROMBOEMBOLISM: YES
DIABETES: YES
CHA2DS2 VASC SCORE: 8
VASCULAR DISEASE: NO

## 2024-12-03 ASSESSMENT — SOCIAL DETERMINANTS OF HEALTH (SDOH)
WITHIN THE LAST YEAR, HAVE YOU BEEN KICKED, HIT, SLAPPED, OR OTHERWISE PHYSICALLY HURT BY YOUR PARTNER OR EX-PARTNER?: PATIENT DECLINED
WITHIN THE PAST 12 MONTHS, THE FOOD YOU BOUGHT JUST DIDN'T LAST AND YOU DIDN'T HAVE MONEY TO GET MORE: PATIENT DECLINED
IN THE PAST 12 MONTHS, HAS THE ELECTRIC, GAS, OIL, OR WATER COMPANY THREATENED TO SHUT OFF SERVICE IN YOUR HOME?: PATIENT DECLINED
WITHIN THE LAST YEAR, HAVE TO BEEN RAPED OR FORCED TO HAVE ANY KIND OF SEXUAL ACTIVITY BY YOUR PARTNER OR EX-PARTNER?: PATIENT DECLINED
WITHIN THE LAST YEAR, HAVE YOU BEEN HUMILIATED OR EMOTIONALLY ABUSED IN OTHER WAYS BY YOUR PARTNER OR EX-PARTNER?: PATIENT DECLINED
WITHIN THE LAST YEAR, HAVE YOU BEEN AFRAID OF YOUR PARTNER OR EX-PARTNER?: PATIENT DECLINED
WITHIN THE PAST 12 MONTHS, YOU WORRIED THAT YOUR FOOD WOULD RUN OUT BEFORE YOU GOT THE MONEY TO BUY MORE: PATIENT DECLINED

## 2024-12-03 ASSESSMENT — FIBROSIS 4 INDEX
FIB4 SCORE: 4.13
FIB4 SCORE: 4.13

## 2024-12-03 NOTE — CATH LAB
Pre-Operative Diagnosis:  Incessant atrial tachycardia     Post-Operative Diagnosis:  Macrorentry flutter within RA  at 510 msec     Indications: Incessant AT RA     Procedure(s) Performed: Supraventricular tachycardia ablation with Electrophysiology Study, Electroanatomical 3D mapping, CS/LA pace and record, pre and post reprogramming of DDDR PPM     Physician(s): MIGUE Galloway M.D.     Anesthesia: General     Anesthesiologist:  Dr Mary Truong and Luis A Guevara      Specimen(s) Removed: None     Estimated Blood Loss:  20cc     Complications:  None    Pre-procedure ECG: Atrial tachycarida    Post -procedure ECG: Atrial pacing    Procedure:     After informed written consent, the patient was brought to the EP lab in the fasting, non-sedated state. The patient was prepped and draped in the usual sterile fashion. Femoral venous access was obtained using the modified Seldinger technique. In the left femoral vein, 1 sheath (6  Fr) were inserted over 0.35” guidewires. In the right femoral vein, 2 sheaths (6,10 Fr) were inserted over 0.35” guidewires. A quadrapolar catheter was advanced to the His position, A deflectable decapolar catheter was advanced to the CS position. Mapping performed with an Igor mapping catheter  Atrial flutter was in the RA.  Macrorentry.  Going Inferior down posterior to the nelli and up the septal. RA isthmus blocked at baseline. Area of slow conduction superior to nelli and scar anterior to the nelli. Terminated multiple times with bumping. Easily reinducible with single extra.     PFA in basket configuration from SVC down to scar anterior to the nelli with termination.  Post ablation  PES performed and the patient was non inducible.    At the end of the procedure, the catheter and sheaths were removed, and hemostasis was achieved byvascade and figure of eight . Following recovery from anesthesia, the patient was transferred to the PPU.        Baseline Rhythm: Right atrial flutter 510 ms,  QRS  duration 99 ms, QT interval 450 ms      Arrhythmias:  1. At baseline, RA flutter concentric on CS at 510 msec  2. Easily reinducible with single extra.       Mapping:  Electroanatomical JR map was created of right atrium and coronary sinus created during sinus rhythm to  the position of the His, CS ostium, and the posteroseptal tricuspid annulus. Circuit as above     Ablation:  PFA 17 total in basket from SVC to Ana /scar with termination.    Post Ablation Testin. Not inducible post PFA  2. Reprogramming of PPM to previous setting with AV search    Fluroscopy time: 11.6 mGy     Impressions:    1. Ablation of RA macrorentry flutterer  2. Post ablation non inducible    Plan:   1. Admit to monitored bed  2. Bedrest x 4 hours

## 2024-12-03 NOTE — ANESTHESIA PROCEDURE NOTES
Peripheral IV    Date/Time: 12/3/2024 11:13 AM    Performed by: Mary Truong M.D.  Authorized by: Mary Truong M.D.    Size:  18 G  Laterality:  Right  Peripheral IV Location:  Forearm  Local Anesthetic:  None  Site Prep:  Alcohol  Technique:  Ultrasound guided  Attempts:  1

## 2024-12-03 NOTE — ANESTHESIA PROCEDURE NOTES
Airway    Date/Time: 12/3/2024 11:07 AM    Performed by: Mary Truong M.D.  Authorized by: Mary Truong M.D.    Location:  OR  Urgency:  Elective  Indications for Airway Management:  Anesthesia      Spontaneous Ventilation: absent    Sedation Level:  Deep  Preoxygenated: Yes    Patient Position:  Sniffing  Mask Difficulty Assessment:  1 - vent by mask  Final Airway Type:  Endotracheal airway  Final Endotracheal Airway:  ETT  Cuffed: Yes    Technique Used for Successful ETT Placement:  Direct laryngoscopy  Devices/Methods Used in Placement:  Intubating stylet    Insertion Site:  Oral  Blade Type:  Jamari  Laryngoscope Blade/Videolaryngoscope Blade Size:  3  ETT Size (mm):  7.0  Measured from:  Teeth  ETT to Teeth (cm):  21  Placement Verified by: auscultation and capnometry    Cormack-Lehane Classification:  Grade IIa - partial view of glottis  Number of Attempts at Approach:  1

## 2024-12-03 NOTE — OR NURSING
1503 - assumption of patient care from Zack MONTGOMERY. Per ion Dixon updated. Right groin site C/D/soft, femstop in place set to 0 mmHg. VSS. Denies pain at this time. Educated patient on bedrest instructions. Tolerating oral intake.  1530 - belongings returned to patient at bedside  1542 - medicated for nausea

## 2024-12-03 NOTE — ANESTHESIA PROCEDURE NOTES
Arterial Line    Performed by: Mary Truong M.D.  Authorized by: Mary Truong M.D.    Start Time:  12/3/2024 11:10 AM  End Time:  12/3/2024 11:13 AM  Localization: ultrasound guidance  Image captured, interpreted and electronically stored.  Patient Location:  OR  Indication: continuous blood pressure monitoring        Catheter Size:  20 G  Seldinger Technique?: Yes    Laterality:  Right  Site:  Radial artery  Line Secured:  Antimicrobial disc, tape and transparent dressing  Events: patient tolerated procedure well with no complications

## 2024-12-03 NOTE — OR NURSING
@1319 PT arrived to recovery. Report received from anesthesia and cath lab rn. Dressing site clean dry intact soft, pedal pulse 2+ on right foot.    @1331 Post op EKG obtained. Plan of care discussed with patient regarding flat bedrest for 4 hours. Pt verbalizes understanding.

## 2024-12-03 NOTE — DISCHARGE INSTRUCTIONS
[facility] Post Ablation Patient Instructions:  No lifting > 10 lbs x 1 week.      No soaking in baths, hot tubs, pools x 1 week.  May shower the day after discharge and take off groin dressings and leave  sites uncovered.  Continue to monitor sites daily for warmth, redness, discolored drainage.  It is common to have a small lump in the area where the cather was (usually the size of a marble); this will go away but takes approximately 6 weeks to normalize.     3.   Please take all medications as prescribed to you; please do not stop any medications prescribed post ablation unless directed by your healthcare provider.      4.   Please do not miss any doses of your blood thinner (if you have been started on, or take chronic blood thinners) without discussion with your healthcare provider first.     5.   Please walk and take deep breaths after discharge.  After discharge, if you experience neurological changes/signs of stroke or high fever you should be seen in the emergency dept.     6.   It is possible you may experience some chest discomfort or chest tightness post ablation.  This is usually secondary to inflammation and irritation of the tissues at the area of the ablation.  If this occurs, it is advised to try 400 mg of Ibuprofen with food as needed up to three times a day for a maximum of two days.  This should help to decrease pain and tissue inflammation.          **Please notify the office ([phone]) if this occurs.         ** DO NOT TAKE Ibuprofen IF HISTORY OF ALLERGY, SIGNIFICANT BLEEDING OR KIDNEY DISEASE WITHOUT DISCUSSING WITH YOUR CARDIOLOGY PROVIDER FIRST.          ** If pain becomes severe or you have additional symptoms you may need to be medically evaluated; please contact the cardiology office ([phone]) for further direction.     7. It is possible that you may experience arrhythmia/Atrial Fibrillation post ablation.  This is secondary to irritation and  inflammation of the cardiac tissues from the ablation.  If you have atrial fibrillation all day or feel poorly with it, please notify your cardiologist's via phone ([phone]) or mychart.      8.  Please contact call our office ([phone]) or message via [facility] message if you have any questions or concerns post procedurally.    9. You need to be seen for post ablation follow up 3-4 weeks post procedure. An appointment is scheduled for you.  Please contact the office ([phone]) if you need to change your appointment.      10. If there is bleeding at the catheter insertion site, apply pressure for 10 minutes.  If bleeding persists, call 911, and continue to hold pressure until advanced medical support arrives.    What to Expect Post Anesthesia    Rest and take it easy for the first 24 hours.  A responsible adult is recommended to remain with you during that time.  It is normal to feel sleepy.  We encourage you to not do anything that requires balance, judgment or coordination.    FOR 24 HOURS DO NOT:  Drive, operate machinery or run household appliances.  Drink beer or alcoholic beverages.  Make important decisions or sign legal documents.    To avoid nausea, slowly advance diet as tolerated, avoiding spicy or greasy foods for the first day.  Add more substantial food to your diet according to your provider's instructions.  Babies can be fed formula or breast milk as soon as they are hungry.  INCREASE FLUIDS AND FIBER TO AVOID CONSTIPATION.    MILD FLU-LIKE SYMPTOMS ARE NORMAL.  YOU MAY EXPERIENCE GENERALIZED MUSCLE ACHES, THROAT IRRITATION, HEADACHE AND/OR SOME NAUSE

## 2024-12-03 NOTE — ANESTHESIA PREPROCEDURE EVALUATION
Date/Time: 12/03/24 1000    Scheduled providers: Jhon Galloway M.D.; Mary Truong M.D.    Procedure: CL-EP ABLATION SUPRAVENTRICULAR TACHYCARDIA    Diagnosis: SVT (supraventricular tachycardia) (Former[facility]) [I47.10]    Indications: See Associated Dx    [facility]          - COPD on home O2 2-5L depending on activity  - permanenet pacemaker  - Pulmonary HTN  -h/o mitral valve repair  - h/o TIA  - CKD  - DM2  - Multiple allergies    TTE 5/1/24  CONCLUSIONS  Compared to the prior study on 4/19/24, contrast is used in this study   which showed left ventricular systolic function of 25%.  Severely reduced left ventricular systolic function. The ejection   fraction is measured to be 25%.  Mild aortic insufficiency.  Known mitral valve repair which is functioning normally with   appropriate transvalvular gradient. Trace mitral regurgitation.    Relevant Problems   PULMONARY   (positive) Chronic obstructive pulmonary disease (HCC)      NEURO   (positive) Amaurosis fugax of left eye   (positive) TIA (transient ischemic attack)      CARDIAC   (positive) Amaurosis fugax of left eye   (positive) Atherosclerosis of aorta (HCC)   (positive) Atrial fibrillation (HCC)   (positive) Atrial tachycardia (HCC)   (positive) Benign essential hypertension   (positive) Pacemaker   (positive) Pulmonary hypertension (HCC)         (positive) Microalbuminuria due to type 2 diabetes mellitus (HCC)   (positive) Stage 3b chronic kidney disease (CKD)      ENDO   (positive) Dyslipidemia associated with type 2 diabetes mellitus (HCC)   (positive) Type 2 diabetes mellitus with stage 3b chronic kidney disease, without long-term current use of insulin (HCC)       Physical Exam    Airway   Mallampati: II  TM distance: >3 FB  Neck ROM: full       Cardiovascular - normal exam  Rhythm: irregular  Rate: abnormal  (-) murmur     Dental - normal exam           Pulmonary - normal exam  Breath sounds clear to  auscultation     Abdominal    Neurological - normal exam         Other findings: Multiple missing teeth.  No loose teeth               Anesthesia Plan    ASA 4   ASA physical status 4 criteria: severe reduction of ejection fractions    Plan - general       Airway plan will be ETT  DARLENE Planned        Induction: intravenous    Postoperative Plan: Postoperative administration of opioids is intended.    Pertinent diagnostic labs and testing reviewed    Informed Consent:    Anesthetic plan and risks discussed with patient.    Use of blood products discussed with: patient whom consented to blood products.

## 2024-12-03 NOTE — H&P
Physician H&P    Patient ID:  Salma Lees  [id number]  74 y.o. female  1950    History:  Primary Diagnosis: Recurrent AT.    HPI:  PPM BSI. Recurrent AT. Previous flutter RFA and AT ablation medial to line. On coumadin. Recent cath with nl cors. Probable tachycardia induce CM. Previous mitral valve repair in [place].    Past Medical History:  has a past medical history of A-fib (HCC), Asthma, BMI 35.0-35.9,adult (11/12/2018), Breath shortness (09/07/2017), Calculus of ureter (09/18/2017), Cataract, Chronic anticoagulation (02/13/2017), CKD (chronic kidney disease) stage 3, GFR 30-59 ml/min, Congestive heart failure (HCC), COPD (chronic obstructive pulmonary disease) (HCC), Dental disorder (Dentures), Diabetes (HCC), Emphysema of lung ([facility]) (Copd), Heart valve disease (2009 Implant), Hemorrhagic disorder (HCC) (Warfarin), High cholesterol, History of mitral valve repair, Hypertension, Infectious disease (2014), Pacemaker (2014), Pulmonary hypertension ([facility]), Renal disorder (09/07/2017), Shortness of breath (10/18/2017), and Urinary incontinence.    She has no past medical history of Cough, Painful breathing, Sputum production, or Wheezing.  Past Surgical History:  has a past surgical history that includes mitral valve repair (01/01/2009); pacemaker insertion (01/01/2015); knee replacement, total (Left, 01/01/2015); cystoscopy stent placement (Left, 06/25/2017); ureteroscopy (Left, 09/18/2017); lasertripsy (Left, 09/18/2017); stent removal (Left, 09/18/2017); hysterectomy, vaginal (1994); tonsillectomy; gyn surgery (1994); other (2015//2022); and cataract extraction with iol (Bilateral).  Past Social History:  reports that she quit smoking about 34 years ago. Her smoking use included cigarettes. She started smoking about 39 years ago. She has a 5 pack-year smoking history. She has never used smokeless tobacco. She reports that she does not drink alcohol and does not use drugs.  Past Family History:    Family History   Problem Relation Age of Onset    Lung Disease Mother         asthma    Cancer Mother         ovarian    Heart Disease Father     Stroke Father     Thyroid Sister     Cancer Brother         pancreas    Cancer Paternal Grandmother         colon    Heart Disease Brother     No Known Problems Brother     No Known Problems Brother     Heart Disease Son         afib     Allergies: Amoxicillin, Nitrofurantoin, Pcn [penicillins], Vancomycin, Amlodipine besylate-valsartan, Etodolac, Food, Ivabradine, Jardiance [empagliflozin], Skin adhesives, Eliquis [apixaban], Lisinopril, Other drug, Other misc, and Tape    Current Medications:  Prior to Admission medications    Medication Sig Start Date End Date Taking? Authorizing Provider   warfarin (COUMADIN) 5 MG Tab Take 0.5-1 Tablets by mouth every morning. 2.5 mg Sunday/Tuesday/Thursday, 5 mg all other days  Patient taking differently: Take 2.5-5 mg by mouth every morning. 2.5 mg Sunday/Tuesday/Thursday, 5 mg all other days    MEDICATION INSTRUCTIONS FOR SURGERY ON 12-03-24: FOLLOW INSTRUCTIONS FROM SURGEON OR SPECIALIST. 11/4/24  Yes Elham Mckeon M.D.   carvedilol (COREG) 12.5 MG Tab Take 1 Tablet by mouth 2 times a day with meals.  Patient taking differently: Take 12.5 mg by mouth 2 times a day with meals.     MEDICATION INSTRUCTIONS FOR SURGERY ON 12-03-24: FOLLOW INSTRUCTIONS FROM SURGEON OR SPECIALIST. 6/7/24  Yes Jhon Galloway M.D.   atorvastatin (LIPITOR) 10 MG Tab Take 1 Tablet by mouth every day.  Patient taking differently: Take 10 mg by mouth every day.     MEDICATION INSTRUCTIONS FOR SURGERY/PROCEDURE SCHEDULED FOR 12-03-24   OK TO CONTINUE TAKING PRIOR TO SURGERY AND DAY OF SURGERY 1/16/24  Yes    Dulaglutide 1.5 MG/0.5ML Solution Pen-injector Inject 1 pen under the skin every 7 days. Every Sunday 11/13/24   Leah Bonilla D.O.   furosemide (LASIX) 20 MG Tab Take 1 Tablet by mouth every 48 hours.  Patient taking differently: Take 20 mg by  mouth every 48 hours.     DO NOT TAKE DAY OF SURGERY ON 12-03-24 11/7/24   Zander Quiñones M.D.   COVID-19 mRNA Vac-Kinza,Pfizer, (COMIRNATY) 30 MCG/0.3ML Suspension Prefilled Syringe injection Inject  into the shoulder, thigh, or buttocks.  Patient not taking: Reported on 11/11/2024 10/26/24   Campbell Gray M.D.   injection RSV Pre-Fusion F A&B Vac Rcmb (ABRYSVO) 120 MCG/0.5ML Recon Soln Inject  into the shoulder, thigh, or buttocks.  Patient not taking: Reported on 11/11/2024 10/26/24   Campbell Gray M.D.   calcitRIOL (ROCALTROL) 0.25 MCG Cap Take 2 Capsules by mouth every day.  Patient taking differently: Take 0.5 mcg by mouth every day.     MEDICATION INSTRUCTIONS FOR SURGERY ON 12-03-24: FOLLOW INSTRUCTIONS FROM SURGEON OR SPECIALIST. 10/10/24   Leah Bonilla D.O.   potassium Chloride ER (K-TAB) 20 MEQ Tab CR tablet Take 1 Tablet by mouth every day.  Patient taking differently: Take 20 mEq by mouth every day.     DO NOT TAKE DAY OF SURGERY ON 12-03-24 10/8/24   Aida Rojas P.A.-C.   Multiple Vitamins-Minerals (HAIR SKIN NAILS PO) Take 2 Tablets by mouth at bedtime.     MEDICATION INSTRUCTIONS FOR SURGERY/PROCEDURE SCHEDULED FOR 12-03-24   DO NOT TAKE 7 DAYS PRIOR TO SURGERY    Physician Outpatient   amiodarone (CORDARONE) 200 MG Tab Take 0.5 Tablets by mouth every day.  Patient taking differently: Take 100 mg by mouth every day.     MEDICATION INSTRUCTIONS FOR SURGERY ON 12-03-24: FOLLOW INSTRUCTIONS FROM SURGEON OR SPECIALIST; PER PATIENT MD HAVING HER STOP 2 WEEKS BEFORE PROCEDURE ON 12-03-24. 6/7/24   Jhon Galloway M.D.   sacubitril-valsartan (ENTRESTO) 24-26 MG Tab Take 1 tablet by mouth 2 times a day.  Patient taking differently: Take 1 Tablet by mouth 2 times a day.     MEDICATION INSTRUCTIONS FOR SURGERY/PROCEDURE SCHEDULED FOR 12-03-24   DO NOT TAKE 48 HOURS PRIOR TO SURGERY 6/3/24   Pelon Simmons M.D.       Review of Systems:  ROS  Pulse (!) 125   Temp 36.4 °C (97.5 °F)  "(Temporal)   Resp 16   Ht 1.626 m (5' 4\")   Wt 83.3 kg (183 lb 10.3 oz)   SpO2 92%     Physical Examination:  Physical Exam  Constitutional:       General: She is not in acute distress.     Appearance: Normal appearance. She is well-developed.   Eyes:      Conjunctiva/sclera: Conjunctivae normal.   Neck:      Thyroid: No thyroid mass.      Vascular: No JVD.   Cardiovascular:      Rate and Rhythm: Regular rhythm. Tachycardia present.      Chest Wall: PMI is not displaced.      Pulses: Normal pulses.           Carotid pulses are 2+ on the right side and 2+ on the left side.       Radial pulses are 2+ on the right side and 2+ on the left side.        Femoral pulses are 2+ on the right side and 2+ on the left side.       Dorsalis pedis pulses are 2+ on the right side and 2+ on the left side.      Heart sounds: S1 normal and S2 normal. No murmur heard.     No gallop.      Comments: No peripheral edema.  Pulmonary:      Effort: Pulmonary effort is normal.      Breath sounds: Normal breath sounds.   Abdominal:      General: There is no abdominal bruit.      Palpations: Abdomen is soft. There is no mass.      Tenderness: There is no abdominal tenderness.   Musculoskeletal:         General: Normal range of motion.      Cervical back: Neck supple.      Lumbar back: No spasms or tenderness.   Skin:     General: Skin is warm and dry.      Findings: No rash.      Nails: There is no clubbing.   Neurological:      Mental Status: She is alert and oriented to person, place, and time.         Impression:  AT for ablation.  The risks, benefits, and alternatives to SVT/AT/flutter ablation were discussed in great detail. Specific risks mentioned include bleeding, infection, arteriovenous fistula related to sheath placement, cardiac perforation with possible tamponade requiring pericardiocentesis or possibly open heart surgery. In addition, pneumothorax and hemothorax were mentioned with right internal jugular venous access. Lastly " the risk of stroke was mentioned should left atrial ablation be required; in addition the risk of death and myocardial infarction were discussed. The patient vebalized understanding of these potential complications and wishes to proceed with the procedure. Success rates quoted for AVNRT at approximately 90%, WPW at 90%, atrial tachycardia at 85%.           Pre Procedure update:   No changes.    Jhon Galloway M.D.  12/3/2024

## 2024-12-04 VITALS
TEMPERATURE: 98.5 F | HEART RATE: 63 BPM | DIASTOLIC BLOOD PRESSURE: 63 MMHG | SYSTOLIC BLOOD PRESSURE: 136 MMHG | WEIGHT: 183.64 LBS | RESPIRATION RATE: 18 BRPM | HEIGHT: 64 IN | BODY MASS INDEX: 31.35 KG/M2 | OXYGEN SATURATION: 100 %

## 2024-12-04 DIAGNOSIS — Z79.01 CHRONIC ANTICOAGULATION: ICD-10-CM

## 2024-12-04 LAB
ALBUMIN SERPL BCP-MCNC: 3.9 G/DL (ref 3.2–4.9)
ALBUMIN/GLOB SERPL: 1.3 G/DL
ALP SERPL-CCNC: 52 U/L (ref 30–99)
ALT SERPL-CCNC: 31 U/L (ref 2–50)
ANION GAP SERPL CALC-SCNC: 9 MMOL/L (ref 7–16)
AST SERPL-CCNC: 39 U/L (ref 12–45)
BASOPHILS # BLD AUTO: 0 % (ref 0–1.8)
BASOPHILS # BLD: 0 K/UL (ref 0–0.12)
BILIRUB SERPL-MCNC: 1.4 MG/DL (ref 0.1–1.5)
BUN SERPL-MCNC: 39 MG/DL (ref 8–22)
CALCIUM ALBUM COR SERPL-MCNC: 8.9 MG/DL (ref 8.5–10.5)
CALCIUM SERPL-MCNC: 8.8 MG/DL (ref 8.5–10.5)
CHLORIDE SERPL-SCNC: 107 MMOL/L (ref 96–112)
CO2 SERPL-SCNC: 25 MMOL/L (ref 20–33)
CREAT SERPL-MCNC: 1.68 MG/DL (ref 0.5–1.4)
EKG IMPRESSION: NORMAL
EOSINOPHIL # BLD AUTO: 0 K/UL (ref 0–0.51)
EOSINOPHIL NFR BLD: 0 % (ref 0–6.9)
ERYTHROCYTE [DISTWIDTH] IN BLOOD BY AUTOMATED COUNT: 59 FL (ref 35.9–50)
GFR SERPLBLD CREATININE-BSD FMLA CKD-EPI: 31 ML/MIN/1.73 M 2
GLOBULIN SER CALC-MCNC: 2.9 G/DL (ref 1.9–3.5)
GLUCOSE SERPL-MCNC: 176 MG/DL (ref 65–99)
HCT VFR BLD AUTO: 29.4 % (ref 37–47)
HGB BLD-MCNC: 9.1 G/DL (ref 12–16)
IMM GRANULOCYTES # BLD AUTO: 0.01 K/UL (ref 0–0.11)
IMM GRANULOCYTES NFR BLD AUTO: 0.2 % (ref 0–0.9)
INR PPP: 1.68 (ref 0.87–1.13)
LYMPHOCYTES # BLD AUTO: 0.2 K/UL (ref 1–4.8)
LYMPHOCYTES NFR BLD: 4.7 % (ref 22–41)
MCH RBC QN AUTO: 29.6 PG (ref 27–33)
MCHC RBC AUTO-ENTMCNC: 31 G/DL (ref 32.2–35.5)
MCV RBC AUTO: 95.8 FL (ref 81.4–97.8)
MONOCYTES # BLD AUTO: 0.19 K/UL (ref 0–0.85)
MONOCYTES NFR BLD AUTO: 4.4 % (ref 0–13.4)
NEUTROPHILS # BLD AUTO: 3.87 K/UL (ref 1.82–7.42)
NEUTROPHILS NFR BLD: 90.7 % (ref 44–72)
NRBC # BLD AUTO: 0 K/UL
NRBC BLD-RTO: 0 /100 WBC (ref 0–0.2)
PLATELET # BLD AUTO: 90 K/UL (ref 164–446)
PLATELETS.RETICULATED NFR BLD AUTO: 9.8 % (ref 0.6–13.1)
PMV BLD AUTO: 11.5 FL (ref 9–12.9)
POTASSIUM SERPL-SCNC: 5.2 MMOL/L (ref 3.6–5.5)
PROT SERPL-MCNC: 6.8 G/DL (ref 6–8.2)
PROTHROMBIN TIME: 19.8 SEC (ref 12–14.6)
RBC # BLD AUTO: 3.07 M/UL (ref 4.2–5.4)
SODIUM SERPL-SCNC: 141 MMOL/L (ref 135–145)
WBC # BLD AUTO: 4.3 K/UL (ref 4.8–10.8)

## 2024-12-04 PROCEDURE — 93286 PERI-PX EVAL PM/LDLS PM IP: CPT | Mod: 26 | Performed by: INTERNAL MEDICINE

## 2024-12-04 PROCEDURE — 700102 HCHG RX REV CODE 250 W/ 637 OVERRIDE(OP): Performed by: INTERNAL MEDICINE

## 2024-12-04 PROCEDURE — 93653 COMPRE EP EVAL TX SVT: CPT | Performed by: INTERNAL MEDICINE

## 2024-12-04 PROCEDURE — 93010 ELECTROCARDIOGRAM REPORT: CPT | Performed by: INTERNAL MEDICINE

## 2024-12-04 PROCEDURE — 85025 COMPLETE CBC W/AUTO DIFF WBC: CPT

## 2024-12-04 PROCEDURE — A9270 NON-COVERED ITEM OR SERVICE: HCPCS | Performed by: INTERNAL MEDICINE

## 2024-12-04 PROCEDURE — 85055 RETICULATED PLATELET ASSAY: CPT

## 2024-12-04 PROCEDURE — 93005 ELECTROCARDIOGRAM TRACING: CPT | Mod: TC | Performed by: INTERNAL MEDICINE

## 2024-12-04 PROCEDURE — 80053 COMPREHEN METABOLIC PANEL: CPT

## 2024-12-04 PROCEDURE — A9270 NON-COVERED ITEM OR SERVICE: HCPCS

## 2024-12-04 PROCEDURE — G0378 HOSPITAL OBSERVATION PER HR: HCPCS

## 2024-12-04 PROCEDURE — 700102 HCHG RX REV CODE 250 W/ 637 OVERRIDE(OP)

## 2024-12-04 PROCEDURE — 85610 PROTHROMBIN TIME: CPT

## 2024-12-04 RX ADMIN — AMIODARONE HYDROCHLORIDE 100 MG: 200 TABLET ORAL at 06:19

## 2024-12-04 RX ADMIN — SACUBITRIL AND VALSARTAN 1 TABLET: 24; 26 TABLET, FILM COATED ORAL at 06:20

## 2024-12-04 RX ADMIN — CARVEDILOL 12.5 MG: 12.5 TABLET, FILM COATED ORAL at 08:47

## 2024-12-04 ASSESSMENT — PAIN DESCRIPTION - PAIN TYPE
TYPE: ACUTE PAIN

## 2024-12-04 ASSESSMENT — PAIN SCALES - GENERAL: PAIN_LEVEL: 0

## 2024-12-04 NOTE — DISCHARGE SUMMARY
Discharge Summary    Reason for Admission  SVT ablation with postoperative femoral access site bleeding.     Admission Date  12/3/2024    CODE STATUS  Full Code    HPI & HOSPITAL COURSE  Salma Lees is a very pleasant 74 y.o. female admitted on 12/3/2024 for ablation due to history of incessant atrial tachycardia.     Patient underwent successful ablation of RA macro-entry atrial flutter with Dr. Galloway.     Post ablation patient recovered well. The right femoral access site suture was removed without difficulty. Patient ambulated out of bed after four hours of bed rest and had bleeding from the right femoral access site that was controlled with manual pressure. Patient was kept overnight for observation to monitor site for any further bleeding.     Overnight patient did well with no further bleeding form her femoral access site. Site is soft with CDI dressing in place. She has ambulated out of bed with out difficulty and denies having any cardiac complaints or concerns. Vital signs remained stable. A paced on tele. Physical exam is unremarkable with intact sensation and circulation.       Post ablation discharge instructions were reviewed with patient with written instructions also given, all patient questions were answered. I also discussed the importance of uninterrupted OAC use post ablation for at least 2 months.     Therefore, she is discharged in good and stable condition to home with close outpatient follow-up.    Discharge Date  12/4/2024      DISCHARGE DIAGNOSES  Principal Problem:    Atrial fibrillation (HCC) (POA: Yes)      Overview: Diagnosed in 2014 following repair of her mitral valve.  repair of her       mitral valve.  She required hospitalization in February 2015 due to CHF       and A. fib with RVR.  She experienced significant bradycardia       necessitating permanent pacemaker.  She is in permanent A. fib at this       time.  She is using anticoagulation in the form of warfarin, she denies        any bleeding issues.  Rate control achieved through diltiazem and coreg       and rhythm control with amiodarone.            Current regimen: Diltiazem 30 mg 2 times daily, carvedilol 25 mg twice       daily, and noted on 200 mg daily, warfarin dosed per pharmacy  Resolved Problems:    * No resolved hospital problems. *      FOLLOW UP  Future Appointments   Date Time Provider Department Center   12/9/2024 10:00 AM [facility] PHARMACIST [facility]   12/17/2024  8:20 AM [facility] Martinez   12/30/2024  2:00 PM D[facility] R E[facility]   1/7/2025  9:40 AM [facility] Marie None   5/13/2025 10:00 AM [facility] Bailey None       MEDICATIONS ON DISCHARGE     Medication List        CHANGE how you take these medications        Instructions   amiodarone 200 MG Tabs  Commonly known as: Cordarone   Take 0.5 Tablets by mouth every day.  Dose: 100 mg     atorvastatin 10 MG Tabs  What changed: additional instructions  Commonly known as: Lipitor   Take 1 Tablet by mouth every day.  Dose: 10 mg     calcitRIOL 0.25 MCG Caps  Commonly known as: Rocaltrol   Take 2 Capsules by mouth every day.  Dose: 0.5 mcg     carvedilol 12.5 MG Tabs  What changed: additional instructions  Commonly known as: Coreg   Take 1 Tablet by mouth 2 times a day with meals.  Dose: 12.5 mg     Entresto 24-26 MG Tabs  Generic drug: sacubitril-valsartan   Take 1 tablet by mouth 2 times a day.  Dose: 1 Tablet     furosemide 20 MG Tabs  Commonly known as: Lasix   Take 1 Tablet by mouth every 48 hours.  Dose: 20 mg     potassium Chloride ER 20 MEQ Tbcr tablet  Commonly known as: K-Tab   Take 1 Tablet by mouth every day.  Dose: 20 mEq     warfarin 5 MG Tabs  What changed: additional instructions  Commonly known as: Coumadin   Take 0.5-1 Tablets by mouth every morning. 2.5 mg Sunday/Tuesday/Thursday, 5 mg all other days  Dose: 2.5-5 mg            CONTINUE taking these medications        Instructions   Comirnaty 30  MCG/0.3ML Laura injection  Generic drug: COVID-19 mRNA Vac-Kinza(Pfizer)   Inject  into the shoulder, thigh, or buttocks.     Dulaglutide 1.5 MG/0.5ML Soaj   Inject 1 pen under the skin every 7 days. Every Sunday  Dose: 0.5 mL     HAIR SKIN NAILS PO   Take 2 Tablets by mouth at bedtime.     MEDICATION INSTRUCTIONS FOR SURGERY/PROCEDURE SCHEDULED FOR 12-03-24   DO NOT TAKE 7 DAYS PRIOR TO SURGERY  Dose: 2 Tablet     injection Abrysvo 120 MCG/0.5ML Solr  Generic drug: RSV Pre-Fusion F A&B Vac Rcmb   Inject  into the shoulder, thigh, or buttocks.

## 2024-12-04 NOTE — PROGRESS NOTES
Pt arrived to unit via stretcher at 1800. Pt oriented to room, unit, and plan of care. Tele-monitor placed and monitor room notified. All questions answered at this time. Call light within reach; fall precautions in place.     Bedrest until 1920.

## 2024-12-04 NOTE — PROGRESS NOTES
Discharge orders received.  Patient arrived to the discharge lounge.  PIV removed by discharge RN. Meds to beds medications not ordered.  Instructions given, medications reviewed and general discharge education provided to patient.  Follow up appointments discussed.  Patient verbalized understanding of dc instructions and prescriptions.  Patient signed discharge instructions.  Patient verbalized she had all belongings with her, Denied having any home medications locked in our inpatient pharmacy that  she needs back. Patient connected to home oxygen.Patient wheeled from discharge lounge to private vehicle. Patient left via car with son to home in stable condition.

## 2024-12-04 NOTE — OR NURSING
1640- Assumed care of pt. Pt resting in SHANTAL levi on 2L NC which is pt's baseline. R art line dc'd, called and updated pt's son. Pt's son headed to hospital.     1715- Pt assisted to the bathroom, able to void, as pt ambulated back to room R groin site started to bleed. Pressure held, pt assisted back to bed, Dr. Galloway at bedside, wants to admit pt overnight. Pt's son updated over the phone. Site stable, new dressing in place, per Dr. Galloway keep pt flat for 2 hours.     1743- Report given to VALENTINA Michel.     1757- Pt transported to CDU on the zoll by RN. VSS on 2L NC. All belongings sent with pt. Pt transported by RN.

## 2024-12-04 NOTE — PROGRESS NOTES
Inpatient Anticoagulation Service Note for 12/3/2024      Reason for Anticoagulation: Atrial Fibrillation, Mitral Valve Repair   [id number] VASc Score: 8  HAS-BLED Score: 5    Target INR: 2.0 to 3.0    INR from last 7 days        Date/Time INR Value    12/03/24 1905 1.55                   Dose from last 7 days        Date/Time Dose (mg)    12/03/24 1947 5                   Average Dose (mg):  (2.5 mg on Sun, Tue, Thu, and 5mg all other days)  Significant Interactions: Amiodarone  Bridge Therapy: No     Reversal Agent Administered: Not Applicable  Comments: Patient on warfarin for history of mitral valve repair and A-Fib presenting for ablation. Patient follows with the [facility] (TTR 55%) and was last seen 11/11/24 with a therapeutic INR. INR today sub-therapeutic. No overt signs/symptoms of bleeding following procedure. CBC yesterday WNL.    Plan: Bolus with 5mg tonight, INR x3 ordered starting with 12/4 AM labs  Education Material Provided?: No    Pharmacist suggested discharge dosing: Recommend resuming home regimen of 2.5mg on Sun, Tue, Thu, and 5mg all other days. Follow up INR needed within 48-72 hours of discharge.     Giacomo Preciado, JoleenD

## 2024-12-04 NOTE — ANESTHESIA POSTPROCEDURE EVALUATION
Patient: Salma Lees    Procedure Summary       Date: 12/03/24 Room / Location: [facility] - Cath Lab [facility]    Anesthesia Start: 1056 Anesthesia Stop: 1327    Procedure: CL-EP ABLATION SUPRAVENTRICULAR TACHYCARDIA Diagnosis:       SVT (supraventricular tachycardia) (HCC)      (See Associated Dx)    Scheduled Providers: Jhon Galloway M.D.; Mary Truong M.D. Responsible Provider: Luis A Guevara M.D.    Anesthesia Type: general ASA Status: 4            Final Anesthesia Type: general  Last vitals  BP   Blood Pressure : 122/58    Temp   36.6 °C (97.9 °F)    Pulse   60   Resp   17    SpO2   98 %      Anesthesia Post Evaluation    Patient location during evaluation: PACU  Patient participation: complete - patient participated  Level of consciousness: sleepy but conscious  Pain score: 0    Airway patency: patent  Anesthetic complications: no  Cardiovascular status: hemodynamically stable  Respiratory status: acceptable  Hydration status: balanced    PONV: none          There were no known notable events for this encounter.     Nurse Pain Score: 0 (NPRS)

## 2024-12-04 NOTE — CARE PLAN
The patient is Stable - Low risk of patient condition declining or worsening    Shift Goals  Clinical Goals: maintain hemostosis @ cath site  Patient Goals: go home in the morning & have no more bleeding at cath site      Problem: Knowledge Deficit - Standard  Goal: Patient and family/care givers will demonstrate understanding of plan of care, disease process/condition, diagnostic tests and medications  Outcome: Progressing     Problem: Pain - Standard  Goal: Alleviation of pain or a reduction in pain to the patient’s comfort goal  Outcome: Progressing

## 2024-12-04 NOTE — OR NURSING
6505 received report from Dalila claros patient care at this time. Patient awake. Right groin access site sutured. Femstop in place site clean and soft. Patient denies pain, denies nausea. Vss.     6893 suture removed by leila Ferguson     1640 Handoff to Pratibha MONTGOMERY

## 2024-12-04 NOTE — ANESTHESIA TIME REPORT
Anesthesia Start and Stop Event Times       Date Time Event    12/3/2024 1042 Ready for Procedure     1056 Anesthesia Start     1327 Anesthesia Stop          Responsible Staff  12/03/24      Name Role Begin End    Mary Truong M.D. Anesth 1056 1231    Luis A Guevara M.D. Anesth 1231 1327          Overtime Reason:  no overtime (within assigned shift)    Comments:

## 2024-12-04 NOTE — DISCHARGE PLANNING
HTH/SCP TCN chart review completed. Collaborated with CHAYA Robertson prior to meeting with the pt. The most current review of medical record, knowledge of pt's PLOF and social support, LACE+ score of 57, 6 clicks scores of 24 ADL's and 24 mobility were considered.  Per chart review, patient underwent CL-EP ABLATION SUPRAVENTRICULAR TACHYCARDIA on 12/3/24 with Dx of SVT.      Pt seen at bedside. Introduced TCN program. Provided education regarding post acute levels of care. Education provided regarding case management policy for blanket SNF referrals. Discussed HTH/SCP plan benefits. Pt verbalizes understanding.     Patient states she lives alone in an apartment and was independent/modified independent with ADL's, IADL's, mobility (she reports using a 4WW indoors only when very SOB) and driving at her baseline level of function. She reports being on 2 L/min O2 at her baseline with [employer] and is currently on 2 L/min O2 at her baseline.  She reports having a portable/concentrator.   Her son can provide transportation home at discharge.  She denies any functional concerns with discharge to home and states she is at her baseline level off function.      n collaboration with CHAYA, current discharge considerations are noted to be for home with close outpatient f/u when medically cleared.      TCN will continue to follow and collaborate with discharge planning team as additional post acute needs arise. Thank you.     Completed today:  Choice obtained: None.  See above.    Pt aware of [facility]'s blanket referral policy  SCP with [facility] PCP. Discussed possible outpatient transitional PCP follow up if indicated and patient reports that she already has a PCP f/u appointment.    Established Patient with Physician Leah Bonilla D.O. on Tuesday Dec 17, 2024 8:05 AM.

## 2024-12-04 NOTE — PROGRESS NOTES
Bedside shift report received from night shift RN. Patient A&Ox4, in bed resting comfortably. Bed in lowest, locked position with call light and belongings within reach. Fall precautions reviewed with patient. Patient updated on POC.

## 2024-12-09 ENCOUNTER — APPOINTMENT (OUTPATIENT)
Dept: MEDICAL GROUP | Facility: MEDICAL CENTER | Age: 74
End: 2024-12-09
Payer: MEDICARE

## 2024-12-12 ENCOUNTER — TELEPHONE (OUTPATIENT)
Dept: MEDICAL GROUP | Facility: PHYSICIAN GROUP | Age: 74
End: 2024-12-12
Payer: MEDICARE

## 2024-12-12 PROCEDURE — 93308 TTE F-UP OR LMTD: CPT | Mod: 26 | Performed by: INTERNAL MEDICINE

## 2024-12-12 NOTE — TELEPHONE ENCOUNTER
1. Caller Name: Salma Lees                          Call Back Number: There are no phone numbers on file.        How would the patient prefer to be contacted with a response: Phone call OK to leave a detailed message    Called and spoke to patient about [employer] application   She will bring it next week with her for the 12/17/25 visit

## 2024-12-17 ENCOUNTER — PATIENT OUTREACH (OUTPATIENT)
Dept: HEALTH INFORMATION MANAGEMENT | Facility: OTHER | Age: 74
End: 2024-12-17

## 2024-12-17 ENCOUNTER — OFFICE VISIT (OUTPATIENT)
Dept: MEDICAL GROUP | Facility: PHYSICIAN GROUP | Age: 74
End: 2024-12-17
Payer: MEDICARE

## 2024-12-17 VITALS
RESPIRATION RATE: 16 BRPM | DIASTOLIC BLOOD PRESSURE: 50 MMHG | HEART RATE: 72 BPM | BODY MASS INDEX: 31.04 KG/M2 | SYSTOLIC BLOOD PRESSURE: 138 MMHG | WEIGHT: 181.8 LBS | TEMPERATURE: 97.4 F | OXYGEN SATURATION: 93 % | HEIGHT: 64 IN

## 2024-12-17 DIAGNOSIS — J96.11 CHRONIC RESPIRATORY FAILURE WITH HYPOXIA, ON HOME OXYGEN THERAPY (HCC): ICD-10-CM

## 2024-12-17 DIAGNOSIS — I50.22 CHRONIC HFREF (HEART FAILURE WITH REDUCED EJECTION FRACTION) (HCC): ICD-10-CM

## 2024-12-17 DIAGNOSIS — D64.9 NORMOCYTIC ANEMIA: ICD-10-CM

## 2024-12-17 DIAGNOSIS — E11.69 DYSLIPIDEMIA ASSOCIATED WITH TYPE 2 DIABETES MELLITUS (HCC): ICD-10-CM

## 2024-12-17 DIAGNOSIS — Z79.01 ANTICOAGULATED: ICD-10-CM

## 2024-12-17 DIAGNOSIS — H61.23 BILATERAL IMPACTED CERUMEN: ICD-10-CM

## 2024-12-17 DIAGNOSIS — E53.8 B12 DEFICIENCY: ICD-10-CM

## 2024-12-17 DIAGNOSIS — N18.32 STAGE 3B CHRONIC KIDNEY DISEASE (CKD): ICD-10-CM

## 2024-12-17 DIAGNOSIS — Z99.81 CHRONIC RESPIRATORY FAILURE WITH HYPOXIA, ON HOME OXYGEN THERAPY (HCC): ICD-10-CM

## 2024-12-17 DIAGNOSIS — J44.9 CHRONIC OBSTRUCTIVE PULMONARY DISEASE, UNSPECIFIED COPD TYPE (HCC): ICD-10-CM

## 2024-12-17 DIAGNOSIS — D64.9 ANEMIA, UNSPECIFIED TYPE: ICD-10-CM

## 2024-12-17 DIAGNOSIS — E78.5 DYSLIPIDEMIA ASSOCIATED WITH TYPE 2 DIABETES MELLITUS (HCC): ICD-10-CM

## 2024-12-17 DIAGNOSIS — N18.32 TYPE 2 DIABETES MELLITUS WITH STAGE 3B CHRONIC KIDNEY DISEASE, WITHOUT LONG-TERM CURRENT USE OF INSULIN (HCC): ICD-10-CM

## 2024-12-17 DIAGNOSIS — Z86.79 S/P ABLATION OF ATRIAL FLUTTER: ICD-10-CM

## 2024-12-17 DIAGNOSIS — E11.22 TYPE 2 DIABETES MELLITUS WITH STAGE 3B CHRONIC KIDNEY DISEASE, WITHOUT LONG-TERM CURRENT USE OF INSULIN (HCC): ICD-10-CM

## 2024-12-17 DIAGNOSIS — I27.20 PULMONARY HYPERTENSION (HCC): ICD-10-CM

## 2024-12-17 DIAGNOSIS — Z98.890 S/P ABLATION OF ATRIAL FLUTTER: ICD-10-CM

## 2024-12-17 PROCEDURE — 99214 OFFICE O/P EST MOD 30 MIN: CPT | Performed by: INTERNAL MEDICINE

## 2024-12-17 PROCEDURE — 3078F DIAST BP <80 MM HG: CPT | Performed by: INTERNAL MEDICINE

## 2024-12-17 PROCEDURE — 3075F SYST BP GE 130 - 139MM HG: CPT | Performed by: INTERNAL MEDICINE

## 2024-12-17 RX ORDER — ATORVASTATIN CALCIUM 10 MG/1
10 TABLET, FILM COATED ORAL DAILY
Qty: 100 TABLET | Refills: 3 | Status: SHIPPED | OUTPATIENT
Start: 2024-12-17

## 2024-12-17 ASSESSMENT — FIBROSIS 4 INDEX: FIB4 SCORE: 5.84

## 2024-12-17 NOTE — PROGRESS NOTES
Subjective:   Chief Complaint/History of Present Illness:  Salma Lees is a 74 y.o. female established patient who presents today to discuss medical problems as listed below. Salma is unaccompanied for today's visit.    History of Present Illness  The patient presents for evaluation of multiple medical concerns.     S/p atrial flutter ablation which she has tolerated well. She feels much better following the procedure, not having palpitations or feeling like her heart is beating too forcefully. No light headedness or dizziness.     She reports normal bowel movements, with no constipation or straining. She also reports normal urination. She has not experienced any falls since her last visit. She experienced some pain at the surgical site, which has since resolved.    An MRI had been ordered due to concern for headaches but her headaches have since resolved and so she is not feeling the need to complete the MRI anymore.     She is currently on 2 liters of oxygen daily, increasing to 5 liters during ambulation. She has a CTA ordered for evaluation of pulmonary HTN and to look for evidence of stenosis.     She is taking her furosemide as needed and taking potassium.     She continues on warfarin and denies any bleeding concerns.       Current Medications:  Current Outpatient Medications Ordered in Epic   Medication Sig Dispense Refill    atorvastatin (LIPITOR) 10 MG Tab Take 1 tablet by mouth every day. 100 Tablet 3    Dulaglutide 1.5 MG/0.5ML Solution Pen-injector Inject 1 pen under the skin every 7 days. Every Sunday 2 mL 3    furosemide (LASIX) 20 MG Tab Take 1 Tablet by mouth every 48 hours. 45 Tablet 3    warfarin (COUMADIN) 5 MG Tab Take 0.5-1 Tablets by mouth every morning. 2.5 mg Sunday/Tuesday/Thursday, 5 mg all other days 90 Tablet 0    calcitRIOL (ROCALTROL) 0.25 MCG Cap Take 2 Capsules by mouth every day. 200 Capsule 3    potassium Chloride ER (K-TAB) 20 MEQ Tab CR tablet Take 1 Tablet by mouth  "every day. 90 Tablet 0    Multiple Vitamins-Minerals (HAIR SKIN NAILS PO) Take 2 Tablets by mouth at bedtime.     MEDICATION INSTRUCTIONS FOR SURGERY/PROCEDURE SCHEDULED FOR 12-03-24   DO NOT TAKE 7 DAYS PRIOR TO SURGERY      carvedilol (COREG) 12.5 MG Tab Take 1 Tablet by mouth 2 times a day with meals. 180 Tablet 3    amiodarone (CORDARONE) 200 MG Tab Take 0.5 Tablets by mouth every day. 90 Tablet 3    sacubitril-valsartan (ENTRESTO) 24-26 MG Tab Take 1 tablet by mouth 2 times a day. 60 Tablet 11     No current [facility]-ordered facility-administered medications on file.          Objective:   Physical Exam:    Vitals: /50 (BP Location: Left arm, Patient Position: Sitting, BP Cuff Size: Adult)   Pulse 72   Temp 36.3 °C (97.4 °F) (Temporal)   Resp 16   Ht 1.626 m (5' 4\")   Wt 82.5 kg (181 lb 12.8 oz)   SpO2 93%    BMI: Body mass index is 31.21 kg/m².  Physical Exam  Constitutional:       General: She is not in acute distress.     Appearance: She is not toxic-appearing.      Comments: Chronically ill appearing   HENT:      Head: Normocephalic and atraumatic.      Right Ear: Tympanic membrane, ear canal and external ear normal. There is impacted cerumen.      Left Ear: Tympanic membrane, ear canal and external ear normal. There is impacted cerumen.      Nose: Nose normal.      Comments: Nasal cannula in place     Mouth/Throat:      Pharynx: No oropharyngeal exudate or posterior oropharyngeal erythema.   Eyes:      General: No scleral icterus.     Extraocular Movements: Extraocular movements intact.      Conjunctiva/sclera: Conjunctivae normal.      Pupils: Pupils are equal, round, and reactive to light.   Cardiovascular:      Rate and Rhythm: Normal rate and regular rhythm.      Pulses: Normal pulses.      Heart sounds: Normal heart sounds. No murmur heard.  Pulmonary:      Effort: Pulmonary effort is normal. No respiratory distress.      Breath sounds: Normal breath sounds. No wheezing or rales.      Comments: " On 2L oxygen   Abdominal:      General: Abdomen is flat. There is no distension.      Palpations: Abdomen is soft.      Tenderness: There is no abdominal tenderness. There is no guarding.   Musculoskeletal:      Cervical back: Normal range of motion. No rigidity.   Lymphadenopathy:      Cervical: No cervical adenopathy.   Skin:     General: Skin is warm and dry.   Neurological:      General: No focal deficit present.      Mental Status: She is alert and oriented to person, place, and time.      Cranial Nerves: No cranial nerve deficit.   Psychiatric:         Mood and Affect: Mood normal.         Thought Content: Thought content normal.         Judgment: Judgment normal.          Assessment & Plan  Normocytic anemia  Previous iron labs were reassuring. Will repeat. Likely that some of her drop was due to the lab being drawn right after the surgery which had some bleeding from the access site.     2. Type 2 diabetes, dyslipidemia, CKD3b   Fructosamine elevated in April 2023. Most recent A1c normal but anemia present. Continue dulaglutide at this time, patient assistance paperwork completed to renew 1.5 mg weekly for the next year. Avoid nephrotoxic agents and continue atorvastatin for cholesterol management.    3. Pulmonary HTN  Following with Cardiology who has ordered CTA, confirmed with her cardiologist that she doesn't need to schedule at this time and to follow up in clinic as planned and complete echocardiogram as scheduled.     4. Low vitamin B12  Recommend B12 supplementation to help mitigate risk of symptoms associated with deficiency.    5. Bilateral cerumen impaction  Ear lavage today performed by medical assistant, Monie TRAYLOR No issues with procedure and patient appreciative of having this completed.    6. Chronic hypoxic respiratory failure on supplemental O2  2L at rest and 5L with activity, likely multifactorial but certainly a cardiac component with active investigation underway.    7. S/p ablation for  atrial flutter  8. Anticoagulated  Regular rhythm in clinic today. She denies any palpitations. She continues on warfarin. Pleased with results of ablation.      Assessment and Plan:   Salma is a 74 y.o. female with the following:  Problem List Items Addressed This Visit       Anticoagulated    B12 deficiency    Chronic respiratory failure with hypoxia, on home oxygen therapy (HCC)    Dyslipidemia associated with type 2 diabetes mellitus (HCC)    Relevant Medications    atorvastatin (LIPITOR) 10 MG Tab    Normocytic anemia    Pulmonary hypertension (HCC)    Relevant Medications    atorvastatin (LIPITOR) 10 MG Tab    S/P ablation of atrial flutter/AT    Stage 3b chronic kidney disease (CKD)    Relevant Orders    Comp Metabolic Panel    Type 2 diabetes mellitus with stage 3b chronic kidney disease, without long-term current use of insulin (HCC)    Relevant Orders    HEMOGLOBIN A1C     Other Visit Diagnoses       Anemia, unspecified type        Relevant Orders    IRON/TOTAL IRON BIND    FERRITIN    CBC WITH DIFFERENTIAL    Bilateral impacted cerumen        Relevant Orders    Ear Irrigation (MA Only)               RTC: 4 mos    I spent a total of 37 minutes with record review, exam, communication with the patient, communication with other providers, and documentation of this encounter.    Verbal consent was acquired by the patient to use [email or web address] ambient listening note generation during this visit Yes       Patient was seen in conjunction with our geriatric fellow, Dr. Brandon Schmitz. Pertinent details of the history and examination were verified and we discussed the assessment and plan in detail. Orders were reviewed together. I agree with the documentation in the note with additions or changes made as indicated above.      PLEASE NOTE: This dictation was created using voice recognition software. I have made every reasonable attempt to correct obvious errors, but I expect that there are errors of grammar and possibly  content that I did not discover before finalizing the note.     Ag Schmitz, DO  Geriatric fellow, [employer]     Leah Bonilla, DO  Geriatric and Internal Medicine  [facility]

## 2024-12-17 NOTE — PROGRESS NOTES
12/17/24 9:00 am: Nurse CHAYA met with pt after PCP appointment today.  Pt is here for follow-up with PCP. Pt meets criteria for enrollment in services.  Pt has general risk score of 7. Pt has history of DM, CHF, CKD, COPD, pulmonary hypertension, HTN, and dyslipidemia. Other health problems listed in Epic. Nurse provided information on personal care management services and nurse CM role.  Pt reports she would be agreeable to participate in personal care management program.  Pt agreeable to outreach call on Friday 12/20 to complete intake assessment.     12/20/24 2:15 pm: Nurse CM outreach call to pt to complete intake assessment.  Pt answered telephone. See assessment below.       Salma Lees is a 74 year old female that meets all criteria to qualify for the PCM program.  Pt lives alone in a senior housing apartment complex. Pt is independent with all of her ADL's. Pt reports no history of depression or cognitive concerns.  Pt has history of CKD, CHF,  cardiac ablation for atrial flutter, pulmonary hypertension, and non ischemic cardiomyopathy. Other health problems are listed in Epic. Pt reports upcoming CTA ordered by cardiology. Pt reports there was discussion if pt will be referred to pulmonary after results from cardiac CTA. Pt reports she does wear oxygen at 2 liters, 5 liters with exertion. Pt reports DME company is [employer]. Pt has history of COPD. Pt has followed with pulmonary in the past.  Pt has history of DM. Currently on trulicity once weekly. Last A1C was 5.5. Pt reports she is comfortable managing her health conditions and feels confident in her ability to manage her chronic health conditions.   Pt reports no other resources are needed currently to manage health care needs.     INITIAL CARE MANAGEMENT CARE PLAN/ASSESSMENT         Medication Self-Management Goals:  Pt reports she is independent managing medications.  Goal is to continue to take medications as directed.      Reviewed medications  listed below with patient.      Current Outpatient Medications:     atorvastatin (LIPITOR) 10 MG Tab, Take 1 tablet by mouth every day., Disp: 100 Tablet, Rfl: 3    Dulaglutide 1.5 MG/0.5ML Solution Pen-injector, Inject 1 pen under the skin every 7 days. Every Sunday, Disp: 2 mL, Rfl: 3    furosemide (LASIX) 20 MG Tab, Take 1 Tablet by mouth every 48 hours., Disp: 45 Tablet, Rfl: 3    warfarin (COUMADIN) 5 MG Tab, Take 0.5-1 Tablets by mouth every morning. 2.5 mg Sunday/Tuesday/Thursday, 5 mg all other days, Disp: 90 Tablet, Rfl: 0    calcitRIOL (ROCALTROL) 0.25 MCG Cap, Take 2 Capsules by mouth every day., Disp: 200 Capsule, Rfl: 3    potassium Chloride ER (K-TAB) 20 MEQ Tab CR tablet, Take 1 Tablet by mouth every day., Disp: 90 Tablet, Rfl: 0    Multiple Vitamins-Minerals (HAIR SKIN NAILS PO), Take 2 Tablets by mouth at bedtime.   MEDICATION INSTRUCTIONS FOR SURGERY/PROCEDURE SCHEDULED FOR 12-03-24  DO NOT TAKE 7 DAYS PRIOR TO SURGERY, Disp: , Rfl:     carvedilol (COREG) 12.5 MG Tab, Take 1 Tablet by mouth 2 times a day with meals., Disp: 180 Tablet, Rfl: 3    amiodarone (CORDARONE) 200 MG Tab, Take 0.5 Tablets by mouth every day., Disp: 90 Tablet, Rfl: 3    sacubitril-valsartan (ENTRESTO) 24-26 MG Tab, Take 1 tablet by mouth 2 times a day., Disp: 60 Tablet, Rfl: 11         Goal:  Continue to take medications independently       Physical/Functional/Environmental Status:     Activities of Daily Living:  Bathing: independent   Dressing: independent  Grooming: independent  Mouth Care: independent  Toileting: independent  Climbing a Flight of Stairs: independent    Independent Activities of Daily Living:  Shopping: independent  Cooking: independent  Managing Medications: independent  Using the phone and looking up numbers: independent  Driving or using public transportation: independent  Managing Finances: independent        12/20/2024     2:30 PM 12/20/2024     2:43 PM   STEADI Fall Risk   STEADI Risk for Falling  Score  2   One or more falls in the last year No No   Advised to use a cane or walker to get around safely  No   Feels unsteady when walking  No   Steadies self on furniture while walking at home  No   Worried about falling  Yes   Needs to push with hands when rising from a chair  Yes   Has trouble stepping up onto a curb / using stairs  No   Often has to rush to the toilet  No   Has lost some feeling in feet  No   Takes medicine that makes him/her feel lightheaded or more tired than usual  No   Takes medicine to sleep or improve mood  No   Often feels sad or depressed  No         Goal:  Pt reports no depression    Social Determinants of Health       Financial Status:      Financial Resource Strain: Not on file         Referred to CHW/SW:  NA       Transportation Status:      Transportation Needs: No Transportation Needs (12/20/2024)    PRAPARE - Transportation     Lack of Transportation (Medical): No     Lack of Transportation (Non-Medical): No        Referred to CHW/SW:  NA      Food Insecurity:      Food Insecurity: No Food Insecurity (12/20/2024)    Hunger Vital Sign     Worried About Running Out of Food in the Last Year: Never true     Ran Out of Food in the Last Year: Never true        Referred to CHW/SW:  NA       Housing Status:     Housing Stability: Low Risk  (12/20/2024)    Housing Stability Vital Sign     Unable to Pay for Housing in the Last Year: No     Number of Times Moved in the Last Year: 0     Homeless in the Last Year: No        Referred to CHW/SW:  NA      Social Connections:     Social Connections: Socially Isolated (12/20/2024)    Social Connection and Isolation Panel [NHANES]     Frequency of Communication with Friends and Family: Once a week     Frequency of Social Gatherings with Friends and Family: Once a week     Attends Quaker Services: Never     Active Member of Clubs or Organizations: No     Attends Club or Organization Meetings: Never     Marital Status:         Referred to  CHW/SW:  OLLIE      Mental/Behavioral/Psychosocial Status:        10/7/2024     3:20 PM 12/3/2024     6:20 PM 12/17/2024     9:05 AM   Depression Screen (PHQ-2/PHQ-9)   PHQ-2 Total Score 0 0    PHQ-2 Total Score   0       Interpretation of PHQ-9 Total Score   Score Severity   1-4 No Depression   5-9 Mild Depression   10-14 Moderate Depression   15-19 Moderately Severe Depression   20-27 Severe Depression       Goal:  No depression reported    Review of Benefits    Member's insurance benefits provide coverage for their medication, PCP/specialists, and CCM services; benefits are adequate for care management plan.    Phone Number and Website provided for questions:    [employer]:  [phone]   www.Spotwise/documents    Advance Planning    Do you have an Advance Directive?  No    (If no, a copy can be provided for patient.)    Would you like an Advance Directive Packet sent to you? No  Pt reports she does have an AD. Recommended pt provide copy for chart.       Chronic Care Management Care Plan         Care Plans       Chronic Care Management (CMS)    Met: 0 of 6 Met: 0 of 6      No Outcome (6)       Learn to Manage Calories (Diet Management)  Disciplines:  Nurse, Interdisciplinary  Expected end:  -     HP Increase understanding of congestive heart failure (Knowledge deficit of congestive heart failure disease process)  Disciplines:  Nurse, Interdisciplinary  Expected end:  -     Limit dietary sodium (Low Sodium Diet Management)  Disciplines:  Nurse, Interdisciplinary  Expected end:  -     Establish a plan for excess fluid management (Excess Fluid)  Disciplines:  Nurse, Interdisciplinary  Expected end:  -     Patient will be able to verbalize understanding of CKD (Knowledge Deficit with Chronic Kidney Disease process)  Disciplines:  Nurse, Interdisciplinary  Expected end:  -     Patient will maintain/demonstrate improvement in lab values (Knowledge Deficit with Chronic Kidney Disease  process)  Disciplines:  Nurse, Interdisciplinary  Expected end:  -                                         Discussion of Self Management of Care:   Pt to continue to follow closely with specialists. Continue to monitor daily weights for CHF. Continue to follow heart healthy diet. Pt reports she follows diabetes, kidney and heart healthy meal plan. Pt doesn't feel she needs any additional resources for her diet.     Barriers to Goals: Pt wears oxygen 24/7.     Resources Provided:  Pt doesn't feel she needs any written material at this time. Reviewed daily weights and follow-up.      Next Scheduled patient outreach: One month January 2025  Nurse Care Coordinator: Shannan Edwards [phone]

## 2024-12-19 ENCOUNTER — APPOINTMENT (OUTPATIENT)
Dept: MEDICAL GROUP | Facility: MEDICAL CENTER | Age: 74
End: 2024-12-19
Payer: MEDICARE

## 2024-12-20 SDOH — ECONOMIC STABILITY: FOOD INSECURITY: WITHIN THE PAST 12 MONTHS, THE FOOD YOU BOUGHT JUST DIDN'T LAST AND YOU DIDN'T HAVE MONEY TO GET MORE.: NEVER TRUE

## 2024-12-20 SDOH — HEALTH STABILITY: PHYSICAL HEALTH: ON AVERAGE, HOW MANY MINUTES DO YOU ENGAGE IN EXERCISE AT THIS LEVEL?: 40 MIN

## 2024-12-20 SDOH — ECONOMIC STABILITY: INCOME INSECURITY: IN THE LAST 12 MONTHS, WAS THERE A TIME WHEN YOU WERE NOT ABLE TO PAY THE MORTGAGE OR RENT ON TIME?: NO

## 2024-12-20 SDOH — ECONOMIC STABILITY: FOOD INSECURITY: WITHIN THE PAST 12 MONTHS, YOU WORRIED THAT YOUR FOOD WOULD RUN OUT BEFORE YOU GOT MONEY TO BUY MORE.: NEVER TRUE

## 2024-12-20 SDOH — ECONOMIC STABILITY: TRANSPORTATION INSECURITY
IN THE PAST 12 MONTHS, HAS LACK OF TRANSPORTATION KEPT YOU FROM MEETINGS, WORK, OR FROM GETTING THINGS NEEDED FOR DAILY LIVING?: NO

## 2024-12-20 SDOH — HEALTH STABILITY: PHYSICAL HEALTH: ON AVERAGE, HOW MANY DAYS PER WEEK DO YOU ENGAGE IN MODERATE TO STRENUOUS EXERCISE (LIKE A BRISK WALK)?: 3 DAYS

## 2024-12-20 ASSESSMENT — PATIENT HEALTH QUESTIONNAIRE - PHQ9: CLINICAL INTERPRETATION OF PHQ2 SCORE: 0

## 2024-12-20 ASSESSMENT — SOCIAL DETERMINANTS OF HEALTH (SDOH)
HOW OFTEN DO YOU ATTEND CHURCH OR RELIGIOUS SERVICES?: NEVER
DO YOU BELONG TO ANY CLUBS OR ORGANIZATIONS SUCH AS CHURCH GROUPS UNIONS, FRATERNAL OR ATHLETIC GROUPS, OR SCHOOL GROUPS?: NO
HOW OFTEN DO YOU GET TOGETHER WITH FRIENDS OR RELATIVES?: ONCE A WEEK
HOW OFTEN DO YOU ATTENT MEETINGS OF THE CLUB OR ORGANIZATION YOU BELONG TO?: NEVER
IN A TYPICAL WEEK, HOW MANY TIMES DO YOU TALK ON THE PHONE WITH FAMILY, FRIENDS, OR NEIGHBORS?: ONCE A WEEK

## 2024-12-20 ASSESSMENT — LIFESTYLE VARIABLES
HOW MANY STANDARD DRINKS CONTAINING ALCOHOL DO YOU HAVE ON A TYPICAL DAY: 1 OR 2
AUDIT-C TOTAL SCORE: 1
HOW OFTEN DO YOU HAVE SIX OR MORE DRINKS ON ONE OCCASION: NEVER
HOW OFTEN DO YOU HAVE A DRINK CONTAINING ALCOHOL: MONTHLY OR LESS
SKIP TO QUESTIONS 9-10: 1

## 2024-12-20 NOTE — CARE PLAN
Problem: Knowledge deficit of congestive heart failure disease process  Goal: HP Increase understanding of congestive heart failure/long term goal  Outcome: Progressing/pt verbalizes importance of daily weights. Pt taking diuretics as directed  Problem: Low Sodium Diet Management  Goal: Limit dietary sodium/long term goal  Outcome: Progressing/pt reports she is following diet for her diabetes, heart and CKD. Declining any written material.

## 2024-12-23 PROCEDURE — RXMED WILLOW AMBULATORY MEDICATION CHARGE: Performed by: INTERNAL MEDICINE

## 2024-12-24 ENCOUNTER — PHARMACY VISIT (OUTPATIENT)
Dept: PHARMACY | Facility: MEDICAL CENTER | Age: 74
End: 2024-12-24
Payer: COMMERCIAL

## 2024-12-30 ENCOUNTER — ANCILLARY PROCEDURE (OUTPATIENT)
Dept: CARDIOLOGY | Facility: MEDICAL CENTER | Age: 74
End: 2024-12-30
Attending: INTERNAL MEDICINE
Payer: MEDICARE

## 2024-12-30 DIAGNOSIS — I48.91 ATRIAL FIBRILLATION, UNSPECIFIED TYPE (HCC): ICD-10-CM

## 2024-12-30 PROCEDURE — 93306 TTE W/DOPPLER COMPLETE: CPT

## 2025-01-06 ENCOUNTER — TELEPHONE (OUTPATIENT)
Dept: CARDIOLOGY | Facility: MEDICAL CENTER | Age: 75
End: 2025-01-06

## 2025-01-06 ENCOUNTER — APPOINTMENT (OUTPATIENT)
Dept: MEDICAL GROUP | Facility: MEDICAL CENTER | Age: 75
End: 2025-01-06
Payer: MEDICARE

## 2025-01-06 VITALS
DIASTOLIC BLOOD PRESSURE: 51 MMHG | SYSTOLIC BLOOD PRESSURE: 158 MMHG | HEART RATE: 61 BPM | WEIGHT: 181 LBS | BODY MASS INDEX: 30.9 KG/M2 | HEIGHT: 64 IN

## 2025-01-06 DIAGNOSIS — I27.20 PULMONARY HYPERTENSION (HCC): ICD-10-CM

## 2025-01-06 DIAGNOSIS — Z98.890 H/O MITRAL VALVE REPAIR: ICD-10-CM

## 2025-01-06 LAB
INR PPP: 2 (ref 2–3.5)
LV EJECT FRACT MOD 2C 99903: 56.21
LV EJECT FRACT MOD 4C 99902: 50.52
LV EJECT FRACT MOD BP 99901: 52.09

## 2025-01-06 PROCEDURE — 93793 ANTICOAG MGMT PT WARFARIN: CPT | Performed by: PHYSICIAN ASSISTANT

## 2025-01-06 PROCEDURE — 3078F DIAST BP <80 MM HG: CPT | Performed by: PHYSICIAN ASSISTANT

## 2025-01-06 PROCEDURE — 85610 PROTHROMBIN TIME: CPT

## 2025-01-06 PROCEDURE — 93306 TTE W/DOPPLER COMPLETE: CPT | Mod: 26 | Performed by: INTERNAL MEDICINE

## 2025-01-06 PROCEDURE — 3077F SYST BP >= 140 MM HG: CPT | Performed by: PHYSICIAN ASSISTANT

## 2025-01-06 ASSESSMENT — FIBROSIS 4 INDEX: FIB4 SCORE: 5.84

## 2025-01-06 NOTE — PROGRESS NOTES
"Anticoagulation Summary  As of 2025      INR goal:  2.0-3.0   TTR:  54.0% (7.8 y)   INR used for dosin.00 (2025)   Warfarin maintenance plan:  2.5 mg (5 mg x 0.5) every Sun, Thu; 5 mg (5 mg x 1) all other days   Weekly warfarin total:  30 mg   Plan last modified:  Jake Betancourt, PharmD (10/24/2024)   Next INR check:  2025   Target end date:  Indefinite    Indications    Atrial fibrillation (HCC) [I48.91]  H/O mitral valve repair [Z98.890]                 Anticoagulation Episode Summary       INR check location:  --    Preferred lab:  --    Send INR reminders to:  --    Comments:  --          Anticoagulation Care Providers       Provider Role Specialty Phone number    LILIYA Pearson Referring Family Medicine [phone]    [facility] Anticoagulation Services Responsible  [phone]    Kenneth Salas, PharmD Responsible Pharmacy                   Refer to Patient Findings for HPI:  Patient Findings       Negatives:  Signs/symptoms of thrombosis, Signs/symptoms of bleeding, Laboratory test error suspected, Change in health, Change in alcohol use, Change in activity, Upcoming invasive procedure, Emergency department visit, Upcoming dental procedure, Missed doses, Extra doses, Change in medications, Change in diet/appetite, Hospital admission, Bruising, Other complaints            Vitals:    25 1052   BP: (!) 158/51   Pulse: 61   Weight: 82.1 kg (181 lb)   Height: 1.626 m (5' 4\")   Patient just barely took her BP medications. Will continue to trend.     Verified current warfarin dosing schedule.    Medications reconciled.  Pt is not on antiplatelet therapy.      A/P   INR is therapeutic  Reason(s) for out of range INR today: N/A      Warfarin dosing recommendation: Increase to Warfarin 7.5 mg (due to missing dose) and then resume previous dosing.     Pt educated to contact our clinic with any changes in medications or s/s of bleeding or thrombosis. Pt is aware to seek immediate medical " attention for falls, head injury or deep cuts.    Request pt to return in 3 week(s). Pt agrees.    Joleen HallmanD

## 2025-01-07 ENCOUNTER — OFFICE VISIT (OUTPATIENT)
Dept: CARDIOLOGY | Facility: MEDICAL CENTER | Age: 75
End: 2025-01-07
Attending: INTERNAL MEDICINE
Payer: MEDICARE

## 2025-01-07 VITALS
BODY MASS INDEX: 31.07 KG/M2 | DIASTOLIC BLOOD PRESSURE: 64 MMHG | OXYGEN SATURATION: 90 % | HEIGHT: 64 IN | SYSTOLIC BLOOD PRESSURE: 140 MMHG | RESPIRATION RATE: 18 BRPM | HEART RATE: 62 BPM

## 2025-01-07 DIAGNOSIS — I42.8 NICM (NONISCHEMIC CARDIOMYOPATHY) (HCC): ICD-10-CM

## 2025-01-07 DIAGNOSIS — G45.9 TIA (TRANSIENT ISCHEMIC ATTACK): ICD-10-CM

## 2025-01-07 DIAGNOSIS — I48.91 ATRIAL FIBRILLATION, UNSPECIFIED TYPE (HCC): ICD-10-CM

## 2025-01-07 DIAGNOSIS — Z95.0 PACEMAKER: ICD-10-CM

## 2025-01-07 DIAGNOSIS — D68.69 SECONDARY HYPERCOAGULABLE STATE (HCC): ICD-10-CM

## 2025-01-07 DIAGNOSIS — I27.20 PULMONARY HYPERTENSION (HCC): ICD-10-CM

## 2025-01-07 DIAGNOSIS — E11.69 DYSLIPIDEMIA ASSOCIATED WITH TYPE 2 DIABETES MELLITUS (HCC): ICD-10-CM

## 2025-01-07 DIAGNOSIS — Z86.79 S/P ABLATION OF ATRIAL FLUTTER: ICD-10-CM

## 2025-01-07 DIAGNOSIS — E78.5 DYSLIPIDEMIA ASSOCIATED WITH TYPE 2 DIABETES MELLITUS (HCC): ICD-10-CM

## 2025-01-07 DIAGNOSIS — Z98.890 H/O MITRAL VALVE REPAIR: ICD-10-CM

## 2025-01-07 DIAGNOSIS — Z98.890 S/P ABLATION OF ATRIAL FLUTTER: ICD-10-CM

## 2025-01-07 DIAGNOSIS — I47.19 ATRIAL TACHYCARDIA (HCC): ICD-10-CM

## 2025-01-07 PROCEDURE — 93280 PM DEVICE PROGR EVAL DUAL: CPT | Performed by: INTERNAL MEDICINE

## 2025-01-07 PROCEDURE — 93280 PM DEVICE PROGR EVAL DUAL: CPT | Mod: 26 | Performed by: INTERNAL MEDICINE

## 2025-01-07 PROCEDURE — 99212 OFFICE O/P EST SF 10 MIN: CPT | Performed by: INTERNAL MEDICINE

## 2025-01-07 PROCEDURE — 3078F DIAST BP <80 MM HG: CPT | Performed by: INTERNAL MEDICINE

## 2025-01-07 PROCEDURE — 99214 OFFICE O/P EST MOD 30 MIN: CPT | Mod: 25 | Performed by: INTERNAL MEDICINE

## 2025-01-07 PROCEDURE — 3077F SYST BP >= 140 MM HG: CPT | Performed by: INTERNAL MEDICINE

## 2025-01-07 NOTE — TELEPHONE ENCOUNTER
Phone Number Called: [phone]    Call outcome:  Received return call from patient    Message: Called to inform patient of AL recommendations. Patient verbalizes understanding and would like to proceed with procedure. Advised we have time held on 1/9. Patient is agreeable to that day. Advised Jessica will call her either today or tomorrow to go  over the details and if she has questions recommended to ask DS during appointment tomorrow. Patient verbalizes understanding and is grateful for call.

## 2025-01-07 NOTE — TELEPHONE ENCOUNTER
Phone Number Called: [phone]    Call outcome: Did not leave a detailed message. Requested patient to call back.    Message: Called to inform patient of AL recommendations. Advised she either call back or discuss the results and recommendations with DS tomorrow at her appointment.         ----- Message from Jessica GARCIA sent at 1/6/2025  3:45 PM PST -----  Currently holding time on 01- with AL.  ----- Message -----  From: Cassi Kaiser R.N.  Sent: 1/6/2025   3:39 PM PST  To: Gia Bone, Med Ass't; Jessica Ryder; #    To Heart Cath team: I will be calling patient- can we hold a spot per AL request since I am in a meeting and cannot call patient right this minute    Zander Quiñones M.D.  Jhon Galloway M.D.; Cassi Kaiser R.N.; Jovita Perry R.N.  Thanks Igor!    Jovita: can you please advise her on this and given her LVEF improved, I suggest we repeat a RHC with possible nitric and accordingly will decide on cardiac CT / Pulm referral as we previously discussed. I have openings this Thursday or next Monday in the lab    Thank you team !

## 2025-01-07 NOTE — TELEPHONE ENCOUNTER
Caller: Salma Lees    Topic/issue: Patient returning your call - could not reach at RN extension.     Callback Number: [phone]    Thank you,  Aida SULLIVAN

## 2025-01-07 NOTE — PROGRESS NOTES
"Chief Complaint   Patient presents with    Atrial Fibrillation    Transient Ischemic Attack    Other     F/v DX: S/P ablation of atrial flutter/AT       Subjective     Salma Lees is a 74 y.o. female who presents today status post atypical right atrial flutter ablation.  No recurrence.  On low-dose amiodarone.  Mitral valve repair.  Pulmonary hypertension.  Normalization of LV function.  Overall stable.  On Coumadin.    Past Medical History:   Diagnosis Date    A-fib (HCC)     Asthma     BMI 35.0-35.9,adult 11/12/2018    Breath shortness 09/07/2017    uses oxygen at 2 liters/min cont. with \"[facility]    Calculus of ureter 09/18/2017    Cataract     codey IOL    Chronic anticoagulation 02/13/2017    CKD (chronic kidney disease) stage 3, GFR 30-59 ml/min     Congestive heart failure (HCC)     COPD (chronic obstructive pulmonary disease) (HCC)     Dental disorder Dentures    full dentures    Diabetes (HCC)     Emphysema of lung (HCC) Copd    Heart valve disease 2009 Implant    mitral heart valve    Hemorrhagic disorder (HCC) Warfarin    High cholesterol     History of mitral valve repair     Hypertension     Infectious disease 2014    UTI/hx. MRSA    Pacemaker 2014    Pulmonary hypertension (HCC)     Renal disorder 09/07/2017    hx kidney stones    Shortness of breath 10/18/2017    Urinary incontinence      Past Surgical History:   Procedure Laterality Date    URETEROSCOPY Left 09/18/2017    Procedure: URETEROSCOPY;  Surgeon: Edgardo Richter M.D.;  Location: [facility];  Service: Urology    LASERTRIPSY Left 09/18/2017    Procedure: LASERTRIPSY LITHO  ;  Surgeon: Edgardo Richter M.D.;  Location: [facility];  Service: Urology    STENT REMOVAL Left 09/18/2017    Procedure: STENT REMOVAL;  Surgeon: Edgardo Richter M.D.;  Location: [facility];  Service: Urology    CYSTOSCOPY STENT PLACEMENT Left 06/25/2017    Procedure: CYSTOSCOPY, LEFT URETERAL " STENT PLACEMENT;  Surgeon: Edgardo Richter M.D.;  Location: SURGERY [facility];  Service:     PACEMAKER INSERTION  2015    KNEE REPLACEMENT, TOTAL Left 2015    MITRAL VALVE REPAIR  2009    HYSTERECTOMY, VAGINAL      CATARACT EXTRACTION WITH IOL Bilateral     GYN SURGERY      Hysterectomy    OTHER      Left knee replacement//pacemaker battery    TONSILLECTOMY       Family History   Problem Relation Age of Onset    Lung Disease Mother         asthma    Cancer Mother         ovarian    Heart Disease Father     Stroke Father     Thyroid Sister     Cancer Brother         pancreas    Cancer Paternal Grandmother         colon    Heart Disease Brother     No Known Problems Brother     No Known Problems Brother     Heart Disease Son         afib     Social History     Socioeconomic History    Marital status:      Spouse name: Not on file    Number of children: Not on file    Years of education: Not on file    Highest education level: Not on file   Occupational History    Not on file   Tobacco Use    Smoking status: Former     Current packs/day: 0.00     Average packs/day: 1 pack/day for 5.0 years (5.0 ttl pk-yrs)     Types: Cigarettes     Start date: 1985     Quit date: 1990     Years since quittin.0    Smokeless tobacco: Never    Tobacco comments:     Continued abstinence   Vaping Use    Vaping status: Never Used   Substance and Sexual Activity    Alcohol use: No     Comment: only on holidays    Drug use: No    Sexual activity: Not Currently   Other Topics Concern    Not on file   Social History Narrative    She has a son. She had previously experience in [employer].     Social Drivers of Health     Financial Resource Strain: Not on file   Food Insecurity: No Food Insecurity (2024)    Hunger Vital Sign     Worried About Running Out of Food in the Last Year: Never true     Ran Out of Food in the Last Year: Never true   Transportation Needs: No  Transportation Needs (12/20/2024)    PRAPARE - Transportation     Lack of Transportation (Medical): No     Lack of Transportation (Non-Medical): No   Physical Activity: Insufficiently Active (12/20/2024)    Exercise Vital Sign     Days of Exercise per Week: 3 days     Minutes of Exercise per Session: 40 min   Stress: Not on file   Social Connections: Socially Isolated (12/20/2024)    Social Connection and Isolation Panel [NHANES]     Frequency of Communication with Friends and Family: Once a week     Frequency of Social Gatherings with Friends and Family: Once a week     Attends Taoism Services: Never     Active Member of Clubs or Organizations: No     Attends Club or Organization Meetings: Never     Marital Status:    Intimate Partner Violence: Patient Declined (12/3/2024)    Humiliation, Afraid, Rape, and Kick questionnaire     Fear of Current or Ex-Partner: Patient declined     Emotionally Abused: Patient declined     Physically Abused: Patient declined     Sexually Abused: Patient declined   Housing Stability: Low Risk  (12/20/2024)    Housing Stability Vital Sign     Unable to Pay for Housing in the Last Year: No     Number of Times Moved in the Last Year: 0     Homeless in the Last Year: No     Allergies   Allergen Reactions    Amoxicillin Anaphylaxis and Shortness of Breath    Nitrofurantoin      swelling    Pcn [Penicillins] Swelling    Vancomycin Shortness of Breath and Unspecified     Severe hypotension and bronchospasm observed by anesthesia while giving  vanco during procedure    Amlodipine Besylate-Valsartan Unspecified     Chest pain and dizziness     Etodolac Hives and Nausea     Dark stools    Food Hives     Plums    Formaldehyde      Pt reports she was informed of allergy during a skin test. Pt not sure of reaction    Ivabradine      unknown    Jardiance [Empagliflozin] Unspecified     Headaches, dizziness    Skin Adhesives Hives     Patient states they cause welts for several days;  EKG  "stickers    Eliquis [Apixaban] Shortness of Breath     Pt reports SOB and dizzy when she took    Lisinopril Unspecified     Dizziness     Other Drug Rash     Metal silver    Other Misc Shortness of Breath and Runny Nose     Mold, dust, and feathers, adhesives, plums    Tape Rash     Paper tape ok     Outpatient Encounter Medications as of 1/7/2025   Medication Sig Dispense Refill    atorvastatin (LIPITOR) 10 MG Tab Take 1 tablet by mouth every day. 100 Tablet 3    Dulaglutide 1.5 MG/0.5ML Solution Pen-injector Inject 1 pen under the skin every 7 days. Every Sunday 2 mL 3    furosemide (LASIX) 20 MG Tab Take 1 Tablet by mouth every 48 hours. 45 Tablet 3    warfarin (COUMADIN) 5 MG Tab Take 0.5-1 Tablets by mouth every morning. 2.5 mg Sunday/Tuesday/Thursday, 5 mg all other days 90 Tablet 0    calcitRIOL (ROCALTROL) 0.25 MCG Cap Take 2 Capsules by mouth every day. 200 Capsule 3    potassium Chloride ER (K-TAB) 20 MEQ Tab CR tablet Take 1 Tablet by mouth every day. 90 Tablet 0    Multiple Vitamins-Minerals (HAIR SKIN NAILS PO) Take 2 Tablets by mouth at bedtime.     MEDICATION INSTRUCTIONS FOR SURGERY/PROCEDURE SCHEDULED FOR 12-03-24   DO NOT TAKE 7 DAYS PRIOR TO SURGERY      carvedilol (COREG) 12.5 MG Tab Take 1 Tablet by mouth 2 times a day with meals. 180 Tablet 3    amiodarone (CORDARONE) 200 MG Tab Take 0.5 Tablets by mouth every day. 90 Tablet 3    sacubitril-valsartan (ENTRESTO) 24-26 MG Tab Take 1 tablet by mouth 2 times a day. 60 Tablet 11     No facility-administered encounter medications on file as of 1/7/2025.     ROS           Objective     BP (!) 140/64 (BP Location: Right arm, Patient Position: Sitting, BP Cuff Size: Adult)   Pulse 62   Resp 18   Ht 1.626 m (5' 4\")   SpO2 90%   BMI 31.07 kg/m²     Physical Exam  Constitutional:       Appearance: She is well-developed.   HENT:      Head: Normocephalic and atraumatic.   Eyes:      Pupils: Pupils are equal, round, and reactive to light. "   Cardiovascular:      Rate and Rhythm: Normal rate and regular rhythm.      Heart sounds: Normal heart sounds. No murmur heard.     No friction rub. No gallop.   Pulmonary:      Effort: Pulmonary effort is normal.      Breath sounds: Normal breath sounds.   Abdominal:      General: Bowel sounds are normal.      Palpations: Abdomen is soft.   Musculoskeletal:         General: Normal range of motion.      Cervical back: Normal range of motion and neck supple.   Skin:     General: Skin is warm.   Neurological:      Mental Status: She is alert and oriented to person, place, and time.      Cranial Nerves: No cranial nerve deficit.   Psychiatric:         Behavior: Behavior normal.         Thought Content: Thought content normal.         Judgment: Judgment normal.                Assessment & Plan     1. Atrial fibrillation, unspecified type (HCC)  CANCELED: EKG      2. TIA (transient ischemic attack)        3. NICM (nonischemic cardiomyopathy) ([facility])        4. Pulmonary hypertension ([facility])        5. Pacemaker        6. S/P ablation of atrial flutter/AT        7. Secondary hypercoagulable state ([facility])        8. Dyslipidemia associated with type 2 diabetes mellitus ([facility])        9. H/O mitral valve repair        10. Atrial tachycardia (HCC)            Medical Decision Making: Today's Assessment/Status/Plan:   1.  Atrial arrhythmias continue low-dose amiodarone no recurrence.  2.  Nonischemic cardiomyopathy resolved with maintenance of sinus rhythm.  3.  Pulmonary hypertension.  Scheduled for repeat right heart cath.  4.  Status post mitral valve repair.  5.  Follow-up in 3 months.

## 2025-01-07 NOTE — TELEPHONE ENCOUNTER
Called patient to get procedure scheduled, advised she needs a diver home, she said she doesn't have one. I gave her the number to right at home, if she is unable to do that, what do you recommend AL?

## 2025-01-08 ENCOUNTER — PRE-ADMISSION TESTING (OUTPATIENT)
Dept: ADMISSIONS | Facility: MEDICAL CENTER | Age: 75
End: 2025-01-08
Payer: MEDICARE

## 2025-01-08 ASSESSMENT — LIFESTYLE VARIABLES
AUDIT-C TOTAL SCORE: 1
HOW OFTEN DO YOU HAVE SIX OR MORE DRINKS ON ONE OCCASION: NEVER
HOW OFTEN DO YOU HAVE A DRINK CONTAINING ALCOHOL: MONTHLY OR LESS
HOW MANY STANDARD DRINKS CONTAINING ALCOHOL DO YOU HAVE ON A TYPICAL DAY: 1 OR 2
SKIP TO QUESTIONS 9-10: 1

## 2025-01-08 NOTE — TELEPHONE ENCOUNTER
Patient is scheduled for a RHC/LHC with nitric oxide on 01- with Dr. Quiñones. Patient   has been instructed to check in at 7:30 am for 9:30 procedure. Hold Coumadin 5 days prior and hold Dulaglutide 7 days prior,. Patient has been advised they will need a  home and with them after since they are sedated. Message sent to authorizations.  JOHANA sent to [facility]. Offered 01-09, she was not able to arrange a ride for that day

## 2025-01-09 NOTE — OR NURSING
Tele pre-admit appointment created and completed with patient for procedure on 1/13/25.    [facility] procedure instruction sheet was thoroughly reviewed with patient.  Patient states she was instructed to hold her coumadin 2 days prior to procedure but instruction sheet states 5 days prior to procedure.  This RN called Jessica Ryder asking to please clarify with patient- patient prefers Mychart as means for this communication.  Left voicemail for Jessica.      Patient verbalizes understanding of all instructions given. No further questions at this time.  Patient requesting to have testing done in pre-op.

## 2025-01-13 ENCOUNTER — APPOINTMENT (OUTPATIENT)
Dept: CARDIOLOGY | Facility: MEDICAL CENTER | Age: 75
End: 2025-01-13
Attending: INTERNAL MEDICINE
Payer: MEDICARE

## 2025-01-13 ENCOUNTER — HOSPITAL ENCOUNTER (OUTPATIENT)
Facility: MEDICAL CENTER | Age: 75
End: 2025-01-13
Attending: INTERNAL MEDICINE | Admitting: INTERNAL MEDICINE
Payer: MEDICARE

## 2025-01-13 VITALS
HEART RATE: 63 BPM | OXYGEN SATURATION: 96 % | RESPIRATION RATE: 18 BRPM | SYSTOLIC BLOOD PRESSURE: 155 MMHG | DIASTOLIC BLOOD PRESSURE: 54 MMHG | BODY MASS INDEX: 31.35 KG/M2 | WEIGHT: 183.64 LBS | HEIGHT: 64 IN | TEMPERATURE: 97 F

## 2025-01-13 DIAGNOSIS — I27.20 PULMONARY HYPERTENSION (HCC): ICD-10-CM

## 2025-01-13 LAB
ALBUMIN SERPL BCP-MCNC: 3.9 G/DL (ref 3.2–4.9)
ALBUMIN/GLOB SERPL: 1.1 G/DL
ALP SERPL-CCNC: 61 U/L (ref 30–99)
ALT SERPL-CCNC: 14 U/L (ref 2–50)
ANION GAP SERPL CALC-SCNC: 9 MMOL/L (ref 7–16)
APTT PPP: 28 SEC (ref 24.7–36)
AST SERPL-CCNC: 24 U/L (ref 12–45)
BILIRUB SERPL-MCNC: 1.4 MG/DL (ref 0.1–1.5)
BUN SERPL-MCNC: 29 MG/DL (ref 8–22)
CALCIUM ALBUM COR SERPL-MCNC: 9.7 MG/DL (ref 8.5–10.5)
CALCIUM SERPL-MCNC: 9.6 MG/DL (ref 8.5–10.5)
CALCULATED OXYGEN CONTENT: 13.8
CALCULATED OXYGEN CONTENT: 8.1
CALCULATED OXYGEN CONTENT: 9.5
CHLORIDE SERPL-SCNC: 104 MMOL/L (ref 96–112)
CO2 SERPL-SCNC: 29 MMOL/L (ref 20–33)
CREAT SERPL-MCNC: 1.64 MG/DL (ref 0.5–1.4)
EKG IMPRESSION: NORMAL
ERYTHROCYTE [DISTWIDTH] IN BLOOD BY AUTOMATED COUNT: 57.3 FL (ref 35.9–50)
GFR SERPLBLD CREATININE-BSD FMLA CKD-EPI: 32 ML/MIN/1.73 M 2
GLOBULIN SER CALC-MCNC: 3.4 G/DL (ref 1.9–3.5)
GLUCOSE SERPL-MCNC: 78 MG/DL (ref 65–99)
HCT VFR BLD AUTO: 33.2 % (ref 37–47)
HGB BLD-MCNC: 10.3 G/DL (ref 12–16)
INR PPP: 1.23 (ref 0.87–1.13)
MCH RBC QN AUTO: 30.2 PG (ref 27–33)
MCHC RBC AUTO-ENTMCNC: 31 G/DL (ref 32.2–35.5)
MCV RBC AUTO: 97.4 FL (ref 81.4–97.8)
OXYHEMOGLOBIN: 58.8
OXYHEMOGLOBIN: 74.6
OXYHEMOGLOBIN: 95.4
PLATELET # BLD AUTO: 114 K/UL (ref 164–446)
PMV BLD AUTO: 11.4 FL (ref 9–12.9)
POTASSIUM SERPL-SCNC: 4.1 MMOL/L (ref 3.6–5.5)
PROT SERPL-MCNC: 7.3 G/DL (ref 6–8.2)
PROTHROMBIN TIME: 15.5 SEC (ref 12–14.6)
RBC # BLD AUTO: 3.41 M/UL (ref 4.2–5.4)
SODIUM SERPL-SCNC: 142 MMOL/L (ref 135–145)
TOTAL HEMOGLOBIN: 10.4
TOTAL HEMOGLOBIN: 9.2
TOTAL HEMOGLOBIN: NORMAL
WBC # BLD AUTO: 4.2 K/UL (ref 4.8–10.8)

## 2025-01-13 PROCEDURE — 93460 R&L HRT ART/VENTRICLE ANGIO: CPT | Performed by: INTERNAL MEDICINE

## 2025-01-13 PROCEDURE — 85730 THROMBOPLASTIN TIME PARTIAL: CPT

## 2025-01-13 PROCEDURE — 85027 COMPLETE CBC AUTOMATED: CPT

## 2025-01-13 PROCEDURE — 700117 HCHG RX CONTRAST REV CODE 255: Performed by: INTERNAL MEDICINE

## 2025-01-13 PROCEDURE — 99153 MOD SED SAME PHYS/QHP EA: CPT

## 2025-01-13 PROCEDURE — 160036 HCHG PACU - EA ADDL 30 MINS PHASE I

## 2025-01-13 PROCEDURE — 160035 HCHG PACU - 1ST 60 MINS PHASE I

## 2025-01-13 PROCEDURE — 85018 HEMOGLOBIN: CPT | Performed by: INTERNAL MEDICINE

## 2025-01-13 PROCEDURE — 700101 HCHG RX REV CODE 250

## 2025-01-13 PROCEDURE — 700111 HCHG RX REV CODE 636 W/ 250 OVERRIDE (IP): Mod: JZ

## 2025-01-13 PROCEDURE — A9270 NON-COVERED ITEM OR SERVICE: HCPCS

## 2025-01-13 PROCEDURE — 93005 ELECTROCARDIOGRAM TRACING: CPT | Mod: TC | Performed by: INTERNAL MEDICINE

## 2025-01-13 PROCEDURE — 85610 PROTHROMBIN TIME: CPT

## 2025-01-13 PROCEDURE — 700102 HCHG RX REV CODE 250 W/ 637 OVERRIDE(OP)

## 2025-01-13 PROCEDURE — 99221 1ST HOSP IP/OBS SF/LOW 40: CPT | Mod: MISDOCU | Performed by: INTERNAL MEDICINE

## 2025-01-13 PROCEDURE — 93463 DRUG ADMIN & HEMODYNMIC MEAS: CPT | Performed by: INTERNAL MEDICINE

## 2025-01-13 PROCEDURE — 160002 HCHG RECOVERY MINUTES (STAT)

## 2025-01-13 PROCEDURE — 99152 MOD SED SAME PHYS/QHP 5/>YRS: CPT | Performed by: INTERNAL MEDICINE

## 2025-01-13 PROCEDURE — 93010 ELECTROCARDIOGRAM REPORT: CPT | Mod: 59 | Performed by: INTERNAL MEDICINE

## 2025-01-13 PROCEDURE — 160046 HCHG PACU - 1ST 60 MINS PHASE II

## 2025-01-13 PROCEDURE — 80053 COMPREHEN METABOLIC PANEL: CPT

## 2025-01-13 RX ORDER — MIDAZOLAM HYDROCHLORIDE 1 MG/ML
INJECTION INTRAMUSCULAR; INTRAVENOUS
Status: COMPLETED
Start: 2025-01-13 | End: 2025-01-13

## 2025-01-13 RX ORDER — HEPARIN SODIUM 200 [USP'U]/100ML
INJECTION, SOLUTION INTRAVENOUS
Status: COMPLETED
Start: 2025-01-13 | End: 2025-01-13

## 2025-01-13 RX ORDER — ASPIRIN 81 MG/1
TABLET, CHEWABLE ORAL
Status: COMPLETED
Start: 2025-01-13 | End: 2025-01-13

## 2025-01-13 RX ORDER — LIDOCAINE HYDROCHLORIDE 20 MG/ML
INJECTION, SOLUTION INFILTRATION; PERINEURAL
Status: COMPLETED
Start: 2025-01-13 | End: 2025-01-13

## 2025-01-13 RX ORDER — HYDRALAZINE HYDROCHLORIDE 20 MG/ML
INJECTION INTRAMUSCULAR; INTRAVENOUS
Status: COMPLETED
Start: 2025-01-13 | End: 2025-01-13

## 2025-01-13 RX ORDER — HEPARIN SODIUM 1000 [USP'U]/ML
INJECTION, SOLUTION INTRAVENOUS; SUBCUTANEOUS
Status: COMPLETED
Start: 2025-01-13 | End: 2025-01-13

## 2025-01-13 RX ORDER — VERAPAMIL HYDROCHLORIDE 2.5 MG/ML
INJECTION, SOLUTION INTRAVENOUS
Status: COMPLETED
Start: 2025-01-13 | End: 2025-01-13

## 2025-01-13 RX ADMIN — NITROGLYCERIN 10 ML: 20 INJECTION INTRAVENOUS at 12:42

## 2025-01-13 RX ADMIN — HEPARIN SODIUM 2000 UNITS: 200 INJECTION, SOLUTION INTRAVENOUS at 12:21

## 2025-01-13 RX ADMIN — FENTANYL CITRATE 50 MCG: 50 INJECTION, SOLUTION INTRAMUSCULAR; INTRAVENOUS at 12:57

## 2025-01-13 RX ADMIN — LIDOCAINE HYDROCHLORIDE: 20 INJECTION, SOLUTION INFILTRATION; PERINEURAL at 12:41

## 2025-01-13 RX ADMIN — ASPIRIN 324 MG: 81 TABLET, CHEWABLE ORAL at 11:58

## 2025-01-13 RX ADMIN — IOHEXOL 35 ML: 350 INJECTION, SOLUTION INTRAVENOUS at 12:56

## 2025-01-13 RX ADMIN — MIDAZOLAM HYDROCHLORIDE 2 MG: 1 INJECTION, SOLUTION INTRAMUSCULAR; INTRAVENOUS at 12:42

## 2025-01-13 RX ADMIN — HEPARIN SODIUM 2000 UNITS: 200 INJECTION, SOLUTION INTRAVENOUS at 12:49

## 2025-01-13 RX ADMIN — HEPARIN SODIUM 6000 UNITS: 1000 INJECTION, SOLUTION INTRAVENOUS; SUBCUTANEOUS at 12:41

## 2025-01-13 RX ADMIN — VERAPAMIL HYDROCHLORIDE 2.5 MG: 2.5 INJECTION, SOLUTION INTRAVENOUS at 12:42

## 2025-01-13 RX ADMIN — MIDAZOLAM HYDROCHLORIDE 1 MG: 1 INJECTION, SOLUTION INTRAMUSCULAR; INTRAVENOUS at 12:57

## 2025-01-13 RX ADMIN — HYDRALAZINE HYDROCHLORIDE 10 MG: 20 INJECTION, SOLUTION INTRAMUSCULAR; INTRAVENOUS at 12:30

## 2025-01-13 ASSESSMENT — PAIN DESCRIPTION - PAIN TYPE
TYPE: SURGICAL PAIN

## 2025-01-13 ASSESSMENT — FIBROSIS 4 INDEX: FIB4 SCORE: 5.84

## 2025-01-13 ASSESSMENT — ENCOUNTER SYMPTOMS
WHEEZING: 0
DIZZINESS: 1
ORTHOPNEA: 0
PALPITATIONS: 0
FALLS: 0
SHORTNESS OF BREATH: 1
HEADACHES: 0
COUGH: 0
DOUBLE VISION: 0
WEAKNESS: 0
FOCAL WEAKNESS: 0
BLURRED VISION: 0
LOSS OF CONSCIOUSNESS: 0

## 2025-01-13 NOTE — OR NURSING
1338 Arrived from cath lab ID verified. Report received attached to monitors. Right IJ covered with gauze and tegaderm. Rt radial TR band clean. Denies pain, denies nausea. On RA breathing even and unlabored. Vss.     1445 3cc of air removed from TR band site clean.     1500 3 cc of air removed from TR band site clean.    1515 3 cc of air removed from TR band site clean     1545 3cc TR band removed site soft and dry. Applied gauze and Tegaderm     1617 discharge instructions given to patient verbalize understanding of the orders. Reviewed activity, follow up, medications, dressing care. Worsening symptoms/interventions, copy of instructions given to patient.    1623 Escorted via w/c to responsible adult with all personal belongings.

## 2025-01-13 NOTE — H&P
"History:  Primary Diagnosis: elective right with nitric oxide and left cardiac catheterization     HPI:  74 years old female patient of Dr. Quiñones and Dr. Galloway with significant history of 12/3/2024 s/p atypical right atrial flutter ablation, controlled type 2 diabetes with CKD stage III, PH, chronic hypoxic resp failure on chronic O2 therapy 2-5L/min ( since 2014), history of mitral valve repair (2009), permanent pacemaker in place (since 2014).      Recent cardiac imaging showed discrepancy between LVEDP and PCWP with concern for pulmonary venous hypertension .     Currently patient denies chest pain, shortness of breath or palpitations. Patient does complain of symptoms of worsening fatigue and dizziness.     Medical history:   Past Medical History:   Diagnosis Date    A-fib ([facility])     Asthma     BMI 35.0-35.9,adult 11/12/2018    Breath shortness 09/07/2017    uses oxygen at 2 liters/min cont. with \"[facility]    Calculus of ureter 09/18/2017    Cataract     codey IOL    Chronic anticoagulation 02/13/2017    CKD (chronic kidney disease) stage 3, GFR 30-59 ml/min     Congestive heart failure ([facility])     COPD (chronic obstructive pulmonary disease) ([facility])     Dental disorder Dentures    full dentures    Diabetes ([facility])     Emphysema of lung ([facility]) Copd    Heart murmur     Heart valve disease 2009 Implant    mitral heart valve    Hemorrhagic disorder ([facility]) Warfarin    High cholesterol     Patient denies    History of mitral valve repair     Hypertension     Infectious disease 2014    UTI/hx. MRSA    Pacemaker 2014    Pulmonary hypertension ([facility])     Renal disorder 09/07/2017    hx kidney stones    Shortness of breath 10/18/2017    Urinary incontinence        Surgical history:   Past Surgical History:   Procedure Laterality Date    URETEROSCOPY Left 09/18/2017    Procedure: URETEROSCOPY;  Surgeon: Edgardo Richter M.D.;  Location: SURGERY [facility];  Service: Urology    LASERTRIPSY Left 09/18/2017    " Procedure: LASERTRIPSY LITHO  ;  Surgeon: Edgardo Richter M.D.;  Location: SURGERY [facility];  Service: Urology    STENT REMOVAL Left 2017    Procedure: STENT REMOVAL;  Surgeon: Edgardo Richter M.D.;  Location: SURGERY [facility];  Service: Urology    CYSTOSCOPY STENT PLACEMENT Left 2017    Procedure: CYSTOSCOPY, LEFT URETERAL STENT PLACEMENT;  Surgeon: Edgardo Richter M.D.;  Location: SURGERY [facility];  Service:     PACEMAKER INSERTION  2015    KNEE REPLACEMENT, TOTAL Left 2015    MITRAL VALVE REPAIR  2009    HYSTERECTOMY, VAGINAL      CATARACT EXTRACTION WITH IOL Bilateral     GYN SURGERY  1994    Hysterectomy    OTHER      Left knee replacement//pacemaker battery    OTHER      Heart catheterization around 10/2024.    OTHER      Cardiac Ablation around 2024.    TONSILLECTOMY         Social history:   Social History     Tobacco Use   Smoking Status Former    Current packs/day: 0.00    Average packs/day: 1 pack/day for 5.0 years (5.0 ttl pk-yrs)    Types: Cigarettes    Start date: 1985    Quit date: 1990    Years since quittin.0   Smokeless Tobacco Never   Tobacco Comments    Continued abstinence      Social History     Substance and Sexual Activity   Alcohol Use Yes    Comment: only on holidays      Social History     Substance and Sexual Activity   Drug Use No        Family history:   Family History   Problem Relation Age of Onset    Lung Disease Mother         asthma    Cancer Mother         ovarian    Heart Disease Father     Stroke Father     Thyroid Sister     Cancer Brother         pancreas    Cancer Paternal Grandmother         colon    Heart Disease Brother     No Known Problems Brother     No Known Problems Brother     Heart Disease Son         afib       Allergies:   Allergies   Allergen Reactions    Amoxicillin Anaphylaxis and Shortness of Breath    Nitrofurantoin      swelling    Pcn [Penicillins]  Anaphylaxis    Vancomycin Anaphylaxis and Shortness of Breath     Severe hypotension and bronchospasm observed by anesthesia while giving  vanco during procedure    Amlodipine Besylate-Valsartan Unspecified     Chest pain and dizziness     Etodolac Hives and Nausea     Dark stools    Food Hives     Plums    Formaldehyde      Pt reports she was informed of allergy during a skin test. Pt not sure of reaction    Ivabradine      unknown    Jardiance [Empagliflozin] Unspecified     Headaches, dizziness    Skin Adhesives Hives     Patient states they cause welts for several days;  EKG stickers    Eliquis [Apixaban] Shortness of Breath     Pt reports SOB and dizzy when she took    Lisinopril Unspecified     Dizziness     Other Drug Rash     Metal silver    Other Misc Shortness of Breath and Runny Nose     Mold, dust, and feathers, adhesives, plums    Tape Rash     Paper tape ok       Home medications: No current facility-administered medications for this encounter.    Review of Systems:  Review of Systems   Constitutional:  Positive for malaise/fatigue.   Eyes:  Negative for blurred vision and double vision.   Respiratory:  Positive for shortness of breath (dyspnea on exertion). Negative for cough and wheezing.    Cardiovascular:  Negative for chest pain, palpitations, orthopnea and leg swelling.   Musculoskeletal:  Negative for falls.   Neurological:  Positive for dizziness. Negative for focal weakness, loss of consciousness, weakness and headaches.   All other systems reviewed and are negative.      Physical Examination:  Physical Exam  Vitals reviewed.   Constitutional:       Appearance: Normal appearance.   Cardiovascular:      Rate and Rhythm: Normal rate and regular rhythm.      Heart sounds: Murmur heard.   Pulmonary:      Effort: Pulmonary effort is normal.      Breath sounds: Decreased air movement present. Decreased breath sounds present.   Musculoskeletal:      Right lower leg: No edema.      Left lower leg: No  edema.   Neurological:      Mental Status: She is alert and oriented to person, place, and time.   Psychiatric:         Mood and Affect: Mood normal.         Behavior: Behavior normal.         Thought Content: Thought content normal.         Judgment: Judgment normal.             Plan:  Right with nitric oxide and Left cardiac cath with possible intervention.     The risks, benefits, and alternatives to coronary angiography with IV sedation were discussed in great detail. Specific risks mentioned include bleeding, infection, kidney damage, allergic reaction, cardiac perforation with possible tamponade requiring pericardiocentesis or possibly open heart surgery. In addition, we discussed that 10% of patients will experience small to moderate bruising at the site of the arterial puncture. Lastly, the risks of heart attack, stroke and death were discussed; the risk of major complications such as heart attack or stroke caused by the angiogram is approximately 1%; the risk of death is approximately 1 in 1000. The patient verbalized understanding of the potential complications and wishes to proceed with this procedure.    The risks/benefits of the procedure will be further discussed with the consenting physician performing the procedure.

## 2025-01-13 NOTE — DISCHARGE INSTRUCTIONS
POST ANGIOGRAM  General Care Instructions  Maintain a bandage over the incision site for 24 hours.  It's normal to find a small bruise or dime-sized lump at the insertion site. This should disappear within a few weeks.  Do not apply lotions or powders to the site.  Do not immerse the catheter insertion site in water (bathtub/swimming) for five days. It is ok to shower 24 hours after the procedure.  You may resume your normal diet immediately; on the day of your procedure, drink 6-10 glasses of water to help flush the contrast liquid out of your system.  If the doctor inserted the catheter in through your groin:  Walking short distances on a flat surface is OK. Limit going up/down stairs for the first 2 days.  DO NOT do yard work, drive, squat, lift heavy objects, or play sports for 2 days; or until your health care provider tells you it is OK.  If the doctor inserted the catheter in your arm:  For 24 hours, DO NOT lift anything heavier than 10 pounds (approximately a gallon of milk). DO NOT do any heavy pushing, pulling, or twisting.    Medications  If your current medications need to be changed, you will be provided with an updated list of your medications prior to discharge.  If you take warfarin (Coumadin), resume taking your usual dose the evening after the procedure.  DO NOT STOP taking prescribed blood thinning (anti-platelet) medications unless instructed by your cardiologist.  These medications include:  Aspirin, Clopidogrel (Plavix), Ticagrelor (Brilinta), or Prasugrel (Effient)   If you take one of the following anticoagulants, RESUME 24 HOURS after your procedure:  Apixiban (Eliquis), Rivaroxaban (Xarelto), Dabigatran (Pradaxa), Edoxaban (Savaysa)  If you take metformin (Glucophage), RESUME 48 HOURS after your procedure.    When to call your healthcare provider  Call your cardiologist right away at [phone] if you have any of the following:   Problems/Concerns taking any of your prescribed heart  medicines.   The insertion site has increasing pain, swelling, redness, bleeding, or drainage.   Your arm or leg below where the insertion site changes color, is cool, or is numb.   You have chest pain or shortness of breath that does not go away with rest.   Your pulse feels irregular -- very slow (less than 60 beats/minute) or very fast (over 100 beats/minute).   You have dizziness, fainting, or you are very tired.   You are coughing up blood or yellow or green mucus.   You have chills or a fever over 101°F (38.3°C).    If there is bleeding at the catheter insertion site, apply pressure for 10 minutes.  If bleeding persists, call 911, and continue to hold pressure until advanced medical support arrives.        Exercising Safely After Percutaneous Coronary Intervention (PCI)  After percutaneous coronary intervention (PCI), which involves angioplasty and often stenting, it's important to focus on your heart health. Exercise can help strengthen your heart. It can also help you feel good and improve your overall health. Talk with your health care provider or cardiac rehab team member about good options for you.  Start slowly. Work up to more vigorous exercise as you get stronger. Aim for at least 150 minutes of exercise each week.  Include aerobic activities. These make the heart beat faster. They work the heart and lungs, and improve the body's ability to use oxygen. Good choices include walking, swimming, and biking .  Always follow your doctor's recommendation for exercise.   You have been referred to cardiac rehabilitation, which is important for your recovery.  You may contact [facility] at [phone] to learn more and schedule a visit.        Lifestyle Management After Percutaneous Coronary Intervention (PCI)  Percutaneous coronary intervention (PCI)  involves angioplasty and often stenting. This procedure can open arteries and relieve symptoms. But, it doesn't cure coronary artery  disease. New blockages can still form. You need to take steps to prevent this by managing risk factors. Doing so will help make your heart and arteries healthier. Your doctor may prescribe cardiac rehabilitation to help with this lifelong process.  Understanding risk factors  Some risk factors for coronary artery disease can be controlled. These include smoking, high blood pressure, cholesterol, diabetes, and obesity. They can be managed with medication, diet, and exercise. Support and counseling can also play a role. The effort will pay off! Managing risk factors can help you be more active, feel better, and reduce the risk of heart attack.    If you smoke, quit!  If your doctor has been urging you to quit smoking, it's for good reasons. Smoking damages your heart, blood vessels, and lungs. The good news is that quitting can halt or even reverse the damage of smoking. To quit now:  Get medical help. Ask your doctor for advice on stop-smoking programs. Also ask about medications or nicotine replacement therapy products that may help you quit smoking.  Get support. Join a support group. Ask for help from your family and friends.  Don't give up. It often takes several tries to succeed in quitting smoking.  Avoid secondhand smoke. Ask family and friends not to smoke around you.

## 2025-01-13 NOTE — PROCEDURES
Cardiac Catheterization Laboratory Procedure Note    DATE: 1/13/2025    : Zander Quiñones MD, MPH    PROCEDURES PERFORMED:  Right heart catheterization  Left heart catheterization  Coronary angiography  Nitric oxide administration  Simultaneous LV/RV hemodynamic assessment  Moderate conscious sedation    INDICATIONS:  Pulmonary hypertension    CONSENT:  The complete alternatives, risks, and benefits of the procedure were explained to the patient. Informed consent was obtained prior to the procedure.    MEDICATIONS:  Lidocaine  Fentanyl  Midazolam  Nitroglycerin  Verapamil  Heparin    MODERATE CONSCIOUS SEDATION:  I personally supervised the administration of moderate conscious sedation by the nursing staff for 21 minutes.  Start time: 12:37   End time: 12:58    CONTRAST: Omnipaque 35 cc    RADIATION DOSE (Air Kerma): 854 mGy    FLUOROSCOPY TIME: 4.1 minutes    ACCESS:  6-Namibian Glidesheath in the right  internal jugular vein  6-Namibian Glidesheath in the right radial artery    ESTIMATED BLOOD LOSS: <20 cc    COMPLICATIONS: None    PROCEDURE IN DETAIL:     The patient was brought to the cardiac catheterization laboratory in the fasting state. The skin over the right neck was prepped and draped in the IJ venous access was obtained using US guidance modified Seldinger technique, using a sterile technique. Then a 6-Namibian Rich[place] sheath was inserted over a microwire into the right IJ vein. A 6-Namibian balloon-tipped pulmonary artery catheter was then advanced into the SVC where the balloon was inflated.  The catheter was then advanced into the right atrium, right ventricle, pulmonary artery, and wedge position with sequential pressures measured. The balloon was deflated and pulmonary artery saturation was obtained for estimation of cardiac output by the Isaac method. Cardiac output was then estimated using the thermodilution method. Following completion of right heart catheterization, we proceeded with the coronary  angiogram and left heart catheterization.    Following completion of right heart catheterization, lidocaine infiltration was used to anesthetize the tissue over the right radial artery. Using the micropuncture technique, a 6-Yi Glidesheath was inserted in the right radial artery. A 5 Yi Coral diagnostic catheter was then advanced over a standard J-wire into the left ventricular cavity where it was gently aspirated, flushed, and then withdrawn across the aortic valve with sequential pressures measured. This catheter was then used to engage the ostium of the left coronary artery and cineangiograms were obtained in multiple projections for complete evaluation of the left coronary system. This catheter was then used to engage the ostium of the right coronary artery and and cineangiograms were obtained in multiple projections for complete evaluation of the right coronary system. Following completion of coronary angiography, all wires, catheters, and sheaths were removed.  A TR band was placed over the  wrist using the patent hemostasis technique.  Manual pressure was applied to the right internal jugular vein access site for hemostasis.    RHC HEMODYNAMICS:   Significant respiratory variation was  present but end expiratory measurements were recorded  Mean arterial pressure: 97 mmHg  Right atrial pressure: 15 mmHg  Right ventricular pressure: 92/20 mmHg  Pulmonary artery pressure: 83 /34/52 mmHg  Mean end-expiratory pulmonary capillary wedge pressure: 30 mmHg (not very good waveform)  Transpulmonary gradient: 32 mmHg using LVEDP 20 mmHg  Pulmonary artery saturation: 58.8%  Systemic arterial saturation: 95.4%  Isaac cardiac output: 3.22 L/min  Isaac cardiac index: 1.7 L/min/m2  Thermodilution cardiac output: 4.63 L/min  Thermodilution cardiac index: 2.45 L/min/m2  Pulmonary vascular resistance: ~ 10 Wood units using TD CO  Systemic Vascular resistance: ~ 1420 dyne s/cm5  Cardiac Power Output () = mean arterial  pressure × CO/451 = 0.98 (Favorable > 0.6 W)  Chriss = PA pulse pressure / RA pressure = 3.26 (Favorable > 1)    Post NO 80ppm administration ~ 4min:  PA 53/16/31  PCWP 20  LVEDP 20  RV 53/10  PA sat 74.6% (from 58.8%)  Isaac CO 5.66 , CI 2.99    [facility] HEMODYNAMICS:  Aortic pressure: 156 /61/97 mmHg  LVEDP: 20 mmHg  No significant transaortic gradient on pullback    Simultaneous LV and RV tracings recorded:  Discordant LV and RV tracings with respiration suggestive of possible underlying constrictive physiology.     CORONARY ANGIOGRAPHY:  The left main coronary artery: Normal-appearing large-caliber vessel that bifurcates to LAD and left circumflex  The left anterior descending coronary artery: Large-caliber transapical vessel with mild mid segment CAD.  Gives rise to a large diagonal branch  The left circumflex coronary artery: Large-caliber vessel that gives rise to 2 large OM branches with mild CAD particularly in the proximal OM1.  The right coronary artery: Large-caliber dominant vessel with mild mid segment CAD.    IMPRESSION:  1.  Elevated right heart filling pressures: CVP ~ 15mmHg  2.  Elevated left heart filling pressures: LVEDP ~ 20mmHg  3.  Severely elevated pulmonary pressures: PASP 83mmHg, mean PA 52 mmHg, PVR ~ 10WU, PA sat 58.8%. POST NO administration at 80ppm for ~ 4min (significant improvement): PASP 53 mmHg, mean PA 31mmHg, PCWP and LVEDP ~ 20 mmHg, PA sat 74.6%  4.  Normal resting cardiac output by thermodilution (low by assumed Isaac pre NO, normal post NO administration).  5.  Mild epicardial CAD, dominant RCA  6.  Discordant LV and RV tracings with respiration suggestive of possible underlying constrictive physiology.  Patient with known history of open heart with MV repair in 2009.    No concerns of pulmonary venous related PH on today's study.    NOTIFICATION:  The patient's son -Tam - was called and notified of the results.    Patient referred to pulmonary hypertension clinic for comanagement  .  Appreciate their input particularly given the patient's favorable response to nitric oxide administration.    Zander Quiñones MD, MPH [facility]  Interventional Cardiologist  [facility]   of Clinical Internal Medicine - [facility]

## 2025-01-21 ENCOUNTER — PATIENT OUTREACH (OUTPATIENT)
Dept: HEALTH INFORMATION MANAGEMENT | Facility: OTHER | Age: 75
End: 2025-01-21
Payer: MEDICARE

## 2025-01-21 ENCOUNTER — PATIENT MESSAGE (OUTPATIENT)
Dept: HEALTH INFORMATION MANAGEMENT | Facility: OTHER | Age: 75
End: 2025-01-21

## 2025-01-21 DIAGNOSIS — J44.9 CHRONIC OBSTRUCTIVE PULMONARY DISEASE, UNSPECIFIED COPD TYPE (HCC): ICD-10-CM

## 2025-01-21 DIAGNOSIS — I50.22 CHRONIC HFREF (HEART FAILURE WITH REDUCED EJECTION FRACTION) (HCC): ICD-10-CM

## 2025-01-21 NOTE — PROGRESS NOTES
Nurse CM outreach call for monthly CCM assessment.  Pt answered telephone.    Reason for Follow-Up:    Monthly outreach call    Current Health Status:    Pt reports current healthy status is fair. Pt following in personal care management services for history of DM, CHF, COPD and CKD.    Medical Updates:  Pt reports she had recent cardiac catheterization. Pt was referred to pulmonary medicine for follow-up. Pt has upcoming appt on 1/23 for pulmonary hypertenison follow-up. Pt reports she continues to wear oxygen at 2 liters. Reports she does short of breath when her oxygen readings drop with any activity. Pt will rest until pulse oximetry reading come up to the 90's.  Pt has history of CHF. Reports no lower extremity edema today. Pt will take lasix and potassium as needed for edema. Pt following with cardiology. Pt does monitor daily weights.  Reports she needs batteries for her scale to resume checking weights.  Pt has history of DM. Last A1C was 5.8. Pt has history of CKD. Last GFR on 1/13 was 32.     Medication Updates:  No new medications.     Symptoms:  Pt reports she is feeling more fatigued since cardiac catheterization.     Plan of Care Goals and Progress:    Goal 1. Maintain fluid balance, history of CHF     Progress:  Pt reports weight has been stable. Reports no edema in lower extremities today      Barriers:  Chronic health problems     Interventions:  Pt will continue to weigh daily, take medications as directed.      Education:  Follow closely with cardiology. Notify cardiology if weight gain of 3 lbs in a day, or 5 lbs in a week    Goal 2.  Maintain good AIC for DM management     Progress:  Last A1C 5.8 on 6/11/24      Barriers:  Chronic health conditions     Interventions:  Continue to work on following a healthy meal plan. Continue to take medications     Education:  Continue to follow with specialists. Get labs completed as ordered.       Patient's Concerns and Feedback (Self Management of Care):      Pt concerned regarding some billing questions. Nurse will provide number for [employer] representative. [phone]. Pt also has questions regarding [employer] application for assistance with her [employer]. Nurse spoke to MA.  MA will re-fax application to [employer].     Next Steps:     Follow-Up Plan:  Follow-up one month around February 2025      Appointments:  Reviewed     Contact Information:  Call [phone] with any questions or concerns.

## 2025-01-21 NOTE — CARE PLAN
Problem: Knowledge deficit of congestive heart failure disease process  Goal: HP Increase understanding of congestive heart failure/long term goal  Outcome: Progressing     Problem: Excess Fluid  Goal: Establish a plan for excess fluid management/long term goal  Outcome: Progressing/ pt will continue to weight daily    Problem: Knowledge Deficit with Chronic Kidney Disease process  Goal: Patient will be able to verbalize understanding of CKD/long term goal  Outcome: Progressing  Goal: Patient will maintain/demonstrate improvement in lab values/long term goal  Outcome: Progressing

## 2025-01-22 PROCEDURE — RXMED WILLOW AMBULATORY MEDICATION CHARGE: Performed by: INTERNAL MEDICINE

## 2025-01-23 ENCOUNTER — OFFICE VISIT (OUTPATIENT)
Dept: SLEEP MEDICINE | Facility: MEDICAL CENTER | Age: 75
End: 2025-01-23
Attending: STUDENT IN AN ORGANIZED HEALTH CARE EDUCATION/TRAINING PROGRAM
Payer: MEDICARE

## 2025-01-23 ENCOUNTER — PHARMACY VISIT (OUTPATIENT)
Dept: PHARMACY | Facility: MEDICAL CENTER | Age: 75
End: 2025-01-23
Payer: COMMERCIAL

## 2025-01-23 VITALS
DIASTOLIC BLOOD PRESSURE: 48 MMHG | BODY MASS INDEX: 31.41 KG/M2 | HEIGHT: 64 IN | SYSTOLIC BLOOD PRESSURE: 144 MMHG | WEIGHT: 184 LBS | HEART RATE: 60 BPM | OXYGEN SATURATION: 94 %

## 2025-01-23 DIAGNOSIS — J84.9 INTERSTITIAL PULMONARY DISEASE (HCC): Primary | ICD-10-CM

## 2025-01-23 DIAGNOSIS — G47.33 OSA (OBSTRUCTIVE SLEEP APNEA): ICD-10-CM

## 2025-01-23 DIAGNOSIS — J44.1 CHRONIC OBSTRUCTIVE PULMONARY DISEASE WITH (ACUTE) EXACERBATION (HCC): ICD-10-CM

## 2025-01-23 DIAGNOSIS — J96.22 ACUTE AND CHRONIC RESPIRATORY FAILURE WITH HYPERCAPNIA (HCC): ICD-10-CM

## 2025-01-23 DIAGNOSIS — I27.20 PULMONARY HYPERTENSION (HCC): ICD-10-CM

## 2025-01-23 PROCEDURE — 3077F SYST BP >= 140 MM HG: CPT | Performed by: STUDENT IN AN ORGANIZED HEALTH CARE EDUCATION/TRAINING PROGRAM

## 2025-01-23 PROCEDURE — 94761 N-INVAS EAR/PLS OXIMETRY MLT: CPT

## 2025-01-23 PROCEDURE — 99213 OFFICE O/P EST LOW 20 MIN: CPT | Mod: XU | Performed by: STUDENT IN AN ORGANIZED HEALTH CARE EDUCATION/TRAINING PROGRAM

## 2025-01-23 PROCEDURE — 3078F DIAST BP <80 MM HG: CPT | Performed by: STUDENT IN AN ORGANIZED HEALTH CARE EDUCATION/TRAINING PROGRAM

## 2025-01-23 PROCEDURE — 99205 OFFICE O/P NEW HI 60 MIN: CPT | Mod: 25 | Performed by: STUDENT IN AN ORGANIZED HEALTH CARE EDUCATION/TRAINING PROGRAM

## 2025-01-23 PROCEDURE — 94761 N-INVAS EAR/PLS OXIMETRY MLT: CPT | Performed by: STUDENT IN AN ORGANIZED HEALTH CARE EDUCATION/TRAINING PROGRAM

## 2025-01-23 RX ORDER — FLUTICASONE FUROATE, UMECLIDINIUM BROMIDE AND VILANTEROL TRIFENATATE 200; 62.5; 25 UG/1; UG/1; UG/1
1 POWDER RESPIRATORY (INHALATION) DAILY
Qty: 1 EACH | Refills: 0 | COMMUNITY
Start: 2025-01-23

## 2025-01-23 RX ORDER — FLUTICASONE FUROATE, UMECLIDINIUM BROMIDE AND VILANTEROL TRIFENATATE 200; 62.5; 25 UG/1; UG/1; UG/1
1 POWDER RESPIRATORY (INHALATION) DAILY
Qty: 9 EACH | Refills: 2 | Status: SHIPPED | OUTPATIENT
Start: 2025-01-23

## 2025-01-23 RX ORDER — LEVALBUTEROL TARTRATE 45 UG/1
1-2 AEROSOL, METERED ORAL EVERY 4 HOURS PRN
Qty: 4 EACH | Refills: 3 | Status: SHIPPED | OUTPATIENT
Start: 2025-01-23

## 2025-01-23 ASSESSMENT — FIBROSIS 4 INDEX: FIB4 SCORE: 4.22

## 2025-01-23 NOTE — PROCEDURES
Multi-Ox Readings  Multi Ox #1 Room air   O2 sat % at rest 78   O2 sat % on exertion     O2 sat average on exertion     Multi Ox #2 2 LPM   O2 sat % at rest 87   O2 sat % on exertion     O2 sat average on exertion       Multi Ox #3 3 LPM   O2 sat % at rest 93   O2 sat % on exertion 84   O2 sat average on exertion     Multi Ox #4 4 LPM   O2 sat % at rest 94   O2 sat % on exertion 94   O2 sat average on exertion

## 2025-01-23 NOTE — ASSESSMENT & PLAN NOTE
We will initiate full work up , we will evaluate for Possible PH treatment next visit.     Orders:    PULMONARY FUNCTION TESTS -Test requested: Complete Pulmonary Function Test; Future      CONNECTIVE TISSUE DISEASES PROFILE; Future    6 Minute Walk; Future    ANTI SCL-70 ABS    URINE DRUG SCREEN; Future    HIV AG/AB COMBO ASSAY SCREENING; Future    HEPATITIS PANEL ACUTE(4 COMPONENTS); Future    DX-CHEST-2 VIEWS; Future    NM-LUNG VENT/PERF IMAGING; Future    RHEUMATOID ARTHRITIS FACTOR; Future    CCP    proBrain Natriuretic Peptide, NT; Future    CRP QUANTITIVE (NON-CARDIAC); Future    US-ABDOMEN COMPLETE SURVEY; Future    CT-CHEST, HIGH RESOLUTION LUNG; Future    PULMONARY FUNCTION TESTS -Test requested: Complete Pulmonary Function Test; Future    Exercise Test for Bronchospasm / 6-Minute Walk; Future

## 2025-01-23 NOTE — PROGRESS NOTES
Pulmonary Hypertension Clinic- Initial Consult    Date of Service: 1/23/2025     Referring Physician: Zander Quiñones M.D.    Reason for Consult: Pulmonary HTN    Chief Complaint: SOB on exertion       HPI: Salma Lees is a 74 y.o. female who is followed by cardiology  and is referred to the pulmonary clinic for evaluation of PH .  She has a medical history significant for COPD, HLP , s/p MV repair, CHF, Afib, T2DM , CKD, renal stones, s/p stents placement, anemia. She Started having SOB at 44 years of age with steady decline in exercise capacity, when she complained to her physician she was told she may be overweight. She started  oxygen 2 L in 2013 , she reports no DVT or PE, autoimmune issues but her Mom had lung problems. She was also prescribed phenphen for weight loss back in those days. she had an admission for HF in January 2024,  Later had ablations in 12/24 and 10/24. She reports no edema , orthopnea , chest pain , palpitations, weight gain  or cough.  She was on anoro but was expensive and unable to afford , so had to discontinue it. She is currently using only xopenex.    She is last seen in pulmonary office in 10/2023.    Functional status:  NYHA Class:   I: no symptoms with activity. Normal  II. Mild symptoms with normal physical activity and comfortable at rest.  III: Moderate symptoms with less than normal physical activity. Only comfortable at rest  IV: Severe symptoms with symptoms of heart failure, even at rest.    Pulmonary Hypertension Specific History:    Childhood illnesses/Pulmonary diseases: asthma , COPD   Smoking history: 5 years ( from ages [age])  Prior PFT: in 2022 with moderate obstruction   Hospitalizations: [facility] in 1/2024  Hx of GERD: none  Cardiac history: afib   Pertinent Medical History: CHF, CKD, Anemia  Allergies: present     Exposures/Employment history:asbestos exposure  [id number] , phenphen to  lose weight 1.5-2 years   Current pulmonary specific medications: lasix 20  "mg,Coreg , entresto  Vaccinations: COVID, flu, RSV , PCV     Pulmonary Hypertension Workup:  Echo: 1/06/25- LV mildly dilated, LVH, normal EF, dilated left atrium, MVR with mild transvalvular gradient, RVSP of 83    RHC: 1/13/25- CVP ~ 15mmHg, LVEDP ~ 20mmHg, PASP 83mmHg, mean PA 52 mmHg, PVR ~ 10WU, PA sat 58.8%. POST NO administration at 80ppm for ~ 4min (significant improvement): PASP 53 mmHg, mean PA 31mmHg, PCWP and LVEDP ~ 20 mmHg, PA sat 74.6%.  Mild epicardial CAD, dominant RCA. Discordant LV and RV tracings with respiration suggestive of possible underlying constrictive physiology.  Patient with known history of open heart with MV repair in 2009.    V/Q Scan/CTPA: ordered this visit  HRCT: ordered   CTD Panel: ordered  HIV: negative   PFT: 4/5/22- Fev1-59% , moderatekt severe obstruction  TSH: 12/2/24- normal   Hepatitis:NR   ABG: not on file   BNP: 661 to 2500   6MWT: ordered  ?Genetics: NA    REVEAL SCORE for PAH: unable to calculate due to absent parameters   WHO GROUP: PAH +3, Familial PAH +2, Associated with CTD +1, Other 0  eGFR (< 60 mL/min/1.73 m2 or renal insufficiency)  No: 0  Yes: +1  Male age > 60  No: 0, Yes: +2  NYHA: I: -1, II: 0, III: +1, IV: +2  Systolic BP mmHg: > 110: 0,  < 110: +1  HR:  <96: 0, > 96: +1  6MWT: > 440m: -2, 320-440: -1, 165-320: 0, < 165: +1  BNP pg/mL: <50: -2, 50-<200: -1, 200-<800: +1, >800: +2  Pericardial effusion on echo: No: 0, Yes: +1  DLCO: >40%: 0, <40%: +1  mRAP > 20 mmHg within 1 year on RHC: No: 0, Yes: +1  PVR <5 wood units on RHC: Yes: -1, No: 0       Past Medical History:   Diagnosis Date    A-fib (HCC)     Asthma     BMI 35.0-35.9,adult 11/12/2018    Breath shortness 09/07/2017    uses oxygen at 2 liters/min cont. with \"[facility]\"    Calculus of ureter 09/18/2017    Cataract     codey IOL    Chronic anticoagulation 02/13/2017    CKD (chronic kidney disease) stage 3, GFR 30-59 ml/min     Congestive heart failure (HCC)     COPD (chronic obstructive " pulmonary disease) (HCC)     Dental disorder Dentures    full dentures    Diabetes (HCC)     Emphysema of lung (HCC) Copd    Heart murmur     Heart valve disease 2009 Implant    mitral heart valve    Hemorrhagic disorder (HCC) Warfarin    High cholesterol     Patient denies    History of mitral valve repair     Hypertension     Infectious disease 2014    UTI/hx. MRSA    Pacemaker 2014    Pulmonary hypertension (HCC)     Renal disorder 09/07/2017    hx kidney stones    Shortness of breath 10/18/2017    Urinary incontinence        Past Surgical History:   Procedure Laterality Date    URETEROSCOPY Left 09/18/2017    Procedure: URETEROSCOPY;  Surgeon: Edgardo Richter M.D.;  Location: SURGERY [facility];  Service: Urology    LASERTRIPSY Left 09/18/2017    Procedure: LASERTRIPSY LITHO  ;  Surgeon: Edgardo Richter M.D.;  Location: SURGERY [facility];  Service: Urology    STENT REMOVAL Left 09/18/2017    Procedure: STENT REMOVAL;  Surgeon: Edgardo Richter M.D.;  Location: SURGERY [facility];  Service: Urology    CYSTOSCOPY STENT PLACEMENT Left 06/25/2017    Procedure: CYSTOSCOPY, LEFT URETERAL STENT PLACEMENT;  Surgeon: Edgardo Richter M.D.;  Location: SURGERY [facility];  Service:     PACEMAKER INSERTION  01/01/2015    KNEE REPLACEMENT, TOTAL Left 01/01/2015    MITRAL VALVE REPAIR  01/01/2009    HYSTERECTOMY, VAGINAL  1994    CATARACT EXTRACTION WITH IOL Bilateral     GYN SURGERY  1994    Hysterectomy    OTHER  2015//2022    Left knee replacement//pacemaker battery    OTHER      Heart catheterization around 10/2024.    OTHER      Cardiac Ablation around 12/2024.    TONSILLECTOMY         Social History     Socioeconomic History    Marital status:      Spouse name: Not on file    Number of children: Not on file    Years of education: Not on file    Highest education level: Not on file   Occupational History    Not on file   Tobacco Use    Smoking status: Former      Current packs/day: 0.00     Average packs/day: 1 pack/day for 5.0 years (5.0 ttl pk-yrs)     Types: Cigarettes     Start date: 1985     Quit date: 1990     Years since quittin.0    Smokeless tobacco: Never    Tobacco comments:     Continued abstinence   Vaping Use    Vaping status: Never Used   Substance and Sexual Activity    Alcohol use: Yes     Comment: only on holidays    Drug use: No    Sexual activity: Not Currently   Other Topics Concern    Not on file   Social History Narrative    She has a son. She had previously experience in [employer].     Social Drivers of Health     Financial Resource Strain: Not on file   Food Insecurity: No Food Insecurity (2024)    Hunger Vital Sign     Worried About Running Out of Food in the Last Year: Never true     Ran Out of Food in the Last Year: Never true   Transportation Needs: No Transportation Needs (2024)    PRAPARE - Transportation     Lack of Transportation (Medical): No     Lack of Transportation (Non-Medical): No   Physical Activity: Insufficiently Active (2024)    Exercise Vital Sign     Days of Exercise per Week: 3 days     Minutes of Exercise per Session: 40 min   Stress: Not on file   Social Connections: Socially Isolated (2024)    Social Connection and Isolation Panel [NHANES]     Frequency of Communication with Friends and Family: Once a week     Frequency of Social Gatherings with Friends and Family: Once a week     Attends Sabianist Services: Never     Active Member of Clubs or Organizations: No     Attends Club or Organization Meetings: Never     Marital Status:    Intimate Partner Violence: Patient Declined (12/3/2024)    Humiliation, Afraid, Rape, and Kick questionnaire     Fear of Current or Ex-Partner: Patient declined     Emotionally Abused: Patient declined     Physically Abused: Patient declined     Sexually Abused: Patient declined   Housing Stability: Low Risk  (2024)    Housing Stability Vital Sign      Unable to Pay for Housing in the Last Year: No     Number of Times Moved in the Last Year: 0     Homeless in the Last Year: No          Family History   Problem Relation Age of Onset    Lung Disease Mother         asthma    Cancer Mother         ovarian    Heart Disease Father     Stroke Father     Thyroid Sister     Cancer Brother         pancreas    Cancer Paternal Grandmother         colon    Heart Disease Brother     No Known Problems Brother     No Known Problems Brother     Heart Disease Son         afib       Current Outpatient Medications on File Prior to Visit   Medication Sig Dispense Refill    atorvastatin (LIPITOR) 10 MG Tab Take 1 tablet by mouth every day. (Patient taking differently: Take 10 mg by mouth every evening.) 100 Tablet 3    Dulaglutide 1.5 MG/0.5ML Solution Auto-injector Inject 1 pen under the skin every 7 days. Every Sunday (Patient taking differently: Inject 0.5 mL under the skin every 7 days. On Sundays) 2 mL 3    furosemide (LASIX) 20 MG Tab Take 1 Tablet by mouth every 48 hours. 45 Tablet 3    warfarin (COUMADIN) 5 MG Tab Take 0.5-1 Tablets by mouth every morning. 2.5 mg Sunday/Tuesday/Thursday, 5 mg all other days (Patient taking differently: Take 2.5-5 mg by mouth every morning. 2.5 mg Sunday/Tuesday/Thursday, 5 mg all other days      1/8/25:  On Tuesdays and Saturdays, takes 2.5 mg.  On all other days takes 5 mg.) 90 Tablet 0    calcitRIOL (ROCALTROL) 0.25 MCG Cap Take 2 Capsules by mouth every day. 200 Capsule 3    potassium Chloride ER (K-TAB) 20 MEQ Tab CR tablet Take 1 Tablet by mouth every day. (Patient taking differently: Take 20 mEq by mouth. Takes only when she takes Lasix.) 90 Tablet 0    Multiple Vitamins-Minerals (HAIR SKIN NAILS PO) Take 2 Tablets by mouth at bedtime.     MEDICATION INSTRUCTIONS FOR SURGERY/PROCEDURE SCHEDULED FOR 12-03-24   DO NOT TAKE 7 DAYS PRIOR TO SURGERY      carvedilol (COREG) 12.5 MG Tab Take 1 Tablet by mouth 2 times a day with meals. 180 Tablet  3    amiodarone (CORDARONE) 200 MG Tab Take 0.5 Tablets by mouth every day. (Patient taking differently: Take 100 mg by mouth every evening.) 90 Tablet 3    sacubitril-valsartan (ENTRESTO) 24-26 MG Tab Take 1 tablet by mouth 2 times a day. 60 Tablet 11     No current facility-administered medications on file prior to visit.       Allergies: Amoxicillin, Nitrofurantoin, Pcn [penicillins], Vancomycin, Amlodipine besylate-valsartan, Etodolac, Food, Formaldehyde, Ivabradine, Jardiance [empagliflozin], Skin adhesives, Eliquis [apixaban], Lisinopril, Other drug, Other misc, and Tape      ROS:   Review of Systems   Constitutional:  Negative for chills and weight loss.   Respiratory:  Positive for cough and shortness of breath. Negative for hemoptysis.    Cardiovascular:  Negative for chest pain, palpitations and claudication.   Gastrointestinal:  Negative for abdominal pain and vomiting.   Genitourinary:  Negative for dysuria and urgency.   Neurological:  Negative for focal weakness, seizures and loss of consciousness.   All other systems reviewed and are negative.      Vitals:  There were no vitals taken for this visit.    Physical Exam:  Physical Exam  Vitals reviewed.   Constitutional:       General: She is not in acute distress.  HENT:      Head: Normocephalic and atraumatic.   Eyes:      General: No scleral icterus.     Conjunctiva/sclera: Conjunctivae normal.   Cardiovascular:      Rate and Rhythm: Normal rate and regular rhythm.      Heart sounds: No murmur heard.  Pulmonary:      Effort: Pulmonary effort is normal. No respiratory distress.      Breath sounds: No wheezing.   Abdominal:      General: Bowel sounds are normal.      Palpations: Abdomen is soft.   Musculoskeletal:         General: No swelling.      Right lower leg: No edema.      Left lower leg: No edema.   Skin:     Coloration: Skin is not jaundiced.   Neurological:      General: No focal deficit present.      Mental Status: She is alert and oriented to  person, place, and time.           Vaccinations:    Immunization History   Administered Date(s) Administered    COVID-19, mRNA, LNP-S, PF, margie-sucrose, 30 mcg/0.3 mL 12/18/2023, 10/27/2024    Influenza Vaccine Adult HD 10/18/2017, 09/21/2018, 09/21/2018, 09/16/2019, 09/21/2020, 10/12/2021, 10/19/2022, 12/05/2023    Influenza Vaccine Quad Inj (Pf) 10/09/2015, 10/06/2016, 10/25/2016    Influenza high-dose trivalent (PF) 10/01/2024    PFIZER BIVALENT SARS-COV-2 VACCINE (12+) 09/28/2022    PFIZER MOORE CAP SARS-COV-2 VACCINATION (12+) 06/07/2022    PFIZER PURPLE CAP SARS-COV-2 VACCINATION (12+) 01/19/2021, 02/09/2021, 09/27/2021    Pneumococcal Conjugate Vaccine (Prevnar/PCV-13) 10/06/2016    Pneumococcal Vaccine (PCV7) - HISTORICAL DATA 10/25/2016    Pneumococcal polysaccharide vaccine (PPSV-23) 09/21/2011, 09/16/2019    RSV ABRYSVO VACCINE 10/27/2024    Tdap Vaccine 11/15/2012, 05/01/2023    Zoster Vaccine Recombinant (RZV) (SHINGRIX) 08/02/2021, 10/01/2021        Pertinent Studies:  Laboratory Data:  RF: pending   CCP: pending   GUSTAVO:pending   Anti-Scl-70: pending     HCG: NA   UDS: pending     BNP TREND:  661 in 10/2022                           1620- 4/23                            2531 1/2/24                              2213 5/24  CR: pending     HIV screening: negative     Thyroid studies: TSH normal     Hepatitis: negative     Polycythemia  NA    6MWT: pending     PFTs as reviewed by me personally show:  INTERPRETATION:    1.  There is a moderately severe obstructive ventilatory defect evidenced on   spirometry.  There is no significant response to bronchodilators.  2.  Lung volumes are consistent with air trapping and hyperinflation,   evidenced by the elevated RV and RV/TLC ratio.  3.  There is a moderate diffusion impairment.  The reduced DLCO and DL/VA   ratio are consistent with pulmonary emphysema, pulmonary vascular disease or   interstitial lung disease.  4.  Flow volume loop is consistent with the above  interpretation of an   obstructive ventilatory defect.  5.  Compared to prior testing in 2020, the FEV1 has declined slightly from   1.41 liters to 1.27 L or 59% predicted;    Imaging as reviewed by me personally show:    HRCT/CT Thorax: ordered    VQ: ordered    Pertinent Cardiac Studies:    Echo 12/30/2024:    CONCLUSIONS  Compared to the prior study on 04/19/2024. Improved LV function but   worsened Pulmonary pressure.  The left ventricle is mildly dilated.  Mild concentric left ventricular hypertrophy.  Normal left ventricular systolic function.  Enlarged right atrium.  Severely dilated left atrium.  Known mitral valve repair which is functioning normally with   mildlyelevated transvalvular gradient.  Moderate to severe tricuspid regurgitation.  Estimated right ventricular systolic pressure is 83 mmHg.    Echo 5/1/2024    CONCLUSIONS  Compared to the prior study on 4/19/24, contrast is used in this study   which showed left ventricular systolic function of 25%.  Severely reduced left ventricular systolic function. The ejection   fraction is measured to be 25%.  Mild aortic insufficiency.  Known mitral valve repair which is functioning normally with   appropriate transvalvular gradient. Trace mitral regurgitation.    RHC:     1/13/25  RHC HEMODYNAMICS:   Significant respiratory variation was  present but end expiratory measurements were recorded  Mean arterial pressure: 97 mmHg  Right atrial pressure: 15 mmHg  Right ventricular pressure: 92/20 mmHg  Pulmonary artery pressure: 83 /34/52 mmHg  Mean end-expiratory pulmonary capillary wedge pressure: 30 mmHg (not very good waveform)  Transpulmonary gradient: 32 mmHg using LVEDP 20 mmHg  Pulmonary artery saturation: 58.8%  Systemic arterial saturation: 95.4%  Isaac cardiac output: 3.22 L/min  Isaac cardiac index: 1.7 L/min/m2  Thermodilution cardiac output: 4.63 L/min  Thermodilution cardiac index: 2.45 L/min/m2  Pulmonary vascular resistance: ~ 10 Wood units using TD  CO  Systemic Vascular resistance: ~ 1420 dyne s/cm5  Cardiac Power Output () = mean arterial pressure × CO/451 = 0.98 (Favorable > 0.6 W)  Chriss = PA pulse pressure / RA pressure = 3.26 (Favorable > 1)     Post NO 80ppm administration ~ 4min:  PA 53/16/31  PCWP 20  LVEDP 20  RV 53/10  PA sat 74.6% (from 58.8%)  Isaac CO 5.66 , CI 2.99     [facility] HEMODYNAMICS:  Aortic pressure: 156 /61/97 mmHg  LVEDP: 20 mmHg  No significant transaortic gradient on pullback     Simultaneous LV and RV tracings recorded:  Discordant LV and RV tracings with respiration suggestive of possible underlying constrictive physiology.      CORONARY ANGIOGRAPHY:  The left main coronary artery: Normal-appearing large-caliber vessel that bifurcates to LAD and left circumflex  The left anterior descending coronary artery: Large-caliber transapical vessel with mild mid segment CAD.  Gives rise to a large diagonal branch  The left circumflex coronary artery: Large-caliber vessel that gives rise to 2 large OM branches with mild CAD particularly in the proximal OM1.  The right coronary artery: Large-caliber dominant vessel with mild mid segment CAD.     IMPRESSION:  1.  Elevated right heart filling pressures: CVP ~ 15mmHg  2.  Elevated left heart filling pressures: LVEDP ~ 20mmHg  3.  Severely elevated pulmonary pressures: PASP 83mmHg, mean PA 52 mmHg, PVR ~ 10WU, PA sat 58.8%. POST NO administration at 80ppm for ~ 4min (significant improvement): PASP 53 mmHg, mean PA 31mmHg, PCWP and LVEDP ~ 20 mmHg, PA sat 74.6%  4.  Normal resting cardiac output by thermodilution (low by assumed Isaac pre NO, normal post NO administration).  5.  Mild epicardial CAD, dominant RCA  6.  Discordant LV and RV tracings with respiration suggestive of possible underlying constrictive physiology.  Patient with known history of open heart with MV repair in 2009    10/07/2024  [facility] HEMODYNAMICS:   Significant respiratory variation was not present  Mean arterial pressure: 80  mmHg  Right atrial pressure: 8 mmHg  Right ventricular pressure: 46/5 mmHg  Pulmonary artery pressure: 49/27/36 mmHg  Mean end-expiratory pulmonary capillary wedge pressure: 30 mmHg  Transpulmonary gradient: 6 mmHg BUT LVEDP 10-12 mmHg  Pulmonary artery saturation: 66.1%  Systemic arterial saturation: 96 %  Isaac cardiac output: 4.61 L/min  Isaac cardiac index: 2.46 L/min/m2  Thermodilution cardiac output: 3.9 L/min  Thermodilution cardiac index: 2.08 L/min/m2  Pulmonary vascular resistance: 6 Wood units assuming LVEDP 12mmHg and CO 4L/min  Systemic Vascular resistance: ~ 1400 dyne s/cm5 assuming CO 4L/min  Cardiac Power Output () = mean arterial pressure × CO/451 = 0.7 (Favorable > 0.6 W)  Chriss = PA pulse pressure / RA pressure = 2.75 (Favorable > 1)     80ppm of NO for 5 min:  PA 42/38/33 mmHg  PA sat 71.3%  Isaac CO 5.59  Isaac CI 2.97  LVEDP 12mmHg  PCWP 30mmHg     [facility] HEMODYNAMICS:  Aortic pressure: 99/69/80 mmHg  LVEDP: 10-12 mmHg  No significant transaortic gradient on pullback     CORONARY ANGIOGRAPHY:  The left main coronary artery: Normal-appearing large caliber vessel that bifurcates to LAD and left circumflex  The left anterior descending coronary artery: Large-caliber transapical vessel with mild CAD.  Gives rise to a large diagonal branch without mild proximal disease.  The left circumflex coronary artery: Large-caliber vessel that gives rise to a large high OM1 with mild proximal disease, large OM 2 with minimal luminal irregularities.  The right coronary artery: Large-caliber dominant vessel, mild luminal irregularities     IMPRESSION:  1.  Minimally elevated CVP 8 mmHg  2.  Normal resting LVEDP 10-12 mmHg with no significant transaortic gradient on pullback  3.  Significantly elevated PCWP ~ 30mmHg suggestive of potentially pulmonary venous hypertension due to normal LVEDP and appropriate transmitral gradients on recent echocardiogram.  4.  Pulmonary hypertension with PVR > 3WU using LVEDP 12mmHg  instead of PCWP of 30mmHg  5.  No significant change in pulmonary pressure with nitric oxide administration  6.  Normal resting cardiac output per assumed Isaac 4.6 L/min and mildly reduced per TD 3.9 L/min  7.  Favorable Chriss and   8.  Mildly elevated SVR  9.  Mild nonobstructive epicardial CAD    Assessment & Plan  Pulmonary hypertension (HCC)  We will initiate full work up , we will evaluate for Possible PH treatment next visit.     Orders:    PULMONARY FUNCTION TESTS -Test requested: Complete Pulmonary Function Test; Future      CONNECTIVE TISSUE DISEASES PROFILE; Future    6 Minute Walk; Future    ANTI SCL-70 ABS    URINE DRUG SCREEN; Future    HIV AG/AB COMBO ASSAY SCREENING; Future    HEPATITIS PANEL ACUTE(4 COMPONENTS); Future    DX-CHEST-2 VIEWS; Future    NM-LUNG VENT/PERF IMAGING; Future    RHEUMATOID ARTHRITIS FACTOR; Future    CCP    proBrain Natriuretic Peptide, NT; Future    CRP QUANTITIVE (NON-CARDIAC); Future    US-ABDOMEN COMPLETE SURVEY; Future    CT-CHEST, HIGH RESOLUTION LUNG; Future    PULMONARY FUNCTION TESTS -Test requested: Complete Pulmonary Function Test; Future    Exercise Test for Bronchospasm / 6-Minute Walk; Future    Acute and chronic respiratory failure with hypercapnia (HCC)    Orders:    Exercise Test for Bronchospasm / 6-Minute Walk; Future    Chronic obstructive pulmonary disease with (acute) exacerbation (HCC)  She has moderately severe COPD, FEV1 of 59%, moderate impairment of diffusion  No  recent COPD exacerbations but very symptomatic  - we will start triple inhaler therapy along with Xopenex     Orders:    6 Minute Walk; Future    ALPHA-1-ANTITRYPSIN GENOTYPE W/RFLX TO PHENOTYPE; Future    fluticasone-umeclidinium-vilanterol (TRELEGY ELLIPTA) 200-62.5-25 mcg/act inhaler; Inhale 1 Puff every day.    fluticasone-umeclidinium-vilanterol (TRELEGY ELLIPTA) 200-62.5-25 mcg/act inhaler; Inhale 1 Puff every day.    levalbuterol (XOPENEX HFA) 45 MCG/ACT inhaler; Inhale 1-2 Puffs  every four hours as needed for Shortness of Breath.    CARLITO (obstructive sleep apnea)  R/o sleep apnea   Orders:    Polysomnography, 4 or More    Referral to Pulmonary and Sleep Medicine            This note was generated using voice recognition software which has a chance of producing errors of grammar and possibly content.  I have made every reasonable attempt to find and correct any obvious errors, but it should be expected that some may not be found prior to finalization of this note.    Time spent in record review prior to patient arrival, reviewing results, and in face-to-face encounter totaled 60 min, excluding any procedures if performed.

## 2025-01-26 ASSESSMENT — ENCOUNTER SYMPTOMS
PALPITATIONS: 0
WEIGHT LOSS: 0
VOMITING: 0
ABDOMINAL PAIN: 0
SHORTNESS OF BREATH: 1
COUGH: 1
FOCAL WEAKNESS: 0
SEIZURES: 0
HEMOPTYSIS: 0
CHILLS: 0
CLAUDICATION: 0
LOSS OF CONSCIOUSNESS: 0

## 2025-01-27 ENCOUNTER — TELEPHONE (OUTPATIENT)
Dept: PHARMACY | Facility: MEDICAL CENTER | Age: 75
End: 2025-01-27

## 2025-01-27 ENCOUNTER — ANTICOAGULATION VISIT (OUTPATIENT)
Dept: MEDICAL GROUP | Facility: MEDICAL CENTER | Age: 75
End: 2025-01-27
Payer: MEDICARE

## 2025-01-27 ENCOUNTER — APPOINTMENT (OUTPATIENT)
Dept: RADIOLOGY | Facility: MEDICAL CENTER | Age: 75
End: 2025-01-27
Attending: STUDENT IN AN ORGANIZED HEALTH CARE EDUCATION/TRAINING PROGRAM
Payer: MEDICARE

## 2025-01-27 DIAGNOSIS — Z98.890 H/O MITRAL VALVE REPAIR: ICD-10-CM

## 2025-01-27 DIAGNOSIS — J84.9 INTERSTITIAL PULMONARY DISEASE (HCC): ICD-10-CM

## 2025-01-27 DIAGNOSIS — I48.91 ATRIAL FIBRILLATION, UNSPECIFIED TYPE (HCC): ICD-10-CM

## 2025-01-27 LAB — INR PPP: 2 (ref 2–3.5)

## 2025-01-27 PROCEDURE — 71046 X-RAY EXAM CHEST 2 VIEWS: CPT

## 2025-01-27 PROCEDURE — 93793 ANTICOAG MGMT PT WARFARIN: CPT

## 2025-01-27 PROCEDURE — 85610 PROTHROMBIN TIME: CPT | Performed by: PHYSICIAN ASSISTANT

## 2025-01-27 NOTE — PROGRESS NOTES
Anticoagulation Summary  As of 2025      INR goal:  2.0-3.0   TTR:  53.6% (7.9 y)   INR used for dosin.00 (2025)   Warfarin maintenance plan:  2.5 mg (5 mg x 0.5) every Sun, Thu; 5 mg (5 mg x 1) all other days   Weekly warfarin total:  30 mg   Plan last modified:  Jake Betancourt, PharmD (10/24/2024)   Next INR check:  2025   Target end date:  Indefinite    Indications    Atrial fibrillation (HCC) [I48.91]  H/O mitral valve repair [Z98.890]                 Anticoagulation Episode Summary       INR check location:  --    Preferred lab:  --    Send INR reminders to:  --    Comments:  --          Anticoagulation Care Providers       Provider Role Specialty Phone number    LILIYA Pearson Referring Family Medicine [phone]    [facility] Anticoagulation Services Responsible  [phone]    Kenneth Salas, PharmD Responsible Pharmacy           Refer to Patient Findings for HPI:  Patient Findings       Negatives:  Signs/symptoms of thrombosis, Signs/symptoms of bleeding, Laboratory test error suspected, Change in health, Change in alcohol use, Change in activity, Upcoming invasive procedure, Emergency department visit, Upcoming dental procedure, Missed doses, Extra doses, Change in medications, Change in diet/appetite, Hospital admission, Bruising, Other complaints          There were no vitals filed for this visit.  Patient declined vitals    Verified current warfarin dosing schedule.    Medications reconciled.  Pt is not on antiplatelet therapy.      A/P   INR is therapeutic at 2.0  Reason(s) for out of range INR today: N/A      Warfarin dosing recommendation: Continue regimen as listed above.    Pt educated to contact our clinic with any changes in medications or s/s of bleeding or thrombosis. Pt is aware to seek immediate medical attention for falls, head injury or deep cuts.    Request pt to return in 4 week(s). Pt agrees.    Darvin GoodrichD

## 2025-01-27 NOTE — TELEPHONE ENCOUNTER
Received Refill PA request via [facility]  for fluticasone-umeclidinium-vilanterol (TRELEGY ELLIPTA) 200-62.5-25 mcg/act inhaler . (Quantity:180, Day Supply:90)     Insurance: [employer]  Member ID:  [id number]  BIN: [id number]  PCN: [id number]  Group: [id number]     Ran Test claim via [employer] & medication  pays for $94 copay.     Will release prescription to preferred pharmacy for processing: [facility]

## 2025-01-28 ENCOUNTER — NON-PROVIDER VISIT (OUTPATIENT)
Dept: SLEEP MEDICINE | Facility: MEDICAL CENTER | Age: 75
End: 2025-01-28
Attending: STUDENT IN AN ORGANIZED HEALTH CARE EDUCATION/TRAINING PROGRAM
Payer: MEDICARE

## 2025-01-28 VITALS — WEIGHT: 187 LBS | HEIGHT: 64 IN | BODY MASS INDEX: 31.92 KG/M2

## 2025-01-28 DIAGNOSIS — J84.9 INTERSTITIAL PULMONARY DISEASE (HCC): ICD-10-CM

## 2025-01-28 DIAGNOSIS — I27.20 PULMONARY HYPERTENSION (HCC): ICD-10-CM

## 2025-01-28 DIAGNOSIS — J96.22 ACUTE AND CHRONIC RESPIRATORY FAILURE WITH HYPERCAPNIA (HCC): ICD-10-CM

## 2025-01-28 PROCEDURE — 94729 DIFFUSING CAPACITY: CPT | Performed by: STUDENT IN AN ORGANIZED HEALTH CARE EDUCATION/TRAINING PROGRAM

## 2025-01-28 PROCEDURE — 94060 EVALUATION OF WHEEZING: CPT | Performed by: STUDENT IN AN ORGANIZED HEALTH CARE EDUCATION/TRAINING PROGRAM

## 2025-01-28 PROCEDURE — 94726 PLETHYSMOGRAPHY LUNG VOLUMES: CPT | Mod: 26 | Performed by: INTERNAL MEDICINE

## 2025-01-28 PROCEDURE — 94729 DIFFUSING CAPACITY: CPT | Mod: 26 | Performed by: INTERNAL MEDICINE

## 2025-01-28 PROCEDURE — 94060 EVALUATION OF WHEEZING: CPT | Mod: 26 | Performed by: INTERNAL MEDICINE

## 2025-01-28 PROCEDURE — 94618 PULMONARY STRESS TESTING: CPT | Performed by: STUDENT IN AN ORGANIZED HEALTH CARE EDUCATION/TRAINING PROGRAM

## 2025-01-28 PROCEDURE — 94726 PLETHYSMOGRAPHY LUNG VOLUMES: CPT | Performed by: STUDENT IN AN ORGANIZED HEALTH CARE EDUCATION/TRAINING PROGRAM

## 2025-01-28 ASSESSMENT — FIBROSIS 4 INDEX: FIB4 SCORE: 4.22

## 2025-01-28 NOTE — PROCEDURES
Test started on Room Air. Before starting test added 02 to 2 lpm, In 1st minute upped 02 to 3 lpm and in 4th minute upped 02 to 4 lpm. Patient finished test on 4 lpm 02.    RESULTS:  Baseline vital signs at rest were: blood pressure 124/68 mmHg; heart rate 64 bpm; and saturation 72% on room air, added 2LPM O2 resting saturations improved to 91%.  Prior to the test, the patient reported dyspnea rated 0.5/10 and fatigue rated 0/10.  The patient walked a total distance of 360 feet, or 28% predicted.  At the end of the test, heart rate was 76 bpm and oxygen saturation was 91% on 4LPM nasal cannula.  Dyspnea was reported as 1/10 and fatigue was reported as 0/10.    IKristofer M.D. am the author of this note (walk test interpretation).  __________  Kristofer Triplett MD  Pulmonary and Critical Care Medicine  [facility]

## 2025-01-28 NOTE — PROCEDURES
Tech: Ruth Vazquez CRT  Good patient effort & cooperation.  Test was performed on the [employer] Body Plethysmograph- Elite DX system.  The predicted sets used for Spirometry are GLI-2012, for Lung Volumes are ITS, and for DLCO is GLI 2017.  The results of this test meet the ATS standards for acceptability and repeatability.  The DLCO was corrected for Hb @10.3.  A bronchodilator of Ventolin HFA 2 puffs via spacer was administered.  DLCO was performed during dilation period.    Interpretation:  Spirometry is consistent with a moderately severe obstructive ventilatory defect.  There is no significant response to bronchodilators.  This is consistent with reported diagnosis of COPD.    Lung volumes show mild air trapping evidenced by the elevated RV and RV/TLC ratio.    Diffusion capacity is moderately reduced and corrects for alveolar volume consistent with obstructive airway disease and underestimation of alveolar volumes due to maldistribution of ventilation.   Compared to prior testing in 2022, the FEV1 has declined from 1.36 L to 1.08 L, or 52% predicted.    Flow volume loop is consistent with the above interpretation.    I, Kristofer Triplett M.D. am the author of this note (PFT interpretation).  __________  Kristofer Triplett MD  Pulmonary and Critical Care Medicine  [facility]

## 2025-02-03 ENCOUNTER — NON-PROVIDER VISIT (OUTPATIENT)
Dept: CARDIOLOGY | Facility: MEDICAL CENTER | Age: 75
End: 2025-02-03
Payer: MEDICARE

## 2025-02-03 PROCEDURE — 93294 REM INTERROG EVL PM/LDLS PM: CPT | Performed by: INTERNAL MEDICINE

## 2025-02-11 DIAGNOSIS — Z79.01 CHRONIC ANTICOAGULATION: ICD-10-CM

## 2025-02-11 PROCEDURE — RXMED WILLOW AMBULATORY MEDICATION CHARGE: Performed by: INTERNAL MEDICINE

## 2025-02-11 RX ORDER — WARFARIN SODIUM 5 MG/1
2.5-5 TABLET ORAL EVERY MORNING
Qty: 100 TABLET | Refills: 3 | Status: SHIPPED | OUTPATIENT
Start: 2025-02-11

## 2025-02-12 ENCOUNTER — APPOINTMENT (OUTPATIENT)
Dept: RADIOLOGY | Facility: MEDICAL CENTER | Age: 75
End: 2025-02-12
Attending: STUDENT IN AN ORGANIZED HEALTH CARE EDUCATION/TRAINING PROGRAM
Payer: MEDICARE

## 2025-02-14 ENCOUNTER — PHARMACY VISIT (OUTPATIENT)
Dept: PHARMACY | Facility: MEDICAL CENTER | Age: 75
End: 2025-02-14
Payer: COMMERCIAL

## 2025-02-15 ENCOUNTER — TELEPHONE (OUTPATIENT)
Dept: SLEEP MEDICINE | Facility: MEDICAL CENTER | Age: 75
End: 2025-02-15
Payer: MEDICARE

## 2025-02-15 NOTE — TELEPHONE ENCOUNTER
Called patient trelegy is expensive with high monthly co pay. So sent advair as per patient request.    Also her home O2 machine is no longer working , sent prescription for new DME supplies based on 6 MWT ( added to my clinic encounter). Also sent a message to our clinic staff to help fax the prescription.

## 2025-02-17 ENCOUNTER — TELEPHONE (OUTPATIENT)
Dept: SLEEP MEDICINE | Facility: MEDICAL CENTER | Age: 75
End: 2025-02-17
Payer: MEDICARE

## 2025-02-17 DIAGNOSIS — J44.9 CHRONIC OBSTRUCTIVE PULMONARY DISEASE, UNSPECIFIED COPD TYPE (HCC): Primary | ICD-10-CM

## 2025-02-18 RX ORDER — FLUTICASONE PROPIONATE AND SALMETEROL 500; 50 UG/1; UG/1
1 POWDER RESPIRATORY (INHALATION) 2 TIMES DAILY
Qty: 120 EACH | Refills: 5 | Status: SHIPPED | OUTPATIENT
Start: 2025-02-18

## 2025-02-18 NOTE — TELEPHONE ENCOUNTER
Received [employer] request via [facility]  for fluticasone-salmeterol (ADVAIR DISKUS) 500-50 MCG/ACT AEROSOL POWDER. (Quantity:180, Day Supply:90)     Insurance: [employer]  Member ID:  [id number]  BIN: [id number]  PCN: [id number]  Group: [facility]     Ran Test claim via [place] & medication Pays for a $0 copay. Will outreach to patient to offer specialty pharmacy services and or release to preferred pharmacy    Thank you,   Love Cobos [facility]  Pharmacy Liaison

## 2025-02-21 PROCEDURE — RXMED WILLOW AMBULATORY MEDICATION CHARGE: Performed by: STUDENT IN AN ORGANIZED HEALTH CARE EDUCATION/TRAINING PROGRAM

## 2025-02-21 PROCEDURE — RXMED WILLOW AMBULATORY MEDICATION CHARGE: Performed by: INTERNAL MEDICINE

## 2025-02-24 ENCOUNTER — PHARMACY VISIT (OUTPATIENT)
Dept: PHARMACY | Facility: MEDICAL CENTER | Age: 75
End: 2025-02-24
Payer: COMMERCIAL

## 2025-02-25 ENCOUNTER — PATIENT OUTREACH (OUTPATIENT)
Dept: HEALTH INFORMATION MANAGEMENT | Facility: OTHER | Age: 75
End: 2025-02-25
Payer: MEDICARE

## 2025-02-25 DIAGNOSIS — N18.32 TYPE 2 DIABETES MELLITUS WITH STAGE 3B CHRONIC KIDNEY DISEASE, WITHOUT LONG-TERM CURRENT USE OF INSULIN (HCC): ICD-10-CM

## 2025-02-25 DIAGNOSIS — I50.22 CHRONIC HFREF (HEART FAILURE WITH REDUCED EJECTION FRACTION) (HCC): ICD-10-CM

## 2025-02-25 DIAGNOSIS — E11.22 TYPE 2 DIABETES MELLITUS WITH STAGE 3B CHRONIC KIDNEY DISEASE, WITHOUT LONG-TERM CURRENT USE OF INSULIN (HCC): ICD-10-CM

## 2025-02-25 NOTE — CARE PLAN
Problem: Knowledge deficit of congestive heart failure disease process  Goal: HP Increase understanding of congestive heart failure/long term goal  Outcome: Progressing     Problem: Excess Fluid  Goal: Establish a plan for excess fluid management/long term goal  Outcome: Progressing     Problem: Recognizing current health habits that may contribute to diabetes  Goal: In conjunction with patient, identify which health habits can be modified/long term goal  Outcome: Progressing

## 2025-02-25 NOTE — PROGRESS NOTES
Nurse CM outreach call to pt for monthly CCM assessment.  Pt answered telephone.    Reason for Follow-Up:    Monthly CCM assessment    Current Health Status:    Pt following in personal care management services for history of CHF, DM, COPD and CKD.    Medical Updates:  Pt reports she had follow-up with pulmonary provider, Dr. Vaca.  Per pt she is working on getting imaging and tests completed.  Pt reports because of high co-pays she is scheduling some of the tests intermittently. Pt will complete labs from PCP and pulmonary when she is able. Pt has history of COPD. Reports no worsening respiratory symptoms. Pt is working with  pulmonary provider on getting a new portable oxygen concentrator as the one she currently has may not be working accurately. Pt will contact pulmonary for follow-up. Pt reports she wears oxygen at 3 liters at rest and 5 liters with exertion. Reports pulse oximetry readings have been in a good range, above 90%. Pt has history of CHF. Last cardiology appt was 1/7/25. Pt reports no lower extremity edema. Pt monitoring daily weights. States weight is stable around 172-173 lbs.  Pt has history of CKD. Last GFR was 32 on 1/13/25. Pt has history of DM. Last A1C was 5.5 on 12/2/24.    Medication Updates:  Pt reports she was recently started on Advair    Symptoms:  Reports no new symptoms today.    Plan of Care Goals and Progress:    Goal 1. Maintain fluid balance     Progress:  Pt reports no lower extremity edema today      Barriers:  Chronic health conditions, history of CHF.     Interventions:  Continue to take diuretics as directed, monitor daily weights     Education:  Reviewed notifying cardiology if weight gain of 3 lbs in a day, 5 lbs in a week.  Follow low sodium diet    Goal 2. Maintain A1C in good range     Progress:  Last A1C was 5.5      Barriers:  Chronic health conditions     Interventions:  Get labs completed as ordered by PCP. Continue to work on following heart healthy meal  plan, limit sweets and carbohydrates in diet.     Education:  Continue to follow closely with specialists. Activity as tolerated.       Patient's Concerns and Feedback (Self Management of Care):     Pt reports only concern today is the portable oxygen tank. Pt will contact pulmonary for follow-up. Pt will continue to monitor symptoms and follow-up with specialists    Next Steps:     Follow-Up Plan:  Follow-up in 4 weeks, around March 2025      Appointments:  2/27 at 10:30 with anti-coag clinic     Contact Information:  Call [phone] with any questions or concerns.

## 2025-02-27 ENCOUNTER — APPOINTMENT (OUTPATIENT)
Dept: LAB | Facility: MEDICAL CENTER | Age: 75
End: 2025-02-27
Payer: MEDICARE

## 2025-02-27 ENCOUNTER — ANTICOAGULATION VISIT (OUTPATIENT)
Dept: MEDICAL GROUP | Facility: MEDICAL CENTER | Age: 75
End: 2025-02-27
Payer: MEDICARE

## 2025-02-27 VITALS
BODY MASS INDEX: 29.71 KG/M2 | HEIGHT: 64 IN | HEART RATE: 61 BPM | SYSTOLIC BLOOD PRESSURE: 136 MMHG | RESPIRATION RATE: 15 BRPM | DIASTOLIC BLOOD PRESSURE: 94 MMHG | WEIGHT: 174 LBS

## 2025-02-27 DIAGNOSIS — Z98.890 H/O MITRAL VALVE REPAIR: ICD-10-CM

## 2025-02-27 LAB — INR PPP: 1.4 (ref 2–3.5)

## 2025-02-27 ASSESSMENT — FIBROSIS 4 INDEX: FIB4 SCORE: 4.28

## 2025-02-27 NOTE — PROGRESS NOTES
Anticoagulation Summary  As of 2025      INR goal:  2.0-3.0   TTR:  53.0% (8 y)   INR used for dosin.40 (2025)   Warfarin maintenance plan:  5 mg (5 mg x 1) every day   Weekly warfarin total:  35 mg   Plan last modified:  Ivy Galloway, PharmD (2025)   Next INR check:  3/3/2025   Target end date:  Indefinite    Indications    Atrial fibrillation (HCC) [I48.91]  H/O mitral valve repair [Z98.890]                 Anticoagulation Episode Summary       INR check location:  --    Preferred lab:  --    Send INR reminders to:  --    Comments:  --          Anticoagulation Care Providers       Provider Role Specialty Phone number    Zeferino Almaraz, A.P.N. Referring Family Medicine [phone]    [facility] Anticoagulation Services Responsible  [phone]    Kenneth Salas, PharmD Responsible Pharmacy                 Refer to Patient Findings for HPI:  Patient Findings       Negatives:  Signs/symptoms of thrombosis, Signs/symptoms of bleeding, Laboratory test error suspected, Change in health, Change in alcohol use, Change in activity, Upcoming invasive procedure, Emergency department visit, Upcoming dental procedure, Missed doses, Extra doses, Change in medications, Change in diet/appetite, Hospital admission, Bruising, Other complaints            Date Referral Placed: 24      Vitals:  Vitals:    25 1034   BP: (!) 136/94   Pulse: 61         Verified current warfarin dosing schedule.    Medications reconciled.  Pt is not on antiplatelet therapy.      A/P   INR is subtherapeutic - based on history of MVR and chadsvasc of 7 bridging is indicated,discussed with patient and based on shared decision making she would like to bolus warfarin and check INR at shorter interval - declines lovenox at this time.   Reason(s) for out of range INR today: N/A      Warfarin dosing recommendation: Increase to Warfarin 10 mg tonight and then resume 5 mg daily.    Pt educated to contact our clinic with any changes in  medications or s/s of bleeding or thrombosis. Pt is aware to seek immediate medical attention for falls, head injury or deep cuts.    Request pt to return in 4 day(s). Pt agrees.    Joleen HallmanD

## 2025-02-28 ENCOUNTER — TELEPHONE (OUTPATIENT)
Dept: HEALTH INFORMATION MANAGEMENT | Facility: OTHER | Age: 75
End: 2025-02-28
Payer: MEDICARE

## 2025-03-03 ENCOUNTER — HOSPITAL ENCOUNTER (OUTPATIENT)
Dept: LAB | Facility: MEDICAL CENTER | Age: 75
End: 2025-03-03
Attending: STUDENT IN AN ORGANIZED HEALTH CARE EDUCATION/TRAINING PROGRAM
Payer: MEDICARE

## 2025-03-03 DIAGNOSIS — E11.22 TYPE 2 DIABETES MELLITUS WITH STAGE 3B CHRONIC KIDNEY DISEASE, WITHOUT LONG-TERM CURRENT USE OF INSULIN (HCC): ICD-10-CM

## 2025-03-03 DIAGNOSIS — N18.32 TYPE 2 DIABETES MELLITUS WITH STAGE 3B CHRONIC KIDNEY DISEASE, WITHOUT LONG-TERM CURRENT USE OF INSULIN (HCC): ICD-10-CM

## 2025-03-03 LAB
EST. AVERAGE GLUCOSE BLD GHB EST-MCNC: 100 MG/DL
HBA1C MFR BLD: 5.1 % (ref 4–5.6)

## 2025-03-03 PROCEDURE — 83036 HEMOGLOBIN GLYCOSYLATED A1C: CPT

## 2025-03-03 PROCEDURE — 36415 COLL VENOUS BLD VENIPUNCTURE: CPT

## 2025-03-06 ENCOUNTER — APPOINTMENT (OUTPATIENT)
Dept: RADIOLOGY | Facility: MEDICAL CENTER | Age: 75
End: 2025-03-06
Attending: STUDENT IN AN ORGANIZED HEALTH CARE EDUCATION/TRAINING PROGRAM
Payer: MEDICARE

## 2025-03-06 ENCOUNTER — RESULTS FOLLOW-UP (OUTPATIENT)
Dept: MEDICAL GROUP | Facility: PHYSICIAN GROUP | Age: 75
End: 2025-03-06
Payer: MEDICARE

## 2025-03-06 DIAGNOSIS — J84.9 INTERSTITIAL PULMONARY DISEASE (HCC): ICD-10-CM

## 2025-03-06 PROCEDURE — 76700 US EXAM ABDOM COMPLETE: CPT

## 2025-03-10 ENCOUNTER — TELEPHONE (OUTPATIENT)
Dept: HEALTH INFORMATION MANAGEMENT | Facility: OTHER | Age: 75
End: 2025-03-10
Payer: MEDICARE

## 2025-03-10 NOTE — TELEPHONE ENCOUNTER
Pt called and has questions regarding getting a portable oxygen concentrator, the [employer] machine. Pt reports she has been trying to reach somebody at pulmonary office. Nurse contacted pulmonary and spoke to representative, Kevin.  Kevin will route a message to the pulmonary provider's MA for follow-up.

## 2025-03-10 NOTE — TELEPHONE ENCOUNTER
Called and spoke with pt. Pt would like to switch DME co and have her Order sent to [employer].    Faxed Order, demo, insurance card, Ov note and Multi-ox to DME:  [employer] / rita [phone] / riley [phone]      Informed pt to call [employer] in a few days to make sure they have received our order and to message/call us if there is any issues.

## 2025-03-10 NOTE — TELEPHONE ENCOUNTER
Nurse Shannan Edwards calling on behalf of the PT. The Pt states they've been leaving MSG's for a call back. Nurse states the Pt has many questions RE: O2. Please contact the Pt: [phone].

## 2025-03-11 ENCOUNTER — APPOINTMENT (OUTPATIENT)
Dept: RADIOLOGY | Facility: MEDICAL CENTER | Age: 75
End: 2025-03-11
Attending: STUDENT IN AN ORGANIZED HEALTH CARE EDUCATION/TRAINING PROGRAM
Payer: MEDICARE

## 2025-03-11 DIAGNOSIS — J84.9 INTERSTITIAL PULMONARY DISEASE (HCC): ICD-10-CM

## 2025-03-11 PROCEDURE — 71046 X-RAY EXAM CHEST 2 VIEWS: CPT

## 2025-03-11 PROCEDURE — A9540 TC99M MAA: HCPCS

## 2025-03-13 ENCOUNTER — ANTICOAGULATION VISIT (OUTPATIENT)
Dept: MEDICAL GROUP | Facility: MEDICAL CENTER | Age: 75
End: 2025-03-13
Payer: MEDICARE

## 2025-03-13 VITALS — BODY MASS INDEX: 30.55 KG/M2 | WEIGHT: 178 LBS

## 2025-03-13 DIAGNOSIS — Z98.890 H/O MITRAL VALVE REPAIR: ICD-10-CM

## 2025-03-13 LAB — INR PPP: 3.6 (ref 2–3.5)

## 2025-03-13 PROCEDURE — 99211 OFF/OP EST MAY X REQ PHY/QHP: CPT | Performed by: STUDENT IN AN ORGANIZED HEALTH CARE EDUCATION/TRAINING PROGRAM

## 2025-03-13 ASSESSMENT — FIBROSIS 4 INDEX: FIB4 SCORE: 4.28

## 2025-03-13 NOTE — PROGRESS NOTES
Anticoagulation Summary  As of 3/13/2025      INR goal:  2.0-3.0   TTR:  53.0% (8 y)   INR used for dosing:  3.60 (3/13/2025)   Warfarin maintenance plan:  5 mg (5 mg x 1) every day   Weekly warfarin total:  35 mg   Plan last modified:  Joleen HallmanD (2/27/2025)   Next INR check:  3/24/2025   Target end date:  Indefinite    Indications    Atrial fibrillation (HCC) [I48.91]  H/O mitral valve repair [Z98.890]                 Anticoagulation Episode Summary       INR check location:  --    Preferred lab:  --    Send INR reminders to:  --    Comments:  --          Anticoagulation Care Providers       Provider Role Specialty Phone number    Zeferino Almaraz A.P.N. Referring Family Medicine [phone]    [facility] Anticoagulation Services Responsible  [phone]    Kenneth Salas, PharmD Responsible Pharmacy                 Refer to Patient Findings for HPI:  Patient Findings       Positives:  Signs/symptoms of bleeding (blood when blowing nose), Extra doses    Negatives:  Signs/symptoms of thrombosis, Laboratory test error suspected, Change in health, Change in alcohol use, Change in activity, Upcoming invasive procedure, Emergency department visit, Upcoming dental procedure, Missed doses, Change in medications, Change in diet/appetite, Hospital admission, Bruising, Other complaints            Date Referral Placed: 12/4/24      Vitals:  Vitals:    03/13/25 0828   Weight: 80.7 kg (178 lb)     Pt declined vitals    Verified current warfarin dosing schedule.    Medications reconciled.  Pt is not on antiplatelet therapy.      A/P   INR is supratherapeutic - patient unable to get INR at lab done so she self increased warfarin dose  Reason(s) for out of range INR today: Extra dose(s)      Warfarin dosing recommendation: Decrease to Warfarin 2.5 mg tomorrow then continue 5 mg daily.     Pt educated to contact our clinic with any changes in medications or s/s of bleeding or thrombosis. Pt is aware to seek immediate  medical attention for falls, head injury or deep cuts.    Request pt to return in 1 week(s). Pt agrees.    Joleen HallmanD

## 2025-03-20 NOTE — CARDIAC REMOTE MONITOR - SCAN
Device transmission reviewed. Device demonstrated appropriate function.       Electronically Signed by: Jhon Galloway M.D.    3/22/2025  2:55 PM

## 2025-03-24 ENCOUNTER — ANTICOAGULATION VISIT (OUTPATIENT)
Dept: MEDICAL GROUP | Facility: MEDICAL CENTER | Age: 75
End: 2025-03-24
Payer: MEDICARE

## 2025-03-24 VITALS
DIASTOLIC BLOOD PRESSURE: 43 MMHG | WEIGHT: 189.38 LBS | HEART RATE: 59 BPM | SYSTOLIC BLOOD PRESSURE: 166 MMHG | BODY MASS INDEX: 32.33 KG/M2 | HEIGHT: 64 IN

## 2025-03-24 DIAGNOSIS — Z98.890 H/O MITRAL VALVE REPAIR: ICD-10-CM

## 2025-03-24 LAB — INR PPP: 2.7 (ref 2–3.5)

## 2025-03-24 PROCEDURE — 3078F DIAST BP <80 MM HG: CPT | Performed by: NURSE PRACTITIONER

## 2025-03-24 PROCEDURE — 93793 ANTICOAG MGMT PT WARFARIN: CPT | Performed by: NURSE PRACTITIONER

## 2025-03-24 PROCEDURE — 3077F SYST BP >= 140 MM HG: CPT | Performed by: NURSE PRACTITIONER

## 2025-03-24 PROCEDURE — 85610 PROTHROMBIN TIME: CPT

## 2025-03-24 ASSESSMENT — FIBROSIS 4 INDEX: FIB4 SCORE: 4.28

## 2025-03-24 NOTE — PROGRESS NOTES
Anticoagulation Summary  As of 3/24/2025      INR goal:  2.0-3.0   TTR:  52.9% (8 y)   INR used for dosin.70 (3/24/2025)   Warfarin maintenance plan:  5 mg (5 mg x 1) every day   Weekly warfarin total:  35 mg   Plan last modified:  Ivy Galloway PharmD (2025)   Next INR check:  4/10/2025   Target end date:  Indefinite    Indications    Atrial fibrillation (HCC) [I48.91]  H/O mitral valve repair [Z98.890]                 Anticoagulation Episode Summary       INR check location:  --    Preferred lab:  --    Send INR reminders to:  --    Comments:  --          Anticoagulation Care Providers       Provider Role Specialty Phone number    Zeferino Almaraz A.P.N. Referring Family Medicine [phone]    [facility] Anticoagulation Services Responsible  [phone]    Joleen BoxD Responsible Pharmacy                 Refer to Patient Findings for HPI:  Patient Findings       Negatives:  Signs/symptoms of thrombosis, Signs/symptoms of bleeding, Laboratory test error suspected, Change in health, Change in alcohol use, Change in activity, Upcoming invasive procedure, Emergency department visit, Upcoming dental procedure, Missed doses, Extra doses, Change in medications, Change in diet/appetite, Hospital admission, Bruising, Other complaints            Date Referral Placed: 24      Vitals:  Vitals:    25 1112   Weight: 85.9 kg (189 lb 6 oz)       Verified current warfarin dosing schedule.    Medications reconciled.  Pt is not on antiplatelet therapy.      A/P   INR is therapeutic  Reason(s) for out of range INR today: N/A      Warfarin dosing recommendation: Continue regimen as listed above.    Pt educated to contact our clinic with any changes in medications or s/s of bleeding or thrombosis. Pt is aware to seek immediate medical attention for falls, head injury or deep cuts.    Request pt to return in 2 week(s). Pt agrees.    Joleen HallmanD

## 2025-03-26 ENCOUNTER — PATIENT OUTREACH (OUTPATIENT)
Dept: HEALTH INFORMATION MANAGEMENT | Facility: OTHER | Age: 75
End: 2025-03-26
Payer: MEDICARE

## 2025-03-26 DIAGNOSIS — I50.22 CHRONIC HFREF (HEART FAILURE WITH REDUCED EJECTION FRACTION) (HCC): ICD-10-CM

## 2025-03-26 DIAGNOSIS — J44.9 CHRONIC OBSTRUCTIVE PULMONARY DISEASE, UNSPECIFIED COPD TYPE (HCC): ICD-10-CM

## 2025-03-26 NOTE — CARE PLAN
Problem: Knowledge deficit of congestive heart failure disease process  Goal: HP Increase understanding of congestive heart failure/long term goal  Outcome: Progressing     Problem: Excess Fluid  Goal: Establish a plan for excess fluid management/long term goal  Outcome: Not Met     Problem: Knowledge Deficit with Chronic Kidney Disease process  Goal: Patient will be able to verbalize understanding of CKD/long term goal  Outcome: Progressing

## 2025-03-26 NOTE — PROGRESS NOTES
Nurse CHAYA outreach call to pt for monthly CCM assessment.  Pt answered telephone.    Reason for Follow-Up:    Monthly CCM assessment    Current Health Status:    Pt following in personal care management for history of COPD, CHF, DM, and CKD.     Medical Updates:  Pt reports she is doing okay.  Reports she did receive the portable concentrator from "[employer].  Pt reports no worsening respiratory symptoms. Pt has follow-up with pulmonary provider on 4/30/25.  Pt has history of CHF. Reports she has been having some worsening edema in her lower extremities over the past week.   Pt reports it is probably related to increased sodium in her diet. States for a couple of days she had increased salt with corned beef on one occasion and then some fast food tacos.  Reports she is now back on track and watching sodium content in her diet. Pt reports she has been taking her furosemide as directed. Pt reports her scale is not working accurately at home to monitor home weights. States edema has improved in lower extremities today.  Denies any worsening shortness of breath. Pt has history of CKD. Last GFR was 32 on 1/13/25. Pt has some upcoming labs due and will complete before appts with PCP and specialists.     Medication Updates:  No changes to current medications.     Symptoms:  Reports no change in symptoms. Pt states edema in lower extremities is improving    Plan of Care Goals and Progress:    Goal 1. Maintain fluid balance     Progress:  Pt reports increase in lower extremity edema related to some dietary changes over the past several weeks. Pt reports she is monitoring and edema is improving.       Barriers:  Pt reports no having a working scale to monitor weights. Increased sodium in diet.     Interventions:  Pt working on lowering sodium in diet. Stay hydrated. Follow-up with specialists. Nurse CM will provide pt with a new scale to monitor weights. Pt can  at next appt or come in sooner to see nurse CHAYA for  scale.      Education:  Follow heart healthy meal plan, limit sodium in diet. Notify cardiology if weight gain of 3 lbs in a day, or 5 lbs in a week.    Goal 2. Improved lab values     Progress:  Last GFR was 32 on 1/13/25. Previous reading was 31.       Barriers:  Chronic health conditions     Interventions:  Follow-up with PCP as scheduled. Stay hydrated. Reduce sodium in diet     Education:  Monitor weight, follow heart healthy meal plan. Follow up with specialists as scheduled.       Patient's Concerns and Feedback (Self Management of Care):     Pt reports she has been monitoring blood pressure readings.  States increased readings at home and also at anti-coag clinic. Pt reports reading yesterday of 152/68. Pt will continue to monitor and will bring log of readings to upcoming cardiology appt.     Next Steps:     Follow-Up Plan:  Follow-up in 4 weeks, around April 2025.      Appointments:  3/29/25 radiology at 10:00 am, CT scan     Contact Information:  Call [phone] with any questions or concerns.

## 2025-03-29 ENCOUNTER — HOSPITAL ENCOUNTER (OUTPATIENT)
Dept: RADIOLOGY | Facility: MEDICAL CENTER | Age: 75
End: 2025-03-29
Attending: STUDENT IN AN ORGANIZED HEALTH CARE EDUCATION/TRAINING PROGRAM
Payer: MEDICARE

## 2025-03-29 DIAGNOSIS — J84.9 INTERSTITIAL PULMONARY DISEASE (HCC): ICD-10-CM

## 2025-03-29 PROCEDURE — 71250 CT THORAX DX C-: CPT

## 2025-04-08 ENCOUNTER — OFFICE VISIT (OUTPATIENT)
Dept: CARDIOLOGY | Facility: MEDICAL CENTER | Age: 75
End: 2025-04-08
Attending: INTERNAL MEDICINE
Payer: MEDICARE

## 2025-04-08 VITALS
BODY MASS INDEX: 32.44 KG/M2 | HEIGHT: 64 IN | WEIGHT: 190 LBS | HEART RATE: 60 BPM | DIASTOLIC BLOOD PRESSURE: 50 MMHG | RESPIRATION RATE: 16 BRPM | SYSTOLIC BLOOD PRESSURE: 120 MMHG | OXYGEN SATURATION: 97 %

## 2025-04-08 DIAGNOSIS — I27.20 PULMONARY HYPERTENSION (HCC): ICD-10-CM

## 2025-04-08 DIAGNOSIS — Z86.79 S/P ABLATION OF ATRIAL FLUTTER: ICD-10-CM

## 2025-04-08 DIAGNOSIS — Z98.890 H/O MITRAL VALVE REPAIR: ICD-10-CM

## 2025-04-08 DIAGNOSIS — E78.5 DYSLIPIDEMIA ASSOCIATED WITH TYPE 2 DIABETES MELLITUS (HCC): ICD-10-CM

## 2025-04-08 DIAGNOSIS — I48.91 ATRIAL FIBRILLATION, UNSPECIFIED TYPE (HCC): ICD-10-CM

## 2025-04-08 DIAGNOSIS — Z95.0 PACEMAKER: ICD-10-CM

## 2025-04-08 DIAGNOSIS — Z79.01 ANTICOAGULATED: ICD-10-CM

## 2025-04-08 DIAGNOSIS — G45.9 TIA (TRANSIENT ISCHEMIC ATTACK): ICD-10-CM

## 2025-04-08 DIAGNOSIS — E11.69 DYSLIPIDEMIA ASSOCIATED WITH TYPE 2 DIABETES MELLITUS (HCC): ICD-10-CM

## 2025-04-08 DIAGNOSIS — I10 BENIGN ESSENTIAL HYPERTENSION: ICD-10-CM

## 2025-04-08 DIAGNOSIS — Z98.890 S/P ABLATION OF ATRIAL FLUTTER: ICD-10-CM

## 2025-04-08 DIAGNOSIS — I47.19 ATRIAL TACHYCARDIA (HCC): ICD-10-CM

## 2025-04-08 PROCEDURE — 93280 PM DEVICE PROGR EVAL DUAL: CPT | Mod: 26 | Performed by: INTERNAL MEDICINE

## 2025-04-08 PROCEDURE — 93280 PM DEVICE PROGR EVAL DUAL: CPT | Performed by: INTERNAL MEDICINE

## 2025-04-08 PROCEDURE — 99213 OFFICE O/P EST LOW 20 MIN: CPT | Performed by: INTERNAL MEDICINE

## 2025-04-08 PROCEDURE — 3078F DIAST BP <80 MM HG: CPT | Performed by: INTERNAL MEDICINE

## 2025-04-08 PROCEDURE — 3074F SYST BP LT 130 MM HG: CPT | Performed by: INTERNAL MEDICINE

## 2025-04-08 PROCEDURE — 99214 OFFICE O/P EST MOD 30 MIN: CPT | Mod: 25 | Performed by: INTERNAL MEDICINE

## 2025-04-08 ASSESSMENT — FIBROSIS 4 INDEX: FIB4 SCORE: 4.28

## 2025-04-08 NOTE — LETTER
"     [facility] - Operated by [facility]   [address], [address]  [place] [address]  Phone: [phone]  Fax: [phone]              Salma Lees  1950    Encounter Date: 4/8/2025    Jhon Galloway M.D.          PROGRESS NOTE:  Chief Complaint   Patient presents with   • Atrial Fibrillation       Subjective    Salma Lees is a 75 y.o. female who presents today with history of atrial tachycardia right sided.  Pulmonary hypertension.  Followed by Dr. Quiñones and  pulmonary.  Recent right heart cath consistent with precapillary and postcapillary pulmonary hypertension.  Some response to nitric oxide.  Patient feels fine.  Maintaining sinus rhythm.  On low-dose amiodarone.    Past Medical History:   Diagnosis Date   • A-fib ([facility])    • Asthma    • BMI 35.0-35.9,adult 11/12/2018   • Breath shortness 09/07/2017    uses oxygen at 2 liters/min cont. with \"[facility]   • Calculus of ureter 09/18/2017   • Cataract     codey IOL   • Chronic anticoagulation 02/13/2017   • CKD (chronic kidney disease) stage 3, GFR 30-59 ml/min    • Congestive heart failure ([facility])    • COPD (chronic obstructive pulmonary disease) ([facility])    • Dental disorder Dentures    full dentures   • Diabetes ([facility])    • Emphysema of lung ([facility]) Copd   • Heart murmur    • Heart valve disease 2009 Implant    mitral heart valve   • Hemorrhagic disorder ([facility]) Warfarin   • High cholesterol     Patient denies   • History of mitral valve repair    • Hypertension    • Infectious disease 2014    UTI/hx. MRSA   • Pacemaker 2014   • Pulmonary hypertension ([facility])    • Renal disorder 09/07/2017    hx kidney stones   • Shortness of breath 10/18/2017   • Urinary incontinence      Past Surgical History:   Procedure Laterality Date   • URETEROSCOPY Left 09/18/2017    Procedure: URETEROSCOPY;  Surgeon: Edgardo Richter M.D.;  Location: SURGERY [facility];  Service: Urology   • " LASERTRIPSY Left 2017    Procedure: LASERTRIPSY LITHO  ;  Surgeon: Edgardo Richter M.D.;  Location: SURGERY [facility];  Service: Urology   • STENT REMOVAL Left 2017    Procedure: STENT REMOVAL;  Surgeon: Edgardo Richter M.D.;  Location: SURGERY [facility];  Service: Urology   • CYSTOSCOPY STENT PLACEMENT Left 2017    Procedure: CYSTOSCOPY, LEFT URETERAL STENT PLACEMENT;  Surgeon: Edgardo Richter M.D.;  Location: SURGERY [facility];  Service:    • PACEMAKER INSERTION  2015   • KNEE REPLACEMENT, TOTAL Left 2015   • MITRAL VALVE REPAIR  2009   • HYSTERECTOMY, VAGINAL     • CATARACT EXTRACTION WITH IOL Bilateral    • GYN SURGERY      Hysterectomy   • OTHER      Left knee replacement//pacemaker battery   • OTHER      Heart catheterization around 10/2024.   • OTHER      Cardiac Ablation around 2024.   • TONSILLECTOMY       Family History   Problem Relation Age of Onset   • Lung Disease Mother         asthma   • Cancer Mother         ovarian   • Heart Disease Father    • Stroke Father    • Thyroid Sister    • Cancer Brother         pancreas   • Cancer Paternal Grandmother         colon   • Heart Disease Brother    • No Known Problems Brother    • No Known Problems Brother    • Heart Disease Son         afib     Social History     Socioeconomic History   • Marital status:      Spouse name: Not on file   • Number of children: Not on file   • Years of education: Not on file   • Highest education level: Not on file   Occupational History   • Not on file   Tobacco Use   • Smoking status: Former     Current packs/day: 0.00     Average packs/day: 1 pack/day for 5.0 years (5.0 ttl pk-yrs)     Types: Cigarettes     Start date: 1985     Quit date: 1990     Years since quittin.2   • Smokeless tobacco: Never   • Tobacco comments:     Continued abstinence   Vaping Use   • Vaping status: Never Used   Substance and Sexual  Activity   • Alcohol use: Yes     Comment: only on holidays   • Drug use: No   • Sexual activity: Not Currently   Other Topics Concern   • Not on file   Social History Narrative    She has a son. She had previously experience in [employer].     Social Drivers of Health     Financial Resource Strain: Not on file   Food Insecurity: No Food Insecurity (12/20/2024)    Hunger Vital Sign    • Worried About Running Out of Food in the Last Year: Never true    • Ran Out of Food in the Last Year: Never true   Transportation Needs: No Transportation Needs (12/20/2024)    PRAPARE - Transportation    • Lack of Transportation (Medical): No    • Lack of Transportation (Non-Medical): No   Physical Activity: Insufficiently Active (12/20/2024)    Exercise Vital Sign    • Days of Exercise per Week: 3 days    • Minutes of Exercise per Session: 40 min   Stress: Not on file   Social Connections: Socially Isolated (12/20/2024)    Social Connection and Isolation Panel [NHANES]    • Frequency of Communication with Friends and Family: Once a week    • Frequency of Social Gatherings with Friends and Family: Once a week    • Attends Jain Services: Never    • Active Member of Clubs or Organizations: No    • Attends Club or Organization Meetings: Never    • Marital Status:    Intimate Partner Violence: Patient Declined (12/3/2024)    Humiliation, Afraid, Rape, and Kick questionnaire    • Fear of Current or Ex-Partner: Patient declined    • Emotionally Abused: Patient declined    • Physically Abused: Patient declined    • Sexually Abused: Patient declined   Housing Stability: Low Risk  (12/20/2024)    Housing Stability Vital Sign    • Unable to Pay for Housing in the Last Year: No    • Number of Times Moved in the Last Year: 0    • Homeless in the Last Year: No     Allergies   Allergen Reactions   • Amoxicillin Anaphylaxis and Shortness of Breath   • Nitrofurantoin      swelling   • Pcn [Penicillins] Anaphylaxis   • Vancomycin Anaphylaxis  and Shortness of Breath     Severe hypotension and bronchospasm observed by anesthesia while giving  vanco during procedure   • Amlodipine Besylate-Valsartan Unspecified     Chest pain and dizziness    • Etodolac Hives and Nausea     Dark stools   • Food Hives     Plums   • Formaldehyde      Pt reports she was informed of allergy during a skin test. Pt not sure of reaction   • Ivabradine      unknown   • Jardiance [Empagliflozin] Unspecified     Headaches, dizziness   • Skin Adhesives Hives     Patient states they cause welts for several days;  EKG stickers   • Eliquis [Apixaban] Shortness of Breath     Pt reports SOB and dizzy when she took   • Lisinopril Unspecified     Dizziness    • Other Drug Rash     Metal silver   • Other Misc Shortness of Breath and Runny Nose     Mold, dust, and feathers, adhesives, plums   • Tape Rash     Paper tape ok     Outpatient Encounter Medications as of 4/8/2025   Medication Sig Dispense Refill   • fluticasone-salmeterol (ADVAIR) 500-50 MCG/ACT AEROSOL POWDER, BREATH ACTIVATED Inhale 1 Puff 2 times a day. 120 Each 5   • fluticasone-salmeterol (ADVAIR DISKUS) 500-50 MCG/ACT AEROSOL POWDER, BREATH ACTIVATED Inhale 1 Puff every 12 hours for 270 days. 3 Each 2   • warfarin (COUMADIN) 5 MG Tab Take 0.5-1 Tablets by mouth every morning. 2.5 mg Sunday/Tuesday/Thursday, 5 mg all other days 100 Tablet 3   • fluticasone-umeclidinium-vilanterol (TRELEGY ELLIPTA) 200-62.5-25 mcg/act inhaler Inhale 1 Puff every day. 1 Each 0   • levalbuterol (XOPENEX HFA) 45 MCG/ACT inhaler Inhale 1-2 Puffs every four hours as needed for Shortness of Breath. 4 Each 3   • atorvastatin (LIPITOR) 10 MG Tab Take 1 tablet by mouth every day. (Patient taking differently: Take 10 mg by mouth every evening.) 100 Tablet 3   • Dulaglutide 1.5 MG/0.5ML Solution Auto-injector Inject 1 pen under the skin every 7 days. Every Sunday (Patient taking differently: Inject 0.5 mL under the skin every 7 days. On Sundays) 2 mL 3   •  "furosemide (LASIX) 20 MG Tab Take 1 Tablet by mouth every 48 hours. 45 Tablet 3   • calcitRIOL (ROCALTROL) 0.25 MCG Cap Take 2 Capsules by mouth every day. 200 Capsule 3   • potassium Chloride ER (K-TAB) 20 MEQ Tab CR tablet Take 1 Tablet by mouth every day. (Patient taking differently: Take 20 mEq by mouth. Takes only when she takes Lasix.) 90 Tablet 0   • Multiple Vitamins-Minerals (HAIR SKIN NAILS PO) Take 2 Tablets by mouth at bedtime.     MEDICATION INSTRUCTIONS FOR SURGERY/PROCEDURE SCHEDULED FOR 12-03-24   DO NOT TAKE 7 DAYS PRIOR TO SURGERY     • carvedilol (COREG) 12.5 MG Tab Take 1 Tablet by mouth 2 times a day with meals. 180 Tablet 3   • amiodarone (CORDARONE) 200 MG Tab Take 0.5 Tablets by mouth every day. (Patient taking differently: Take 100 mg by mouth every evening.) 90 Tablet 3   • sacubitril-valsartan (ENTRESTO) 24-26 MG Tab Take 1 tablet by mouth 2 times a day. 60 Tablet 11     No facility-administered encounter medications on file as of 4/8/2025.     ROS           Objective    /50 (BP Location: Left arm, Patient Position: Sitting, BP Cuff Size: Adult)   Pulse 60   Resp 16   Ht 1.626 m (5' 4\")   Wt 86.2 kg (190 lb)   SpO2 97%   BMI 32.61 kg/m²     Physical Exam  Constitutional:       Appearance: She is well-developed.      Comments: On O2   HENT:      Head: Normocephalic and atraumatic.   Eyes:      Pupils: Pupils are equal, round, and reactive to light.   Cardiovascular:      Rate and Rhythm: Normal rate and regular rhythm.      Heart sounds: Normal heart sounds. No murmur heard.     No friction rub. No gallop.   Pulmonary:      Effort: Pulmonary effort is normal.      Breath sounds: Normal breath sounds.   Abdominal:      General: Bowel sounds are normal.      Palpations: Abdomen is soft.   Musculoskeletal:         General: Normal range of motion.      Cervical back: Normal range of motion and neck supple.   Skin:     General: Skin is warm.   Neurological:      Mental Status: She is " alert and oriented to person, place, and time.      Cranial Nerves: No cranial nerve deficit.   Psychiatric:         Behavior: Behavior normal.         Thought Content: Thought content normal.         Judgment: Judgment normal.                Assessment & Plan    1. S/P ablation of atrial flutter/AT        2. Pulmonary hypertension (HCC)        3. Pacemaker        4. TIA (transient ischemic attack)        5. Anticoagulated        6. Benign essential hypertension        7. H/O mitral valve repair        8. Atrial fibrillation, unspecified type (HCC)        9. Atrial tachycardia (HCC)        10. Dyslipidemia associated with type 2 diabetes mellitus (HCC)            Medical Decision Making: Today's Assessment/Status/Plan:   1.  Status post mitral valve repair.  Elevated wedge on right heart cath.  2.  Hyperlipidemia on statins.  3.  Pulmonary hypertension mixed picture.  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  4.  Status post ablation maintaining sinus rhythm.  5.  Permanent pacemaker normal function.  6.  Follow-up with me in 6 months.                           Leah Bonilla D.O.  [address] [facility]  [address]  [place] [address]  Via In Basket    Zander Quiñones M.D.  [address]  [address]  [place] [address]  Via In Basket

## 2025-04-08 NOTE — PROGRESS NOTES
"Chief Complaint   Patient presents with    Atrial Fibrillation       Subjective     Salma Lees is a 75 y.o. female who presents today with history of atrial tachycardia right sided.  Pulmonary hypertension.  Followed by Dr. Quiñones and  pulmonary.  Recent right heart cath consistent with precapillary and postcapillary pulmonary hypertension.  Some response to nitric oxide.  Patient feels fine.  Maintaining sinus rhythm.  On low-dose amiodarone.    Past Medical History:   Diagnosis Date    A-fib (HCC)     Asthma     BMI 35.0-35.9,adult 11/12/2018    Breath shortness 09/07/2017    uses oxygen at 2 liters/min cont. with \"[facility]    Calculus of ureter 09/18/2017    Cataract     codey IOL    Chronic anticoagulation 02/13/2017    CKD (chronic kidney disease) stage 3, GFR 30-59 ml/min     Congestive heart failure ([facility])     COPD (chronic obstructive pulmonary disease) ([facility])     Dental disorder Dentures    full dentures    Diabetes ([facility])     Emphysema of lung ([facility]) Copd    Heart murmur     Heart valve disease 2009 Implant    mitral heart valve    Hemorrhagic disorder ([facility]) Warfarin    High cholesterol     Patient denies    History of mitral valve repair     Hypertension     Infectious disease 2014    UTI/hx. MRSA    Pacemaker 2014    Pulmonary hypertension ([facility])     Renal disorder 09/07/2017    hx kidney stones    Shortness of breath 10/18/2017    Urinary incontinence      Past Surgical History:   Procedure Laterality Date    URETEROSCOPY Left 09/18/2017    Procedure: URETEROSCOPY;  Surgeon: Edgardo Richter M.D.;  Location: [facility];  Service: Urology    LASERTRIPSY Left 09/18/2017    Procedure: LASERTRIPSY LITHO  ;  Surgeon: Edgardo Richter M.D.;  Location: SURGERY [facility];  Service: Urology    STENT REMOVAL Left 09/18/2017    Procedure: STENT REMOVAL;  Surgeon: Edgardo Richter M.D.;  Location: SURGERY [facility];  Service: Urology    CYSTOSCOPY STENT " PLACEMENT Left 2017    Procedure: CYSTOSCOPY, LEFT URETERAL STENT PLACEMENT;  Surgeon: Edgardo Richter M.D.;  Location: SURGERY [facility];  Service:     PACEMAKER INSERTION  2015    KNEE REPLACEMENT, TOTAL Left 2015    MITRAL VALVE REPAIR  2009    HYSTERECTOMY, VAGINAL      CATARACT EXTRACTION WITH IOL Bilateral     GYN SURGERY  1994    Hysterectomy    OTHER      Left knee replacement//pacemaker battery    OTHER      Heart catheterization around 10/2024.    OTHER      Cardiac Ablation around 2024.    TONSILLECTOMY       Family History   Problem Relation Age of Onset    Lung Disease Mother         asthma    Cancer Mother         ovarian    Heart Disease Father     Stroke Father     Thyroid Sister     Cancer Brother         pancreas    Cancer Paternal Grandmother         colon    Heart Disease Brother     No Known Problems Brother     No Known Problems Brother     Heart Disease Son         afib     Social History     Socioeconomic History    Marital status:      Spouse name: Not on file    Number of children: Not on file    Years of education: Not on file    Highest education level: Not on file   Occupational History    Not on file   Tobacco Use    Smoking status: Former     Current packs/day: 0.00     Average packs/day: 1 pack/day for 5.0 years (5.0 ttl pk-yrs)     Types: Cigarettes     Start date: 1985     Quit date: 1990     Years since quittin.2    Smokeless tobacco: Never    Tobacco comments:     Continued abstinence   Vaping Use    Vaping status: Never Used   Substance and Sexual Activity    Alcohol use: Yes     Comment: only on holidays    Drug use: No    Sexual activity: Not Currently   Other Topics Concern    Not on file   Social History Narrative    She has a son. She had previously experience in [employer].     Social Drivers of Health     Financial Resource Strain: Not on file   Food Insecurity: No Food Insecurity (2024)    Hunger  Vital Sign     Worried About Running Out of Food in the Last Year: Never true     Ran Out of Food in the Last Year: Never true   Transportation Needs: No Transportation Needs (12/20/2024)    PRAPARE - Transportation     Lack of Transportation (Medical): No     Lack of Transportation (Non-Medical): No   Physical Activity: Insufficiently Active (12/20/2024)    Exercise Vital Sign     Days of Exercise per Week: 3 days     Minutes of Exercise per Session: 40 min   Stress: Not on file   Social Connections: Socially Isolated (12/20/2024)    Social Connection and Isolation Panel [NHANES]     Frequency of Communication with Friends and Family: Once a week     Frequency of Social Gatherings with Friends and Family: Once a week     Attends Jain Services: Never     Active Member of Clubs or Organizations: No     Attends Club or Organization Meetings: Never     Marital Status:    Intimate Partner Violence: Patient Declined (12/3/2024)    Humiliation, Afraid, Rape, and Kick questionnaire     Fear of Current or Ex-Partner: Patient declined     Emotionally Abused: Patient declined     Physically Abused: Patient declined     Sexually Abused: Patient declined   Housing Stability: Low Risk  (12/20/2024)    Housing Stability Vital Sign     Unable to Pay for Housing in the Last Year: No     Number of Times Moved in the Last Year: 0     Homeless in the Last Year: No     Allergies   Allergen Reactions    Amoxicillin Anaphylaxis and Shortness of Breath    Nitrofurantoin      swelling    Pcn [Penicillins] Anaphylaxis    Vancomycin Anaphylaxis and Shortness of Breath     Severe hypotension and bronchospasm observed by anesthesia while giving  vanco during procedure    Amlodipine Besylate-Valsartan Unspecified     Chest pain and dizziness     Etodolac Hives and Nausea     Dark stools    Food Hives     Plums    Formaldehyde      Pt reports she was informed of allergy during a skin test. Pt not sure of reaction    Ivabradine       unknown    Jardiance [Empagliflozin] Unspecified     Headaches, dizziness    Skin Adhesives Hives     Patient states they cause welts for several days;  EKG stickers    Eliquis [Apixaban] Shortness of Breath     Pt reports SOB and dizzy when she took    Lisinopril Unspecified     Dizziness     Other Drug Rash     Metal silver    Other Misc Shortness of Breath and Runny Nose     Mold, dust, and feathers, adhesives, plums    Tape Rash     Paper tape ok     Outpatient Encounter Medications as of 4/8/2025   Medication Sig Dispense Refill    fluticasone-salmeterol (ADVAIR) 500-50 MCG/ACT AEROSOL POWDER, BREATH ACTIVATED Inhale 1 Puff 2 times a day. 120 Each 5    fluticasone-salmeterol (ADVAIR DISKUS) 500-50 MCG/ACT AEROSOL POWDER, BREATH ACTIVATED Inhale 1 Puff every 12 hours for 270 days. 3 Each 2    warfarin (COUMADIN) 5 MG Tab Take 0.5-1 Tablets by mouth every morning. 2.5 mg Sunday/Tuesday/Thursday, 5 mg all other days 100 Tablet 3    fluticasone-umeclidinium-vilanterol (TRELEGY ELLIPTA) 200-62.5-25 mcg/act inhaler Inhale 1 Puff every day. 1 Each 0    levalbuterol (XOPENEX HFA) 45 MCG/ACT inhaler Inhale 1-2 Puffs every four hours as needed for Shortness of Breath. 4 Each 3    atorvastatin (LIPITOR) 10 MG Tab Take 1 tablet by mouth every day. (Patient taking differently: Take 10 mg by mouth every evening.) 100 Tablet 3    Dulaglutide 1.5 MG/0.5ML Solution Auto-injector Inject 1 pen under the skin every 7 days. Every Sunday (Patient taking differently: Inject 0.5 mL under the skin every 7 days. On Sundays) 2 mL 3    furosemide (LASIX) 20 MG Tab Take 1 Tablet by mouth every 48 hours. 45 Tablet 3    calcitRIOL (ROCALTROL) 0.25 MCG Cap Take 2 Capsules by mouth every day. 200 Capsule 3    potassium Chloride ER (K-TAB) 20 MEQ Tab CR tablet Take 1 Tablet by mouth every day. (Patient taking differently: Take 20 mEq by mouth. Takes only when she takes Lasix.) 90 Tablet 0    Multiple Vitamins-Minerals (HAIR SKIN NAILS PO) Take  "2 Tablets by mouth at bedtime.     MEDICATION INSTRUCTIONS FOR SURGERY/PROCEDURE SCHEDULED FOR 12-03-24   DO NOT TAKE 7 DAYS PRIOR TO SURGERY      carvedilol (COREG) 12.5 MG Tab Take 1 Tablet by mouth 2 times a day with meals. 180 Tablet 3    amiodarone (CORDARONE) 200 MG Tab Take 0.5 Tablets by mouth every day. (Patient taking differently: Take 100 mg by mouth every evening.) 90 Tablet 3    sacubitril-valsartan (ENTRESTO) 24-26 MG Tab Take 1 tablet by mouth 2 times a day. 60 Tablet 11     No facility-administered encounter medications on file as of 4/8/2025.     ROS           Objective     /50 (BP Location: Left arm, Patient Position: Sitting, BP Cuff Size: Adult)   Pulse 60   Resp 16   Ht 1.626 m (5' 4\")   Wt 86.2 kg (190 lb)   SpO2 97%   BMI 32.61 kg/m²     Physical Exam  Constitutional:       Appearance: She is well-developed.      Comments: On O2   HENT:      Head: Normocephalic and atraumatic.   Eyes:      Pupils: Pupils are equal, round, and reactive to light.   Cardiovascular:      Rate and Rhythm: Normal rate and regular rhythm.      Heart sounds: Normal heart sounds. No murmur heard.     No friction rub. No gallop.   Pulmonary:      Effort: Pulmonary effort is normal.      Breath sounds: Normal breath sounds.   Abdominal:      General: Bowel sounds are normal.      Palpations: Abdomen is soft.   Musculoskeletal:         General: Normal range of motion.      Cervical back: Normal range of motion and neck supple.   Skin:     General: Skin is warm.   Neurological:      Mental Status: She is alert and oriented to person, place, and time.      Cranial Nerves: No cranial nerve deficit.   Psychiatric:         Behavior: Behavior normal.         Thought Content: Thought content normal.         Judgment: Judgment normal.                Assessment & Plan     1. S/P ablation of atrial flutter/AT        2. Pulmonary hypertension (HCC)        3. Pacemaker        4. TIA (transient ischemic attack)        5. " Anticoagulated        6. Benign essential hypertension        7. H/O mitral valve repair        8. Atrial fibrillation, unspecified type (HCC)        9. Atrial tachycardia (HCC)        10. Dyslipidemia associated with type 2 diabetes mellitus (HCC)            Medical Decision Making: Today's Assessment/Status/Plan:   1.  Status post mitral valve repair.  Elevated wedge on right heart cath.  2.  Hyperlipidemia on statins.  3.  Pulmonary hypertension mixed picture.  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  4.  Status post ablation maintaining sinus rhythm.  5.  Permanent pacemaker normal function.  6.  Follow-up with me in 6 months.

## 2025-04-09 PROCEDURE — RXMED WILLOW AMBULATORY MEDICATION CHARGE: Performed by: INTERNAL MEDICINE

## 2025-04-10 ENCOUNTER — ANTICOAGULATION VISIT (OUTPATIENT)
Dept: MEDICAL GROUP | Facility: MEDICAL CENTER | Age: 75
End: 2025-04-10
Payer: MEDICARE

## 2025-04-10 VITALS
WEIGHT: 187.61 LBS | HEIGHT: 64 IN | SYSTOLIC BLOOD PRESSURE: 139 MMHG | BODY MASS INDEX: 32.03 KG/M2 | DIASTOLIC BLOOD PRESSURE: 71 MMHG | HEART RATE: 60 BPM

## 2025-04-10 DIAGNOSIS — Z98.890 H/O MITRAL VALVE REPAIR: ICD-10-CM

## 2025-04-10 LAB — INR PPP: 2 (ref 2–3.5)

## 2025-04-10 PROCEDURE — 85610 PROTHROMBIN TIME: CPT | Performed by: NURSE PRACTITIONER

## 2025-04-10 PROCEDURE — 3075F SYST BP GE 130 - 139MM HG: CPT | Performed by: NURSE PRACTITIONER

## 2025-04-10 PROCEDURE — 99211 OFF/OP EST MAY X REQ PHY/QHP: CPT | Performed by: NURSE PRACTITIONER

## 2025-04-10 PROCEDURE — 3078F DIAST BP <80 MM HG: CPT | Performed by: NURSE PRACTITIONER

## 2025-04-10 ASSESSMENT — FIBROSIS 4 INDEX: FIB4 SCORE: 4.28

## 2025-04-10 NOTE — PROGRESS NOTES
"Anticoagulation Summary  As of 4/10/2025      INR goal:  2.0-3.0   TTR:  53.2% (8.1 y)   INR used for dosin.00 (4/10/2025)   Warfarin maintenance plan:  5 mg (5 mg x 1) every day   Weekly warfarin total:  35 mg   Plan last modified:  Ivy Galloway PharmD (2025)   Next INR check:  2025   Target end date:  Indefinite    Indications    Atrial fibrillation (HCC) [I48.91]  H/O mitral valve repair [Z98.890]                 Anticoagulation Episode Summary       INR check location:  --    Preferred lab:  --    Send INR reminders to:  --    Comments:  --          Anticoagulation Care Providers       Provider Role Specialty Phone number    LILIYA Pearson Referring Family Medicine [phone]    [facility] Anticoagulation Services Responsible  [phone]    Kenneth Salas, PharmD Responsible Pharmacy                 Refer to Patient Findings for HPI:  Patient Findings       Negatives:  Signs/symptoms of thrombosis, Signs/symptoms of bleeding, Laboratory test error suspected, Change in health, Change in alcohol use, Change in activity, Upcoming invasive procedure, Emergency department visit, Upcoming dental procedure, Missed doses, Extra doses, Change in medications, Change in diet/appetite, Hospital admission, Bruising, Other complaints          Clinical Outcomes       Negatives:  Major bleeding event, Thromboembolic event, Anticoagulation-related hospital admission, Anticoagulation-related ED visit, Anticoagulation-related fatality            Date Referral Placed: 24      Vitals:  Vitals:    04/10/25 1027   BP: 139/71   Pulse: 60   Weight: 85.1 kg (187 lb 9.8 oz)   Height: 1.626 m (5' 4\")       Verified current warfarin dosing schedule.    Medications reconciled.  Pt is not on antiplatelet therapy.      A/P   INR is therapeutic - but patient trended down on current regimen in the past.  Reason(s) for out of range INR today: N/A      Warfarin dosing recommendation: Increase to Warfarin 7.5 mg and " then Continue regimen as listed above.    Pt educated to contact our clinic with any changes in medications or s/s of bleeding or thrombosis. Pt is aware to seek immediate medical attention for falls, head injury or deep cuts.    Request pt to return in 2 week(s). Pt agrees.    Ivy Galloway, JoleenD

## 2025-04-11 ENCOUNTER — HOSPITAL ENCOUNTER (OUTPATIENT)
Dept: LAB | Facility: MEDICAL CENTER | Age: 75
End: 2025-04-11
Attending: STUDENT IN AN ORGANIZED HEALTH CARE EDUCATION/TRAINING PROGRAM
Payer: MEDICARE

## 2025-04-11 ENCOUNTER — RESEARCH ENCOUNTER (OUTPATIENT)
Dept: RESEARCH | Facility: MEDICAL CENTER | Age: 75
End: 2025-04-11
Payer: MEDICARE

## 2025-04-11 DIAGNOSIS — D64.9 ANEMIA, UNSPECIFIED TYPE: ICD-10-CM

## 2025-04-11 DIAGNOSIS — Z00.6 RESEARCH STUDY PATIENT: ICD-10-CM

## 2025-04-11 DIAGNOSIS — N18.32 STAGE 3B CHRONIC KIDNEY DISEASE (CKD): ICD-10-CM

## 2025-04-11 LAB
ALBUMIN SERPL BCP-MCNC: 4 G/DL (ref 3.2–4.9)
ALBUMIN/GLOB SERPL: 1.1 G/DL
ALP SERPL-CCNC: 59 U/L (ref 30–99)
ALT SERPL-CCNC: 26 U/L (ref 2–50)
ANION GAP SERPL CALC-SCNC: 8 MMOL/L (ref 7–16)
AST SERPL-CCNC: 28 U/L (ref 12–45)
BASOPHILS # BLD AUTO: 0.9 % (ref 0–1.8)
BASOPHILS # BLD: 0.04 K/UL (ref 0–0.12)
BILIRUB SERPL-MCNC: 1.1 MG/DL (ref 0.1–1.5)
BUN SERPL-MCNC: 35 MG/DL (ref 8–22)
CALCIUM ALBUM COR SERPL-MCNC: 9.6 MG/DL (ref 8.5–10.5)
CALCIUM SERPL-MCNC: 9.6 MG/DL (ref 8.5–10.5)
CHLORIDE SERPL-SCNC: 109 MMOL/L (ref 96–112)
CO2 SERPL-SCNC: 27 MMOL/L (ref 20–33)
CREAT SERPL-MCNC: 1.65 MG/DL (ref 0.5–1.4)
EOSINOPHIL # BLD AUTO: 0.59 K/UL (ref 0–0.51)
EOSINOPHIL NFR BLD: 12.8 % (ref 0–6.9)
ERYTHROCYTE [DISTWIDTH] IN BLOOD BY AUTOMATED COUNT: 59.7 FL (ref 35.9–50)
FERRITIN SERPL-MCNC: 93.8 NG/ML (ref 10–291)
GFR SERPLBLD CREATININE-BSD FMLA CKD-EPI: 32 ML/MIN/1.73 M 2
GLOBULIN SER CALC-MCNC: 3.6 G/DL (ref 1.9–3.5)
GLUCOSE SERPL-MCNC: 105 MG/DL (ref 65–99)
HCT VFR BLD AUTO: 35.2 % (ref 37–47)
HGB BLD-MCNC: 10.7 G/DL (ref 12–16)
IMM GRANULOCYTES # BLD AUTO: 0.01 K/UL (ref 0–0.11)
IMM GRANULOCYTES NFR BLD AUTO: 0.2 % (ref 0–0.9)
IRON SATN MFR SERPL: 20 % (ref 15–55)
IRON SERPL-MCNC: 58 UG/DL (ref 40–170)
LYMPHOCYTES # BLD AUTO: 0.59 K/UL (ref 1–4.8)
LYMPHOCYTES NFR BLD: 12.8 % (ref 22–41)
MCH RBC QN AUTO: 28.9 PG (ref 27–33)
MCHC RBC AUTO-ENTMCNC: 30.4 G/DL (ref 32.2–35.5)
MCV RBC AUTO: 95.1 FL (ref 81.4–97.8)
MONOCYTES # BLD AUTO: 0.35 K/UL (ref 0–0.85)
MONOCYTES NFR BLD AUTO: 7.6 % (ref 0–13.4)
NEUTROPHILS # BLD AUTO: 3.04 K/UL (ref 1.82–7.42)
NEUTROPHILS NFR BLD: 65.7 % (ref 44–72)
NRBC # BLD AUTO: 0 K/UL
NRBC BLD-RTO: 0 /100 WBC (ref 0–0.2)
PLATELET # BLD AUTO: 130 K/UL (ref 164–446)
PMV BLD AUTO: 11.7 FL (ref 9–12.9)
POTASSIUM SERPL-SCNC: 4.1 MMOL/L (ref 3.6–5.5)
PROT SERPL-MCNC: 7.6 G/DL (ref 6–8.2)
RBC # BLD AUTO: 3.7 M/UL (ref 4.2–5.4)
SODIUM SERPL-SCNC: 144 MMOL/L (ref 135–145)
TIBC SERPL-MCNC: 294 UG/DL (ref 250–450)
UIBC SERPL-MCNC: 236 UG/DL (ref 110–370)
WBC # BLD AUTO: 4.6 K/UL (ref 4.8–10.8)

## 2025-04-11 PROCEDURE — 82728 ASSAY OF FERRITIN: CPT

## 2025-04-11 PROCEDURE — 83540 ASSAY OF IRON: CPT

## 2025-04-11 PROCEDURE — 83550 IRON BINDING TEST: CPT

## 2025-04-11 PROCEDURE — 85025 COMPLETE CBC W/AUTO DIFF WBC: CPT

## 2025-04-11 PROCEDURE — 36415 COLL VENOUS BLD VENIPUNCTURE: CPT

## 2025-04-11 PROCEDURE — 80053 COMPREHEN METABOLIC PANEL: CPT

## 2025-04-14 DIAGNOSIS — N18.32 TYPE 2 DIABETES MELLITUS WITH STAGE 3B CHRONIC KIDNEY DISEASE, WITHOUT LONG-TERM CURRENT USE OF INSULIN (HCC): ICD-10-CM

## 2025-04-14 DIAGNOSIS — E11.22 TYPE 2 DIABETES MELLITUS WITH STAGE 3B CHRONIC KIDNEY DISEASE, WITHOUT LONG-TERM CURRENT USE OF INSULIN (HCC): ICD-10-CM

## 2025-04-14 PROCEDURE — RXMED WILLOW AMBULATORY MEDICATION CHARGE: Performed by: INTERNAL MEDICINE

## 2025-04-14 RX ORDER — DULAGLUTIDE 1.5 MG/.5ML
0.5 INJECTION, SOLUTION SUBCUTANEOUS
Qty: 6 ML | Refills: 3 | Status: SHIPPED | OUTPATIENT
Start: 2025-04-14

## 2025-04-15 ENCOUNTER — PHARMACY VISIT (OUTPATIENT)
Dept: PHARMACY | Facility: MEDICAL CENTER | Age: 75
End: 2025-04-15
Payer: COMMERCIAL

## 2025-04-15 ENCOUNTER — RESULTS FOLLOW-UP (OUTPATIENT)
Dept: MEDICAL GROUP | Facility: PHYSICIAN GROUP | Age: 75
End: 2025-04-15

## 2025-04-17 ENCOUNTER — OFFICE VISIT (OUTPATIENT)
Dept: MEDICAL GROUP | Facility: PHYSICIAN GROUP | Age: 75
End: 2025-04-17
Payer: MEDICARE

## 2025-04-17 VITALS
HEIGHT: 64 IN | TEMPERATURE: 97.8 F | HEART RATE: 62 BPM | DIASTOLIC BLOOD PRESSURE: 68 MMHG | OXYGEN SATURATION: 91 % | SYSTOLIC BLOOD PRESSURE: 130 MMHG | BODY MASS INDEX: 31.38 KG/M2 | WEIGHT: 183.8 LBS

## 2025-04-17 DIAGNOSIS — G89.29 CHRONIC PAIN OF RIGHT KNEE: ICD-10-CM

## 2025-04-17 DIAGNOSIS — L65.9 HAIR THINNING: ICD-10-CM

## 2025-04-17 DIAGNOSIS — I27.20 PULMONARY HYPERTENSION (HCC): ICD-10-CM

## 2025-04-17 DIAGNOSIS — D63.8 ANEMIA, CHRONIC DISEASE: ICD-10-CM

## 2025-04-17 DIAGNOSIS — B35.1 ONYCHOMYCOSIS: ICD-10-CM

## 2025-04-17 DIAGNOSIS — M25.561 CHRONIC PAIN OF RIGHT KNEE: ICD-10-CM

## 2025-04-17 DIAGNOSIS — E53.8 B12 DEFICIENCY: ICD-10-CM

## 2025-04-17 DIAGNOSIS — N18.32 TYPE 2 DIABETES MELLITUS WITH STAGE 3B CHRONIC KIDNEY DISEASE, WITHOUT LONG-TERM CURRENT USE OF INSULIN (HCC): ICD-10-CM

## 2025-04-17 DIAGNOSIS — E11.22 TYPE 2 DIABETES MELLITUS WITH STAGE 3B CHRONIC KIDNEY DISEASE, WITHOUT LONG-TERM CURRENT USE OF INSULIN (HCC): ICD-10-CM

## 2025-04-17 DIAGNOSIS — E55.9 VITAMIN D DEFICIENCY: ICD-10-CM

## 2025-04-17 PROCEDURE — 99214 OFFICE O/P EST MOD 30 MIN: CPT | Performed by: INTERNAL MEDICINE

## 2025-04-17 PROCEDURE — 3078F DIAST BP <80 MM HG: CPT | Performed by: INTERNAL MEDICINE

## 2025-04-17 PROCEDURE — 3075F SYST BP GE 130 - 139MM HG: CPT | Performed by: INTERNAL MEDICINE

## 2025-04-17 PROCEDURE — G2211 COMPLEX E/M VISIT ADD ON: HCPCS | Performed by: INTERNAL MEDICINE

## 2025-04-17 ASSESSMENT — PATIENT HEALTH QUESTIONNAIRE - PHQ9: CLINICAL INTERPRETATION OF PHQ2 SCORE: 0

## 2025-04-17 ASSESSMENT — FIBROSIS 4 INDEX: FIB4 SCORE: 3.21

## 2025-04-17 NOTE — PROGRESS NOTES
Subjective:   Chief Complaint/History of Present Illness:  Salma Lees is a 75 y.o. female established patient who presents today to discuss medical problems as listed below. Salma is unaccompanied for today's visit.    History of Present Illness  The patient presents for evaluation of pulmonary hypertension, anemia, fungal infection, hair loss, and right knee pain.    A right heart catheterization was performed in 01/2025 by Dr. Quiñones. Subsequently, a referral to a pulmonologist specializing in pulmonary hypertension was made, with the next appointment scheduled for 04/30/2025. Extensive testing, including genetic testing, has been ongoing, and a sleep study is due but postponed until next month due to the co-pay. Enrollment in a genetic study has been completed. Constant anemia and concern about low blood count are reported. Daily intake of vitamin B12 1000 mcg has improved sleep quality.    A history of kidney issues was initially misdiagnosed as a urinary tract infection (UTI) many years ago. Despite UTI treatment, the condition worsened, leading to an emergency room visit where kidney stones were diagnosed. Medication provided relief, but no further tests were conducted. This may have contributed to kidney damage.    Chest pain at night, difficulty climbing stairs, and walking short distances were reported. Advice to lose weight and exercise more was given. During kidney stone treatment, a heart condition was discovered, necessitating emergency open-heart surgery. A follow-up appointment with Dr. Fuller is scheduled for 05/2025. A recent test revealed fluid accumulation between the lungs, with potential surgery discussed.    The right knee condition is deteriorating, and a replacement is anticipated.    A change in toenail color following a pedicure is noted, suspecting a fungal infection. A history of ingrown toenails on the big toes is managed with nightly Vaseline application and morning rinsing,  as recommended by a podiatrist in her 30s.    Interest in starting [email or web address] [facility] collagen complex is expressed due to persistent hair loss. A new machine maintains oxygen levels in the 90s, compared to previous readings as low as 66.    PAST SURGICAL HISTORY:  - Emergency open-heart surgery  - Kidney stone treatment       Current Medications:  Current Outpatient Medications Ordered in Epic   Medication Sig Dispense Refill    Dulaglutide (TRULICITY) 1.5 MG/0.5ML Solution Auto-injector Inject 1 pen under the skin every 7 days. Every Sunday 6 mL 3    fluticasone-salmeterol (ADVAIR) 500-50 MCG/ACT AEROSOL POWDER, BREATH ACTIVATED Inhale 1 Puff 2 times a day. 120 Each 5    fluticasone-salmeterol (ADVAIR DISKUS) 500-50 MCG/ACT AEROSOL POWDER, BREATH ACTIVATED Inhale 1 Puff every 12 hours for 270 days. 3 Each 2    warfarin (COUMADIN) 5 MG Tab Take 0.5-1 Tablets by mouth every morning. 2.5 mg Sunday/Tuesday/Thursday, 5 mg all other days 100 Tablet 3    fluticasone-umeclidinium-vilanterol (TRELEGY ELLIPTA) 200-62.5-25 mcg/act inhaler Inhale 1 Puff every day. 1 Each 0    levalbuterol (XOPENEX HFA) 45 MCG/ACT inhaler Inhale 1-2 Puffs every four hours as needed for Shortness of Breath. 4 Each 3    atorvastatin (LIPITOR) 10 MG Tab Take 1 tablet by mouth every day. (Patient taking differently: Take 10 mg by mouth every evening.) 100 Tablet 3    furosemide (LASIX) 20 MG Tab Take 1 Tablet by mouth every 48 hours. 45 Tablet 3    calcitRIOL (ROCALTROL) 0.25 MCG Cap Take 2 Capsules by mouth every day. 200 Capsule 3    potassium Chloride ER (K-TAB) 20 MEQ Tab CR tablet Take 1 Tablet by mouth every day. (Patient taking differently: Take 20 mEq by mouth. Takes only when she takes Lasix.) 90 Tablet 0    Multiple Vitamins-Minerals (HAIR SKIN NAILS PO) Take 2 Tablets by mouth at bedtime.     MEDICATION INSTRUCTIONS FOR SURGERY/PROCEDURE SCHEDULED FOR 12-03-24   DO NOT TAKE 7 DAYS PRIOR TO SURGERY      carvedilol (COREG) 12.5  "MG Tab Take 1 Tablet by mouth 2 times a day with meals. 180 Tablet 3    amiodarone (CORDARONE) 200 MG Tab Take 0.5 Tablets by mouth every day. (Patient taking differently: Take 100 mg by mouth every evening.) 90 Tablet 3    sacubitril-valsartan (ENTRESTO) 24-26 MG Tab Take 1 tablet by mouth 2 times a day. 60 Tablet 11     No current [facility]-ordered facility-administered medications on file.          Objective:   Physical Exam:    Vitals: /68   Pulse 62   Temp 36.6 °C (97.8 °F) (Temporal)   Ht 1.626 m (5' 4\")   Wt 83.4 kg (183 lb 12.8 oz)   SpO2 91%    BMI: Body mass index is 31.55 kg/m².  Physical Exam  Constitutional:       General: She is not in acute distress.     Appearance: Normal appearance.   HENT:      Right Ear: External ear normal.      Left Ear: External ear normal.   Eyes:      General: No scleral icterus.     Conjunctiva/sclera: Conjunctivae normal.   Cardiovascular:      Rate and Rhythm: Normal rate and regular rhythm.      Pulses: Normal pulses.      Heart sounds: Murmur heard.   Pulmonary:      Effort: Pulmonary effort is normal. No respiratory distress.      Breath sounds: No wheezing.      Comments: Supplemental oxygen in place  Abdominal:      General: Bowel sounds are normal.      Palpations: Abdomen is soft.   Musculoskeletal:      Comments: Venous insufficiency with mild edema bilaterally   Skin:     General: Skin is warm and dry.      Comments: White discoloration at distal aspect of left great toe   Psychiatric:         Mood and Affect: Mood normal.         Behavior: Behavior normal.         Thought Content: Thought content normal.         Judgment: Judgment normal.           Results  Labs   - Hemoglobin: Stable at 10.5 g/dL   - White Blood Cells: Improved   - Kidney Function: Stable at 32%   - Electrolyte Levels: Normal   - Iron Levels: Normal    Imaging   - Chest CT: Trace pleural effusion and small densities or nodules    Assessment & Plan  1. Pulmonary Hypertension.  - Referred to " a pulmonary specialist for pulmonary hypertension and has been undergoing various tests.  - Next appointment with the specialist is on 04/30/2025.  - Advised to continue follow-ups with the pulmonary specialist to manage her condition.  - Discussion about the possibility of using medications that promote blood flow, such as sildenafil (Revatio).    2. CKD stage 3b with Anemia.  - Anemia likely due to chronic kidney disease, inhibiting the bone marrow's ability to produce cells.  - Hemoglobin level has slightly improved but remains stable; iron levels are normal.  - Anemia is not due to bleeding or iron deficiency but rather a lack of stimulation from the kidney hormone.  - If hemoglobin level consistently falls below 9, Epogen injections may be considered to stimulate blood cell production.    3. Fungal infiltration right greater hallux.  - Presents with a low-grade fungal infection on toenails, limited to the end of the nails.  - Recommended over-the-counter antifungal lacquer and natural remedies such as tea tree oil or apple cider vinegar.  - Can continue using Vaseline on the skin but should avoid applying it on the nails.  - Oral antifungal pills are not recommended due to their harsh effects on the liver.    4. Hair Loss/thinning.  - Informed that [employer] [facility] collagen complex could slightly alter thyroid lab results but would not affect thyroid function.  - Calcium and iron in the supplement may cause mild constipation; zinc could promote skin healing.  - Supplement does not pose any risk to kidneys or liver.  - Advised to continue using the supplement and monitor for any changes.    5. Right Knee Pain due to osteoarthritis.  - Reports worsening pain in the right knee and is considering knee replacement surgery.  - Advised to consult with cardiologist and pulmonologist before proceeding with surgery to ensure fitness for the procedure.  - Discussed potential co-pays and the importance of  pre-surgical clearance from heart and lung specialists.    6. B12 deficiency  7. Vitamin D deficiency  - Blood test ordered to assess vitamin D and B12 levels  - Printed off vitamin D and B12 tests to be included with pulmonary and genetic testing.  - Advised that elevated B12 levels are not harmful and may require adjustment in supplementation frequency.    8. Type 2 diabetes with CKD stage 3b  - A1c down to normal range, continue Trulicity 1.5 mg weekly, obtained through patient assistance program.      Assessment and Plan:   Salma is a 75 y.o. female with the following:  Problem List Items Addressed This Visit       B12 deficiency    Pulmonary hypertension (HCC)    Type 2 diabetes mellitus with stage 3b chronic kidney disease, without long-term current use of insulin (HCC)    Relevant Orders    VITAMIN B12    Vitamin D deficiency    Relevant Orders    VITAMIN D,25 HYDROXY (DEFICIENCY)     Other Visit Diagnoses         Chronic pain of right knee          Hair thinning          Onychomycosis          Anemia, chronic disease                   RTC: Return in about 5 months (around 9/17/2025).    I spent a total of 32 minutes with record review, exam, communication with the patient, communication with other providers, and documentation of this encounter.    Verbal consent was acquired by the patient to use [employer] ambient listening note generation during this visit Yes     Billing : secondary to the complexity of this patient's illnesses and their interactions.  All problems listed were discussed during the office visit, medications were evaluated and complexities were discussed as well as plan for the future    PLEASE NOTE: This dictation was created using voice recognition software. I have made every reasonable attempt to correct obvious errors, but I expect that there are errors of grammar and possibly content that I did not discover before finalizing the note.      Leah Bonilla, DO  Geriatric and  Internal Medicine  [facility]

## 2025-04-23 ENCOUNTER — HOSPITAL ENCOUNTER (OUTPATIENT)
Dept: LAB | Facility: MEDICAL CENTER | Age: 75
End: 2025-04-23
Attending: INTERNAL MEDICINE
Payer: MEDICARE

## 2025-04-23 ENCOUNTER — HOSPITAL ENCOUNTER (OUTPATIENT)
Dept: LAB | Facility: MEDICAL CENTER | Age: 75
End: 2025-04-23
Attending: FAMILY MEDICINE

## 2025-04-23 DIAGNOSIS — N18.32 TYPE 2 DIABETES MELLITUS WITH STAGE 3B CHRONIC KIDNEY DISEASE, WITHOUT LONG-TERM CURRENT USE OF INSULIN (HCC): ICD-10-CM

## 2025-04-23 DIAGNOSIS — J84.9 INTERSTITIAL PULMONARY DISEASE (HCC): ICD-10-CM

## 2025-04-23 DIAGNOSIS — Z00.6 RESEARCH STUDY PATIENT: ICD-10-CM

## 2025-04-23 DIAGNOSIS — E11.22 TYPE 2 DIABETES MELLITUS WITH STAGE 3B CHRONIC KIDNEY DISEASE, WITHOUT LONG-TERM CURRENT USE OF INSULIN (HCC): ICD-10-CM

## 2025-04-23 DIAGNOSIS — J44.1 CHRONIC OBSTRUCTIVE PULMONARY DISEASE WITH (ACUTE) EXACERBATION (HCC): ICD-10-CM

## 2025-04-23 DIAGNOSIS — E55.9 VITAMIN D DEFICIENCY: ICD-10-CM

## 2025-04-23 LAB
25(OH)D3 SERPL-MCNC: 31 NG/ML (ref 30–100)
CRP SERPL HS-MCNC: 3.99 MG/DL (ref 0–0.75)
HAV IGM SERPL QL IA: NORMAL
HBV CORE IGM SER QL: NORMAL
HBV SURFACE AG SER QL: NORMAL
HCV AB SER QL: NORMAL
HIV 1+2 AB+HIV1 P24 AG SERPL QL IA: NORMAL
NT-PROBNP SERPL IA-MCNC: 2305 PG/ML (ref 0–125)
RHEUMATOID FACT SER IA-ACNC: <10 IU/ML (ref 0–14)
VIT B12 SERPL-MCNC: 829 PG/ML (ref 211–911)

## 2025-04-23 PROCEDURE — 86140 C-REACTIVE PROTEIN: CPT

## 2025-04-23 PROCEDURE — 80074 ACUTE HEPATITIS PANEL: CPT

## 2025-04-23 PROCEDURE — 83880 ASSAY OF NATRIURETIC PEPTIDE: CPT

## 2025-04-23 PROCEDURE — 80307 DRUG TEST PRSMV CHEM ANLYZR: CPT

## 2025-04-23 PROCEDURE — 86200 CCP ANTIBODY: CPT

## 2025-04-23 PROCEDURE — 83516 IMMUNOASSAY NONANTIBODY: CPT | Mod: 91

## 2025-04-23 PROCEDURE — 82306 VITAMIN D 25 HYDROXY: CPT

## 2025-04-23 PROCEDURE — 86431 RHEUMATOID FACTOR QUANT: CPT

## 2025-04-23 PROCEDURE — 36415 COLL VENOUS BLD VENIPUNCTURE: CPT

## 2025-04-23 PROCEDURE — 86235 NUCLEAR ANTIGEN ANTIBODY: CPT | Mod: 91

## 2025-04-23 PROCEDURE — 82607 VITAMIN B-12: CPT

## 2025-04-23 PROCEDURE — 87389 HIV-1 AG W/HIV-1&-2 AB AG IA: CPT

## 2025-04-24 ENCOUNTER — TELEPHONE (OUTPATIENT)
Dept: SLEEP MEDICINE | Facility: MEDICAL CENTER | Age: 75
End: 2025-04-24

## 2025-04-24 ENCOUNTER — RESULTS FOLLOW-UP (OUTPATIENT)
Dept: MEDICAL GROUP | Facility: PHYSICIAN GROUP | Age: 75
End: 2025-04-24

## 2025-04-24 ENCOUNTER — ANTICOAGULATION VISIT (OUTPATIENT)
Dept: MEDICAL GROUP | Facility: MEDICAL CENTER | Age: 75
End: 2025-04-24
Payer: MEDICARE

## 2025-04-24 DIAGNOSIS — Z98.890 H/O MITRAL VALVE REPAIR: ICD-10-CM

## 2025-04-24 LAB — INR PPP: 2.5 (ref 2–3.5)

## 2025-04-24 PROCEDURE — 85610 PROTHROMBIN TIME: CPT | Performed by: STUDENT IN AN ORGANIZED HEALTH CARE EDUCATION/TRAINING PROGRAM

## 2025-04-24 PROCEDURE — 93793 ANTICOAG MGMT PT WARFARIN: CPT | Performed by: STUDENT IN AN ORGANIZED HEALTH CARE EDUCATION/TRAINING PROGRAM

## 2025-04-24 NOTE — PROGRESS NOTES
Anticoagulation Summary  As of 2025      INR goal:  2.0-3.0   TTR:  53.4% (8.1 y)   INR used for dosin.50 (2025)   Warfarin maintenance plan:  5 mg (5 mg x 1) every day   Weekly warfarin total:  35 mg   Plan last modified:  Joleen HallmanD (2025)   Next INR check:  2025   Target end date:  Indefinite    Indications    Atrial fibrillation (HCC) [I48.91]  H/O mitral valve repair [Z98.890]                 Anticoagulation Episode Summary       INR check location:  --    Preferred lab:  --    Send INR reminders to:  --    Comments:  --          Anticoagulation Care Providers       Provider Role Specialty Phone number    MARTA Pearson. Referring Family Medicine [phone]    [facility] Anticoagulation Services Responsible  [phone]    Kenneth Salas, PharmD Responsible Pharmacy                 Refer to Patient Findings for HPI:  Patient Findings       Negatives:  Signs/symptoms of thrombosis, Signs/symptoms of bleeding, Laboratory test error suspected, Change in health, Change in alcohol use, Change in activity, Upcoming invasive procedure, Emergency department visit, Upcoming dental procedure, Missed doses, Extra doses, Change in medications, Change in diet/appetite, Hospital admission, Bruising, Other complaints          Clinical Outcomes       Negatives:  Major bleeding event, Thromboembolic event, Anticoagulation-related hospital admission, Anticoagulation-related ED visit, Anticoagulation-related fatality            Date Referral Placed: 24      Vitals:  There were no vitals filed for this visit.  Pt declined vitals    Verified current warfarin dosing schedule.    Medications reconciled.  Pt is not on antiplatelet therapy.      A/P   INR is therapeutic  Reason(s) for out of range INR today: N/A      Warfarin dosing recommendation: Continue regimen as listed above.    Pt educated to contact our clinic with any changes in medications or s/s of bleeding or thrombosis. Pt is  aware to seek immediate medical attention for falls, head injury or deep cuts.    Request pt to return in 2 week(s). Pt agrees.    Joleen HallmanD

## 2025-04-24 NOTE — TELEPHONE ENCOUNTER
Pt has been seen by Dr. Villasenor and Dr. Vaca for pulm/hypertension. The Pt's INS needs auth before the Pt can schedule a lab test: Alpha-1-Antitrypsin. Procedure codes: 89790, 23845. The Lab on [facility] is where the Pt will be seen. In case you need it, the Lab on [facility] fax number: [phone]. /// Please contact Pt after task has been completed.     Salma Lees = [phone]

## 2025-04-25 ENCOUNTER — TELEPHONE (OUTPATIENT)
Dept: SLEEP MEDICINE | Facility: MEDICAL CENTER | Age: 75
End: 2025-04-25
Payer: MEDICARE

## 2025-04-25 LAB
AMPHET CTO UR CFM-MCNC: NEGATIVE NG/ML
BARBITURATES CTO UR CFM-MCNC: NEGATIVE NG/ML
BENZODIAZ CTO UR CFM-MCNC: NEGATIVE NG/ML
CANNABINOIDS CTO UR CFM-MCNC: NEGATIVE NG/ML
CCP IGA+IGG SERPL IA-ACNC: 7 UNITS (ref 0–19)
COCAINE CTO UR CFM-MCNC: NEGATIVE NG/ML
CREAT UR-MCNC: 126.6 MG/DL (ref 20–400)
DRUG COMMENT 753798: NORMAL
METHADONE CTO UR CFM-MCNC: NEGATIVE NG/ML
OPIATES CTO UR CFM-MCNC: NEGATIVE NG/ML
PCP CTO UR CFM-MCNC: NEGATIVE NG/ML
PROPOXYPH CTO UR CFM-MCNC: NEGATIVE NG/ML

## 2025-04-25 NOTE — TELEPHONE ENCOUNTER
DOCUMENTATION OF PAR STATUS:    1. Name of Procedure: Lab CPT codes: 78342, 90763     2. Name of Prescription Coverage Company & phone #: [facility] medical PA form    3. Date Prior Auth Submitted: faxed: 04/25/25    4. What information was given to obtain insurance decision? Last OV    5. Prior Auth Status? Pending    6. Patient Notified: yes

## 2025-04-25 NOTE — TELEPHONE ENCOUNTER
[facility] There are certain test that are going to need Prior authorization prior to drawing them. Alpha-1-Antitrypsin Genotype w/rflx to Phenotype is one of those test.    Faxed PA form to pt's insurance.    Called and spoke with. Informed pt I will submit a PA for these codes.

## 2025-04-26 LAB
CENTROMERE IGG TITR SER IF: 1 AU/ML (ref 0–40)
ENA JO1 AB TITR SER: 6 AU/ML (ref 0–40)
ENA SCL70 IGG SER QL: 1 AU/ML (ref 0–40)
ENA SM IGG SER-ACNC: 3 AU/ML (ref 0–40)
ENA SS-A 60KD AB SER-ACNC: 1 AU/ML (ref 0–40)
ENA SS-A IGG SER QL: 3 AU/ML (ref 0–40)
ENA SS-B IGG SER IA-ACNC: 1 AU/ML (ref 0–40)
RIBOSOMAL P AB SER-ACNC: 1 AU/ML (ref 0–40)
U1 SNRNP IGG SER QL: 5 UNITS (ref 0–19)

## 2025-04-28 ENCOUNTER — PATIENT OUTREACH (OUTPATIENT)
Dept: HEALTH INFORMATION MANAGEMENT | Facility: OTHER | Age: 75
End: 2025-04-28
Payer: MEDICARE

## 2025-04-28 DIAGNOSIS — J44.9 CHRONIC OBSTRUCTIVE PULMONARY DISEASE, UNSPECIFIED COPD TYPE (HCC): ICD-10-CM

## 2025-04-28 DIAGNOSIS — I50.22 CHRONIC HFREF (HEART FAILURE WITH REDUCED EJECTION FRACTION) (HCC): ICD-10-CM

## 2025-04-28 LAB
ELF SCORE: 10.95 -
HA (HYALURONIC ACID): 184.54 NG/ML
PIIINP (AMINO-TERMINAL PROPEPTIDE): 13.34 NG/ML
RELATIVE RISK: ABNORMAL
RISK GROUP: ABNORMAL
RISK: 23.6 %
TIMP-1 (TISSUE INHIBITOR OF MMP1): 335 NG/ML

## 2025-04-28 PROCEDURE — RXMED WILLOW AMBULATORY MEDICATION CHARGE: Performed by: INTERNAL MEDICINE

## 2025-04-28 NOTE — PROGRESS NOTES
4/28/25 2:25 pm: Nurse CM outreach call to pt for monthly CCM assessment. Pt answered telephone.  Reports she is currently busy and requesting to call nurse back later today.     4/30/25  2:30 pm: Nurse CM outreach call to pt for monthly CCM assessment.  Pt answered telephone.     Reason for Follow-Up:    Monthly CCM assessment    Current Health Status:    Pt following in personal care management services for history of COPD, CKD, DM and CHF.     Medical Updates:  Pt reports she is doing well this month. Reports since she has her new oxygen concentrator she is doing better. Reports wearing oxygen at 3 liters. Pt reports her pulmonary appt was changed until June. Denies any worsening symptoms at this time. Pt using inhalers as directed. Pt reports she does get short of breath with exertion.  Pt has history of CHF. Following with cardiology. Pt reports no edema in lower extremities. Reports weight is stable at around 182 lbs. Pt reports she will follow-up with cardiology this month. Pt has history of CKD, last GFR was 32 on 4/11/25.    Medication Updates:  Reports no changes to current medications    Symptoms:  No worsening symptoms reported today    Plan of Care Goals and Progress:    Goal 1. Maintain fluid balance     Progress:  Reports no edema in lower extremities      Barriers:  Chronic health conditions     Interventions:  Continue to monitor daily weights     Education:  Reviewed notifying cardiology if weight gain of 3 lbs in a day, 5 lbs in a week.  Work on following heart healthy meal plan. Limit sodium in diet.    Goal 2. Improved COPD symptoms     Progress:  Pt reports no worsening symptoms      Barriers:  Chronic health conditions     Interventions:  Continue to follow closely with pulmonary. Monitor pulse oximetry readings.      Education:  Reviewed inhalers, pt using as recommended. Continue to monitor symptoms, notify pulmonary if any worsening respiratory symptoms.       Patient's Concerns and  Feedback (Self Management of Care):     Pt voicing no concerns today    Next Steps:     Follow-Up Plan:  Follow-up in one month, around May 2025      Appointments:  5/7/25 at 12:30 with Aida Buck P.A.-C, 5/8/25 at 10:45 with [facility] Pharmacist, Cardiology 5/13/25 at 10:00 with Cardiology     Contact Information:  Call [phone] with any questions or concerns.

## 2025-04-29 ENCOUNTER — PHARMACY VISIT (OUTPATIENT)
Dept: PHARMACY | Facility: MEDICAL CENTER | Age: 75
End: 2025-04-29
Payer: COMMERCIAL

## 2025-04-29 ENCOUNTER — RESULTS FOLLOW-UP (OUTPATIENT)
Dept: MEDICAL GROUP | Facility: PHYSICIAN GROUP | Age: 75
End: 2025-04-29
Payer: MEDICARE

## 2025-04-30 NOTE — CARE PLAN
Problem: Diet Management  Goal: Learn to Manage Calories/long term goal  Description: Pt would like to lose weight.   Outcome: Progressing     Problem: Knowledge deficit of congestive heart failure disease process  Goal: HP Increase understanding of congestive heart failure/long term goal  Outcome: Progressing     Problem: Knowledge Deficit with Chronic Kidney Disease process  Goal: Patient will be able to verbalize understanding of CKD/long term goal  Outcome: Progressing  Goal: Patient will maintain/demonstrate improvement in lab values/long term goal  Outcome: Progressing

## 2025-05-02 LAB
APOB+LDLR+PCSK9 GENE MUT ANL BLD/T: NOT DETECTED
BRCA1+BRCA2 DEL+DUP + FULL MUT ANL BLD/T: NOT DETECTED
MLH1+MSH2+MSH6+PMS2 GN DEL+DUP+FUL M: NOT DETECTED

## 2025-05-03 ASSESSMENT — PATIENT HEALTH QUESTIONNAIRE - PHQ9
2. FEELING DOWN, DEPRESSED, IRRITABLE, OR HOPELESS: NOT AT ALL
1. LITTLE INTEREST OR PLEASURE IN DOING THINGS: NOT AT ALL

## 2025-05-03 ASSESSMENT — ACTIVITIES OF DAILY LIVING (ADL): BATHING_REQUIRES_ASSISTANCE: 0

## 2025-05-03 ASSESSMENT — ENCOUNTER SYMPTOMS: GENERAL WELL-BEING: GOOD

## 2025-05-05 ENCOUNTER — NON-PROVIDER VISIT (OUTPATIENT)
Dept: CARDIOLOGY | Facility: MEDICAL CENTER | Age: 75
End: 2025-05-05
Payer: MEDICARE

## 2025-05-05 ASSESSMENT — PATIENT HEALTH QUESTIONNAIRE - PHQ9: CLINICAL INTERPRETATION OF PHQ2 SCORE: 0

## 2025-05-05 NOTE — CARDIAC REMOTE MONITOR - SCAN
Device transmission reviewed. Device demonstrated appropriate function.       Electronically Signed by: Santi Snell M.D.    5/20/2025  12:29 PM

## 2025-05-06 NOTE — ASSESSMENT & PLAN NOTE
Chronic, Stable. BP in office today is 116/60. She does check her BP at home. She currently takes Entresto BID, lasix 20 mg daily, carvedilol 12.5 mg BID. Follow up with PCP for continued monitoring and management.

## 2025-05-06 NOTE — ASSESSMENT & PLAN NOTE
Chronic, stable. Last A1c in March 2025 was 5.1. She does not check her blood sugar at home. Currently takes Trulicity SQ weekly. She stopped her Jardiance due to side effects she experienced. Last retinal screening was last year. She has her appointment scheduled for next wee. 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 Follows with ophthalmology regularly. Last microalbumin creat ratio in June 2024 was 118. She has known CKD stage 3b. Most recent GFR was 32. She is on calcitriol for her kidneys. We discussed avoiding nephrotoxic medication such as NSAIDs as well as maintaining adequate hydration. Her PCP does her feet exams. She is on a statin. We discussed diet and lifestyle habits. Follow up with PCP for continued monitoring and management.

## 2025-05-06 NOTE — ASSESSMENT & PLAN NOTE
Chronic, stable. She has a hx of smoking. She maintains on [place] BID and levalbuterol PRN. Denies recent exacerbations. Denies chronic cough but reports she is always out of breath. She is on oxygen therapy- 2L 24/7 and increases up to 5L with exertion. Follows with pulmonology as previously scheduled.

## 2025-05-06 NOTE — ASSESSMENT & PLAN NOTE
Chronic, ongoing. Most recent echo in Dec 2024 with RVSP of 83 mmHg. She has COPD and is on oxygen at all times. Follow up with Dr. Vaca, as scheduled.

## 2025-05-06 NOTE — ASSESSMENT & PLAN NOTE
Chronic, stable.  She currently takes atorvastatin 10 mg daily. We discussed her dietary/lifestyle regimen. Follow up with PCP for continued monitoring and management.  Lab Results   Component Value Date/Time    CHOLSTRLTOT 116 06/11/2024 10:19 AM    TRIGLYCERIDE 83 06/11/2024 10:19 AM    HDL 57 06/11/2024 10:19 AM    LDL 42 06/11/2024 10:19 AM

## 2025-05-06 NOTE — ASSESSMENT & PLAN NOTE
Chronic, stable. She is on calcitriol for CKD. GFR 32 in March 2025. Vitamin D levels normal. Follow up with PCP at least annually for continued monitoring and management.

## 2025-05-06 NOTE — ASSESSMENT & PLAN NOTE
Chronic, stable. Continue taking amiodarone 100 mg daily, carvedilol 12.5 mg BID, and warfarin daily. She does follow with the anticoagulation clinic. Follows with cardiology regularly.

## 2025-05-07 ENCOUNTER — OFFICE VISIT (OUTPATIENT)
Dept: FAMILY PLANNING/WOMEN'S HEALTH CLINIC | Facility: PHYSICIAN GROUP | Age: 75
End: 2025-05-07
Payer: MEDICARE

## 2025-05-07 VITALS
SYSTOLIC BLOOD PRESSURE: 116 MMHG | HEART RATE: 60 BPM | WEIGHT: 184 LBS | OXYGEN SATURATION: 92 % | DIASTOLIC BLOOD PRESSURE: 60 MMHG | BODY MASS INDEX: 30.66 KG/M2 | HEIGHT: 65 IN

## 2025-05-07 DIAGNOSIS — I10 BENIGN ESSENTIAL HYPERTENSION: ICD-10-CM

## 2025-05-07 DIAGNOSIS — Z95.0 PACEMAKER: ICD-10-CM

## 2025-05-07 DIAGNOSIS — J96.11 CHRONIC RESPIRATORY FAILURE WITH HYPOXIA, ON HOME OXYGEN THERAPY (HCC): ICD-10-CM

## 2025-05-07 DIAGNOSIS — E78.5 DYSLIPIDEMIA ASSOCIATED WITH TYPE 2 DIABETES MELLITUS (HCC): ICD-10-CM

## 2025-05-07 DIAGNOSIS — N18.32 TYPE 2 DIABETES MELLITUS WITH STAGE 3B CHRONIC KIDNEY DISEASE, WITHOUT LONG-TERM CURRENT USE OF INSULIN (HCC): ICD-10-CM

## 2025-05-07 DIAGNOSIS — Z99.81 CHRONIC RESPIRATORY FAILURE WITH HYPOXIA, ON HOME OXYGEN THERAPY (HCC): ICD-10-CM

## 2025-05-07 DIAGNOSIS — I48.91 ATRIAL FIBRILLATION, UNSPECIFIED TYPE (HCC): ICD-10-CM

## 2025-05-07 DIAGNOSIS — E11.3292 MILD NONPROLIFERATIVE DIABETIC RETINOPATHY OF LEFT EYE WITHOUT MACULAR EDEMA ASSOCIATED WITH TYPE 2 DIABETES MELLITUS (HCC): ICD-10-CM

## 2025-05-07 DIAGNOSIS — N25.81 HYPERPARATHYROIDISM DUE TO RENAL INSUFFICIENCY (HCC): ICD-10-CM

## 2025-05-07 DIAGNOSIS — J44.9 CHRONIC OBSTRUCTIVE PULMONARY DISEASE, UNSPECIFIED COPD TYPE (HCC): ICD-10-CM

## 2025-05-07 DIAGNOSIS — R80.9 MICROALBUMINURIA DUE TO TYPE 2 DIABETES MELLITUS (HCC): ICD-10-CM

## 2025-05-07 DIAGNOSIS — E11.29 MICROALBUMINURIA DUE TO TYPE 2 DIABETES MELLITUS (HCC): ICD-10-CM

## 2025-05-07 DIAGNOSIS — I47.19 ATRIAL TACHYCARDIA (HCC): ICD-10-CM

## 2025-05-07 DIAGNOSIS — E11.22 TYPE 2 DIABETES MELLITUS WITH STAGE 3B CHRONIC KIDNEY DISEASE, WITHOUT LONG-TERM CURRENT USE OF INSULIN (HCC): ICD-10-CM

## 2025-05-07 DIAGNOSIS — I27.20 PULMONARY HYPERTENSION (HCC): ICD-10-CM

## 2025-05-07 DIAGNOSIS — E11.69 DYSLIPIDEMIA ASSOCIATED WITH TYPE 2 DIABETES MELLITUS (HCC): ICD-10-CM

## 2025-05-07 PROCEDURE — 3074F SYST BP LT 130 MM HG: CPT

## 2025-05-07 PROCEDURE — G0439 PPPS, SUBSEQ VISIT: HCPCS

## 2025-05-07 PROCEDURE — 3078F DIAST BP <80 MM HG: CPT

## 2025-05-07 PROCEDURE — 1126F AMNT PAIN NOTED NONE PRSNT: CPT

## 2025-05-07 RX ORDER — UBIDECARENONE 75 MG
100 CAPSULE ORAL DAILY
COMMUNITY

## 2025-05-07 SDOH — ECONOMIC STABILITY: FOOD INSECURITY: HOW HARD IS IT FOR YOU TO PAY FOR THE VERY BASICS LIKE FOOD, HOUSING, MEDICAL CARE, AND HEATING?: NOT HARD AT ALL

## 2025-05-07 SDOH — ECONOMIC STABILITY: FOOD INSECURITY: WITHIN THE PAST 12 MONTHS, THE FOOD YOU BOUGHT JUST DIDN'T LAST AND YOU DIDN'T HAVE MONEY TO GET MORE.: NEVER TRUE

## 2025-05-07 SDOH — ECONOMIC STABILITY: HOUSING INSECURITY: IN THE LAST 12 MONTHS, WAS THERE A TIME WHEN YOU WERE NOT ABLE TO PAY THE MORTGAGE OR RENT ON TIME?: NO

## 2025-05-07 SDOH — ECONOMIC STABILITY: HOUSING INSECURITY: AT ANY TIME IN THE PAST 12 MONTHS, WERE YOU HOMELESS OR LIVING IN A SHELTER (INCLUDING NOW)?: NO

## 2025-05-07 SDOH — ECONOMIC STABILITY: FOOD INSECURITY: WITHIN THE PAST 12 MONTHS, YOU WORRIED THAT YOUR FOOD WOULD RUN OUT BEFORE YOU GOT THE MONEY TO BUY MORE.: NEVER TRUE

## 2025-05-07 SDOH — ECONOMIC STABILITY: HOUSING INSECURITY: IN THE PAST 12 MONTHS, HOW MANY TIMES HAVE YOU MOVED WHERE YOU WERE LIVING?: 1

## 2025-05-07 SDOH — ECONOMIC STABILITY: TRANSPORTATION INSECURITY: IN THE PAST 12 MONTHS, HAS LACK OF TRANSPORTATION KEPT YOU FROM MEDICAL APPOINTMENTS OR FROM GETTING MEDICATIONS?: NO

## 2025-05-07 ASSESSMENT — PAIN SCALES - GENERAL: PAINLEVEL_OUTOF10: NO PAIN

## 2025-05-07 ASSESSMENT — ACTIVITIES OF DAILY LIVING (ADL): LACK_OF_TRANSPORTATION: NO

## 2025-05-07 ASSESSMENT — FIBROSIS 4 INDEX: FIB4 SCORE: 3.21

## 2025-05-07 NOTE — LETTER
[facility]  Leah Bonilla D.O.  [address]  [place] [address]  Fax: [phone]   Authorization for Release/Disclosure of   Protected Health Information   Name: CHARLENE GARCIA : 1950 SSN: [id number]   Address: [address] [place] [address] [address]  [address]  [place] [address] Phone:    There are no phone numbers on file.   I authorize the entity listed below to release/disclose the PHI below to:   [facility]/Leah Bonilla D.O. and Aida Buck P.A.-C.   Provider or Entity Name:     Address   City, State, Zip   Phone:      Fax:     Reason for request: continuity of care   Information to be released:    [  ] LAST COLONOSCOPY,  including any PATH REPORT and follow-up  [  ] LAST FIT/COLOGUARD RESULT [  ] LAST DEXA  [  ] LAST MAMMOGRAM  [  ] LAST PAP  [  ] LAST LABS [  ] RETINA EXAM REPORT  [  ] IMMUNIZATION RECORDS  [  ] Release all info      [  ] Check here and initial the line next to each item to release ALL health information INCLUDING  _____ Care and treatment for drug and / or alcohol abuse  _____ HIV testing, infection status, or AIDS  _____ Genetic Testing    DATES OF SERVICE OR TIME PERIOD TO BE DISCLOSED: _____________  I understand and acknowledge that:  * This Authorization may be revoked at any time by you in writing, except if your health information has already been used or disclosed.  * Your health information that will be used or disclosed as a result of you signing this authorization could be re-disclosed by the recipient. If this occurs, your re-disclosed health information may no longer be protected by State or Federal laws.  * You may refuse to sign this Authorization. Your refusal will not affect your ability to obtain treatment.  * This Authorization becomes effective upon signing and will  on (date) __________.      If no date is indicated, this Authorization will  one (1) year from the signature date.    Name: Charlene Garcia  Signature: Date:    5/7/2025     PLEASE FAX REQUESTED RECORDS BACK TO: [phone]

## 2025-05-07 NOTE — PROGRESS NOTES
Comprehensive Health Assessment Program     Salma Lees is a 75 y.o. here for her comprehensive health assessment.    Patient Active Problem List    Diagnosis Date Noted    Atrial tachycardia (HCC) 11/07/2024    Mild nonproliferative diabetic retinopathy of left eye without macular edema associated with type 2 diabetes mellitus (HCC) 07/08/2024    B12 deficiency 06/13/2024    TIA (transient ischemic attack) 06/13/2024    Laceration of fourth toe 03/25/2024    S/P ablation of atrial flutter/AT 11/15/2023    Iron deficiency anemia, unspecified 11/02/2023    Eosinophilia 08/07/2023    Bruit 08/07/2023    Amaurosis fugax of left eye 08/07/2023    Hyperparathyroidism due to renal insufficiency (HCC) 05/01/2023    Vitamin D deficiency 05/01/2023    Microalbuminuria due to type 2 diabetes mellitus ([facility]) 01/23/2023    Genitourinary syndrome of menopause 10/19/2022    Dyslipidemia associated with type 2 diabetes mellitus ([facility]) 09/09/2021    Secondary hypercoagulable state ([facility]) 08/06/2021    Chronic respiratory failure with hypoxia, on home oxygen therapy ([facility]) 11/19/2019    Atherosclerosis of aorta ([facility]) 11/19/2019    Pulmonary hypertension ([facility]) 11/19/2019    Stage 3b chronic kidney disease (CKD) 11/19/2019    Normocytic anemia 06/26/2017    Encounter for completion of form with patient- DMV form and [employer] patient assistance 02/13/2017    H/O mitral valve repair 02/13/2017    Chronic obstructive pulmonary disease (HCC) 02/13/2017    Atrial fibrillation (HCC) 02/13/2017    Type 2 diabetes mellitus with stage 3b chronic kidney disease, without long-term current use of insulin (HCC) 02/13/2017    Anticoagulated 02/13/2017    Benign essential hypertension 03/19/2015    Pacemaker 2014       Current Outpatient Medications   Medication Sig Dispense Refill    cyanocobalamin (VITAMIN B-12) 100 MCG Tab Take 100 mcg by mouth every day.      Dulaglutide (TRULICITY) 1.5 MG/0.5ML Solution Auto-injector Inject 1 pen  under the skin every 7 days. Every Sunday 6 mL 3    fluticasone-salmeterol (ADVAIR) 500-50 MCG/ACT AEROSOL POWDER, BREATH ACTIVATED Inhale 1 Puff 2 times a day. 120 Each 5    fluticasone-salmeterol (ADVAIR DISKUS) 500-50 MCG/ACT AEROSOL POWDER, BREATH ACTIVATED Inhale 1 Puff every 12 hours for 270 days. 3 Each 2    warfarin (COUMADIN) 5 MG Tab Take 0.5-1 Tablets by mouth every morning. 2.5 mg Sunday/Tuesday/Thursday, 5 mg all other days 100 Tablet 3    levalbuterol (XOPENEX HFA) 45 MCG/ACT inhaler Inhale 1-2 Puffs every four hours as needed for Shortness of Breath. 4 Each 3    atorvastatin (LIPITOR) 10 MG Tab Take 1 tablet by mouth every day. (Patient taking differently: Take 10 mg by mouth every evening.) 100 Tablet 3    furosemide (LASIX) 20 MG Tab Take 1 Tablet by mouth every 48 hours. 45 Tablet 3    calcitRIOL (ROCALTROL) 0.25 MCG Cap Take 2 Capsules by mouth every day. 200 Capsule 3    potassium Chloride ER (K-TAB) 20 MEQ Tab CR tablet Take 1 Tablet by mouth every day. (Patient taking differently: Take 20 mEq by mouth. Takes only when she takes Lasix.) 90 Tablet 0    Multiple Vitamins-Minerals (HAIR SKIN NAILS PO) Take 2 Tablets by mouth at bedtime.     MEDICATION INSTRUCTIONS FOR SURGERY/PROCEDURE SCHEDULED FOR 12-03-24   DO NOT TAKE 7 DAYS PRIOR TO SURGERY      carvedilol (COREG) 12.5 MG Tab Take 1 Tablet by mouth 2 times a day with meals. 180 Tablet 3    amiodarone (CORDARONE) 200 MG Tab Take 0.5 Tablets by mouth every day. (Patient taking differently: Take 100 mg by mouth every evening.) 90 Tablet 3    sacubitril-valsartan (ENTRESTO) 24-26 MG Tab Take 1 tablet by mouth 2 times a day. 60 Tablet 11     No current facility-administered medications for this visit.          Current supplements as per medication list.     Allergies:   Amoxicillin, Nitrofurantoin, Pcn [penicillins], Vancomycin, Amlodipine besylate-valsartan, Etodolac, Food, Formaldehyde, Ivabradine, Jardiance [empagliflozin], Skin adhesives,  Eliquis [apixaban], Lisinopril, Other drug, Other misc, and Tape  Social History     Tobacco Use    Smoking status: Former     Current packs/day: 0.00     Average packs/day: 1 pack/day for 5.0 years (5.0 ttl pk-yrs)     Types: Cigarettes     Start date: 1985     Quit date: 1990     Years since quittin.3    Smokeless tobacco: Never    Tobacco comments:     Continued abstinence   Vaping Use    Vaping status: Never Used   Substance Use Topics    Alcohol use: Yes     Comment: only on holidays    Drug use: No     Family History   Problem Relation Age of Onset    Lung Disease Mother         asthma    Cancer Mother         ovarian    Heart Disease Father     Stroke Father     Thyroid Sister     Cancer Brother         pancreas    Cancer Paternal Grandmother         colon    Heart Disease Brother     No Known Problems Brother     No Known Problems Brother     Heart Disease Son         sandy Marsh  has a past medical history of A-fib ([facility]), Asthma, BMI 35.0-35.9,adult (2018), Breath shortness (2017), Calculus of ureter (2017), Cataract, Chronic anticoagulation (2017), CKD (chronic kidney disease) stage 3, GFR 30-59 ml/min, Congestive heart failure ([facility]), COPD (chronic obstructive pulmonary disease) ([facility]), Dental disorder ([facility]), Diabetes ([facility]), Emphysema of lung ([facility]) (Copd), Heart murmur, Heart valve disease (2009 Implant), Hemorrhagic disorder ([facility]) (Warfarin), High cholesterol, History of mitral valve repair, Hypertension, Infectious disease (), Pacemaker (), Pulmonary hypertension ([facility]), Renal disorder (2017), Shortness of breath (10/18/2017), and Urinary incontinence.    She has no past medical history of Cough, Painful breathing, Sputum production, or Wheezing.   Past Surgical History:   Procedure Laterality Date    URETEROSCOPY Left 2017    Procedure: URETEROSCOPY;  Surgeon: Edgardo Richter M.D.;  Location: SURGERY [facility];  Service:  Urology    LASERTRIPSY Left 09/18/2017    Procedure: LASERTRIPSY LITHO  ;  Surgeon: Edgardo Richter M.D.;  Location: SURGERY [facility];  Service: Urology    STENT REMOVAL Left 09/18/2017    Procedure: STENT REMOVAL;  Surgeon: Edgardo Richter M.D.;  Location: SURGERY [facility];  Service: Urology    CYSTOSCOPY STENT PLACEMENT Left 06/25/2017    Procedure: CYSTOSCOPY, LEFT URETERAL STENT PLACEMENT;  Surgeon: Edgardo Richter M.D.;  Location: SURGERY [facility];  Service:     PACEMAKER INSERTION  01/01/2015    KNEE REPLACEMENT, TOTAL Left 01/01/2015    MITRAL VALVE REPAIR  01/01/2009    HYSTERECTOMY, VAGINAL  1994    CATARACT EXTRACTION WITH IOL Bilateral     GYN SURGERY  1994    Hysterectomy    OTHER  2015//2022    Left knee replacement//pacemaker battery    OTHER      Heart catheterization around 10/2024.    OTHER      Cardiac Ablation around 12/2024.    TONSILLECTOMY         Screening:  In the last six months have you experienced any leakage of urine? Yes, with lasix. Wears a liner.    Depression Screening  Little interest or pleasure in doing things?  0 - not at all  Feeling down, depressed , or hopeless? 0 - not at all  Patient Health Questionnaire Score: 0     If depressive symptoms identified deferred to follow up visit unless specifically addressed in assessment and plan.    Interpretation of PHQ-9 Total Score   Score Severity   1-4 No Depression   5-9 Mild Depression   10-14 Moderate Depression   15-19 Moderately Severe Depression   20-27 Severe Depression    Screening for Cognitive Impairment  Do you or any of your friends or family members have any concern about your memory? No  Three Minute Recall (Village, Kitchen, Baby) 3/3    Dave clock face with all 12 numbers and set the hands to show 10 minutes past 11.  Yes    Cognitive concerns identified deferred for follow up unless specifically addressed in assessment and plan.    Fall Risk Assessment  Has the patient had two  or more falls in the last year or any fall with injury in the last year?  No    Safety Assessment  Do you always wear your seatbelt?  Yes  Any changes to home needed to function safely? No  Difficulty hearing.  No  Patient counseled about all safety risks that were identified.    Functional Assessment ADLs  Are there any barriers preventing you from cooking for yourself or meeting nutritional needs?  No.    Are there any barriers preventing you from driving safely or obtaining transportation?  No.    Are there any barriers preventing you from using a telephone or calling for help?  No    Are there any barriers preventing you from shopping?  No.    Are there any barriers preventing you from taking care of your own finances?  No    Are there any barriers preventing you from managing your medications?  No    Are there any barriers preventing you from showering, bathing or dressing yourself? No    Are there any barriers preventing you from doing housework or laundry? No  Are there any barriers preventing you from using the toilet?No  Are you currently engaging in any exercise or physical activity?  Yes.  She has stopped exercising around [place].     Self-Assessment of Health  What is your perception of your health? Good    Do you sleep more than six hours a night? No  the B12 has helped her sleep.   In the past 7 days, how much did pain keep you from doing your normal work? None    Do you spend quality time with family or friends (virtually or in person)? Yes    Do you usually eat a heart healthy diet that constists of a variety of fruits, vegetables, whole grains and fiber? Yes    Do you eat foods high in fat and/or Fast Food more than three times per week? No    How concerned are you that your medical conditions are not being well managed? Not at all    Are you worried that in the next 2 months, you may not have stable housing that you own, rent, or stay in as part of a household? Choose not to answer      Advance  Care Planning  Do you have an Advance Directive, Living Will, Durable Power of , or POLST? Yes    Living Will     is not on file - instructed patient to bring in a copy to scan into their chart      Health Maintenance Summary            Current Care Gaps       COVID-19 Vaccine (8 - 2024-25 season) Overdue since 4/27/2025      10/27/2024  Imm Admin: COVID-19, mRNA, LNP-S, PF, margie-sucrose, 30 mcg/0.3 mL    12/18/2023  Imm Admin: COVID-19, mRNA, LNP-S, PF, margie-sucrose, 30 mcg/0.3 mL    09/28/2022  Imm Admin: PFIZER BIVALENT SARS-COV-2 VACCINE (12+)    06/07/2022  Imm Admin: PFIZER MOORE CAP SARS-COV-2 VACCINATION (12+)    09/27/2021  Imm Admin: PFIZER PURPLE CAP SARS-COV-2 VACCINATION (12+)     Only the first 5 history entries have been loaded, but more history exists.                    Upcoming       Fasting Lipid Profile (Yearly) Next due on 6/11/2025 06/11/2024  Lipid Profile    05/29/2024  Lipid Profile    04/25/2023  Lipid Profile    10/26/2022  Lipid Profile    09/13/2021  Lipid Profile      Only the first 5 history entries have been loaded, but more history exists.              Diabetes: Urine Protein Screening (Yearly) Next due on 6/11/2025 06/11/2024  MICROALBUMIN CREAT RATIO URINE    12/05/2023  MICROALBUMIN CREAT RATIO URINE    10/19/2022  MICROALBUMIN CREAT RATIO URINE    10/19/2021  MICROALBUMIN CREAT RATIO URINE    12/03/2019  MICROALBUMIN CREAT RATIO URINE      Only the first 5 history entries have been loaded, but more history exists.              Diabetes: Monofilament / LE Exam (Yearly) Next due on 6/13/2025 06/13/2024  SmartData: WORKFLOW - DIABETES - DIABETIC FOOT EXAM PERFORMED    06/13/2024  Diabetic Monofilament Lower Extremity Exam    05/01/2023  Diabetic Monofilament Lower Extremity Exam    04/11/2022  Diabetic Monofilament LE Exam    01/18/2021  SmartData: WORKFLOW - DIABETES - DIABETIC FOOT EXAM PERFORMED      Only the first 5 history entries have been loaded, but  more history exists.              Diabetes: Retinopathy Screening (Yearly) Next due on 7/6/2025 07/06/2024  RETINAL SCREENING RESULTS    05/16/2023  RETINAL SCREENING RESULTS    05/01/2023  Done - 4/18/2023- records requested    04/19/2023  AMB EXTERNAL RETINAL SCREENING RESULTS    04/19/2023  AMB EXTERNAL RETINAL SCREENING RESULTS      Only the first 5 history entries have been loaded, but more history exists.              A1c Screening (Every 6 Months) Next due on 9/3/2025      03/03/2025  HEMOGLOBIN A1C    12/02/2024  Hemoglobin A1c    06/11/2024  HEMOGLOBIN A1C    11/06/2023  Hemoglobin A1c    08/03/2023  HEMOGLOBIN A1C      Only the first 5 history entries have been loaded, but more history exists.              Annual Pulmonary Function Test / Spirometry (Yearly) Next due on 1/28/2026 01/28/2025  PULMONARY FUNCTION TESTS -Test requested: Complete Pulmonary Function Test    04/05/2022  PFT DICTATED RESULTS    12/22/2020  PFT DICTATED RESULTS    11/12/2018  PULMONARY FUNCTION TESTS -Test requested: Complete Pulmonary Function Test              SERUM CREATININE (Yearly) Next due on 4/11/2026 04/11/2025  Comp Metabolic Panel    01/13/2025  COMP METABOLIC PANEL    12/04/2024  Comp Metabolic Panel    12/02/2024  Comp Metabolic Panel    10/14/2024  Basic Metabolic Panel      Only the first 5 history entries have been loaded, but more history exists.              Annual Wellness Visit (Yearly) Next due on 5/7/2026 05/07/2025  Level of Service: OK ANNUAL WELLNESS VISIT-INCLUDES PPPS SUBSEQUE*    12/05/2023  Subsequent Annual Wellness Visit - Includes PPPS ()    12/05/2023  Visit Dx: Medicare annual wellness visit, subsequent    08/04/2021  Level of Service: OK ANNUAL WELLNESS VISIT-INCLUDES PPPS SUBSEQUE*    07/19/2021  Level of Service: OK ANNUAL WELLNESS VISIT-INCLUDES PPPS SUBSEQUE*      Only the first 5 history entries have been loaded, but more history exists.              Bone Density Scan  (Every 5 Years) Next due on 10/4/2027      10/04/2022  DS-BONE DENSITY STUDY (DEXA)              IMM DTaP/Tdap/Td Vaccine (3 - Td or Tdap) Next due on 2023  Imm Admin: Tdap Vaccine    11/15/2012  Imm Admin: Tdap Vaccine                      Completed or No Longer Recommended       Influenza Vaccine (Series Information) Completed      10/01/2024  Imm Admin: Influenza high-dose trivalent (PF)    2023  Imm Admin: Influenza Vaccine Adult HD    10/19/2022  Imm Admin: Influenza Vaccine Adult HD    10/12/2021  Imm Admin: Influenza Vaccine Adult HD    2020  Imm Admin: Influenza Vaccine Adult HD      Only the first 5 history entries have been loaded, but more history exists.              Zoster (Shingles) Vaccines (Series Information) Completed      10/01/2021  Imm Admin: Zoster Vaccine Recombinant (RZV) (SHINGRIX)    2021  Imm Admin: Zoster Vaccine Recombinant (RZV) (SHINGRIX)              Hepatitis C Screening  Completed      2025  Hepatitis C Antibody component of HEPATITIS PANEL ACUTE(4 COMPONENTS)    2021  Hepatitis C Antibody component of HCV Scrn ( [id number] 1xLife)              Pneumococcal Vaccine: 50+ Years (Series Information) Completed      2019  Imm Admin: Pneumococcal polysaccharide vaccine (PPSV-23)    10/25/2016  Imm Admin: Pneumococcal Vaccine (PCV7) - HISTORICAL DATA    10/06/2016  Imm Admin: Pneumococcal Conjugate Vaccine (Prevnar/PCV-13)    2011  Imm Admin: Pneumococcal polysaccharide vaccine (PPSV-23)              Hepatitis A Vaccine (Hep A) (Series Information) Aged Out      No completion history exists for this topic.              HPV Vaccines (Series Information) Aged Out     No completion history exists for this topic.              Polio Vaccine (Inactivated Polio) (Series Information) Aged Out     No completion history exists for this topic.              Meningococcal Immunization (Series Information) Aged Out     No completion history  "exists for this topic.              Mammogram  Discontinued        Frequency changed to Never automatically (Topic No Longer Applies)    10/04/2022  MA-SCREENING MAMMO BILAT W/TOMOSYNTHESIS W/CAD    07/01/2021  MA-SCREENING MAMMO BILAT W/TOMOSYNTHESIS W/CAD              Colorectal Cancer Screening  Discontinued        Frequency changed to Never automatically (Topic No Longer Applies)    06/27/2024  OCCULT BLOOD FECES IMMUNOASSAY    10/11/2022  OCCULT BLOOD FECES IMMUNOASSAY    06/14/2021  OCCULT BLOOD FECES IMMUNOASSAY              Hepatitis B Vaccine (Hep B)  Discontinued     No completion history exists for this topic.                            Patient Care Team:  Leah Bonilla D.O. as PCP - General (Internal Medicine)  Ivy Galloway M.D. (Inactive) (Pulmonary Medicine)  Zander Quiñones M.D. (Interventional Cardiology)  Shannan Edwards R.N. as RN Care Coordinator   Elsa Vaca M.D. as Attending Team Physician (Internal Medicine)  [employer]      Financial Resource Strain: Low Risk  (5/7/2025)    Overall Financial Resource Strain (CARDIA)     Difficulty of Paying Living Expenses: Not hard at all      Transportation Needs: No Transportation Needs (5/7/2025)    PRAPARE - Transportation     Lack of Transportation (Medical): No     Lack of Transportation (Non-Medical): No      Food Insecurity: No Food Insecurity (5/7/2025)    Hunger Vital Sign     Worried About Running Out of Food in the Last Year: Never true     Ran Out of Food in the Last Year: Never true        Encounter Vitals  Blood Pressure : 116/60  Pulse: 60  Pulse Oximetry: 92 %  O2 Delivery Device: Nasal Cannula (2 for rest period and 5 for walking)  Weight: 83.5 kg (184 lb)  Height: 163.8 cm (5' 4.5\")  BMI (Calculated): 31.1  Pain Score: No pain  Pulmonary Vitals  O2 Flow Rate (L/min): 2  DME  O2 Delivery Device: Nasal Cannula (2 for rest period and 5 for walking)     Physical Exam  Constitutional:       General: She is not in acute " distress.     Appearance: Normal appearance.   HENT:      Head: Normocephalic and atraumatic.   Eyes:      Extraocular Movements: Extraocular movements intact.      Pupils: Pupils are equal, round, and reactive to light.   Cardiovascular:      Rate and Rhythm: Normal rate and regular rhythm.      Heart sounds: Normal heart sounds.   Pulmonary:      Breath sounds: Normal breath sounds.   Musculoskeletal:      Right lower leg: No edema.      Left lower leg: No edema.   Neurological:      Mental Status: She is alert and oriented to person, place, and time.   Psychiatric:         Mood and Affect: Mood normal.         Behavior: Behavior normal.       Assessment and Plan. The following treatment and monitoring plan is recommended:  Atrial fibrillation (HCC)  Atrial tachycardia (HCC)  Chronic, stable. Continue taking amiodarone 100 mg daily, carvedilol 12.5 mg BID, and warfarin daily. She does follow with the anticoagulation clinic. Follows with cardiology regularly.     Benign essential hypertension  Chronic, Stable. BP in office today is 116/60. She does check her BP at home. She currently takes Entresto BID, lasix 20 mg daily, carvedilol 12.5 mg BID. Follow up with PCP for continued monitoring and management.    Chronic obstructive pulmonary disease (HCC)  Chronic, stable. She has a hx of smoking. She maintains on [place] BID and levalbuterol PRN. Denies recent exacerbations. Denies chronic cough but reports she is always out of breath. She is on oxygen therapy- 2L 24/7 and increases up to 5L with exertion. Follows with pulmonology as previously scheduled.    Chronic respiratory failure with hypoxia, on home oxygen therapy (HCC)  Chronic, stable. Has COPD and is on 2L of supplemental O2 24/7. Follows with pulmonology.    Dyslipidemia associated with type 2 diabetes mellitus (HCC)  Chronic, stable.  She currently takes atorvastatin 10 mg daily. We discussed her dietary/lifestyle regimen. Follow up with PCP for continued  monitoring and management.  Lab Results   Component Value Date/Time    CHOLSTRLTOT 116 06/11/2024 10:19 AM    TRIGLYCERIDE 83 06/11/2024 10:19 AM    HDL 57 06/11/2024 10:19 AM    LDL 42 06/11/2024 10:19 AM     Hyperparathyroidism due to renal insufficiency (HCC)  Chronic, stable. She is on calcitriol for CKD. GFR 32 in March 2025. Vitamin D levels normal. Follow up with PCP at least annually for continued monitoring and management.    Pacemaker  Chronic, stable. No complications. Follow up with cardiology.     Pulmonary hypertension (HCC)  Chronic, ongoing. Most recent echo in Dec 2024 with RVSP of 83 mmHg. She has COPD and is on oxygen at all times. Follow up with Dr. Vaca, as scheduled.     Type 2 diabetes mellitus with stage 3b chronic kidney disease, without long-term current use of insulin ([facility])  Microalbuminuria due to type 2 diabetes mellitus ([facility])  Mild nonproliferative diabetic retinopathy of left eye without macular edema associated with type 2 diabetes mellitus ([facility])Chronic, stable. Last A1c in March 2025 was 5.1. She does not check her blood sugar at home. Currently takes Trulicity SQ weekly. She stopped her Jardiance due to side effects she experienced. Last retinal screening was last year. She has her appointment scheduled for next wee. 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 Follows with ophthalmology regularly. Last microalbumin creat ratio in June 2024 was 118. She has known CKD stage 3b. Most recent GFR was 32. She is on calcitriol for her kidneys. We discussed avoiding nephrotoxic medication such as NSAIDs as well as maintaining adequate hydration. Her PCP does her feet exams. She is on a statin. We discussed diet and lifestyle habits. Follow up with PCP for continued monitoring and management.    Services suggested: No services needed at this time  Health Care Screening: Age-appropriate preventive services recommended by USPTF and ACIP covered by Medicare were  discussed today. Services ordered if indicated and agreed upon by the patient.  Referrals offered: Community-based lifestyle interventions to reduce health risks and promote self-management and wellness, fall prevention, nutrition, physical activity, tobacco-use cessation, weight loss, and mental health services as per orders if indicated.    Discussion today about general wellness and lifestyle habits:    Prevent falls and reduce trip hazards; Cautioned about securing or removing rugs.  Have a working fire alarm and carbon monoxide detector.  Engage in regular physical activity and social activities.    Follow-up: Return for appointment with Primary Care Provider as needed..

## 2025-05-08 ENCOUNTER — ANTICOAGULATION VISIT (OUTPATIENT)
Dept: MEDICAL GROUP | Facility: MEDICAL CENTER | Age: 75
End: 2025-05-08
Payer: MEDICARE

## 2025-05-08 VITALS — BODY MASS INDEX: 30.66 KG/M2 | HEIGHT: 65 IN | WEIGHT: 184 LBS

## 2025-05-08 DIAGNOSIS — Z98.890 H/O MITRAL VALVE REPAIR: ICD-10-CM

## 2025-05-08 LAB — INR PPP: 1.9 (ref 2–3.5)

## 2025-05-08 PROCEDURE — 99211 OFF/OP EST MAY X REQ PHY/QHP: CPT | Performed by: NURSE PRACTITIONER

## 2025-05-08 PROCEDURE — 85610 PROTHROMBIN TIME: CPT | Performed by: NURSE PRACTITIONER

## 2025-05-08 ASSESSMENT — FIBROSIS 4 INDEX: FIB4 SCORE: 3.21

## 2025-05-08 NOTE — PROGRESS NOTES
"Anticoagulation Summary  As of 2025      INR goal:  2.0-3.0   TTR:  53.5% (8.1 y)   INR used for dosin.90 (2025)   Warfarin maintenance plan:  5 mg (5 mg x 1) every day   Weekly warfarin total:  35 mg   Plan last modified:  Ivy Galloway PharmD (2025)   Next INR check:  2025   Target end date:  Indefinite    Indications    Atrial fibrillation (HCC) [I48.91]  H/O mitral valve repair [Z98.890]                 Anticoagulation Episode Summary       INR check location:  --    Preferred lab:  --    Send INR reminders to:  --    Comments:  --          Anticoagulation Care Providers       Provider Role Specialty Phone number    Zeferino Almaraz, A.P.N. Referring Family Medicine [phone]    [facility] Anticoagulation Services Responsible  [phone]    Kenneth Salas, PharmD Responsible Pharmacy                 Refer to Patient Findings for HPI:  Patient Findings       Positives:  Change in diet/appetite (more broccoli lately)    Negatives:  Signs/symptoms of thrombosis, Signs/symptoms of bleeding, Laboratory test error suspected, Change in health, Change in alcohol use, Change in activity, Upcoming invasive procedure, Emergency department visit, Upcoming dental procedure, Missed doses, Extra doses, Change in medications, Hospital admission, Bruising, Other complaints          Clinical Outcomes       Negatives:  Major bleeding event, Thromboembolic event, Anticoagulation-related hospital admission, Anticoagulation-related ED visit, Anticoagulation-related fatality            Date Referral Placed: 24      Vitals:  Vitals:    25 1051   Weight: 83.5 kg (184 lb)   Height: 1.638 m (5' 4.5\")     Pt declined vitals - malfunction with blood pressure monitor had checked last week.     Verified current warfarin dosing schedule.    Medications reconciled.  Pt is not on antiplatelet therapy.      A/P   INR is slightly subtherapeutic  Reason(s) for out of range INR today: N/A      Warfarin dosing " recommendation: Continue regimen as listed above.    Pt educated to contact our clinic with any changes in medications or s/s of bleeding or thrombosis. Pt is aware to seek immediate medical attention for falls, head injury or deep cuts.    Request pt to return in 2 week(s). Pt agrees.    Ivy Galloway, JoleenD

## 2025-05-12 NOTE — TELEPHONE ENCOUNTER
FINAL PRIOR AUTHORIZATION STATUS:    1.  Name of Procedure : CPT 82103 x1, 81332 x1     2. Prior Auth Status: Approved through 4/25/204/25/26     3. Action Taken:Patient Notified: yes

## 2025-05-13 ENCOUNTER — OFFICE VISIT (OUTPATIENT)
Dept: CARDIOLOGY | Facility: MEDICAL CENTER | Age: 75
End: 2025-05-13
Attending: INTERNAL MEDICINE
Payer: MEDICARE

## 2025-05-13 ENCOUNTER — PATIENT MESSAGE (OUTPATIENT)
Dept: CARDIOLOGY | Facility: MEDICAL CENTER | Age: 75
End: 2025-05-13

## 2025-05-13 ENCOUNTER — PATIENT MESSAGE (OUTPATIENT)
Dept: RHEUMATOLOGY | Facility: MEDICAL CENTER | Age: 75
End: 2025-05-13
Payer: MEDICARE

## 2025-05-13 VITALS
WEIGHT: 184.4 LBS | BODY MASS INDEX: 31.48 KG/M2 | HEART RATE: 66 BPM | SYSTOLIC BLOOD PRESSURE: 140 MMHG | DIASTOLIC BLOOD PRESSURE: 62 MMHG | RESPIRATION RATE: 16 BRPM | HEIGHT: 64 IN | OXYGEN SATURATION: 86 %

## 2025-05-13 DIAGNOSIS — Z86.79 S/P ABLATION OF ATRIAL FLUTTER: ICD-10-CM

## 2025-05-13 DIAGNOSIS — D50.9 IRON DEFICIENCY ANEMIA, UNSPECIFIED IRON DEFICIENCY ANEMIA TYPE: ICD-10-CM

## 2025-05-13 DIAGNOSIS — Z98.890 S/P ABLATION OF ATRIAL FLUTTER: ICD-10-CM

## 2025-05-13 DIAGNOSIS — I51.89 LEFT VENTRICULAR SYSTOLIC DYSFUNCTION, NYHA CLASS 3: ICD-10-CM

## 2025-05-13 DIAGNOSIS — N18.32 STAGE 3B CHRONIC KIDNEY DISEASE (CKD): ICD-10-CM

## 2025-05-13 DIAGNOSIS — I27.20 PULMONARY HYPERTENSION (HCC): ICD-10-CM

## 2025-05-13 DIAGNOSIS — I50.20 ACC/AHA STAGE C SYSTOLIC CONGESTIVE HEART FAILURE (HCC): ICD-10-CM

## 2025-05-13 DIAGNOSIS — R06.02 SHORTNESS OF BREATH: ICD-10-CM

## 2025-05-13 DIAGNOSIS — Z98.890 H/O MITRAL VALVE REPAIR: ICD-10-CM

## 2025-05-13 DIAGNOSIS — I50.22 CHRONIC HFREF (HEART FAILURE WITH REDUCED EJECTION FRACTION) (HCC): Primary | ICD-10-CM

## 2025-05-13 DIAGNOSIS — I42.8 NICM (NONISCHEMIC CARDIOMYOPATHY) (HCC): ICD-10-CM

## 2025-05-13 PROCEDURE — 3077F SYST BP >= 140 MM HG: CPT | Performed by: INTERNAL MEDICINE

## 2025-05-13 PROCEDURE — G2211 COMPLEX E/M VISIT ADD ON: HCPCS | Performed by: INTERNAL MEDICINE

## 2025-05-13 PROCEDURE — 99214 OFFICE O/P EST MOD 30 MIN: CPT | Performed by: INTERNAL MEDICINE

## 2025-05-13 PROCEDURE — 3078F DIAST BP <80 MM HG: CPT | Performed by: INTERNAL MEDICINE

## 2025-05-13 PROCEDURE — 99213 OFFICE O/P EST LOW 20 MIN: CPT | Performed by: INTERNAL MEDICINE

## 2025-05-13 RX ORDER — ONDANSETRON 2 MG/ML
8 INJECTION INTRAMUSCULAR; INTRAVENOUS PRN
Status: CANCELLED | OUTPATIENT
Start: 2025-05-13

## 2025-05-13 RX ORDER — METHYLPREDNISOLONE SODIUM SUCCINATE 125 MG/2ML
125 INJECTION, POWDER, LYOPHILIZED, FOR SOLUTION INTRAMUSCULAR; INTRAVENOUS PRN
Status: CANCELLED | OUTPATIENT
Start: 2025-05-13

## 2025-05-13 RX ORDER — SODIUM CHLORIDE 9 MG/ML
INJECTION, SOLUTION INTRAVENOUS CONTINUOUS
OUTPATIENT
Start: 2025-05-13

## 2025-05-13 RX ORDER — DIPHENHYDRAMINE HYDROCHLORIDE 50 MG/ML
25 INJECTION, SOLUTION INTRAMUSCULAR; INTRAVENOUS PRN
Status: CANCELLED | OUTPATIENT
Start: 2025-05-13

## 2025-05-13 RX ORDER — SODIUM CHLORIDE 9 MG/ML
1000 INJECTION, SOLUTION INTRAVENOUS PRN
Status: CANCELLED | OUTPATIENT
Start: 2025-05-13

## 2025-05-13 RX ORDER — ACETAMINOPHEN 325 MG/1
650 TABLET ORAL PRN
Status: CANCELLED | OUTPATIENT
Start: 2025-05-13

## 2025-05-13 RX ORDER — METHYLPREDNISOLONE SODIUM SUCCINATE 125 MG/2ML
125 INJECTION, POWDER, LYOPHILIZED, FOR SOLUTION INTRAMUSCULAR; INTRAVENOUS PRN
OUTPATIENT
Start: 2025-05-13

## 2025-05-13 RX ORDER — LORAZEPAM 2 MG/ML
0.5 INJECTION INTRAMUSCULAR PRN
Status: CANCELLED | OUTPATIENT
Start: 2025-05-13

## 2025-05-13 RX ORDER — LORAZEPAM 0.5 MG/1
0.5 TABLET ORAL PRN
Status: CANCELLED | OUTPATIENT
Start: 2025-05-13

## 2025-05-13 RX ORDER — ONDANSETRON 8 MG/1
8 TABLET, ORALLY DISINTEGRATING ORAL PRN
Status: CANCELLED | OUTPATIENT
Start: 2025-05-13

## 2025-05-13 RX ORDER — MEPERIDINE HYDROCHLORIDE 25 MG/ML
25 INJECTION INTRAMUSCULAR; INTRAVENOUS; SUBCUTANEOUS PRN
Status: CANCELLED | OUTPATIENT
Start: 2025-05-13

## 2025-05-13 RX ORDER — EPINEPHRINE 1 MG/ML(1)
0.5 AMPUL (ML) INJECTION PRN
Status: CANCELLED | OUTPATIENT
Start: 2025-05-13

## 2025-05-13 RX ORDER — ALBUTEROL SULFATE 5 MG/ML
2.5 SOLUTION RESPIRATORY (INHALATION) PRN
Status: CANCELLED | OUTPATIENT
Start: 2025-05-13

## 2025-05-13 RX ORDER — SACUBITRIL AND VALSARTAN 24; 26 MG/1; MG/1
1 TABLET, FILM COATED ORAL 2 TIMES DAILY
Qty: 180 TABLET | Refills: 3 | Status: SHIPPED | OUTPATIENT
Start: 2025-05-13

## 2025-05-13 ASSESSMENT — FIBROSIS 4 INDEX: FIB4 SCORE: 3.21

## 2025-05-13 NOTE — Clinical Note
Any chance we can stop amiodarone? I wonder if her PH is partially related to amio use. She noticed more O2 needs ever since starting it.

## 2025-05-13 NOTE — PROGRESS NOTES
CARDIOLOGY established PATIENT:    PCP: Leah Bonilla D.O.    1. Chronic HFrEF (heart failure with reduced ejection fraction) ([facility])    2. ACC/AHA stage C systolic congestive heart failure (HCC)    3. Left ventricular systolic dysfunction, NYHA class 3    4. NICM (nonischemic cardiomyopathy) ([facility])    5. Pulmonary hypertension ([facility])    6. H/O mitral valve repair    7. Stage 3b chronic kidney disease (CKD)    8. S/P ablation of atrial flutter/AT        Salma Lees is here for follow-up for PAH with concerns of chronic HFpEF, history of mitral valve repair and atrial arrhythmia ablations     Chief Complaint   Patient presents with    Atrial Fibrillation    Congestive Heart Failure     F/V Dx:heart failure with reduced ejection fraction       History: Salma Lees is a 75 y.o. female with nonischemic cardiomyopathy attributed to tachycardia induced cardiomyopathy, AT/AFL status post ablation (Right AFL and AT ablation 11/8/23), controlled type 2 diabetes with CKD stage III, PH, chronic hypoxic resp failure on chronic O2 therapy 2-5L/min ( since 2014), history of surgical mitral valve repair (2009), permanent pacemaker in place (since 2014), SVT ablation 12/2024, followed by EP-Dr. Galloway., here for follow up.     Clinic with Dr. Simmons 6/2024: Started on Jardiance 10 mg, Entresto.     Clinic Dr. Galloway 6/2024: resumed amiodarone 100mg, stopped ivabradine, decrease carvedilol due to orthostatic symptoms and off spironolactone.     Had worse fatigue and headaches / dizziness with Jardiance, hence stopped     Clinic Dr. Galloway 7/2024: TTE ordered to follow up on LVEF.     Clinic established with me 9/2024: arranged RHC/LHC. Then decreased lasix to daily from BID. Cr improved.     Dr. Galloway clinic 11/1/24: AT ablation scheduled 12/3/24. Paced terminated in clinic    Clinic 11/7/24: lasix _+ KCL every other day    SVT ablation Dr. Galloway 12/2024    Since ~ Jan 2025: Lasix once a week given CKD    Jan 2025 echo  with worsening PH, RHC/LHC repeated. EF normalized. Showed mixed pre and post cap PH with good response to NO, referred to PH clinic.     On amiodarone for ~ 1 year.  O2 needs worse ever since.     Seeing Dr. Villasenor from PH clinic 6/6/25    BP log: checks mid am 120/60's mmHg    No recent heart failure hospitalizations.  No lower extremity edema.  Stable ongoing dyspnea on exertion.  Denies any chest pain, dizziness, palpitations, syncope.    Intolerant to jardiance.  Used aldactone before.  No known side effects then.    Device interrogation 5/5/25:      NTproBNP 2305 4/2025  NTproBNP 661 10/2022    Ferritin 93.8 4/2025  10/2021  Iron sat 20% 4/2025  Hg 10.7 4/2025    Cr 1.65 4/11/25  Cr 1.33 10/14/24  Cr 1.91 10/3/2024  Cr 1.46 6/2024  Cr 1.56 1/2024    Normal TSH, ALT and AST 6/2024     A1C 5.8% 6/2024     Lipid 6/2024: on atorva 10mg  LDL 42, TG 83      RHC/LHC / NO 1/13/25: Personally performed  1.  Elevated right heart filling pressures: CVP ~ 15mmHg  2.  Elevated left heart filling pressures: LVEDP ~ 20mmHg  3.  Severely elevated pulmonary pressures: PASP 83mmHg, mean PA 52 mmHg, PVR ~ 10WU, PA sat 58.8%. POST NO administration at 80ppm for ~ 4min (significant improvement): PASP 53 mmHg, mean PA 31mmHg, PCWP and LVEDP ~ 20 mmHg, PA sat 74.6%  4.  Normal resting cardiac output by thermodilution (low by assumed Isaac pre NO, normal post NO administration).  5.  Mild epicardial CAD, dominant RCA  6.  Discordant LV and RV tracings with respiration suggestive of possible underlying constrictive physiology.  Patient with known history of open heart with MV repair in 2009    RHC/LHC/CA 10/7/24: Personally performed  1.  Minimally elevated CVP 8 mmHg  2.  Normal resting LVEDP 10-12 mmHg with no significant transaortic gradient on pullback  3.  Significantly elevated PCWP ~ 30mmHg suggestive of potentially pulmonary venous hypertension due to normal LVEDP and appropriate transmitral gradients on recent  "echocardiogram.  4.  Pulmonary hypertension with PVR > 3WU using LVEDP 12mmHg instead of PCWP of 30mmHg  5.  No significant change in pulmonary pressure with nitric oxide administration  6.  Normal resting cardiac output per assumed Isaac 4.6 L/min and mildly reduced per TD 3.9 L/min  7.  Favorable Chriss and   8.  Mildly elevated SVR  9.  Mild nonobstructive epicardial CAD     TTE 1/6/2025:  Compared to the prior study on 04/19/2024. Improved LV function but worsened Pulmonary pressure.  The left ventricle is mildly dilated.  Mild concentric left ventricular hypertrophy.  Normal left ventricular systolic function.  Enlarged right atrium.  Severely dilated left atrium.  Known mitral valve repair which is functioning normally with   mildlyelevated transvalvular gradient.  Moderate to severe tricuspid regurgitation.  Estimated right ventricular systolic pressure is 83 mmHg.      Functional status:       NYHA Class: II-III  I: no symptoms with activity. Normal  II. Mild symptoms with normal physical activity and comfortable at rest.  III: Moderate symptoms with less than normal physical activity. Only comfortable at rest  IV: Severe symptoms with symptoms of heart failure, even at rest.      PE:  BP (!) 140/62 (BP Location: Right arm, Patient Position: Sitting, BP Cuff Size: Adult)   Pulse 66   Resp 16   Ht 1.626 m (5' 4\")   Wt 83.6 kg (184 lb 6.4 oz)   SpO2 (!) 86%   BMI 31.65 kg/m²     Gen: chronically ill appearing  No conversational dyspnea  HEENT: Symmetric face.  NECK: No JVD.   CARDIAC: Regular, Normal S1, S2, +systolic murmur  VASCULATURE: carotids are normal bilaterally without bruit  RESP: Diffuse decreased air entry without crackles or rhonchi  EXT: Bilateral varicose veins noted, no edema  SKIN: Warm and dry  NEURO: No gross deficits  PSYCH: Appropriate affect, participates in conversation    The ASCVD Risk score (Tresckow DK, et al., 2019) failed to calculate.    Past Medical History:   Diagnosis Date " "   A-fib ([facility])     Asthma     BMI 35.0-35.9,adult 11/12/2018    Breath shortness 09/07/2017    uses oxygen at 2 liters/min cont. with \"[facility]    Calculus of ureter 09/18/2017    Cataract     codey IOL    Chronic anticoagulation 02/13/2017    CKD (chronic kidney disease) stage 3, GFR 30-59 ml/min     Congestive heart failure ([facility])     COPD (chronic obstructive pulmonary disease) ([facility])     Dental disorder Dentures    full dentures    Diabetes ([facility])     Emphysema of lung ([facility]) Copd    Heart murmur     Heart valve disease 2009 Implant    mitral heart valve    Hemorrhagic disorder ([facility]) Warfarin    High cholesterol     Patient denies    History of mitral valve repair     Hypertension     Infectious disease 2014    UTI/hx. MRSA    Pacemaker 2014    Pulmonary hypertension ([facility])     Renal disorder 09/07/2017    hx kidney stones    Shortness of breath 10/18/2017    Urinary incontinence      Past Surgical History:   Procedure Laterality Date    URETEROSCOPY Left 09/18/2017    Procedure: URETEROSCOPY;  Surgeon: Edgardo Richter M.D.;  Location: [facility];  Service: Urology    LASERTRIPSY Left 09/18/2017    Procedure: LASERTRIPSY LITHO  ;  Surgeon: Edgardo Richter M.D.;  Location: [facility];  Service: Urology    STENT REMOVAL Left 09/18/2017    Procedure: STENT REMOVAL;  Surgeon: Edgardo Richter M.D.;  Location: [facility];  Service: Urology    CYSTOSCOPY STENT PLACEMENT Left 06/25/2017    Procedure: CYSTOSCOPY, LEFT URETERAL STENT PLACEMENT;  Surgeon: Edgardo Richter M.D.;  Location: [facility];  Service:     PACEMAKER INSERTION  01/01/2015    KNEE REPLACEMENT, TOTAL Left 01/01/2015    MITRAL VALVE REPAIR  01/01/2009    HYSTERECTOMY, VAGINAL  1994    CATARACT EXTRACTION WITH IOL Bilateral     GYN SURGERY  1994    Hysterectomy    OTHER  2015//2022    Left knee replacement//pacemaker battery    OTHER      Heart catheterization around " 10/2024.    OTHER      Cardiac Ablation around 12/2024.    TONSILLECTOMY       Allergies   Allergen Reactions    Amoxicillin Anaphylaxis and Shortness of Breath    Nitrofurantoin      swelling    Pcn [Penicillins] Anaphylaxis    Vancomycin Anaphylaxis and Shortness of Breath     Severe hypotension and bronchospasm observed by anesthesia while giving  vanco during procedure    Amlodipine Besylate-Valsartan Unspecified     Chest pain and dizziness     Etodolac Hives and Nausea     Dark stools    Food Hives     Plums    Formaldehyde      Pt reports she was informed of allergy during a skin test. Pt not sure of reaction    Ivabradine      unknown    Jardiance [Empagliflozin] Unspecified     Headaches, dizziness    Skin Adhesives Hives     Patient states they cause welts for several days;  EKG stickers    Eliquis [Apixaban] Shortness of Breath     Pt reports SOB and dizzy when she took    Lisinopril Unspecified     Dizziness     Other Drug Rash     Metal silver    Other Misc Shortness of Breath and Runny Nose     Mold, dust, and feathers, adhesives, plums    Tape Rash     Paper tape ok     Outpatient Encounter Medications as of 5/13/2025   Medication Sig Dispense Refill    sacubitril-valsartan (ENTRESTO) 24-26 MG Tab Take 1 tablet by mouth 2 times a day. 180 Tablet 3    cyanocobalamin (VITAMIN B-12) 100 MCG Tab Take 100 mcg by mouth every day.      Dulaglutide (TRULICITY) 1.5 MG/0.5ML Solution Auto-injector Inject 1 pen under the skin every 7 days. Every Sunday 6 mL 3    fluticasone-salmeterol (ADVAIR) 500-50 MCG/ACT AEROSOL POWDER, BREATH ACTIVATED Inhale 1 Puff 2 times a day. 120 Each 5    fluticasone-salmeterol (ADVAIR DISKUS) 500-50 MCG/ACT AEROSOL POWDER, BREATH ACTIVATED Inhale 1 Puff every 12 hours for 270 days. 3 Each 2    warfarin (COUMADIN) 5 MG Tab Take 0.5-1 Tablets by mouth every morning. 2.5 mg Sunday/Tuesday/Thursday, 5 mg all other days 100 Tablet 3    levalbuterol (XOPENEX HFA) 45 MCG/ACT inhaler Inhale  1-2 Puffs every four hours as needed for Shortness of Breath. 4 Each 3    atorvastatin (LIPITOR) 10 MG Tab Take 1 tablet by mouth every day. (Patient taking differently: Take 10 mg by mouth every evening.) 100 Tablet 3    furosemide (LASIX) 20 MG Tab Take 1 Tablet by mouth every 48 hours. 45 Tablet 3    calcitRIOL (ROCALTROL) 0.25 MCG Cap Take 2 Capsules by mouth every day. 200 Capsule 3    potassium Chloride ER (K-TAB) 20 MEQ Tab CR tablet Take 1 Tablet by mouth every day. (Patient taking differently: Take 20 mEq by mouth. Takes only when she takes Lasix.) 90 Tablet 0    Multiple Vitamins-Minerals (HAIR SKIN NAILS PO) Take 2 Tablets by mouth at bedtime.     MEDICATION INSTRUCTIONS FOR SURGERY/PROCEDURE SCHEDULED FOR 24   DO NOT TAKE 7 DAYS PRIOR TO SURGERY      carvedilol (COREG) 12.5 MG Tab Take 1 Tablet by mouth 2 times a day with meals. 180 Tablet 3    amiodarone (CORDARONE) 200 MG Tab Take 0.5 Tablets by mouth every day. (Patient taking differently: Take 100 mg by mouth every evening.) 90 Tablet 3    [DISCONTINUED] sacubitril-valsartan (ENTRESTO) 24-26 MG Tab Take 1 tablet by mouth 2 times a day. 60 Tablet 11     No facility-administered encounter medications on file as of 2025.     Social History     Socioeconomic History    Marital status:      Spouse name: Not on file    Number of children: Not on file    Years of education: Not on file    Highest education level: Not on file   Occupational History    Not on file   Tobacco Use    Smoking status: Former     Current packs/day: 0.00     Average packs/day: 1 pack/day for 5.0 years (5.0 ttl pk-yrs)     Types: Cigarettes     Start date: 1985     Quit date: 1990     Years since quittin.3    Smokeless tobacco: Never    Tobacco comments:     Continued abstinence   Vaping Use    Vaping status: Never Used   Substance and Sexual Activity    Alcohol use: Yes     Comment: only on holidays    Drug use: No    Sexual activity: Not Currently    Other Topics Concern    Not on file   Social History Narrative    She has a son. She had previously experience in [place].     Social Drivers of Health     Financial Resource Strain: Low Risk  (5/7/2025)    Overall Financial Resource Strain (CARDIA)     Difficulty of Paying Living Expenses: Not hard at all   Food Insecurity: No Food Insecurity (5/7/2025)    Hunger Vital Sign     Worried About Running Out of Food in the Last Year: Never true     Ran Out of Food in the Last Year: Never true   Transportation Needs: No Transportation Needs (5/7/2025)    PRAPARE - Transportation     Lack of Transportation (Medical): No     Lack of Transportation (Non-Medical): No   Physical Activity: Insufficiently Active (12/20/2024)    Exercise Vital Sign     Days of Exercise per Week: 3 days     Minutes of Exercise per Session: 40 min   Stress: Not on file   Social Connections: Socially Isolated (12/20/2024)    Social Connection and Isolation Panel [NHANES]     Frequency of Communication with Friends and Family: Once a week     Frequency of Social Gatherings with Friends and Family: Once a week     Attends [employer] Services: Never     Active Member of Clubs or Organizations: No     Attends Club or Organization Meetings: Never     Marital Status:    Intimate Partner Violence: Patient Declined (12/3/2024)    Humiliation, Afraid, Rape, and Kick questionnaire     Fear of Current or Ex-Partner: Patient declined     Emotionally Abused: Patient declined     Physically Abused: Patient declined     Sexually Abused: Patient declined   Housing Stability: Low Risk  (5/7/2025)    Housing Stability Vital Sign     Unable to Pay for Housing in the Last Year: No     Number of Times Moved in the Last Year: 1     Homeless in the Last Year: No     Family History   Problem Relation Age of Onset    Lung Disease Mother         asthma    Cancer Mother         ovarian    Heart Disease Father     Stroke Father     Thyroid Sister     Cancer Brother          pancreas    Cancer Paternal Grandmother         colon    Heart Disease Brother     No Known Problems Brother     No Known Problems Brother     Heart Disease Son         afib         Studies    Lab Results   Component Value Date/Time    TSHULTRASEN 2.730 12/02/2024 1104        Lab Results   Component Value Date/Time    FREET4 1.46 06/11/2024 1019      Lab Results   Component Value Date/Time    HBA1C 5.1 03/03/2025 01:36 PM     Lab Results   Component Value Date/Time    CHOLSTRLTOT 116 06/11/2024 10:19 AM    LDL 42 06/11/2024 10:19 AM    HDL 57 06/11/2024 10:19 AM    TRIGLYCERIDE 83 06/11/2024 10:19 AM       Lab Results   Component Value Date/Time    SODIUM 144 04/11/2025 09:10 AM    POTASSIUM 4.1 04/11/2025 09:10 AM    CHLORIDE 109 04/11/2025 09:10 AM    CO2 27 04/11/2025 09:10 AM    GLUCOSE 105 (H) 04/11/2025 09:10 AM    BUN 35 (H) 04/11/2025 09:10 AM    CREATININE 1.65 (H) 04/11/2025 09:10 AM     Lab Results   Component Value Date/Time    ALKPHOSPHAT 59 04/11/2025 09:10 AM    ASTSGOT 28 04/11/2025 09:10 AM    ALTSGPT 26 04/11/2025 09:10 AM    TBILIRUBIN 1.1 04/11/2025 09:10 AM        Echocardiogram:  Results for orders placed or performed during the hospital encounter of 09/18/17   ECHOCARDIOGRAM COMP W/O CONT    Collection Time: 09/18/17  8:21 AM   Result Value Ref Range    Eject.Frac. MOD BP 57.46     Eject.Frac. MOD 4C 57.06     Eject.Frac. MOD 2C 64.61     Left Ventrical Ejection Fraction 55          Assessment and Recommendations:    Problem List Items Addressed This Visit          Cardiology Medicine Problems    Chronic HFrEF (heart failure with reduced ejection fraction) (HCC) - Primary    Relevant Medications    sacubitril-valsartan (ENTRESTO) 24-26 MG Tab       Other    H/O mitral valve repair    Pulmonary hypertension (HCC)    Relevant Medications    sacubitril-valsartan (ENTRESTO) 24-26 MG Tab    Stage 3b chronic kidney disease (CKD)    S/P ablation of atrial flutter/AT    ACC/AHA stage C systolic  congestive heart failure (HCC)    Relevant Medications    sacubitril-valsartan (ENTRESTO) 24-26 MG Tab    NICM (nonischemic cardiomyopathy) (HCC)    Relevant Medications    sacubitril-valsartan (ENTRESTO) 24-26 MG Tab    Left ventricular systolic dysfunction, NYHA class 3    Relevant Medications    sacubitril-valsartan (ENTRESTO) 24-26 MG Tab     Fortunately, Ms Lees remains stable from a chronic complex cardiovascular and pulmonary standpoint    Intolerant to SGLT2 inhibitor/Jardiance in the past.    I will discuss with Dr. Galloway utility of stopping her amiodarone given potential contribution to her underlying pulm hypertension and look into alternatives if needed with any recurrent atrial arrhythmias.  Currently on 100 mg daily    Given her described symptoms of increased sleepiness in the morning after she takes her blood pressure pills, I advised her to space out the Entresto and the carvedilol at least 1 hour apart and see if that helps    Advised her to utilize her Lasix 20 mg 3 days a week    Will follow-up with her after ongoing discussions with the EP team regarding amiodarone and PH clinic after her upcoming appointment with Dr. Villasenor.    Given recent ferritin level < 100 (decreasing over the last few years), anemia with Hg ~ 10 and Iron sat 20%.  I recommend IV iron of at least 2 doses 2 weeks apart and plan to recheck iron studies and ferritin in 3 months.    Thank you for the opportunity to be involved in Salma Lees 's  complex cardiovascular care; and please reach out with any questions or concerns.     () Today's E/M visit is associated with medical care services that serve as the continuing focal point for all needed health care services and/or with medical care services that  are part of ongoing cardiac care related to a patient's single, serious condition, or a complex condition: This includes  furnishing services to patients on an ongoing basis that result in care that is  personalized  to the patient. The services result in a comprehensive, longitudinal, and continuous  relationship with the patient and involve delivery of team-based care that is accessible, coordinated with other practitioners and providers, and integrated with the broader health  care landscape.     Return in about 4 months (around 9/13/2025).    Zander Quiñones MD, MPH [facility]  Interventional Cardiologist  [facility]   of Clinical Internal Medicine - [facility]    ~ Portions of this note were completed using voice recognition software (Dragon Naturally speaking software) . Occasional transcription errors may have escaped proof reading. I have made every reasonable attempt to correct obvious errors, but I expect that there are errors of grammar and possibly content that I did not discover before finalizing the note. ~

## 2025-05-13 NOTE — PATIENT COMMUNICATION
Noted:  Can u plz help coordinate an iron infusion for her. I keep forgetting which one they offer. TWO doses 2 weeks apart and recheck ferritin and iron studies in 3 months.       Iron infusion therapy plan orders placed. Lab orders placed with exp date of approx 3 months from now.

## 2025-05-13 NOTE — PATIENT INSTRUCTIONS
Try to take the Entresto and Carvedilol 1 hour apart in the am  Take amiodarone 100mg at night and I will discuss with Dr. Galloway if we can stop it  Lasix 20mg Mondays Wednesdays and Fridays

## 2025-05-13 NOTE — TELEPHONE ENCOUNTER
----- Message from Physician Zander Quiñones M.D. sent at 5/13/2025 11:22 AM PDT -----  Can u plz help coordinate an iron infusion for her. I keep forgetting which one they offer. Iron sucrose ? 200mg TWO doses 2 weeks apart and recheck ferritin and iron studies in 3 months. Thanks Jovita, forgot to let her know when I saw her. Will send sal a [facility] message

## 2025-05-13 NOTE — Clinical Note
Can u plz help coordinate an iron infusion for her. I keep forgetting which one they offer. Iron sucrose ? 200mg TWO doses 2 weeks apart and recheck ferritin and iron studies in 3 months. Thanks Jovita, forgot to let her know when I saw her. Will send sal a [facility] message

## 2025-05-14 PROCEDURE — RXMED WILLOW AMBULATORY MEDICATION CHARGE: Performed by: INTERNAL MEDICINE

## 2025-05-16 ENCOUNTER — PHARMACY VISIT (OUTPATIENT)
Dept: PHARMACY | Facility: MEDICAL CENTER | Age: 75
End: 2025-05-16
Payer: COMMERCIAL

## 2025-05-19 ENCOUNTER — ANTICOAGULATION VISIT (OUTPATIENT)
Dept: MEDICAL GROUP | Facility: MEDICAL CENTER | Age: 75
End: 2025-05-19
Payer: MEDICARE

## 2025-05-19 ENCOUNTER — HOSPITAL ENCOUNTER (OUTPATIENT)
Dept: LAB | Facility: MEDICAL CENTER | Age: 75
End: 2025-05-19
Attending: STUDENT IN AN ORGANIZED HEALTH CARE EDUCATION/TRAINING PROGRAM
Payer: MEDICARE

## 2025-05-19 VITALS
DIASTOLIC BLOOD PRESSURE: 51 MMHG | HEART RATE: 61 BPM | WEIGHT: 184 LBS | HEIGHT: 64 IN | BODY MASS INDEX: 31.41 KG/M2 | SYSTOLIC BLOOD PRESSURE: 164 MMHG

## 2025-05-19 DIAGNOSIS — Z98.890 H/O MITRAL VALVE REPAIR: Primary | ICD-10-CM

## 2025-05-19 LAB — INR PPP: 2.6 (ref 2–3.5)

## 2025-05-19 PROCEDURE — 36415 COLL VENOUS BLD VENIPUNCTURE: CPT

## 2025-05-19 PROCEDURE — 82103 ALPHA-1-ANTITRYPSIN TOTAL: CPT

## 2025-05-19 PROCEDURE — 86235 NUCLEAR ANTIGEN ANTIBODY: CPT

## 2025-05-19 PROCEDURE — 85610 PROTHROMBIN TIME: CPT | Performed by: STUDENT IN AN ORGANIZED HEALTH CARE EDUCATION/TRAINING PROGRAM

## 2025-05-19 PROCEDURE — 3078F DIAST BP <80 MM HG: CPT | Performed by: STUDENT IN AN ORGANIZED HEALTH CARE EDUCATION/TRAINING PROGRAM

## 2025-05-19 PROCEDURE — 86200 CCP ANTIBODY: CPT

## 2025-05-19 PROCEDURE — 82104 ALPHA-1-ANTITRYPSIN PHENO: CPT

## 2025-05-19 PROCEDURE — 81332 SERPINA1 GENE: CPT

## 2025-05-19 PROCEDURE — 93793 ANTICOAG MGMT PT WARFARIN: CPT | Performed by: STUDENT IN AN ORGANIZED HEALTH CARE EDUCATION/TRAINING PROGRAM

## 2025-05-19 PROCEDURE — 3077F SYST BP >= 140 MM HG: CPT | Performed by: STUDENT IN AN ORGANIZED HEALTH CARE EDUCATION/TRAINING PROGRAM

## 2025-05-19 ASSESSMENT — FIBROSIS 4 INDEX: FIB4 SCORE: 3.21

## 2025-05-19 NOTE — PROGRESS NOTES
"Anticoagulation Summary  As of 2025      INR goal:  2.0-3.0   TTR:  53.6% (8.2 y)   INR used for dosin.60 (2025)   Warfarin maintenance plan:  5 mg (5 mg x 1) every day   Weekly warfarin total:  35 mg   Plan last modified:  Ivy Galloway PharmD (2025)   Next INR check:  2025   Target end date:  Indefinite    Indications    Atrial fibrillation (HCC) [I48.91]  H/O mitral valve repair [Z98.890]                 Anticoagulation Episode Summary       INR check location:  --    Preferred lab:  --    Send INR reminders to:  --    Comments:  --          Anticoagulation Care Providers       Provider Role Specialty Phone number    LILIYA Pearson Referring Family Medicine [phone]    [facility] Anticoagulation [facility] Responsible  [phone]    Kenneth Salas, PharmD Responsible Pharmacy                 Refer to Patient Findings for HPI:  Patient Findings       Negatives:  Signs/symptoms of thrombosis, Signs/symptoms of bleeding, Laboratory test error suspected, Change in health, Change in alcohol use, Change in activity, Upcoming invasive procedure, Emergency department visit, Upcoming dental procedure, Missed doses, Extra doses, Change in medications, Change in diet/appetite, Hospital admission, Bruising, Other complaints          Clinical Outcomes       Negatives:  Major bleeding event, Thromboembolic event, Anticoagulation-related hospital admission, Anticoagulation-related ED visit, Anticoagulation-related fatality            Date Referral Placed: 24      Vitals:  Vitals:    25 1141   BP: (!) 164/51   Pulse: 61   Weight: 83.5 kg (184 lb)   Height: 1.626 m (5' 4\")       Verified current warfarin dosing schedule.    Medications reconciled.  Pt is not on antiplatelet therapy.      A/P   INR is therapeutic  Reason(s) for out of range INR today: N/A      Warfarin dosing recommendation: Continue regimen as listed above.    Pt educated to contact our clinic with any changes in " medications or s/s of bleeding or thrombosis. Pt is aware to seek immediate medical attention for falls, head injury or deep cuts.    Request pt to return in 2 week(s). Pt agrees.    Joleen HallmanD

## 2025-05-20 ENCOUNTER — OUTPATIENT INFUSION SERVICES (OUTPATIENT)
Dept: ONCOLOGY | Facility: MEDICAL CENTER | Age: 75
End: 2025-05-20
Attending: INTERNAL MEDICINE
Payer: MEDICARE

## 2025-05-20 VITALS
SYSTOLIC BLOOD PRESSURE: 146 MMHG | BODY MASS INDEX: 31.54 KG/M2 | RESPIRATION RATE: 18 BRPM | HEIGHT: 64 IN | DIASTOLIC BLOOD PRESSURE: 61 MMHG | WEIGHT: 184.75 LBS | HEART RATE: 64 BPM | OXYGEN SATURATION: 92 % | TEMPERATURE: 97 F

## 2025-05-20 DIAGNOSIS — D50.9 IRON DEFICIENCY ANEMIA, UNSPECIFIED IRON DEFICIENCY ANEMIA TYPE: Primary | ICD-10-CM

## 2025-05-20 LAB
FERRITIN SERPL-MCNC: 73.8 NG/ML (ref 10–291)
IRON SATN MFR SERPL: 17 % (ref 15–55)
IRON SERPL-MCNC: 49 UG/DL (ref 40–170)
TIBC SERPL-MCNC: 281 UG/DL (ref 250–450)
UIBC SERPL-MCNC: 232 UG/DL (ref 110–370)

## 2025-05-20 PROCEDURE — 96365 THER/PROPH/DIAG IV INF INIT: CPT

## 2025-05-20 PROCEDURE — 83540 ASSAY OF IRON: CPT

## 2025-05-20 PROCEDURE — 700111 HCHG RX REV CODE 636 W/ 250 OVERRIDE (IP): Mod: JZ,TB | Performed by: INTERNAL MEDICINE

## 2025-05-20 PROCEDURE — 83550 IRON BINDING TEST: CPT

## 2025-05-20 PROCEDURE — 82728 ASSAY OF FERRITIN: CPT

## 2025-05-20 PROCEDURE — 700105 HCHG RX REV CODE 258: Performed by: INTERNAL MEDICINE

## 2025-05-20 RX ORDER — SODIUM CHLORIDE 9 MG/ML
1000 INJECTION, SOLUTION INTRAVENOUS PRN
Status: DISCONTINUED | OUTPATIENT
Start: 2025-05-20 | End: 2025-05-20 | Stop reason: HOSPADM

## 2025-05-20 RX ORDER — SODIUM CHLORIDE 9 MG/ML
1000 INJECTION, SOLUTION INTRAVENOUS PRN
OUTPATIENT
Start: 2025-06-03

## 2025-05-20 RX ORDER — METHYLPREDNISOLONE SODIUM SUCCINATE 125 MG/2ML
125 INJECTION, POWDER, LYOPHILIZED, FOR SOLUTION INTRAMUSCULAR; INTRAVENOUS PRN
OUTPATIENT
Start: 2025-06-03

## 2025-05-20 RX ORDER — MEPERIDINE HYDROCHLORIDE 25 MG/ML
25 INJECTION INTRAMUSCULAR; INTRAVENOUS; SUBCUTANEOUS PRN
Status: DISCONTINUED | OUTPATIENT
Start: 2025-05-20 | End: 2025-05-20 | Stop reason: HOSPADM

## 2025-05-20 RX ORDER — METHYLPREDNISOLONE SODIUM SUCCINATE 125 MG/2ML
125 INJECTION, POWDER, LYOPHILIZED, FOR SOLUTION INTRAMUSCULAR; INTRAVENOUS PRN
Status: DISCONTINUED | OUTPATIENT
Start: 2025-05-20 | End: 2025-05-20 | Stop reason: HOSPADM

## 2025-05-20 RX ORDER — DIPHENHYDRAMINE HYDROCHLORIDE 50 MG/ML
25 INJECTION, SOLUTION INTRAMUSCULAR; INTRAVENOUS PRN
Status: DISCONTINUED | OUTPATIENT
Start: 2025-05-20 | End: 2025-05-20 | Stop reason: HOSPADM

## 2025-05-20 RX ORDER — ONDANSETRON 8 MG/1
8 TABLET, ORALLY DISINTEGRATING ORAL PRN
Status: DISCONTINUED | OUTPATIENT
Start: 2025-05-20 | End: 2025-05-20 | Stop reason: HOSPADM

## 2025-05-20 RX ORDER — SODIUM CHLORIDE 9 MG/ML
INJECTION, SOLUTION INTRAVENOUS CONTINUOUS
OUTPATIENT
Start: 2025-06-03

## 2025-05-20 RX ORDER — MEPERIDINE HYDROCHLORIDE 25 MG/ML
25 INJECTION INTRAMUSCULAR; INTRAVENOUS; SUBCUTANEOUS PRN
OUTPATIENT
Start: 2025-06-03

## 2025-05-20 RX ORDER — LORAZEPAM 2 MG/ML
0.5 INJECTION INTRAMUSCULAR PRN
Status: DISCONTINUED | OUTPATIENT
Start: 2025-05-20 | End: 2025-05-20 | Stop reason: HOSPADM

## 2025-05-20 RX ORDER — ONDANSETRON 8 MG/1
8 TABLET, ORALLY DISINTEGRATING ORAL PRN
OUTPATIENT
Start: 2025-06-03

## 2025-05-20 RX ORDER — METHYLPREDNISOLONE SODIUM SUCCINATE 125 MG/2ML
125 INJECTION, POWDER, LYOPHILIZED, FOR SOLUTION INTRAMUSCULAR; INTRAVENOUS PRN
Status: COMPLETED | OUTPATIENT
Start: 2025-05-20 | End: 2025-05-20

## 2025-05-20 RX ORDER — ACETAMINOPHEN 325 MG/1
650 TABLET ORAL PRN
Status: DISCONTINUED | OUTPATIENT
Start: 2025-05-20 | End: 2025-05-20 | Stop reason: HOSPADM

## 2025-05-20 RX ORDER — EPINEPHRINE 1 MG/ML(1)
0.5 AMPUL (ML) INJECTION PRN
Status: DISCONTINUED | OUTPATIENT
Start: 2025-05-20 | End: 2025-05-20 | Stop reason: HOSPADM

## 2025-05-20 RX ORDER — ALBUTEROL SULFATE 5 MG/ML
2.5 SOLUTION RESPIRATORY (INHALATION) PRN
OUTPATIENT
Start: 2025-06-03

## 2025-05-20 RX ORDER — LORAZEPAM 2 MG/ML
0.5 INJECTION INTRAMUSCULAR PRN
OUTPATIENT
Start: 2025-06-03

## 2025-05-20 RX ORDER — ACETAMINOPHEN 325 MG/1
650 TABLET ORAL PRN
OUTPATIENT
Start: 2025-06-03

## 2025-05-20 RX ORDER — LORAZEPAM 1 MG/1
0.5 TABLET ORAL PRN
OUTPATIENT
Start: 2025-06-03

## 2025-05-20 RX ORDER — LORAZEPAM 1 MG/1
0.5 TABLET ORAL PRN
Status: DISCONTINUED | OUTPATIENT
Start: 2025-05-20 | End: 2025-05-20 | Stop reason: HOSPADM

## 2025-05-20 RX ORDER — ONDANSETRON 2 MG/ML
8 INJECTION INTRAMUSCULAR; INTRAVENOUS PRN
Status: DISCONTINUED | OUTPATIENT
Start: 2025-05-20 | End: 2025-05-20 | Stop reason: HOSPADM

## 2025-05-20 RX ORDER — DIPHENHYDRAMINE HYDROCHLORIDE 50 MG/ML
25 INJECTION, SOLUTION INTRAMUSCULAR; INTRAVENOUS PRN
OUTPATIENT
Start: 2025-06-03

## 2025-05-20 RX ORDER — ALBUTEROL SULFATE 5 MG/ML
2.5 SOLUTION RESPIRATORY (INHALATION) PRN
Status: DISCONTINUED | OUTPATIENT
Start: 2025-05-20 | End: 2025-05-20 | Stop reason: HOSPADM

## 2025-05-20 RX ORDER — EPINEPHRINE 1 MG/ML(1)
0.5 AMPUL (ML) INJECTION PRN
OUTPATIENT
Start: 2025-06-03

## 2025-05-20 RX ORDER — ONDANSETRON 2 MG/ML
8 INJECTION INTRAMUSCULAR; INTRAVENOUS PRN
OUTPATIENT
Start: 2025-06-03

## 2025-05-20 RX ADMIN — METHYLPREDNISOLONE SODIUM SUCCINATE 125 MG: 125 INJECTION, POWDER, LYOPHILIZED, FOR SOLUTION INTRAMUSCULAR; INTRAVENOUS at 12:54

## 2025-05-20 RX ADMIN — SODIUM CHLORIDE 1000 MG: 9 INJECTION, SOLUTION INTRAVENOUS at 13:23

## 2025-05-20 ASSESSMENT — FIBROSIS 4 INDEX: FIB4 SCORE: 3.21

## 2025-05-20 NOTE — PROGRESS NOTES
Ms eLes is here today for iron infusion for anemia.    She reports fatigue, but otherwise in good spirits.     IV placed to her right arm. Labs drawn and results are within parameters for treatment today.     Solu-medrol given IVP - pt has multiple drug allergies.     Iron given per MAR and was tolerated well.     IV removed and pt was discharged in stable condition.

## 2025-05-21 LAB
CCP IGA+IGG SERPL IA-ACNC: 5 UNITS (ref 0–19)
ENA SCL70 IGG SER QL: 1 AU/ML (ref 0–40)

## 2025-05-22 ENCOUNTER — RESULTS FOLLOW-UP (OUTPATIENT)
Dept: CARDIOLOGY | Facility: MEDICAL CENTER | Age: 75
End: 2025-05-22
Payer: MEDICARE

## 2025-05-22 NOTE — RESULT ENCOUNTER NOTE
Can you plz help arrange IV iron  x 2 and repeat these labs in 4-6 months ? Thank you Jovita Bonilla: pia re iron

## 2025-05-25 LAB
A1AT PHENOTYP SERPL-IMP: NORMAL
A1AT SERPL-MCNC: 183 MG/DL (ref 90–200)
A1AT SS SERPL-MCNC: NEGATIVE G/L
A1AT SZ SERPL-MCNC: NORMAL G/L
A1AT ZZ SERPL-MCNC: NEGATIVE G/L
SPECIMEN SOURCE: NORMAL

## 2025-05-27 PROCEDURE — RXMED WILLOW AMBULATORY MEDICATION CHARGE: Performed by: INTERNAL MEDICINE

## 2025-05-29 ENCOUNTER — PHARMACY VISIT (OUTPATIENT)
Dept: PHARMACY | Facility: MEDICAL CENTER | Age: 75
End: 2025-05-29
Payer: COMMERCIAL

## 2025-06-02 ENCOUNTER — APPOINTMENT (OUTPATIENT)
Dept: MEDICAL GROUP | Facility: MEDICAL CENTER | Age: 75
End: 2025-06-02
Payer: MEDICARE

## 2025-06-02 VITALS
SYSTOLIC BLOOD PRESSURE: 144 MMHG | DIASTOLIC BLOOD PRESSURE: 45 MMHG | HEIGHT: 64 IN | HEART RATE: 60 BPM | WEIGHT: 181 LBS | BODY MASS INDEX: 30.9 KG/M2

## 2025-06-02 DIAGNOSIS — Z98.890 H/O MITRAL VALVE REPAIR: Primary | ICD-10-CM

## 2025-06-02 LAB — INR PPP: 2.9 (ref 2–3.5)

## 2025-06-02 PROCEDURE — 93793 ANTICOAG MGMT PT WARFARIN: CPT | Performed by: STUDENT IN AN ORGANIZED HEALTH CARE EDUCATION/TRAINING PROGRAM

## 2025-06-02 PROCEDURE — 85610 PROTHROMBIN TIME: CPT | Performed by: STUDENT IN AN ORGANIZED HEALTH CARE EDUCATION/TRAINING PROGRAM

## 2025-06-02 PROCEDURE — 3077F SYST BP >= 140 MM HG: CPT | Performed by: STUDENT IN AN ORGANIZED HEALTH CARE EDUCATION/TRAINING PROGRAM

## 2025-06-02 PROCEDURE — 3078F DIAST BP <80 MM HG: CPT | Performed by: STUDENT IN AN ORGANIZED HEALTH CARE EDUCATION/TRAINING PROGRAM

## 2025-06-02 ASSESSMENT — FIBROSIS 4 INDEX: FIB4 SCORE: 3.21

## 2025-06-02 NOTE — PROGRESS NOTES
"Anticoagulation Summary  As of 2025      INR goal:  2.0-3.0   TTR:  53.8% (8.2 y)   INR used for dosin.90 (2025)   Warfarin maintenance plan:  5 mg (5 mg x 1) every day   Weekly warfarin total:  35 mg   Plan last modified:  Ivy Galloway PharmD (2025)   Next INR check:  2025   Target end date:  Indefinite    Indications    Atrial fibrillation (HCC) [I48.91]  H/O mitral valve repair [Z98.890]                 Anticoagulation Episode Summary       INR check location:  --    Preferred lab:  --    Send INR reminders to:  AMB ANTICOAG POOL    Comments:  --          Anticoagulation Care Providers       Provider Role Specialty Phone number    Zeferino Almaraz A.P.TWYLA Referring Family Medicine [phone]    [facility] Anticoagulation Services Responsible  [phone]    Kenneth Salas, PharmD Responsible Pharmacy                 Refer to Patient Findings for HPI:  Patient Findings       Negatives:  Signs/symptoms of thrombosis, Signs/symptoms of bleeding, Laboratory test error suspected, Change in health, Change in alcohol use, Change in activity, Upcoming invasive procedure, Emergency department visit, Upcoming dental procedure, Missed doses, Extra doses, Change in medications, Change in diet/appetite, Hospital admission, Bruising, Other complaints          Clinical Outcomes       Negatives:  Major bleeding event, Thromboembolic event, Anticoagulation-related hospital admission, Anticoagulation-related ED visit, Anticoagulation-related fatality            Date Referral Placed: 24      Vitals:  Vitals:    25 1140   BP: (!) 144/45   Pulse: 60   Weight: 82.1 kg (181 lb)   Height: 1.626 m (5' 4\")         Verified current warfarin dosing schedule.    Medications reconciled.  Pt is not on antiplatelet therapy.      A/P   INR is therapeutic  Reason(s) for out of range INR today: N/A      Warfarin dosing recommendation: Continue regimen as listed above.    Pt educated to contact our clinic with any " changes in medications or s/s of bleeding or thrombosis. Pt is aware to seek immediate medical attention for falls, head injury or deep cuts.    Request pt to return in 2 week(s). Pt agrees.    Joleen HallmanD

## 2025-06-03 ENCOUNTER — OUTPATIENT INFUSION SERVICES (OUTPATIENT)
Dept: ONCOLOGY | Facility: MEDICAL CENTER | Age: 75
End: 2025-06-03
Attending: INTERNAL MEDICINE
Payer: MEDICARE

## 2025-06-03 ENCOUNTER — PATIENT OUTREACH (OUTPATIENT)
Dept: HEALTH INFORMATION MANAGEMENT | Facility: OTHER | Age: 75
End: 2025-06-03

## 2025-06-03 VITALS
BODY MASS INDEX: 31.65 KG/M2 | RESPIRATION RATE: 16 BRPM | OXYGEN SATURATION: 90 % | HEIGHT: 64 IN | SYSTOLIC BLOOD PRESSURE: 150 MMHG | HEART RATE: 66 BPM | DIASTOLIC BLOOD PRESSURE: 53 MMHG | WEIGHT: 185.41 LBS | TEMPERATURE: 97.7 F

## 2025-06-03 DIAGNOSIS — D50.9 IRON DEFICIENCY ANEMIA, UNSPECIFIED IRON DEFICIENCY ANEMIA TYPE: Primary | ICD-10-CM

## 2025-06-03 DIAGNOSIS — I50.22 CHRONIC HFREF (HEART FAILURE WITH REDUCED EJECTION FRACTION) (HCC): Primary | ICD-10-CM

## 2025-06-03 PROCEDURE — 96365 THER/PROPH/DIAG IV INF INIT: CPT

## 2025-06-03 PROCEDURE — 700105 HCHG RX REV CODE 258: Performed by: INTERNAL MEDICINE

## 2025-06-03 PROCEDURE — 700111 HCHG RX REV CODE 636 W/ 250 OVERRIDE (IP): Mod: JZ,TB | Performed by: INTERNAL MEDICINE

## 2025-06-03 PROCEDURE — 96375 TX/PRO/DX INJ NEW DRUG ADDON: CPT

## 2025-06-03 RX ORDER — ONDANSETRON 2 MG/ML
8 INJECTION INTRAMUSCULAR; INTRAVENOUS PRN
OUTPATIENT
Start: 2025-06-03

## 2025-06-03 RX ORDER — ONDANSETRON 8 MG/1
8 TABLET, ORALLY DISINTEGRATING ORAL PRN
OUTPATIENT
Start: 2025-06-03

## 2025-06-03 RX ORDER — LORAZEPAM 1 MG/1
0.5 TABLET ORAL PRN
OUTPATIENT
Start: 2025-06-03

## 2025-06-03 RX ORDER — SODIUM CHLORIDE 9 MG/ML
INJECTION, SOLUTION INTRAVENOUS CONTINUOUS
OUTPATIENT
Start: 2025-06-03

## 2025-06-03 RX ORDER — EPINEPHRINE 1 MG/ML(1)
0.5 AMPUL (ML) INJECTION PRN
OUTPATIENT
Start: 2025-06-03

## 2025-06-03 RX ORDER — METHYLPREDNISOLONE SODIUM SUCCINATE 125 MG/2ML
125 INJECTION, POWDER, LYOPHILIZED, FOR SOLUTION INTRAMUSCULAR; INTRAVENOUS PRN
OUTPATIENT
Start: 2025-06-03

## 2025-06-03 RX ORDER — METHYLPREDNISOLONE SODIUM SUCCINATE 125 MG/2ML
125 INJECTION, POWDER, LYOPHILIZED, FOR SOLUTION INTRAMUSCULAR; INTRAVENOUS PRN
Status: CANCELLED | OUTPATIENT
Start: 2025-06-03

## 2025-06-03 RX ORDER — LORAZEPAM 2 MG/ML
0.5 INJECTION INTRAMUSCULAR PRN
OUTPATIENT
Start: 2025-06-03

## 2025-06-03 RX ORDER — ACETAMINOPHEN 325 MG/1
650 TABLET ORAL PRN
OUTPATIENT
Start: 2025-06-03

## 2025-06-03 RX ORDER — ALBUTEROL SULFATE 5 MG/ML
2.5 SOLUTION RESPIRATORY (INHALATION) PRN
OUTPATIENT
Start: 2025-06-03

## 2025-06-03 RX ORDER — DIPHENHYDRAMINE HYDROCHLORIDE 50 MG/ML
25 INJECTION, SOLUTION INTRAMUSCULAR; INTRAVENOUS PRN
OUTPATIENT
Start: 2025-06-03

## 2025-06-03 RX ORDER — MEPERIDINE HYDROCHLORIDE 25 MG/ML
25 INJECTION INTRAMUSCULAR; INTRAVENOUS; SUBCUTANEOUS PRN
OUTPATIENT
Start: 2025-06-03

## 2025-06-03 RX ORDER — SODIUM CHLORIDE 9 MG/ML
1000 INJECTION, SOLUTION INTRAVENOUS PRN
OUTPATIENT
Start: 2025-06-03

## 2025-06-03 RX ORDER — METHYLPREDNISOLONE SODIUM SUCCINATE 125 MG/2ML
125 INJECTION, POWDER, LYOPHILIZED, FOR SOLUTION INTRAMUSCULAR; INTRAVENOUS PRN
Status: DISCONTINUED | OUTPATIENT
Start: 2025-06-03 | End: 2025-06-03 | Stop reason: HOSPADM

## 2025-06-03 RX ADMIN — SODIUM CHLORIDE 1000 MG: 9 INJECTION, SOLUTION INTRAVENOUS at 09:35

## 2025-06-03 RX ADMIN — METHYLPREDNISOLONE SODIUM SUCCINATE 125 MG: 125 INJECTION INTRAMUSCULAR; INTRAVENOUS at 09:19

## 2025-06-03 ASSESSMENT — FIBROSIS 4 INDEX: FIB4 SCORE: 3.21

## 2025-06-03 NOTE — CARE PLAN
Problem: Knowledge deficit of congestive heart failure disease process  Goal: HP Increase understanding of congestive heart failure/long term goal  Outcome: Progressing     Problem: Low Sodium Diet Management  Goal: Limit dietary sodium/long term goal  Outcome: Progressing     Problem: Excess Fluid  Goal: Establish a plan for excess fluid management/long term goal  Outcome: Progressing     Problem: Knowledge Deficit with Chronic Kidney Disease process  Goal: Patient will be able to verbalize understanding of CKD/long term goal  Outcome: Progressing

## 2025-06-03 NOTE — PROGRESS NOTES
Nurse CM outreach call to pt for monthly CCM assessment.     Reason for Follow-Up:    Monthly CCM assessment    Current Health Status:    Pt following in personal care management for history of CHF, CKD, COPD, and DM.  Pt has a general risk score of 6.    Medical Updates:  Pt reports she has been doing okay.  Reports she received her second iron infusion this am. Pt reports feeling better since infusions.  Reports her fingers aren't as cold. Pt has history of CHF.  Pt had recent visit with cardiology on 5/13/25. Reports no changes to medications. Pt denies any edema in lower extremities. Denies feeling short of breath. Pt following with pulmonary. Reports upcoming appt this Friday. Pt has history of pulmonary hypertension. Pt wears oxygen at 3 liters.     Medication Updates:  Pt reports no changes to her medications    Symptoms:  Pt Reports no active symptoms today    Plan of Care Goals and Progress:    Goal 1. Maintain fluid balance                 Progress:  Reports no edema in lower extremities                            Barriers:  Chronic health conditions                 Interventions:  Continue to monitor daily weights                 Education:  Reviewed notifying cardiology if weight gain of 3 lbs in a day, 5 lbs in a week.  Work on following heart healthy meal plan. Limit sodium in diet.       Goal 2.  Improved COPD symptoms                 Progress:  Pt reports no worsening symptoms                            Barriers:  Chronic health conditions                 Interventions:  Continue to follow closely with pulmonary. Monitor pulse oximetry readings.                  Education:  Continue to monitor symptoms, notify pulmonary if any worsening respiratory symptoms.           Patient's Concerns and Feedback (Self Management of Care):     Pt voicing no concerns today. Pt will follow-up with pulmonary provider this week.  Reviewed with pt that no AD document on file.  Pt reports she has completed and her son  is aware of pt's wishes. Encouraged pt to provide copy for chart when she can.     Next Steps:     Follow-Up Plan:  Follow-up in 4 weeks, around July 2025      Appointments:  6/6/25 at 10:20 with pulmonary medicine     Contact Information:  Call [phone] with any questions or concerns.

## 2025-06-03 NOTE — PROGRESS NOTES
Salma pereira IS for 2nd iron dextran infusion.  Patient reports feeling much better after 1st iron infusion, stated she noticed difference after about 3 days.  POC discussed, pt in agreement.  PIV established to left forearm, Pt pre-medicated per order.  Iron dextran infused as ordered starting at 75 mL/hr for 5 minutes. Infusion stopped and pt observed for 10 minutes. No s/s of reaction so infusion restarted at 333 mL/hr.  PIV with + blood return post infusion, flushed and d/c'd with tip intact. Gauze and Coban applied.  Pt off unit in NAD.

## 2025-06-05 ASSESSMENT — ENCOUNTER SYMPTOMS
HEMOPTYSIS: 0
LOSS OF CONSCIOUSNESS: 0
COUGH: 1
WEIGHT LOSS: 0
FOCAL WEAKNESS: 0
VOMITING: 0
ABDOMINAL PAIN: 0
PALPITATIONS: 0
SEIZURES: 0
CLAUDICATION: 0
CHILLS: 0
SHORTNESS OF BREATH: 1

## 2025-06-05 NOTE — PROGRESS NOTES
Pulmonary Hypertension Clinic- Follow up    Date of Service: 6/6/25    Referring Physician: Zander Quiñones M.D.    Reason for Consult: Pulmonary HTN    Chief Complaint: SOB on exertion       HPI: Salma Lees is a 74 y.o. female who is followed by cardiology  and is referred to the pulmonary clinic for evaluation of PH, last seen by Dr. Vaca on 1/23/25.  She has a medical history significant for COPD, HLP , s/p MV repair, CHF, Afib, T2DM , CKD, renal stones, s/p stents placement, anemia. She Started having SOB at 44 years of age with steady decline in exercise capacity, when she complained to her physician she was told she may be overweight. She started  oxygen 2 L in 2013 , she reports no DVT or PE, autoimmune issues but her Mom had lung problems. She was also prescribed phenphen for weight loss back in those days. she had an admission for HF in January 2024,  Later had ablations in 12/24 and 10/24. She reports no edema , orthopnea , chest pain , palpitations, weight gain  or cough.  She was on anoro but was expensive and unable to afford , so had to discontinue it. She is currently using only xopenex.    She is last seen in pulmonary office in 10/2023.    Functional status:  NYHA Class:   I: no symptoms with activity. Normal  II. Mild symptoms with normal physical activity and comfortable at rest.  III: Moderate symptoms with less than normal physical activity. Only comfortable at rest  IV: Severe symptoms with symptoms of heart failure, even at rest.    Today 6/6/25: Salma continues to have significant dyspnea on exertion particularly when walking long distances or bending over to pick things up. She completed her whole pulmonary htn workup aside from sleep clinic which she cannot afford right now due to $250 copay.  Her EF has recovered to 55% however her LA remains severely dilated and her LVEDP remains elevated on repeat RHC.  She is managed by Dr. Quiñones on Entresto and remains on amiodarone.  " She is on the GLP1 Trulicity as she had headaches and dizziness with Jardiance.  She is using 2L ATC with 4-5L with exertion.      HRCT had some very mild GGO and \"interlobular septal thickening\" which I find hard to appreciate and is likely due to pulmonary edema.  There is no significant ILD present and no significant concern for amiodarone toxicity although there is a chance that the GGO represents inflammation from amio.  She did have some RLL nodules which appear to be a clearing infection to me.     PFTs are consistent with known Asthma/COPD overlap syndrome and demonstrate a moderately severe obstruction. Her TLC dropped significantly from 114% to 83% predicted from 2022 to 2024 however this was also the time period that her EF dropped and recovered and likely this is related to pulmonary edema.  I cannot rule out that this is early or mild amiodarone toxicity.      VQ is negative    Labs are all WNL    Past Medical History:   Diagnosis Date    A-fib ([facility])     Asthma     BMI 35.0-35.9,adult 11/12/2018    Breath shortness 09/07/2017    uses oxygen at 2 liters/min cont. with \"[facility]\"    Calculus of ureter 09/18/2017    Cataract     codey IOL    Chronic anticoagulation 02/13/2017    CKD (chronic kidney disease) stage 3, GFR 30-59 ml/min     Congestive heart failure ([facility])     COPD (chronic obstructive pulmonary disease) ([facility])     Dental disorder Dentures    full dentures    Diabetes ([facility])     Emphysema of lung ([facility]) Copd    Heart murmur     Heart valve disease 2009 Implant    mitral heart valve    Hemorrhagic disorder ([facility]) Warfarin    High cholesterol     Patient denies    History of mitral valve repair     Hypertension     Infectious disease 2014    UTI/hx. MRSA    Pacemaker 2014    Pulmonary hypertension ([facility])     Renal disorder 09/07/2017    hx kidney stones    Shortness of breath 10/18/2017    Urinary incontinence        Past Surgical History:   Procedure Laterality Date    URETEROSCOPY Left " 2017    Procedure: URETEROSCOPY;  Surgeon: Edgardo Richter M.D.;  Location: SURGERY [facility];  Service: Urology    LASERTRIPSY Left 2017    Procedure: LASERTRIPSY LITHO  ;  Surgeon: Edgardo Richter M.D.;  Location: SURGERY [facility];  Service: Urology    STENT REMOVAL Left 2017    Procedure: STENT REMOVAL;  Surgeon: Edgardo Richter M.D.;  Location: SURGERY [facility];  Service: Urology    CYSTOSCOPY STENT PLACEMENT Left 2017    Procedure: CYSTOSCOPY, LEFT URETERAL STENT PLACEMENT;  Surgeon: Edgardo Richter M.D.;  Location: SURGERY [facility];  Service:     PACEMAKER INSERTION  2015    KNEE REPLACEMENT, TOTAL Left 2015    MITRAL VALVE REPAIR  2009    HYSTERECTOMY, VAGINAL      CATARACT EXTRACTION WITH IOL Bilateral     GYN SURGERY  1994    Hysterectomy    OTHER      Left knee replacement//pacemaker battery    OTHER      Heart catheterization around 10/2024.    OTHER      Cardiac Ablation around 2024.    TONSILLECTOMY         Social History     Socioeconomic History    Marital status:      Spouse name: Not on file    Number of children: Not on file    Years of education: Not on file    Highest education level: Not on file   Occupational History    Not on file   Tobacco Use    Smoking status: Former     Current packs/day: 0.00     Average packs/day: 1 pack/day for 5.0 years (5.0 ttl pk-yrs)     Types: Cigarettes     Start date: 1985     Quit date: 1990     Years since quittin.4    Smokeless tobacco: Never    Tobacco comments:     Continued abstinence   Vaping Use    Vaping status: Never Used   Substance and Sexual Activity    Alcohol use: Yes     Comment: only on holidays    Drug use: No    Sexual activity: Not Currently   Other Topics Concern    Not on file   Social History Narrative    She has a son. She had previously experience in [place].     Social Drivers of Health     Financial  Resource Strain: Low Risk  (5/7/2025)    Overall Financial Resource Strain (CARDIA)     Difficulty of Paying Living Expenses: Not hard at all   Food Insecurity: No Food Insecurity (5/7/2025)    Hunger Vital Sign     Worried About Running Out of Food in the Last Year: Never true     Ran Out of Food in the Last Year: Never true   Transportation Needs: No Transportation Needs (5/7/2025)    PRAPARE - Transportation     Lack of Transportation (Medical): No     Lack of Transportation (Non-Medical): No   Physical Activity: Insufficiently Active (12/20/2024)    Exercise Vital Sign     Days of Exercise per Week: 3 days     Minutes of Exercise per Session: 40 min   Stress: Not on file   Social Connections: Socially Isolated (12/20/2024)    Social Connection and Isolation Panel [NHANES]     Frequency of Communication with Friends and Family: Once a week     Frequency of Social Gatherings with Friends and Family: Once a week     Attends Mosque Services: Never     Active Member of Clubs or Organizations: No     Attends Club or Organization Meetings: Never     Marital Status:    Intimate Partner Violence: Patient Declined (12/3/2024)    Humiliation, Afraid, Rape, and Kick questionnaire     Fear of Current or Ex-Partner: Patient declined     Emotionally Abused: Patient declined     Physically Abused: Patient declined     Sexually Abused: Patient declined   Housing Stability: Low Risk  (5/7/2025)    Housing Stability Vital Sign     Unable to Pay for Housing in the Last Year: No     Number of Times Moved in the Last Year: 1     Homeless in the Last Year: No          Family History   Problem Relation Age of Onset    Lung Disease Mother         asthma    Cancer Mother         ovarian    Heart Disease Father     Stroke Father     Thyroid Sister     Cancer Brother         pancreas    Cancer Paternal Grandmother         colon    Heart Disease Brother     No Known Problems Brother     No Known Problems Brother     Heart Disease  Son         sandy       Current Outpatient Medications on File Prior to Visit   Medication Sig Dispense Refill    sacubitril-valsartan (ENTRESTO) 24-26 MG Tab Take 1 tablet by mouth 2 times a day. 180 Tablet 3    cyanocobalamin (VITAMIN B-12) 100 MCG Tab Take 100 mcg by mouth every day.      Dulaglutide (TRULICITY) 1.5 MG/0.5ML Solution Auto-injector Inject 1 pen under the skin every 7 days. Every Sunday 6 mL 3    fluticasone-salmeterol (ADVAIR) 500-50 MCG/ACT AEROSOL POWDER, BREATH ACTIVATED Inhale 1 Puff 2 times a day. 120 Each 5    fluticasone-salmeterol (ADVAIR DISKUS) 500-50 MCG/ACT AEROSOL POWDER, BREATH ACTIVATED Inhale 1 Puff every 12 hours for 270 days. 3 Each 2    warfarin (COUMADIN) 5 MG Tab Take 0.5-1 Tablets by mouth every morning. 2.5 mg Sunday/Tuesday/Thursday, 5 mg all other days 100 Tablet 3    levalbuterol (XOPENEX HFA) 45 MCG/ACT inhaler Inhale 1-2 Puffs every four hours as needed for Shortness of Breath. 4 Each 3    atorvastatin (LIPITOR) 10 MG Tab Take 1 tablet by mouth every day. (Patient taking differently: Take 10 mg by mouth every evening.) 100 Tablet 3    furosemide (LASIX) 20 MG Tab Take 1 Tablet by mouth every 48 hours. 45 Tablet 3    calcitRIOL (ROCALTROL) 0.25 MCG Cap Take 2 Capsules by mouth every day. 200 Capsule 3    potassium Chloride ER (K-TAB) 20 MEQ Tab CR tablet Take 1 Tablet by mouth every day. (Patient taking differently: Take 20 mEq by mouth. Takes only when she takes Lasix.) 90 Tablet 0    Multiple Vitamins-Minerals (HAIR SKIN NAILS PO) Take 2 Tablets by mouth at bedtime.     MEDICATION INSTRUCTIONS FOR SURGERY/PROCEDURE SCHEDULED FOR 12-03-24   DO NOT TAKE 7 DAYS PRIOR TO SURGERY      carvedilol (COREG) 12.5 MG Tab Take 1 Tablet by mouth 2 times a day with meals. 180 Tablet 3    amiodarone (CORDARONE) 200 MG Tab Take 0.5 Tablets by mouth every day. (Patient taking differently: Take 100 mg by mouth every evening.) 90 Tablet 3     No current facility-administered medications  on file prior to visit.       Allergies: Amoxicillin, Nitrofurantoin, Pcn [penicillins], Vancomycin, Amlodipine besylate-valsartan, Etodolac, Food, Formaldehyde, Ivabradine, Jardiance [empagliflozin], Skin adhesives, Eliquis [apixaban], Lisinopril, Other drug, Other misc, and Tape      ROS:   Review of Systems   Constitutional:  Negative for chills and weight loss.   Respiratory:  Positive for cough and shortness of breath. Negative for hemoptysis.    Cardiovascular:  Negative for chest pain, palpitations and claudication.   Gastrointestinal:  Negative for abdominal pain and vomiting.   Genitourinary:  Negative for dysuria and urgency.   Neurological:  Negative for focal weakness, seizures and loss of consciousness.   All other systems reviewed and are negative.      Vitals:  There were no vitals taken for this visit.    Physical Exam:  Physical Exam  Vitals reviewed.   Constitutional:       General: She is not in acute distress.  HENT:      Head: Normocephalic and atraumatic.   Eyes:      General: No scleral icterus.     Conjunctiva/sclera: Conjunctivae normal.   Cardiovascular:      Rate and Rhythm: Normal rate and regular rhythm.      Heart sounds: No murmur heard.  Pulmonary:      Effort: Pulmonary effort is normal. No respiratory distress.      Breath sounds: No wheezing.   Abdominal:      General: Bowel sounds are normal.      Palpations: Abdomen is soft.   Musculoskeletal:         General: No swelling.      Right lower leg: No edema.      Left lower leg: No edema.   Skin:     Coloration: Skin is not jaundiced.   Neurological:      General: No focal deficit present.      Mental Status: She is alert and oriented to person, place, and time.           Vaccinations:    Immunization History   Administered Date(s) Administered    COVID-19, mRNA, LNP-S, PF, margie-sucrose, 30 mcg/0.3 mL 12/18/2023, 10/27/2024    Influenza Vaccine Adult HD 10/18/2017, 09/21/2018, 09/21/2018, 09/16/2019, 09/21/2020, 10/12/2021,  10/19/2022, 12/05/2023    Influenza Vaccine Quad Inj (Pf) 10/09/2015, 10/06/2016, 10/25/2016    Influenza high-dose trivalent (PF) 10/01/2024    PFIZER BIVALENT SARS-COV-2 VACCINE (12+) 09/28/2022    PFIZER MOORE CAP SARS-COV-2 VACCINATION (12+) 06/07/2022    PFIZER PURPLE CAP SARS-COV-2 VACCINATION (12+) 01/19/2021, 02/09/2021, 09/27/2021    Pneumococcal Conjugate Vaccine (Prevnar/PCV-13) 10/06/2016    Pneumococcal Vaccine (PCV7) - HISTORICAL DATA 10/25/2016    Pneumococcal polysaccharide vaccine (PPSV-23) 09/21/2011, 09/16/2019    RSV ABRYSVO VACCINE 10/27/2024    Tdap Vaccine 11/15/2012, 05/01/2023    Zoster Vaccine Recombinant (RZV) (SHINGRIX) 08/02/2021, 10/01/2021        Pertinent Studies:  Laboratory Data:  RF: WNL 4/23/25  CCP: WNL 4/23/25  GUSTAVO: WNL 4/23/25  Anti-Scl-70: 4/23/25    HCG: NA   UDS: NEGATIVE    BNP TREND:  661 in 10/2022 -> 2213 5/24 -> 2305 4/23/25    CR: 1.65 4/11/25    HIV screening: negative     Thyroid studies: TSH normal     Hepatitis: negative     Polycythemia  NA    6MWT: 1/28/25: 109m on 4L    PFTs as reviewed by me personally show:      Imaging as reviewed by me personally show:    HRCT/CT Thorax: ordered    VQ: ordered    Pertinent Cardiac Studies:    Echo 12/30/2024:    CONCLUSIONS  Compared to the prior study on 04/19/2024. Improved LV function but   worsened Pulmonary pressure.  The left ventricle is mildly dilated.  Mild concentric left ventricular hypertrophy.  Normal left ventricular systolic function.  Enlarged right atrium.  Severely dilated left atrium.  Known mitral valve repair which is functioning normally with   mildlyelevated transvalvular gradient.  Moderate to severe tricuspid regurgitation.  Estimated right ventricular systolic pressure is 83 mmHg.    Echo 5/1/2024    CONCLUSIONS  Compared to the prior study on 4/19/24, contrast is used in this study   which showed left ventricular systolic function of 25%.  Severely reduced left ventricular systolic function. The  ejection   fraction is measured to be 25%.  Mild aortic insufficiency.  Known mitral valve repair which is functioning normally with   appropriate transvalvular gradient. Trace mitral regurgitation.    RHC:     1/13/25  RHC HEMODYNAMICS:   Significant respiratory variation was  present but end expiratory measurements were recorded  Mean arterial pressure: 97 mmHg  Right atrial pressure: 15 mmHg  Right ventricular pressure: 92/20 mmHg  Pulmonary artery pressure: 83 /34/52 mmHg  Mean end-expiratory pulmonary capillary wedge pressure: 30 mmHg (not very good waveform)  Transpulmonary gradient: 32 mmHg using LVEDP 20 mmHg  Pulmonary artery saturation: 58.8%  Systemic arterial saturation: 95.4%  Isaac cardiac output: 3.22 L/min  Isaac cardiac index: 1.7 L/min/m2  Thermodilution cardiac output: 4.63 L/min  Thermodilution cardiac index: 2.45 L/min/m2  Pulmonary vascular resistance: ~ 10 Wood units using TD CO  Systemic Vascular resistance: ~ 1420 dyne s/cm5  Cardiac Power Output () = mean arterial pressure × CO/451 = 0.98 (Favorable > 0.6 W)  Chriss = PA pulse pressure / RA pressure = 3.26 (Favorable > 1)     Post NO 80ppm administration ~ 4min:  PA 53/16/31  PCWP 20  LVEDP 20  RV 53/10  PA sat 74.6% (from 58.8%)  Isaac CO 5.66 , CI 2.99     [facility] HEMODYNAMICS:  Aortic pressure: 156 /61/97 mmHg  LVEDP: 20 mmHg  No significant transaortic gradient on pullback     Simultaneous LV and RV tracings recorded:  Discordant LV and RV tracings with respiration suggestive of possible underlying constrictive physiology.      CORONARY ANGIOGRAPHY:  The left main coronary artery: Normal-appearing large-caliber vessel that bifurcates to LAD and left circumflex  The left anterior descending coronary artery: Large-caliber transapical vessel with mild mid segment CAD.  Gives rise to a large diagonal branch  The left circumflex coronary artery: Large-caliber vessel that gives rise to 2 large OM branches with mild CAD particularly in the proximal  OM1.  The right coronary artery: Large-caliber dominant vessel with mild mid segment CAD.     IMPRESSION:  1.  Elevated right heart filling pressures: CVP ~ 15mmHg  2.  Elevated left heart filling pressures: LVEDP ~ 20mmHg  3.  Severely elevated pulmonary pressures: PASP 83mmHg, mean PA 52 mmHg, PVR ~ 10WU, PA sat 58.8%. POST NO administration at 80ppm for ~ 4min (significant improvement): PASP 53 mmHg, mean PA 31mmHg, PCWP and LVEDP ~ 20 mmHg, PA sat 74.6%  4.  Normal resting cardiac output by thermodilution (low by assumed Isaac pre NO, normal post NO administration).  5.  Mild epicardial CAD, dominant RCA  6.  Discordant LV and RV tracings with respiration suggestive of possible underlying constrictive physiology.  Patient with known history of open heart with MV repair in 2009    10/07/2024  [facility] HEMODYNAMICS:   Significant respiratory variation was not present  Mean arterial pressure: 80 mmHg  Right atrial pressure: 8 mmHg  Right ventricular pressure: 46/5 mmHg  Pulmonary artery pressure: 49/27/36 mmHg  Mean end-expiratory pulmonary capillary wedge pressure: 30 mmHg  Transpulmonary gradient: 6 mmHg BUT LVEDP 10-12 mmHg  Pulmonary artery saturation: 66.1%  Systemic arterial saturation: 96 %  Isaac cardiac output: 4.61 L/min  Isaac cardiac index: 2.46 L/min/m2  Thermodilution cardiac output: 3.9 L/min  Thermodilution cardiac index: 2.08 L/min/m2  Pulmonary vascular resistance: 6 Wood units assuming LVEDP 12mmHg and CO 4L/min  Systemic Vascular resistance: ~ 1400 dyne s/cm5 assuming CO 4L/min  Cardiac Power Output () = mean arterial pressure × CO/451 = 0.7 (Favorable > 0.6 W)  Chriss = PA pulse pressure / RA pressure = 2.75 (Favorable > 1)     80ppm of NO for 5 min:  PA 42/38/33 mmHg  PA sat 71.3%  Isaac CO 5.59  Isaac CI 2.97  LVEDP 12mmHg  PCWP 30mmHg     [facility] HEMODYNAMICS:  Aortic pressure: 99/69/80 mmHg  LVEDP: 10-12 mmHg  No significant transaortic gradient on pullback     CORONARY ANGIOGRAPHY:  The left main  coronary artery: Normal-appearing large caliber vessel that bifurcates to LAD and left circumflex  The left anterior descending coronary artery: Large-caliber transapical vessel with mild CAD.  Gives rise to a large diagonal branch without mild proximal disease.  The left circumflex coronary artery: Large-caliber vessel that gives rise to a large high OM1 with mild proximal disease, large OM 2 with minimal luminal irregularities.  The right coronary artery: Large-caliber dominant vessel, mild luminal irregularities     IMPRESSION:  1.  Minimally elevated CVP 8 mmHg  2.  Normal resting LVEDP 10-12 mmHg with no significant transaortic gradient on pullback  3.  Significantly elevated PCWP ~ 30mmHg suggestive of potentially pulmonary venous hypertension due to normal LVEDP and appropriate transmitral gradients on recent echocardiogram.  4.  Pulmonary hypertension with PVR > 3WU using LVEDP 12mmHg instead of PCWP of 30mmHg  5.  No significant change in pulmonary pressure with nitric oxide administration  6.  Normal resting cardiac output per assumed Isaac 4.6 L/min and mildly reduced per TD 3.9 L/min  7.  Favorable Chriss and   8.  Mildly elevated SVR  9.  Mild nonobstructive epicardial CAD    Assessment & Plan  Pulmonary hypertension (HCC)  Impression: This is Group II PH 2/2 HFrEF.  There may be a small contribution from Group III as we have not yet ruled out CARLITO.     Pulmonary Hypertension Workup:  Group I: Known risk factors include use of FenPhen   Group II: History of mitral valve repair, afib s/p ablation, NICM with EF down to 25% in 5/2024 now recovered to 55%. RVSP was 55 at the time of her HErEF diagnosis and now is in the 80's. RVSP 45-50 mmHG in 2021, 75mmHg in 2019, 60mmHg in 2018. RVSP has fluctuated up and down with changes to the LV and valvular heart disease.  Very likely during her course of mitral valve disease and recovery from HFrEF her pulmonary vasculature remodeled and we continue to deal with  some PH as a result.  The LV does not look good and is certain to fail if trials of PH therapy are initiated at this time.   Group III: Still need to R/o CARLITO.  No significant ILD.  Group IV: No CTEPH  Group V: No risk factors    Plan:  - Will touch base with Drkindra Quiñones and Irene regarding possible trial of Farxiga as I think an SGLT2 would be a great idea in his scenario  - Continue GDMT and maximize where able  - NOT a candidate for PH therapy at this time.  I would really doubt that Salma will ever be a candidate for PH therapy as her valvular and left sided heart disease is so significant a contributor that PH therapy is almost certain to throw her into left heart failure.  However we could reconsider if she has substantial improvements in the appearance of her left side.  - Optimize diuresis, I do not thing Salma is fully dry and would benefit from increased diuretics  - I would recommend removal of amiodarone as Salma cannot afford to have an episode of amio toxicity however I don't have solid evidence to be concerned for this at present.  THERAPY:  Diuretics: Continue Lasix, consider increasing or adding spironolactone    - Follow up with Dr. Villasenor or Lio in [facility] in 6 months           Asthma-COPD overlap syndrome (HCC)  Stable  Plan:  - Continue advair and PRN albuterol       Lung nodules  RLL nodular infiltrate on HRCT likely infections  Plan:  - Repeat CT Thorax in August  - Follow up in 6 months           Time spent in record review prior to patient arrival, reviewing results, and in face-to-face encounter totaled 69 min, excluding any procedures if performed.    Tram Villasenor MD RD  Pulmonary and Critical Care    Available on Voalte

## 2025-06-06 ENCOUNTER — OFFICE VISIT (OUTPATIENT)
Dept: SLEEP MEDICINE | Facility: MEDICAL CENTER | Age: 75
End: 2025-06-06
Attending: INTERNAL MEDICINE
Payer: MEDICARE

## 2025-06-06 VITALS
WEIGHT: 185 LBS | BODY MASS INDEX: 31.58 KG/M2 | HEIGHT: 64 IN | SYSTOLIC BLOOD PRESSURE: 140 MMHG | OXYGEN SATURATION: 94 % | DIASTOLIC BLOOD PRESSURE: 72 MMHG | HEART RATE: 60 BPM | RESPIRATION RATE: 12 BRPM

## 2025-06-06 DIAGNOSIS — I27.20 PULMONARY HYPERTENSION (HCC): ICD-10-CM

## 2025-06-06 DIAGNOSIS — J44.89 ASTHMA-COPD OVERLAP SYNDROME (HCC): ICD-10-CM

## 2025-06-06 DIAGNOSIS — R91.8 PULMONARY NODULES: Primary | ICD-10-CM

## 2025-06-06 DIAGNOSIS — R91.8 LUNG NODULES: ICD-10-CM

## 2025-06-06 PROBLEM — J44.9 CHRONIC OBSTRUCTIVE PULMONARY DISEASE (HCC): Status: RESOLVED | Noted: 2017-02-13 | Resolved: 2025-06-06

## 2025-06-06 PROCEDURE — 99213 OFFICE O/P EST LOW 20 MIN: CPT | Performed by: INTERNAL MEDICINE

## 2025-06-06 PROCEDURE — G2212 PROLONG OUTPT/OFFICE VIS: HCPCS | Performed by: INTERNAL MEDICINE

## 2025-06-06 PROCEDURE — 99215 OFFICE O/P EST HI 40 MIN: CPT | Performed by: INTERNAL MEDICINE

## 2025-06-06 ASSESSMENT — FIBROSIS 4 INDEX: FIB4 SCORE: 3.21

## 2025-06-06 NOTE — ASSESSMENT & PLAN NOTE
Impression: This is Group II PH 2/2 HFrEF.  There may be a small contribution from Group III as we have not yet ruled out CARLITO.     Pulmonary Hypertension Workup:  Group I: Known risk factors include use of FenPhen   Group II: History of mitral valve repair, afib s/p ablation, NICM with EF down to 25% in 5/2024 now recovered to 55%. RVSP was 55 at the time of her HErEF diagnosis and now is in the 80's. RVSP 45-50 mmHG in 2021, 75mmHg in 2019, 60mmHg in 2018. RVSP has fluctuated up and down with changes to the LV and valvular heart disease.  Very likely during her course of mitral valve disease and recovery from HFrEF her pulmonary vasculature remodeled and we continue to deal with some PH as a result.  The LV does not look good and is certain to fail if trials of PH therapy are initiated at this time.   Group III: Still need to R/o CARLITO.  No significant ILD.  Group IV: No CTEPH  Group V: No risk factors    Plan:  - Will touch base with Drkindra Quiñones and Irene regarding possible trial of Farxiga as I think an SGLT2 would be a great idea in his scenario  - Continue GDMT and maximize where able  - NOT a candidate for PH therapy at this time.  I would really doubt that Salma will ever be a candidate for PH therapy as her valvular and left sided heart disease is so significant a contributor that PH therapy is almost certain to throw her into left heart failure.  However we could reconsider if she has substantial improvements in the appearance of her left side.  - Optimize diuresis, I do not thing Salma is fully dry and would benefit from increased diuretics  - I would recommend removal of amiodarone as Salma cannot afford to have an episode of amio toxicity however I don't have solid evidence to be concerned for this at present.  THERAPY:  Diuretics: Continue Lasix, consider increasing or adding spironolactone    - Follow up with Dr. Villasenor or Lio in P[facility]MONARY HYP[facility]ION CLINIC in 6 months

## 2025-06-06 NOTE — ASSESSMENT & PLAN NOTE
"CHIEF COMPLAINT:  Upper respiratory infection symptoms.      HISTORY OF PRESENT ILLNESS:  \"Joan\" is a 75 year old who is still feeling a bit ill.  Going back a bit, on 01/24, she had a knee replacement.  Once she recovered, she and her  went to [place] for about 7 weeks.  While there, approximately a week ago, she developed a slight fever.  She had a bit of a cough, but no pain associated with it.  There was some postnasal drip.  She did not have any allergy symptoms.  She had some laryngitis on the first day to the extent that she really had \"no voice\" at all.  She was seen and was given a Z-Hernandez.  She has now returned home and is doing better.  However, her nose is constantly runny.  At this point, her throat does not feel sore.  The cough continues a bit.        She has not had any significant weight loss.  No fever or chills or sweats.  She generally feels just fine, but wanted to get checked out.      ACTIVE MEDICAL PROBLEMS:  Reviewed.      CURRENT MEDICATIONS:  Reviewed.      OBJECTIVE:  Joan is in no distress.  Breathing comfortably.  No increased respiratory rate.  No audible wheezes or crackles are heard.  BP is 148/82 with a pulse of 66 and regular.  Temperature is 97.4.  She is 5 feet 5.5 inches in height and weighs 137 with a BMI of 22.46.  O2 sats are 99% on room air.  She has no pain with palpation of the frontal or maxillary sinuses.  Her eyes look just fine.  Throat looks entirely benign.  No intense erythema.  No tonsillar hypertrophy.  No obvious postnasal drip.  No anterior or posterior chain adenopathy is palpable.  Lungs are clear to auscultation at this point.  Absolutely no wheezes, crackles or rhonchi are heard.  In short, this is a normal examination, and she was reassured.      ASSESSMENT/PLAN:   1.  Recent episode of a cough that continues mainly of a runny nose.  For that reason, I will go ahead and prescribe Atrovent 0.06% nasal spray 2 squirts each nostril up to 3 times daily. " RLL nodular infiltrate on HRCT likely infections  Plan:  - Repeat CT Thorax in August  - Follow up in 6 months          2.  I am going to give her a wait-and-see prescription for amoxicillin 875 mg 1 tablet twice daily in case this really turns into more of an acute nonrecurrent maxillary sinusitis given the quantity of nasal congestion and drainage.  She understands that she should truly hold off on this, and the only reason I am doing this is that she had a knee replacement just a few months ago and her age.  She will call with any problems or questions in the meantime.

## 2025-06-14 ENCOUNTER — HOSPITAL ENCOUNTER (INPATIENT)
Facility: MEDICAL CENTER | Age: 75
LOS: 11 days | DRG: 291 | End: 2025-06-25
Attending: STUDENT IN AN ORGANIZED HEALTH CARE EDUCATION/TRAINING PROGRAM | Admitting: STUDENT IN AN ORGANIZED HEALTH CARE EDUCATION/TRAINING PROGRAM
Payer: MEDICARE

## 2025-06-14 ENCOUNTER — APPOINTMENT (OUTPATIENT)
Dept: RADIOLOGY | Facility: MEDICAL CENTER | Age: 75
DRG: 291 | End: 2025-06-14
Attending: STUDENT IN AN ORGANIZED HEALTH CARE EDUCATION/TRAINING PROGRAM
Payer: MEDICARE

## 2025-06-14 DIAGNOSIS — J18.9 COMMUNITY ACQUIRED PNEUMONIA OF RIGHT LOWER LOBE OF LUNG: Primary | ICD-10-CM

## 2025-06-14 DIAGNOSIS — I50.20 ACC/AHA STAGE C SYSTOLIC CONGESTIVE HEART FAILURE (HCC): ICD-10-CM

## 2025-06-14 DIAGNOSIS — I51.89 LEFT VENTRICULAR SYSTOLIC DYSFUNCTION, NYHA CLASS 3: ICD-10-CM

## 2025-06-14 DIAGNOSIS — R06.02 SHORTNESS OF BREATH: ICD-10-CM

## 2025-06-14 DIAGNOSIS — I27.20 PULMONARY HYPERTENSION (HCC): ICD-10-CM

## 2025-06-14 DIAGNOSIS — I50.813 ACUTE ON CHRONIC RIGHT-SIDED CONGESTIVE HEART FAILURE (HCC): ICD-10-CM

## 2025-06-14 DIAGNOSIS — J96.01 ACUTE HYPOXIC RESPIRATORY FAILURE (HCC): ICD-10-CM

## 2025-06-14 DIAGNOSIS — J44.1 ACUTE EXACERBATION OF CHRONIC OBSTRUCTIVE PULMONARY DISEASE (COPD) (HCC): ICD-10-CM

## 2025-06-14 DIAGNOSIS — N18.9 CHRONIC KIDNEY DISEASE, UNSPECIFIED CKD STAGE: ICD-10-CM

## 2025-06-14 DIAGNOSIS — J96.21 ACUTE ON CHRONIC RESPIRATORY FAILURE WITH HYPOXIA (HCC): ICD-10-CM

## 2025-06-14 LAB
ALBUMIN SERPL BCP-MCNC: 3.6 G/DL (ref 3.2–4.9)
ALBUMIN/GLOB SERPL: 0.9 G/DL
ALP SERPL-CCNC: 91 U/L (ref 30–99)
ALT SERPL-CCNC: 21 U/L (ref 2–50)
ANION GAP SERPL CALC-SCNC: 16 MMOL/L (ref 7–16)
APTT PPP: 50.1 SEC (ref 24.7–36)
AST SERPL-CCNC: 17 U/L (ref 12–45)
BASOPHILS # BLD AUTO: 0.2 % (ref 0–1.8)
BASOPHILS # BLD: 0.03 K/UL (ref 0–0.12)
BILIRUB SERPL-MCNC: 2 MG/DL (ref 0.1–1.5)
BUN SERPL-MCNC: 25 MG/DL (ref 8–22)
CALCIUM ALBUM COR SERPL-MCNC: 10.2 MG/DL (ref 8.5–10.5)
CALCIUM SERPL-MCNC: 9.9 MG/DL (ref 8.5–10.5)
CHLORIDE SERPL-SCNC: 100 MMOL/L (ref 96–112)
CO2 SERPL-SCNC: 21 MMOL/L (ref 20–33)
CREAT SERPL-MCNC: 1.52 MG/DL (ref 0.5–1.4)
EKG IMPRESSION: NORMAL
EOSINOPHIL # BLD AUTO: 0.01 K/UL (ref 0–0.51)
EOSINOPHIL NFR BLD: 0.1 % (ref 0–6.9)
ERYTHROCYTE [DISTWIDTH] IN BLOOD BY AUTOMATED COUNT: 59 FL (ref 35.9–50)
FLUAV RNA SPEC QL NAA+PROBE: NEGATIVE
FLUBV RNA SPEC QL NAA+PROBE: NEGATIVE
GFR SERPLBLD CREATININE-BSD FMLA CKD-EPI: 35 ML/MIN/1.73 M 2
GLOBULIN SER CALC-MCNC: 4.1 G/DL (ref 1.9–3.5)
GLUCOSE SERPL-MCNC: 127 MG/DL (ref 65–99)
HCT VFR BLD AUTO: 42.3 % (ref 37–47)
HGB BLD-MCNC: 13.4 G/DL (ref 12–16)
IMM GRANULOCYTES # BLD AUTO: 0.16 K/UL (ref 0–0.11)
IMM GRANULOCYTES NFR BLD AUTO: 1.1 % (ref 0–0.9)
INR PPP: 3.4 (ref 0.87–1.13)
LACTATE SERPL-SCNC: 1.3 MMOL/L (ref 0.5–2)
LYMPHOCYTES # BLD AUTO: 0.42 K/UL (ref 1–4.8)
LYMPHOCYTES NFR BLD: 2.9 % (ref 22–41)
MCH RBC QN AUTO: 30.2 PG (ref 27–33)
MCHC RBC AUTO-ENTMCNC: 31.7 G/DL (ref 32.2–35.5)
MCV RBC AUTO: 95.5 FL (ref 81.4–97.8)
MONOCYTES # BLD AUTO: 0.76 K/UL (ref 0–0.85)
MONOCYTES NFR BLD AUTO: 5.2 % (ref 0–13.4)
NEUTROPHILS # BLD AUTO: 13.29 K/UL (ref 1.82–7.42)
NEUTROPHILS NFR BLD: 90.5 % (ref 44–72)
NRBC # BLD AUTO: 0 K/UL
NRBC BLD-RTO: 0 /100 WBC (ref 0–0.2)
NT-PROBNP SERPL IA-MCNC: 4291 PG/ML (ref 0–125)
PLATELET # BLD AUTO: 155 K/UL (ref 164–446)
PMV BLD AUTO: 11.6 FL (ref 9–12.9)
POTASSIUM SERPL-SCNC: 4.5 MMOL/L (ref 3.6–5.5)
PROCALCITONIN SERPL-MCNC: 0.22 NG/ML
PROT SERPL-MCNC: 7.7 G/DL (ref 6–8.2)
PROTHROMBIN TIME: 35.4 SEC (ref 12–14.6)
RBC # BLD AUTO: 4.43 M/UL (ref 4.2–5.4)
RSV RNA SPEC QL NAA+PROBE: NEGATIVE
SARS-COV-2 RNA RESP QL NAA+PROBE: NOTDETECTED
SODIUM SERPL-SCNC: 137 MMOL/L (ref 135–145)
SPECIMEN SOURCE: NORMAL
TROPONIN T SERPL-MCNC: 27 NG/L (ref 6–19)
WBC # BLD AUTO: 14.7 K/UL (ref 4.8–10.8)

## 2025-06-14 PROCEDURE — 94640 AIRWAY INHALATION TREATMENT: CPT

## 2025-06-14 PROCEDURE — 83605 ASSAY OF LACTIC ACID: CPT

## 2025-06-14 PROCEDURE — 96374 THER/PROPH/DIAG INJ IV PUSH: CPT

## 2025-06-14 PROCEDURE — 71045 X-RAY EXAM CHEST 1 VIEW: CPT

## 2025-06-14 PROCEDURE — 80053 COMPREHEN METABOLIC PANEL: CPT

## 2025-06-14 PROCEDURE — 700111 HCHG RX REV CODE 636 W/ 250 OVERRIDE (IP): Mod: JZ | Performed by: STUDENT IN AN ORGANIZED HEALTH CARE EDUCATION/TRAINING PROGRAM

## 2025-06-14 PROCEDURE — 700102 HCHG RX REV CODE 250 W/ 637 OVERRIDE(OP): Performed by: STUDENT IN AN ORGANIZED HEALTH CARE EDUCATION/TRAINING PROGRAM

## 2025-06-14 PROCEDURE — 84145 PROCALCITONIN (PCT): CPT

## 2025-06-14 PROCEDURE — 85025 COMPLETE CBC W/AUTO DIFF WBC: CPT

## 2025-06-14 PROCEDURE — A9270 NON-COVERED ITEM OR SERVICE: HCPCS | Performed by: STUDENT IN AN ORGANIZED HEALTH CARE EDUCATION/TRAINING PROGRAM

## 2025-06-14 PROCEDURE — 99223 1ST HOSP IP/OBS HIGH 75: CPT | Mod: AI | Performed by: STUDENT IN AN ORGANIZED HEALTH CARE EDUCATION/TRAINING PROGRAM

## 2025-06-14 PROCEDURE — 85610 PROTHROMBIN TIME: CPT

## 2025-06-14 PROCEDURE — 770020 HCHG ROOM/CARE - TELE (206)

## 2025-06-14 PROCEDURE — 96376 TX/PRO/DX INJ SAME DRUG ADON: CPT

## 2025-06-14 PROCEDURE — 99285 EMERGENCY DEPT VISIT HI MDM: CPT

## 2025-06-14 PROCEDURE — 96375 TX/PRO/DX INJ NEW DRUG ADDON: CPT

## 2025-06-14 PROCEDURE — 84484 ASSAY OF TROPONIN QUANT: CPT

## 2025-06-14 PROCEDURE — 85730 THROMBOPLASTIN TIME PARTIAL: CPT

## 2025-06-14 PROCEDURE — 700101 HCHG RX REV CODE 250: Performed by: STUDENT IN AN ORGANIZED HEALTH CARE EDUCATION/TRAINING PROGRAM

## 2025-06-14 PROCEDURE — 36415 COLL VENOUS BLD VENIPUNCTURE: CPT

## 2025-06-14 PROCEDURE — 83880 ASSAY OF NATRIURETIC PEPTIDE: CPT

## 2025-06-14 PROCEDURE — 93005 ELECTROCARDIOGRAM TRACING: CPT | Mod: TC | Performed by: STUDENT IN AN ORGANIZED HEALTH CARE EDUCATION/TRAINING PROGRAM

## 2025-06-14 PROCEDURE — 87040 BLOOD CULTURE FOR BACTERIA: CPT | Mod: 91

## 2025-06-14 PROCEDURE — 0241U HCHG SARS-COV-2 COVID-19 NFCT DS RESP RNA 4 TRGT MIC: CPT

## 2025-06-14 RX ORDER — METHYLPREDNISOLONE SODIUM SUCCINATE 125 MG/2ML
125 INJECTION, POWDER, LYOPHILIZED, FOR SOLUTION INTRAMUSCULAR; INTRAVENOUS ONCE
Status: COMPLETED | OUTPATIENT
Start: 2025-06-14 | End: 2025-06-14

## 2025-06-14 RX ORDER — FUROSEMIDE 10 MG/ML
40 INJECTION INTRAMUSCULAR; INTRAVENOUS ONCE
Status: DISCONTINUED | OUTPATIENT
Start: 2025-06-14 | End: 2025-06-14

## 2025-06-14 RX ORDER — FUROSEMIDE 10 MG/ML
20 INJECTION INTRAMUSCULAR; INTRAVENOUS ONCE
Status: COMPLETED | OUTPATIENT
Start: 2025-06-14 | End: 2025-06-14

## 2025-06-14 RX ORDER — AZITHROMYCIN 250 MG/1
500 TABLET, FILM COATED ORAL ONCE
Status: COMPLETED | OUTPATIENT
Start: 2025-06-14 | End: 2025-06-14

## 2025-06-14 RX ORDER — IPRATROPIUM BROMIDE AND ALBUTEROL SULFATE 2.5; .5 MG/3ML; MG/3ML
3 SOLUTION RESPIRATORY (INHALATION)
Status: COMPLETED | OUTPATIENT
Start: 2025-06-14 | End: 2025-06-14

## 2025-06-14 RX ORDER — CEFTRIAXONE 2 G/1
2000 INJECTION, POWDER, FOR SOLUTION INTRAMUSCULAR; INTRAVENOUS ONCE
Status: COMPLETED | OUTPATIENT
Start: 2025-06-14 | End: 2025-06-14

## 2025-06-14 RX ADMIN — CEFTRIAXONE SODIUM 2000 MG: 2 INJECTION, POWDER, FOR SOLUTION INTRAMUSCULAR; INTRAVENOUS at 22:54

## 2025-06-14 RX ADMIN — FUROSEMIDE 20 MG: 10 INJECTION, SOLUTION INTRAVENOUS at 23:38

## 2025-06-14 RX ADMIN — METHYLPREDNISOLONE SODIUM SUCCINATE 125 MG: 125 INJECTION, POWDER, FOR SOLUTION INTRAMUSCULAR; INTRAVENOUS at 22:56

## 2025-06-14 RX ADMIN — IPRATROPIUM BROMIDE AND ALBUTEROL SULFATE 3 ML: .5; 2.5 SOLUTION RESPIRATORY (INHALATION) at 22:47

## 2025-06-14 RX ADMIN — AZITHROMYCIN DIHYDRATE 500 MG: 250 TABLET ORAL at 22:55

## 2025-06-14 ASSESSMENT — FIBROSIS 4 INDEX: FIB4 SCORE: 3.21

## 2025-06-14 NOTE — LETTER
June 21, 2025       Patient: Salma Lees   YOB: 1950   Date of Visit: 6/14/2025         To Whom It May Concern:    My name is Dr. Dominic Mancera, I am the primary Attending Physician for Ms. Salma Lees.    In my medical opinion, I recommend that Salma Lees to remain hospitalized at [facility] in [place], [place].  She will be discharging in the coming business days.    Her son Mr. Tam Lees is needed at the hospital as he helps makes decisions for his mother's medical care.  She requires further procedures including a bronchoscopy.  He will also be needed in the next days to take care of his mother after discharging from the hospital.    If possible, please excuse Mr. Tam Lees from Jury Duty to assist in the care of his mother during her illness.  She has worsening respiratory failure and will require assistance by Mr. Tam Lees to ensure oxygen is being utilized at home and help carry her oxygen during transportation to clinic visits.  Ms. Salma Lees is not capable to care for herself alone at this time.          Sincerely,          Dominic Mancera MD  [facility]  Internal Medicine Department

## 2025-06-15 PROBLEM — J96.21 ACUTE ON CHRONIC RESPIRATORY FAILURE WITH HYPOXIA (HCC): Status: ACTIVE | Noted: 2025-06-15

## 2025-06-15 PROBLEM — I48.0 PAROXYSMAL ATRIAL FIBRILLATION (HCC): Status: ACTIVE | Noted: 2017-02-13

## 2025-06-15 LAB
APPEARANCE UR: ABNORMAL
BACTERIA #/AREA URNS HPF: ABNORMAL /HPF
BILIRUB UR QL STRIP.AUTO: NEGATIVE
CASTS URNS QL MICRO: >20 /LPF (ref 0–2)
COLOR UR: ABNORMAL
EPITHELIAL CELLS 1715: ABNORMAL /HPF (ref 0–5)
GLUCOSE UR STRIP.AUTO-MCNC: NEGATIVE MG/DL
HYALINE CAST   1831: PRESENT /LPF
KETONES UR STRIP.AUTO-MCNC: 15 MG/DL
LACTATE SERPL-SCNC: 1.3 MMOL/L (ref 0.5–2)
LEUKOCYTE ESTERASE UR QL STRIP.AUTO: NEGATIVE
MICRO URNS: ABNORMAL
NITRITE UR QL STRIP.AUTO: NEGATIVE
PH UR STRIP.AUTO: 5 [PH] (ref 5–8)
PROCALCITONIN SERPL-MCNC: 0.34 NG/ML
PROT UR QL STRIP: >=1000 MG/DL
RBC # URNS HPF: ABNORMAL /HPF
RBC UR QL AUTO: ABNORMAL
SP GR UR STRIP.AUTO: 1.02
UROBILINOGEN UR STRIP.AUTO-MCNC: 1 EU/DL
WBC #/AREA URNS HPF: ABNORMAL /HPF

## 2025-06-15 PROCEDURE — 83605 ASSAY OF LACTIC ACID: CPT

## 2025-06-15 PROCEDURE — 770020 HCHG ROOM/CARE - TELE (206)

## 2025-06-15 PROCEDURE — 700101 HCHG RX REV CODE 250: Performed by: STUDENT IN AN ORGANIZED HEALTH CARE EDUCATION/TRAINING PROGRAM

## 2025-06-15 PROCEDURE — A9270 NON-COVERED ITEM OR SERVICE: HCPCS | Performed by: STUDENT IN AN ORGANIZED HEALTH CARE EDUCATION/TRAINING PROGRAM

## 2025-06-15 PROCEDURE — 700105 HCHG RX REV CODE 258: Performed by: STUDENT IN AN ORGANIZED HEALTH CARE EDUCATION/TRAINING PROGRAM

## 2025-06-15 PROCEDURE — 94640 AIRWAY INHALATION TREATMENT: CPT

## 2025-06-15 PROCEDURE — 94669 MECHANICAL CHEST WALL OSCILL: CPT

## 2025-06-15 PROCEDURE — 84145 PROCALCITONIN (PCT): CPT

## 2025-06-15 PROCEDURE — 700102 HCHG RX REV CODE 250 W/ 637 OVERRIDE(OP): Performed by: STUDENT IN AN ORGANIZED HEALTH CARE EDUCATION/TRAINING PROGRAM

## 2025-06-15 PROCEDURE — 81001 URINALYSIS AUTO W/SCOPE: CPT

## 2025-06-15 PROCEDURE — 700111 HCHG RX REV CODE 636 W/ 250 OVERRIDE (IP): Mod: JZ | Performed by: STUDENT IN AN ORGANIZED HEALTH CARE EDUCATION/TRAINING PROGRAM

## 2025-06-15 PROCEDURE — 94760 N-INVAS EAR/PLS OXIMETRY 1: CPT

## 2025-06-15 PROCEDURE — 99233 SBSQ HOSP IP/OBS HIGH 50: CPT | Performed by: INTERNAL MEDICINE

## 2025-06-15 PROCEDURE — 87086 URINE CULTURE/COLONY COUNT: CPT

## 2025-06-15 PROCEDURE — 36415 COLL VENOUS BLD VENIPUNCTURE: CPT

## 2025-06-15 PROCEDURE — 94664 DEMO&/EVAL PT USE INHALER: CPT

## 2025-06-15 RX ORDER — ALBUTEROL SULFATE 5 MG/ML
2.5 SOLUTION RESPIRATORY (INHALATION)
Status: DISCONTINUED | OUTPATIENT
Start: 2025-06-15 | End: 2025-06-17

## 2025-06-15 RX ORDER — IPRATROPIUM BROMIDE AND ALBUTEROL SULFATE 2.5; .5 MG/3ML; MG/3ML
3 SOLUTION RESPIRATORY (INHALATION)
Status: DISCONTINUED | OUTPATIENT
Start: 2025-06-15 | End: 2025-06-16

## 2025-06-15 RX ORDER — WARFARIN SODIUM 2 MG/1
2 TABLET ORAL ONCE
Status: COMPLETED | OUTPATIENT
Start: 2025-06-15 | End: 2025-06-15

## 2025-06-15 RX ORDER — FLUTICASONE PROPIONATE AND SALMETEROL 500; 50 UG/1; UG/1
1 POWDER RESPIRATORY (INHALATION) EVERY 12 HOURS
Status: DISCONTINUED | OUTPATIENT
Start: 2025-06-15 | End: 2025-06-15

## 2025-06-15 RX ORDER — CARVEDILOL 6.25 MG/1
12.5 TABLET ORAL 2 TIMES DAILY WITH MEALS
Status: DISCONTINUED | OUTPATIENT
Start: 2025-06-15 | End: 2025-06-16

## 2025-06-15 RX ORDER — ATORVASTATIN CALCIUM 10 MG/1
10 TABLET, FILM COATED ORAL NIGHTLY
Status: DISCONTINUED | OUTPATIENT
Start: 2025-06-15 | End: 2025-06-25 | Stop reason: HOSPADM

## 2025-06-15 RX ORDER — AMIODARONE HYDROCHLORIDE 200 MG/1
100 TABLET ORAL NIGHTLY
Status: DISCONTINUED | OUTPATIENT
Start: 2025-06-15 | End: 2025-06-25 | Stop reason: HOSPADM

## 2025-06-15 RX ORDER — AZITHROMYCIN 250 MG/1
500 TABLET, FILM COATED ORAL DAILY
Status: COMPLETED | OUTPATIENT
Start: 2025-06-15 | End: 2025-06-16

## 2025-06-15 RX ORDER — CALCITRIOL 0.25 UG/1
0.5 CAPSULE, LIQUID FILLED ORAL DAILY
Status: DISCONTINUED | OUTPATIENT
Start: 2025-06-15 | End: 2025-06-25 | Stop reason: HOSPADM

## 2025-06-15 RX ORDER — FUROSEMIDE 10 MG/ML
80 INJECTION INTRAMUSCULAR; INTRAVENOUS 2 TIMES DAILY
Status: DISCONTINUED | OUTPATIENT
Start: 2025-06-15 | End: 2025-06-19

## 2025-06-15 RX ORDER — ACETAMINOPHEN 325 MG/1
650 TABLET ORAL EVERY 6 HOURS PRN
Status: DISCONTINUED | OUTPATIENT
Start: 2025-06-15 | End: 2025-06-25 | Stop reason: HOSPADM

## 2025-06-15 RX ORDER — PREDNISONE 20 MG/1
40 TABLET ORAL DAILY
Status: COMPLETED | OUTPATIENT
Start: 2025-06-15 | End: 2025-06-18

## 2025-06-15 RX ORDER — SACUBITRIL AND VALSARTAN 24; 26 MG/1; MG/1
1 TABLET, FILM COATED ORAL 2 TIMES DAILY
Status: DISCONTINUED | OUTPATIENT
Start: 2025-06-15 | End: 2025-06-25 | Stop reason: HOSPADM

## 2025-06-15 RX ORDER — WARFARIN SODIUM 2.5 MG/1
2.5 TABLET ORAL ONCE
Status: DISCONTINUED | OUTPATIENT
Start: 2025-06-15 | End: 2025-06-15

## 2025-06-15 RX ADMIN — ATORVASTATIN CALCIUM 10 MG: 10 TABLET, FILM COATED ORAL at 21:50

## 2025-06-15 RX ADMIN — FUROSEMIDE 40 MG: 10 INJECTION, SOLUTION INTRAVENOUS at 06:42

## 2025-06-15 RX ADMIN — AMIODARONE HYDROCHLORIDE 100 MG: 200 TABLET ORAL at 02:03

## 2025-06-15 RX ADMIN — IPRATROPIUM BROMIDE AND ALBUTEROL SULFATE 3 ML: .5; 2.5 SOLUTION RESPIRATORY (INHALATION) at 01:32

## 2025-06-15 RX ADMIN — CALCITRIOL CAPSULES 0.25 MCG 0.5 MCG: 0.25 CAPSULE ORAL at 06:33

## 2025-06-15 RX ADMIN — MOMETASONE FUROATE AND FORMOTEROL FUMARATE DIHYDRATE 2 PUFF: 200; 5 AEROSOL RESPIRATORY (INHALATION) at 07:26

## 2025-06-15 RX ADMIN — AMIODARONE HYDROCHLORIDE 100 MG: 200 TABLET ORAL at 21:50

## 2025-06-15 RX ADMIN — IPRATROPIUM BROMIDE AND ALBUTEROL SULFATE 3 ML: .5; 2.5 SOLUTION RESPIRATORY (INHALATION) at 18:33

## 2025-06-15 RX ADMIN — IPRATROPIUM BROMIDE AND ALBUTEROL SULFATE 3 ML: .5; 2.5 SOLUTION RESPIRATORY (INHALATION) at 10:36

## 2025-06-15 RX ADMIN — IPRATROPIUM BROMIDE AND ALBUTEROL SULFATE 3 ML: .5; 2.5 SOLUTION RESPIRATORY (INHALATION) at 07:26

## 2025-06-15 RX ADMIN — ATORVASTATIN CALCIUM 10 MG: 10 TABLET, FILM COATED ORAL at 02:04

## 2025-06-15 RX ADMIN — CARVEDILOL 12.5 MG: 6.25 TABLET, FILM COATED ORAL at 08:09

## 2025-06-15 RX ADMIN — IPRATROPIUM BROMIDE AND ALBUTEROL SULFATE 3 ML: .5; 2.5 SOLUTION RESPIRATORY (INHALATION) at 22:09

## 2025-06-15 RX ADMIN — WARFARIN SODIUM 2 MG: 2 TABLET ORAL at 02:04

## 2025-06-15 RX ADMIN — CARVEDILOL 12.5 MG: 6.25 TABLET, FILM COATED ORAL at 17:11

## 2025-06-15 RX ADMIN — CEFTRIAXONE SODIUM 2000 MG: 2 INJECTION, POWDER, FOR SOLUTION INTRAMUSCULAR; INTRAVENOUS at 21:49

## 2025-06-15 RX ADMIN — IPRATROPIUM BROMIDE AND ALBUTEROL SULFATE 3 ML: .5; 2.5 SOLUTION RESPIRATORY (INHALATION) at 14:50

## 2025-06-15 RX ADMIN — SACUBITRIL AND VALSARTAN 1 TABLET: 24; 26 TABLET, FILM COATED ORAL at 17:10

## 2025-06-15 RX ADMIN — AZITHROMYCIN DIHYDRATE 500 MG: 250 TABLET ORAL at 21:50

## 2025-06-15 RX ADMIN — MOMETASONE FUROATE AND FORMOTEROL FUMARATE DIHYDRATE 2 PUFF: 200; 5 AEROSOL RESPIRATORY (INHALATION) at 22:09

## 2025-06-15 RX ADMIN — PREDNISONE 40 MG: 20 TABLET ORAL at 10:47

## 2025-06-15 ASSESSMENT — ENCOUNTER SYMPTOMS
FOCAL WEAKNESS: 0
SHORTNESS OF BREATH: 1
PALPITATIONS: 0
NAUSEA: 0
HEADACHES: 0
SPUTUM PRODUCTION: 1
HEMOPTYSIS: 0
MYALGIAS: 0
CHILLS: 1
HEARTBURN: 0
HALLUCINATIONS: 0
FEVER: 0
BLOOD IN STOOL: 0
DIZZINESS: 0
COUGH: 1
SORE THROAT: 0
CHILLS: 0
VOMITING: 0
WEAKNESS: 1
DEPRESSION: 0
DIARRHEA: 0
SORE THROAT: 1
ABDOMINAL PAIN: 0
BACK PAIN: 0

## 2025-06-15 ASSESSMENT — SOCIAL DETERMINANTS OF HEALTH (SDOH)
WITHIN THE PAST 12 MONTHS, YOU WORRIED THAT YOUR FOOD WOULD RUN OUT BEFORE YOU GOT THE MONEY TO BUY MORE: NEVER TRUE
WITHIN THE LAST YEAR, HAVE TO BEEN RAPED OR FORCED TO HAVE ANY KIND OF SEXUAL ACTIVITY BY YOUR PARTNER OR EX-PARTNER?: NO
WITHIN THE LAST YEAR, HAVE YOU BEEN HUMILIATED OR EMOTIONALLY ABUSED IN OTHER WAYS BY YOUR PARTNER OR EX-PARTNER?: NO
WITHIN THE LAST YEAR, HAVE YOU BEEN AFRAID OF YOUR PARTNER OR EX-PARTNER?: NO
IN THE PAST 12 MONTHS, HAS THE ELECTRIC, GAS, OIL, OR WATER COMPANY THREATENED TO SHUT OFF SERVICE IN YOUR HOME?: NO
WITHIN THE LAST YEAR, HAVE YOU BEEN KICKED, HIT, SLAPPED, OR OTHERWISE PHYSICALLY HURT BY YOUR PARTNER OR EX-PARTNER?: NO
WITHIN THE PAST 12 MONTHS, THE FOOD YOU BOUGHT JUST DIDN'T LAST AND YOU DIDN'T HAVE MONEY TO GET MORE: NEVER TRUE

## 2025-06-15 ASSESSMENT — CHA2DS2 SCORE
VASCULAR DISEASE: YES
SEX: FEMALE
AGE 65 TO 74: NO
PRIOR STROKE OR TIA OR THROMBOEMBOLISM: YES
AGE 75 OR GREATER: YES
DIABETES: YES
HYPERTENSION: YES
CHA2DS2 VASC SCORE: 9
CHF OR LEFT VENTRICULAR DYSFUNCTION: YES

## 2025-06-15 ASSESSMENT — COGNITIVE AND FUNCTIONAL STATUS - GENERAL
MOBILITY SCORE: 18
DAILY ACTIVITIY SCORE: 24
MOVING FROM LYING ON BACK TO SITTING ON SIDE OF FLAT BED: A LITTLE
MOVING TO AND FROM BED TO CHAIR: A LITTLE
SUGGESTED CMS G CODE MODIFIER MOBILITY: CK
STANDING UP FROM CHAIR USING ARMS: A LITTLE
SUGGESTED CMS G CODE MODIFIER DAILY ACTIVITY: CH
CLIMB 3 TO 5 STEPS WITH RAILING: A LOT
WALKING IN HOSPITAL ROOM: A LITTLE

## 2025-06-15 ASSESSMENT — LIFESTYLE VARIABLES
TOTAL SCORE: 0
CONSUMPTION TOTAL: NEGATIVE
TOTAL SCORE: 0
TOTAL SCORE: 0
HAVE YOU EVER FELT YOU SHOULD CUT DOWN ON YOUR DRINKING: NO
HAVE PEOPLE ANNOYED YOU BY CRITICIZING YOUR DRINKING: NO
EVER HAD A DRINK FIRST THING IN THE MORNING TO STEADY YOUR NERVES TO GET RID OF A HANGOVER: NO
AVERAGE NUMBER OF DAYS PER WEEK YOU HAVE A DRINK CONTAINING ALCOHOL: 0
ALCOHOL_USE: YES
DOES PATIENT WANT TO STOP DRINKING: NO
EVER FELT BAD OR GUILTY ABOUT YOUR DRINKING: NO
HOW MANY TIMES IN THE PAST YEAR HAVE YOU HAD 5 OR MORE DRINKS IN A DAY: 0
ON A TYPICAL DAY WHEN YOU DRINK ALCOHOL HOW MANY DRINKS DO YOU HAVE: 2

## 2025-06-15 ASSESSMENT — FIBROSIS 4 INDEX
FIB4 SCORE: 1.82

## 2025-06-15 ASSESSMENT — PAIN DESCRIPTION - PAIN TYPE
TYPE: ACUTE PAIN

## 2025-06-15 ASSESSMENT — PATIENT HEALTH QUESTIONNAIRE - PHQ9
2. FEELING DOWN, DEPRESSED, IRRITABLE, OR HOPELESS: NOT AT ALL
SUM OF ALL RESPONSES TO PHQ9 QUESTIONS 1 AND 2: 0
1. LITTLE INTEREST OR PLEASURE IN DOING THINGS: NOT AT ALL

## 2025-06-15 NOTE — DISCHARGE PLANNING
Case Management Discharge Planning    Admission Date: 6/14/2025  GMLOS:    ALOS: 1    6-Clicks ADL Score: 24  6-Clicks Mobility Score: 18      CM RN received notification pt is worried abut her cat at home. CHAYA RN provided pt with [employer] Dispatch number [phone]. CHAYA RN stated that they have a Safe Hold [place]avier that may be beneficial to pt. CHAYA RN stating they will ask questions like her name, Address, what kind of animal is in the home.

## 2025-06-15 NOTE — PROGRESS NOTES
4 Eyes Skin Assessment Completed by VALENTINA Mcclendon and VALENTINA Bailon.    Skin assessment is primarily focused on high risk bony prominences. Pay special attention to skin beneath and around medical devices, high risk bony prominences, skin to skin areas and areas where the patient lacks sensation to feel pain and areas where the patient previously had breakdown.     Head (Occipital):  WDL   Ears (Under Medical Devices): WDL   Nose (Under Medical Devices): WDL   Mouth:  WDL   Neck: WDL   Breast/Chest:  WDL   Shoulder Blades:  WDL   Spine:   WDL   (R) Arm/Elbow/Hand: Bruising   (L) Arm/Elbow/Hand: Bruising   Abdomen: WDL   Pannus/Groin:  WDL   Sacrum/Coccyx:   WDL   (R) Ischial Tuberosity (Sit Bones):  WDL   (L) Ischial Tuberosity (Sit Bones):  WDL   (R) Leg:  Bruising and Edema   (L) Leg:  Bruising and Edema   (R) Heel:  Pink, Red, and Blanching   (R) Foot/Toe: WDL   (L) Heel: Pink, Red, and Blanching   (L) Foot/Toe:  Scab       DEVICES IN USE:   Respiratory Devices:  Nasal cannula and Pulse ox  Feeding Devices:  N/A   Lines & BP Monitoring Devices:  Peripheral IV    Orthopedic Devices:  N/A  Miscellaneous Devices:  Telemetry monitor    PROTOCOL INTERVENTIONS:   Standard/Trauma Bed:  Already in place  Glide Sheet:  Applied this assessment  Nasal Cannula with Gray Foams:  Already in place    WOUND PHOTOS:   Completed and in EPIC     WOUND CONSULT:   N/A, no advanced wound care needs identified

## 2025-06-15 NOTE — ED PROVIDER NOTES
ED Provider Note    CHIEF COMPLAINT  Shortness of breath    EXTERNAL RECORDS REVIEWED  Outpatient Notes patient was seen by her pulmonologist 6/6/2025 for evaluation of shortness of breath on exertion.  Notably with a history of pulmonary hypertension.  Patient follows with cardiology in pulmonary clinic.  Also with a history of COPD, HLP status post mitral valve repair, CHF, atrial fibrillation, type 2 diabetes, chronic kidney disease.  She has group 2 pulmonary hypertension secondary to heart failure with reduced ejection fraction as well as a small contribution of group 3 in the setting of CARLITO.  Patient medicated with Lasix.  Patient also takes warfarin.    HPI/ROS  LIMITATION TO HISTORY   Select: : None  OUTSIDE HISTORIAN(S):  EMS report that the patient called them to her home for evaluation of shortness of breath worsening for 1 week.  On their arrival she was 70% on her baseline 3 L of oxygen.  They provided a DuoNeb with improvement in her symptoms and her oxygen level was maintained in the low 90s on 6 L following this.    Salma Lees is a 75 y.o. female who presents to the emergency department for evaluation of shortness of breath worsening for the last week.  The patient states that her symptoms initially were occurring primarily with exertion and now are constant.  She states that she has felt generally weak so much that is that she has had difficulty getting out of bed and therefore has not been able to make it to her medicine cabinet so has not taken her medicine for the last 4 days.  She states she has had a cough productive of green sputum as well as fevers up to 102 degrees, chills.  She reports some centralized chest pressure intermittently but not currently.  She reports worsening swelling of her legs as well.  She called EMS tonight secondary to worsening of her symptoms.  She is still urinating normally and has never been on dialysis.    PAST MEDICAL HISTORY   has a past medical history  of A-fib ([facility]), Asthma, BMI 35.0-35.9,adult (2018), Breath shortness (2017), Calculus of ureter (2017), Cataract, Chronic anticoagulation (2017), CKD (chronic kidney disease) stage 3, GFR 30-59 ml/min, Congestive heart failure ([facility]), COPD (chronic obstructive pulmonary disease) ([facility]), Dental disorder (Dentures), Diabetes ([facility]), Emphysema of lung ([facility]) (Copd), Heart murmur, Heart valve disease ( Implant), Hemorrhagic disorder ([facility]) (Warfarin), High cholesterol, History of mitral valve repair, Hypertension, Infectious disease (), Pacemaker (), Pulmonary hypertension ([facility]), Renal disorder (2017), Shortness of breath (10/18/2017), and Urinary incontinence.    SURGICAL HISTORY   has a past surgical history that includes mitral valve repair (2009); pacemaker insertion (2015); knee replacement, total (Left, 2015); cystoscopy stent placement (Left, 2017); ureteroscopy (Left, 2017); lasertripsy (Left, 2017); stent removal (Left, 2017); hysterectomy, vaginal (); tonsillectomy; gyn surgery (); other (); cataract extraction with iol (Bilateral); other; and other.    FAMILY HISTORY  Family History   Problem Relation Age of Onset    Lung Disease Mother         asthma    Cancer Mother         ovarian    Heart Disease Father     Stroke Father     Thyroid Sister     Cancer Brother         pancreas    Cancer Paternal Grandmother         colon    Heart Disease Brother     No Known Problems Brother     No Known Problems Brother     Heart Disease Son         afib       SOCIAL HISTORY  Social History     Tobacco Use    Smoking status: Former     Current packs/day: 0.00     Average packs/day: 1 pack/day for 5.0 years (5.0 ttl pk-yrs)     Types: Cigarettes     Start date: 1985     Quit date: 1990     Years since quittin.4    Smokeless tobacco: Never    Tobacco comments:     Continued abstinence   Vaping Use    Vaping status:  "Never Used   Substance and Sexual Activity    Alcohol use: Yes     Comment: only on holidays    Drug use: No    Sexual activity: Not Currently       CURRENT MEDICATIONS  Home Medications       Reviewed by Danelle Marquez R.N. (Registered Nurse) on 06/15/25 at 0122  Med List Status: Complete     Medication Last Dose Status   amiodarone (CORDARONE) 200 MG Tab 6/13/2025 Active   atorvastatin (LIPITOR) 10 MG Tab 6/13/2025 Active   calcitRIOL (ROCALTROL) 0.25 MCG Cap 6/13/2025 Active   carvedilol (COREG) 12.5 MG Tab 6/13/2025 Active   cyanocobalamin (VITAMIN B-12) 100 MCG Tab 6/13/2025 Active   Dulaglutide (TRULICITY) 1.5 MG/0.5ML Solution Auto-injector 6/1/2025 Active   fluticasone-salmeterol (ADVAIR DISKUS) 500-50 MCG/ACT AEROSOL POWDER, BREATH ACTIVATED 6/13/2025 Active   furosemide (LASIX) 20 MG Tab 6/13/2025 Active   levalbuterol (XOPENEX HFA) 45 MCG/ACT inhaler 6/13/2025 Active   Multiple Vitamins-Minerals (HAIR SKIN NAILS PO) 6/13/2025 Active   potassium Chloride ER (K-TAB) 20 MEQ Tab CR tablet 6/13/2025 Active   sacubitril-valsartan (ENTRESTO) 24-26 MG Tab 6/13/2025 Active   warfarin (COUMADIN) 5 MG Tab 6/13/2025 Active                  Audit from Redirected Encounters    **Home medications have not yet been reviewed for this encounter**         ALLERGIES  Allergies[1]    PHYSICAL EXAM  VITAL SIGNS: /57   Pulse 68   Temp 37.3 °C (99.1 °F) (Temporal)   Resp 18   Ht 1.638 m (5' 4.5\")   Wt 82.8 kg (182 lb 8.7 oz)   SpO2 93%   BMI 30.85 kg/m²    Constitutional: No acute distress  HEENT: Atraumatic, normocephalic, pupils are equal round reactive to light, nose normal, mouth shows dry mucous membranes  Neck: Supple, no JVD, no tracheal deviation  Cardiovascular: Regular rate and rhythm, holosystolic murmur noted.  Capillary fill less than 2 seconds in the distal extremities which are warm.  Thorax & Lungs: Tachypneic with inspiratory and expiratory wheezing, diminished breath sounds in the bases with " crackles and scattered rhonchi.  GI: Soft, non-distended, non-tender, no rebound  Skin: Warm, dry, no acute rash or lesion  Musculoskeletal: Moving all extremities, no acute deformity, 2+ symmetric pitting lower extremity edema, no tenderness  Neurologic: A&Ox3, at baseline mentation, moving all extremities without focal deficit  Psychiatric: Appropriate affect for situation at this time      EKG/LABS  Labs Reviewed   CBC WITH DIFFERENTIAL - Abnormal; Notable for the following components:       Result Value    WBC 14.7 (*)     MCHC 31.7 (*)     RDW 59.0 (*)     Platelet Count 155 (*)     Neutrophils-Polys 90.50 (*)     Lymphocytes 2.90 (*)     Immature Granulocytes 1.10 (*)     Neutrophils (Absolute) 13.29 (*)     Lymphs (Absolute) 0.42 (*)     Immature Granulocytes (abs) 0.16 (*)     All other components within normal limits   COMP METABOLIC PANEL - Abnormal; Notable for the following components:    Glucose 127 (*)     Bun 25 (*)     Creatinine 1.52 (*)     Total Bilirubin 2.0 (*)     Globulin 4.1 (*)     All other components within normal limits   TROPONIN - Abnormal; Notable for the following components:    Troponin T 27 (*)     All other components within normal limits   PROBRAIN NATRIURETIC PEPTIDE, NT - Abnormal; Notable for the following components:    NT-proBNP 4291 (*)     All other components within normal limits   PROTHROMBIN TIME - Abnormal; Notable for the following components:    PT 35.4 (*)     INR 3.40 (*)     All other components within normal limits   APTT - Abnormal; Notable for the following components:    APTT 50.1 (*)     All other components within normal limits   ESTIMATED GFR - Abnormal; Notable for the following components:    GFR (CKD-EPI) 35 (*)     All other components within normal limits   LACTIC ACID   BLOOD CULTURE   BLOOD CULTURE   COV-2, FLU A/B, AND RSV BY PCR (CEPHEID)   PROCALCITONIN   LACTIC ACID   LACTIC ACID   URINALYSIS   URINE CULTURE(NEW)   PROCALCITONIN     Results for  orders placed or performed during the hospital encounter of 25   EKG   Result Value Ref Range    Report       [facility] Emergency Dept.    Test Date:  2025  Pt Name:    CHARLENE GARCIA                Department: [facility]  MRN:        [id number]                      [facility]  Gender:     Female                       Technician: [id number]  :        [id number]                   Requested By:ER TRIAGE PROTOCOL  Order #:    [id number]                    Reading MD: Jean Carlos Montaño    Measurements  Intervals                                Axis  Rate:       66                           P:          0  GA:         66                           QRS:        61  QRSD:       113                          T:          35  QT:         412  QTc:        432    Interpretive Statements  Sinus rhythm at a rate of 66, normal axis, first-degree AV block otherwise  normal intervals, no ST segment elevation or depression, T wave inversion in  V1, V2.  T wave inversion in V2 new compared to prior EKG.  Impression  nonspecific T wave inversion.  No STEMI.  Electronically Signed On  025 22:27:54 PDT by Jean Carlos Montaño       *Note: Due to a large number of results and/or encounters for the requested time period, some results have not been displayed. A complete set of results can be found in Results Review.       I have independently interpreted this EKG    RADIOLOGY/PROCEDURES   I have independently interpreted the diagnostic imaging associated with this visit and am waiting the final reading from the radiologist.   My preliminary interpretation is as follows: Chest x-ray demonstrates a right focal consolidative pneumonia    Radiologist interpretation:  DX-CHEST-PORTABLE (1 VIEW)   Final Result      Right basilar opacity is consistent with pneumonia. Follow-up imaging is recommended to document resolution          COURSE & MEDICAL DECISION MAKING    ASSESSMENT, COURSE AND PLAN  Care Narrative:      Patient presents to the emergency department for evaluation of 1 week of worsening shortness of breath associated with a productive cough fevers and chills, generalized weakness.  She has not taken her medicine in the last 4 days secondary to the degree of her weakness and inability to make it to her medicine cabinet.  She has a known history of pulmonary hypertension and is prescribed Lasix therapy.  On arrival she is hypoxic requiring 6 L supplemental oxygen, tachypneic, febrile.  She has hypertensive however.  Given concern for sepsis broad laboratory workup undertaken including lactate and blood cultures and broad-spectrum antibiotics to cover a potential pneumonia administered.  Per chart review she has tolerated ceftriaxone in the past despite her penicillin allergy therefore this was selected.  She has gross evidence of volume overload with pulmonary edema on bedside ultrasound, significant peripheral edema therefore IV fluids were not administered and IV Lasix therapy was initiated as I suspect fluid overload is likewise contributing to her hypoxia and shortness of breath.  She was also treated with a DuoNeb and Solu-Medrol given concern for concurrent COPD exacerbation.  Will obtain an x-ray, EKG and cardiac enzymes as well however she is not endorsing any chest pain or pressure and I feel ACS is very unlikely.  I certainly think that an exacerbation of her pulmonary hypertension is likely.    Chest x-ray demonstrates a focal pneumonia.  Appropriate antibiotic therapy already ordered.  Likewise there is evidence of pulmonary vascular congestion and some edema for which Lasix is infusing.  Patient's lab testing is remarkable for significant leukocytosis with a left shift and elevated BNP further suggesting pneumonia and CHF exacerbation.  Mildly elevated troponin suggestive of demand ischemia in the setting of the above.  Lactate is not significantly elevated which argues against severe sepsis.  Patient  has evidence of ongoing chronic kidney disease without change.  On recheck she is feeling better and clinically significantly improved with respect to her respiratory status.  She remains on 5 L supplemental oxygen.  Will plan for admission for ongoing treatment.    Case discussed with Dr. Queen, hospitalist who accepts for admission.    Sepsis: Infection was suspected 10:30 PM (Time). Sepsis pathway was initiated. Less than 30cc/kg because of concern for volume overload 0 mL of crystalloid was ordered. Antibiotics were given per protocol.          ADDITIONAL PROBLEMS MANAGED  CHF exacerbation  COPD exacerbation  Community-acquired pneumonia  Fluid overload    DISPOSITION AND DISCUSSIONS  I have discussed management of the patient with the following physicians and SILVESTRE's: Hospitalist as above    Decision tools and prescription drugs considered including, but not limited to: Antibiotics ceftriaxone and azithromycin.    FINAL DIAGNOSIS  1. Community acquired pneumonia of right lower lobe of lung    2. Acute on chronic right-sided congestive heart failure (HCC)    3. Pulmonary hypertension (HCC)    4. Chronic kidney disease, unspecified CKD stage    5. Acute exacerbation of chronic obstructive pulmonary disease (COPD) (HCC)    6. Acute hypoxic respiratory failure (HCC)         Electronically signed by: Jean Carlos Montaño M.D., 6/14/2025 10:24 PM           [1]   Allergies  Allergen Reactions    Amoxicillin Anaphylaxis and Shortness of Breath     Tolerates ceftriaxone    Nitrofurantoin      swelling    Pcn [Penicillins] Anaphylaxis     Tolerates ceftriaxone    Vancomycin Anaphylaxis and Shortness of Breath     Severe hypotension and bronchospasm observed by anesthesia while giving  vanco during procedure    Amlodipine Besylate-Valsartan Unspecified     Chest pain and dizziness     Etodolac Hives and Nausea     Dark stools    Food Hives     Plums    Formaldehyde      Pt reports she was informed of allergy during a skin  test. Pt not sure of reaction    Ivabradine      unknown    Jardiance [Empagliflozin] Unspecified     Headaches, dizziness    Skin Adhesives Hives     Patient states they cause welts for several days;  EKG stickers    Eliquis [Apixaban] Shortness of Breath     Pt reports SOB and dizzy when she took    Lisinopril Unspecified     Dizziness     Other Drug Rash     Metal silver    Other Misc Shortness of Breath and Runny Nose     Mold, dust, and feathers, adhesives, plums    Tape Rash     Paper tape ok

## 2025-06-15 NOTE — ASSESSMENT & PLAN NOTE
Volume overload on exam with B LE edema  NT-proBNP 4000  Previous echo 12/2024 showed an EF of 55-60%, s/p mitral valve repair, moderate to severe tricuspid regurg, RVSP of 83  Cw IV lasix 20mg daily  Cw entresto, bb

## 2025-06-15 NOTE — PROGRESS NOTES
Medication history reviewed with pt. Med rec is complete.  Allergies reviewed, per pt    Pt reports that she received her second IRON infusion on 6/10/2025 from [facility]    Patient has not had any outpatient antibiotics in the last 30 days.    Pt takes WARFARIN 5MG, last dose was taken on 6/10/2025      Dispense history available in EPIC? Some medications

## 2025-06-15 NOTE — PROGRESS NOTES
Inpatient Anticoagulation Service Note for 6/15/2025      Reason for Anticoagulation: Atrial Fibrillation, Mitral Valve Repair   [id number] VASc Score: 9      High risk for bleed: No     Hemoglobin Value: 13.4  Hematocrit Value: 42.3  Lab Platelet Value: (!) 155  Target INR: 2.0 to 3.0     Date/Time INR Value    06/14/25 2230 3.4       Date/Time Dose (mg)    06/15/25 0140 2      Average Dose (mg): 5  Significant Interactions: Amiodarone, Antibiotics, Corticosteroids  Bridge Therapy: No    Reversal Agent Administered: Not Applicable  Comments: Patient on warfarin for history of A-Fib and mitral valve REPAIR presenting with SOB diagnosed with PNA. Patient follows with the [facility]  (TTR ~ 53%) and was last seen 6/2 with a therapeutic INR and instructed to continue with 5mg daily. Notably patient is on amiodarone at home. Patient has also been started on ceftriaxone and azithromycin but little to no interaction expected with these antibiotics. Patient has also been started on steroids which will likely interact and increase the patients INR. INR today is elevated at 3.4.    Plan: Reduced dose of 2mg tonight, follow up INR ordered x3 starting 6/16 with AM labs  Education Material Provided?: No    Pharmacist suggested discharge dosing: Given elevated INR and DDI difficult to recommend discharge regimen at this time. Patient can likely resume home regimen of 5mg daily after steroid and antibiotic therapy completed. Regardless patient will need follow up INR within 48-72 hours of discharge.     Giacomo Preciado, PharmD

## 2025-06-15 NOTE — PROGRESS NOTES
Telemetry Shift Summary     Rhythm: SR  Rate: 62-63  Measurements: 0.18/0.10/0.42  Ectopy (reported by Monitor Tech): rPVC     Normal Values  Rhythm: Sinus  HR:   Measurements: 0.12-0.20/0.06-0.10/0.30-0.52

## 2025-06-15 NOTE — PROGRESS NOTES
Assumed care of patient at bedside report from NOC RN. Updated on POC. Patient currently A & O x 4; on 6 L O2 95 percent saturation; up stand by assist; without complaints of acute pain. Assessment completed. Call light within reach. Whiteboard updated. Fall precautions in place. Bed locked and in lowest position. All questions answered. No other needs indicated at this time.

## 2025-06-15 NOTE — ASSESSMENT & PLAN NOTE
Currently with an acute exacerbation secondary to pneumonia.    I discussed with pulmonology, continue current course of treatment.  Pulmonology evaluate patient for COPD.  Continue Dulera  Continue IPV, 7% hypertonic saline nebulized treatments.  Steroids stopped  improving

## 2025-06-15 NOTE — PROGRESS NOTES
Hospital Medicine Daily Progress Note    Date of Service  6/15/2025    Chief Complaint  Salma Lees is a 75 y.o. female admitted 6/14/2025 with shortness of breath    Hospital Course  History of severe pulm hypertension RVSP of 83, chronic  3 L oxygen and respiratory failure secondary to COPD who was admitted for shortness of breath found to have volume overload, sleep exacerbation and community-acquired pneumonia.  She had acute on chronic respiratory failure hypoxia requiring 6 L nasal cannula due to room air saturation to 74%.  She was started on Solu-Medrol, IV antibiotics and IV Lasix and was admitted for further medical treatment    Interval Problem Update  6/15: Feeling much better today, she is down to 5 L of oxygen 30 BC remains.  Elevated. BP low 100s    I have discussed this patient's plan of care and discharge plan at IDT rounds today with Case Management, Nursing, Nursing leadership, and other members of the IDT team.    Consultants/Specialty  None    Code Status  Full Code    Disposition  The patient is not medically cleared for discharge to home or a post-acute facility.  Anticipate discharge to: home with organized home healthcare and close outpatient follow-up    I have placed the appropriate orders for post-discharge needs.    Review of Systems  Review of Systems   Constitutional:  Positive for malaise/fatigue. Negative for chills and fever.   HENT:  Negative for sore throat.    Respiratory:  Positive for cough, sputum production and shortness of breath. Negative for hemoptysis.    Cardiovascular:  Positive for leg swelling. Negative for chest pain and palpitations.   Gastrointestinal:  Negative for abdominal pain, blood in stool, diarrhea, heartburn, nausea and vomiting.   Genitourinary:  Negative for dysuria and frequency.   Musculoskeletal:  Negative for back pain and myalgias.   Neurological:  Positive for weakness. Negative for dizziness, focal weakness and headaches.    Psychiatric/Behavioral:  Negative for depression and hallucinations.    All other systems reviewed and are negative.       Physical Exam  Temp:  [36.2 °C (97.1 °F)-38.4 °C (101.1 °F)] 36.2 °C (97.1 °F)  Pulse:  [60-78] 60  Resp:  [17-22] 17  BP: (106-185)/(51-78) 108/51  SpO2:  [90 %-97 %] 94 %    Physical Exam  Constitutional:       General: She is not in acute distress.  HENT:      Nose: Nose normal.      Mouth/Throat:      Mouth: Mucous membranes are dry.   Cardiovascular:      Rate and Rhythm: Normal rate and regular rhythm.      Pulses: Normal pulses.   Pulmonary:      Breath sounds: Rhonchi present.      Comments: Reduced inspiratory effort  Abdominal:      General: There is no distension.      Palpations: Abdomen is soft.      Tenderness: There is no abdominal tenderness.   Musculoskeletal:         General: No swelling.      Cervical back: No muscular tenderness.      Right lower leg: Edema present.      Left lower leg: Edema present.   Lymphadenopathy:      Cervical: No cervical adenopathy.   Skin:     General: Skin is warm and dry.      Coloration: Skin is pale.      Findings: No rash.   Neurological:      General: No focal deficit present.      Mental Status: She is alert and oriented to person, place, and time.      Motor: Weakness present.   Psychiatric:         Mood and Affect: Mood normal.         Thought Content: Thought content normal.         Fluids    Intake/Output Summary (Last 24 hours) at 6/15/2025 1244  Last data filed at 6/15/2025 0753  Gross per 24 hour   Intake 180 ml   Output --   Net 180 ml        Laboratory  Recent Labs     06/14/25 2230   WBC 14.7*   RBC 4.43   HEMOGLOBIN 13.4   HEMATOCRIT 42.3   MCV 95.5   MCH 30.2   MCHC 31.7*   RDW 59.0*   PLATELETCT 155*   MPV 11.6     Recent Labs     06/14/25 2230   SODIUM 137   POTASSIUM 4.5   CHLORIDE 100   CO2 21   GLUCOSE 127*   BUN 25*   CREATININE 1.52*   CALCIUM 9.9     Recent Labs     06/14/25 2230   APTT 50.1*   INR 3.40*                Imaging  DX-CHEST-PORTABLE (1 VIEW)   Final Result      Right basilar opacity is consistent with pneumonia. Follow-up imaging is recommended to document resolution           Assessment/Plan  * Pneumonia of right lower lobe due to infectious organism- (present on admission)  Assessment & Plan  Worsening shortness of breath, cough with productive sputum for over a week  CXR reviewed and shows an opacity on the right.    Procalcitonin elevated at 0.3  Continue IV antibiotics, ceftriaxone and azithromycin    Acute on chronic respiratory failure with hypoxia (HCC)- (present on admission)  Assessment & Plan  Baseline 2-3 L, saturating in the 70s and requiring 6 L on presentation  Secondary to asthma/COPD exacerbation  significant volume overload due to CHF exacerbation and pneumonia (severe PAH)    Acute on chronic systolic congestive heart failure (HCC)- (present on admission)  Assessment & Plan  Volume overload on exam with B LE edema  NT-proBNP 4000  Previous echo 12/2024 showed an EF of 55-60%, s/p mitral valve repair, moderate to severe tricuspid regurg, RVSP of 83  Continue home meds of carvedilol, sacubitril-valsartan  IV diuresis, 80 IV twice daily  BMP ordered to continue monitoring kidney function and electrolytes   I/O, daily weight, cardiac diet, fluid and salt restriction    Benign essential hypertension- (present on admission)  Assessment & Plan  Continue carvedilol, Entresto    Stage 3b chronic kidney disease- (present on admission)  Assessment & Plan  At baseline.  She will be diuresed for CHF exacerbation  BMP ordered to continue monitoring kidney function, avoid nephrotoxic agents    Pulmonary hypertension (HCC)- (present on admission)  Assessment & Plan  Follows with pulmonology.  Echo 12/2024 showed an RVSP of 83.  Per notes type II secondary to CHF and valvular disease  She is currently volume overloaded, IV diuresis    Paroxysmal atrial fibrillation (HCC)- (present on admission)  Assessment &  Plan  S/p ablation.  Continue amiodarone, carvedilol, warfarin    Asthma-COPD overlap syndrome (HCC)- (present on admission)  Assessment & Plan  Currently with an acute exacerbation secondary to pneumonia.    Scheduled DuoNebs, continue home inhalers, prednisone         VTE prophylaxis: Coumadin    I have performed a physical exam and reviewed and updated ROS and Plan today (6/15/2025). In review of yesterday's note (6/14/2025), there are no changes except as documented above.    Total time:  51 minutes.  I spent greater than 50% of the time for patient care, counseling, and coordination on this date, including unit/floor time, and face-to-face time with the patient as per interval events and assessment and plan above

## 2025-06-15 NOTE — ASSESSMENT & PLAN NOTE
Follows with pulmonology.  Echo 12/2024 showed an RVSP of 83.  Per notes type II secondary to CHF and valvular disease  Creatinine 1.38  Continue IV Lasix

## 2025-06-15 NOTE — ASSESSMENT & PLAN NOTE
Worsening shortness of breath, cough with productive sputum for over a week  CXR reviewed and shows an opacity on the right.    Sp IV CTX and Azithro  CT chest without contrast reviewed by pulmonologist Dr. Miguel.  Patient has mucous or infection still in the right lower lobe.  We are starting IPV.  If patient does not have improvement we will consider bronchoscopy.  No further antibiotics.  No bronchoscopy at this time.  I ordered sputum cultures  Continue with IPV, hypertonic saline

## 2025-06-15 NOTE — CARE PLAN
The patient is Watcher - Medium risk of patient condition declining or worsening    Shift Goals  Clinical Goals: monitor resp status, admit profile  Patient Goals: sleep  Family Goals: cecilia    Progress made toward(s) clinical / shift goals:  Patient reports feeling much better than yesterday. Completed admit profile, obtained urine sample. Free of falls.    Problem: Knowledge Deficit - Standard  Goal: Patient and family/care givers will demonstrate understanding of plan of care, disease process/condition, diagnostic tests and medications  Outcome: Progressing     Problem: Self Care  Goal: Patient will have the ability to perform ADLs independently or with assistance (bathe, groom, dress, toilet and feed)  Outcome: Progressing     Problem: Respiratory  Goal: Patient will achieve/maintain optimum respiratory ventilation and gas exchange  Outcome: Progressing     Problem: Fall Risk  Goal: Patient will remain free from falls  Outcome: Progressing    Patient is not progressing towards the following goals:

## 2025-06-15 NOTE — HOSPITAL COURSE
History of severe pulm hypertension RVSP of 83, chronic  3 L oxygen and respiratory failure secondary to COPD who was admitted for shortness of breath found to have volume overload, sleep exacerbation and community-acquired pneumonia.  She had acute on chronic respiratory failure hypoxia requiring 6 L nasal cannula due to room air saturation to 74%.  She was started on Solu-Medrol, IV antibiotics and IV Lasix and was admitted for further medical treatment  
0 (no pain/absence of nonverbal indicators of pain)

## 2025-06-15 NOTE — ASSESSMENT & PLAN NOTE
S/p ablation.  INR 1.84, restarting warfarin.  Stopping Lovenox.  Cw warfarin discussed w pharmacy  Cw amiodarone and bb

## 2025-06-15 NOTE — CARE PLAN
The patient is Stable - Low risk of patient condition declining or worsening    Shift Goals  Clinical Goals: Monitor O2 needs, wean o2, Safety, Monitor Vitals  Patient Goals: Sleep  Family Goals: cecilia    Progress made toward(s) clinical / shift goals:    Problem: Knowledge Deficit - Standard  Goal: Patient and family/care givers will demonstrate understanding of plan of care, disease process/condition, diagnostic tests and medications  Description: Target End Date:  1-3 days or as soon as patient condition allows    Document in Patient Education    1.  Patient and family/caregiver oriented to unit, equipment, visitation policy and means for communicating concern  2.  Complete/review Learning Assessment  3.  Assess knowledge level of disease process/condition, treatment plan, diagnostic tests and medications  4.  Explain disease process/condition, treatment plan, diagnostic tests and medications  Outcome: Progressing    Patient verbalized understanding of plan of care. All questions answered.    Problem: Fall Risk  Goal: Patient will remain free from falls  Description: Target End Date:  Prior to discharge or change in level of care    Document interventions on the [place] Fall Risk Assessment    1.  Assess for fall risk factors  2.  Implement fall precautions  Outcome: Progressing    Patient remained free from falls throughout shift. Bed locked in lowest position. Patient refused bed alarm.     Patient is not progressing towards the following goals:

## 2025-06-15 NOTE — ASSESSMENT & PLAN NOTE
Baseline 2-3 L, saturating in the 70s and requiring 6 L on presentation  Secondary to asthma/COPD exacerbation  significant volume overload due to CHF exacerbation and pneumonia (severe PAH)  Cw IV Lasix 20mg daily  Still requiring 6L with ambulation, at rest on 3LNC  Home inogen concentrator goes up to 5L  Obtain daily weights

## 2025-06-15 NOTE — H&P
Hospital Medicine History & Physical Note    Date of Service  6/14/2025    Primary Care Physician  Leah Bonilla D.O.    Code Status  Full Code    Chief Complaint  Chief Complaint   Patient presents with    Shortness of Breath     X1wk worse today   Baseline 3L hx of COPD/Asthma  Pt was found on arrival on 4L 75%, EMS gave 1 albuterol/1 duoneb SPO2 increased to 85%  pt increased to 6-8L NC     Sore Throat     Intermitten x1wk    Chest Pain     discomfort    Weight Gain     Hx of CHF   Pt reports she has missed some doses of her medication due to her not feeling well     Fever     102 highest reported at home states she took a home COVID test & was negative    Cough       History of Presenting Illness  Salma Lees is a 75 y.o. female who presented 6/14/2025 with shortness of breath.  PMH of severe pulmonary hypertension, CHF with recovered EF (echo 12/2024 showed an EF of 55-60%, s/p mitral valve repair, moderate to severe tricuspid regurg, RVSP of 83), s/p mitral valve repair, asthma/COPD overlap syndrome on 3 L O2, CKD 3, A-fib/flutter s/p ablation, iron deficiency anemia last iron infusion was on 6/2.  On 6/6 in the evening she began having a sore throat and some mild shortness of breath.  She later developed a cough with yellow/brown sputum, denies hemoptysis as well as subjective fevers and chills.  No abdominal pain, urinary or bowel symptoms.  She continued to worsen and over the past 3 days she has been increasing her oxygen requirements and still feeling short of breath.  Upon EMS arrival she was found to be saturating in the 70s and was given a breathing treatment with improvement in her symptoms.    She has not taken any of her meds in the past week because she has been feeling lightheaded and dizzy and was afraid she would take them in appropriate.     In the ED febrile to 101.1, hemodynamically stable, requiring 6 L O2 on my evaluation.  Labs show WBC count of 14, BNP 4000.  CXR shows  opacity in the right.    I discussed the plan of care with patient, bedside RN, and EDP.    Review of Systems  Review of Systems   Constitutional:  Positive for chills.   HENT:  Positive for sore throat.    Respiratory:  Positive for cough, sputum production and shortness of breath. Negative for hemoptysis.    Cardiovascular:  Positive for leg swelling. Negative for chest pain.   Gastrointestinal:  Negative for abdominal pain, diarrhea, nausea and vomiting.   Genitourinary:  Negative for dysuria and urgency.       Past Medical History   has a past medical history of A-fib ([facility]), Asthma, BMI 35.0-35.9,adult (11/12/2018), Breath shortness (09/07/2017), Calculus of ureter (09/18/2017), Cataract, Chronic anticoagulation (02/13/2017), CKD (chronic kidney disease) stage 3, GFR 30-59 ml/min, Congestive heart failure ([facility]), COPD (chronic obstructive pulmonary disease) ([facility]), Dental disorder (Dentures), Diabetes ([facility]), Emphysema of lung ([facility]) (Copd), Heart murmur, Heart valve disease (2009 Implant), Hemorrhagic disorder ([facility]) (Warfarin), High cholesterol, History of mitral valve repair, Hypertension, Infectious disease (2014), Pacemaker (2014), Pulmonary hypertension ([facility]), Renal disorder (09/07/2017), Shortness of breath (10/18/2017), and Urinary incontinence.    Surgical History   has a past surgical history that includes mitral valve repair (01/01/2009); pacemaker insertion (01/01/2015); knee replacement, total (Left, 01/01/2015); cystoscopy stent placement (Left, 06/25/2017); ureteroscopy (Left, 09/18/2017); lasertripsy (Left, 09/18/2017); stent removal (Left, 09/18/2017); hysterectomy, vaginal (1994); tonsillectomy; gyn surgery (1994); other (2015//2022); cataract extraction with iol (Bilateral); other; and other.     Family History  Family History   Problem Relation Age of Onset    Lung Disease Mother         asthma    Cancer Mother         ovarian    Heart Disease Father     Stroke Father     Thyroid Sister     Cancer  Brother         pancreas    Cancer Paternal Grandmother         colon    Heart Disease Brother     No Known Problems Brother     No Known Problems Brother     Heart Disease Son         afib        Family history reviewed with patient. There is no family history that is pertinent to the chief complaint.     Social History   reports that she quit smoking about 35 years ago. Her smoking use included cigarettes. She started smoking about 40 years ago. She has a 5 pack-year smoking history. She has never used smokeless tobacco. She reports current alcohol use. She reports that she does not use drugs.    Allergies  Allergies[1]    Medications  Prior to Admission Medications   Prescriptions Last Dose Informant Patient Reported? Taking?   Dulaglutide (TRULICITY) 1.5 MG/0.5ML Solution Auto-injector   No No   Sig: Inject 1 pen under the skin every 7 days. Every Sunday   Multiple Vitamins-Minerals (HAIR SKIN NAILS PO)  Patient Yes No   Sig: Take 2 Tablets by mouth at bedtime.     MEDICATION INSTRUCTIONS FOR SURGERY/PROCEDURE SCHEDULED FOR 12-03-24   DO NOT TAKE 7 DAYS PRIOR TO SURGERY   amiodarone (CORDARONE) 200 MG Tab  Patient No No   Sig: Take 0.5 Tablets by mouth every day.   Patient taking differently: Take 100 mg by mouth every evening.   atorvastatin (LIPITOR) 10 MG Tab  Patient No No   Sig: Take 1 tablet by mouth every day.   Patient taking differently: Take 10 mg by mouth every evening.   calcitRIOL (ROCALTROL) 0.25 MCG Cap  Patient No No   Sig: Take 2 Capsules by mouth every day.   carvedilol (COREG) 12.5 MG Tab  Patient No No   Sig: Take 1 Tablet by mouth 2 times a day with meals.   cyanocobalamin (VITAMIN B-12) 100 MCG Tab   Yes No   Sig: Take 100 mcg by mouth every day.   fluticasone-salmeterol (ADVAIR DISKUS) 500-50 MCG/ACT AEROSOL POWDER, BREATH ACTIVATED   No No   Sig: Inhale 1 Puff every 12 hours for 270 days.   furosemide (LASIX) 20 MG Tab  Patient No No   Sig: Take 1 Tablet by mouth every 48 hours.    levalbuterol (XOPENEX HFA) 45 MCG/ACT inhaler   No No   Sig: Inhale 1-2 Puffs every four hours as needed for Shortness of Breath.   potassium Chloride ER (K-TAB) 20 MEQ Tab CR tablet  Patient No No   Sig: Take 1 Tablet by mouth every day.   Patient taking differently: Take 20 mEq by mouth. Takes only when she takes Lasix.   sacubitril-valsartan (ENTRESTO) 24-26 MG Tab   No No   Sig: Take 1 tablet by mouth 2 times a day.   warfarin (COUMADIN) 5 MG Tab   No No   Sig: Take 0.5-1 Tablets by mouth every morning. 2.5 mg Sunday/Tuesday/Thursday, 5 mg all other days      Facility-Administered Medications: None       Physical Exam  Temp:  [37.3 °C (99.1 °F)-38.4 °C (101.1 °F)] 37.3 °C (99.1 °F)  Pulse:  [62-78] 62  Resp:  [19-22] 19  BP: (122-185)/(57-78) 122/57  SpO2:  [90 %-93 %] 93 %  Blood Pressure : (!) 151/65   Temperature: 37.7 °C (99.9 °F)   Pulse: 78   Respiration: 20   Pulse Oximetry: 91 %       Physical Exam  Constitutional:       Appearance: She is ill-appearing.   HENT:      Head: Normocephalic and atraumatic.      Mouth/Throat:      Mouth: Mucous membranes are moist.      Pharynx: No oropharyngeal exudate or posterior oropharyngeal erythema.   Cardiovascular:      Rate and Rhythm: Normal rate and regular rhythm.      Pulses: Normal pulses.      Heart sounds: Normal heart sounds. No murmur heard.  Pulmonary:      Effort: Respiratory distress present.      Breath sounds: Wheezing and rales present.   Musculoskeletal:         General: No tenderness. Normal range of motion.      Right lower leg: Edema present.      Left lower leg: Edema present.   Skin:     General: Skin is warm and dry.   Neurological:      General: No focal deficit present.      Mental Status: She is alert and oriented to person, place, and time.   Psychiatric:         Mood and Affect: Mood normal.         Laboratory:  Recent Labs     06/14/25  2230   WBC 14.7*   RBC 4.43   HEMOGLOBIN 13.4   HEMATOCRIT 42.3   MCV 95.5   MCH 30.2   MCHC 31.7*    RDW 59.0*   PLATELETCT 155*   MPV 11.6     Recent Labs     06/14/25  2230   SODIUM 137   POTASSIUM 4.5   CHLORIDE 100   CO2 21   GLUCOSE 127*   BUN 25*   CREATININE 1.52*   CALCIUM 9.9     Recent Labs     06/14/25 2230   ALTSGPT 21   ASTSGOT 17   ALKPHOSPHAT 91   TBILIRUBIN 2.0*   GLUCOSE 127*     Recent Labs     06/14/25  2230   APTT 50.1*   INR 3.40*     Recent Labs     06/14/25  2230   NTPROBNP 4291*         Recent Labs     06/14/25  2230   TROPONINT 27*       Imaging:  DX-CHEST-PORTABLE (1 VIEW)   Final Result      Right basilar opacity is consistent with pneumonia. Follow-up imaging is recommended to document resolution          X-Ray:  I have personally reviewed the images and compared with prior images. and My impression is: Opacity on the right  EKG:  I have personally reviewed the images and compared with prior images. and My impression is: NSR    Assessment/Plan:  Justification for Admission Status  I anticipate this patient will require at least two midnights for appropriate medical management, necessitating inpatient admission because pneumonia, hypoxic respiratory failure, CHF exacerbation, COPD exacerbation    * Pneumonia of right lower lobe due to infectious organism- (present on admission)  Assessment & Plan  Worsening shortness of breath, cough with productive sputum for over a week  CXR reviewed and shows an opacity on the right.  Procalcitonin pending  Started on IV antibiotics    Acute on chronic systolic congestive heart failure (HCC)- (present on admission)  Assessment & Plan  Appears to have volume overload on exam with bilateral lower extremity edema which is the worst she is ever noticeably been  NT-proBNP 4000  Previous echo 12/2024 showed an EF of 55-60%, s/p mitral valve repair, moderate to severe tricuspid regurg, RVSP of 83  Continue home meds of carvedilol, sacubitril-valsartan  IV diuresis, BMP ordered to continue monitoring kidney function and electrolytes   I/O, daily weight,  cardiac diet, fluid and salt restriction    Pulmonary hypertension (HCC)- (present on admission)  Assessment & Plan  Follows with pulmonology.  Echo 12/2024 showed an RVSP of 83.  Per notes type II secondary to CHF and valvular disease  She is currently volume overloaded, IV diuresis    Asthma-COPD overlap syndrome (HCC)- (present on admission)  Assessment & Plan  Currently with an acute exacerbation secondary to pneumonia.  Scheduled DuoNebs, continue home inhalers, prednisone    Acute on chronic respiratory failure with hypoxia (HCC)- (present on admission)  Assessment & Plan  Baseline 2-3 L, saturating in the 70s and requiring 6 L on presentation  Secondary to asthma/COPD exacerbation, significant volume overload due to CHF exacerbation and pneumonia    Stage 3b chronic kidney disease- (present on admission)  Assessment & Plan  At baseline.  She will be diuresed for CHF exacerbation  BMP ordered to continue monitoring kidney function, avoid nephrotoxic agents    Paroxysmal atrial fibrillation (HCC)- (present on admission)  Assessment & Plan  S/p ablation.  Continue amiodarone, carvedilol, warfarin    Benign essential hypertension- (present on admission)  Assessment & Plan  Continue carvedilol, Entresto        VTE prophylaxis: therapeutic anticoagulation with warfarin         [1]   Allergies  Allergen Reactions    Amoxicillin Anaphylaxis and Shortness of Breath     Tolerates ceftriaxone    Nitrofurantoin      swelling    Pcn [Penicillins] Anaphylaxis     Tolerates ceftriaxone    Vancomycin Anaphylaxis and Shortness of Breath     Severe hypotension and bronchospasm observed by anesthesia while giving  vanco during procedure    Amlodipine Besylate-Valsartan Unspecified     Chest pain and dizziness     Etodolac Hives and Nausea     Dark stools    Food Hives     Plums    Formaldehyde      Pt reports she was informed of allergy during a skin test. Pt not sure of reaction    Ivabradine      unknown    Jardiance  [Empagliflozin] Unspecified     Headaches, dizziness    Skin Adhesives Hives     Patient states they cause welts for several days;  EKG stickers    Eliquis [Apixaban] Shortness of Breath     Pt reports SOB and dizzy when she took    Lisinopril Unspecified     Dizziness     Other Drug Rash     Metal silver    Other Misc Shortness of Breath and Runny Nose     Mold, dust, and feathers, adhesives, plums    Tape Rash     Paper tape ok

## 2025-06-15 NOTE — ED TRIAGE NOTES
"Chief Complaint   Patient presents with    Shortness of Breath     X1wk worse today   Baseline 3L hx of COPD/Asthma  Pt was found on arrival on 4L 75%, EMS gave 1 albuterol/1 duoneb SPO2 increased to 85%  pt increased to 6-8L NC     Sore Throat     Intermitten x1wk    Chest Pain     discomfort    Weight Gain     Hx of CHF   Pt reports she has missed some doses of her medication due to her not feeling well     Fever     102 highest reported at home states she took a home COVID test & was negative    Cough     BP (!) 185/78   Pulse 63   Temp (!) 38.4 °C (101.1 °F) (Oral)   Resp (!) 22   Ht 1.646 m (5' 4.8\")   Wt 81.6 kg (180 lb)   SpO2 92%   BMI 30.14 kg/m²      "

## 2025-06-16 ENCOUNTER — APPOINTMENT (OUTPATIENT)
Dept: MEDICAL GROUP | Facility: MEDICAL CENTER | Age: 75
End: 2025-06-16
Payer: MEDICARE

## 2025-06-16 LAB
ALBUMIN SERPL BCP-MCNC: 2.9 G/DL (ref 3.2–4.9)
ANION GAP SERPL CALC-SCNC: 11 MMOL/L (ref 7–16)
BUN SERPL-MCNC: 51 MG/DL (ref 8–22)
CALCIUM ALBUM COR SERPL-MCNC: 10.2 MG/DL (ref 8.5–10.5)
CALCIUM SERPL-MCNC: 9.3 MG/DL (ref 8.5–10.5)
CHLORIDE SERPL-SCNC: 102 MMOL/L (ref 96–112)
CO2 SERPL-SCNC: 23 MMOL/L (ref 20–33)
CREAT SERPL-MCNC: 2.02 MG/DL (ref 0.5–1.4)
ERYTHROCYTE [DISTWIDTH] IN BLOOD BY AUTOMATED COUNT: 57.4 FL (ref 35.9–50)
GFR SERPLBLD CREATININE-BSD FMLA CKD-EPI: 25 ML/MIN/1.73 M 2
GLUCOSE SERPL-MCNC: 204 MG/DL (ref 65–99)
HCT VFR BLD AUTO: 34.9 % (ref 37–47)
HGB BLD-MCNC: 11.1 G/DL (ref 12–16)
INR PPP: 4.34 (ref 0.87–1.13)
MAGNESIUM SERPL-MCNC: 2.2 MG/DL (ref 1.5–2.5)
MCH RBC QN AUTO: 29.8 PG (ref 27–33)
MCHC RBC AUTO-ENTMCNC: 31.8 G/DL (ref 32.2–35.5)
MCV RBC AUTO: 93.8 FL (ref 81.4–97.8)
PHOSPHATE SERPL-MCNC: 3.7 MG/DL (ref 2.5–4.5)
PLATELET # BLD AUTO: 131 K/UL (ref 164–446)
PMV BLD AUTO: 10.7 FL (ref 9–12.9)
POTASSIUM SERPL-SCNC: 4.4 MMOL/L (ref 3.6–5.5)
PROTHROMBIN TIME: 42.9 SEC (ref 12–14.6)
RBC # BLD AUTO: 3.72 M/UL (ref 4.2–5.4)
SODIUM SERPL-SCNC: 136 MMOL/L (ref 135–145)
WBC # BLD AUTO: 13.5 K/UL (ref 4.8–10.8)

## 2025-06-16 PROCEDURE — 85610 PROTHROMBIN TIME: CPT

## 2025-06-16 PROCEDURE — 700102 HCHG RX REV CODE 250 W/ 637 OVERRIDE(OP): Performed by: STUDENT IN AN ORGANIZED HEALTH CARE EDUCATION/TRAINING PROGRAM

## 2025-06-16 PROCEDURE — 97162 PT EVAL MOD COMPLEX 30 MIN: CPT

## 2025-06-16 PROCEDURE — 94669 MECHANICAL CHEST WALL OSCILL: CPT

## 2025-06-16 PROCEDURE — 700101 HCHG RX REV CODE 250: Performed by: INTERNAL MEDICINE

## 2025-06-16 PROCEDURE — 770020 HCHG ROOM/CARE - TELE (206)

## 2025-06-16 PROCEDURE — 700105 HCHG RX REV CODE 258: Performed by: STUDENT IN AN ORGANIZED HEALTH CARE EDUCATION/TRAINING PROGRAM

## 2025-06-16 PROCEDURE — A9270 NON-COVERED ITEM OR SERVICE: HCPCS | Performed by: INTERNAL MEDICINE

## 2025-06-16 PROCEDURE — 36415 COLL VENOUS BLD VENIPUNCTURE: CPT

## 2025-06-16 PROCEDURE — 83735 ASSAY OF MAGNESIUM: CPT

## 2025-06-16 PROCEDURE — 94664 DEMO&/EVAL PT USE INHALER: CPT

## 2025-06-16 PROCEDURE — 94640 AIRWAY INHALATION TREATMENT: CPT

## 2025-06-16 PROCEDURE — A9270 NON-COVERED ITEM OR SERVICE: HCPCS | Performed by: STUDENT IN AN ORGANIZED HEALTH CARE EDUCATION/TRAINING PROGRAM

## 2025-06-16 PROCEDURE — 80069 RENAL FUNCTION PANEL: CPT

## 2025-06-16 PROCEDURE — 700102 HCHG RX REV CODE 250 W/ 637 OVERRIDE(OP): Performed by: INTERNAL MEDICINE

## 2025-06-16 PROCEDURE — 99233 SBSQ HOSP IP/OBS HIGH 50: CPT | Performed by: INTERNAL MEDICINE

## 2025-06-16 PROCEDURE — 94760 N-INVAS EAR/PLS OXIMETRY 1: CPT

## 2025-06-16 PROCEDURE — 85027 COMPLETE CBC AUTOMATED: CPT

## 2025-06-16 PROCEDURE — 700111 HCHG RX REV CODE 636 W/ 250 OVERRIDE (IP): Mod: JZ | Performed by: STUDENT IN AN ORGANIZED HEALTH CARE EDUCATION/TRAINING PROGRAM

## 2025-06-16 RX ORDER — IPRATROPIUM BROMIDE AND ALBUTEROL SULFATE 2.5; .5 MG/3ML; MG/3ML
3 SOLUTION RESPIRATORY (INHALATION)
Status: DISCONTINUED | OUTPATIENT
Start: 2025-06-16 | End: 2025-06-17

## 2025-06-16 RX ORDER — FUROSEMIDE 10 MG/ML
40 INJECTION INTRAMUSCULAR; INTRAVENOUS ONCE
Status: COMPLETED | OUTPATIENT
Start: 2025-06-16 | End: 2025-06-16

## 2025-06-16 RX ORDER — CARVEDILOL 6.25 MG/1
6.25 TABLET ORAL 2 TIMES DAILY WITH MEALS
Status: DISCONTINUED | OUTPATIENT
Start: 2025-06-16 | End: 2025-06-25 | Stop reason: HOSPADM

## 2025-06-16 RX ADMIN — SACUBITRIL AND VALSARTAN 1 TABLET: 24; 26 TABLET, FILM COATED ORAL at 05:54

## 2025-06-16 RX ADMIN — AMIODARONE HYDROCHLORIDE 100 MG: 200 TABLET ORAL at 20:50

## 2025-06-16 RX ADMIN — CALCITRIOL CAPSULES 0.25 MCG 0.5 MCG: 0.25 CAPSULE ORAL at 05:54

## 2025-06-16 RX ADMIN — CEFTRIAXONE SODIUM 2000 MG: 2 INJECTION, POWDER, FOR SOLUTION INTRAMUSCULAR; INTRAVENOUS at 20:48

## 2025-06-16 RX ADMIN — MOMETASONE FUROATE AND FORMOTEROL FUMARATE DIHYDRATE 2 PUFF: 200; 5 AEROSOL RESPIRATORY (INHALATION) at 07:26

## 2025-06-16 RX ADMIN — PREDNISONE 40 MG: 20 TABLET ORAL at 05:54

## 2025-06-16 RX ADMIN — CARVEDILOL 6.25 MG: 6.25 TABLET, FILM COATED ORAL at 16:44

## 2025-06-16 RX ADMIN — FUROSEMIDE 40 MG: 10 INJECTION, SOLUTION INTRAVENOUS at 05:55

## 2025-06-16 RX ADMIN — AZITHROMYCIN DIHYDRATE 500 MG: 250 TABLET ORAL at 21:52

## 2025-06-16 RX ADMIN — ATORVASTATIN CALCIUM 10 MG: 10 TABLET, FILM COATED ORAL at 20:50

## 2025-06-16 RX ADMIN — SACUBITRIL AND VALSARTAN 1 TABLET: 24; 26 TABLET, FILM COATED ORAL at 16:44

## 2025-06-16 RX ADMIN — IPRATROPIUM BROMIDE AND ALBUTEROL SULFATE 3 ML: .5; 2.5 SOLUTION RESPIRATORY (INHALATION) at 07:25

## 2025-06-16 ASSESSMENT — ENCOUNTER SYMPTOMS
WEAKNESS: 1
SORE THROAT: 0
ABDOMINAL PAIN: 0
HEARTBURN: 0
SHORTNESS OF BREATH: 1
SPUTUM PRODUCTION: 1
HEMOPTYSIS: 0
CHILLS: 0
FOCAL WEAKNESS: 0
MYALGIAS: 0
FEVER: 0
NAUSEA: 0
DIARRHEA: 0
COUGH: 1
HALLUCINATIONS: 0
HEADACHES: 0
BLOOD IN STOOL: 0
PALPITATIONS: 0
DEPRESSION: 0
DIZZINESS: 0
VOMITING: 0
BACK PAIN: 0

## 2025-06-16 ASSESSMENT — COGNITIVE AND FUNCTIONAL STATUS - GENERAL
CLIMB 3 TO 5 STEPS WITH RAILING: A LITTLE
WALKING IN HOSPITAL ROOM: A LITTLE
MOVING FROM LYING ON BACK TO SITTING ON SIDE OF FLAT BED: A LITTLE
MOBILITY SCORE: 21
SUGGESTED CMS G CODE MODIFIER MOBILITY: CJ

## 2025-06-16 ASSESSMENT — GAIT ASSESSMENTS
ASSISTIVE DEVICE: FRONT WHEEL WALKER
DISTANCE (FEET): 100
GAIT LEVEL OF ASSIST: STANDBY ASSIST
DEVIATION: DECREASED BASE OF SUPPORT

## 2025-06-16 ASSESSMENT — PATIENT HEALTH QUESTIONNAIRE - PHQ9
SUM OF ALL RESPONSES TO PHQ9 QUESTIONS 1 AND 2: 0
2. FEELING DOWN, DEPRESSED, IRRITABLE, OR HOPELESS: NOT AT ALL
1. LITTLE INTEREST OR PLEASURE IN DOING THINGS: NOT AT ALL

## 2025-06-16 ASSESSMENT — FIBROSIS 4 INDEX: FIB4 SCORE: 2.15

## 2025-06-16 NOTE — PROGRESS NOTES
Inpatient Anticoagulation Service Note for 6/16/2025      Reason for Anticoagulation: Atrial Fibrillation, Mitral Valve Repair   [id number] VASc Score: 9      High risk for bleed: No        Hemoglobin Value: (!) 11.1  Hematocrit Value: (!) 34.9  Lab Platelet Value: (!) 131  Target INR: 2.0 to 3.0     Date/Time INR Value    06/16/25 0137 4.34     06/14/25 2230 3.4       Date/Time Dose (mg)    06/16/25 0912 2     06/15/25 0140 0     Average Dose (mg): 5  Significant Interactions: Amiodarone, Antibiotics, Corticosteroids  Bridge Therapy: No     Reversal Agent Administered: Not Applicable  Comments: Patient on warfarin for history of A-Fib and mitral valve REPAIR presenting with SOB diagnosed with PNA. Patient follows with the [facility]  (TTR ~ 53%) and was last seen 6/2 with a therapeutic INR and instructed to continue with 5mg daily. Notably patient is on amiodarone at home. Patient has also been started on ceftriaxone and azithromycin but little to no interaction expected with these antibiotics. Patient has also been started on steroids which will likely interact and increase the patients INR. INR today is elevated at 4.34    Plan:  HOLD warfarin, likely elevated d/t steroids. H/H is stable   Education Material Provided?: No    Pharmacist suggested discharge dosing: TBD depending on what other medications discharged on, INR within of 72 hours     Sakshi Mcconnell, Jing

## 2025-06-16 NOTE — CARE PLAN
Problem: Knowledge Deficit - Standard  Goal: Patient and family/care givers will demonstrate understanding of plan of care, disease process/condition, diagnostic tests and medications  Outcome: Progressing     Problem: Fall Risk  Goal: Patient will remain free from falls  Outcome: Progressing   The patient is Stable - Low risk of patient condition declining or worsening    Shift Goals  Clinical Goals: Monitor labs and vitals  Patient Goals: rest and comfort  Family Goals: cecilia    Progress made toward(s) clinical / shift goals:  Pt verbalized understnding of POC & barriers to DC. Pt asks appropriate question regarding POC.   Fall precautions are in place. Patient uses call light appropriately.

## 2025-06-16 NOTE — PROGRESS NOTES
Telemetry Shift Summary     Rhythm SR with a BBB  HR Range 60-61  Ectopy no ectopy  Measurements  0.19/0.13/0.43  Per strip printed 0400     Normal Values  Rhythm SR  HR Range    Measurements 0.12-0.20 / 0.06-0.10  / 0.30-0.52

## 2025-06-16 NOTE — PROGRESS NOTES
This RN retrieved fax from [facility] for patient. Patient filled out form for patient's cat to be checked up on by [facility]. This RN faxed completed form to [facility]. Form states patient has until 06/20/2025 to  cat before the ct will be surrendered. Patient verbalized understanding.

## 2025-06-16 NOTE — THERAPY
Physical Therapy   Initial Evaluation     Patient Name:  Salma Lees  Age:  75 y.o., Sex:  female  Medical Record #:  [id number]  Today's Date: 6/16/2025     Precautions  Comments: (P) 3L NC baseline - 6L NC w/ activity    Assessment  Patient is 75 y.o. female w/ a dx of pneumonia. Pt tolerate evaluation well w/ bed mobility, transfers, and ambulation. Pt is limited by SOB on exertion and decreased activity tolerance from SOB. Pt demonstrates functional strength and mobility needed for independence. Pt was able to ambulate w/ SBA and required 6L NC to maintain O2. Pt was educated on deep breathing, importance of upright mobilization, and incorporating rest breaks. Pt reports having done this prior to admission and will continue to do so when home. Patient will not be actively followed for physical therapy services at this time, however may be seen if requested by physician for 1 more visit within 30 days to address any discharge or equipment needs. Upon d/c home, recommending OP PT services to further assist in regaining endurance for IPLOF.      Plan  Physical Therapy Initial Treatment Plan   Duration: (P) Discharge Needs Only    DC Equipment Recommendations: (P) None  Discharge Recommendations: (P) Recommend outpatient physical therapy services to address higher level deficits       Subjective  Pt reports living w/ her cat on the 4th floor of an ILF and has neighbors for support as well as her son in [place]. Pt reports that she has been on O2 for about 10 years and has her routine pretty well figured out w/ a scooter for long distances and a SPC or 4WW for walking when needed. Her son is flying here to assist for the next couple days upon her return home. Pt reports an active and IPLOF.       Objective   06/16/25 1601   Initial Contact Note    Initial Contact Note Order Received and Verified, Physical Therapy Evaluation in Progress with Full Report to Follow.   Precautions   Comments 3L NC baseline -  6L NC w/ activity   Vitals   Pulse 75   Pulse Oximetry 90 %   O2 (LPM) 3   O2 Delivery Device Nasal Cannula   Vitals Comments w/ activity, pt SpO2 at 87-89 6L NC   Pain 0 - 10 Group   Therapist Pain Assessment Prior to Activity;During Activity;Post Activity;Nurse Notified;0   Prior Living Situation   Prior Services Home-Independent   Housing / Facility Independent Living Facility  (4th floor)   Steps In Home 0   Elevator Yes   Bathroom Set up Walk In Shower;Shower Chair   Equipment Owned Front-Wheel Walker;Single Point Cane;Scooter;Hand Held Shower;Oxygen   Lives with - Patient's Self Care Capacity Alone and Able to Care For Self   Comments pt lives alone in an ILF and has support from neighbors - pt only utilizes an AD if she feels like she needs it w/ SOB   Prior Level of Functional Mobility   Bed Mobility Independent   Transfer Status Independent   Ambulation Independent   Ambulation Distance household/short community   Comments pt will utilize a 4WW or SPC if needed, for longer distances (grocery stores), pt uses scooter   History of Falls   History of Falls No   Cognition    Cognition / Consciousness WDL   Level of Consciousness Alert   Comments pleasant and cooperative   Active ROM Lower Body    Active ROM Lower Body  WDL   Strength Lower Body   Lower Body Strength  WDL   Comments 4-5/5 B LE; able to demo functional mobility   Sensation Lower Body   Lower Extremity Sensation   WDL   Neurological Concerns   Neurological Concerns No   Vision   Vision Comments WNL   Balance Assessment   Sitting Balance (Static) Good   Sitting Balance (Dynamic) Fair +   Standing Balance (Static) Fair +   Standing Balance (Dynamic) Fair   Weight Shift Sitting Good   Weight Shift Standing Good   Bed Mobility    Supine to Sit Standby Assist   Sit to Supine Standby Assist   Scooting Supervised   Comments HOB elevated - rails up   Gait Analysis   Gait Level Of Assist Standby Assist   Assistive Device Front Wheel Walker   Distance  (Feet) 100   # of Times Distance was Traveled 1   Deviation Decreased Base Of Support   # of Stairs Climbed 0   Weight Bearing Status FWB   Comments w/ 6L NC   Functional Mobility   Sit to Stand Supervised   Bed, Chair, Wheelchair Transfer Supervised   Toilet Transfers Supervised   Transfer Method Stand Step   Mobility supine, EOB, sit<>stand, ambulation, back to bed   6 Clicks Assessment - How much HELP from from another person do you currently need... (If the patient hasn't done an activity recently, how much help from another person do you think he/she would need if he/she tried?)   Turning from your back to your side while in a flat bed without using bedrails? 4   Moving from lying on your back to sitting on the side of a flat bed without using bedrails? 3   Moving to and from a bed to a chair (including a wheelchair)? 4   Standing up from a chair using your arms (e.g., wheelchair, or bedside chair)? 4   Walking in hospital room? 3   Climbing 3-5 steps with a railing? 3   6 clicks Mobility Score 21   Activity Tolerance   Sitting in Chair NT   Sitting Edge of Bed 5 mins   Standing 6 mins   Comments w/ FWW   Patient / Family Goals    Patient / Family Goal #1 pt wants to go home and be w/ her cat   Education Group   Education Provided Role of Physical Therapist;Exercises - Supine;Exercises - Seated   Role of Physical Therapist Patient Response Patient;Acceptance;Explanation;Verbal Demonstration   Exercises - Supine Patient Response Patient;Acceptance;Demonstration;Explanation;Verbal Demonstration;Action Demonstration   Exercises - Seated Patient Response Patient;Acceptance;Demonstration;Explanation;Verbal Demonstration;Action Demonstration   Physical Therapy Initial Treatment Plan    Duration Discharge Needs Only   Anticipated Discharge Equipment and Recommendations   DC Equipment Recommendations None   Discharge Recommendations Recommend outpatient physical therapy services to address higher level deficits    Interdisciplinary Plan of Care Collaboration   IDT Collaboration with  Nursing   Patient Position at End of Therapy In Bed;Call Light within Reach;Tray Table within Reach;Phone within Reach   Collaboration Comments RN aware of visit and recs   Session Information   Date / Session Number  6/16 D/C needs only

## 2025-06-16 NOTE — PROGRESS NOTES
Hospital Medicine Daily Progress Note    Date of Service  6/16/2025    Chief Complaint  Salma Lees is a 75 y.o. female admitted 6/14/2025 with shortness of breath    Hospital Course  History of severe pulm hypertension RVSP of 83, chronic  3 L oxygen and respiratory failure secondary to COPD who was admitted for shortness of breath found to have volume overload, sleep exacerbation and community-acquired pneumonia.  She had acute on chronic respiratory failure hypoxia requiring 6 L nasal cannula due to room air saturation to 74%.  She was started on Solu-Medrol, IV antibiotics and IV Lasix and was admitted for further medical treatment    Interval Problem Update  6/15: Feeling much better today, she is down to 5 L of oxygen BNP remains.  BP low 100s    6/16: Creatinine increased today to 2.02, hold Lasix.  Continue azithromycin, ceftriaxone.  WBC is slightly improved.  Overall she is feeling much better.  PT OT ordered    I have discussed this patient's plan of care and discharge plan at IDT rounds today with Case Management, Nursing, Nursing leadership, and other members of the IDT team.    Consultants/Specialty  None    Code Status  Full Code    Disposition  The patient is not medically cleared for discharge to home or a post-acute facility.  Anticipate discharge to: home with organized home healthcare and close outpatient follow-up    I have placed the appropriate orders for post-discharge needs.    Review of Systems  Review of Systems   Constitutional:  Positive for malaise/fatigue. Negative for chills and fever.   HENT:  Negative for sore throat.    Respiratory:  Positive for cough, sputum production and shortness of breath. Negative for hemoptysis.    Cardiovascular:  Positive for leg swelling (Improving). Negative for chest pain and palpitations.   Gastrointestinal:  Negative for abdominal pain, blood in stool, diarrhea, heartburn, nausea and vomiting.   Genitourinary:  Negative for dysuria and  frequency.   Musculoskeletal:  Negative for back pain and myalgias.   Neurological:  Positive for weakness. Negative for dizziness, focal weakness and headaches.   Psychiatric/Behavioral:  Negative for depression and hallucinations.    All other systems reviewed and are negative.       Physical Exam  Temp:  [36.6 °C (97.8 °F)-37.3 °C (99.1 °F)] 36.6 °C (97.8 °F)  Pulse:  [60-69] 69  Resp:  [16-19] 18  BP: (107-122)/(53-59) 107/53  SpO2:  [91 %-96 %] 91 %    Physical Exam  Constitutional:       General: She is not in acute distress.  HENT:      Nose: Nose normal.      Mouth/Throat:      Mouth: Mucous membranes are dry.   Cardiovascular:      Rate and Rhythm: Normal rate and regular rhythm.      Pulses: Normal pulses.   Pulmonary:      Breath sounds: Rhonchi present.      Comments: Improved air movement  Abdominal:      General: There is no distension.      Palpations: Abdomen is soft.      Tenderness: There is no abdominal tenderness.   Musculoskeletal:         General: No swelling.      Cervical back: No muscular tenderness.      Right lower leg: Edema (Trace, much better) present.      Left lower leg: Edema (Improved) present.   Lymphadenopathy:      Cervical: No cervical adenopathy.   Skin:     General: Skin is warm and dry.      Coloration: Skin is pale.      Findings: No rash.   Neurological:      General: No focal deficit present.      Mental Status: She is alert and oriented to person, place, and time.      Motor: Weakness present.   Psychiatric:         Mood and Affect: Mood normal.         Thought Content: Thought content normal.         Fluids    Intake/Output Summary (Last 24 hours) at 6/16/2025 1608  Last data filed at 6/16/2025 1200  Gross per 24 hour   Intake 620 ml   Output 350 ml   Net 270 ml        Laboratory  Recent Labs     06/14/25  2230 06/16/25  0137   WBC 14.7* 13.5*   RBC 4.43 3.72*   HEMOGLOBIN 13.4 11.1*   HEMATOCRIT 42.3 34.9*   MCV 95.5 93.8   MCH 30.2 29.8   MCHC 31.7* 31.8*   RDW 59.0*  57.4*   PLATELETCT 155* 131*   MPV 11.6 10.7     Recent Labs     06/14/25 2230 06/16/25  0137   SODIUM 137 136   POTASSIUM 4.5 4.4   CHLORIDE 100 102   CO2 21 23   GLUCOSE 127* 204*   BUN 25* 51*   CREATININE 1.52* 2.02*   CALCIUM 9.9 9.3     Recent Labs     06/14/25 2230 06/16/25  0137   APTT 50.1*  --    INR 3.40* 4.34*               Imaging  DX-CHEST-PORTABLE (1 VIEW)   Final Result      Right basilar opacity is consistent with pneumonia. Follow-up imaging is recommended to document resolution           Assessment/Plan  * Pneumonia of right lower lobe due to infectious organism- (present on admission)  Assessment & Plan  Worsening shortness of breath, cough with productive sputum for over a week  CXR reviewed and shows an opacity on the right.    Procalcitonin elevated at 0.3  Continue IV antibiotics, ceftriaxone and azithromycin  Still with wet breath sounds/rhonchi on exam    Acute on chronic respiratory failure with hypoxia (HCC)- (present on admission)  Assessment & Plan  Baseline 2-3 L, saturating in the 70s and requiring 6 L on presentation  Secondary to asthma/COPD exacerbation  significant volume overload due to CHF exacerbation and pneumonia (severe PAH)    Acute on chronic heart failure with preserved ejection fraction (HFpEF) (HCC)- (present on admission)  Assessment & Plan  Volume overload on exam with B LE edema  NT-proBNP 4000  Previous echo 12/2024 showed an EF of 55-60%, s/p mitral valve repair, moderate to severe tricuspid regurg, RVSP of 83  Continue home meds of carvedilol, sacubitril-valsartan  Cr rising, hold lasix today  BMP ordered to continue monitoring kidney function and electrolytes   I/O, daily weight, cardiac diet, fluid and salt restriction    Benign essential hypertension- (present on admission)  Assessment & Plan  Continue carvedilol, Entresto    Stage 3b chronic kidney disease- (present on admission)  Assessment & Plan  At baseline.  She will be diuresed for CHF  exacerbation  BMP ordered to continue monitoring kidney function, avoid nephrotoxic agents    Pulmonary hypertension (HCC)- (present on admission)  Assessment & Plan  Follows with pulmonology.  Echo 12/2024 showed an RVSP of 83.  Per notes type II secondary to CHF and valvular disease  .  Continue diuresis as tolerated by creatinine, hold today    Paroxysmal atrial fibrillation (HCC)- (present on admission)  Assessment & Plan  S/p ablation.  Continue amiodarone, carvedilol, warfarin    Asthma-COPD overlap syndrome (HCC)- (present on admission)  Assessment & Plan  Currently with an acute exacerbation secondary to pneumonia.    Scheduled DuoNebs, continue home inhalers, prednisone         VTE prophylaxis: Coumadin    I have performed a physical exam and reviewed and updated ROS and Plan today (6/16/2025). In review of yesterday's note (6/15/2025), there are no changes except as documented above.    Total time:  52 minutes.  I spent greater than 50% of the time for patient care, counseling, and coordination on this date, including unit/floor time, and face-to-face time with the patient as per interval events and assessment and plan above

## 2025-06-16 NOTE — CARE PLAN
The patient is Stable - Low risk of patient condition declining or worsening    Shift Goals  Clinical Goals: wean O2, iv abx, fall safety, monitor vitals, monitor labs  Patient Goals: sleep  Family Goals: cecilia    Progress made toward(s) clinical / shift goals:      Head of bed elevated past 30 degrees to help with secretion management and aspiration prevention. IV ABX therapy completed. Blood pressures have been soft, one dose of lasix held.       Problem: Impaired Gas Exchange  Goal: Patient will demonstrate improved ventilation and adequate oxygenation and participate in treatment regimen within the level of ability/situation.  Outcome: Progressing     Problem: Hemodynamics  Goal: Patient's hemodynamics, fluid balance and neurologic status will be stable or improve  Outcome: Progressing     Problem: Fluid Volume  Goal: Fluid volume balance will be maintained  Outcome: Progressing

## 2025-06-16 NOTE — RESPIRATORY CARE
COPD EDUCATION by COPD CLINICAL EDUCATOR  6/16/2025  at  4:47 PM by Natasha Connors RRT     Patient interviewed by education team.  Patient declined or is unable to participate in the full program.  Therefore, a short intervention has been conducted.  Patient is well educated on disease and declines book education at this time.  Patient is closely followed by [facility] and was recently seen on June 6th.      COPD Screen  COPD Risk Screening  Do you have a history of COPD?: Yes  Do you have a Pulmonologist?: Yes    COPD Assessment  COPD Clinical Specialists ONLY  COPD Education Initiated: Yes--Short Intervention   Is this a COPD exacerbation patient?: Yes  DME Company: [employer]  DME Equipment Type: 3L O2 rest and 5L exertion, [employer]  Physician Follow Up Appointment: 09/17/25  Appt Time: 1140  Physician Name: Leah Bonilla D.O.  Pulmonary Follow Up Appointment: 11/07/25 (msg sent to pulm to see if they want 7 day f/u from admission, they will make that decision)  Appt Time: 1320  Pulmonologist Name: Elsa Vaca M.D.  Pulmonary Rehab: No (grad 5 years ago)  Smoking Cessation: N/A  Hospice: No  Home Health Care: No  Mobile Urgent Care Services: No  Geriatric Specialty Group: No  Private In-Home Care Agency: No  $ Demo/Eval of SVN's, MDI's and Aerosols: Yes (spacer)  (OP) Pulmonary Function Testing: Yes (1/2025 FVC 1.71 (64%) FEV1 1.99 (48%) Ratio 58 DLCO 45)  Interdisciplinary Rounds: Attendance at Rounds (30 Min)    Meds to Beds  [facility] provides bedside medication delivery for all eligible patients at discharge and you have been automatically enrolled in the Meds to Beds Program. Would you like to opt out of this program for any reason?: No - Stay Opted In     MY COPD ACTION PLAN     It is recommended that patients and physicians/healthcare providers complete this action plan together. This plan should be discussed at each physician visit and updated as needed.    The green, yellow and red zones  "show groups of symptoms of COPD. This list of symptoms is not comprehensive, and you may experience other symptoms. In the \"Actions\" column, your healthcare provider has recommended actions for you to take based on your symptoms.    Patient Name: Salma Lees   YOB: 1950   Last Updated on: 6/16/2025  4:47 PM   Green Zone:  I am doing well today Actions     Usual activitiy and exercise level   Take daily medications     Usual amounts of cough and phlegm/mucus   Use oxygen as prescribed     Sleep well at night   Continue regular exercise/diet plan     Appetite is good   At all times avoid cigarette smoke, inhaled irritants     Daily Medications (these medications are taken every day):   Fluticosone/Salmeterol (Advair) 1 Puff Twice daily     Additional Information:  Rinse mouth after use.    Yellow Zone:  I am having a bad day or a COPD flare Actions     More breathless than usual   Continue daily medications     I have less energy for my daily activities   Use quick relief inhaler as ordered     Increased or thicker phlegm/mucus   Use oxygen as prescribed     Using quick relief inhaler/nebulizer more often   Get plenty of rest     Swelling of ankles more than usual   Use pursed lip breathing     More coughing than usual   At all times avoid cigarette smoke, inhaled irritants     I feel like I have a \"chest cold\"     Poor sleep and my symptoms woke me up     My appetite is not good     My medicine is not helping      Call provider immediately if symptoms don’t improve     Continue daily medications, add rescue medications:   Levalbuterol (Xopenex) 2 Puffs PRN       Medications to be used during a flare up, (as Discussed with Provider):           Additional Information:  Use with spacer    Red Zone:  I need urgent medical care Actions     Severe shortness of breath even at rest   Call 911 or seek medical care immediately     Not able to do any activity because of breathing      Fever or shaking " chills      Feeling confused or very drowsy       Chest pains      Coughing up blood

## 2025-06-16 NOTE — PROGRESS NOTES
Telemetry Shift Summary     Rhythm: SR  HR: 62-80  Ectopy: rPVC    Measurements: 0.194/0.105/0.441    Normal Values  Rhythm: SR  HR:   Measurements: 0.12-0.20/0.08-0.10/0.30-0.52

## 2025-06-16 NOTE — PROGRESS NOTES
Assumed care and received report from VALENTINA Muñiz. Patient awake and answering questions during bedside, change of shift report. Patient's call light and tray table within reach. Patient's whiteboard updated.

## 2025-06-17 LAB
ALBUMIN SERPL BCP-MCNC: 2.9 G/DL (ref 3.2–4.9)
ANION GAP SERPL CALC-SCNC: 11 MMOL/L (ref 7–16)
BACTERIA UR CULT: NORMAL
BUN SERPL-MCNC: 57 MG/DL (ref 8–22)
CALCIUM ALBUM COR SERPL-MCNC: 10.6 MG/DL (ref 8.5–10.5)
CALCIUM SERPL-MCNC: 9.7 MG/DL (ref 8.5–10.5)
CHLORIDE SERPL-SCNC: 103 MMOL/L (ref 96–112)
CO2 SERPL-SCNC: 24 MMOL/L (ref 20–33)
CREAT SERPL-MCNC: 1.97 MG/DL (ref 0.5–1.4)
ERYTHROCYTE [DISTWIDTH] IN BLOOD BY AUTOMATED COUNT: 58.5 FL (ref 35.9–50)
GFR SERPLBLD CREATININE-BSD FMLA CKD-EPI: 26 ML/MIN/1.73 M 2
GLUCOSE BLD STRIP.AUTO-MCNC: 202 MG/DL (ref 65–99)
GLUCOSE SERPL-MCNC: 192 MG/DL (ref 65–99)
HCT VFR BLD AUTO: 37.5 % (ref 37–47)
HGB BLD-MCNC: 11.8 G/DL (ref 12–16)
INR PPP: 3.48 (ref 0.87–1.13)
MAGNESIUM SERPL-MCNC: 2.2 MG/DL (ref 1.5–2.5)
MCH RBC QN AUTO: 30.1 PG (ref 27–33)
MCHC RBC AUTO-ENTMCNC: 31.5 G/DL (ref 32.2–35.5)
MCV RBC AUTO: 95.7 FL (ref 81.4–97.8)
PHOSPHATE SERPL-MCNC: 3.9 MG/DL (ref 2.5–4.5)
PLATELET # BLD AUTO: 165 K/UL (ref 164–446)
PMV BLD AUTO: 12.3 FL (ref 9–12.9)
POTASSIUM SERPL-SCNC: 4.3 MMOL/L (ref 3.6–5.5)
PROTHROMBIN TIME: 36 SEC (ref 12–14.6)
RBC # BLD AUTO: 3.92 M/UL (ref 4.2–5.4)
SIGNIFICANT IND 70042: NORMAL
SITE SITE: NORMAL
SODIUM SERPL-SCNC: 138 MMOL/L (ref 135–145)
SOURCE SOURCE: NORMAL
WBC # BLD AUTO: 15.5 K/UL (ref 4.8–10.8)

## 2025-06-17 PROCEDURE — 85610 PROTHROMBIN TIME: CPT

## 2025-06-17 PROCEDURE — A9270 NON-COVERED ITEM OR SERVICE: HCPCS | Performed by: STUDENT IN AN ORGANIZED HEALTH CARE EDUCATION/TRAINING PROGRAM

## 2025-06-17 PROCEDURE — 94760 N-INVAS EAR/PLS OXIMETRY 1: CPT

## 2025-06-17 PROCEDURE — 80069 RENAL FUNCTION PANEL: CPT

## 2025-06-17 PROCEDURE — 97165 OT EVAL LOW COMPLEX 30 MIN: CPT

## 2025-06-17 PROCEDURE — 700101 HCHG RX REV CODE 250: Performed by: STUDENT IN AN ORGANIZED HEALTH CARE EDUCATION/TRAINING PROGRAM

## 2025-06-17 PROCEDURE — 85027 COMPLETE CBC AUTOMATED: CPT

## 2025-06-17 PROCEDURE — A9270 NON-COVERED ITEM OR SERVICE: HCPCS | Performed by: INTERNAL MEDICINE

## 2025-06-17 PROCEDURE — 770020 HCHG ROOM/CARE - TELE (206)

## 2025-06-17 PROCEDURE — 700101 HCHG RX REV CODE 250: Mod: JZ | Performed by: INTERNAL MEDICINE

## 2025-06-17 PROCEDURE — 94640 AIRWAY INHALATION TREATMENT: CPT

## 2025-06-17 PROCEDURE — 97535 SELF CARE MNGMENT TRAINING: CPT

## 2025-06-17 PROCEDURE — 700105 HCHG RX REV CODE 258: Performed by: STUDENT IN AN ORGANIZED HEALTH CARE EDUCATION/TRAINING PROGRAM

## 2025-06-17 PROCEDURE — 700102 HCHG RX REV CODE 250 W/ 637 OVERRIDE(OP): Performed by: STUDENT IN AN ORGANIZED HEALTH CARE EDUCATION/TRAINING PROGRAM

## 2025-06-17 PROCEDURE — 99222 1ST HOSP IP/OBS MODERATE 55: CPT | Performed by: INTERNAL MEDICINE

## 2025-06-17 PROCEDURE — 83735 ASSAY OF MAGNESIUM: CPT

## 2025-06-17 PROCEDURE — 700111 HCHG RX REV CODE 636 W/ 250 OVERRIDE (IP): Performed by: STUDENT IN AN ORGANIZED HEALTH CARE EDUCATION/TRAINING PROGRAM

## 2025-06-17 PROCEDURE — 36415 COLL VENOUS BLD VENIPUNCTURE: CPT

## 2025-06-17 PROCEDURE — 700102 HCHG RX REV CODE 250 W/ 637 OVERRIDE(OP): Performed by: INTERNAL MEDICINE

## 2025-06-17 PROCEDURE — 99233 SBSQ HOSP IP/OBS HIGH 50: CPT | Performed by: INTERNAL MEDICINE

## 2025-06-17 PROCEDURE — 82962 GLUCOSE BLOOD TEST: CPT | Performed by: INTERNAL MEDICINE

## 2025-06-17 RX ORDER — LEVALBUTEROL INHALATION SOLUTION 1.25 MG/3ML
1.25 SOLUTION RESPIRATORY (INHALATION)
Status: DISCONTINUED | OUTPATIENT
Start: 2025-06-17 | End: 2025-06-25 | Stop reason: HOSPADM

## 2025-06-17 RX ORDER — WARFARIN SODIUM 1 MG/1
1 TABLET ORAL
Status: COMPLETED | OUTPATIENT
Start: 2025-06-17 | End: 2025-06-17

## 2025-06-17 RX ADMIN — CARVEDILOL 6.25 MG: 6.25 TABLET, FILM COATED ORAL at 07:24

## 2025-06-17 RX ADMIN — CALCITRIOL CAPSULES 0.25 MCG 0.5 MCG: 0.25 CAPSULE ORAL at 05:22

## 2025-06-17 RX ADMIN — MOMETASONE FUROATE AND FORMOTEROL FUMARATE DIHYDRATE 2 PUFF: 200; 5 AEROSOL RESPIRATORY (INHALATION) at 05:24

## 2025-06-17 RX ADMIN — PREDNISONE 40 MG: 20 TABLET ORAL at 05:22

## 2025-06-17 RX ADMIN — SACUBITRIL AND VALSARTAN 1 TABLET: 24; 26 TABLET, FILM COATED ORAL at 05:22

## 2025-06-17 RX ADMIN — AMIODARONE HYDROCHLORIDE 100 MG: 200 TABLET ORAL at 21:32

## 2025-06-17 RX ADMIN — WARFARIN SODIUM 1 MG: 1 TABLET ORAL at 17:08

## 2025-06-17 RX ADMIN — CEFTRIAXONE SODIUM 2000 MG: 2 INJECTION, POWDER, FOR SOLUTION INTRAMUSCULAR; INTRAVENOUS at 21:35

## 2025-06-17 RX ADMIN — ALBUTEROL SULFATE 2.5 MG: 2.5 SOLUTION RESPIRATORY (INHALATION) at 15:32

## 2025-06-17 RX ADMIN — MOMETASONE FUROATE AND FORMOTEROL FUMARATE DIHYDRATE 2 PUFF: 200; 5 AEROSOL RESPIRATORY (INHALATION) at 17:08

## 2025-06-17 RX ADMIN — ATORVASTATIN CALCIUM 10 MG: 10 TABLET, FILM COATED ORAL at 21:33

## 2025-06-17 RX ADMIN — CARVEDILOL 6.25 MG: 6.25 TABLET, FILM COATED ORAL at 17:08

## 2025-06-17 RX ADMIN — LEVALBUTEROL HYDROCHLORIDE 1.25 MG: 1.25 SOLUTION RESPIRATORY (INHALATION) at 22:02

## 2025-06-17 ASSESSMENT — ENCOUNTER SYMPTOMS
WEAKNESS: 1
VOMITING: 0
CHILLS: 1
WHEEZING: 1
SHORTNESS OF BREATH: 1
PND: 1
ABDOMINAL PAIN: 0
SPUTUM PRODUCTION: 1
COUGH: 1
FEVER: 1
NAUSEA: 0

## 2025-06-17 ASSESSMENT — COGNITIVE AND FUNCTIONAL STATUS - GENERAL
SUGGESTED CMS G CODE MODIFIER DAILY ACTIVITY: CH
DAILY ACTIVITIY SCORE: 24

## 2025-06-17 ASSESSMENT — PATIENT HEALTH QUESTIONNAIRE - PHQ9
1. LITTLE INTEREST OR PLEASURE IN DOING THINGS: NOT AT ALL
2. FEELING DOWN, DEPRESSED, IRRITABLE, OR HOPELESS: NOT AT ALL
SUM OF ALL RESPONSES TO PHQ9 QUESTIONS 1 AND 2: 0

## 2025-06-17 ASSESSMENT — GAIT ASSESSMENTS: DISTANCE (FEET): 50

## 2025-06-17 ASSESSMENT — ACTIVITIES OF DAILY LIVING (ADL): TOILETING: INDEPENDENT

## 2025-06-17 ASSESSMENT — FIBROSIS 4 INDEX: FIB4 SCORE: 1.71

## 2025-06-17 NOTE — PROGRESS NOTES
Inpatient Anticoagulation Service Note for 6/17/2025      Reason for Anticoagulation: Atrial Fibrillation, Mitral Valve Repair   [id number] VASc Score: 9      High risk for bleed: No        Hemoglobin Value: (!) 11.8  Hematocrit Value: 37.5  Lab Platelet Value: 165  Target INR: 2.0 to 3.0     Date/Time INR Value    06/17/25 0259 3.48     06/16/25 0137 4.34     06/14/25 2230 3.4       Date/Time Dose (mg)    06/17/25 1517 --     06/16/25 0912 0     06/15/25 0140 2      Average Dose (mg): Home regimen: warfarin 5 mg daily  Significant Interactions: Amiodarone, Antibiotics, Corticosteroids  Bridge Therapy: No     Reversal Agent Administered: Not Applicable    Comments: Patient on warfarin for history AFib s/p ablation and mitral valve Repair, whom follows with anti-coag clinic. Pt admitted for PNA treatment. INR has been supratherapeutic this admission. New DDI with prednisone this admission as well as acute on chronic HF. Pharmacy trending INR while unstable.    Plan:  Warfarin 1 mg this evening.  Education Material Provided?: No (Chronic warfarin therapy)    Pharmacist suggested discharge dosing: tbd. If discharging 6/18, please contact pharmacy for recommendation after INR review. Will need close INR follow up after discharge within 2-3 days.     Abhinav Rodriguez, PharmD

## 2025-06-17 NOTE — CONSULTS
Pulmonary Consultation    Date of consult: 6/17/2025    Referring Physician  Dominic Mancera M.D.    Reason for Consultation  COPD [employer]    History of Presenting Illness  75 y.o. female who presented 6/14/2025 with increasing cough and sputum production.  Patient has a history of COPD/asthma overlap syndrome and is on Advair.  She also has a history of heart failure with preserved EF and resultant group 2 pulmonary hypertension.  She has been seen in the pul hypertension clinic and is pending a sleep study to rule out CARLITO as a contribution to her pulmonary hypertension.  She was admitted on 6/14/2025 with right lower lobe pneumonia as demonstrated on her chest x-ray.  At that time, patient was requiring 6 L nasal cannula, she is chronically on 1-3.  She was started on antibiotics and steroids and was diuresed appropriately due to elevated BNP and lower extremity edema.  Since that time, patient has improved significantly and feels like her breathing is nearly back to baseline.  Her main complaint at this time is ongoing productive cough, which I assured her will continue for several more days to weeks.    Code Status  Full Code    Review of Systems   Constitutional:  Positive for chills, fever and malaise/fatigue.   Respiratory:  Positive for cough, sputum production, shortness of breath and wheezing.    Cardiovascular:  Positive for leg swelling and PND. Negative for chest pain.   Gastrointestinal:  Negative for nausea and vomiting.   Neurological:  Positive for weakness.       Past Medical History   has a past medical history of A-fib ([facility]), Asthma, BMI 35.0-35.9,adult (11/12/2018), Breath shortness (09/07/2017), Calculus of ureter (09/18/2017), Cataract, Chronic anticoagulation (02/13/2017), CKD (chronic kidney disease) stage 3, GFR 30-59 ml/min, Congestive heart failure ([facility]), COPD (chronic obstructive pulmonary disease) ([facility]), Dental disorder (Dentures), Diabetes ([facility]), Emphysema of lung ([facility])  (Copd), Heart murmur, Heart valve disease (2009 Implant), Hemorrhagic disorder (HCC) (Warfarin), High cholesterol, History of mitral valve repair, Hypertension, Infectious disease (2014), Pacemaker (2014), Pulmonary hypertension (HCC), Renal disorder (09/07/2017), Shortness of breath (10/18/2017), and Urinary incontinence.    She has no past medical history of Cough, Painful breathing, Sputum production, or Wheezing.    Surgical History   has a past surgical history that includes mitral valve repair (01/01/2009); pacemaker insertion (01/01/2015); knee replacement, total (Left, 01/01/2015); cystoscopy stent placement (Left, 06/25/2017); ureteroscopy (Left, 09/18/2017); lasertripsy (Left, 09/18/2017); stent removal (Left, 09/18/2017); hysterectomy, vaginal (1994); tonsillectomy; gyn surgery (1994); other (2015//2022); cataract extraction with iol (Bilateral); other; and other.    Family History  Family History   Problem Relation Age of Onset    Lung Disease Mother         asthma    Cancer Mother         ovarian    Heart Disease Father     Stroke Father     Thyroid Sister     Cancer Brother         pancreas    Cancer Paternal Grandmother         colon    Heart Disease Brother     No Known Problems Brother     No Known Problems Brother     Heart Disease Son         afib       Social History   reports that she quit smoking about 35 years ago. Her smoking use included cigarettes. She started smoking about 40 years ago. She has a 5 pack-year smoking history. She has never used smokeless tobacco. She reports current alcohol use. She reports that she does not use drugs.    Medications  Home Medications       Reviewed by Doni Rascon (Pharmacy Tech) on 06/15/25 at 0916  Med List Status: Complete     Medication Last Dose Status   amiodarone (CORDARONE) 200 MG Tab 6/13/2025 Active   atorvastatin (LIPITOR) 10 MG Tab 6/13/2025 Active   calcitRIOL (ROCALTROL) 0.25 MCG Cap 6/13/2025 Active   carvedilol (COREG) 12.5 MG Tab  6/13/2025 Active   Cyanocobalamin (VITAMIN B-12 PO) 6/13/2025 Active   Dulaglutide (TRULICITY) 1.5 MG/0.5ML Solution Auto-injector 6/1/2025 Active   fluticasone-salmeterol (ADVAIR DISKUS) 500-50 MCG/ACT AEROSOL POWDER, BREATH ACTIVATED 6/13/2025 Active   furosemide (LASIX) 20 MG Tab 6/13/2025 Active   levalbuterol (XOPENEX HFA) 45 MCG/ACT inhaler 6/13/2025 Active   Multiple Vitamins-Minerals (HAIR SKIN NAILS PO) 6/13/2025 Active   Nutritional Supplements (COLD AND FLU PO) 6/13/2025 Active   potassium Chloride ER (K-TAB) 20 MEQ Tab CR tablet 6/13/2025 Active   sacubitril-valsartan (ENTRESTO) 24-26 MG Tab 6/13/2025 Active   warfarin (COUMADIN) 5 MG Tab 6/10/2025 Active                  Audit from Redirected Encounters    **Home medications have not yet been reviewed for this encounter**       Current Medications[1]    Allergies  Allergies[2]    Vital Signs last 24 hours  Temp:  [36.2 °C (97.2 °F)-36.7 °C (98.1 °F)] 36.2 °C (97.2 °F)  Pulse:  [60-75] 61  Resp:  [17-18] 17  BP: (116-164)/(58-72) 116/58  SpO2:  [90 %-94 %] 93 %    Physical Exam  Vitals and nursing note reviewed.   Constitutional:       Appearance: Normal appearance. She is ill-appearing.   HENT:      Head: Normocephalic.   Eyes:      Conjunctiva/sclera: Conjunctivae normal.   Cardiovascular:      Rate and Rhythm: Normal rate and regular rhythm.   Pulmonary:      Effort: Pulmonary effort is normal. No respiratory distress.      Breath sounds: Wheezing present.   Skin:     General: Skin is warm and dry.   Neurological:      General: No focal deficit present.      Mental Status: She is alert and oriented to person, place, and time. Mental status is at baseline.   Psychiatric:         Mood and Affect: Mood normal.         Behavior: Behavior normal.         Fluids    Intake/Output Summary (Last 24 hours) at 6/17/2025 1456  Last data filed at 6/17/2025 0900  Gross per 24 hour   Intake 220 ml   Output 800 ml   Net -580 ml       Laboratory  Recent Results (from  the past 48 hours)   PROTHROMBIN TIME    Collection Time: 06/16/25  1:37 AM   Result Value Ref Range    PT 42.9 (H) 12.0 - 14.6 sec    INR 4.34 (H) 0.87 - 1.13   Renal Function Panel    Collection Time: 06/16/25  1:37 AM   Result Value Ref Range    Sodium 136 135 - 145 mmol/L    Potassium 4.4 3.6 - 5.5 mmol/L    Chloride 102 96 - 112 mmol/L    Co2 23 20 - 33 mmol/L    Anion Gap 11.0 7.0 - 16.0    Glucose 204 (H) 65 - 99 mg/dL    Creatinine 2.02 (H) 0.50 - 1.40 mg/dL    Bun 51 (H) 8 - 22 mg/dL    Calcium 9.3 8.5 - 10.5 mg/dL    Correct Calcium 10.2 8.5 - 10.5 mg/dL    Phosphorus 3.7 2.5 - 4.5 mg/dL    Albumin 2.9 (L) 3.2 - 4.9 g/dL   MAGNESIUM    Collection Time: 06/16/25  1:37 AM   Result Value Ref Range    Magnesium 2.2 1.5 - 2.5 mg/dL   CBC WITHOUT DIFFERENTIAL    Collection Time: 06/16/25  1:37 AM   Result Value Ref Range    WBC 13.5 (H) 4.8 - 10.8 K/uL    RBC 3.72 (L) 4.20 - 5.40 M/uL    Hemoglobin 11.1 (L) 12.0 - 16.0 g/dL    Hematocrit 34.9 (L) 37.0 - 47.0 %    MCV 93.8 81.4 - 97.8 fL    MCH 29.8 27.0 - 33.0 pg    MCHC 31.8 (L) 32.2 - 35.5 g/dL    RDW 57.4 (H) 35.9 - 50.0 fL    Platelet Count 131 (L) 164 - 446 K/uL    MPV 10.7 9.0 - 12.9 fL   ESTIMATED GFR    Collection Time: 06/16/25  1:37 AM   Result Value Ref Range    GFR (CKD-EPI) 25 (A) >60 mL/min/1.73 m 2   PROTHROMBIN TIME    Collection Time: 06/17/25  2:59 AM   Result Value Ref Range    PT 36.0 (H) 12.0 - 14.6 sec    INR 3.48 (H) 0.87 - 1.13   CBC WITHOUT DIFFERENTIAL    Collection Time: 06/17/25  2:59 AM   Result Value Ref Range    WBC 15.5 (H) 4.8 - 10.8 K/uL    RBC 3.92 (L) 4.20 - 5.40 M/uL    Hemoglobin 11.8 (L) 12.0 - 16.0 g/dL    Hematocrit 37.5 37.0 - 47.0 %    MCV 95.7 81.4 - 97.8 fL    MCH 30.1 27.0 - 33.0 pg    MCHC 31.5 (L) 32.2 - 35.5 g/dL    RDW 58.5 (H) 35.9 - 50.0 fL    Platelet Count 165 164 - 446 K/uL    MPV 12.3 9.0 - 12.9 fL   Renal Function Panel    Collection Time: 06/17/25  2:59 AM   Result Value Ref Range    Sodium 138 135 - 145  mmol/L    Potassium 4.3 3.6 - 5.5 mmol/L    Chloride 103 96 - 112 mmol/L    Co2 24 20 - 33 mmol/L    Anion Gap 11.0 7.0 - 16.0    Glucose 192 (H) 65 - 99 mg/dL    Creatinine 1.97 (H) 0.50 - 1.40 mg/dL    Bun 57 (H) 8 - 22 mg/dL    Calcium 9.7 8.5 - 10.5 mg/dL    Correct Calcium 10.6 (H) 8.5 - 10.5 mg/dL    Phosphorus 3.9 2.5 - 4.5 mg/dL    Albumin 2.9 (L) 3.2 - 4.9 g/dL   MAGNESIUM    Collection Time: 06/17/25  2:59 AM   Result Value Ref Range    Magnesium 2.2 1.5 - 2.5 mg/dL   ESTIMATED GFR    Collection Time: 06/17/25  2:59 AM   Result Value Ref Range    GFR (CKD-EPI) 26 (A) >60 mL/min/1.73 m 2       Imaging  DX-CHEST-PORTABLE (1 VIEW)   Final Result      Right basilar opacity is consistent with pneumonia. Follow-up imaging is recommended to document resolution          Assessment/Plan  # COPD/asthma, in exacerbation  # Acute on chronic hypoxemic respiratory failure, improving  # Right lower lobe community-acquired pneumonia  # Acute on chronic heart failure with preserved EF    Titrate O2 to maintain sats 88-92%  Continue with steroids and nebulizers  Continue with antibiotics for pneumonia  Continue with diuresis per primary, has been temporarily stopped due to an KENDRA  Patient should continue with Advair after discharge  I will arrange for follow-up in the general pulmonary clinic in the next several weeks  Patient to follow-up in the pulmonary hypertension clinic in January    We will sign off. Please do not hesitate to contact us if we can be of any further assistance. I am most easily reached via Vo[employer] secure messaging application.    __________  This note was generated using voice recognition software which has a chance of producing errors of grammar and content.  I have made every reasonable attempt to find and correct any errors, but it should be expected that some may not be found prior to finalization of this note.    Trina Miguel, DO   Pulmonary and Critical Care         [1]   Current  Facility-Administered Medications   Medication Dose Route Frequency Provider Last Rate Last Admin    mometasone-formoterol (Dulera) 200-5 MCG/ACT inhaler 2 Puff  2 Puff Inhalation BID Luz Colin D.O.   2 Puff at 06/17/25 0524    carvedilol (Coreg) tablet 6.25 mg  6.25 mg Oral BID WITH MEALS Luz Colin D.O.   6.25 mg at 06/17/25 0724    Respiratory Therapy Consult   Nebulization Continuous RT Amara Queen M.D.        albuterol (Proventil) 2.5mg/0.5ml nebulizer solution 2.5 mg  2.5 mg Nebulization Q2HRS PRN (RT) Amara Queen M.D.        amiodarone (Cordarone) tablet 100 mg  100 mg Oral Nightly Amara Queen M.D.   100 mg at 06/16/25 2050    atorvastatin (Lipitor) tablet 10 mg  10 mg Oral Nightly Amara Queen M.D.   10 mg at 06/16/25 2050    calcitRIOL (Rocaltrol) capsule 0.5 mcg  0.5 mcg Oral DAILY Amara Queen M.D.   0.5 mcg at 06/17/25 0522    [Held by provider] sacubitril-valsartan (Entresto) 24-26 MG 1 Tablet  1 Tablet Oral BID Amara Queen M.D.   1 Tablet at 06/17/25 0522    MD Alert...Warfarin per Pharmacy   Other PHARMACY TO DOSE Amara Queen M.D.        acetaminophen (Tylenol) tablet 650 mg  650 mg Oral Q6HRS PRN Amara Queen M.D.        cefTRIAXone (Rocephin) 2,000 mg in  mL IVPB  2,000 mg Intravenous Q24HRS Amara Queen M.D.   Stopped at 06/16/25 2118    predniSONE (Deltasone) tablet 40 mg  40 mg Oral DAILY Amara Queen M.D.   40 mg at 06/17/25 0522    [Held by provider] furosemide (Lasix) injection 80 mg  80 mg Intravenous BID Amara Queen M.D.   40 mg at 06/15/25 0642   [2]   Allergies  Allergen Reactions    Amoxicillin Anaphylaxis and Shortness of Breath     Tolerates ceftriaxone    Nitrofurantoin      swelling    Pcn [Penicillins] Anaphylaxis     Tolerates ceftriaxone    Vancomycin Anaphylaxis and Shortness of Breath     Severe hypotension and bronchospasm observed by anesthesia while giving  vanco during procedure    Amlodipine Besylate-Valsartan  Unspecified     Chest pain and dizziness     Etodolac Hives and Nausea     Dark stools    Food Hives     Plums    Formaldehyde      Pt reports she was informed of allergy during a skin test. Pt not sure of reaction    Ivabradine      unknown    Jardiance [Empagliflozin] Unspecified     Headaches, dizziness    Skin Adhesives Hives     Patient states they cause welts for several days;  EKG stickers    Eliquis [Apixaban] Shortness of Breath     Pt reports SOB and dizzy when she took    Lisinopril Unspecified     Dizziness     Other Drug Rash     Metal silver    Other Misc Shortness of Breath and Runny Nose     Mold, dust, and feathers, adhesives, plums    Tape Rash     Paper tape ok

## 2025-06-17 NOTE — THERAPY
Occupational Therapy   Initial Evaluation     Patient Name:  Salma Lees  Age:  75 y.o., Sex:  female  Medical Record #:  [id number]  Today's Date:  6/17/2025          Assessment  Patient is 75 y.o. female with SOB, sore throat, Chest pain, Fever, Cough, Chills, LE swelling.  Admitted w/ Pneumonia of RL Lobe due to infectious organism.  PMH - Pulmonary HTN, CHF, COPD, CKD III.  Pt lives alone in a 4th floor seinor's apartment w/ elevator access ([address]) in [place], [place].  Neighbors/friends are available to assist on an intermittent basis.  PLOF Mod I to Indep for ADL's, transfers and ambulation via intermittent use of SPC and/or 4WW in home and 4WW/scooter in the community.  Pt demonstrated Sup bed mobility, Sup sit/stand, Sup FWW, Sup transfers, Mof I U/LBCM, Mod I toileting, Mod I standing G&H.  Therapist reviewed environmental/safety awareness, fall precautions, AE/DME, ADL's and transfers.    Plan    Occupational Therapy Initial Treatment Plan   Duration: (P) Evaluation only       Discharge Recommendations: (P) Anticipate that the patient will have no further occupational therapy needs after discharge from the hospital     Subjective    Pt was alert and cooperative w/ tx.     Objective       06/17/25 0830    Services   Is patient using  services for this encounter? No   Initial Contact Note    Initial Contact Note Order Received and Verified, Evaluation Only - Patient Does Not Require Further Acute Occupational Therapy at this Time.  However, May Benefit from Post Acute Therapy for Higher Level Functional Deficits.   Prior Living Situation   Prior Services Home-Independent   Housing / Facility Other (Comments)  (4th floor [facility]'s apartment w/ elevator access.)   Steps Into Home 0   Steps In Home 0   Elevator Yes   Bathroom Set up Walk In Shower;Shower Chair   Equipment Owned Front-Wheel Walker;Single Point Cane;Scooter;Tub / Shower Seat;Hand Held  Shower;Reacher;Oxygen  (3.0L supplemental O2 at baseline, increased to 6.0L w/ activity.)   Lives with - Patient's Self Care Capacity Alone and Able to Care For Self   Comments [facility] ([address]) in [place], [place].  Neighbors/friends are available to assist on an intermittent basis.  PLOF Mod I to Indep for ADL's, transfers and ambulation via intermittent use of SPC and/or 4WW in home and 4WW/scooter in the community.   Prior Level of ADL Function   Self Feeding Independent   Grooming / Hygiene Independent   Bathing Independent   Dressing Independent   Toileting Independent   Prior Level of IADL Function   Medication Management Independent   Laundry Independent   Kitchen Mobility Independent   Finances Independent   Home Management Independent   Shopping Independent   Prior Level Of Mobility Independent With Device in Community;Independent Without Device in Home;Independent With Device in Home   Driving / Transportation Driving Independent   Occupation (Pre-Hospital Vocational) Not Employed   History of Falls   History of Falls No   Vitals   Pulse Oximetry 94 %   O2 (LPM) 3   O2 Delivery Device Silicone Nasal Cannula   Pain   Intervention Declines   Cognition    Cognition / Consciousness WDL   Level of Consciousness Alert   Active ROM Upper Body   Active ROM Upper Body  WDL   Strength Upper Body   Upper Body Strength  WDL   Coordination Upper Body   Coordination WDL   Balance Assessment   Sitting Balance (Static) Good   Sitting Balance (Dynamic) Fair +   Standing Balance (Static) Fair +   Standing Balance (Dynamic) Fair   Weight Shift Sitting Good   Weight Shift Standing Fair   Comments OOB FWW   Bed Mobility    Supine to Sit Supervised   Sit to Supine Supervised   Scooting Supervised   ADL Assessment   Grooming Modified Independent;Standing   Upper Body Dressing Independent   Lower Body Dressing Modified Independent   Toileting Modified  Independent   How much help from another person does the patient currently need...   Putting on and taking off regular lower body clothing? 4   Bathing (including washing, rinsing, and drying)? 4   Toileting, which includes using a toilet, bedpan, or urinal? 4   Putting on and taking off regular upper body clothing? 4   Taking care of personal grooming such as brushing teeth? 4   Eating meals? 4   6 Clicks Daily Activity Score 24   Functional Mobility   Sit to Stand Supervised   Bed, Chair, Wheelchair Transfer Supervised   Toilet Transfers Supervised   Transfer Method Stand Step   Mobility Sup FWW   Distance (Feet) 50   # of Times Distance was Traveled 2   Edema / Skin Assessment   Edema / Skin  Not Assessed   Education Group   Education Provided Energy Conservation;Transfers;Role of Occupational Therapist;Activities of Daily Living;Use of Call Light;Adaptive Equipment   Role of Occupational Therapist Patient Response Patient;Acceptance;Explanation;Verbal Demonstration   Energy Conservation Patient Response Patient;Acceptance;Explanation;Demonstration;Teach Back;Verbal Demonstration;Action Demonstration   Transfers Patient Response Patient;Acceptance;Teach Back;Verbal Demonstration;Action Demonstration;Reinforcement Needed   ADL Patient Response Patient;Acceptance;Explanation;Demonstration;Teach Back;Verbal Demonstration;Action Demonstration   Adaptive Equipment Patient Response Patient;Acceptance;Explanation;Demonstration;Verbal Demonstration;Action Demonstration;Teach Back   Use of Call Light Patient Response Patient;Acceptance;Explanation;Demonstration;Verbal Demonstration   Occupational Therapy Initial Treatment Plan    Duration Evaluation only   Problem List   Problem List None   Anticipated Discharge Equipment and Recommendations   Discharge Recommendations Anticipate that the patient will have no further occupational therapy needs after discharge from the hospital

## 2025-06-17 NOTE — PROGRESS NOTES
Bedside report received from Sumanth RN and assumed Pt's cares. Call light within reach. Safety measures in place.

## 2025-06-17 NOTE — PROGRESS NOTES
Telemetry Shift Summary     Per monitor tech:  Rhythm 100% paced  HR Range 60  Ectopy rPVC  Measurements 0.18/0.10/0.38      Normal Values  Rhythm SR  HR Range:   Measurements: 0.12-0.20/0.06-0.10/0.30-0.52

## 2025-06-17 NOTE — PROGRESS NOTES
Telemetry shift summary:  Rhythm: 1005 Paced     HR Range: 60     Measurements from strip printed at 00:12:01 am  WI: 0.18   QRS 0.10   QT 0.38     Ectopies (As per Telemetry Tech report) rare PVC.

## 2025-06-17 NOTE — CARE PLAN
The patient is Stable - Low risk of patient condition declining or worsening    Shift Goals  Clinical Goals: Wean 02, DANIELLE  Patient Goals: DC  Family Goals: No family present    Progress made toward(s) clinical / shift goals:  Pt with no c/o pain or discomfort at rest. Pt c/o discomfort from coughing. Pt having minimal sputum production. IS encouraged. DANIELLE.     Patient is not progressing towards the following goals:      Problem: Knowledge Deficit - COPD  Goal: Patient/significant other demonstrates understanding of disease process, utilization of the Action Plan, medications and discharge instruction  Outcome: Progressing     Problem: Ineffective Airway Clearance  Goal: Patient will maintain patent airway with clear/clearing breath sounds  Outcome: Progressing

## 2025-06-17 NOTE — CARE PLAN
The patient is Stable - Low risk of patient condition declining or worsening    Shift Goals  Clinical Goals: Monitor respiratory status and oxygenation. ABX  Patient Goals: Sleep and be discharche asp  Family Goals: No family present    Progress made toward(s) clinical / shift goals:  Encouraged her deep breathing and coughing. Encouraged and assisted her with use of Incentive Spirometer. Continuous pulse oximeter. Pt able to keep sats at >90 % on 3 lpm via nc.    Patient is not progressing towards the following goals: n/a      Problem: Fall Risk  Goal: Patient will remain free from falls  Outcome: Progressing  Note: Have been checking on Pt hourly. She has been encouraged to call for assistance as needed. Bed in low position, upper side rails up, anti slippery socks on, call light within reach, and bed and strip alarm on.        Problem: Infection - Standard  Goal: Patient will remain free from infection  Outcome: Progressing  Note: Pt receiving ABX as scheduled. Improving WBS. No fever through the night

## 2025-06-17 NOTE — PROGRESS NOTES
Telemetry Shift Summary     Rhythm: PACED  Rate: 60-69  Measurements: .18/.11/.43  Ectopy (reported by Monitor Tech): none     Normal Values  Rhythm: Sinus  HR:   Measurements: 0.12-0.20/0.06-0.10/0.30-0.52

## 2025-06-17 NOTE — DISCHARGE PLANNING
Care Transition Team Assessment    LSW spoke with pt at bedside to complete assessment. Pt A&Ox4 and able to verify the information on the face sheet. Pt lives alone in an apartment that has elevator access. Pt is independent with all ADLs, IADLs, and drives independently. Pt reported she has a 4WW, cane, and recently bought an electric scooter. Pt also uses 3L O2 at baseline and reported this is supplied though [employer]. Pt stated her primary support is her son, Tam, who lives in [place]. Tam is flying into [place] today and will be staying at least until Sunday. Pt confirmed Tam will be the one to provide transportation home and will bring her portable O2 for DC.    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 Pt denied any financial, SA, or MH concerns. Pt does not have any ACP documents on file, however stated she has already completed an advance directive and her son, Tam Lees [phone], is her DPOA and has all of the paperwork.    Information Source  Orientation Level: Oriented X4  Information Given By: Patient  Informant's Name: Salma Lees  Who is responsible for making decisions for patient? : Patient    Readmission Evaluation  Is this a readmission?: No    Elopement Risk  Legal Hold: No  Ambulatory or Self Mobile in Wheelchair: Yes  Disoriented: No  Psychiatric Symptoms: None  History of Wandering: No  Elopement this Admit: No  Vocalizing Wanting to Leave: No  Displays Behaviors, Body Language Wanting to Leave: No-Not at Risk for Elopement  Elopement Risk: Not at Risk for Elopement    Interdisciplinary Discharge Planning  Lives with - Patient's Self Care Capacity: Alone and Able to Care For Self  Patient or legal guardian wants to designate a caregiver:  (maybe her son)  Support Systems: Children  Housing / Facility: Other (Comments) (4th floor senior's apartment w/ elevator access.)  [facility] of Care Facility[facility]  Prior Services:  Home-Independent    Discharge Preparedness  What is your plan after discharge?: Home with help  What are your discharge supports?: Child  Prior Functional Level: Ambulatory, Drives Self, Independent with Activities of Daily Living, Independent with Medication Management, Uses Walker, Uses Cane  Difficulity with ADLs: None  Difficulity with IADLs: None    Functional Assesment  Prior Functional Level: Ambulatory, Drives Self, Independent with Activities of Daily Living, Independent with Medication Management, Uses Walker, Uses Cane    Finances  Financial Barriers to Discharge: No  Prescription Coverage: Yes    Vision / Hearing Impairment  Vision Impairment : No (reading glasses)  Hearing Impairment : No    Advance Directive  Advance Directive?: None  Advance Directive offered?: AD Booklet refused    Domestic Abuse  Have you ever been the victim of abuse or violence?: No  Possible Abuse/Neglect Reported to:: Not Applicable    Psychological Assessment  History of Substance Abuse: None  History of Psychiatric Problems: No  Non-compliant with Treatment: No  Newly Diagnosed Illness: No    Discharge Risks or Barriers  Discharge risks or barriers?: Lives alone, no community support  Patient risk factors: Lack of outside supports, Lives alone and no community support, Vulnerable adult    Anticipated Discharge Information  Discharge Disposition: Discharged to home/self care (01)  Discharge Address: [address] [place] [address] [address] [place] [address]  Discharge Contact Phone Number: [phone]

## 2025-06-18 LAB
ALBUMIN SERPL BCP-MCNC: 2.8 G/DL (ref 3.2–4.9)
ANION GAP SERPL CALC-SCNC: 10 MMOL/L (ref 7–16)
BUN SERPL-MCNC: 57 MG/DL (ref 8–22)
CALCIUM ALBUM COR SERPL-MCNC: 10.7 MG/DL (ref 8.5–10.5)
CALCIUM SERPL-MCNC: 9.7 MG/DL (ref 8.5–10.5)
CHLORIDE SERPL-SCNC: 103 MMOL/L (ref 96–112)
CO2 SERPL-SCNC: 24 MMOL/L (ref 20–33)
CREAT SERPL-MCNC: 1.73 MG/DL (ref 0.5–1.4)
ERYTHROCYTE [DISTWIDTH] IN BLOOD BY AUTOMATED COUNT: 57.3 FL (ref 35.9–50)
GFR SERPLBLD CREATININE-BSD FMLA CKD-EPI: 30 ML/MIN/1.73 M 2
GLUCOSE SERPL-MCNC: 208 MG/DL (ref 65–99)
HCT VFR BLD AUTO: 36.5 % (ref 37–47)
HGB BLD-MCNC: 11.6 G/DL (ref 12–16)
INR PPP: 2.73 (ref 0.87–1.13)
MAGNESIUM SERPL-MCNC: 2.2 MG/DL (ref 1.5–2.5)
MCH RBC QN AUTO: 30.2 PG (ref 27–33)
MCHC RBC AUTO-ENTMCNC: 31.8 G/DL (ref 32.2–35.5)
MCV RBC AUTO: 95.1 FL (ref 81.4–97.8)
PHOSPHATE SERPL-MCNC: 3.6 MG/DL (ref 2.5–4.5)
PLATELET # BLD AUTO: 155 K/UL (ref 164–446)
PMV BLD AUTO: 11.4 FL (ref 9–12.9)
POTASSIUM SERPL-SCNC: 4.4 MMOL/L (ref 3.6–5.5)
PROCALCITONIN SERPL-MCNC: 0.22 NG/ML
PROTHROMBIN TIME: 29.7 SEC (ref 12–14.6)
RBC # BLD AUTO: 3.84 M/UL (ref 4.2–5.4)
SODIUM SERPL-SCNC: 137 MMOL/L (ref 135–145)
WBC # BLD AUTO: 10.6 K/UL (ref 4.8–10.8)

## 2025-06-18 PROCEDURE — 94640 AIRWAY INHALATION TREATMENT: CPT

## 2025-06-18 PROCEDURE — 36415 COLL VENOUS BLD VENIPUNCTURE: CPT

## 2025-06-18 PROCEDURE — 700105 HCHG RX REV CODE 258: Performed by: STUDENT IN AN ORGANIZED HEALTH CARE EDUCATION/TRAINING PROGRAM

## 2025-06-18 PROCEDURE — 94669 MECHANICAL CHEST WALL OSCILL: CPT

## 2025-06-18 PROCEDURE — 85610 PROTHROMBIN TIME: CPT

## 2025-06-18 PROCEDURE — A9270 NON-COVERED ITEM OR SERVICE: HCPCS | Performed by: INTERNAL MEDICINE

## 2025-06-18 PROCEDURE — 700102 HCHG RX REV CODE 250 W/ 637 OVERRIDE(OP): Performed by: INTERNAL MEDICINE

## 2025-06-18 PROCEDURE — 80069 RENAL FUNCTION PANEL: CPT

## 2025-06-18 PROCEDURE — 770020 HCHG ROOM/CARE - TELE (206)

## 2025-06-18 PROCEDURE — 94760 N-INVAS EAR/PLS OXIMETRY 1: CPT

## 2025-06-18 PROCEDURE — 99233 SBSQ HOSP IP/OBS HIGH 50: CPT | Performed by: INTERNAL MEDICINE

## 2025-06-18 PROCEDURE — 85027 COMPLETE CBC AUTOMATED: CPT

## 2025-06-18 PROCEDURE — A9270 NON-COVERED ITEM OR SERVICE: HCPCS | Performed by: STUDENT IN AN ORGANIZED HEALTH CARE EDUCATION/TRAINING PROGRAM

## 2025-06-18 PROCEDURE — 700111 HCHG RX REV CODE 636 W/ 250 OVERRIDE (IP): Mod: JZ | Performed by: INTERNAL MEDICINE

## 2025-06-18 PROCEDURE — 84145 PROCALCITONIN (PCT): CPT

## 2025-06-18 PROCEDURE — 700111 HCHG RX REV CODE 636 W/ 250 OVERRIDE (IP): Performed by: STUDENT IN AN ORGANIZED HEALTH CARE EDUCATION/TRAINING PROGRAM

## 2025-06-18 PROCEDURE — 700102 HCHG RX REV CODE 250 W/ 637 OVERRIDE(OP): Performed by: STUDENT IN AN ORGANIZED HEALTH CARE EDUCATION/TRAINING PROGRAM

## 2025-06-18 PROCEDURE — 700101 HCHG RX REV CODE 250: Mod: JZ | Performed by: INTERNAL MEDICINE

## 2025-06-18 PROCEDURE — 83735 ASSAY OF MAGNESIUM: CPT

## 2025-06-18 RX ORDER — HYDROCORTISONE SODIUM SUCCINATE 100 MG/2ML
100 INJECTION INTRAMUSCULAR; INTRAVENOUS DAILY
Status: COMPLETED | OUTPATIENT
Start: 2025-06-18 | End: 2025-06-20

## 2025-06-18 RX ORDER — WARFARIN SODIUM 2.5 MG/1
2.5 TABLET ORAL ONCE
Status: COMPLETED | OUTPATIENT
Start: 2025-06-18 | End: 2025-06-18

## 2025-06-18 RX ADMIN — LEVALBUTEROL HYDROCHLORIDE 1.25 MG: 1.25 SOLUTION RESPIRATORY (INHALATION) at 11:01

## 2025-06-18 RX ADMIN — CALCITRIOL CAPSULES 0.25 MCG 0.5 MCG: 0.25 CAPSULE ORAL at 05:49

## 2025-06-18 RX ADMIN — AMIODARONE HYDROCHLORIDE 100 MG: 200 TABLET ORAL at 20:05

## 2025-06-18 RX ADMIN — PREDNISONE 40 MG: 20 TABLET ORAL at 05:49

## 2025-06-18 RX ADMIN — ATORVASTATIN CALCIUM 10 MG: 10 TABLET, FILM COATED ORAL at 20:05

## 2025-06-18 RX ADMIN — WARFARIN SODIUM 2.5 MG: 2.5 TABLET ORAL at 17:29

## 2025-06-18 RX ADMIN — CEFTRIAXONE SODIUM 2000 MG: 2 INJECTION, POWDER, FOR SOLUTION INTRAMUSCULAR; INTRAVENOUS at 17:35

## 2025-06-18 RX ADMIN — MOMETASONE FUROATE AND FORMOTEROL FUMARATE DIHYDRATE 2 PUFF: 200; 5 AEROSOL RESPIRATORY (INHALATION) at 17:31

## 2025-06-18 RX ADMIN — LEVALBUTEROL HYDROCHLORIDE 1.25 MG: 1.25 SOLUTION RESPIRATORY (INHALATION) at 07:58

## 2025-06-18 RX ADMIN — HYDROCORTISONE SODIUM SUCCINATE 100 MG: 100 INJECTION, POWDER, FOR SOLUTION INTRAMUSCULAR; INTRAVENOUS at 17:30

## 2025-06-18 RX ADMIN — CARVEDILOL 6.25 MG: 6.25 TABLET, FILM COATED ORAL at 07:41

## 2025-06-18 RX ADMIN — MOMETASONE FUROATE AND FORMOTEROL FUMARATE DIHYDRATE 2 PUFF: 200; 5 AEROSOL RESPIRATORY (INHALATION) at 05:50

## 2025-06-18 RX ADMIN — LEVALBUTEROL HYDROCHLORIDE 1.25 MG: 1.25 SOLUTION RESPIRATORY (INHALATION) at 15:18

## 2025-06-18 RX ADMIN — CARVEDILOL 6.25 MG: 6.25 TABLET, FILM COATED ORAL at 17:28

## 2025-06-18 ASSESSMENT — CHA2DS2 SCORE
PRIOR STROKE OR TIA OR THROMBOEMBOLISM: YES
SEX: FEMALE
AGE 65 TO 74: NO
HYPERTENSION: YES
AGE 75 OR GREATER: YES
DIABETES: YES
VASCULAR DISEASE: YES
CHF OR LEFT VENTRICULAR DYSFUNCTION: YES
CHA2DS2 VASC SCORE: 9

## 2025-06-18 ASSESSMENT — PAIN DESCRIPTION - PAIN TYPE
TYPE: ACUTE PAIN
TYPE: ACUTE PAIN

## 2025-06-18 ASSESSMENT — FIBROSIS 4 INDEX: FIB4 SCORE: 1.82

## 2025-06-18 ASSESSMENT — ENCOUNTER SYMPTOMS
WEAKNESS: 1
ABDOMINAL PAIN: 0
SPUTUM PRODUCTION: 1
COUGH: 1
SHORTNESS OF BREATH: 1

## 2025-06-18 NOTE — PROGRESS NOTES
Hospital Medicine Daily Progress Note    Date of Service  6/18/2025    Chief Complaint  Salma Lees is a 75 y.o. female admitted 6/14/2025 with   Chief Complaint   Patient presents with    Shortness of Breath     X1wk worse today   Baseline 3L hx of COPD/Asthma  Pt was found on arrival on 4L 75%, EMS gave 1 albuterol/1 duoneb SPO2 increased to 85%  pt increased to 6-8L NC     Sore Throat     Intermitten x1wk    Chest Pain     discomfort    Weight Gain     Hx of CHF   Pt reports she has missed some doses of her medication due to her not feeling well     Fever     102 highest reported at home states she took a home COVID test & was negative    Cough       Hospital Course  History of severe pulm hypertension RVSP of 83, chronic  3 L oxygen and respiratory failure secondary to COPD who was admitted for shortness of breath found to have volume overload, sleep exacerbation and community-acquired pneumonia.  She had acute on chronic respiratory failure hypoxia requiring 6 L nasal cannula due to room air saturation to 74%.  She was started on Solu-Medrol, IV antibiotics and IV Lasix and was admitted for further medical treatment    Interval Problem Update  6/18:  No acute complaints today. I spoke with patient and son at bedside.  I recommended N95 or masking when in public to reduce her respiratory infectious risks.  Pt needed more oxygen, I submitted new DME for home concentrator.  I reviewed labs, WBC 10.6 from 15.5. continue IV Ceftriaxone.  Cr 1.73, decreased but still elevated.    6/17  Patient still having trouble breathing.  She feels congested still in the right lower lobe.  We are treating for a right lower lobe community-acquired bacterial pneumonia.  Unfortunately she will need to remain on ceftriaxone as she has 16 allergies listed in several are antibiotics.  Patient's creatinine 1.97 from 2.02, 1.52.  Remaining elevated I am holding Entresto as this could be worsening.  Patient's WBCs 15.5.  Dr. Hong  INR 3.48 on warfarin.  Patient is still on 3 L nasal cannula.    I have discussed this patient's plan of care and discharge plan at IDT rounds today with Case Management, Nursing, Nursing leadership, and other members of the IDT team.    Consultants/Specialty  pulmonary    Code Status  Full Code    Disposition  The patient is not medically cleared for discharge to home or a post-acute facility.      I have placed the appropriate orders for post-discharge needs.    Review of Systems  Review of Systems   Constitutional:  Positive for malaise/fatigue.   Respiratory:  Positive for cough, sputum production and shortness of breath.    Cardiovascular:  Negative for chest pain.   Gastrointestinal:  Negative for abdominal pain.   Neurological:  Positive for weakness.   All other systems reviewed and are negative.       Physical Exam  Temp:  [36.4 °C (97.5 °F)-36.8 °C (98.2 °F)] 36.6 °C (97.8 °F)  Pulse:  [60-82] 60  Resp:  [16-20] 18  BP: (131-159)/(63-70) 159/68  SpO2:  [92 %-97 %] 94 %    Physical Exam  Vitals and nursing note reviewed.   Constitutional:       General: She is not in acute distress.     Appearance: She is ill-appearing.      Comments: Frail appearing elderly female   HENT:      Head: Normocephalic and atraumatic.      Comments: Temporal muscle wasting positive     Mouth/Throat:      Mouth: Mucous membranes are dry.      Pharynx: No oropharyngeal exudate.   Eyes:      General: No scleral icterus.     Extraocular Movements: Extraocular movements intact.   Cardiovascular:      Rate and Rhythm: Normal rate and regular rhythm.      Pulses: Normal pulses.      Heart sounds: Normal heart sounds. No murmur heard.  Pulmonary:      Effort: Pulmonary effort is normal. No respiratory distress.      Breath sounds: Rhonchi (Right lower lung field) and rales present. No wheezing.   Abdominal:      General: Abdomen is flat. Bowel sounds are normal. There is no distension.      Palpations: Abdomen is soft.      Tenderness:  There is no abdominal tenderness.   Musculoskeletal:         General: No swelling or tenderness.      Cervical back: Normal range of motion. No rigidity.      Comments: Sarcopenic. Bilateral hand muscle atrophy noted.   Skin:     General: Skin is warm.      Capillary Refill: Capillary refill takes less than 2 seconds.      Coloration: Skin is not jaundiced.      Findings: No erythema.   Neurological:      Mental Status: She is alert and oriented to person, place, and time. Mental status is at baseline.      Motor: Weakness present.   Psychiatric:         Mood and Affect: Mood normal.         Behavior: Behavior normal.         Thought Content: Thought content normal.         Judgment: Judgment normal.         Fluids    Intake/Output Summary (Last 24 hours) at 6/18/2025 1640  Last data filed at 6/18/2025 1630  Gross per 24 hour   Intake 940 ml   Output 350 ml   Net 590 ml        Laboratory  Recent Labs     06/16/25 0137 06/17/25 0259 06/18/25  0101   WBC 13.5* 15.5* 10.6   RBC 3.72* 3.92* 3.84*   HEMOGLOBIN 11.1* 11.8* 11.6*   HEMATOCRIT 34.9* 37.5 36.5*   MCV 93.8 95.7 95.1   MCH 29.8 30.1 30.2   MCHC 31.8* 31.5* 31.8*   RDW 57.4* 58.5* 57.3*   PLATELETCT 131* 165 155*   MPV 10.7 12.3 11.4     Recent Labs     06/16/25 0137 06/17/25 0259 06/18/25  0101   SODIUM 136 138 137   POTASSIUM 4.4 4.3 4.4   CHLORIDE 102 103 103   CO2 23 24 24   GLUCOSE 204* 192* 208*   BUN 51* 57* 57*   CREATININE 2.02* 1.97* 1.73*   CALCIUM 9.3 9.7 9.7     Recent Labs     06/16/25 0137 06/17/25  0259 06/18/25  0101   INR 4.34* 3.48* 2.73*               Imaging  DX-CHEST-PORTABLE (1 VIEW)   Final Result      Right basilar opacity is consistent with pneumonia. Follow-up imaging is recommended to document resolution           Assessment/Plan  * Pneumonia of right lower lobe due to infectious organism- (present on admission)  Assessment & Plan  Worsening shortness of breath, cough with productive sputum for over a week  CXR reviewed and  shows an opacity on the right.      WBC 10.6  Patient finished azithromycin.  Continue ceftriaxone  Procalcitonin 0.22    Acute on chronic respiratory failure with hypoxia (HCC)- (present on admission)  Assessment & Plan  Baseline 2-3 L, saturating in the 70s and requiring 6 L on presentation  Secondary to asthma/COPD exacerbation  significant volume overload due to CHF exacerbation and pneumonia (severe PAH)  Currently patient is on 2 L.  Patient has a portable pulse oxygen machine.  She is requiring a higher DME for home concentrator.  I placed new referral.    Acute on chronic heart failure with preserved ejection fraction (HFpEF) (HCC)- (present on admission)  Assessment & Plan  Volume overload on exam with B LE edema  NT-proBNP 4000  Previous echo 12/2024 showed an EF of 55-60%, s/p mitral valve repair, moderate to severe tricuspid regurg, RVSP of 83  Holding Lasix and Entresto  Continue carvedilol  Creatinine 1.73    Benign essential hypertension- (present on admission)  Assessment & Plan  Continue carvedilol  Holding Entresto  SBP goal < 160    Stage 3b chronic kidney disease- (present on admission)  Assessment & Plan  At baseline.  She will be diuresed for CHF exacerbation  Creatinine 1.73.  Holding Entresto and Lasix    Pulmonary hypertension (HCC)- (present on admission)  Assessment & Plan  Follows with pulmonology.  Echo 12/2024 showed an RVSP of 83.  Per notes type II secondary to CHF and valvular disease  Creatinine 1.73.  Holding Lasix    Paroxysmal atrial fibrillation (HCC)- (present on admission)  Assessment & Plan  S/p ablation.  Continue amiodarone, carvedilol and warfarin.  INR 2.73    Asthma-COPD overlap syndrome (HCC)- (present on admission)  Assessment & Plan  Currently with an acute exacerbation secondary to pneumonia.    I discussed with pulmonology, continue current course of treatment.  Pulmonology evaluate patient for COPD.  Continue Dulera, prednisone and nebulizer treatments.         VTE  prophylaxis:    therapeutic anticoagulation with coumadin dosing per pharmacy, adjust PRN      I have performed a physical exam and reviewed and updated ROS and Plan today (6/18/2025). In review of yesterday's note (6/17/2025), there are no changes except as documented above.      Total time spent 52 minutes.    This included my review of patient's overnight RN notes, face to face interview, physical examination, lab analysis.  Also includes repeat visits with the patient, and my documented assessments and interventions above.  In addition, I spoke with entire care team on patient's treatment plan, and DC planning on morning rounds and IDT rounds.    This note was generated using voice recognition software which has a chance of producing errors of grammar and content.  I have made every reasonable attempt to find and correct any errors, but it should be expected that some may not be found prior to finalization of this note.

## 2025-06-18 NOTE — FACE TO FACE
"Face to Face Note  -  Durable Medical Equipment    Dominic Mancera M.D. - NPI: [id number]  I certify that this patient is under my care and that they had a durable medical equipment(DME)face to face encounter by myself that meets the physician DME face-to-face encounter requirements with this patient on:    Date of encounter:   Patient:                    MRN:                       YOB: 2026  Salma Lees  [id number]  1950     The encounter with the patient was in whole, or in part, for the following medical condition, which is the primary reason for durable medical equipment:  COPD, Asthma, and Other - acute on chronic hypoxemic respiratory failure, acute right lower lobe bacterial pneumonia, acute on chronic HFpEF, CKD 3B, pulmonary hypertension, paroxysmal atrial fibrillation    I certify that, based on my findings, the following durable medical equipment is medically necessary:    Oxygen   HOME O2 Saturation Measurements:(Values must be present for Home Oxygen orders)  Room air sat at rest: 85     With liters of O2: 2, O2 sat at rest with O2: 91  With Liters of O2: 6, O2 sat with amb with O2 : 92  Is the patient mobile?: Yes  If patient feels more short of breath, they can go up to 6 liters per minute and contact healthcare provider.    Supporting Symptoms: The patient requires supplemental oxygen, as the following interventions have been tried with limited or no improvement: \"Bronchodilators and/or steroid inhalers, \"Positive expiratory pressure therapies, \"Oral and/or IV steroids, \"Ambulation with oximetry, and \"Incentive spirometry.    My Clinical findings support the need for the above equipment due to:  Hypoxia, acute on chronic hypoxemic respiratory failure  "

## 2025-06-18 NOTE — PROGRESS NOTES
Telemetry Shift Summary     Per monitor tech:  Rhythm 100% paced  HR Range 60  Ectopy rPAC/PVC  Measurements 0.14/0.08/0.42      Normal Values  Rhythm SR  HR Range:   Measurements: 0.12-0.20/0.06-0.10/0.30-0.52

## 2025-06-18 NOTE — PROGRESS NOTES
Inpatient Anticoagulation Service Note for 6/18/2025      Reason for Anticoagulation: Atrial Fibrillation, Mitral Valve Repair   [id number] VASc Score: 9      High risk for bleed: No        Hemoglobin Value: (!) 11.6  Hematocrit Value: (!) 36.5  Lab Platelet Value: (!) 155  Target INR: 2.0 to 3.0     Date/Time INR Value    06/18/25 0101 2.73     06/17/25 0259 3.48     06/16/25 0137 4.34     06/14/25 2230 3.4       Date/Time Dose (mg)    06/18/25 1153     06/18/25 1142     06/17/25 1517 --     06/16/25 0912 0     06/15/25 0140 2      Average Dose (mg): Home regimen: warfarin 5 mg daily    Significant Interactions: Amiodarone, Antibiotics, Corticosteroids (Ceftriaxone: Last dose scheduled 6/18 Prednisone: Last dose 6/18)    Bridge Therapy: No   Reversal Agent Administered: Not Applicable    Comments: Patients with supratherapeutic INR since admission, now trended therapeutic. Prednisone course completed this am, ceftriaxone last dose scheduled this evening. Patient may still have increased sensitivity to warfarin so giving half their home dose at 2.5 mg, and hoping to start patient on home regimen once INR is stable.    Plan:  2.5 mg once, with pharmacy trending INR daily until stable.  Education Material Provided?: No    Pharmacist suggested discharge dosing: Recommend patient takes 2.5 mg once, and depending on INR trend possibly resuming their home regimen of 5 mg once daily. Recommend follow-up with anticoag clinic 2-3 days after discharge to further adjust their warfarin as needed.      Jesus Alberto Keen, Pharmacy Intern

## 2025-06-18 NOTE — PROGRESS NOTES
Telemetry shift summary:  Rhythm: A Paced     HR Range: 60's      Measurements from strip printed at 12:35:58 am  NY: 0.20   QRS 0.08   QT 0.36     Ectopies (As per Telemetry Tech report) Rare PVC.

## 2025-06-18 NOTE — CARE PLAN
The patient is Stable - Low risk of patient condition declining or worsening    Shift Goals  Clinical Goals: Monitor respiratory status and oxygenation. IV ABX  Patient Goals: Sleep well  Family Goals: No family present    Progress made toward(s) clinical / shift goals:  Encouraged her deep breathing and coughing. Encouraged and assisted her with use of Incentive Spirometer. Continuous pulse oximeter. Pt able to keep sats at >90% on 2 lpm via nc. Xopenex given for SOB earlier tonight by RT as requested by Pt    Patient is not progressing towards the following goals: n/a      Problem: Infection - Standard  Goal: Patient will remain free from infection  Outcome: Progressing  Note: Pt has been receiving ABX as scheduled. No fever during the night. WBC down to 10.3 this morning, from 15.5 yesterday      Problem: Fall Risk  Goal: Patient will remain free from falls  Outcome: Progressing  Note: Have been checking on Pt hourly. She has been encouraged to call for assistance as needed. Bed in low position, upper side rails up, anti slippery socks on, call light within reach, and bed and strip alarm on.

## 2025-06-18 NOTE — PROGRESS NOTES
Bedside report received from Jayson MONTGOMERY and assumed Pt's cares. Call light within reach. Safety measures in place.

## 2025-06-18 NOTE — CARE PLAN
The patient is Stable - Low risk of patient condition declining or worsening    Shift Goals  Clinical Goals: Wean 02, home 02 eval  Patient Goals: Comfort  Family Goals: No family present    Progress made toward(s) clinical / shift goals:  Pt home 02 eval complete. Pt has no c/o pain or discomfort. Nebs completed with good relief of cough and SOB endorsed by pt. IS encouraged. Pt makes needs known.      Problem: Impaired Gas Exchange  Goal: Patient will demonstrate improved ventilation and adequate oxygenation and participate in treatment regimen within the level of ability/situation.  Outcome: Progressing     Problem: Respiratory  Goal: Patient will achieve/maintain optimum respiratory ventilation and gas exchange  Outcome: Progressing       Patient is not progressing towards the following goals:

## 2025-06-18 NOTE — PROGRESS NOTES
Hospital Medicine Daily Progress Note    Date of Service  6/17/2025    Chief Complaint  Salma Lees is a 75 y.o. female admitted 6/14/2025 with   Chief Complaint   Patient presents with    Shortness of Breath     X1wk worse today   Baseline 3L hx of COPD/Asthma  Pt was found on arrival on 4L 75%, EMS gave 1 albuterol/1 duoneb SPO2 increased to 85%  pt increased to 6-8L NC     Sore Throat     Intermitten x1wk    Chest Pain     discomfort    Weight Gain     Hx of CHF   Pt reports she has missed some doses of her medication due to her not feeling well     Fever     102 highest reported at home states she took a home COVID test & was negative    Cough       Hospital Course  History of severe pulm hypertension RVSP of 83, chronic  3 L oxygen and respiratory failure secondary to COPD who was admitted for shortness of breath found to have volume overload, sleep exacerbation and community-acquired pneumonia.  She had acute on chronic respiratory failure hypoxia requiring 6 L nasal cannula due to room air saturation to 74%.  She was started on Solu-Medrol, IV antibiotics and IV Lasix and was admitted for further medical treatment    Interval Problem Update  Patient still having trouble breathing.  She feels congested still in the right lower lobe.  We are treating for a right lower lobe community-acquired bacterial pneumonia.  Unfortunately she will need to remain on ceftriaxone as she has 16 allergies listed in several are antibiotics.  Patient's creatinine 1.97 from 2.02, 1.52.  Remaining elevated I am holding Entresto as this could be worsening.  Patient's WBCs 15.5.  Dr. Hong INR 3.48 on warfarin.  Patient is still on 3 L nasal cannula.    I have discussed this patient's plan of care and discharge plan at IDT rounds today with Case Management, Nursing, Nursing leadership, and other members of the IDT team.    Consultants/Specialty  pulmonary    Code Status  Full Code    Disposition  The patient is not medically  cleared for discharge to home or a post-acute facility.  Anticipate discharge to: home with close outpatient follow-up    I have placed the appropriate orders for post-discharge needs.    Review of Systems  Review of Systems   Constitutional:  Positive for malaise/fatigue.   Respiratory:  Positive for cough, sputum production and shortness of breath.    Cardiovascular:  Negative for chest pain.   Gastrointestinal:  Negative for abdominal pain.   Neurological:  Positive for weakness.   All other systems reviewed and are negative.       Physical Exam  Temp:  [36.2 °C (97.2 °F)-36.7 °C (98.1 °F)] 36.4 °C (97.5 °F)  Pulse:  [60-62] 60  Resp:  [17-18] 18  BP: (116-146)/(58-67) 139/65  SpO2:  [90 %-96 %] 93 %    Physical Exam  Vitals and nursing note reviewed.   Constitutional:       General: She is not in acute distress.     Appearance: She is ill-appearing.      Comments: Frail appearing elderly female   HENT:      Head: Normocephalic and atraumatic.      Comments: Temporal muscle wasting positive     Mouth/Throat:      Mouth: Mucous membranes are dry.      Pharynx: No oropharyngeal exudate.   Eyes:      General: No scleral icterus.     Extraocular Movements: Extraocular movements intact.   Cardiovascular:      Rate and Rhythm: Normal rate and regular rhythm.      Pulses: Normal pulses.      Heart sounds: Normal heart sounds. No murmur heard.  Pulmonary:      Effort: Pulmonary effort is normal. No respiratory distress.      Breath sounds: Rhonchi (Right lower lung field) and rales present. No wheezing.   Abdominal:      General: Abdomen is flat. Bowel sounds are normal. There is no distension.      Palpations: Abdomen is soft.      Tenderness: There is no abdominal tenderness.   Musculoskeletal:         General: No swelling or tenderness.      Cervical back: Normal range of motion. No rigidity.      Comments: Sarcopenic. Bilateral hand muscle atrophy noted.   Skin:     General: Skin is warm.      Capillary Refill:  Capillary refill takes less than 2 seconds.      Coloration: Skin is not jaundiced.      Findings: No erythema.   Neurological:      Mental Status: She is alert and oriented to person, place, and time. Mental status is at baseline.      Motor: Weakness present.   Psychiatric:         Mood and Affect: Mood normal.         Behavior: Behavior normal.         Thought Content: Thought content normal.         Judgment: Judgment normal.         Fluids    Intake/Output Summary (Last 24 hours) at 6/17/2025 1908  Last data filed at 6/17/2025 1800  Gross per 24 hour   Intake 520 ml   Output 950 ml   Net -430 ml        Laboratory  Recent Labs     06/14/25 2230 06/16/25 0137 06/17/25 0259   WBC 14.7* 13.5* 15.5*   RBC 4.43 3.72* 3.92*   HEMOGLOBIN 13.4 11.1* 11.8*   HEMATOCRIT 42.3 34.9* 37.5   MCV 95.5 93.8 95.7   MCH 30.2 29.8 30.1   MCHC 31.7* 31.8* 31.5*   RDW 59.0* 57.4* 58.5*   PLATELETCT 155* 131* 165   MPV 11.6 10.7 12.3     Recent Labs     06/14/25 2230 06/16/25 0137 06/17/25  0259   SODIUM 137 136 138   POTASSIUM 4.5 4.4 4.3   CHLORIDE 100 102 103   CO2 21 23 24   GLUCOSE 127* 204* 192*   BUN 25* 51* 57*   CREATININE 1.52* 2.02* 1.97*   CALCIUM 9.9 9.3 9.7     Recent Labs     06/14/25 2230 06/16/25 0137 06/17/25  0259   APTT 50.1*  --   --    INR 3.40* 4.34* 3.48*               Imaging  DX-CHEST-PORTABLE (1 VIEW)   Final Result      Right basilar opacity is consistent with pneumonia. Follow-up imaging is recommended to document resolution           Assessment/Plan  * Pneumonia of right lower lobe due to infectious organism- (present on admission)  Assessment & Plan  Worsening shortness of breath, cough with productive sputum for over a week  CXR reviewed and shows an opacity on the right.      WBC 15.5, procalcitonin 0.34  Continue IV ceftriaxone.  Patient finished azithromycin.  Recheck procalcitonin    Acute on chronic respiratory failure with hypoxia (HCC)- (present on admission)  Assessment & Plan  Baseline  2-3 L, saturating in the 70s and requiring 6 L on presentation  Secondary to asthma/COPD exacerbation  significant volume overload due to CHF exacerbation and pneumonia (severe PAH)  Currently patient on 3 L nasal cannula.  Her son will come bring her portable oxygen.    Acute on chronic heart failure with preserved ejection fraction (HFpEF) (HCC)- (present on admission)  Assessment & Plan  Volume overload on exam with B LE edema  NT-proBNP 4000  Previous echo 12/2024 showed an EF of 55-60%, s/p mitral valve repair, moderate to severe tricuspid regurg, RVSP of 83  Holding Lasix and Entresto  Continue carvedilol  Creatinine remaining elevated    Benign essential hypertension- (present on admission)  Assessment & Plan  Continue carvedilol  Holding Entresto  SBP goal < 160    Stage 3b chronic kidney disease- (present on admission)  Assessment & Plan  At baseline.  She will be diuresed for CHF exacerbation  Creatinine 1.97 from 2.02, 1.5 to  Holding Entresto and Lasix    Pulmonary hypertension (HCC)- (present on admission)  Assessment & Plan  Follows with pulmonology.  Echo 12/2024 showed an RVSP of 83.  Per notes type II secondary to CHF and valvular disease  Holding Lasix due to KENDRA.    Paroxysmal atrial fibrillation (HCC)- (present on admission)  Assessment & Plan  S/p ablation.  Continue amiodarone, carvedilol and warfarin.  INR 3.48.    Asthma-COPD overlap syndrome (HCC)- (present on admission)  Assessment & Plan  Currently with an acute exacerbation secondary to pneumonia.    I discussed with pulmonology, continue current course of treatment.  Pulmonology evaluate patient for COPD.  Continue Dulera, prednisone.  Continue nebulizer treatments.         VTE prophylaxis:    therapeutic anticoagulation with coumadin dosing per pharmacy, adjust PRN      I have performed a physical exam and reviewed and updated ROS and Plan today (6/17/2025). In review of yesterday's note (6/16/2025), there are no changes except as  documented above.      Total time spent 51 minutes. I spent greater than 50% of the time for patient care, including unit/floor time, multiple face-to-face encounters with the patient, counseling on treatment plan and discussion with bedside RN.  Further, I spent time on my own review of patient's overnight events, RN notes, imaging and lab analysis, and developing my assessment and plan above.  In addition, working with , social workers, and Charge RN on case coordination on this date.    This note was generated using voice recognition software which has a chance of producing errors of grammar and content.  I have made every reasonable attempt to find and correct any errors, but it should be expected that some may not be found prior to finalization of this note.

## 2025-06-19 ENCOUNTER — APPOINTMENT (OUTPATIENT)
Dept: RADIOLOGY | Facility: MEDICAL CENTER | Age: 75
DRG: 291 | End: 2025-06-19
Attending: INTERNAL MEDICINE
Payer: MEDICARE

## 2025-06-19 LAB
ALBUMIN SERPL BCP-MCNC: 2.9 G/DL (ref 3.2–4.9)
ANION GAP SERPL CALC-SCNC: 8 MMOL/L (ref 7–16)
BUN SERPL-MCNC: 49 MG/DL (ref 8–22)
CALCIUM ALBUM COR SERPL-MCNC: 10.4 MG/DL (ref 8.5–10.5)
CALCIUM SERPL-MCNC: 9.5 MG/DL (ref 8.5–10.5)
CHLORIDE SERPL-SCNC: 106 MMOL/L (ref 96–112)
CO2 SERPL-SCNC: 25 MMOL/L (ref 20–33)
CREAT SERPL-MCNC: 1.51 MG/DL (ref 0.5–1.4)
ERYTHROCYTE [DISTWIDTH] IN BLOOD BY AUTOMATED COUNT: 57.1 FL (ref 35.9–50)
GFR SERPLBLD CREATININE-BSD FMLA CKD-EPI: 36 ML/MIN/1.73 M 2
GLUCOSE SERPL-MCNC: 227 MG/DL (ref 65–99)
HCT VFR BLD AUTO: 37.4 % (ref 37–47)
HGB BLD-MCNC: 11.9 G/DL (ref 12–16)
INR PPP: 2.01 (ref 0.87–1.13)
MAGNESIUM SERPL-MCNC: 2.1 MG/DL (ref 1.5–2.5)
MCH RBC QN AUTO: 30.3 PG (ref 27–33)
MCHC RBC AUTO-ENTMCNC: 31.8 G/DL (ref 32.2–35.5)
MCV RBC AUTO: 95.2 FL (ref 81.4–97.8)
PHOSPHATE SERPL-MCNC: 3 MG/DL (ref 2.5–4.5)
PLATELET # BLD AUTO: 136 K/UL (ref 164–446)
PMV BLD AUTO: 11.6 FL (ref 9–12.9)
POTASSIUM SERPL-SCNC: 4.6 MMOL/L (ref 3.6–5.5)
PROTHROMBIN TIME: 23.4 SEC (ref 12–14.6)
RBC # BLD AUTO: 3.93 M/UL (ref 4.2–5.4)
SODIUM SERPL-SCNC: 139 MMOL/L (ref 135–145)
WBC # BLD AUTO: 8.3 K/UL (ref 4.8–10.8)

## 2025-06-19 PROCEDURE — 99233 SBSQ HOSP IP/OBS HIGH 50: CPT | Performed by: INTERNAL MEDICINE

## 2025-06-19 PROCEDURE — A9270 NON-COVERED ITEM OR SERVICE: HCPCS | Performed by: STUDENT IN AN ORGANIZED HEALTH CARE EDUCATION/TRAINING PROGRAM

## 2025-06-19 PROCEDURE — 83735 ASSAY OF MAGNESIUM: CPT

## 2025-06-19 PROCEDURE — 700111 HCHG RX REV CODE 636 W/ 250 OVERRIDE (IP): Mod: JZ | Performed by: INTERNAL MEDICINE

## 2025-06-19 PROCEDURE — 85610 PROTHROMBIN TIME: CPT

## 2025-06-19 PROCEDURE — 770020 HCHG ROOM/CARE - TELE (206)

## 2025-06-19 PROCEDURE — A9270 NON-COVERED ITEM OR SERVICE: HCPCS | Performed by: INTERNAL MEDICINE

## 2025-06-19 PROCEDURE — 94760 N-INVAS EAR/PLS OXIMETRY 1: CPT

## 2025-06-19 PROCEDURE — 36415 COLL VENOUS BLD VENIPUNCTURE: CPT

## 2025-06-19 PROCEDURE — 700102 HCHG RX REV CODE 250 W/ 637 OVERRIDE(OP): Performed by: STUDENT IN AN ORGANIZED HEALTH CARE EDUCATION/TRAINING PROGRAM

## 2025-06-19 PROCEDURE — 80069 RENAL FUNCTION PANEL: CPT

## 2025-06-19 PROCEDURE — 71045 X-RAY EXAM CHEST 1 VIEW: CPT

## 2025-06-19 PROCEDURE — 94640 AIRWAY INHALATION TREATMENT: CPT

## 2025-06-19 PROCEDURE — 700102 HCHG RX REV CODE 250 W/ 637 OVERRIDE(OP): Performed by: INTERNAL MEDICINE

## 2025-06-19 PROCEDURE — 85027 COMPLETE CBC AUTOMATED: CPT

## 2025-06-19 RX ORDER — FUROSEMIDE 10 MG/ML
20 INJECTION INTRAMUSCULAR; INTRAVENOUS
Status: DISCONTINUED | OUTPATIENT
Start: 2025-06-19 | End: 2025-06-25 | Stop reason: HOSPADM

## 2025-06-19 RX ORDER — WARFARIN SODIUM 5 MG/1
5 TABLET ORAL
Status: COMPLETED | OUTPATIENT
Start: 2025-06-19 | End: 2025-06-19

## 2025-06-19 RX ORDER — HYDRALAZINE HYDROCHLORIDE 20 MG/ML
10 INJECTION INTRAMUSCULAR; INTRAVENOUS EVERY 6 HOURS PRN
Status: DISCONTINUED | OUTPATIENT
Start: 2025-06-19 | End: 2025-06-25 | Stop reason: HOSPADM

## 2025-06-19 RX ADMIN — ATORVASTATIN CALCIUM 10 MG: 10 TABLET, FILM COATED ORAL at 21:00

## 2025-06-19 RX ADMIN — CARVEDILOL 6.25 MG: 6.25 TABLET, FILM COATED ORAL at 07:41

## 2025-06-19 RX ADMIN — HYDROCORTISONE SODIUM SUCCINATE 100 MG: 100 INJECTION, POWDER, FOR SOLUTION INTRAMUSCULAR; INTRAVENOUS at 05:00

## 2025-06-19 RX ADMIN — WARFARIN SODIUM 5 MG: 5 TABLET ORAL at 17:43

## 2025-06-19 RX ADMIN — AMIODARONE HYDROCHLORIDE 100 MG: 200 TABLET ORAL at 21:00

## 2025-06-19 RX ADMIN — FUROSEMIDE 20 MG: 10 INJECTION, SOLUTION INTRAVENOUS at 18:19

## 2025-06-19 RX ADMIN — CARVEDILOL 6.25 MG: 6.25 TABLET, FILM COATED ORAL at 17:43

## 2025-06-19 RX ADMIN — MOMETASONE FUROATE AND FORMOTEROL FUMARATE DIHYDRATE 2 PUFF: 200; 5 AEROSOL RESPIRATORY (INHALATION) at 20:20

## 2025-06-19 RX ADMIN — CALCITRIOL CAPSULES 0.25 MCG 0.5 MCG: 0.25 CAPSULE ORAL at 05:00

## 2025-06-19 RX ADMIN — MOMETASONE FUROATE AND FORMOTEROL FUMARATE DIHYDRATE 2 PUFF: 200; 5 AEROSOL RESPIRATORY (INHALATION) at 05:01

## 2025-06-19 ASSESSMENT — COGNITIVE AND FUNCTIONAL STATUS - GENERAL
MOBILITY SCORE: 22
DAILY ACTIVITIY SCORE: 23
CLIMB 3 TO 5 STEPS WITH RAILING: A LITTLE
SUGGESTED CMS G CODE MODIFIER DAILY ACTIVITY: CI
SUGGESTED CMS G CODE MODIFIER MOBILITY: CJ
DRESSING REGULAR LOWER BODY CLOTHING: A LITTLE
WALKING IN HOSPITAL ROOM: A LITTLE

## 2025-06-19 ASSESSMENT — ENCOUNTER SYMPTOMS
COUGH: 1
WEAKNESS: 1
SPUTUM PRODUCTION: 1
ABDOMINAL PAIN: 0
SHORTNESS OF BREATH: 1

## 2025-06-19 ASSESSMENT — FIBROSIS 4 INDEX: FIB4 SCORE: 2.073069957241927622

## 2025-06-19 ASSESSMENT — PAIN DESCRIPTION - PAIN TYPE: TYPE: ACUTE PAIN

## 2025-06-19 NOTE — PROGRESS NOTES
Assumed care of patient at bedside report from Day RN. Updated on POC. Patient currently A & O x 4; on 2 L O2 NC; up SBA; without complaints of acute pain. Call light within reach. Whiteboard updated. Fall precautions in place. Bed locked and in lowest position. All questions answered. No other needs indicated at this time.

## 2025-06-19 NOTE — PROGRESS NOTES
Patient's home concentrator goes up to 5L. Attempted ambulation on 4L for 5 minutes (hospital oxygen tank does not have 5L setting, only 4 or 6L). Dropped to 80% on 4L after walking for 5 minutes. Will re-attempt ambulation test on 6L oxygen.

## 2025-06-19 NOTE — PROGRESS NOTES
Telemetry Shift Summary    Rhythm Paced  HR Range 60s  Ectopy rare PVC/PAC  Measurements 0.19/0.08/0.37        Normal Values  Rhythm SR  HR Range    Measurements 0.12-0.20 / 0.06-0.10  / 0.30-0.52

## 2025-06-19 NOTE — CARE PLAN
The patient is Stable - Low risk of patient condition declining or worsening    Shift Goals  Clinical Goals: Ambulate  Patient Goals: Discharge planning  Family Goals: No family present    Progress made toward(s) clinical / shift goals:    Problem: Respiratory  Goal: Patient will achieve/maintain optimum respiratory ventilation and gas exchange  Outcome: Progressing  Note: Attempting ambulatory oxygen tests as able to tolerate.     Problem: Infection - Standard  Goal: Patient will remain free from infection  Outcome: Progressing  Flowsheets (Taken 6/19/2025 1130)  Standard Infection Interventions:   Assessed for signs and symptoms of infection   Implemented standard precautions   Instructed patient/family on signs and symptoms of infection   Provided education on proper hand hygiene and infection prevention measures   Assessed for removal IV, central lines, intra-arterial or urinary catheters       Patient is not progressing towards the following goals:

## 2025-06-19 NOTE — PROGRESS NOTES
4 Eyes Skin Assessment Completed by VALENTINA Lund and VALENTINA Faye.    Skin assessment is primarily focused on high risk bony prominences. Pay special attention to skin beneath and around medical devices, high risk bony prominences, skin to skin areas and areas where the patient lacks sensation to feel pain and areas where the patient previously had breakdown.     Head (Occipital):  WDL   Ears (Under Medical Devices): WDL   Nose (Under Medical Devices): WDL   Mouth:  WDL   Neck: WDL   Breast/Chest:  WDL   Shoulder Blades:  WDL   Spine:   WDL   (R) Arm/Elbow/Hand: Bruising   (L) Arm/Elbow/Hand: Bruising   Abdomen: WDL   Pannus/Groin:  WDL   Sacrum/Coccyx:   WDL   (R) Ischial Tuberosity (Sit Bones):  WDL   (L) Ischial Tuberosity (Sit Bones):  WDL   (R) Leg:  Bruising   (L) Leg:  Bruising   (R) Heel:  Boggy   (R) Foot/Toe: WDL   (L) Heel: Boggy   (L) Foot/Toe:  Scab and Pink       DEVICES IN USE:   Respiratory Devices:  Nasal cannula and Pulse ox  Feeding Devices:  N/A   Lines & BP Monitoring Devices:  Peripheral IV and Pulse ox    Orthopedic Devices:  N/A  Miscellaneous Devices:  Telemetry monitor    PROTOCOL INTERVENTIONS:   Standard/Trauma Bed:  Already in place  Nasal Cannula with Gray Foams:  Already in place    WOUND PHOTOS:   N/A no wounds identified    WOUND CONSULT:   N/A, no advanced wound care needs identified

## 2025-06-19 NOTE — PROGRESS NOTES
Hospital Medicine Daily Progress Note    Date of Service  6/19/2025    Chief Complaint  Salma Lees is a 75 y.o. female admitted 6/14/2025 with   Chief Complaint   Patient presents with    Shortness of Breath     X1wk worse today   Baseline 3L hx of COPD/Asthma  Pt was found on arrival on 4L 75%, EMS gave 1 albuterol/1 duoneb SPO2 increased to 85%  pt increased to 6-8L NC     Sore Throat     Intermitten x1wk    Chest Pain     discomfort    Weight Gain     Hx of CHF   Pt reports she has missed some doses of her medication due to her not feeling well     Fever     102 highest reported at home states she took a home COVID test & was negative    Cough       Hospital Course  History of severe pulm hypertension RVSP of 83, chronic  3 L oxygen and respiratory failure secondary to COPD who was admitted for shortness of breath found to have volume overload, sleep exacerbation and community-acquired pneumonia.  She had acute on chronic respiratory failure hypoxia requiring 6 L nasal cannula due to room air saturation to 74%.  She was started on Solu-Medrol, IV antibiotics and IV Lasix and was admitted for further medical treatment    Interval Problem Update  6/19:  Patient stated she still has shortness of breath.  We retested home oxygen, she is still desaturating to 82% while on 6 L.  Currently on 4 L nasal cannula at rest. Checking CXR.  I reviewed her labs, creatinine improved to 1.51.  This may be her Baseline from prior labs in 2025 and 2024.  INR 2.01  If patient's kidney function remains maintained at 1.5, we may be able to do a CTA chest in the morning to rule out PE.  She had been on warfarin and was therapeutic on her INR.    6/18:  No acute complaints today. I spoke with patient and son at bedside.  I recommended N95 or masking when in public to reduce her respiratory infectious risks.  Pt needed more oxygen, I submitted new DME for home concentrator.  I reviewed labs, WBC 10.6 from 15.5. continue IV  Ceftriaxone.  Cr 1.73, decreased but still elevated.    6/17  Patient still having trouble breathing.  She feels congested still in the right lower lobe.  We are treating for a right lower lobe community-acquired bacterial pneumonia.  Unfortunately she will need to remain on ceftriaxone as she has 16 allergies listed in several are antibiotics.  Patient's creatinine 1.97 from 2.02, 1.52.  Remaining elevated I am holding Entresto as this could be worsening.  Patient's WBCs 15.5.  Dr. Hong INR 3.48 on warfarin.  Patient is still on 3 L nasal cannula.    I have discussed this patient's plan of care and discharge plan at IDT rounds today with Case Management, Nursing, Nursing leadership, and other members of the IDT team.    Consultants/Specialty  pulmonary    Code Status  Full Code    Disposition  The patient is not medically cleared for discharge to home or a post-acute facility.  Anticipate discharge to: home with organized home healthcare and close outpatient follow-up    I have placed the appropriate orders for post-discharge needs.    Review of Systems  Review of Systems   Constitutional:  Positive for malaise/fatigue.   Respiratory:  Positive for cough, sputum production and shortness of breath.    Cardiovascular:  Negative for chest pain.   Gastrointestinal:  Negative for abdominal pain.   Neurological:  Positive for weakness.   All other systems reviewed and are negative.       Physical Exam  Temp:  [36.5 °C (97.7 °F)-36.7 °C (98.1 °F)] 36.7 °C (98.1 °F)  Pulse:  [60-65] 61  Resp:  [17-18] 17  BP: (150-164)/(63-69) 152/67  SpO2:  [92 %-97 %] 97 %    Physical Exam  Vitals and nursing note reviewed.   Constitutional:       General: She is not in acute distress.     Appearance: She is ill-appearing.      Comments: Frail appearing elderly female   HENT:      Head: Normocephalic and atraumatic.      Comments: Temporal muscle wasting positive     Mouth/Throat:      Mouth: Mucous membranes are dry.      Pharynx: No  oropharyngeal exudate.   Eyes:      General: No scleral icterus.     Extraocular Movements: Extraocular movements intact.   Cardiovascular:      Rate and Rhythm: Normal rate and regular rhythm.      Pulses: Normal pulses.      Heart sounds: Normal heart sounds. No murmur heard.  Pulmonary:      Effort: Pulmonary effort is normal. No respiratory distress.      Breath sounds: Rhonchi (Right lower lung field) and rales present. No wheezing.   Abdominal:      General: Abdomen is flat. Bowel sounds are normal. There is no distension.      Palpations: Abdomen is soft.      Tenderness: There is no abdominal tenderness.   Musculoskeletal:         General: No swelling or tenderness.      Cervical back: Normal range of motion. No rigidity.      Comments: Sarcopenic. Bilateral hand muscle atrophy noted.   Skin:     General: Skin is warm.      Capillary Refill: Capillary refill takes less than 2 seconds.      Coloration: Skin is not jaundiced.      Findings: No erythema.   Neurological:      Mental Status: She is alert and oriented to person, place, and time. Mental status is at baseline.      Motor: Weakness present.   Psychiatric:         Mood and Affect: Mood normal.         Behavior: Behavior normal.         Thought Content: Thought content normal.         Judgment: Judgment normal.         Fluids    Intake/Output Summary (Last 24 hours) at 6/19/2025 1250  Last data filed at 6/19/2025 0800  Gross per 24 hour   Intake 840 ml   Output 600 ml   Net 240 ml        Laboratory  Recent Labs     06/17/25  0259 06/18/25  0101 06/19/25  0300   WBC 15.5* 10.6 8.3   RBC 3.92* 3.84* 3.93*   HEMOGLOBIN 11.8* 11.6* 11.9*   HEMATOCRIT 37.5 36.5* 37.4   MCV 95.7 95.1 95.2   MCH 30.1 30.2 30.3   MCHC 31.5* 31.8* 31.8*   RDW 58.5* 57.3* 57.1*   PLATELETCT 165 155* 136*   MPV 12.3 11.4 11.6     Recent Labs     06/17/25  0259 06/18/25  0101 06/19/25  0300   SODIUM 138 137 139   POTASSIUM 4.3 4.4 4.6   CHLORIDE 103 103 106   CO2 24 24 25    GLUCOSE 192* 208* 227*   BUN 57* 57* 49*   CREATININE 1.97* 1.73* 1.51*   CALCIUM 9.7 9.7 9.5     Recent Labs     06/17/25  0259 06/18/25  0101 06/19/25  0300   INR 3.48* 2.73* 2.01*               Imaging  DX-CHEST-PORTABLE (1 VIEW)   Final Result      Right basilar opacity is consistent with pneumonia. Follow-up imaging is recommended to document resolution      DX-CHEST-PORTABLE (1 VIEW)    (Results Pending)        Assessment/Plan  * Pneumonia of right lower lobe due to infectious organism- (present on admission)  Assessment & Plan  Worsening shortness of breath, cough with productive sputum for over a week  CXR reviewed and shows an opacity on the right.      Finished IV ceftriaxone and azithromycin  WBC 8.3, procalcitonin 0.22    Pulmonary hypertension (HCC)- (present on admission)  Assessment & Plan  Follows with pulmonology.  Echo 12/2024 showed an RVSP of 83.  Per notes type II secondary to CHF and valvular disease  Cr 1.51.     Asthma-COPD overlap syndrome (HCC)- (present on admission)  Assessment & Plan  Currently with an acute exacerbation secondary to pneumonia.    I discussed with pulmonology, continue current course of treatment.  Pulmonology evaluate patient for COPD.  Continue Dulera, prednisone and continued nebulized treatments.  Checking CXR    Paroxysmal atrial fibrillation (HCC)- (present on admission)  Assessment & Plan  S/p ablation.  Continue amiodarone and carvedilol.  Continue warfarin, INR 2.01    Acute on chronic respiratory failure with hypoxia (HCC)- (present on admission)  Assessment & Plan  Baseline 2-3 L, saturating in the 70s and requiring 6 L on presentation  Secondary to asthma/COPD exacerbation  significant volume overload due to CHF exacerbation and pneumonia (severe PAH)  On 4LNC  Patient has a portable pulse oxygen machine.  Requiring higher oxygen needs than her home DME.    Acute on chronic heart failure with preserved ejection fraction (HFpEF) (HCC)- (present on  admission)  Assessment & Plan  Volume overload on exam with B LE edema  NT-proBNP 4000  Previous echo 12/2024 showed an EF of 55-60%, s/p mitral valve repair, moderate to severe tricuspid regurg, RVSP of 83    Holding Entresto  Continue Carvedilol  Cr 1.51    Benign essential hypertension- (present on admission)  Assessment & Plan  Continue carvedilol.  Holding Entresto.  Checking chest x-ray, may need to start Lasix.  SBP goal < 160    Stage 3b chronic kidney disease- (present on admission)  Assessment & Plan  At baseline.  She will be diuresed for CHF exacerbation  Creatinine 1.51.  Holding Entresto.         VTE prophylaxis:    therapeutic anticoagulation with coumadin dosing per pharmacy, adjust PRN      I have performed a physical exam and reviewed and updated ROS and Plan today (6/19/2025). In review of yesterday's note (6/18/2025), there are no changes except as documented above.      Total time spent 51 minutes. I spent greater than 50% of the time for patient care, including unit/floor time, multiple face-to-face encounters with the patient, counseling on treatment plan and discussion with bedside RN.  Further, I spent time on my own review of patient's overnight events, RN notes, imaging and lab analysis, and developing my assessment and plan above.  In addition, working with , social workers, and Charge RN on case coordination on this date.    This note was generated using voice recognition software which has a chance of producing errors of grammar and content.  I have made every reasonable attempt to find and correct any errors, but it should be expected that some may not be found prior to finalization of this note.

## 2025-06-19 NOTE — CARE PLAN
The patient is Stable - Low risk of patient condition declining or worsening    Shift Goals  Clinical Goals: wean O2, IS, neb treatments  Patient Goals: comfort, sleep through the night  Family Goals: No family present    Progress made toward(s) clinical / shift goals:  Patient updated on POC throughout shift with all questions and concerns addressed. Patient remained on 2L NC throughout shift with SpO2>90%. Patient remained free from falls throughout shift.     Problem: Knowledge Deficit - Standard  Goal: Patient and family/care givers will demonstrate understanding of plan of care, disease process/condition, diagnostic tests and medications  Outcome: Progressing     Problem: Ineffective Airway Clearance  Goal: Patient will maintain patent airway with clear/clearing breath sounds  Outcome: Progressing     Problem: Impaired Gas Exchange  Goal: Patient will demonstrate improved ventilation and adequate oxygenation and participate in treatment regimen within the level of ability/situation.  Outcome: Progressing     Problem: Fluid Volume  Goal: Fluid volume balance will be maintained  Outcome: Progressing     Problem: Fall Risk  Goal: Patient will remain free from falls  Outcome: Progressing     Patient is not progressing towards the following goals:

## 2025-06-19 NOTE — PROGRESS NOTES
Telemetry summary    Rhythm A-Paced with BBB  Rate 60s    Ectopy rPVC/PAC  Measurements 0.20/0.11/0.38    Normal Values  Rhythm SR  HR Range   Measurements 0.12-0.20/0.06-0.10/0.30-0.52

## 2025-06-20 ENCOUNTER — APPOINTMENT (OUTPATIENT)
Dept: RADIOLOGY | Facility: MEDICAL CENTER | Age: 75
DRG: 291 | End: 2025-06-20
Attending: INTERNAL MEDICINE
Payer: MEDICARE

## 2025-06-20 LAB
ALBUMIN SERPL BCP-MCNC: 2.9 G/DL (ref 3.2–4.9)
ANION GAP SERPL CALC-SCNC: 10 MMOL/L (ref 7–16)
BACTERIA BLD CULT: NORMAL
BACTERIA BLD CULT: NORMAL
BUN SERPL-MCNC: 46 MG/DL (ref 8–22)
CALCIUM ALBUM COR SERPL-MCNC: 10.5 MG/DL (ref 8.5–10.5)
CALCIUM SERPL-MCNC: 9.6 MG/DL (ref 8.5–10.5)
CHLORIDE SERPL-SCNC: 105 MMOL/L (ref 96–112)
CO2 SERPL-SCNC: 27 MMOL/L (ref 20–33)
CREAT SERPL-MCNC: 1.48 MG/DL (ref 0.5–1.4)
ERYTHROCYTE [DISTWIDTH] IN BLOOD BY AUTOMATED COUNT: 57.2 FL (ref 35.9–50)
GFR SERPLBLD CREATININE-BSD FMLA CKD-EPI: 36 ML/MIN/1.73 M 2
GLUCOSE SERPL-MCNC: 168 MG/DL (ref 65–99)
HCT VFR BLD AUTO: 36.7 % (ref 37–47)
HGB BLD-MCNC: 11.4 G/DL (ref 12–16)
INR PPP: 1.9 (ref 0.87–1.13)
MAGNESIUM SERPL-MCNC: 2 MG/DL (ref 1.5–2.5)
MCH RBC QN AUTO: 29.8 PG (ref 27–33)
MCHC RBC AUTO-ENTMCNC: 31.1 G/DL (ref 32.2–35.5)
MCV RBC AUTO: 95.8 FL (ref 81.4–97.8)
PHOSPHATE SERPL-MCNC: 2.7 MG/DL (ref 2.5–4.5)
PLATELET # BLD AUTO: 139 K/UL (ref 164–446)
PMV BLD AUTO: 11 FL (ref 9–12.9)
POTASSIUM SERPL-SCNC: 4.1 MMOL/L (ref 3.6–5.5)
PROCALCITONIN SERPL-MCNC: 0.06 NG/ML
PROTHROMBIN TIME: 22.4 SEC (ref 12–14.6)
RBC # BLD AUTO: 3.83 M/UL (ref 4.2–5.4)
SIGNIFICANT IND 70042: NORMAL
SIGNIFICANT IND 70042: NORMAL
SITE SITE: NORMAL
SITE SITE: NORMAL
SODIUM SERPL-SCNC: 142 MMOL/L (ref 135–145)
SOURCE SOURCE: NORMAL
SOURCE SOURCE: NORMAL
WBC # BLD AUTO: 7.8 K/UL (ref 4.8–10.8)

## 2025-06-20 PROCEDURE — 36415 COLL VENOUS BLD VENIPUNCTURE: CPT

## 2025-06-20 PROCEDURE — A9270 NON-COVERED ITEM OR SERVICE: HCPCS | Performed by: INTERNAL MEDICINE

## 2025-06-20 PROCEDURE — 84145 PROCALCITONIN (PCT): CPT

## 2025-06-20 PROCEDURE — 71250 CT THORAX DX C-: CPT

## 2025-06-20 PROCEDURE — 700102 HCHG RX REV CODE 250 W/ 637 OVERRIDE(OP): Performed by: STUDENT IN AN ORGANIZED HEALTH CARE EDUCATION/TRAINING PROGRAM

## 2025-06-20 PROCEDURE — A9270 NON-COVERED ITEM OR SERVICE: HCPCS | Performed by: STUDENT IN AN ORGANIZED HEALTH CARE EDUCATION/TRAINING PROGRAM

## 2025-06-20 PROCEDURE — 85027 COMPLETE CBC AUTOMATED: CPT

## 2025-06-20 PROCEDURE — 99233 SBSQ HOSP IP/OBS HIGH 50: CPT | Performed by: INTERNAL MEDICINE

## 2025-06-20 PROCEDURE — 94760 N-INVAS EAR/PLS OXIMETRY 1: CPT

## 2025-06-20 PROCEDURE — 700102 HCHG RX REV CODE 250 W/ 637 OVERRIDE(OP): Performed by: INTERNAL MEDICINE

## 2025-06-20 PROCEDURE — 700111 HCHG RX REV CODE 636 W/ 250 OVERRIDE (IP): Mod: JZ | Performed by: INTERNAL MEDICINE

## 2025-06-20 PROCEDURE — 80069 RENAL FUNCTION PANEL: CPT

## 2025-06-20 PROCEDURE — 85610 PROTHROMBIN TIME: CPT

## 2025-06-20 PROCEDURE — 83735 ASSAY OF MAGNESIUM: CPT

## 2025-06-20 PROCEDURE — 770020 HCHG ROOM/CARE - TELE (206)

## 2025-06-20 PROCEDURE — 94669 MECHANICAL CHEST WALL OSCILL: CPT

## 2025-06-20 RX ORDER — WARFARIN SODIUM 5 MG/1
5 TABLET ORAL
Status: COMPLETED | OUTPATIENT
Start: 2025-06-20 | End: 2025-06-20

## 2025-06-20 RX ADMIN — WARFARIN SODIUM 5 MG: 5 TABLET ORAL at 18:01

## 2025-06-20 RX ADMIN — MOMETASONE FUROATE AND FORMOTEROL FUMARATE DIHYDRATE 2 PUFF: 200; 5 AEROSOL RESPIRATORY (INHALATION) at 20:28

## 2025-06-20 RX ADMIN — MOMETASONE FUROATE AND FORMOTEROL FUMARATE DIHYDRATE 2 PUFF: 200; 5 AEROSOL RESPIRATORY (INHALATION) at 05:26

## 2025-06-20 RX ADMIN — HYDROCORTISONE SODIUM SUCCINATE 100 MG: 100 INJECTION, POWDER, FOR SOLUTION INTRAMUSCULAR; INTRAVENOUS at 05:25

## 2025-06-20 RX ADMIN — ATORVASTATIN CALCIUM 10 MG: 10 TABLET, FILM COATED ORAL at 20:27

## 2025-06-20 RX ADMIN — CALCITRIOL CAPSULES 0.25 MCG 0.5 MCG: 0.25 CAPSULE ORAL at 05:25

## 2025-06-20 RX ADMIN — CARVEDILOL 6.25 MG: 6.25 TABLET, FILM COATED ORAL at 16:41

## 2025-06-20 RX ADMIN — AMIODARONE HYDROCHLORIDE 100 MG: 200 TABLET ORAL at 20:27

## 2025-06-20 RX ADMIN — CARVEDILOL 6.25 MG: 6.25 TABLET, FILM COATED ORAL at 08:07

## 2025-06-20 ASSESSMENT — ENCOUNTER SYMPTOMS
SHORTNESS OF BREATH: 1
WEAKNESS: 1
COUGH: 1
SPUTUM PRODUCTION: 1
ABDOMINAL PAIN: 0

## 2025-06-20 ASSESSMENT — FIBROSIS 4 INDEX: FIB4 SCORE: 2.03

## 2025-06-20 ASSESSMENT — PAIN DESCRIPTION - PAIN TYPE
TYPE: ACUTE PAIN

## 2025-06-20 NOTE — PROGRESS NOTES
Inpatient Anticoagulation Service Note    Date: 6/19/2025  Reason for Anticoagulation: Atrial Fibrillation, Mitral Valve Repair   [id number] VASc Score: 9  HAS-BLED Score: 0    Hemoglobin Value: (!) 11.9  Hematocrit Value: 37.4  Lab Platelet Value: (!) 136  Target INR: 2.0 to 3.0     Date/Time INR Value    06/19/25 0300 2.01     06/18/25 0101 2.73     06/17/25 0259 3.48     06/16/25 0137 4.34     06/14/25 2230 3.4       Date/Time Dose (mg)    06/19/25 1800 5     06/18/25 1153 1     06/18/25 1142 1     06/17/25 1517 --     06/16/25 0912 0     06/15/25 0140 2      Average Dose (mg):  Home regimen: warfarin 5 mg daily  Significant Interactions: Amiodarone, Corticosteroids (Hydrocortisone IV)  Bridge Therapy: No     Reversal Agent Administered: Not Applicable    Comments: Patient on warfarin for history of AFib s/p ablation and MV Repair and they follow with anti-coag clinic. Supratherapeutic INR trend this admission now with downtrend to lower end of INR goal range. DDI continues with Solu-cortef. Pharmacy to provide home dose this evening and cautiously monitor INR given recent instability.    Plan:  Warfarin 5 mg this evening  Education Material Provided?:  (Chronic warfarin therapy.)    Pharmacist suggested discharge dosing: tbd. If INR remains stable then home dose with INR follow up within 2-3 days of discharge.     Abhinav Rodriguez, PharmD

## 2025-06-20 NOTE — PROGRESS NOTES
Telemetry shift summary    Rhythm: Paced, SR  HR: 60-75  Ectopy: R-PVC, R-PAC    Measurements: .20 / .10 / .39    Normal values  Rhythm SR  HR   Measurements: 0.12-0.20/0.08-0.10/0.30-0.52

## 2025-06-20 NOTE — CARE PLAN
The patient is Stable - Low risk of patient condition declining or worsening    Shift Goals  Clinical Goals: Encourage IS, ambulation, Monitor I/Os  Patient Goals: POC Updates  Family Goals: OLIMPIA    Progress made toward(s) clinical / shift goals: Pt is AO x4. Pt had no complaints of pain. Pt is on 4L room air. Pt used her IS before bed. Pt refused oral care. Pt ambulated to void in the bathroom four times during this shift. Pt I/Os were assessed and documented in the flowsheet. Pt has call light within reach, bed in lowest position, and no further needs at this time.      Problem: Fall Risk  Goal: Patient will remain free from falls  Outcome: Progressing     Problem: Self Care  Goal: Patient will have the ability to perform ADLs independently or with assistance (bathe, groom, dress, toilet and feed)  Outcome: Progressing       Patient is not progressing towards the following goals:

## 2025-06-20 NOTE — CARE PLAN
The patient is Stable - Low risk of patient condition declining or worsening    Shift Goals  Clinical Goals: Monitor I/Os, CT scan, monitor O2  Patient Goals: rest, go home  Family Goals: OLIMPIA    Progress made toward(s) clinical / shift goals:  CT completed. Patient agreeable to daily walking O2. Patient knows were are to track I/Os.      Problem: Knowledge Deficit - Standard  Goal: Patient and family/care givers will demonstrate understanding of plan of care, disease process/condition, diagnostic tests and medications  Outcome: Progressing     Problem: Knowledge Deficit - COPD  Goal: Patient/significant other demonstrates understanding of disease process, utilization of the Action Plan, medications and discharge instruction  Outcome: Progressing     Problem: Risk for Infection - COPD  Goal: Patient will remain free from signs and symptoms of infection  Outcome: Progressing     Problem: Nutrition - Advanced  Goal: Patient will display progressive weight gain toward goal have adequate food and fluid intake  Outcome: Progressing     Problem: Ineffective Airway Clearance  Goal: Patient will maintain patent airway with clear/clearing breath sounds  Outcome: Progressing     Problem: Impaired Gas Exchange  Goal: Patient will demonstrate improved ventilation and adequate oxygenation and participate in treatment regimen within the level of ability/situation.  Outcome: Progressing     Problem: Risk for Aspiration  Goal: Patient's risk for aspiration will be absent or decrease  Outcome: Progressing     Problem: Self Care  Goal: Patient will have the ability to perform ADLs independently or with assistance (bathe, groom, dress, toilet and feed)  Outcome: Progressing     Problem: Hemodynamics  Goal: Patient's hemodynamics, fluid balance and neurologic status will be stable or improve  Outcome: Progressing     Problem: Fluid Volume  Goal: Fluid volume balance will be maintained  Outcome: Progressing

## 2025-06-20 NOTE — PROGRESS NOTES
Late Entry:  Bedside report received from Natasha gardner RN Patient awake and alert in bed AOx4. Pt is on 4L NC. Bed is locked and in low position. 240mL of water given. Reviewed plan of care with patient. Call light within reach. No other needs at this time.

## 2025-06-20 NOTE — PROGRESS NOTES
Hospital Medicine Daily Progress Note    Date of Service  6/20/2025    Chief Complaint  Salma Lees is a 75 y.o. female admitted 6/14/2025 with   Chief Complaint   Patient presents with    Shortness of Breath     X1wk worse today   Baseline 3L hx of COPD/Asthma  Pt was found on arrival on 4L 75%, EMS gave 1 albuterol/1 duoneb SPO2 increased to 85%  pt increased to 6-8L NC     Sore Throat     Intermitten x1wk    Chest Pain     discomfort    Weight Gain     Hx of CHF   Pt reports she has missed some doses of her medication due to her not feeling well     Fever     102 highest reported at home states she took a home COVID test & was negative    Cough       Hospital Course  History of severe pulm hypertension RVSP of 83, chronic  3 L oxygen and respiratory failure secondary to COPD who was admitted for shortness of breath found to have volume overload, sleep exacerbation and community-acquired pneumonia.  She had acute on chronic respiratory failure hypoxia requiring 6 L nasal cannula due to room air saturation to 74%.  She was started on Solu-Medrol, IV antibiotics and IV Lasix and was admitted for further medical treatment    Interval Problem Update  6/20:  Pt still having a productive cough. 4L resting. Checking walking oxygen needs again today.  I reviewed CXR, RLL is worse, LLL atelectasis. Cr 1.48.   I ordered CT chest without contrast, and reviewed with pulmonologist Dr. Miguel.  Patient has mucous or infection still in the right lower lobe.  We are starting IPV.  If patient does not have improvement we will consider bronchoscopy.  I reviewed labs, WBC 7.8, procalcitonin 0.06, hgb 11.4, plt 139.  Na 142, K 4.1, Phos 2.7, mg 2.0.  INR 1.9, warfarin 5mg given. We may need to reverse if bronchoscopy is needed.  I called patient's son Tam Lees and gave updates.    6/19:  Patient stated she still has shortness of breath.  We retested home oxygen, she is still desaturating to 82% while on 6  L.  Currently on 4 L nasal cannula at rest. Checking CXR.  I reviewed her labs, creatinine improved to 1.51.  This may be her Baseline from prior labs in 2025 and 2024.  INR 2.01  If patient's kidney function remains maintained at 1.5, we may be able to do a CTA chest in the morning to rule out PE.  She had been on warfarin and was therapeutic on her INR.    6/18:  No acute complaints today. I spoke with patient and son at bedside.  I recommended N95 or masking when in public to reduce her respiratory infectious risks.  Pt needed more oxygen, I submitted new DME for home concentrator.  I reviewed labs, WBC 10.6 from 15.5. continue IV Ceftriaxone.  Cr 1.73, decreased but still elevated.    6/17  Patient still having trouble breathing.  She feels congested still in the right lower lobe.  We are treating for a right lower lobe community-acquired bacterial pneumonia.  Unfortunately she will need to remain on ceftriaxone as she has 16 allergies listed in several are antibiotics.  Patient's creatinine 1.97 from 2.02, 1.52.  Remaining elevated I am holding Entresto as this could be worsening.  Patient's WBCs 15.5.  Dr. Hong INR 3.48 on warfarin.  Patient is still on 3 L nasal cannula.    I have discussed this patient's plan of care and discharge plan at IDT rounds today with Case Management, Nursing, Nursing leadership, and other members of the IDT team.    Consultants/Specialty  pulmonary    Code Status  Full Code    Disposition  The patient is not medically cleared for discharge to home or a post-acute facility.      I have placed the appropriate orders for post-discharge needs.    Review of Systems  Review of Systems   Constitutional:  Positive for malaise/fatigue.   Respiratory:  Positive for cough, sputum production and shortness of breath.    Cardiovascular:  Negative for chest pain.   Gastrointestinal:  Negative for abdominal pain.   Neurological:  Positive for weakness.   All other systems reviewed and are  negative.       Physical Exam  Temp:  [36.4 °C (97.5 °F)-37.1 °C (98.7 °F)] 36.9 °C (98.5 °F)  Pulse:  [58-69] 60  Resp:  [16-19] 17  BP: (139-175)/(60-77) 175/77  SpO2:  [92 %-99 %] 95 %    Physical Exam  Vitals and nursing note reviewed.   Constitutional:       General: She is not in acute distress.     Appearance: She is ill-appearing.      Comments: Frail appearing elderly female   HENT:      Head: Normocephalic and atraumatic.      Comments: Temporal muscle wasting positive     Mouth/Throat:      Mouth: Mucous membranes are dry.      Pharynx: No oropharyngeal exudate.   Eyes:      General: No scleral icterus.     Extraocular Movements: Extraocular movements intact.   Cardiovascular:      Rate and Rhythm: Normal rate and regular rhythm.      Pulses: Normal pulses.      Heart sounds: Normal heart sounds. No murmur heard.  Pulmonary:      Effort: Pulmonary effort is normal. No respiratory distress.      Breath sounds: Rhonchi (Right lower lung field) and rales present. No wheezing.   Abdominal:      General: Abdomen is flat. Bowel sounds are normal. There is no distension.      Palpations: Abdomen is soft.      Tenderness: There is no abdominal tenderness.   Musculoskeletal:         General: No swelling or tenderness.      Cervical back: Normal range of motion. No rigidity.      Comments: Sarcopenic. Bilateral hand muscle atrophy noted.   Skin:     General: Skin is warm.      Capillary Refill: Capillary refill takes less than 2 seconds.      Coloration: Skin is not jaundiced.      Findings: No erythema.   Neurological:      Mental Status: She is alert and oriented to person, place, and time. Mental status is at baseline.      Motor: Weakness present.   Psychiatric:         Mood and Affect: Mood normal.         Behavior: Behavior normal.         Thought Content: Thought content normal.         Judgment: Judgment normal.         Fluids    Intake/Output Summary (Last 24 hours) at 6/20/2025 1830  Last data filed at  6/20/2025 0500  Gross per 24 hour   Intake 280 ml   Output 925 ml   Net -645 ml        Laboratory  Recent Labs     06/18/25  0101 06/19/25  0300 06/20/25  0013   WBC 10.6 8.3 7.8   RBC 3.84* 3.93* 3.83*   HEMOGLOBIN 11.6* 11.9* 11.4*   HEMATOCRIT 36.5* 37.4 36.7*   MCV 95.1 95.2 95.8   MCH 30.2 30.3 29.8   MCHC 31.8* 31.8* 31.1*   RDW 57.3* 57.1* 57.2*   PLATELETCT 155* 136* 139*   MPV 11.4 11.6 11.0     Recent Labs     06/18/25  0101 06/19/25  0300 06/20/25  0013   SODIUM 137 139 142   POTASSIUM 4.4 4.6 4.1   CHLORIDE 103 106 105   CO2 24 25 27   GLUCOSE 208* 227* 168*   BUN 57* 49* 46*   CREATININE 1.73* 1.51* 1.48*   CALCIUM 9.7 9.5 9.6     Recent Labs     06/18/25  0101 06/19/25  0300 06/20/25  0013   INR 2.73* 2.01* 1.90*               Imaging  CT-CHEST (THORAX) W/O   Final Result      1.  Fluid opacification of multiple lower lobe posterior bronchi which could be related to mucus impaction or aspiration.   2.  Bilateral basilar atelectasis and/or consolidation. Underlying infection is possible.   3.  Trace right effusion.   4.  Cardiomegaly.   5.  Atherosclerosis with coronary artery disease.         DX-CHEST-PORTABLE (1 VIEW)   Final Result      1.  Worsening volume loss and consolidation within the right lung base.      2.  Cardiomegaly.      DX-CHEST-PORTABLE (1 VIEW)   Final Result      Right basilar opacity is consistent with pneumonia. Follow-up imaging is recommended to document resolution           Assessment/Plan  * Pneumonia of right lower lobe due to infectious organism- (present on admission)  Assessment & Plan  Worsening shortness of breath, cough with productive sputum for over a week  CXR reviewed and shows an opacity on the right.      Finished IV ceftriaxone and azithromycin  I ordered CT chest without contrast, and reviewed with pulmonologist Dr. Miguel.  Patient has mucous or infection still in the right lower lobe.  We are starting IPV.  If patient does not have improvement we will  consider bronchoscopy.  WBC 7.8, procalcitonin 0.06, not indicating recurrence of pneumonia.    CT chest with bilateral lower posterior lobe consolidations.    Pulmonary hypertension (HCC)- (present on admission)  Assessment & Plan  Follows with pulmonology.  Echo 12/2024 showed an RVSP of 83.  Per notes type II secondary to CHF and valvular disease  Creatinine 1.48.  I restarted IV Lasix    Asthma-COPD overlap syndrome (HCC)- (present on admission)  Assessment & Plan  Currently with an acute exacerbation secondary to pneumonia.    I discussed with pulmonology, continue current course of treatment.  Pulmonology evaluate patient for COPD.  Continue Dulera, prednisone and continued nebulized treatments.  I discussed with pulmonologist.  We are starting IPV.    Paroxysmal atrial fibrillation (HCC)- (present on admission)  Assessment & Plan  S/p ablation.  Continue amiodarone and carvedilol.  Continue Warfarin. INR 1.9. giving lovenox.    Acute on chronic respiratory failure with hypoxia (HCC)- (present on admission)  Assessment & Plan  Baseline 2-3 L, saturating in the 70s and requiring 6 L on presentation  Secondary to asthma/COPD exacerbation  significant volume overload due to CHF exacerbation and pneumonia (severe PAH)  On 4LNC  Patient has a portable pulse oxygen machine.  I discussed with Pulmonologist for re-evaluation    Acute on chronic heart failure with preserved ejection fraction (HFpEF) (HCC)- (present on admission)  Assessment & Plan  Volume overload on exam with B LE edema  NT-proBNP 4000  Previous echo 12/2024 showed an EF of 55-60%, s/p mitral valve repair, moderate to severe tricuspid regurg, RVSP of 83    Continue carvedilol  Restarted IV Lasix  Consider restarting Entresto.    Benign essential hypertension- (present on admission)  Assessment & Plan  Continue carvedilol  Restarted IV Lasix  Consider restarting Entresto.  SBP goal < 160    Stage 3b chronic kidney disease- (present on  admission)  Assessment & Plan  At baseline.  She will be diuresed for CHF exacerbation  Cr 1.49  Consider restarting Entresto if tomorrow's labs improve.         VTE prophylaxis:    therapeutic anticoagulation with coumadin dosing per pharmacy, adjust PRN      I have performed a physical exam and reviewed and updated ROS and Plan today (6/20/2025). In review of yesterday's note (6/19/2025), there are no changes except as documented above.      Total time spent 52 minutes.    This included my review of patient's overnight RN notes, face to face interview, physical examination, lab analysis.  Also includes repeat visits with the patient, and my documented assessments and interventions above.  I have spoken with specialist from pulmonology.  In addition, I spoke with entire care team on patient's treatment plan, and DC planning on morning rounds and IDT rounds.    This note was generated using voice recognition software which has a chance of producing errors of grammar and content.  I have made every reasonable attempt to find and correct any errors, but it should be expected that some may not be found prior to finalization of this note.

## 2025-06-20 NOTE — PROGRESS NOTES
Charge Nurse Rounding Note     Bedside rounding completed to address quality of care and overall patient experience.     Patient Satisfaction addressed including staff responsiveness. Patient/family are aware of the POC and any questions answered. Thorough safety education completed including use of call light prior to all mobility throughout the entirety of the hospital stay.      Patient/family aware of time of next Hourly Round.     No further questions/concerns currently.

## 2025-06-21 ENCOUNTER — APPOINTMENT (OUTPATIENT)
Dept: RADIOLOGY | Facility: MEDICAL CENTER | Age: 75
DRG: 291 | End: 2025-06-21
Attending: INTERNAL MEDICINE
Payer: MEDICARE

## 2025-06-21 LAB
ALBUMIN SERPL BCP-MCNC: 2.9 G/DL (ref 3.2–4.9)
ANION GAP SERPL CALC-SCNC: 7 MMOL/L (ref 7–16)
BUN SERPL-MCNC: 48 MG/DL (ref 8–22)
CALCIUM ALBUM COR SERPL-MCNC: 10.5 MG/DL (ref 8.5–10.5)
CALCIUM SERPL-MCNC: 9.6 MG/DL (ref 8.5–10.5)
CHLORIDE SERPL-SCNC: 105 MMOL/L (ref 96–112)
CO2 SERPL-SCNC: 29 MMOL/L (ref 20–33)
CREAT SERPL-MCNC: 1.33 MG/DL (ref 0.5–1.4)
GFR SERPLBLD CREATININE-BSD FMLA CKD-EPI: 41 ML/MIN/1.73 M 2
GLUCOSE SERPL-MCNC: 131 MG/DL (ref 65–99)
INR PPP: 1.85 (ref 0.87–1.13)
MAGNESIUM SERPL-MCNC: 2 MG/DL (ref 1.5–2.5)
PHOSPHATE SERPL-MCNC: 3.1 MG/DL (ref 2.5–4.5)
POTASSIUM SERPL-SCNC: 4.6 MMOL/L (ref 3.6–5.5)
PROTHROMBIN TIME: 21.9 SEC (ref 12–14.6)
SODIUM SERPL-SCNC: 141 MMOL/L (ref 135–145)

## 2025-06-21 PROCEDURE — 94669 MECHANICAL CHEST WALL OSCILL: CPT

## 2025-06-21 PROCEDURE — 700102 HCHG RX REV CODE 250 W/ 637 OVERRIDE(OP): Performed by: INTERNAL MEDICINE

## 2025-06-21 PROCEDURE — 700111 HCHG RX REV CODE 636 W/ 250 OVERRIDE (IP): Performed by: INTERNAL MEDICINE

## 2025-06-21 PROCEDURE — 83735 ASSAY OF MAGNESIUM: CPT

## 2025-06-21 PROCEDURE — 85610 PROTHROMBIN TIME: CPT

## 2025-06-21 PROCEDURE — 36415 COLL VENOUS BLD VENIPUNCTURE: CPT

## 2025-06-21 PROCEDURE — 71045 X-RAY EXAM CHEST 1 VIEW: CPT

## 2025-06-21 PROCEDURE — 700101 HCHG RX REV CODE 250: Performed by: INTERNAL MEDICINE

## 2025-06-21 PROCEDURE — A9270 NON-COVERED ITEM OR SERVICE: HCPCS | Performed by: STUDENT IN AN ORGANIZED HEALTH CARE EDUCATION/TRAINING PROGRAM

## 2025-06-21 PROCEDURE — 99232 SBSQ HOSP IP/OBS MODERATE 35: CPT | Performed by: INTERNAL MEDICINE

## 2025-06-21 PROCEDURE — A9270 NON-COVERED ITEM OR SERVICE: HCPCS | Performed by: INTERNAL MEDICINE

## 2025-06-21 PROCEDURE — 80069 RENAL FUNCTION PANEL: CPT

## 2025-06-21 PROCEDURE — 99233 SBSQ HOSP IP/OBS HIGH 50: CPT | Performed by: INTERNAL MEDICINE

## 2025-06-21 PROCEDURE — 700101 HCHG RX REV CODE 250: Mod: JZ | Performed by: INTERNAL MEDICINE

## 2025-06-21 PROCEDURE — 94760 N-INVAS EAR/PLS OXIMETRY 1: CPT

## 2025-06-21 PROCEDURE — 770020 HCHG ROOM/CARE - TELE (206)

## 2025-06-21 PROCEDURE — 94640 AIRWAY INHALATION TREATMENT: CPT

## 2025-06-21 PROCEDURE — 700102 HCHG RX REV CODE 250 W/ 637 OVERRIDE(OP): Performed by: STUDENT IN AN ORGANIZED HEALTH CARE EDUCATION/TRAINING PROGRAM

## 2025-06-21 RX ORDER — SODIUM CHLORIDE FOR INHALATION 7 %
4 VIAL, NEBULIZER (ML) INHALATION
Status: DISCONTINUED | OUTPATIENT
Start: 2025-06-21 | End: 2025-06-21

## 2025-06-21 RX ORDER — GUAIFENESIN 600 MG/1
600 TABLET, EXTENDED RELEASE ORAL EVERY 12 HOURS
Status: DISCONTINUED | OUTPATIENT
Start: 2025-06-21 | End: 2025-06-25 | Stop reason: HOSPADM

## 2025-06-21 RX ORDER — SODIUM CHLORIDE FOR INHALATION 7 %
4 VIAL, NEBULIZER (ML) INHALATION
Status: DISCONTINUED | OUTPATIENT
Start: 2025-06-21 | End: 2025-06-25

## 2025-06-21 RX ORDER — ACETYLCYSTEINE 200 MG/ML
3 SOLUTION ORAL; RESPIRATORY (INHALATION)
Status: DISCONTINUED | OUTPATIENT
Start: 2025-06-21 | End: 2025-06-25

## 2025-06-21 RX ORDER — ENOXAPARIN SODIUM 100 MG/ML
1 INJECTION SUBCUTANEOUS EVERY 12 HOURS
Status: DISCONTINUED | OUTPATIENT
Start: 2025-06-21 | End: 2025-06-23

## 2025-06-21 RX ADMIN — GUAIFENESIN 600 MG: 600 TABLET, EXTENDED RELEASE ORAL at 21:24

## 2025-06-21 RX ADMIN — ATORVASTATIN CALCIUM 10 MG: 10 TABLET, FILM COATED ORAL at 21:24

## 2025-06-21 RX ADMIN — MOMETASONE FUROATE AND FORMOTEROL FUMARATE DIHYDRATE 2 PUFF: 200; 5 AEROSOL RESPIRATORY (INHALATION) at 21:25

## 2025-06-21 RX ADMIN — Medication 4 ML: at 19:24

## 2025-06-21 RX ADMIN — ENOXAPARIN SODIUM 80 MG: 100 INJECTION SUBCUTANEOUS at 07:07

## 2025-06-21 RX ADMIN — LEVALBUTEROL HYDROCHLORIDE 1.25 MG: 1.25 SOLUTION RESPIRATORY (INHALATION) at 19:24

## 2025-06-21 RX ADMIN — MOMETASONE FUROATE AND FORMOTEROL FUMARATE DIHYDRATE 2 PUFF: 200; 5 AEROSOL RESPIRATORY (INHALATION) at 04:58

## 2025-06-21 RX ADMIN — CARVEDILOL 6.25 MG: 6.25 TABLET, FILM COATED ORAL at 07:42

## 2025-06-21 RX ADMIN — FUROSEMIDE 20 MG: 10 INJECTION, SOLUTION INTRAVENOUS at 04:58

## 2025-06-21 RX ADMIN — ACETYLCYSTEINE 3 ML: 200 SOLUTION ORAL; RESPIRATORY (INHALATION) at 19:24

## 2025-06-21 RX ADMIN — CARVEDILOL 6.25 MG: 6.25 TABLET, FILM COATED ORAL at 17:01

## 2025-06-21 RX ADMIN — ENOXAPARIN SODIUM 80 MG: 100 INJECTION SUBCUTANEOUS at 17:01

## 2025-06-21 RX ADMIN — CALCITRIOL CAPSULES 0.25 MCG 0.5 MCG: 0.25 CAPSULE ORAL at 04:57

## 2025-06-21 RX ADMIN — AMIODARONE HYDROCHLORIDE 100 MG: 200 TABLET ORAL at 21:24

## 2025-06-21 ASSESSMENT — ENCOUNTER SYMPTOMS
SPUTUM PRODUCTION: 1
WEAKNESS: 1
ABDOMINAL PAIN: 0
WHEEZING: 0
SHORTNESS OF BREATH: 1
COUGH: 1

## 2025-06-21 ASSESSMENT — PAIN DESCRIPTION - PAIN TYPE: TYPE: ACUTE PAIN

## 2025-06-21 NOTE — PROGRESS NOTES
Telemetry Shift Summary    Rhythm A Paced  HR Range 60  Ectopy rPAC, rPVC   Measurement     Normal Values  Rhythm SR  HR Range   Measurement 0.12-.020 / 0.06-0.10 / 0.30-0.52

## 2025-06-21 NOTE — PROGRESS NOTES
Inpatient Anticoagulation Service Note for 6/20/2025      Reason for Anticoagulation: Atrial Fibrillation, Mitral Valve Repair   [id number] VASc Score: 9      High risk for bleed: No        Hemoglobin Value: (!) 11.4  Hematocrit Value: (!) 36.7  Lab Platelet Value: (!) 139  Target INR: 2.0 to 3.0     Date/Time INR Value    06/20/25 0013 1.9     06/19/25 0300 2.01     06/18/25 0101 2.73     06/17/25 0259 3.48     06/16/25 0137 4.34     06/14/25 2230 3.4       Date/Time Dose (mg)    06/20/25 1706 5     06/19/25 1800 5     06/18/25 1142 2.5     06/17/25 1517 1     06/16/25 0912 0     06/15/25 0140 2      Average Dose (mg):  Reported home regimen Coumadin 5 mg daily with last dose 6/10/25 prior to arrival  Significant Interactions: Amiodarone, completed rocephin and prednisone 6/18/25  Bridge Therapy: No   Reversal Agent Administered: Not Applicable      Comments: Patient on Coumadin chronically for a hx of Afib and MV repair.  Reviewed recent outpatient dosing history and patient has been pretty stable on Coumadin 5 mg daily.  Last dose prior to admission reported as 6/10/25 per med rec.  Suprathrepaeutic INR trend during this admission while acute illness, and completion of antibiotics and steroids 6/18/25.  Reduced doses of Coumadin given and doses held throuhgout stay, INR trending slightly subtherapeutic today at 1.9.  Home dose of 5 mg given last night.  Expect INR downward trend to slow and INR begin to increase.      Plan:  Coumadin 5 mg po once tonight.  INR daily x 7 ordered until stabilized.  Pharmacy to monitor and adjust as needed.    Education Material Provided?: No (Chronic Coumadin Patient)    Pharmacist suggested discharge dosing: Coumadin 5 mg daily with INR check 48-72 hrs post discharge      Hillary Ray, PharmD, BCPS

## 2025-06-21 NOTE — CARE PLAN
Daily Note     Today's date: 2022  Patient name: Katt Hanks  : [id number]  MRN: [id number]  Referring provider: Duane Pearl  Dx:   Encounter Diagnosis     ICD-10-CM    1  Traumatic tear of supraspinatus tendon of right shoulder, subsequent encounter  [id number]    2  S/P right rotator cuff repair  [id number]                   Subjective: Pt states she saw pain mgmt and was put on hydrocodone which has been helping her shld and neck pain  Reports compliance with HEP at least 3x/day  Notes she is by herself most of the time and has had to make modifications to daily tasks  Objective: See treatment diary below      Assessment: Tolerated treatment well  Patient exhibited good technique with therapeutic exercises and would benefit from continued PT  Pt has pain with PROM and tries to tolerate as much as possible  Pt has diff relaxing during PROM ex due to ms guarding  Plan: Continue per plan of care        Precautions: POTS, severe scoliosis,fibromyalgia,GI issues,lumbar spinal cage, cerv DDD,arachnoiditis  Arachnoid cysts    Re-eval Date:  22    Date    RE 4/11   4/15 4/22   4/29   Visit Count  8 9  10 11   12   FOTO Comp        Pain In 8 7/10  7-8/10   710    Pain Out  /10              Manuals 4/8/22 4/11 4/15  4/22   4/29   PROM R SHLD  15 mins 10 min 10min 10 min   Mass RUT    10 min                    Neuro Re-Ed        scap retraction                                                        Ther Ex        Wrist AROM  30 ea 30x ea 30x ea  30x ea 30x   Elbow ROM AAROM 30x AAROM 30x AAROM  30x AROM  30 AROM 30    strengthening Fisting 30x Digiflex Yellow  30x digiflex  Red  30x digiflex green   30x  Ball red 30x digiflex green   30x  Ball red 30x   cerv AROM 10x ea dir 10x ea dir 10x ea dir 10 x 10"  10 x 10"   Pendulum ex  20 ea 20x ea  20x 30x  30x                           Table slides  Flex, arc        Wand ex  Flex, ER, EXT, IR Abd        Wall slides        Pulleys  3 The patient is Stable - Low risk of patient condition declining or worsening    Shift Goals  Clinical Goals: Pulm consult  Patient Goals: Ambulate  Family Goals: OLIMPIA    Progress made toward(s) clinical / shift goals:    Problem: Nutrition - Advanced  Goal: Patient will display progressive weight gain toward goal have adequate food and fluid intake  Outcome: Progressing  Flowsheets (Taken 6/21/2025 1201)  Advanced Nutrition:   Promoted systemic fluid hydration within cardiac tolerance   Provided oral care     Problem: Fluid Volume  Goal: Fluid volume balance will be maintained  Outcome: Progressing  Note: I/O's monitored.       Patient is not progressing towards the following goals:       way        UBE        MTPs/LTPs        Bicep curls  3 way        Prone flex, HA, rows, ext        Ther Activity                        Gait Training                        Modalities        CP to R shld

## 2025-06-21 NOTE — PROGRESS NOTES
Bedside report received from kendra RNCindy. Patient awake and alert in bed AOx4. Pt is on 3L NC. Pt has no complaints of pain. Bed is locked and in low position. Water pitcher refilled. 240mL of free water given. Reviewed plan of care with patient. Call light within reach. No other needs at this time.

## 2025-06-21 NOTE — CARE PLAN
The patient is Stable - Low risk of patient condition declining or worsening    Shift Goals  Clinical Goals: Monitor O2, Encourage IS, Monitor I/O  Patient Goals: Sleep and go home  Family Goals: OLIMPIA    Progress made toward(s) clinical / shift goals: Pt is AO x4. Pt is on 3L NC. Pt had no complaints of pain. VSS. Pt was able to do IS at 750. Pt I/O were documented. Pt has call light within reach, bed in lowest position, and no other needs at this time.      Problem: Self Care  Goal: Patient will have the ability to perform ADLs independently or with assistance (bathe, groom, dress, toilet and feed)  Outcome: Progressing     Problem: Fall Risk  Goal: Patient will remain free from falls  Outcome: Progressing       Patient is not progressing towards the following goals:

## 2025-06-21 NOTE — PROGRESS NOTES
Telemetry shift summary    Rhythm: A Paced, SR with BBB  HR: 60-64  Ectopy: -    Measurements: .20 / .11 / .41    Normal values  Rhythm SR  HR   Measurements: 0.12-0.20/0.08-0.10/0.30-0.52

## 2025-06-22 LAB
INR PPP: 2.01 (ref 0.87–1.13)
PROTHROMBIN TIME: 23.4 SEC (ref 12–14.6)

## 2025-06-22 PROCEDURE — 700102 HCHG RX REV CODE 250 W/ 637 OVERRIDE(OP): Performed by: STUDENT IN AN ORGANIZED HEALTH CARE EDUCATION/TRAINING PROGRAM

## 2025-06-22 PROCEDURE — 94640 AIRWAY INHALATION TREATMENT: CPT

## 2025-06-22 PROCEDURE — 85610 PROTHROMBIN TIME: CPT

## 2025-06-22 PROCEDURE — 700102 HCHG RX REV CODE 250 W/ 637 OVERRIDE(OP): Performed by: INTERNAL MEDICINE

## 2025-06-22 PROCEDURE — A9270 NON-COVERED ITEM OR SERVICE: HCPCS | Performed by: INTERNAL MEDICINE

## 2025-06-22 PROCEDURE — 700101 HCHG RX REV CODE 250: Mod: JZ | Performed by: INTERNAL MEDICINE

## 2025-06-22 PROCEDURE — 700111 HCHG RX REV CODE 636 W/ 250 OVERRIDE (IP): Performed by: INTERNAL MEDICINE

## 2025-06-22 PROCEDURE — 36415 COLL VENOUS BLD VENIPUNCTURE: CPT

## 2025-06-22 PROCEDURE — 99232 SBSQ HOSP IP/OBS MODERATE 35: CPT | Performed by: INTERNAL MEDICINE

## 2025-06-22 PROCEDURE — 94760 N-INVAS EAR/PLS OXIMETRY 1: CPT

## 2025-06-22 PROCEDURE — A9270 NON-COVERED ITEM OR SERVICE: HCPCS | Performed by: STUDENT IN AN ORGANIZED HEALTH CARE EDUCATION/TRAINING PROGRAM

## 2025-06-22 PROCEDURE — 770020 HCHG ROOM/CARE - TELE (206)

## 2025-06-22 PROCEDURE — 700101 HCHG RX REV CODE 250: Performed by: INTERNAL MEDICINE

## 2025-06-22 PROCEDURE — 94669 MECHANICAL CHEST WALL OSCILL: CPT

## 2025-06-22 RX ADMIN — GUAIFENESIN 600 MG: 600 TABLET, EXTENDED RELEASE ORAL at 05:05

## 2025-06-22 RX ADMIN — SACUBITRIL AND VALSARTAN 1 TABLET: 24; 26 TABLET, FILM COATED ORAL at 17:16

## 2025-06-22 RX ADMIN — MOMETASONE FUROATE AND FORMOTEROL FUMARATE DIHYDRATE 2 PUFF: 200; 5 AEROSOL RESPIRATORY (INHALATION) at 05:06

## 2025-06-22 RX ADMIN — LEVALBUTEROL HYDROCHLORIDE 1.25 MG: 1.25 SOLUTION RESPIRATORY (INHALATION) at 07:47

## 2025-06-22 RX ADMIN — CARVEDILOL 6.25 MG: 6.25 TABLET, FILM COATED ORAL at 08:13

## 2025-06-22 RX ADMIN — SACUBITRIL AND VALSARTAN 1 TABLET: 24; 26 TABLET, FILM COATED ORAL at 05:05

## 2025-06-22 RX ADMIN — LEVALBUTEROL HYDROCHLORIDE 1.25 MG: 1.25 SOLUTION RESPIRATORY (INHALATION) at 19:14

## 2025-06-22 RX ADMIN — ENOXAPARIN SODIUM 80 MG: 100 INJECTION SUBCUTANEOUS at 17:16

## 2025-06-22 RX ADMIN — ACETYLCYSTEINE 3 ML: 200 SOLUTION ORAL; RESPIRATORY (INHALATION) at 14:51

## 2025-06-22 RX ADMIN — CALCITRIOL CAPSULES 0.25 MCG 0.5 MCG: 0.25 CAPSULE ORAL at 05:05

## 2025-06-22 RX ADMIN — Medication 4 ML: at 07:48

## 2025-06-22 RX ADMIN — ENOXAPARIN SODIUM 80 MG: 100 INJECTION SUBCUTANEOUS at 05:05

## 2025-06-22 RX ADMIN — FUROSEMIDE 20 MG: 10 INJECTION, SOLUTION INTRAVENOUS at 05:05

## 2025-06-22 RX ADMIN — AMIODARONE HYDROCHLORIDE 100 MG: 200 TABLET ORAL at 20:32

## 2025-06-22 RX ADMIN — CARVEDILOL 6.25 MG: 6.25 TABLET, FILM COATED ORAL at 17:16

## 2025-06-22 RX ADMIN — ATORVASTATIN CALCIUM 10 MG: 10 TABLET, FILM COATED ORAL at 20:32

## 2025-06-22 RX ADMIN — ACETYLCYSTEINE 3 ML: 200 SOLUTION ORAL; RESPIRATORY (INHALATION) at 07:46

## 2025-06-22 RX ADMIN — GUAIFENESIN 600 MG: 600 TABLET, EXTENDED RELEASE ORAL at 17:16

## 2025-06-22 RX ADMIN — Medication 4 ML: at 19:14

## 2025-06-22 RX ADMIN — ACETYLCYSTEINE 3 ML: 200 SOLUTION ORAL; RESPIRATORY (INHALATION) at 19:14

## 2025-06-22 ASSESSMENT — ENCOUNTER SYMPTOMS
SPUTUM PRODUCTION: 1
COUGH: 1
WEAKNESS: 1
ABDOMINAL PAIN: 0
SHORTNESS OF BREATH: 1

## 2025-06-22 ASSESSMENT — PAIN DESCRIPTION - PAIN TYPE
TYPE: ACUTE PAIN
TYPE: ACUTE PAIN

## 2025-06-22 NOTE — PROGRESS NOTES
Telemetry Shift Summary    Rhythm Paced  HR Range 60s  Ectopy n/a  Measurements 0.18/0.11/0.40        Normal Values  Rhythm SR  HR Range    Measurements 0.12-0.20 / 0.06-0.10  / 0.30-0.52

## 2025-06-22 NOTE — PROGRESS NOTES
Bedside report received from kendra RN, Natasha AVILA Patient awake and alert in bed AOx4. Pt is on 4L NC. Pt has no complaints of pain at this time. Water pitcher refilled and pt ambulated to the bathroom to void. Bed is locked and in low position. Reviewed plan of care with patient. Call light within reach. No other needs at this time.

## 2025-06-22 NOTE — CARE PLAN
The patient is Stable - Low risk of patient condition declining or worsening    Shift Goals  Clinical Goals: Encourage IS, Monitor O2  Patient Goals: Ambulate and go home  Family Goals: POC Updates      Problem: Knowledge Deficit - Standard  Goal: Patient and family/care givers will demonstrate understanding of plan of care, disease process/condition, diagnostic tests and medications  Description: Target End Date:  1-3 days or as soon as patient condition allows    Document in Patient Education    1.  Patient and family/caregiver oriented to unit, equipment, visitation policy and means for communicating concern  2.  Complete/review Learning Assessment  3.  Assess knowledge level of disease process/condition, treatment plan, diagnostic tests and medications  4.  Explain disease process/condition, treatment plan, diagnostic tests and medications  Outcome: Progressing     Problem: Knowledge Deficit - COPD  Goal: Patient/significant other demonstrates understanding of disease process, utilization of the Action Plan, medications and discharge instruction  Description: Target End Date:  1-3 days or as soon as patient condition allows    Document in Patient Education    1.  Discuss the importance of medical follow-up care, periodic chest x-rays, sputum cultures  2.  Review worsening signs and symptoms of COPD flare and exacerbation and its management  3.  Discuss respiratory medications, side effects, adverse reactions  4.  Demonstrate technique for using a metered-dose inhaler (MDI) and utilization of a spacer  5.  Review the COPD Action Plan  6.  Instruct and reinforce the rationale for breathing exercises, coughing effectively, and general conditioning exercises  7.  Stress importance of oral care and dental hygiene  8.  Discuss the importance of avoiding people with active respiratory infections and need for routine influenza and pneumococcal vaccinations  9.  Discuss factors that may trigger condition and encourage  patient/significant other to explore ways to control these factors in and around the home and work setting  10. Review the harmful effects of smoking and advise cessation of smoking  11. Provide information about activity limitations and alternating activities with rest periods to prevent fatigue  12. Instruct asthmatic patient of use of peak flow meter, as appropriate  13. Review oxygen requirements and dosage, safe use of oxygen, and refer to the supplier as indicated  14. Educate patient/family/caregiver on the use of Nasal Intermittent Positive Pressure Ventilation (NIPPV) and possible adverse reactions  Outcome: Progressing     Problem: Risk for Infection - COPD  Goal: Patient will remain free from signs and symptoms of infection  Description: Target End Date:  Prior to discharge or change in level of care    1.  Infection prevention measures  2.  Instruct patient regarding signs and symptoms of infection  3.  Review the importance of breathing exercises, effective cough, frequent position changes, and adequate fluid intake  4.  Recommend rinsing mouth with water and spitting, not swallowing, or use of a spacer on the mouthpiece of inhaled corticosteroids  Outcome: Progressing     Problem: Nutrition - Advanced  Goal: Patient will display progressive weight gain toward goal have adequate food and fluid intake  Description: Target End Date:  Prior to discharge or change in level of care    1.  Daily weights  2.  Monitor dietary intake  3.  Promote systemic fluid hydration within cardiac tolerance  4.  Albumin and PreAlbumin per provider order  5.  Enteral and parenteral nutrition per provider order  6.  Calorie count  7.  Provide oral care  8.  Collaborate with Clinical Dietician  9.  Consider Speech Therapy consult for swallowing difficulties  10. Administer supplemental oxygen during meals as indicated  Outcome: Progressing     Problem: Ineffective Airway Clearance  Goal: Patient will maintain patent airway  with clear/clearing breath sounds  Description: Target End Date:  Prior to discharge or change in level of care    1.   Position head midline with flexion appropriate for age and/or condition  2.   Assist patient to assume a position of comfort  3.   Assess and monitor breath sounds  4.   Encourage abdominal or pursed-lip breathing exercises  5.   Assist with measures to improve effectiveness of cough effort  6.   Demonstrate effective coughing and deep-breathing techniques  7.   Assist patient to turn every 2 hours  8.   Encourage patient to ambulate as tolerated  9.   Keep environmental pollution to a minimum  10. Airway suctioning as needed  11. Collaborate with RT to administer medications/treatments per order  12. Promote systemic fluid hydration within cardiac tolerance  13. Provide warm or tepid liquids  Outcome: Progressing     Problem: Impaired Gas Exchange  Goal: Patient will demonstrate improved ventilation and adequate oxygenation and participate in treatment regimen within the level of ability/situation.  Description: Target End Date:  Prior to discharge or change in level of care    1.   Assess/monitor rate/rhythm/depth of effort of respirations  2.   Assess oxygenation as ordered  3.   Administer/titrate oxygen as ordered  4.   Position patient for maximum ventilatory efficiency  5.   Turn, cough, and deep breathe  6.   Vital signs, pulse oximetry  7.   Assess color and body temperature  8.   Assess and monitor breath sounds  9.   Encourage deep-slow or pursed-lip breathing exercises  10. Monitor changes in the level of consciousness and mental status  11. Encourage expectoration of sputum; airway suctioning  12. Elevate the head of the bed and position patient for maximum ventilatory efficiency  13. Provide a calm, quiet environment  14. Limit patient's activity during the acute phase and have patient resume activity gradually and increase as individually tolerated  15. Evaluate sleep patterns and  limit stimulants such as caffeine  16. Collaborate with RT to administer medications/treatments as ordered  Outcome: Progressing     Problem: Risk for Aspiration  Goal: Patient's risk for aspiration will be absent or decrease  Description: Target End Date:  Prior to discharge or change in level of care    1.   Complete dysphagia screening on admission  2.   NPO until dysphagia screening complete or medically cleared  3.   Collaborate with Speech Therapy, Clinical Dietitian and interdisciplinary team  4.   Implement aspiration precautions  5.   Assist patient up to chair for meals  6.   Elevate head of bed 90 degrees if patient is unable to get out of bed  7.   Encourage small bites  8.   Ensure foods/liquids are of appropriate consistency  9.   Assess for any signs/symptoms of aspiration  10. Assess breath sounds and vital signs after oral intake  Outcome: Progressing     Problem: Self Care  Goal: Patient will have the ability to perform ADLs independently or with assistance (bathe, groom, dress, toilet and feed)  Description: Target End Date:  Prior to discharge or change in level of care    Document on ADL flowsheet    1.  Assess the capability and level of deficiency to perform ADLs  2.  Encourage family/care giver involvement  3.  Provide assistive devices  4.  Consider PT/OT evaluations  5.  Maintain support, give positive feedback, encourage self-care allowing extra time and verbal cuing as needed  6.  Avoid doing something for patients they can do themselves, but provide assistance as needed  7.  Assist in anticipating/planning individual needs  8.  Collaborate with Case Management and  to meet discharge needs  Outcome: Progressing     Problem: Hemodynamics  Goal: Patient's hemodynamics, fluid balance and neurologic status will be stable or improve  Description: Target End Date:  Prior to discharge or change in level of care    Document on Assessment and I/O flowsheet templates    1.  Monitor  vital signs, pulse oximetry and cardiac monitor per provider order and/or policy  2.  Maintain blood pressure per provider order  3.  Hemodynamic monitoring per provider order  4.  Manage IV fluids and IV infusions  5.  Monitor intake and output  6.  Daily weights per unit policy or provider order  7.  Assess peripheral pulses and capillary refill  8.  Assess color and body temperature  9.  Position patient for maximum circulation/cardiac output  10. Monitor for signs/symptoms of excessive bleeding  11. Assess mental status, restlessness and changes in level of consciousness  12. Monitor temperature and report fever or hypothermia to provider immediately. Consideration of targeted temperature management.  Outcome: Progressing     Problem: Fluid Volume  Goal: Fluid volume balance will be maintained  Description: Target End Date:  Prior to discharge or change in level of care    Document on I/O flowsheet    1.  Monitor intake and output as ordered  2.  Promote oral intake as appropriate  3.  Report inadequate intake or output to physician  4.  Administer IV therapy as ordered  5.  Weights per provider order  6.  Assess for signs and symptoms of bleeding  7.  Monitor for signs of fluid overload (respiratory changes, edema, weight gain, increased abdominal girth)  8.  Monitor of signs for inadequate fluid volume (poor skin turgor, dry mucous membranes)  9.  Instruct patient on adherence to fluid restrictions  Outcome: Progressing     Problem: Urinary - Renal Perfusion  Goal: Ability to achieve and maintain adequate renal perfusion and functioning will improve  Description: Target End Date:  Prior to discharge or change in level of care    Document on I/O and Assessment flowsheet    1.  Urine output will remain greater than 0.5ml/Kg/HR  2.  Monitor amount and/or characteristics of urine per order/policy. Specific gravity per order/policy  3.  Assess signs and symptoms of renal dysfunction  Outcome: Progressing      Problem: Respiratory  Goal: Patient will achieve/maintain optimum respiratory ventilation and gas exchange  Description: Target End Date:  Prior to discharge or change in level of care    Document on Assessment flowsheet    1.  Assess and monitor rate, rhythm, depth and effort of respiration  2.  Breath sounds assessed qshift and/or as needed  3.  Assess O2 saturation, administer/titrate oxygen as ordered  4.  Position patient for maximum ventilatory efficiency  5.  Turn, cough, and deep breath with splinting to improve effectiveness  6.  Collaborate with RT to administer medication/treatments per order  7.  Encourage use of incentive spirometer and encourage patient to cough after use and utilize splinting techniques if applicable  8.  Airway suctioning  9.  Monitor sputum production for changes in color, consistency and frequency  10. Perform frequent oral hygiene  11. Alternate physical activity with rest periods  Outcome: Progressing     Problem: Physical Regulation  Goal: Diagnostic test results will improve  Description: Target End Date:  Prior to discharge or change in level of care    1.  Monitor lactic acid levels  2.  Monitor ABG's  3.  Monitor diagnostic test results  Outcome: Progressing  Goal: Signs and symptoms of infection will decrease  Description: Target End Date:  Prior to discharge or change in level of care    1.  Remove potential routes of infection, such as central lines and urinary catheter  2.  Follow facility protocol for changing IV tubing and sites  3.  Collaborate with Infectious Disease  4.  Antibiotic therapy per provider order  5.  Note drug effects and monitor for antibiotic toxicity  Outcome: Progressing     Problem: Fall Risk  Goal: Patient will remain free from falls  Description: Target End Date:  Prior to discharge or change in level of care    Document interventions on the Jazlyn Walton Fall Risk Assessment    1.  Assess for fall risk factors  2.  Implement fall  precautions  Outcome: Progressing     Problem: Infection - Standard  Goal: Patient will remain free from infection  Description: Target End Date:  Prior to discharge or change in level of care    1.  Utilize Standard Precautions at all times to reduce the risk of transmission of microorganisms from both recognized and  unrecognized sources of infection  2.  Infection prevention handouts provided (general/device/diagnosis specific) and documented in Patient Education  3.  Educate patient and family/caregiver on isolation precautions if applicable  Outcome: Progressing

## 2025-06-22 NOTE — CARE PLAN
Problem: Hyperinflation  Goal: Prevent or improve atelectasis  Description: Target End Date:  3 to 4 days    1. Instruct incentive spirometry usage  2.  Perform hyperinflation therapy as indicated  Outcome: Progressing      No

## 2025-06-22 NOTE — PROGRESS NOTES
Hospital Medicine Daily Progress Note    Date of Service  6/21/2025    Chief Complaint  Salma Lees is a 75 y.o. female admitted 6/14/2025 with   Chief Complaint   Patient presents with    Shortness of Breath     X1wk worse today   Baseline 3L hx of COPD/Asthma  Pt was found on arrival on 4L 75%, EMS gave 1 albuterol/1 duoneb SPO2 increased to 85%  pt increased to 6-8L NC     Sore Throat     Intermitten x1wk    Chest Pain     discomfort    Weight Gain     Hx of CHF   Pt reports she has missed some doses of her medication due to her not feeling well     Fever     102 highest reported at home states she took a home COVID test & was negative    Cough       Hospital Course  History of severe pulm hypertension RVSP of 83, chronic  3 L oxygen and respiratory failure secondary to COPD who was admitted for shortness of breath found to have volume overload, sleep exacerbation and community-acquired pneumonia.  She had acute on chronic respiratory failure hypoxia requiring 6 L nasal cannula due to room air saturation to 74%.  She was started on Solu-Medrol, IV antibiotics and IV Lasix and was admitted for further medical treatment    Interval Problem Update  6/21:  I had an extensive discussion with patient and her son at bedside.  We will continue IPV and conservative treatments at this time until we have exhausted all efforts.  Pulmonology has not recommended bronchoscopy yet.  Patient is still requiring significant amounts of oxygen ambulating.  She required 9 L to reach 89%.  I reviewed labs, sodium 141, potassium 4.6, creatinine 1.33, Phos 3.1, mag 2.0.  INR 1.85, holding warfarin.  Started Lovenox subcut 1 mg/kg every 12 hours.    6/20:  Pt still having a productive cough. 4L resting. Checking walking oxygen needs again today.  I reviewed CXR, RLL is worse, LLL atelectasis. Cr 1.48.   I ordered CT chest without contrast, and reviewed with pulmonologist Dr. Miguel.  Patient has mucous or infection still in  the right lower lobe.  We are starting IPV.  If patient does not have improvement we will consider bronchoscopy.  I reviewed labs, WBC 7.8, procalcitonin 0.06, hgb 11.4, plt 139.  Na 142, K 4.1, Phos 2.7, mg 2.0.  INR 1.9, warfarin 5mg given. We may need to reverse if bronchoscopy is needed.  I called patient's son Tam Lees and gave updates.    6/19:  Patient stated she still has shortness of breath.  We retested home oxygen, she is still desaturating to 82% while on 6 L.  Currently on 4 L nasal cannula at rest. Checking CXR.  I reviewed her labs, creatinine improved to 1.51.  This may be her Baseline from prior labs in 2025 and 2024.  INR 2.01  If patient's kidney function remains maintained at 1.5, we may be able to do a CTA chest in the morning to rule out PE.  She had been on warfarin and was therapeutic on her INR.    6/18:  No acute complaints today. I spoke with patient and son at bedside.  I recommended N95 or masking when in public to reduce her respiratory infectious risks.  Pt needed more oxygen, I submitted new DME for home concentrator.  I reviewed labs, WBC 10.6 from 15.5. continue IV Ceftriaxone.  Cr 1.73, decreased but still elevated.    6/17  Patient still having trouble breathing.  She feels congested still in the right lower lobe.  We are treating for a right lower lobe community-acquired bacterial pneumonia.  Unfortunately she will need to remain on ceftriaxone as she has 16 allergies listed in several are antibiotics.  Patient's creatinine 1.97 from 2.02, 1.52.  Remaining elevated I am holding Entresto as this could be worsening.  Patient's WBCs 15.5.  Dr. Hong INR 3.48 on warfarin.  Patient is still on 3 L nasal cannula.    I have discussed this patient's plan of care and discharge plan at IDT rounds today with Case Management, Nursing, Nursing leadership, and other members of the IDT team.    Consultants/Specialty  pulmonary    Code Status  Full Code    Disposition  The patient is not  medically cleared for discharge to home or a post-acute facility.  Anticipate discharge to: home with organized home healthcare and close outpatient follow-up    I have placed the appropriate orders for post-discharge needs.    Review of Systems  Review of Systems   Constitutional:  Positive for malaise/fatigue.   Respiratory:  Positive for cough, sputum production and shortness of breath.    Cardiovascular:  Negative for chest pain.   Gastrointestinal:  Negative for abdominal pain.   Neurological:  Positive for weakness.   All other systems reviewed and are negative.       Physical Exam  Temp:  [36.1 °C (97 °F)-36.6 °C (97.9 °F)] 36.6 °C (97.9 °F)  Pulse:  [60-66] 60  Resp:  [18] 18  BP: (127-158)/(54-69) 148/69  SpO2:  [90 %-96 %] 95 %    Physical Exam  Vitals and nursing note reviewed.   Constitutional:       General: She is not in acute distress.     Appearance: She is ill-appearing.      Comments: Frail appearing elderly female   HENT:      Head: Normocephalic and atraumatic.      Comments: Temporal muscle wasting positive     Mouth/Throat:      Mouth: Mucous membranes are dry.      Pharynx: No oropharyngeal exudate.   Eyes:      General: No scleral icterus.     Extraocular Movements: Extraocular movements intact.   Cardiovascular:      Rate and Rhythm: Normal rate and regular rhythm.      Pulses: Normal pulses.      Heart sounds: Normal heart sounds. No murmur heard.  Pulmonary:      Effort: Pulmonary effort is normal. No respiratory distress.      Breath sounds: Rhonchi (Right lower lung field) and rales present. No wheezing.   Abdominal:      General: Abdomen is flat. Bowel sounds are normal. There is no distension.      Palpations: Abdomen is soft.      Tenderness: There is no abdominal tenderness.   Musculoskeletal:         General: No swelling or tenderness.      Cervical back: Normal range of motion. No rigidity.      Comments: Sarcopenic. Bilateral hand muscle atrophy noted.   Skin:     General: Skin is  warm.      Capillary Refill: Capillary refill takes less than 2 seconds.      Coloration: Skin is not jaundiced.      Findings: No erythema.   Neurological:      Mental Status: She is alert and oriented to person, place, and time. Mental status is at baseline.      Motor: Weakness present.   Psychiatric:         Mood and Affect: Mood normal.         Behavior: Behavior normal.         Thought Content: Thought content normal.         Judgment: Judgment normal.         Fluids    Intake/Output Summary (Last 24 hours) at 6/21/2025 1949  Last data filed at 6/21/2025 1800  Gross per 24 hour   Intake 1040 ml   Output 2300 ml   Net -1260 ml        Laboratory  Recent Labs     06/19/25  0300 06/20/25  0013   WBC 8.3 7.8   RBC 3.93* 3.83*   HEMOGLOBIN 11.9* 11.4*   HEMATOCRIT 37.4 36.7*   MCV 95.2 95.8   MCH 30.3 29.8   MCHC 31.8* 31.1*   RDW 57.1* 57.2*   PLATELETCT 136* 139*   MPV 11.6 11.0     Recent Labs     06/19/25  0300 06/20/25  0013 06/21/25  0225   SODIUM 139 142 141   POTASSIUM 4.6 4.1 4.6   CHLORIDE 106 105 105   CO2 25 27 29   GLUCOSE 227* 168* 131*   BUN 49* 46* 48*   CREATININE 1.51* 1.48* 1.33   CALCIUM 9.5 9.6 9.6     Recent Labs     06/19/25  0300 06/20/25  0013 06/21/25  0225   INR 2.01* 1.90* 1.85*               Imaging  DX-CHEST-PORTABLE (1 VIEW)   Final Result      Hazy left basilar opacity, atelectasis or infection.      CT-CHEST (THORAX) W/O   Final Result      1.  Fluid opacification of multiple lower lobe posterior bronchi which could be related to mucus impaction or aspiration.   2.  Bilateral basilar atelectasis and/or consolidation. Underlying infection is possible.   3.  Trace right effusion.   4.  Cardiomegaly.   5.  Atherosclerosis with coronary artery disease.         DX-CHEST-PORTABLE (1 VIEW)   Final Result      1.  Worsening volume loss and consolidation within the right lung base.      2.  Cardiomegaly.      DX-CHEST-PORTABLE (1 VIEW)   Final Result      Right basilar opacity is consistent  with pneumonia. Follow-up imaging is recommended to document resolution           Assessment/Plan  * Pneumonia of right lower lobe due to infectious organism- (present on admission)  Assessment & Plan  Worsening shortness of breath, cough with productive sputum for over a week  CXR reviewed and shows an opacity on the right.      Finished IV ceftriaxone and azithromycin  I ordered CT chest without contrast, and reviewed with pulmonologist Dr. Miguel.  Patient has mucous or infection still in the right lower lobe.  We are starting IPV.  If patient does not have improvement we will consider bronchoscopy.  Holding off any further Abx    Pulmonary hypertension (HCC)- (present on admission)  Assessment & Plan  Follows with pulmonology.  Echo 12/2024 showed an RVSP of 83.  Per notes type II secondary to CHF and valvular disease  Cr 1.33  Continue IV Lasix    Asthma-COPD overlap syndrome (HCC)- (present on admission)  Assessment & Plan  Currently with an acute exacerbation secondary to pneumonia.    I discussed with pulmonology, continue current course of treatment.  Pulmonology evaluate patient for COPD.  Continue Dulera and nebulizer treatments  Continue IPV    Paroxysmal atrial fibrillation (HCC)- (present on admission)  Assessment & Plan  S/p ablation.  Continue amiodarone and carvedilol  INR 1.85, holding warfarin.  Started Lovenox subcut 1 mg/kg every 12 hours.    Acute on chronic respiratory failure with hypoxia (HCC)- (present on admission)  Assessment & Plan  Baseline 2-3 L, saturating in the 70s and requiring 6 L on presentation  Secondary to asthma/COPD exacerbation  significant volume overload due to CHF exacerbation and pneumonia (severe PAH)  Patient is still requiring significant amounts of oxygen ambulating.  She required 9 L to reach 89%.  Pulmonology holding off on bronchoscopy for now.  Continue IPV treatment.    Acute on chronic heart failure with preserved ejection fraction (HFpEF) (HCC)- (present  on admission)  Assessment & Plan  Volume overload on exam with B LE edema  NT-proBNP 4000  Previous echo 12/2024 showed an EF of 55-60%, s/p mitral valve repair, moderate to severe tricuspid regurg, RVSP of 83    Continue carvedilol and IV Lasix  Restarting Entresto tomorrow    Benign essential hypertension- (present on admission)  Assessment & Plan  Continue carvedilol  Continue IV Lasix  Restarting Entresto tomorrow  SBP goal < 150    Stage 3b chronic kidney disease- (present on admission)  Assessment & Plan  At baseline.  She will be diuresed for CHF exacerbation  Cr 1.33  Restarting Entresto         VTE prophylaxis:    therapeutic anticoagulation with weight-based lovenox BID, 1 mg/kg      I have performed a physical exam and reviewed and updated ROS and Plan today (6/21/2025). In review of yesterday's note (6/20/2025), there are no changes except as documented above.      Total time spent 51 minutes. I spent greater than 50% of the time for patient care, including unit/floor time, multiple face-to-face encounters with the patient, counseling on treatment plan and discussion with bedside RN.  Further, I spent time on my own review of patient's overnight events, RN notes, imaging and lab analysis, and developing my assessment and plan above.  In addition, working with , social workers, and Charge RN on case coordination on this date.    This note was generated using voice recognition software which has a chance of producing errors of grammar and content.  I have made every reasonable attempt to find and correct any errors, but it should be expected that some may not be found prior to finalization of this note.

## 2025-06-22 NOTE — PROGRESS NOTES
Pulmonary Progress Note    Date of admission  6/14/2025    Chief Complaint  75 y.o. female admitted 6/14/2025 with COPD/asthma exacerbation    Hospital Course  75-year-old female PMH COPD/asthma overlap syndrome, nonischemic cardiomyopathy HFrEF with resultant group 2 pulmonary hypertension, chronic hypoxemic respiratory failure on 2 L ATC with 4 to 5 L with exertion, A-fib status post ablation x2, mitral valve repair, type 2 diabetes, CKD, dyslipidemia and nephrolithiasis status post stents.  Followed in the pulmonary pretension clinic by Dr. Alaniz, last seen just a few weeks ago.  RHC 1/2025 mean PAP 52 mmHg, LVEDP 20 and not a candidate for PAH specific therapies due to LV dysfunction.    She was admitted this hospitalization on 6/14 for increasing cough and sputum production.  Chest imaging demonstrated a right lower lobe opacity.  She was started on empiric antibiotics with ceftriaxone/azithromycin, steroids and diuretics.  She has been weaned from 6 to 4 L nasal cannula, however baseline 2 L ATC.  WBC normalized.  Defervesced.  Procalcitonin 0.34 -> 0.06.  Flu/COVID/RSV swab negative.  Blood cultures negative.    Due to persistent supplemental O2 requirements above baseline, CT chest was obtained 6/20 demonstrating mild intralobular septal thickening, right upper lobe nodule series 3 image 17, large pulmonary arteries keeping with known diagnosis of pulmonary hypertension, endobronchial opacification of the bilateral lower lobe bronchi with dense consolidations in the bilateral lower lobes suggestive of mucous plugging/aspiration.        Interval Problem Update  Reviewed last 24 hour events:  She has been started on Hypersal and IPV  Weak cough precluding expectoration  She is continuing to require up to 7L with ambulation  Has concentrator and POC at home that only go up to 5L    Review of Systems  Review of Systems   Respiratory:  Positive for cough, sputum production and shortness of breath. Negative for  wheezing.    All other systems reviewed and are negative.     Vital Signs for last 24 hours   Temp:  [36.1 °C (97 °F)-36.6 °C (97.9 °F)] 36.6 °C (97.9 °F)  Pulse:  [60-66] 60  Resp:  [18] 18  BP: (127-158)/(54-69) 148/69  SpO2:  [90 %-96 %] 95 %    Physical Exam   Physical Exam  Vitals and nursing note reviewed.   Constitutional:       General: She is not in acute distress.     Appearance: She is not ill-appearing.   Cardiovascular:      Rate and Rhythm: Normal rate. Rhythm irregular.   Pulmonary:      Breath sounds: Rhonchi present.   Musculoskeletal:      Right lower leg: No edema.      Left lower leg: No edema.   Neurological:      General: No focal deficit present.   Psychiatric:         Mood and Affect: Mood normal.         Behavior: Behavior normal.         Thought Content: Thought content normal.         Judgment: Judgment normal.       Medications  Current Medications[1]    Fluids    Intake/Output Summary (Last 24 hours) at 6/21/2025 1804  Last data filed at 6/21/2025 1200  Gross per 24 hour   Intake 1280 ml   Output 1900 ml   Net -620 ml       Laboratory          Recent Labs     06/19/25  0300 06/20/25  0013 06/21/25  0225   SODIUM 139 142 141   POTASSIUM 4.6 4.1 4.6   CHLORIDE 106 105 105   CO2 25 27 29   BUN 49* 46* 48*   CREATININE 1.51* 1.48* 1.33   MAGNESIUM 2.1 2.0 2.0   PHOSPHORUS 3.0 2.7 3.1   CALCIUM 9.5 9.6 9.6     Recent Labs     06/19/25  0300 06/20/25  0013 06/21/25  0225   GLUCOSE 227* 168* 131*     Recent Labs     06/19/25  0300 06/20/25  0013   WBC 8.3 7.8     Recent Labs     06/19/25  0300 06/20/25  0013 06/21/25  0225   RBC 3.93* 3.83*  --    HEMOGLOBIN 11.9* 11.4*  --    HEMATOCRIT 37.4 36.7*  --    PLATELETCT 136* 139*  --    PROTHROMBTM 23.4* 22.4* 21.9*   INR 2.01* 1.90* 1.85*     Imaging  CT:    Reviewed  See HPI    Assessment/Plan    # Right lower lobe community-acquired pneumonia  # Acute on chronic hypoxemic respiratory failure  # Acute on chronic heart failure with preserved EF  #  Nonischemic cardiomyopathy HFrEF with resultant group 2 pulmonary hypertension, s/p mitral valve repair  # A-fib status post ablation x2  # COPD/asthma overlap syndrome     Persistent supplemental O2 requirements above baseline but clinically otherwise imrpoving with downtrending WBC, defervesced, normalized procalcitonin. Persistent O2 reqs due to residual mucous pooling in lower lobes that is difficult to expectorate due to weak cough    Cont IPV/hypersal; add mucomyst and mucinex  Cont IS, ambulation  Suction Yankauer to facilitate expectoration  Continue to titrate supplemental O2 to maintain saturations 88-92%  Bronchoscopy is an option if the above fails, but patient acknowledges that she is at higher risk of complications from sedation/procedure due to comorbidities and agrees to exhaust all options of pulmonary toileting first before bronchoscopy.    I have performed a physical exam and reviewed and updated ROS and Plan today (6/21/2025). In review of yesterday's note (6/20/2025), there are no changes except as documented above.     Discussed patient condition and risk of morbidity and/or mortality with Family, RN, and Patient    This note was generated using voice recognition software which has a chance of producing errors of grammar and content.  I have made every reasonable attempt to find and correct any errors, but it should be expected that some may not be found prior to finalization of this note.  __________  Kristofer Triplett MD  Pulmonary and Critical Care Medicine  [facility]         [1]   Current Facility-Administered Medications   Medication Dose Route Frequency Provider Last Rate Last Admin    enoxaparin (Lovenox) inj 80 mg  1 mg/kg Subcutaneous Q12HRS Dominic Mancera M.D.   80 mg at 06/21/25 1701    sodium chloride (Hyper-Sal) 7 % nebulizer solution 4 mL  4 mL Nebulization BID (RT) Trina Miguel D.O.        furosemide (Lasix) injection 20 mg  20 mg Intravenous Q DAY Dominic PEREZ  FAB Mancera   20 mg at 06/21/25 0458    hydrALAZINE (Apresoline) injection 10 mg  10 mg Intravenous Q6HRS PRN Dominic Mancera M.D.        mometasone-formoterol (Dulera) 200-5 MCG/ACT inhaler 2 Puff  2 Puff Inhalation BID Luz Colin D.O.   2 Puff at 06/21/25 0458    levalbuterol (Xopenex) 1.25 MG/3ML nebulizer solution 1.25 mg  1.25 mg Nebulization Q4H PRN (RT) Dominic Mancera M.D.   1.25 mg at 06/18/25 1518    carvedilol (Coreg) tablet 6.25 mg  6.25 mg Oral BID WITH MEALS Luz Colin D.O.   6.25 mg at 06/21/25 1701    Respiratory Therapy Consult   Nebulization Continuous RT Amara Queen M.D.        amiodarone (Cordarone) tablet 100 mg  100 mg Oral Nightly Amara Queen M.D.   100 mg at 06/20/25 2027    atorvastatin (Lipitor) tablet 10 mg  10 mg Oral Nightly Amara Queen M.D.   10 mg at 06/20/25 2027    calcitRIOL (Rocaltrol) capsule 0.5 mcg  0.5 mcg Oral DAILY Amara Queen M.D.   0.5 mcg at 06/21/25 0457    [Held by provider] sacubitril-valsartan (Entresto) 24-26 MG 1 Tablet  1 Tablet Oral BID Amara Queen M.D.   1 Tablet at 06/17/25 0522    [Held by provider] MD Alert...Warfarin per Pharmacy   Other PHARMACY TO DOSE Amara Queen M.D.        acetaminophen (Tylenol) tablet 650 mg  650 mg Oral Q6HRS PRN Amara Queen M.D.

## 2025-06-22 NOTE — CARE PLAN
The patient is Stable - Low risk of patient condition declining or worsening    Shift Goals  Clinical Goals: Encourage IS, Monitor O2  Patient Goals: Ambulate and go home  Family Goals: POC Updates    Progress made toward(s) clinical / shift goals: Pt is AO x4. Pt is on 4L NC. Pt has no complaints of pain at this time. Pt was encouraged to use IS. VSS. Pt water pitcher refilled, bed in lowest position, and call light within reach. Pt was encouraged to sleep laying down instead of sitting up and pt was compliant. No other needs at this time.      Problem: Knowledge Deficit - COPD  Goal: Patient/significant other demonstrates understanding of disease process, utilization of the Action Plan, medications and discharge instruction  Outcome: Progressing     Problem: Risk for Aspiration  Goal: Patient's risk for aspiration will be absent or decrease  Outcome: Progressing       Patient is not progressing towards the following goals:

## 2025-06-22 NOTE — CARE PLAN
Problem: Hyperinflation  Goal: Prevent or improve atelectasis  Description: Target End Date:  3 to 4 days    1. Instruct incentive spirometry usage  2.  Perform hyperinflation therapy as indicated  Outcome: Progressing     Problem: Bronchopulmonary Hygiene  Goal: Increase mobilization of retained secretions  Description: Target End Date:  2 to 3 days    1.  Perform bronchopulmonary therapy as indicated by assessment  2.  Perform airway suctioning  3.  Perform actions to maintain patient airway  Outcome: Progressing     Problem: Bronchoconstriction  Goal: Improve in air movement and diminished wheezing  Description: Target End Date:  2 to 3 days    1.  Implement inhaled treatments  2.  Evaluate and manage medication effects  Outcome: Progressing

## 2025-06-22 NOTE — PROGRESS NOTES
Telemetry shift summary    Rhythm: A Paced, SR  HR: 60-61  Ectopy: R-PAC    Measurements: .22 / .09 / .40    Normal values  Rhythm SR  HR   Measurements: 0.12-0.20/0.08-0.10/0.30-0.52

## 2025-06-23 LAB
ALBUMIN SERPL BCP-MCNC: 2.8 G/DL (ref 3.2–4.9)
ANION GAP SERPL CALC-SCNC: 9 MMOL/L (ref 7–16)
BUN SERPL-MCNC: 38 MG/DL (ref 8–22)
CALCIUM ALBUM COR SERPL-MCNC: 10.4 MG/DL (ref 8.5–10.5)
CALCIUM SERPL-MCNC: 9.4 MG/DL (ref 8.5–10.5)
CHLORIDE SERPL-SCNC: 100 MMOL/L (ref 96–112)
CO2 SERPL-SCNC: 31 MMOL/L (ref 20–33)
CREAT SERPL-MCNC: 1.38 MG/DL (ref 0.5–1.4)
ERYTHROCYTE [DISTWIDTH] IN BLOOD BY AUTOMATED COUNT: 55.8 FL (ref 35.9–50)
GFR SERPLBLD CREATININE-BSD FMLA CKD-EPI: 40 ML/MIN/1.73 M 2
GLUCOSE SERPL-MCNC: 101 MG/DL (ref 65–99)
HCT VFR BLD AUTO: 35.9 % (ref 37–47)
HGB BLD-MCNC: 11.4 G/DL (ref 12–16)
INR PPP: 1.84 (ref 0.87–1.13)
MAGNESIUM SERPL-MCNC: 2 MG/DL (ref 1.5–2.5)
MCH RBC QN AUTO: 30.2 PG (ref 27–33)
MCHC RBC AUTO-ENTMCNC: 31.8 G/DL (ref 32.2–35.5)
MCV RBC AUTO: 95.2 FL (ref 81.4–97.8)
PHOSPHATE SERPL-MCNC: 3 MG/DL (ref 2.5–4.5)
PLATELET # BLD AUTO: 121 K/UL (ref 164–446)
PMV BLD AUTO: 11.6 FL (ref 9–12.9)
POTASSIUM SERPL-SCNC: 4.3 MMOL/L (ref 3.6–5.5)
PROTHROMBIN TIME: 21.8 SEC (ref 12–14.6)
RBC # BLD AUTO: 3.77 M/UL (ref 4.2–5.4)
SODIUM SERPL-SCNC: 140 MMOL/L (ref 135–145)
WBC # BLD AUTO: 6.9 K/UL (ref 4.8–10.8)

## 2025-06-23 PROCEDURE — 80069 RENAL FUNCTION PANEL: CPT

## 2025-06-23 PROCEDURE — 700101 HCHG RX REV CODE 250: Performed by: INTERNAL MEDICINE

## 2025-06-23 PROCEDURE — A9270 NON-COVERED ITEM OR SERVICE: HCPCS | Performed by: INTERNAL MEDICINE

## 2025-06-23 PROCEDURE — 94760 N-INVAS EAR/PLS OXIMETRY 1: CPT

## 2025-06-23 PROCEDURE — A9270 NON-COVERED ITEM OR SERVICE: HCPCS | Performed by: STUDENT IN AN ORGANIZED HEALTH CARE EDUCATION/TRAINING PROGRAM

## 2025-06-23 PROCEDURE — 700102 HCHG RX REV CODE 250 W/ 637 OVERRIDE(OP): Performed by: STUDENT IN AN ORGANIZED HEALTH CARE EDUCATION/TRAINING PROGRAM

## 2025-06-23 PROCEDURE — 770001 HCHG ROOM/CARE - MED/SURG/GYN PRIV*

## 2025-06-23 PROCEDURE — 700111 HCHG RX REV CODE 636 W/ 250 OVERRIDE (IP): Performed by: INTERNAL MEDICINE

## 2025-06-23 PROCEDURE — 83735 ASSAY OF MAGNESIUM: CPT

## 2025-06-23 PROCEDURE — 700102 HCHG RX REV CODE 250 W/ 637 OVERRIDE(OP): Performed by: INTERNAL MEDICINE

## 2025-06-23 PROCEDURE — 85027 COMPLETE CBC AUTOMATED: CPT

## 2025-06-23 PROCEDURE — 94640 AIRWAY INHALATION TREATMENT: CPT

## 2025-06-23 PROCEDURE — 94669 MECHANICAL CHEST WALL OSCILL: CPT

## 2025-06-23 PROCEDURE — 85610 PROTHROMBIN TIME: CPT

## 2025-06-23 PROCEDURE — 36415 COLL VENOUS BLD VENIPUNCTURE: CPT

## 2025-06-23 PROCEDURE — 99232 SBSQ HOSP IP/OBS MODERATE 35: CPT | Performed by: INTERNAL MEDICINE

## 2025-06-23 RX ORDER — WARFARIN SODIUM 7.5 MG/1
7.5 TABLET ORAL
Status: COMPLETED | OUTPATIENT
Start: 2025-06-23 | End: 2025-06-23

## 2025-06-23 RX ADMIN — CARVEDILOL 6.25 MG: 6.25 TABLET, FILM COATED ORAL at 07:21

## 2025-06-23 RX ADMIN — SACUBITRIL AND VALSARTAN 1 TABLET: 24; 26 TABLET, FILM COATED ORAL at 17:20

## 2025-06-23 RX ADMIN — MOMETASONE FUROATE AND FORMOTEROL FUMARATE DIHYDRATE 2 PUFF: 200; 5 AEROSOL RESPIRATORY (INHALATION) at 17:21

## 2025-06-23 RX ADMIN — ACETYLCYSTEINE 3 ML: 200 SOLUTION ORAL; RESPIRATORY (INHALATION) at 20:00

## 2025-06-23 RX ADMIN — GUAIFENESIN 600 MG: 600 TABLET, EXTENDED RELEASE ORAL at 04:37

## 2025-06-23 RX ADMIN — AMIODARONE HYDROCHLORIDE 100 MG: 200 TABLET ORAL at 20:36

## 2025-06-23 RX ADMIN — CARVEDILOL 6.25 MG: 6.25 TABLET, FILM COATED ORAL at 17:20

## 2025-06-23 RX ADMIN — WARFARIN SODIUM 7.5 MG: 7.5 TABLET ORAL at 17:20

## 2025-06-23 RX ADMIN — Medication 4 ML: at 20:00

## 2025-06-23 RX ADMIN — MOMETASONE FUROATE AND FORMOTEROL FUMARATE DIHYDRATE 2 PUFF: 200; 5 AEROSOL RESPIRATORY (INHALATION) at 04:37

## 2025-06-23 RX ADMIN — ACETYLCYSTEINE 3 ML: 200 SOLUTION ORAL; RESPIRATORY (INHALATION) at 11:08

## 2025-06-23 RX ADMIN — CALCITRIOL CAPSULES 0.25 MCG 0.5 MCG: 0.25 CAPSULE ORAL at 04:37

## 2025-06-23 RX ADMIN — ENOXAPARIN SODIUM 80 MG: 100 INJECTION SUBCUTANEOUS at 04:37

## 2025-06-23 RX ADMIN — GUAIFENESIN 600 MG: 600 TABLET, EXTENDED RELEASE ORAL at 17:20

## 2025-06-23 RX ADMIN — FUROSEMIDE 20 MG: 10 INJECTION, SOLUTION INTRAVENOUS at 04:36

## 2025-06-23 RX ADMIN — ATORVASTATIN CALCIUM 10 MG: 10 TABLET, FILM COATED ORAL at 20:36

## 2025-06-23 RX ADMIN — SACUBITRIL AND VALSARTAN 1 TABLET: 24; 26 TABLET, FILM COATED ORAL at 04:36

## 2025-06-23 ASSESSMENT — ENCOUNTER SYMPTOMS
SPUTUM PRODUCTION: 1
ABDOMINAL PAIN: 0
WEAKNESS: 1
SHORTNESS OF BREATH: 1
COUGH: 1

## 2025-06-23 ASSESSMENT — PATIENT HEALTH QUESTIONNAIRE - PHQ9
SUM OF ALL RESPONSES TO PHQ9 QUESTIONS 1 AND 2: 0
1. LITTLE INTEREST OR PLEASURE IN DOING THINGS: NOT AT ALL
2. FEELING DOWN, DEPRESSED, IRRITABLE, OR HOPELESS: NOT AT ALL

## 2025-06-23 ASSESSMENT — PAIN DESCRIPTION - PAIN TYPE: TYPE: ACUTE PAIN

## 2025-06-23 NOTE — PROGRESS NOTES
Hospital Medicine Daily Progress Note    Date of Service  6/22/2025    Chief Complaint  Salma Lees is a 75 y.o. female admitted 6/14/2025 with   Chief Complaint   Patient presents with    Shortness of Breath     X1wk worse today   Baseline 3L hx of COPD/Asthma  Pt was found on arrival on 4L 75%, EMS gave 1 albuterol/1 duoneb SPO2 increased to 85%  pt increased to 6-8L NC     Sore Throat     Intermitten x1wk    Chest Pain     discomfort    Weight Gain     Hx of CHF   Pt reports she has missed some doses of her medication due to her not feeling well     Fever     102 highest reported at home states she took a home COVID test & was negative    Cough       Hospital Course  History of severe pulm hypertension RVSP of 83, chronic  3 L oxygen and respiratory failure secondary to COPD who was admitted for shortness of breath found to have volume overload, sleep exacerbation and community-acquired pneumonia.  She had acute on chronic respiratory failure hypoxia requiring 6 L nasal cannula due to room air saturation to 74%.  She was started on Solu-Medrol, IV antibiotics and IV Lasix and was admitted for further medical treatment    Interval Problem Update  6/22:  Patient still requiring 7 to 10 L while walking to maintain oxygen saturation of 89%.  At rest she is at 3 L.  I-S up to 1 L, improved from 500 and 750  INR 2.01.  Holding warfarin.  Continue Lovenox.  No bronchoscopy at this time  Continue IPV for atelectasis.    6/21:  I had an extensive discussion with patient and her son at bedside.  We will continue IPV and conservative treatments at this time until we have exhausted all efforts.  Pulmonology has not recommended bronchoscopy yet.  Patient is still requiring significant amounts of oxygen ambulating.  She required 9 L to reach 89%.  I reviewed labs, sodium 141, potassium 4.6, creatinine 1.33, Phos 3.1, mag 2.0.  INR 1.85, holding warfarin.  Started Lovenox subcut 1 mg/kg every 12 hours.    6/20:  Pt  still having a productive cough. 4L resting. Checking walking oxygen needs again today.  I reviewed CXR, RLL is worse, LLL atelectasis. Cr 1.48.   I ordered CT chest without contrast, and reviewed with pulmonologist Dr. Miguel.  Patient has mucous or infection still in the right lower lobe.  We are starting IPV.  If patient does not have improvement we will consider bronchoscopy.  I reviewed labs, WBC 7.8, procalcitonin 0.06, hgb 11.4, plt 139.  Na 142, K 4.1, Phos 2.7, mg 2.0.  INR 1.9, warfarin 5mg given. We may need to reverse if bronchoscopy is needed.  I called patient's son Tam Lees and gave updates.    6/19:  Patient stated she still has shortness of breath.  We retested home oxygen, she is still desaturating to 82% while on 6 L.  Currently on 4 L nasal cannula at rest. Checking CXR.  I reviewed her labs, creatinine improved to 1.51.  This may be her Baseline from prior labs in 2025 and 2024.  INR 2.01  If patient's kidney function remains maintained at 1.5, we may be able to do a CTA chest in the morning to rule out PE.  She had been on warfarin and was therapeutic on her INR.    6/18:  No acute complaints today. I spoke with patient and son at bedside.  I recommended N95 or masking when in public to reduce her respiratory infectious risks.  Pt needed more oxygen, I submitted new DME for home concentrator.  I reviewed labs, WBC 10.6 from 15.5. continue IV Ceftriaxone.  Cr 1.73, decreased but still elevated.    6/17  Patient still having trouble breathing.  She feels congested still in the right lower lobe.  We are treating for a right lower lobe community-acquired bacterial pneumonia.  Unfortunately she will need to remain on ceftriaxone as she has 16 allergies listed in several are antibiotics.  Patient's creatinine 1.97 from 2.02, 1.52.  Remaining elevated I am holding Entresto as this could be worsening.  Patient's WBCs 15.5.  Dr. Hong INR 3.48 on warfarin.  Patient is still on 3 L nasal  cannula.    I have discussed this patient's plan of care and discharge plan at IDT rounds today with Case Management, Nursing, Nursing leadership, and other members of the IDT team.    Consultants/Specialty  pulmonary    Code Status  Full Code    Disposition  The patient is not medically cleared for discharge to home or a post-acute facility.      I have placed the appropriate orders for post-discharge needs.    Review of Systems  Review of Systems   Constitutional:  Positive for malaise/fatigue.   Respiratory:  Positive for cough, sputum production and shortness of breath.    Cardiovascular:  Negative for chest pain.   Gastrointestinal:  Negative for abdominal pain.   Neurological:  Positive for weakness.   All other systems reviewed and are negative.       Physical Exam  Temp:  [36.3 °C (97.4 °F)-36.9 °C (98.5 °F)] 36.8 °C (98.2 °F)  Pulse:  [60-71] 60  Resp:  [17-18] 17  BP: (131-166)/(45-68) 154/60  SpO2:  [90 %-99 %] 95 %    Physical Exam  Vitals and nursing note reviewed.   Constitutional:       General: She is not in acute distress.     Appearance: She is ill-appearing.      Comments: Frail appearing elderly female   HENT:      Head: Normocephalic and atraumatic.      Comments: Temporal muscle wasting positive     Mouth/Throat:      Mouth: Mucous membranes are dry.      Pharynx: No oropharyngeal exudate.   Eyes:      General: No scleral icterus.     Extraocular Movements: Extraocular movements intact.   Cardiovascular:      Rate and Rhythm: Normal rate and regular rhythm.      Pulses: Normal pulses.      Heart sounds: Normal heart sounds. No murmur heard.  Pulmonary:      Effort: Pulmonary effort is normal. No respiratory distress.      Breath sounds: Rhonchi (bilateral central anterior and posterior.) and rales present. No wheezing.   Abdominal:      General: Abdomen is flat. Bowel sounds are normal. There is no distension.      Palpations: Abdomen is soft.      Tenderness: There is no abdominal tenderness.    Musculoskeletal:         General: No swelling or tenderness.      Cervical back: Normal range of motion. No rigidity.      Comments: Sarcopenic. Bilateral hand muscle atrophy noted.   Skin:     General: Skin is warm.      Capillary Refill: Capillary refill takes less than 2 seconds.      Coloration: Skin is not jaundiced.      Findings: No erythema.   Neurological:      Mental Status: She is alert and oriented to person, place, and time. Mental status is at baseline.      Motor: Weakness present.   Psychiatric:         Mood and Affect: Mood normal.         Behavior: Behavior normal.         Thought Content: Thought content normal.         Judgment: Judgment normal.         Fluids    Intake/Output Summary (Last 24 hours) at 6/22/2025 1742  Last data filed at 6/22/2025 1201  Gross per 24 hour   Intake 660 ml   Output 2350 ml   Net -1690 ml        Laboratory  Recent Labs     06/20/25  0013   WBC 7.8   RBC 3.83*   HEMOGLOBIN 11.4*   HEMATOCRIT 36.7*   MCV 95.8   MCH 29.8   MCHC 31.1*   RDW 57.2*   PLATELETCT 139*   MPV 11.0     Recent Labs     06/20/25  0013 06/21/25  0225   SODIUM 142 141   POTASSIUM 4.1 4.6   CHLORIDE 105 105   CO2 27 29   GLUCOSE 168* 131*   BUN 46* 48*   CREATININE 1.48* 1.33   CALCIUM 9.6 9.6     Recent Labs     06/20/25  0013 06/21/25  0225 06/22/25  0057   INR 1.90* 1.85* 2.01*               Imaging  DX-CHEST-PORTABLE (1 VIEW)   Final Result      Hazy left basilar opacity, atelectasis or infection.      CT-CHEST (THORAX) W/O   Final Result      1.  Fluid opacification of multiple lower lobe posterior bronchi which could be related to mucus impaction or aspiration.   2.  Bilateral basilar atelectasis and/or consolidation. Underlying infection is possible.   3.  Trace right effusion.   4.  Cardiomegaly.   5.  Atherosclerosis with coronary artery disease.         DX-CHEST-PORTABLE (1 VIEW)   Final Result      1.  Worsening volume loss and consolidation within the right lung base.      2.   Cardiomegaly.      DX-CHEST-PORTABLE (1 VIEW)   Final Result      Right basilar opacity is consistent with pneumonia. Follow-up imaging is recommended to document resolution           Assessment/Plan  * Pneumonia of right lower lobe due to infectious organism- (present on admission)  Assessment & Plan  Worsening shortness of breath, cough with productive sputum for over a week  CXR reviewed and shows an opacity on the right.      Finished IV ceftriaxone and azithromycin  I ordered CT chest without contrast, and reviewed with pulmonologist Dr. Miguel.  Patient has mucous or infection still in the right lower lobe.  We are starting IPV.  If patient does not have improvement we will consider bronchoscopy.  Holding off any further Abx    Pulmonary hypertension (HCC)- (present on admission)  Assessment & Plan  Follows with pulmonology.  Echo 12/2024 showed an RVSP of 83.  Per notes type II secondary to CHF and valvular disease  Cr 1.33  Continue IV Lasix    Asthma-COPD overlap syndrome (HCC)- (present on admission)  Assessment & Plan  Currently with an acute exacerbation secondary to pneumonia.    I discussed with pulmonology, continue current course of treatment.  Pulmonology evaluate patient for COPD.  Continue with Dulera and nebulized treatments  Continue IPV  Continue hypertonic nebulizer 7%    Paroxysmal atrial fibrillation (HCC)- (present on admission)  Assessment & Plan  S/p ablation.  Continue amiodarone and carvedilol  INR 2.01, holding warfarin.  Continue Lovenox    Acute on chronic respiratory failure with hypoxia (HCC)- (present on admission)  Assessment & Plan  Baseline 2-3 L, saturating in the 70s and requiring 6 L on presentation  Secondary to asthma/COPD exacerbation  significant volume overload due to CHF exacerbation and pneumonia (severe PAH)  Patient is still requiring significant amounts of oxygen ambulating.  She required 9 L to reach 89%.  Pulmonology holding off on bronchoscopy for now.     Continue IPV, 7% hypertonic saline nebulizer.    Acute on chronic heart failure with preserved ejection fraction (HFpEF) ([facility])- (present on admission)  Assessment & Plan  Volume overload on exam with B LE edema  NT-proBNP 4000  Previous echo 12/2024 showed an EF of 55-60%, s/p mitral valve repair, moderate to severe tricuspid regurg, RVSP of 83    Continue carvedilol, Entresto and IV Lasix    Benign essential hypertension- (present on admission)  Assessment & Plan  Continue carvedilol  Continue IV Lasix  Continue Entresto  SBP goal < 150    Stage 3b chronic kidney disease- (present on admission)  Assessment & Plan  At baseline.  She will be diuresed for CHF exacerbation  Cr 1.33  Continue Entresto         VTE prophylaxis:    therapeutic anticoagulation with weight-based lovenox BID, 1 mg/kg      I have performed a physical exam and reviewed and updated ROS and Plan today (6/22/2025). In review of yesterday's note (6/21/2025), there are no changes except as documented above.      Total time spent 40 minutes.    This included my review of patient's overnight RN notes, face to face interview, physical examination, lab analysis.  Also includes repeat visits with the patient, and my documented assessments and interventions above.  In addition, I spoke with entire care team on patient's treatment plan, and DC planning on morning rounds and IDT rounds.    This note was generated using voice recognition software which has a chance of producing errors of grammar and content.  I have made every reasonable attempt to find and correct any errors, but it should be expected that some may not be found prior to finalization of this note.

## 2025-06-23 NOTE — PROGRESS NOTES
Inpatient Anticoagulation Service Note    Date: 6/23/2025  Reason for Anticoagulation: Atrial Fibrillation, Mitral Valve Repair   [id number] VASc Score: 9  HAS-BLED Score: 0    Hemoglobin Value: (!) 11.4  Hematocrit Value: (!) 35.9  Lab Platelet Value: (!) 121  Target INR: 2.0 to 3.0     Date/Time INR Value    06/23/25 0022 1.84     06/22/25 0057 2.01     06/21/25 0225 1.85     06/20/25 0013 1.9     06/19/25 0300 2.01     06/18/25 0101 2.73     06/17/25 0259 3.48       Date/Time Dose (mg)    06/23/25 1321 7.5     06/20/25 1706 5     06/19/25 1800 5     06/18/25 1153 --     06/18/25 1142 2.5     06/17/25 1517 1      Average Dose (mg):  Reported home regimen Coumadin 5 mg daily   Significant Interactions: Amiodarone   Bridge Therapy: No, enoxaparin discontinued today. Discussed with Dr Mancera.    Reversal Agent Administered: Not Applicable    Comments: Warfarin resuming today, with no current plans for bronch. Last warfarin dose 6/20. INR 1.84 this morning and stable over last few days, anticipating INR decrease soon. Discussed plan with physician, jasmin enoxaparin now, and give increased dose warfarin 7.5 mg tonight and then home dose 5 mg thereafter with INR trending to ensure INR stable.    Plan:  Warfarin 7.5 mg this evening.  Education Material Provided?: No (Chronic warfarin therapy.)  Pharmacist suggested discharge dosing: home regimen with INR follow within 2-3 days      Abhinav Rodriguez, PharmD

## 2025-06-23 NOTE — CARE PLAN
The patient is Stable - Low risk of patient condition declining or worsening    Shift Goals  Clinical Goals: Wean O2 / Monitor VS  Patient Goals: Rest  Family Goals: OLIMPIA    Progress made toward(s) clinical / shift goals:  Pt O2 being weaned. Pt walking O2 required 7L. Pt mobilized multiple times during shift.    Patient is not progressing towards the following goals:

## 2025-06-23 NOTE — CARE PLAN
Problem: Respiratory  Goal: Patient will achieve/maintain optimum respiratory ventilation and gas exchange  Outcome: Progressing     Problem: Fall Risk  Goal: Patient will remain free from falls  Outcome: Progressing   The patient is Stable - Low risk of patient condition declining or worsening    Shift Goals  Clinical Goals: wean oxygen,monitor VS and labs,diurese  Patient Goals: rest  Family Goals: POC Updates    Progress made toward(s) clinical / shift goals:  Remained free from falls.Adequate urine output    Patient is not progressing towards the following goals:

## 2025-06-24 PROBLEM — D69.6 THROMBOCYTOPENIA (HCC): Status: ACTIVE | Noted: 2025-06-24

## 2025-06-24 LAB
INR PPP: 1.29 (ref 0.87–1.13)
PROTHROMBIN TIME: 16.5 SEC (ref 12–14.6)

## 2025-06-24 PROCEDURE — A9270 NON-COVERED ITEM OR SERVICE: HCPCS | Performed by: INTERNAL MEDICINE

## 2025-06-24 PROCEDURE — A9270 NON-COVERED ITEM OR SERVICE: HCPCS | Performed by: STUDENT IN AN ORGANIZED HEALTH CARE EDUCATION/TRAINING PROGRAM

## 2025-06-24 PROCEDURE — 85610 PROTHROMBIN TIME: CPT

## 2025-06-24 PROCEDURE — 770001 HCHG ROOM/CARE - MED/SURG/GYN PRIV*

## 2025-06-24 PROCEDURE — 700102 HCHG RX REV CODE 250 W/ 637 OVERRIDE(OP): Performed by: INTERNAL MEDICINE

## 2025-06-24 PROCEDURE — 94669 MECHANICAL CHEST WALL OSCILL: CPT

## 2025-06-24 PROCEDURE — 700101 HCHG RX REV CODE 250: Performed by: INTERNAL MEDICINE

## 2025-06-24 PROCEDURE — 700102 HCHG RX REV CODE 250 W/ 637 OVERRIDE(OP): Performed by: STUDENT IN AN ORGANIZED HEALTH CARE EDUCATION/TRAINING PROGRAM

## 2025-06-24 PROCEDURE — 99232 SBSQ HOSP IP/OBS MODERATE 35: CPT | Performed by: INTERNAL MEDICINE

## 2025-06-24 PROCEDURE — 94640 AIRWAY INHALATION TREATMENT: CPT

## 2025-06-24 PROCEDURE — 700111 HCHG RX REV CODE 636 W/ 250 OVERRIDE (IP): Mod: JZ | Performed by: INTERNAL MEDICINE

## 2025-06-24 PROCEDURE — 94760 N-INVAS EAR/PLS OXIMETRY 1: CPT

## 2025-06-24 PROCEDURE — 36415 COLL VENOUS BLD VENIPUNCTURE: CPT

## 2025-06-24 RX ORDER — WARFARIN SODIUM 5 MG/1
5 TABLET ORAL DAILY
Status: DISCONTINUED | OUTPATIENT
Start: 2025-06-24 | End: 2025-06-25 | Stop reason: HOSPADM

## 2025-06-24 RX ADMIN — Medication 4 ML: at 20:00

## 2025-06-24 RX ADMIN — CARVEDILOL 6.25 MG: 6.25 TABLET, FILM COATED ORAL at 17:05

## 2025-06-24 RX ADMIN — ACETYLCYSTEINE 3 ML: 200 SOLUTION ORAL; RESPIRATORY (INHALATION) at 19:59

## 2025-06-24 RX ADMIN — ATORVASTATIN CALCIUM 10 MG: 10 TABLET, FILM COATED ORAL at 20:36

## 2025-06-24 RX ADMIN — CARVEDILOL 6.25 MG: 6.25 TABLET, FILM COATED ORAL at 07:51

## 2025-06-24 RX ADMIN — AMIODARONE HYDROCHLORIDE 100 MG: 200 TABLET ORAL at 20:36

## 2025-06-24 RX ADMIN — FUROSEMIDE 20 MG: 10 INJECTION, SOLUTION INTRAVENOUS at 07:51

## 2025-06-24 RX ADMIN — WARFARIN SODIUM 5 MG: 5 TABLET ORAL at 17:05

## 2025-06-24 RX ADMIN — SACUBITRIL AND VALSARTAN 1 TABLET: 24; 26 TABLET, FILM COATED ORAL at 04:48

## 2025-06-24 RX ADMIN — Medication 4 ML: at 07:30

## 2025-06-24 RX ADMIN — ACETYLCYSTEINE 3 ML: 200 SOLUTION ORAL; RESPIRATORY (INHALATION) at 07:31

## 2025-06-24 RX ADMIN — MOMETASONE FUROATE AND FORMOTEROL FUMARATE DIHYDRATE 2 PUFF: 200; 5 AEROSOL RESPIRATORY (INHALATION) at 17:05

## 2025-06-24 RX ADMIN — MOMETASONE FUROATE AND FORMOTEROL FUMARATE DIHYDRATE 2 PUFF: 200; 5 AEROSOL RESPIRATORY (INHALATION) at 04:49

## 2025-06-24 RX ADMIN — GUAIFENESIN 600 MG: 600 TABLET, EXTENDED RELEASE ORAL at 04:48

## 2025-06-24 RX ADMIN — GUAIFENESIN 600 MG: 600 TABLET, EXTENDED RELEASE ORAL at 17:05

## 2025-06-24 RX ADMIN — SACUBITRIL AND VALSARTAN 1 TABLET: 24; 26 TABLET, FILM COATED ORAL at 17:05

## 2025-06-24 RX ADMIN — CALCITRIOL CAPSULES 0.25 MCG 0.5 MCG: 0.25 CAPSULE ORAL at 04:48

## 2025-06-24 ASSESSMENT — PAIN DESCRIPTION - PAIN TYPE
TYPE: ACUTE PAIN
TYPE: ACUTE PAIN

## 2025-06-24 NOTE — PROGRESS NOTES
Inpatient Anticoagulation Service Note    Date: 6/24/2025  Reason for Anticoagulation: Atrial Fibrillation, Mitral Valve Repair   [id number] VASc Score: 9  HAS-BLED Score: 0    Hemoglobin Value: (!) 11.4  Hematocrit Value: (!) 35.9  Lab Platelet Value: (!) 121  Target INR: 2.0 to 3.0     Date/Time INR Value    06/24/25 0147 1.29     06/23/25 0022 1.84     06/22/25 0057 2.01     06/21/25 0225 1.85     06/20/25 0013 1.9     06/19/25 0300 2.01     06/18/25 0101 2.73       Date/Time Dose (mg)    06/23/25 1321 5     06/20/25 1706 5     06/19/25 1800 5     06/18/25 1153 --     06/18/25 1142 2.5     06/17/25 1517 1      Average Dose (mg):  Home regimen: warfarin 5 mg daily  Significant Interactions: Amiodarone  Bridge Therapy: No (Per Dr Quiñones)     Reversal Agent Administered: Not Applicable    Comments: Warfarin resumed yesterday after being held for 2 days in anticipation of possible procedure. Patient received larger warfarin dose of 7.5 mg last night to bump dosing versus home dose 5 mg. INR Started downtrend today as expected from 1.84 to 1.29. No bridge per Dr Quiñones. Will resume home regimen warfarin 5 mg nightly with INR trending until stable.    Plan:  Warfarin 5 mg daily w/ INR trending until stable.    Education Material Provided?: No (Chronic warfarin therapy.)    Pharmacist suggested discharge dosing: home regimen warfarin 5 mg daily w/ INR within 2-3 days of discharge.     Abhinav Rodriguez, PharmD

## 2025-06-24 NOTE — PROGRESS NOTES
Hospital Medicine Daily Progress Note    Date of Service  6/24/2025    Chief Complaint  Salma Lees is a 75 y.o. female admitted 6/14/2025 with sob, chest pain, weight gain, fevers    Hospital Course  History of severe pulm hypertension RVSP of 83, chronic  3 L oxygen and respiratory failure secondary to COPD who was admitted for shortness of breath found to have volume overload, sleep exacerbation and community-acquired pneumonia.  She had acute on chronic respiratory failure hypoxia requiring 6 L nasal cannula due to room air saturation to 74%.  She was started on Solu-Medrol, IV antibiotics and IV Lasix and was admitted for further medical treatment    Interval Problem Update  - pt is doing better  - still feels congested in the lungs  - the RT treatments are working  - per RN walked pt and is still requiring 6L with ambulation    I have discussed this patient's plan of care and discharge plan at IDT rounds today with Case Management, Nursing, Nursing leadership, and other members of the IDT team.    Code Status  Full Code    Disposition  The patient is not medically cleared for discharge to home or a post-acute facility.  Anticipate discharge to: home with organized home healthcare and close outpatient follow-up    I have placed the appropriate orders for post-discharge needs.    Review of Systems  Review of Systems   All other systems reviewed and are negative.       Physical Exam  Temp:  [36.6 °C (97.9 °F)-36.9 °C (98.4 °F)] 36.8 °C (98.2 °F)  Pulse:  [60-64] 61  Resp:  [16-18] 16  BP: (108-151)/(46-65) 121/48  SpO2:  [91 %-93 %] 93 %    Physical Exam  General: NAD, resting comfortably  HEENT: PERRLA, EOMI  Cards: RRR  Pulm: diffuse crackles and rhonchi no wheezing  Abdomen: soft, NTND  MSK: normal ROM of upper and lower extremities  Neuro: CN II-XII grossly intact, sensation/strength intact, AAOx3  Psych: Appropriate mood   Fluids    Intake/Output Summary (Last 24 hours) at 6/24/2025 1525  Last data  filed at 6/24/2025 0758  Gross per 24 hour   Intake 540 ml   Output --   Net 540 ml        Laboratory  Recent Labs     06/23/25  0022   WBC 6.9   RBC 3.77*   HEMOGLOBIN 11.4*   HEMATOCRIT 35.9*   MCV 95.2   MCH 30.2   MCHC 31.8*   RDW 55.8*   PLATELETCT 121*   MPV 11.6     Recent Labs     06/23/25  0022   SODIUM 140   POTASSIUM 4.3   CHLORIDE 100   CO2 31   GLUCOSE 101*   BUN 38*   CREATININE 1.38   CALCIUM 9.4     Recent Labs     06/22/25  0057 06/23/25  0022 06/24/25  0147   INR 2.01* 1.84* 1.29*               Imaging  DX-CHEST-PORTABLE (1 VIEW)   Final Result      Hazy left basilar opacity, atelectasis or infection.      CT-CHEST (THORAX) W/O   Final Result      1.  Fluid opacification of multiple lower lobe posterior bronchi which could be related to mucus impaction or aspiration.   2.  Bilateral basilar atelectasis and/or consolidation. Underlying infection is possible.   3.  Trace right effusion.   4.  Cardiomegaly.   5.  Atherosclerosis with coronary artery disease.         DX-CHEST-PORTABLE (1 VIEW)   Final Result      1.  Worsening volume loss and consolidation within the right lung base.      2.  Cardiomegaly.      DX-CHEST-PORTABLE (1 VIEW)   Final Result      Right basilar opacity is consistent with pneumonia. Follow-up imaging is recommended to document resolution           Assessment/Plan  * Pneumonia of right lower lobe due to infectious organism- (present on admission)  Assessment & Plan  Worsening shortness of breath, cough with productive sputum for over a week  CXR reviewed and shows an opacity on the right.    Sp IV CTX and Azithro  CT chest without contrast reviewed by pulmonologist Dr. Miguel.  Patient has mucous or infection still in the right lower lobe.  We are starting IPV.  If patient does not have improvement we will consider bronchoscopy.  No further antibiotics.  No bronchoscopy at this time.  I ordered sputum cultures  Continue with IPV, hypertonic saline    Thrombocytopenia  (HCC)  Assessment & Plan  Appears chronic    Acute on chronic respiratory failure with hypoxia (HCC)- (present on admission)  Assessment & Plan  Baseline 2-3 L, saturating in the 70s and requiring 6 L on presentation  Secondary to asthma/COPD exacerbation  significant volume overload due to CHF exacerbation and pneumonia (severe PAH)  Cw IV Lasix 20mg daily  Still requiring 6L with ambulation, at rest on 3LNC  Home inogen concentrator goes up to 5L  Obtain daily weights    Acute on chronic heart failure with preserved ejection fraction (HFpEF) (HCC)- (present on admission)  Assessment & Plan  Volume overload on exam with B LE edema  NT-proBNP 4000  Previous echo 12/2024 showed an EF of 55-60%, s/p mitral valve repair, moderate to severe tricuspid regurg, RVSP of 83  Cw IV lasix 20mg daily  Cw entresto, bb    Benign essential hypertension- (present on admission)  Assessment & Plan  Continue carvedilol, Entresto.  Continue IV Lasix  Cw current meds    Secondary hypercoagulable state (HCC)- (present on admission)  Assessment & Plan  Noted  Cw warfarin      Stage 3b chronic kidney disease- (present on admission)  Assessment & Plan  At baseline.  She will be diuresed for CHF exacerbation  Continue Entresto    Pulmonary hypertension (HCC)- (present on admission)  Assessment & Plan  Follows with pulmonology.  Echo 12/2024 showed an RVSP of 83.  Per notes type II secondary to CHF and valvular disease  Creatinine 1.38  Continue IV Lasix    Type 2 diabetes mellitus with stage 3b chronic kidney disease, without long-term current use of insulin (HCC)- (present on admission)  Assessment & Plan  Noted  Last A1c was normal    Paroxysmal atrial fibrillation (HCC)- (present on admission)  Assessment & Plan  S/p ablation.  INR 1.84, restarting warfarin.  Stopping Lovenox.  Cw warfarin discussed w pharmacy  Cw amiodarone and bb    Asthma-COPD overlap syndrome (HCC)- (present on admission)  Assessment & Plan  Currently with an acute  exacerbation secondary to pneumonia.    I discussed with pulmonology, continue current course of treatment.  Pulmonology evaluate patient for COPD.  Continue Dulera  Continue IPV, 7% hypertonic saline nebulized treatments.  Steroids stopped  improving         VTE prophylaxis: warfarin    I have performed a physical exam and reviewed and updated ROS and Plan today (6/24/2025). In review of yesterday's note (6/23/2025), there are no changes except as documented above.    I spent 50 minutes providing care for this patient.  This included face-to-face interview, physical examination.  Review of lab work including CBC, BMP, . Discussion with multidisciplinary team including case management, nursing staff and pharmacy

## 2025-06-24 NOTE — PROGRESS NOTES
Pt ambulated in hallway. Required 6L oxygen to maintain sats 89-91%. Required 4L post ambulation to recover for a few minutes, then oxygen turned down to 3L and pt maintaining sats 91-95%.

## 2025-06-24 NOTE — CARE PLAN
The patient is Stable - Low risk of patient condition declining or worsening    Shift Goals  Clinical Goals: Wean O2, Encourage IS  Patient Goals: Sleep and go home  Family Goals: OLIMPIA    Progress made toward(s) clinical / shift goals: Pt is AO x4. Pt had no complaints of pain. Pt weaned down to 1.5L of oxygen. Pt IS used at 1000. Call light within reach, bed in lowest position, and water pitcher refilled.      Problem: Knowledge Deficit - Standard  Goal: Patient and family/care givers will demonstrate understanding of plan of care, disease process/condition, diagnostic tests and medications  Outcome: Progressing       Patient is not progressing towards the following goals:

## 2025-06-24 NOTE — PROGRESS NOTES
Bedside report received from Raúl gardner RN. Patient awake and alert in bed AOx4. Pt on 1.5L NC. Pt has no complaints of pain at this time. Pt able to use IS at 1000. Pt ambulated to the bathroom to void. Pt received 240mL of decaf hot tea. Bed is locked and in low position. Reviewed plan of care with patient. Call light within reach. No other needs at this time.

## 2025-06-24 NOTE — CARE PLAN
The patient is Stable - Low risk of patient condition declining or worsening    Shift Goals  Clinical Goals: wean O2, encourage IS  Patient Goals: Sleep and go home  Family Goals: OLIMPIA    Progress made toward(s) clinical / shift goals:    Problem: Knowledge Deficit - COPD  Goal: Patient/significant other demonstrates understanding of disease process, utilization of the Action Plan, medications and discharge instruction  Outcome: Progressing     Problem: Self Care  Goal: Patient will have the ability to perform ADLs independently or with assistance (bathe, groom, dress, toilet and feed)  Outcome: Progressing     Problem: Respiratory  Goal: Patient will achieve/maintain optimum respiratory ventilation and gas exchange  Outcome: Progressing     Problem: Fall Risk  Goal: Patient will remain free from falls  Outcome: Progressing     Problem: Infection - Standard  Goal: Patient will remain free from infection  Outcome: Progressing       Patient is not progressing towards the following goals:

## 2025-06-24 NOTE — PROGRESS NOTES
Hospital Medicine Daily Progress Note    Date of Service  6/23/2025    Chief Complaint  Salma Lees is a 75 y.o. female admitted 6/14/2025 with   Chief Complaint   Patient presents with    Shortness of Breath     X1wk worse today   Baseline 3L hx of COPD/Asthma  Pt was found on arrival on 4L 75%, EMS gave 1 albuterol/1 duoneb SPO2 increased to 85%  pt increased to 6-8L NC     Sore Throat     Intermitten x1wk    Chest Pain     discomfort    Weight Gain     Hx of CHF   Pt reports she has missed some doses of her medication due to her not feeling well     Fever     102 highest reported at home states she took a home COVID test & was negative    Cough       Hospital Course  History of severe pulm hypertension RVSP of 83, chronic  3 L oxygen and respiratory failure secondary to COPD who was admitted for shortness of breath found to have volume overload, sleep exacerbation and community-acquired pneumonia.  She had acute on chronic respiratory failure hypoxia requiring 6 L nasal cannula due to room air saturation to 74%.  She was started on Solu-Medrol, IV antibiotics and IV Lasix and was admitted for further medical treatment    Interval Problem Update  6/23:  Patient feels improvement in her breathing.  She feels that she has coughed up more mucus.  I examined the incentive spirometer with her at bedside, she is reaching 1 L more consistently.  We tested walking test again, she is at 7 L.  This is decreased from 9 or 10 L she was the days prior.  Her Inogen pulsed oxygen concentrator only goes up to 5 L.  She is unable to use tank while walking with a walker.  We are trying to maintain her at her Inogen device.  At this time we will restart warfarin, no bronchoscopy.  Stop Lovenox.    6/22:  Patient still requiring 7 to 10 L while walking to maintain oxygen saturation of 89%.  At rest she is at 3 L.  I-S up to 1 L, improved from 500 and 750  INR 2.01.  Holding warfarin.  Continue Lovenox.  No bronchoscopy at  this time  Continue IPV for atelectasis.    6/21:  I had an extensive discussion with patient and her son at bedside.  We will continue IPV and conservative treatments at this time until we have exhausted all efforts.  Pulmonology has not recommended bronchoscopy yet.  Patient is still requiring significant amounts of oxygen ambulating.  She required 9 L to reach 89%.  INR 1.85, holding warfarin.  Started Lovenox subcut 1 mg/kg every 12 hours.    6/20:  Pt still having a productive cough. 4L resting. Checking walking oxygen needs again today.  I reviewed CXR, RLL is worse, LLL atelectasis. Cr 1.48.   I ordered CT chest without contrast, and reviewed with pulmonologist Dr. Miguel.  Patient has mucous or infection still in the right lower lobe.  We are starting IPV.  If patient does not have improvement we will consider bronchoscopy.  I called patient's son Tam Lees and gave updates.    6/19:  Patient stated she still has shortness of breath.  We retested home oxygen, she is still desaturating to 82% while on 6 L.  Currently on 4 L nasal cannula at rest. Checking CXR.    6/18:  No acute complaints today. I spoke with patient and son at bedside.  I recommended N95 or masking when in public to reduce her respiratory infectious risks.  Pt needed more oxygen, I submitted new DME for home concentrator.    6/17  Patient still having trouble breathing.  She feels congested still in the right lower lobe.  We are treating for a right lower lobe community-acquired bacterial pneumonia.  Unfortunately she will need to remain on ceftriaxone as she has 16 allergies listed in several are antibiotics.    I have discussed this patient's plan of care and discharge plan at IDT rounds today with Case Management, Nursing, Nursing leadership, and other members of the IDT team.    Consultants/Specialty  pulmonary    Code Status  Full Code    Disposition  The patient is not medically cleared for discharge to home or a post-acute  facility.  Anticipate discharge to: home with organized home healthcare and close outpatient follow-up    I have placed the appropriate orders for post-discharge needs.    Review of Systems  Review of Systems   Constitutional:  Positive for malaise/fatigue.   Respiratory:  Positive for cough, sputum production and shortness of breath.    Cardiovascular:  Negative for chest pain.   Gastrointestinal:  Negative for abdominal pain.   Neurological:  Positive for weakness.   All other systems reviewed and are negative.       Physical Exam  Temp:  [36.2 °C (97.1 °F)-36.9 °C (98.4 °F)] 36.7 °C (98.1 °F)  Pulse:  [60-64] 62  Resp:  [17-18] 18  BP: (130-155)/(50-61) 148/58  SpO2:  [91 %-97 %] 92 %    Physical Exam  Vitals and nursing note reviewed.   Constitutional:       General: She is not in acute distress.     Appearance: She is ill-appearing.      Comments: Frail appearing elderly female   HENT:      Head: Normocephalic and atraumatic.      Comments: Temporal muscle wasting positive     Mouth/Throat:      Mouth: Mucous membranes are dry.      Pharynx: No oropharyngeal exudate.   Eyes:      General: No scleral icterus.     Extraocular Movements: Extraocular movements intact.   Cardiovascular:      Rate and Rhythm: Normal rate and regular rhythm.      Pulses: Normal pulses.      Heart sounds: Normal heart sounds. No murmur heard.  Pulmonary:      Effort: Pulmonary effort is normal. No respiratory distress.      Breath sounds: Rhonchi (bilateral central anterior and posterior.) and rales present. No wheezing.   Abdominal:      General: Abdomen is flat. Bowel sounds are normal. There is no distension.      Palpations: Abdomen is soft.      Tenderness: There is no abdominal tenderness.   Musculoskeletal:         General: No swelling or tenderness.      Cervical back: Normal range of motion. No rigidity.      Comments: Sarcopenic. Bilateral hand muscle atrophy noted.   Skin:     General: Skin is warm.      Capillary Refill:  Capillary refill takes less than 2 seconds.      Coloration: Skin is not jaundiced.      Findings: No erythema.   Neurological:      Mental Status: She is alert and oriented to person, place, and time. Mental status is at baseline.      Motor: Weakness present.   Psychiatric:         Mood and Affect: Mood normal.         Behavior: Behavior normal.         Thought Content: Thought content normal.         Judgment: Judgment normal.         Fluids    Intake/Output Summary (Last 24 hours) at 6/23/2025 1720  Last data filed at 6/23/2025 1300  Gross per 24 hour   Intake 760 ml   Output 2100 ml   Net -1340 ml        Laboratory  Recent Labs     06/23/25  0022   WBC 6.9   RBC 3.77*   HEMOGLOBIN 11.4*   HEMATOCRIT 35.9*   MCV 95.2   MCH 30.2   MCHC 31.8*   RDW 55.8*   PLATELETCT 121*   MPV 11.6     Recent Labs     06/21/25  0225 06/23/25  0022   SODIUM 141 140   POTASSIUM 4.6 4.3   CHLORIDE 105 100   CO2 29 31   GLUCOSE 131* 101*   BUN 48* 38*   CREATININE 1.33 1.38   CALCIUM 9.6 9.4     Recent Labs     06/21/25  0225 06/22/25  0057 06/23/25  0022   INR 1.85* 2.01* 1.84*               Imaging  DX-CHEST-PORTABLE (1 VIEW)   Final Result      Hazy left basilar opacity, atelectasis or infection.      CT-CHEST (THORAX) W/O   Final Result      1.  Fluid opacification of multiple lower lobe posterior bronchi which could be related to mucus impaction or aspiration.   2.  Bilateral basilar atelectasis and/or consolidation. Underlying infection is possible.   3.  Trace right effusion.   4.  Cardiomegaly.   5.  Atherosclerosis with coronary artery disease.         DX-CHEST-PORTABLE (1 VIEW)   Final Result      1.  Worsening volume loss and consolidation within the right lung base.      2.  Cardiomegaly.      DX-CHEST-PORTABLE (1 VIEW)   Final Result      Right basilar opacity is consistent with pneumonia. Follow-up imaging is recommended to document resolution           Assessment/Plan  * Pneumonia of right lower lobe due to infectious  organism- (present on admission)  Assessment & Plan  Worsening shortness of breath, cough with productive sputum for over a week  CXR reviewed and shows an opacity on the right.      Completed IV ceftriaxone and oral azithromycin.  I ordered CT chest without contrast, and reviewed with pulmonologist Dr. Miguel.  Patient has mucous or infection still in the right lower lobe.  We are starting IPV.  If patient does not have improvement we will consider bronchoscopy.  No further antibiotics.  No bronchoscopy at this time.  Sputum cultures pending    Pulmonary hypertension (HCC)- (present on admission)  Assessment & Plan  Follows with pulmonology.  Echo 12/2024 showed an RVSP of 83.  Per notes type II secondary to CHF and valvular disease  Creatinine 1.38  Continue IV Lasix    Asthma-COPD overlap syndrome (HCC)- (present on admission)  Assessment & Plan  Currently with an acute exacerbation secondary to pneumonia.    I discussed with pulmonology, continue current course of treatment.  Pulmonology evaluate patient for COPD.  Continue Dulera  Continue IPV, 7% hypertonic saline nebulized treatments.  Steroids stopped    Paroxysmal atrial fibrillation (HCC)- (present on admission)  Assessment & Plan  S/p ablation.  Continue amiodarone and carvedilol  INR 1.84, restarting warfarin.  Stopping Lovenox.    Acute on chronic respiratory failure with hypoxia (HCC)- (present on admission)  Assessment & Plan  Baseline 2-3 L, saturating in the 70s and requiring 6 L on presentation  Secondary to asthma/COPD exacerbation  significant volume overload due to CHF exacerbation and pneumonia (severe PAH)  We tested walking test again, she is at 7 L.  This is decreased from 9 or 10 L she was the days prior.  Her Inogen pulsed oxygen concentrator only goes up to 5 L.  She is unable to use tank while walking with a walker.  We are trying to maintain her at her Inogen device.    Acute on chronic heart failure with preserved ejection  fraction (HFpEF) (HCC)- (present on admission)  Assessment & Plan  Volume overload on exam with B LE edema  NT-proBNP 4000  Previous echo 12/2024 showed an EF of 55-60%, s/p mitral valve repair, moderate to severe tricuspid regurg, RVSP of 83    Continue carvedilol, Entresto and IV Lasix    Benign essential hypertension- (present on admission)  Assessment & Plan  Continue carvedilol, Entresto.  Continue IV Lasix    Stage 3b chronic kidney disease- (present on admission)  Assessment & Plan  At baseline.  She will be diuresed for CHF exacerbation  Continue Entresto         VTE prophylaxis:    therapeutic anticoagulation with coumadin dosing per pharmacy, adjust PRN      I have performed a physical exam and reviewed and updated ROS and Plan today (6/23/2025). In review of yesterday's note (6/22/2025), there are no changes except as documented above.      Total time spent 37 minutes. I spent greater than 50% of the time for patient care, including unit/floor time, multiple face-to-face encounters with the patient, counseling on treatment plan and discussion with bedside RN.  Further, I spent time on my own review of patient's overnight events, RN notes, imaging and lab analysis, and developing my assessment and plan above.  In addition, working with , social workers, and Charge RN on case coordination on this date.    This note was generated using voice recognition software which has a chance of producing errors of grammar and content.  I have made every reasonable attempt to find and correct any errors, but it should be expected that some may not be found prior to finalization of this note.

## 2025-06-24 NOTE — DISCHARGE PLANNING
Case Management Discharge Planning    Admission Date: 2025  GMLOS: 3.9  ALOS: 10    6-Clicks ADL Score: 23  6-Clicks Mobility Score: 22      Anticipated Discharge Dispo: Discharge Disposition: Discharged to home/self care ()  Discharge Address: [address] [place] [address] [address] [place] [address]  Discharge Contact Phone Number: [phone]    DME Needed: Yes    DME Ordered: Yes,possibly a new oxygen order previous has     Action(s) Taken: Case discussed in IDT rounds pt is not medically cleared today and is still requiring 6 lpm of oxygen. Pt will need to be at her 02 baseline of 3 LPM to use her Inogen concentrator to discharge due to her inability to ambulate with portable oxygen tank and FWW.    Escalations Completed: None    Medically Clear: No    Next Steps: LMSW to follow for needs and barriers to discharge.      Barriers to Discharge: Medical clearance    Is the patient up for discharge tomorrow: No

## 2025-06-25 ENCOUNTER — PATIENT MESSAGE (OUTPATIENT)
Dept: CARDIOLOGY | Facility: MEDICAL CENTER | Age: 75
End: 2025-06-25
Payer: MEDICARE

## 2025-06-25 VITALS
SYSTOLIC BLOOD PRESSURE: 124 MMHG | BODY MASS INDEX: 29.31 KG/M2 | HEIGHT: 65 IN | TEMPERATURE: 98.3 F | OXYGEN SATURATION: 97 % | WEIGHT: 175.93 LBS | HEART RATE: 60 BPM | DIASTOLIC BLOOD PRESSURE: 48 MMHG | RESPIRATION RATE: 17 BRPM

## 2025-06-25 DIAGNOSIS — I48.91 ATRIAL FIBRILLATION, UNSPECIFIED TYPE (HCC): ICD-10-CM

## 2025-06-25 LAB
ANION GAP SERPL CALC-SCNC: 8 MMOL/L (ref 7–16)
BUN SERPL-MCNC: 38 MG/DL (ref 8–22)
CALCIUM SERPL-MCNC: 9 MG/DL (ref 8.5–10.5)
CHLORIDE SERPL-SCNC: 102 MMOL/L (ref 96–112)
CO2 SERPL-SCNC: 31 MMOL/L (ref 20–33)
CREAT SERPL-MCNC: 1.46 MG/DL (ref 0.5–1.4)
GFR SERPLBLD CREATININE-BSD FMLA CKD-EPI: 37 ML/MIN/1.73 M 2
GLUCOSE SERPL-MCNC: 87 MG/DL (ref 65–99)
INR PPP: 1.48 (ref 0.87–1.13)
POTASSIUM SERPL-SCNC: 4.1 MMOL/L (ref 3.6–5.5)
PROTHROMBIN TIME: 18.3 SEC (ref 12–14.6)
SODIUM SERPL-SCNC: 141 MMOL/L (ref 135–145)

## 2025-06-25 PROCEDURE — 80048 BASIC METABOLIC PNL TOTAL CA: CPT

## 2025-06-25 PROCEDURE — A9270 NON-COVERED ITEM OR SERVICE: HCPCS | Performed by: STUDENT IN AN ORGANIZED HEALTH CARE EDUCATION/TRAINING PROGRAM

## 2025-06-25 PROCEDURE — A9270 NON-COVERED ITEM OR SERVICE: HCPCS | Performed by: INTERNAL MEDICINE

## 2025-06-25 PROCEDURE — 700102 HCHG RX REV CODE 250 W/ 637 OVERRIDE(OP): Performed by: INTERNAL MEDICINE

## 2025-06-25 PROCEDURE — 36415 COLL VENOUS BLD VENIPUNCTURE: CPT

## 2025-06-25 PROCEDURE — 700111 HCHG RX REV CODE 636 W/ 250 OVERRIDE (IP): Mod: JZ | Performed by: INTERNAL MEDICINE

## 2025-06-25 PROCEDURE — RXMED WILLOW AMBULATORY MEDICATION CHARGE: Performed by: INTERNAL MEDICINE

## 2025-06-25 PROCEDURE — 85610 PROTHROMBIN TIME: CPT

## 2025-06-25 PROCEDURE — 94760 N-INVAS EAR/PLS OXIMETRY 1: CPT

## 2025-06-25 PROCEDURE — 700102 HCHG RX REV CODE 250 W/ 637 OVERRIDE(OP): Performed by: STUDENT IN AN ORGANIZED HEALTH CARE EDUCATION/TRAINING PROGRAM

## 2025-06-25 PROCEDURE — 99239 HOSP IP/OBS DSCHRG MGMT >30: CPT | Performed by: INTERNAL MEDICINE

## 2025-06-25 RX ORDER — AMIODARONE HYDROCHLORIDE 200 MG/1
100 TABLET ORAL
Qty: 36 TABLET | Refills: 3 | Status: SHIPPED | OUTPATIENT
Start: 2025-06-25

## 2025-06-25 RX ORDER — ACETYLCYSTEINE 200 MG/ML
600 SOLUTION ORAL; RESPIRATORY (INHALATION) 2 TIMES DAILY
Qty: 30 ML | Refills: 0 | Status: SHIPPED | OUTPATIENT
Start: 2025-06-25 | End: 2025-07-05

## 2025-06-25 RX ORDER — FUROSEMIDE 20 MG/1
20 TABLET ORAL SEE ADMIN INSTRUCTIONS
Status: SHIPPED
Start: 2025-06-25

## 2025-06-25 RX ORDER — POTASSIUM CHLORIDE 1500 MG/1
20 TABLET, EXTENDED RELEASE ORAL SEE ADMIN INSTRUCTIONS
Status: SHIPPED
Start: 2025-06-25

## 2025-06-25 RX ORDER — GUAIFENESIN 600 MG/1
600 TABLET, EXTENDED RELEASE ORAL EVERY 12 HOURS
Qty: 14 TABLET | Refills: 0 | Status: SHIPPED | OUTPATIENT
Start: 2025-06-25 | End: 2025-07-02

## 2025-06-25 RX ADMIN — CARVEDILOL 6.25 MG: 6.25 TABLET, FILM COATED ORAL at 07:57

## 2025-06-25 RX ADMIN — SACUBITRIL AND VALSARTAN 1 TABLET: 24; 26 TABLET, FILM COATED ORAL at 04:38

## 2025-06-25 RX ADMIN — CALCITRIOL CAPSULES 0.25 MCG 0.5 MCG: 0.25 CAPSULE ORAL at 04:38

## 2025-06-25 RX ADMIN — GUAIFENESIN 600 MG: 600 TABLET, EXTENDED RELEASE ORAL at 04:38

## 2025-06-25 RX ADMIN — FUROSEMIDE 20 MG: 10 INJECTION, SOLUTION INTRAVENOUS at 04:39

## 2025-06-25 RX ADMIN — MOMETASONE FUROATE AND FORMOTEROL FUMARATE DIHYDRATE 2 PUFF: 200; 5 AEROSOL RESPIRATORY (INHALATION) at 04:38

## 2025-06-25 ASSESSMENT — FIBROSIS 4 INDEX: FIB4 SCORE: 2.33

## 2025-06-25 ASSESSMENT — PAIN DESCRIPTION - PAIN TYPE
TYPE: ACUTE PAIN
TYPE: ACUTE PAIN

## 2025-06-25 NOTE — DISCHARGE SUMMARY
Hospital Medicine Discharge Note     Admit Date:  6/14/2025       Discharge Date:   6/25/2025  LOS: 11 days     Primary Care Provider:    Leah Bonilla D.O.    Attending Physician:  Brianne Quiñones M.D.     Discharge Diagnoses:   Principal Problem:    Pneumonia of right lower lobe due to infectious organism  Active Problems:    Asthma-COPD overlap syndrome (HCC)    Paroxysmal atrial fibrillation (HCC)    Type 2 diabetes mellitus with stage 3b chronic kidney disease, without long-term current use of insulin (HCC)    Anemia    Pulmonary hypertension (HCC)    Stage 3b chronic kidney disease    Secondary hypercoagulable state (HCC)    Benign essential hypertension    Acute on chronic heart failure with preserved ejection fraction (HFpEF) (HCC)    Acute on chronic respiratory failure with hypoxia (HCC)    Thrombocytopenia (HCC)        Hospital Summary (Brief Narrative):         74 yo with hx of recovered NICM now with HFpEF,, AT/AFL status post ablation (Right AFL and AT ablation 11/8/23), controlled type 2 diabetes with CKD stage III, severe PH, COPD, chronic hypoxic resp failure on chronic O2 therapy 2-5L/min ( since 2014), history of surgical mitral valve repair (2009), permanent pacemaker in place (since 2014), SVT ablation 12/2024, presented with worsening shortness of breath found to have CAP that likely exacerbated her HFpEF. Upon admission patientw as given IV abx for Cap, IV lasix for HF and solu medrol for possible COPD exacerbation. She was still requiring 6L of oxygen at rest and 9-10L with ambulation thus CT chest was obtained that showed RLL with likely mucus plugging. Pulm reviewed imaging and recommended pulmonary hygiene. Pt was on mucomyst, saline nebs, levalbuterol nebs and mucinex which significantly improved her symptoms and oxygen needs. She also received IV lasix 40mg BID and responded very well to that. Her discharge weight was 175lbs. At discharge, she was down to 2L of O2 via NC at  rest and 4LNC with ambulation. She uses a walker and her inogen concentrator only goes up to 5L max and there are no options for higher liters.      Disposition:   Discharge home w HH    Condition:  Stable    Activity:   As tolerated     Diet:   Heart Healthy Diet / 2g Na    Discharge Medications:           Medication List        START taking these medications        Instructions   acetylcysteine 20 % Soln  Commonly known as: Mucomyst   Inhale 3 mL via nebulizer 2 times a day for 5 days.  Dose: 600 mg     guaiFENesin  MG Tb12  Commonly known as: Mucinex   Take 1 Tablet by mouth every 12 hours for 7 days.  Dose: 600 mg            CHANGE how you take these medications        Instructions   amiodarone 200 MG Tabs  What changed: when to take this  Commonly known as: Cordarone   Take 0.5 Tablets by mouth every day.  Dose: 100 mg     atorvastatin 10 MG Tabs  What changed: when to take this  Commonly known as: Lipitor   Take 1 tablet by mouth every day.  Dose: 10 mg            CONTINUE taking these medications        Instructions   calcitRIOL 0.25 MCG Caps  Commonly known as: Rocaltrol   Take 2 Capsules by mouth every day.  Dose: 0.5 mcg     carvedilol 12.5 MG Tabs  Commonly known as: Coreg   Take 1 Tablet by mouth 2 times a day with meals.  Dose: 12.5 mg     COLD AND FLU PO   Take 2 Tablets by mouth 3 times a day as needed (For cold symptoms). (OTC)  Dose: 2 Tablet     Entresto 24-26 MG Tabs  Generic drug: sacubitril-valsartan   Take 1 tablet by mouth 2 times a day.  Dose: 1 Tablet     fluticasone-salmeterol 500-50 MCG/ACT Aepb  Commonly known as: Advair Diskus   Inhale 1 Puff every 12 hours for 270 days.  Dose: 1 Puff     furosemide 20 MG Tabs  Commonly known as: Lasix   Take 1 Tablet by mouth see administration instructions. Pt takes on Mon, Wed, and Friday  Dose: 20 mg     HAIR SKIN NAILS PO   Take 2 Tablets by mouth at bedtime.   Dose: 2 Tablet     levalbuterol 45 MCG/ACT inhaler  Commonly known as: Xopenex  HFA   Inhale 1-2 Puffs every four hours as needed for Shortness of Breath.  Dose: 1-2 Puff     potassium Chloride ER 20 MEQ Tbcr tablet  Commonly known as: K-Tab   Take 1 Tablet by mouth see administration instructions. Pt takes on Mon, Wed, and Friday  Dose: 20 mEq     Trulicity 1.5 MG/0.5ML Soaj  Generic drug: Dulaglutide   Inject 1 pen under the skin every 7 days. Every Sunday  Dose: 0.5 mL     VITAMIN B-12 PO   Take 1 Tablet by mouth every day.  Dose: 1 Tablet     warfarin 5 MG Tabs  Commonly known as: Coumadin   Take 0.5-1 Tablets by mouth every morning. 2.5 mg Sunday/Tuesday/Thursday, 5 mg all other days  Dose: 2.5-5 mg                Follow up appointment details :      I encouraged her to call his PCP to confirm follow up after discharge.    Future Appointments   Date Time Provider Department Center   7/10/2025 10:15 AM [facility] PHARMACIST [facility]   7/18/2025 11:00 AM PERRI Anderson [facility] None   9/17/2025 11:40 AM Leah Bonilla D.O. [facility] [place]   9/19/2025  1:40 PM Zander Quiñones M.D. [facility] None   10/10/2025  1:20 PM Jhon Galloway M.D. [facility] None   11/7/2025  1:20 PM [facility]         Consultants:      None    Studies:    Imaging/ Testing:      DX-CHEST-PORTABLE (1 VIEW)   Final Result      Hazy left basilar opacity, atelectasis or infection.      CT-CHEST (THORAX) W/O   Final Result      1.  Fluid opacification of multiple lower lobe posterior bronchi which could be related to mucus impaction or aspiration.   2.  Bilateral basilar atelectasis and/or consolidation. Underlying infection is possible.   3.  Trace right effusion.   4.  Cardiomegaly.   5.  Atherosclerosis with coronary artery disease.         DX-CHEST-PORTABLE (1 VIEW)   Final Result      1.  Worsening volume loss and consolidation within the right lung base.      2.  Cardiomegaly.      DX-CHEST-PORTABLE (1 VIEW)   Final Result      Right basilar opacity is consistent with  pneumonia. Follow-up imaging is recommended to document resolution          Procedures:        None      Instructions:      The were given instructions to return to the ER if patient's condition worsens      Time Spent on Discharge:     Discharge instructions were discussed with the patient at bedside. Patient  expressed understanding and agreed to comply with all discharge instructions.    37 minutes were spent in the discharge planning and management of this  patient, including more than 50% of the time spent face to face in   Counseling.    Purse String (Simple) Text: Given the location of the defect and the characteristics of the surrounding skin a purse string closure was deemed most appropriate.  Undermining was performed circumfirentially around the surgical defect.  A purse string suture was then placed and tightened.

## 2025-06-25 NOTE — PROGRESS NOTES
"Educated patient on discharge instructions, rx medications and follow up with PCP   patient voiced understanding . VS stable /48   Pulse 60   Temp 36.8 °C (98.3 °F) (Temporal)   Resp 17   Ht 1.638 m (5' 4.5\")   Wt 79.8 kg (175 lb 14.8 oz)   SpO2 97%   BMI 29.73 kg/m²    Patient alert and appropriate. All questions and concerns addressed. No further questions or concerns at this time. Copy of discharge paperwork provided.  Patient out of department with transport in stable condition.    "

## 2025-06-25 NOTE — PROGRESS NOTES
Bedside report received from VALENTINA Rivera. Assumed care of patient. Daily plan of care discussed. Pt resting comfortably in bed with no signs of distress noted. Breathing even and unlabored. Walking O2 completed and updated MD with results. no further needs at this time. Call light within reach. Bed locked in lowest position. Plan of care on going.

## 2025-06-25 NOTE — PROGRESS NOTES
RN weaned pt O2 during the night to 2L. Pt resting comfortably with no SOB, no sign of distress, and unlabored breathing sitting in bed.

## 2025-06-25 NOTE — CARE PLAN
The patient is Stable - Low risk of patient condition declining or worsening    Shift Goals  Clinical Goals: Wean O2, encourage IS  Patient Goals: Go home  Family Goals: OLIMPIA    Progress made toward(s) clinical / shift goals: Pt is AO x4. Pt is on 3L NC. Pt had no complaints of pain. Pt ambulated to the bathroom to void. Pt O2 between 89-92% on 3L. VSS. Pt was able to perform the IS at 1000. 240 mL of herbal tea was provided. Pt had no other needs at this time. Call light within reach, bed in lowest position, and pt is eager to go home.      Problem: Knowledge Deficit - Standard  Goal: Patient and family/care givers will demonstrate understanding of plan of care, disease process/condition, diagnostic tests and medications  Outcome: Progressing     Problem: Self Care  Goal: Patient will have the ability to perform ADLs independently or with assistance (bathe, groom, dress, toilet and feed)  Outcome: Progressing     Problem: Fall Risk  Goal: Patient will remain free from falls  Outcome: Progressing       Patient is not progressing towards the following goals:

## 2025-06-25 NOTE — PROGRESS NOTES
Bedside report received from gmhiloli RNBrandon. Patient awake and alert in bed AOx4. Pt is on 3L NC. Pt has no complaints of pain at this time. Bed is locked and in low position. Reviewed plan of care with patient. Call light within reach. No other needs at this time.

## 2025-06-25 NOTE — CARE PLAN
The patient is Stable - Low risk of patient condition declining or worsening    Shift Goals  Clinical Goals: Less than 6L with ambulation  Patient Goals: go home  Family Goals: OLIMPIA    Progress made toward(s) clinical / shift goals:  Patient maintained above 88% with ambulation on 4LPM. Tolerated walk very well. Denied any SOB or CP.     Patient is not progressing towards the following goals:

## 2025-06-26 ENCOUNTER — PATIENT OUTREACH (OUTPATIENT)
Dept: MEDICAL GROUP | Facility: PHYSICIAN GROUP | Age: 75
End: 2025-06-26
Payer: MEDICARE

## 2025-06-26 DIAGNOSIS — I27.20 PULMONARY HYPERTENSION (HCC): ICD-10-CM

## 2025-06-26 DIAGNOSIS — I50.20 ACC/AHA STAGE C SYSTOLIC CONGESTIVE HEART FAILURE (HCC): Primary | ICD-10-CM

## 2025-06-26 DIAGNOSIS — I50.33 ACUTE ON CHRONIC HEART FAILURE WITH PRESERVED EJECTION FRACTION (HFPEF) (HCC): ICD-10-CM

## 2025-06-26 DIAGNOSIS — J18.9 PNEUMONIA OF RIGHT LOWER LOBE DUE TO INFECTIOUS ORGANISM: ICD-10-CM

## 2025-06-26 DIAGNOSIS — J96.21 ACUTE ON CHRONIC RESPIRATORY FAILURE WITH HYPOXIA (HCC): ICD-10-CM

## 2025-06-26 NOTE — PROGRESS NOTES
6/26/25 9:10 am: Nurse CHAYA outreach call to pt for [facility] follow-up.  VM left with pt requesting a return call to nurse at [phone].    6/26/25 11:10 am: Nurse CHAYA received return call from pt.  [facility] follow-up call completed. Pt reports she didn't receive any of her new medications at discharge. Pt reports she didn't receive nebulizer machine.  Nurse contacted [facility] regarding prescriptions. Nurse was informed they were probably sent to [facility]. Pharmacy was going to check with [facility].  Nurse CHAYA left a  for discharge planner at [facility] regarding order for nebulizer machine. Message left that pt didn't receive a nebulizer machine. Nurse was informed Mucinex was OTC and pt will need to  at a pharmacy.  Nurse was informed Mucomyst solution would be ordered and will be available on 6/27/25 at [facility]. Nurse provided update to pt. Nurse will route message to PCP regarding nebulizer machine.    Transitional Care Management  [facility] Outreach Date and Time: Filed (6/26/2025 10:39 AM)    Discharge Questions  Actual Discharge Date: 06/26/25  Now that you are home, how are you feeling?: Fair (States improving. Wearing oxygen at 3.5 liters)  Did you receive any new prescriptions?: Yes  Were you able to get them filled?: No (Pt reports she was told they weren't available)  Reason prescriptions not filled?: Out of Stock  Do you have any questions about your current medications or new medications (Review Med Rec)?: Yes (Please explain)  Did you have any durable medical equipment ordered?: No (Pt didn't receive a nebulizer machine)  Do you have a follow up appointment scheduled with your PCP?: Yes  Appointment Date: 07/03/25  Appointment Time: 8835  Any issues or paperwork you wish to discuss with your PCP?: No  If Home Health was ordered, have they contacted you (Patient): No (Give phone number)  Did you have enough support after your last discharge?: Yes  Does this  patient qualify for the CCM program?: Yes    Transitional Care  Number of attempts made to contact patient: 2  Current or previous attempts completed within two business days of discharge? : Yes  Provided education regarding treatment plan, medications, self-management, ADLs?: Yes  Has patient completed an Advanced Directive?: No  Has the Care Manager's phone number provided?: Yes    Discharge Summary  Chief Complaint: Shortness of breath  Discharge Diagnosis: Pneumonia right lower lobe

## 2025-06-26 NOTE — DISCHARGE PLANNING
Case Management Discharge Planning    Admission Date: 6/14/2025  GMLOS: 3.9  ALOS: 11    6-Clicks ADL Score: 23  6-Clicks Mobility Score: 22    : LMSW rec'd VM from VALENTINA Campbell with Dr. Lees's office explaining Pt was discharged without Nebulizer and medication. This writer explained no nebulizer order was placed prior to discharge. RN reports PA will place order for nebulizer and send to [facility].

## 2025-06-27 ENCOUNTER — DOCUMENTATION (OUTPATIENT)
Dept: HEALTH INFORMATION MANAGEMENT | Facility: OTHER | Age: 75
End: 2025-06-27
Payer: MEDICARE

## 2025-06-30 ENCOUNTER — TELEPHONE (OUTPATIENT)
Dept: MEDICAL GROUP | Facility: PHYSICIAN GROUP | Age: 75
End: 2025-06-30

## 2025-06-30 ENCOUNTER — PHARMACY VISIT (OUTPATIENT)
Dept: PHARMACY | Facility: MEDICAL CENTER | Age: 75
End: 2025-06-30
Payer: COMMERCIAL

## 2025-06-30 ENCOUNTER — OFFICE VISIT (OUTPATIENT)
Dept: MEDICAL GROUP | Facility: PHYSICIAN GROUP | Age: 75
End: 2025-06-30
Payer: MEDICARE

## 2025-06-30 VITALS
WEIGHT: 183.7 LBS | HEIGHT: 65 IN | DIASTOLIC BLOOD PRESSURE: 60 MMHG | SYSTOLIC BLOOD PRESSURE: 124 MMHG | BODY MASS INDEX: 30.6 KG/M2 | HEART RATE: 60 BPM | TEMPERATURE: 98.7 F | OXYGEN SATURATION: 93 %

## 2025-06-30 DIAGNOSIS — Z23 NEED FOR PNEUMOCOCCAL VACCINATION: Primary | ICD-10-CM

## 2025-06-30 DIAGNOSIS — E11.22 TYPE 2 DIABETES MELLITUS WITH STAGE 3B CHRONIC KIDNEY DISEASE, WITHOUT LONG-TERM CURRENT USE OF INSULIN (HCC): ICD-10-CM

## 2025-06-30 DIAGNOSIS — E78.5 DYSLIPIDEMIA ASSOCIATED WITH TYPE 2 DIABETES MELLITUS (HCC): ICD-10-CM

## 2025-06-30 DIAGNOSIS — I50.20 ACC/AHA STAGE C SYSTOLIC CONGESTIVE HEART FAILURE (HCC): ICD-10-CM

## 2025-06-30 DIAGNOSIS — I48.0 PAROXYSMAL ATRIAL FIBRILLATION (HCC): ICD-10-CM

## 2025-06-30 DIAGNOSIS — E11.69 DYSLIPIDEMIA ASSOCIATED WITH TYPE 2 DIABETES MELLITUS (HCC): ICD-10-CM

## 2025-06-30 DIAGNOSIS — N18.32 TYPE 2 DIABETES MELLITUS WITH STAGE 3B CHRONIC KIDNEY DISEASE, WITHOUT LONG-TERM CURRENT USE OF INSULIN (HCC): ICD-10-CM

## 2025-06-30 DIAGNOSIS — J18.9 PNEUMONIA OF RIGHT LOWER LOBE DUE TO INFECTIOUS ORGANISM: ICD-10-CM

## 2025-06-30 ASSESSMENT — FIBROSIS 4 INDEX: FIB4 SCORE: 2.33

## 2025-06-30 NOTE — TELEPHONE ENCOUNTER
6/30/25 9:05 am: Nurse CM outreach call to pt to see if pt can make appt today at 3:00 with PCP for hospital follow-up. VM left requesting return call to nurse.

## 2025-06-30 NOTE — PROGRESS NOTES
Subjective:     Salma Lees is a 75 y.o. female who presents for Hospital Follow-up.    HPI:   Recently hospitalized for pneumonia.    History of Present Illness  The patient presents for evaluation of pneumonia, diabetes, and atrial fibrillation.    She was recently hospitalized for 11 days due to a blockage in her lung, which required oxygen therapy. She has been home for approximately 5 days. Upon discharge, she was not provided with medication, but she has since obtained it. She has been taking guaifenesin 400 mg every 12 hours, with the last dose administered at 1:30 this morning. She has also been using an incentive spirometer, achieving readings slightly above 1000.     She experienced a sore throat one afternoon, which was followed by coughing and a runny nose the next morning. Initially suspecting influenza, she took a COVID-19 test, which returned negative. Despite this, her condition continued to deteriorate.    Her blood glucose levels were elevated during her hospital stay, likely due to the administration of Solu-Medrol. She administered Trulicity on 06/01/2025 and then again last night, resulting in a 3-week gap. She was not given insulin during her hospital stay.    She is currently on furosemide 3 days a week and reports a brisk response with urine output on the days she takes it. She did not take her dose this morning. She was prescribed Lasix during her hospital stay. Her current weight is around 180 pounds. She is cautious about her fluid intake due to her heart condition. She has a follow-up appointment with Dr. Galloway in September 2025. She plans to reschedule her pulmonary follow-up appointment to [place].    She was previously taking amiodarone daily, but this was reduced to 1 tablet every other day upon her release from the hospital.        Current medicines (including reconciliation performed today)  Current Medications[1]    Allergies:   Amoxicillin, Nitrofurantoin, Pcn  "[penicillins], Vancomycin, Amlodipine besylate-valsartan, Etodolac, Food, Formaldehyde, Ivabradine, Jardiance [empagliflozin], Skin adhesives, Eliquis [apixaban], Lisinopril, Other drug, Other misc, and Tape    Social History[2]    ROS:  Improved energy and breathing, still fatigued and napping. Weight up 5lb since discharge.    Objective:     Vitals:    06/30/25 1444   BP: 124/60   BP Location: Left arm   Patient Position: Sitting   BP Cuff Size: Adult   Pulse: 60   Temp: 37.1 °C (98.7 °F)   SpO2: 93%   Weight: 83.3 kg (183 lb 11.2 oz)   Height: 1.638 m (5' 4.5\")   PF: (!) 2 L/min     Body mass index is 31.05 kg/m².    Physical Exam:  Physical Exam  Constitutional:       General: She is not in acute distress.     Appearance: Normal appearance. She is not ill-appearing.   HENT:      Right Ear: External ear normal.      Left Ear: External ear normal.   Eyes:      General: No scleral icterus.     Conjunctiva/sclera: Conjunctivae normal.   Cardiovascular:      Rate and Rhythm: Normal rate and regular rhythm.      Pulses: Normal pulses.      Heart sounds: Murmur heard.   Pulmonary:      Effort: Pulmonary effort is normal. No respiratory distress.      Breath sounds: Rhonchi present. No wheezing.      Comments: Resolving rhonchi right base  Abdominal:      General: Bowel sounds are normal.      Palpations: Abdomen is soft.   Lymphadenopathy:      Cervical: No cervical adenopathy.   Skin:     General: Skin is warm and dry.      Comments: Spider veins and venous stasis b/l LE   Psychiatric:         Mood and Affect: Mood normal.         Behavior: Behavior normal.         Thought Content: Thought content normal.         Judgment: Judgment normal.           Results  Labs   - Glucose levels: High during hospital stay   - GFR: Down to about 25% but improved to closer to 40% upon discharge   - BUN: Elevated    Imaging   - Lung imaging: Infection in the lower back area of the right lung. The top part of the lungs appears healthy " with normal lung markings. There is fluid and infection at the base of the lung.    Assessment & Plan  1. Pneumonia, community acquired  2. Hospital discharge follow up  - Hospitalized for 11 days due to pneumonia; home for about 5 days.  - Oxygen levels drop with exertion but maintain around 93% at rest; imaging shows significant infection in the lower right lung.  - Prescribed guaifenesin 400 mg every 12 hours and Mucomyst (N-acetylcysteine) 30 mL twice a day for 5 days.  - Advised to continue using the incentive spirometer to help reopen the right lung; Prevnar 20 vaccine to be administered today as her last pneumonia vaccine was about 5 years ago; if Mucomyst proves beneficial, it can be kept on hand for future exacerbations.    3. Diabetes Mellitus type 2 with CKD 3b.  - Glucose levels were elevated during hospitalization, likely due to Solu-Medrol administration.  - Resumed Trulicity 1.5 mg last night after a 3-week break.  - Advised to continue with the current Trulicity dose of 1.5 mg.    4. Paroxysmal atrial fibrillation s/p ablation.  - Previously on amiodarone 100 mg daily, reduced to three times a week.  - Discussed the risks and benefits of continuing versus discontinuing amiodarone.  - Will follow up with Dr. Galloway regarding the continuation of amiodarone.  - Advised her to discuss recommendations regarding dry weight and diuretic use.    5. ACC/AHA stage C systolic CHF  - Currently on furosemide three times a week; reports a brisk response with urine output.  - Dry weight at discharge was 175 lbs, currently around 180 lbs.  - Advised to monitor weight and discuss any necessary adjustments to diuretic therapy with her cardiologist. Currently on furosemide 20 mg on M/W/F    6. POLST  - She would like to be full code at this time with her son as her primary mPOA, he lives in [place].        Assessment and Plan:   Problem List Items Addressed This Visit       ACC/AHA stage C systolic congestive  heart failure (HCC)    Dyslipidemia associated with type 2 diabetes mellitus (HCC)    Relevant Orders    FREE THYROXINE    TSH    VITAMIN B12    VITAMIN D,25 HYDROXY (DEFICIENCY)    Lipid Profile    Comp Metabolic Panel    CBC WITH DIFFERENTIAL    Paroxysmal atrial fibrillation (HCC)    Pneumonia of right lower lobe due to infectious organism    Type 2 diabetes mellitus with stage 3b chronic kidney disease, without long-term current use of insulin (HCC)    Relevant Orders    HEMOGLOBIN A1C    MICROALBUMIN CREAT RATIO URINE    Diabetic Monofilament LE Exam (Completed)     Other Visit Diagnoses         Need for pneumococcal vaccination    -  Primary    Relevant Orders    Pneumococcal Conjugate Vaccine 20-Valent (6 wks+)                - Chart and discharge summary were reviewed.   - Hospitalization and results reviewed with patient.   - Medications reviewed including instructions regarding high risk medications, dosing and side effects.  - Recommended Services: No services needed at this time  - Advance directive/POLST on file?  Yes    Follow-up:Return in about 3 months (around 9/30/2025).    Face-to-face transitional care management services with HIGH (today's visit is within days post discharge & LACE+ score 59+) medical decision complexity were provided.     LACE+ Historical Score Over Time (0-28: Low, 29-58: Medium, 59+: High): 80    I spent a total of 30 minutes with record review, exam, communication with the patient, communication with other providers, and documentation of this encounter.    Leah Bonilla,   Geriatric and Internal Medicine  [facility]                [1]   Current Outpatient Medications   Medication Sig Dispense Refill    furosemide (LASIX) 20 MG Tab Take 1 Tablet by mouth see administration instructions. Pt takes on Mon, Wed, and Friday      potassium Chloride ER (K-TAB) 20 MEQ Tab CR tablet Take 1 Tablet by mouth see administration instructions. Pt takes on Mon, Wed, and Friday       guaiFENesin ER (MUCINEX) 600 MG TABLET SR 12 HR Take 1 Tablet by mouth every 12 hours for 7 days. 14 Tablet 0    acetylcysteine (MUCOMYST) 20 % Solution Inhale 3 mL via nebulizer 2 times a day for 5 days. 30 mL 0    amiodarone (CORDARONE) 200 MG Tab Take 0.5 Tablets by mouth every Monday, Wednesday, and Friday. 36 Tablet 3    sacubitril-valsartan (ENTRESTO) 24-26 MG Tab Take 1 tablet by mouth 2 times a day. 180 Tablet 3    Cyanocobalamin (VITAMIN B-12 PO) Take 1 Tablet by mouth every day.      Dulaglutide (TRULICITY) 1.5 MG/0.5ML Solution Auto-injector Inject 1 pen under the skin every 7 days. Every Sunday 6 mL 3    fluticasone-salmeterol (ADVAIR DISKUS) 500-50 MCG/ACT AEROSOL POWDER, BREATH ACTIVATED Inhale 1 Puff every 12 hours for 270 days. (Patient taking differently: Inhale 2 Puffs every 12 hours as needed.) 3 Each 2    warfarin (COUMADIN) 5 MG Tab Take 0.5-1 Tablets by mouth every morning. 2.5 mg Sunday/Tuesday/Thursday, 5 mg all other days 100 Tablet 3    levalbuterol (XOPENEX HFA) 45 MCG/ACT inhaler Inhale 1-2 Puffs every four hours as needed for Shortness of Breath. 4 Each 3    atorvastatin (LIPITOR) 10 MG Tab Take 1 tablet by mouth every day. 100 Tablet 3    calcitRIOL (ROCALTROL) 0.25 MCG Cap Take 2 Capsules by mouth every day. 200 Capsule 3    Multiple Vitamins-Minerals (HAIR SKIN NAILS PO) Take 2 Tablets by mouth at bedtime.         carvedilol (COREG) 12.5 MG Tab Take 1 Tablet by mouth 2 times a day with meals. 180 Tablet 3    Nutritional Supplements (COLD AND FLU PO) Take 2 Tablets by mouth 3 times a day as needed (For cold symptoms). (OTC) (Patient not taking: Reported on 6/30/2025)       No current facility-administered medications for this visit.   [2]   Social History  Tobacco Use    Smoking status: Former     Current packs/day: 0.00     Average packs/day: 1 pack/day for 5.0 years (5.0 ttl pk-yrs)     Types: Cigarettes     Start date: 1/7/1985     Quit date: 1/7/1990     Years since quitting:  35.5    Smokeless tobacco: Never    Tobacco comments:     Continued abstinence   Vaping Use    Vaping status: Never Used   Substance Use Topics    Alcohol use: Not Currently     Comment: only on holidays    Drug use: No

## 2025-07-05 PROCEDURE — RXMED WILLOW AMBULATORY MEDICATION CHARGE: Performed by: INTERNAL MEDICINE

## 2025-07-07 ENCOUNTER — PHARMACY VISIT (OUTPATIENT)
Dept: PHARMACY | Facility: MEDICAL CENTER | Age: 75
End: 2025-07-07
Payer: COMMERCIAL

## 2025-07-10 ENCOUNTER — APPOINTMENT (OUTPATIENT)
Dept: MEDICAL GROUP | Facility: MEDICAL CENTER | Age: 75
End: 2025-07-10
Payer: MEDICARE

## 2025-07-10 VITALS — WEIGHT: 180.12 LBS | HEIGHT: 65 IN | BODY MASS INDEX: 30.01 KG/M2

## 2025-07-10 DIAGNOSIS — Z98.890 H/O MITRAL VALVE REPAIR: ICD-10-CM

## 2025-07-10 DIAGNOSIS — I48.0 PAROXYSMAL ATRIAL FIBRILLATION (HCC): Primary | ICD-10-CM

## 2025-07-10 LAB — INR PPP: 1.9 (ref 2–3.5)

## 2025-07-10 PROCEDURE — 85610 PROTHROMBIN TIME: CPT | Performed by: NURSE PRACTITIONER

## 2025-07-10 PROCEDURE — 99211 OFF/OP EST MAY X REQ PHY/QHP: CPT | Performed by: STUDENT IN AN ORGANIZED HEALTH CARE EDUCATION/TRAINING PROGRAM

## 2025-07-10 ASSESSMENT — FIBROSIS 4 INDEX: FIB4 SCORE: 2.33

## 2025-07-10 NOTE — PROGRESS NOTES
"Anticoagulation Summary  As of 7/10/2025      INR goal:  2.0-3.0   TTR:  53.7% (8.2 y)   INR used for dosin.90 (7/10/2025)   Warfarin maintenance plan:  5 mg (5 mg x 1) every day   Weekly warfarin total:  35 mg   Plan last modified:  Ivy Galloway PharmD (2025)   Next INR check:  2025   Target end date:  Indefinite    Indications    Paroxysmal atrial fibrillation (HCC) [I48.0]  H/O mitral valve repair [Z98.890]                 Anticoagulation Episode Summary       INR check location:  --    Preferred lab:  --    Send INR reminders to:  AMB ANTICOAG POOL    Comments:  --          Anticoagulation Care Providers       Provider Role Specialty Phone number    Zeferino Almaraz A.P.TWYLA Referring Family Medicine [phone]    [facility] Anticoagulation Services Responsible  [phone]    Kenneth Salas, PharmD Responsible Pharmacy                 Refer to Patient Findings for HPI:  Patient Findings       Negatives:  Signs/symptoms of thrombosis, Signs/symptoms of bleeding, Laboratory test error suspected, Change in health, Change in alcohol use, Change in activity, Upcoming invasive procedure, Emergency department visit, Upcoming dental procedure, Missed doses, Extra doses, Change in medications, Change in diet/appetite, Hospital admission, Bruising, Other complaints          Clinical Outcomes       Negatives:  Major bleeding event, Thromboembolic event, Anticoagulation-related hospital admission, Anticoagulation-related ED visit, Anticoagulation-related fatality            Date Referral Placed: 24      Vitals:  Vitals:    07/10/25 1010   Weight: 81.7 kg (180 lb 1.9 oz)   Height: 1.638 m (5' 4.5\")     Unable to perform vitals    Verified current warfarin dosing schedule.    Medications reconciled.  Pt is not on antiplatelet therapy.      A/P   INR is slightly subtherapeutic  Reason(s) for out of range INR today: N/A        Warfarin dosing recommendation: Increase to Warfarin 7.5 mg and then continue 5 mg " daily.     Pt educated to contact our clinic with any changes in medications or s/s of bleeding or thrombosis. Pt is aware to seek immediate medical attention for falls, head injury or deep cuts.    Request pt to return in 2 week(s). Pt agrees.    Joleen HallmanD

## 2025-07-14 ENCOUNTER — PHARMACY VISIT (OUTPATIENT)
Dept: PHARMACY | Facility: MEDICAL CENTER | Age: 75
End: 2025-07-14
Payer: COMMERCIAL

## 2025-07-14 PROCEDURE — RXMED WILLOW AMBULATORY MEDICATION CHARGE: Performed by: INTERNAL MEDICINE

## 2025-07-15 ENCOUNTER — PHARMACY VISIT (OUTPATIENT)
Dept: PHARMACY | Facility: MEDICAL CENTER | Age: 75
End: 2025-07-15
Payer: COMMERCIAL

## 2025-07-16 ENCOUNTER — PATIENT OUTREACH (OUTPATIENT)
Dept: HEALTH INFORMATION MANAGEMENT | Facility: OTHER | Age: 75
End: 2025-07-16
Payer: MEDICARE

## 2025-07-16 DIAGNOSIS — E11.22 TYPE 2 DIABETES MELLITUS WITH STAGE 3B CHRONIC KIDNEY DISEASE, WITHOUT LONG-TERM CURRENT USE OF INSULIN (HCC): ICD-10-CM

## 2025-07-16 DIAGNOSIS — N18.32 TYPE 2 DIABETES MELLITUS WITH STAGE 3B CHRONIC KIDNEY DISEASE, WITHOUT LONG-TERM CURRENT USE OF INSULIN (HCC): ICD-10-CM

## 2025-07-16 DIAGNOSIS — I50.22 CHRONIC HFREF (HEART FAILURE WITH REDUCED EJECTION FRACTION) (HCC): Primary | ICD-10-CM

## 2025-07-16 NOTE — PROGRESS NOTES
"Nurse CM outreach call to pt for monthly CCM assessment.  Pt answered telephone.    Reason for Follow-Up:    Monthly CCM assessment    Current Health Status:    Pt following in personal care management for history of CHF, COPD, CKD, and DM.    Medical Updates:  Pt reports she continue to recover from her hospitalization in June for pneumonia. Pt reports she is using her incentive spirometer. Pt states she is getting stronger and is starting to do some exercises as tolerated.  Pt reports she is doing some chair yoga. Pt has history of CHF. Pt reports no edema in lower extremities. Reports she is monitoring her daily weights and current weight is 180 lbs. Pt reports she took lasix daily for the past week and has now reduced dosage to 3 days a week. Reports she was having some pain on her sides \"where kidneys are located\" and felt it was caused by extra lasix. Pt states this has happened in the past when she took extra lasix.  Pt following with cardiology. Pt will notify PCP if any ongoing problems. Pt has history of CKD. Last GFR was 37 on 6/25/25. Pt has history of DM. Last A1C was 5.1.     Medication Updates:  Reports no changes to medications    Symptoms:  Reports she had some pain on her sides where kidneys are located.  States pain is improving after reducing her lasix. Denies any pain with urination or other problems.     Plan of Care Goals and Progress:    Goal 1. Maintain fluid balance     Progress:  Current weight stable at 180 lbs.       Barriers:  Chronic health conditions     Interventions:  Continue to monitor daily weights, follow low sodium diet.     Education:  Follow up with cardiology. Notify cardiology if weight gain of 3 lbs in a day, 5 lbs in a week.     Goal 2. Improved lab values     Progress:  Last GFR was 37 on 6/25/25      Barriers:  Chronic health conditions     Interventions:  Follow-up with PCP as scheduled. Stay hydrated. Monitor daily weights.     Education:  Follow low sodium diet. " Continue to weigh daily.       Patient's Concerns and Feedback (Self Management of Care):     Pt voicing no concerns today    Next Steps:     Follow-Up Plan:  Follow-up in one month, August 2025      Appointments:  7/22/25 at 3:00 pm with pulmonary     Contact Information:  Call [phone] with any questions or concerns.

## 2025-07-16 NOTE — CARE PLAN
Problem: Diet Management  Goal: Learn to Manage Calories/long term goal  Description: Pt would like to lose weight.   Outcome: Progressing     Problem: Knowledge deficit of congestive heart failure disease process  Goal: HP Increase understanding of congestive heart failure/long term goal  Outcome: Progressing     Problem: Low Sodium Diet Management  Goal: Limit dietary sodium/long term goal  Outcome: Progressing     Problem: Knowledge Deficit with Chronic Kidney Disease process  Goal: Patient will be able to verbalize understanding of CKD/long term goal  Outcome: Progressing  Goal: Patient will maintain/demonstrate improvement in lab values/long term goal  Outcome: Progressing

## 2025-07-22 ENCOUNTER — APPOINTMENT (OUTPATIENT)
Dept: SLEEP MEDICINE | Facility: MEDICAL CENTER | Age: 75
End: 2025-07-22
Attending: INTERNAL MEDICINE
Payer: MEDICARE

## 2025-07-23 DIAGNOSIS — J44.1 CHRONIC OBSTRUCTIVE PULMONARY DISEASE WITH (ACUTE) EXACERBATION (HCC): ICD-10-CM

## 2025-07-23 PROCEDURE — RXMED WILLOW AMBULATORY MEDICATION CHARGE

## 2025-07-23 RX ORDER — LEVALBUTEROL TARTRATE 45 UG/1
1-2 AEROSOL, METERED ORAL EVERY 4 HOURS PRN
Qty: 60 G | Refills: 3 | Status: SHIPPED | OUTPATIENT
Start: 2025-07-23

## 2025-07-23 RX ORDER — FLUTICASONE PROPIONATE AND SALMETEROL 500; 50 UG/1; UG/1
1 POWDER RESPIRATORY (INHALATION) EVERY 12 HOURS
Qty: 180 EACH | Refills: 2 | Status: SHIPPED | OUTPATIENT
Start: 2025-07-23 | End: 2026-04-19

## 2025-07-23 NOTE — TELEPHONE ENCOUNTER
Have we ever prescribed this med? Yes.  If yes, what date? 01/23/25    Last OV: 06/06/25 with Dr Villasenor     Next OV: 11/07/25 with Dr Vaca    DX:     Medications:   Requested Prescriptions     Pending Prescriptions Disp Refills    fluticasone-salmeterol (ADVAIR DISKUS) 500-50 MCG/ACT AEROSOL POWDER, BREATH ACTIVATED 3 Each 2     Sig: Inhale 1 Puff every 12 hours for 270 days.    levalbuterol (XOPENEX HFA) 45 MCG/ACT inhaler 4 Each 3     Sig: Inhale 1-2 Puffs every four hours as needed for Shortness of Breath.

## 2025-07-25 ENCOUNTER — PHARMACY VISIT (OUTPATIENT)
Dept: PHARMACY | Facility: MEDICAL CENTER | Age: 75
End: 2025-07-25
Payer: COMMERCIAL

## 2025-07-28 ENCOUNTER — ANTICOAGULATION VISIT (OUTPATIENT)
Dept: MEDICAL GROUP | Facility: MEDICAL CENTER | Age: 75
End: 2025-07-28
Payer: MEDICARE

## 2025-07-28 ENCOUNTER — TELEPHONE (OUTPATIENT)
Dept: CARDIOLOGY | Facility: MEDICAL CENTER | Age: 75
End: 2025-07-28

## 2025-07-28 VITALS
DIASTOLIC BLOOD PRESSURE: 49 MMHG | HEIGHT: 65 IN | WEIGHT: 188.49 LBS | SYSTOLIC BLOOD PRESSURE: 150 MMHG | BODY MASS INDEX: 31.4 KG/M2 | HEART RATE: 60 BPM

## 2025-07-28 DIAGNOSIS — I48.0 PAROXYSMAL ATRIAL FIBRILLATION (HCC): Primary | ICD-10-CM

## 2025-07-28 DIAGNOSIS — Z98.890 H/O MITRAL VALVE REPAIR: ICD-10-CM

## 2025-07-28 LAB — INR PPP: 1.5 (ref 2–3.5)

## 2025-07-28 PROCEDURE — 99211 OFF/OP EST MAY X REQ PHY/QHP: CPT | Performed by: STUDENT IN AN ORGANIZED HEALTH CARE EDUCATION/TRAINING PROGRAM

## 2025-07-28 PROCEDURE — 85610 PROTHROMBIN TIME: CPT | Performed by: STUDENT IN AN ORGANIZED HEALTH CARE EDUCATION/TRAINING PROGRAM

## 2025-07-28 PROCEDURE — 3077F SYST BP >= 140 MM HG: CPT | Performed by: STUDENT IN AN ORGANIZED HEALTH CARE EDUCATION/TRAINING PROGRAM

## 2025-07-28 PROCEDURE — 3078F DIAST BP <80 MM HG: CPT | Performed by: STUDENT IN AN ORGANIZED HEALTH CARE EDUCATION/TRAINING PROGRAM

## 2025-07-28 ASSESSMENT — FIBROSIS 4 INDEX: FIB4 SCORE: 2.33

## 2025-07-28 NOTE — TELEPHONE ENCOUNTER
Medication: Entresto, Jardiance, Corlanor  Type of Insurance: Government funded (Medicare/Medicare Advantage)  Type of Financial assistance requested Foundation  Source: [employer]  Source Phone #: [phone]  Outcome: Approved  Effective dates: 06/28/25 until 06/27/26  Details/Billing Information:   [id number]  PCN: [id number]  GRP: [id number]  ID: [id number]  Max Award Amount: $4500  Final Copay: $0    FA Renewal, pt fills at [facility]

## 2025-07-28 NOTE — PROGRESS NOTES
"Anticoagulation Summary  As of 2025      INR goal:  2.0-3.0   TTR:  53.4% (8.3 y)   INR used for dosin.50 (2025)   Warfarin maintenance plan:  7.5 mg (5 mg x 1.5) every Mon; 5 mg (5 mg x 1) all other days   Weekly warfarin total:  37.5 mg   Plan last modified:  Ivy Galloway PharmD (2025)   Next INR check:  2025   Target end date:  Indefinite    Indications    Paroxysmal atrial fibrillation (HCC) [I48.0]  H/O mitral valve repair [Z98.890]                 Anticoagulation Episode Summary       INR check location:  --    Preferred lab:  --    Send INR reminders to:  AMB ANTICOAG POOL    Comments:  --          Anticoagulation Care Providers       Provider Role Specialty Phone number    SUSANNE Pearson.P.TWYLA Referring Family Medicine [phone]    [facility] Anticoagulation Services Responsible  [phone]    Kenneth Salas, PharmD Responsible Pharmacy                 Refer to Patient Findings for HPI:  Patient Findings       Positives:  Change in diet/appetite (eating less in the summer)    Negatives:  Signs/symptoms of thrombosis, Signs/symptoms of bleeding, Laboratory test error suspected, Change in health, Change in alcohol use, Change in activity, Upcoming invasive procedure, Emergency department visit, Upcoming dental procedure, Missed doses, Extra doses, Change in medications, Hospital admission, Bruising, Other complaints          Clinical Outcomes       Negatives:  Major bleeding event, Thromboembolic event, Anticoagulation-related hospital admission, Anticoagulation-related ED visit, Anticoagulation-related fatality            Date Referral Placed: 24      Vitals:  Vitals:    25 1119   BP: (!) 150/49   Pulse: 60   Weight: 85.5 kg (188 lb 7.9 oz)   Height: 1.638 m (5' 4.5\")       Verified current warfarin dosing schedule.    Medications reconciled.  Pt is not on antiplatelet therapy.      A/P   INR is subtherapeutic  Reason(s) for out of range INR today: No obvious causes  "       Warfarin dosing recommendation: Increase to Warfarin 7.5 mg on Monday and 5 mg AOD.     Pt educated to contact our clinic with any changes in medications or s/s of bleeding or thrombosis. Pt is aware to seek immediate medical attention for falls, head injury or deep cuts.    Request pt to return in 2 week(s). Pt agrees.    Joleen HallmanD

## 2025-08-04 ENCOUNTER — NON-PROVIDER VISIT (OUTPATIENT)
Dept: CARDIOLOGY | Facility: MEDICAL CENTER | Age: 75
End: 2025-08-04
Payer: MEDICARE

## 2025-08-04 PROCEDURE — 93294 REM INTERROG EVL PM/LDLS PM: CPT | Performed by: STUDENT IN AN ORGANIZED HEALTH CARE EDUCATION/TRAINING PROGRAM

## 2025-08-06 ENCOUNTER — APPOINTMENT (OUTPATIENT)
Dept: SLEEP MEDICINE | Facility: MEDICAL CENTER | Age: 75
End: 2025-08-06
Attending: INTERNAL MEDICINE
Payer: MEDICARE

## 2025-08-11 ENCOUNTER — ANTICOAGULATION VISIT (OUTPATIENT)
Dept: MEDICAL GROUP | Facility: MEDICAL CENTER | Age: 75
End: 2025-08-11
Payer: MEDICARE

## 2025-08-11 VITALS
HEART RATE: 61 BPM | BODY MASS INDEX: 32.14 KG/M2 | WEIGHT: 192.9 LBS | HEIGHT: 65 IN | SYSTOLIC BLOOD PRESSURE: 153 MMHG | DIASTOLIC BLOOD PRESSURE: 59 MMHG

## 2025-08-11 DIAGNOSIS — Z98.890 H/O MITRAL VALVE REPAIR: ICD-10-CM

## 2025-08-11 DIAGNOSIS — I48.0 PAROXYSMAL ATRIAL FIBRILLATION (HCC): Primary | ICD-10-CM

## 2025-08-11 LAB — INR PPP: 1.9 (ref 2–3.5)

## 2025-08-11 PROCEDURE — 85610 PROTHROMBIN TIME: CPT | Performed by: STUDENT IN AN ORGANIZED HEALTH CARE EDUCATION/TRAINING PROGRAM

## 2025-08-11 PROCEDURE — 3078F DIAST BP <80 MM HG: CPT | Performed by: INTERNAL MEDICINE

## 2025-08-11 PROCEDURE — 99211 OFF/OP EST MAY X REQ PHY/QHP: CPT | Performed by: INTERNAL MEDICINE

## 2025-08-11 PROCEDURE — 3077F SYST BP >= 140 MM HG: CPT | Performed by: INTERNAL MEDICINE

## 2025-08-11 ASSESSMENT — FIBROSIS 4 INDEX: FIB4 SCORE: 2.33

## 2025-08-18 ENCOUNTER — SPECIALTY PHARMACY (OUTPATIENT)
Dept: CARDIOLOGY | Facility: MEDICAL CENTER | Age: 75
End: 2025-08-18
Payer: MEDICARE

## 2025-08-18 DIAGNOSIS — I50.20 ACC/AHA STAGE C SYSTOLIC CONGESTIVE HEART FAILURE (HCC): ICD-10-CM

## 2025-08-18 DIAGNOSIS — I10 BENIGN ESSENTIAL HYPERTENSION: ICD-10-CM

## 2025-08-18 DIAGNOSIS — I48.21 PERMANENT ATRIAL FIBRILLATION (HCC): ICD-10-CM

## 2025-08-18 PROCEDURE — RXMED WILLOW AMBULATORY MEDICATION CHARGE: Performed by: INTERNAL MEDICINE

## 2025-08-19 ENCOUNTER — PATIENT OUTREACH (OUTPATIENT)
Dept: HEALTH INFORMATION MANAGEMENT | Facility: OTHER | Age: 75
End: 2025-08-19
Payer: MEDICARE

## 2025-08-19 DIAGNOSIS — J44.9 CHRONIC OBSTRUCTIVE PULMONARY DISEASE, UNSPECIFIED COPD TYPE (HCC): ICD-10-CM

## 2025-08-19 DIAGNOSIS — I50.22 CHRONIC HFREF (HEART FAILURE WITH REDUCED EJECTION FRACTION) (HCC): Primary | ICD-10-CM

## 2025-08-19 PROCEDURE — RXMED WILLOW AMBULATORY MEDICATION CHARGE: Performed by: INTERNAL MEDICINE

## 2025-08-19 RX ORDER — CARVEDILOL 12.5 MG/1
12.5 TABLET ORAL 2 TIMES DAILY WITH MEALS
Qty: 200 TABLET | Refills: 2 | Status: SHIPPED | OUTPATIENT
Start: 2025-08-19

## 2025-08-21 ENCOUNTER — PHARMACY VISIT (OUTPATIENT)
Dept: PHARMACY | Facility: MEDICAL CENTER | Age: 75
End: 2025-08-21
Payer: COMMERCIAL

## 2025-08-25 ENCOUNTER — ANTICOAGULATION VISIT (OUTPATIENT)
Dept: MEDICAL GROUP | Facility: MEDICAL CENTER | Age: 75
End: 2025-08-25
Payer: MEDICARE

## 2025-08-25 VITALS
SYSTOLIC BLOOD PRESSURE: 132 MMHG | BODY MASS INDEX: 30.69 KG/M2 | HEART RATE: 60 BPM | HEIGHT: 65 IN | DIASTOLIC BLOOD PRESSURE: 42 MMHG | WEIGHT: 184.19 LBS

## 2025-08-25 DIAGNOSIS — I48.0 PAROXYSMAL ATRIAL FIBRILLATION (HCC): Primary | ICD-10-CM

## 2025-08-25 DIAGNOSIS — Z98.890 H/O MITRAL VALVE REPAIR: ICD-10-CM

## 2025-08-25 LAB — INR PPP: 2.3 (ref 2–3.5)

## 2025-08-25 PROCEDURE — 85610 PROTHROMBIN TIME: CPT | Performed by: NURSE PRACTITIONER

## 2025-08-25 PROCEDURE — 93793 ANTICOAG MGMT PT WARFARIN: CPT | Performed by: NURSE PRACTITIONER

## 2025-08-25 PROCEDURE — 3078F DIAST BP <80 MM HG: CPT | Performed by: NURSE PRACTITIONER

## 2025-08-25 PROCEDURE — 3075F SYST BP GE 130 - 139MM HG: CPT | Performed by: NURSE PRACTITIONER

## 2025-08-25 ASSESSMENT — FIBROSIS 4 INDEX: FIB4 SCORE: 2.33

## 2025-08-27 ENCOUNTER — HOSPITAL ENCOUNTER (OUTPATIENT)
Dept: LAB | Facility: MEDICAL CENTER | Age: 75
End: 2025-08-27
Attending: INTERNAL MEDICINE
Payer: MEDICARE

## 2025-08-27 DIAGNOSIS — E78.5 DYSLIPIDEMIA ASSOCIATED WITH TYPE 2 DIABETES MELLITUS (HCC): ICD-10-CM

## 2025-08-27 DIAGNOSIS — N18.32 TYPE 2 DIABETES MELLITUS WITH STAGE 3B CHRONIC KIDNEY DISEASE, WITHOUT LONG-TERM CURRENT USE OF INSULIN (HCC): ICD-10-CM

## 2025-08-27 DIAGNOSIS — E11.22 TYPE 2 DIABETES MELLITUS WITH STAGE 3B CHRONIC KIDNEY DISEASE, WITHOUT LONG-TERM CURRENT USE OF INSULIN (HCC): ICD-10-CM

## 2025-08-27 DIAGNOSIS — R06.02 SHORTNESS OF BREATH: ICD-10-CM

## 2025-08-27 DIAGNOSIS — D50.9 IRON DEFICIENCY ANEMIA, UNSPECIFIED IRON DEFICIENCY ANEMIA TYPE: ICD-10-CM

## 2025-08-27 DIAGNOSIS — E11.69 DYSLIPIDEMIA ASSOCIATED WITH TYPE 2 DIABETES MELLITUS (HCC): ICD-10-CM

## 2025-08-27 LAB
25(OH)D3 SERPL-MCNC: 21 NG/ML (ref 30–100)
ALBUMIN SERPL BCP-MCNC: 3.8 G/DL (ref 3.2–4.9)
ALBUMIN/GLOB SERPL: 1.2 G/DL
ALP SERPL-CCNC: 59 U/L (ref 30–99)
ALT SERPL-CCNC: 14 U/L (ref 2–50)
ANION GAP SERPL CALC-SCNC: 9 MMOL/L (ref 7–16)
AST SERPL-CCNC: 22 U/L (ref 12–45)
BASOPHILS # BLD AUTO: 0.6 % (ref 0–1.8)
BASOPHILS # BLD: 0.03 K/UL (ref 0–0.12)
BILIRUB SERPL-MCNC: 1 MG/DL (ref 0.1–1.5)
BUN SERPL-MCNC: 28 MG/DL (ref 8–22)
CALCIUM ALBUM COR SERPL-MCNC: 10.3 MG/DL (ref 8.5–10.5)
CALCIUM SERPL-MCNC: 10.1 MG/DL (ref 8.5–10.5)
CHLORIDE SERPL-SCNC: 105 MMOL/L (ref 96–112)
CHOLEST SERPL-MCNC: 174 MG/DL (ref 100–199)
CO2 SERPL-SCNC: 29 MMOL/L (ref 20–33)
CREAT SERPL-MCNC: 1.64 MG/DL (ref 0.5–1.4)
CREAT UR-MCNC: 78.3 MG/DL
EOSINOPHIL # BLD AUTO: 0.86 K/UL (ref 0–0.51)
EOSINOPHIL NFR BLD: 17.1 % (ref 0–6.9)
ERYTHROCYTE [DISTWIDTH] IN BLOOD BY AUTOMATED COUNT: 55.9 FL (ref 35.9–50)
EST. AVERAGE GLUCOSE BLD GHB EST-MCNC: 105 MG/DL
FERRITIN SERPL-MCNC: 581 NG/ML (ref 10–291)
GFR SERPLBLD CREATININE-BSD FMLA CKD-EPI: 32 ML/MIN/1.73 M 2
GLOBULIN SER CALC-MCNC: 3.3 G/DL (ref 1.9–3.5)
GLUCOSE SERPL-MCNC: 79 MG/DL (ref 65–99)
HBA1C MFR BLD: 5.3 % (ref 4–5.6)
HCT VFR BLD AUTO: 35.3 % (ref 37–47)
HDLC SERPL-MCNC: 69 MG/DL
HGB BLD-MCNC: 10.9 G/DL (ref 12–16)
IMM GRANULOCYTES # BLD AUTO: 0.01 K/UL (ref 0–0.11)
IMM GRANULOCYTES NFR BLD AUTO: 0.2 % (ref 0–0.9)
IRON SATN MFR SERPL: 34 % (ref 15–55)
IRON SERPL-MCNC: 77 UG/DL (ref 40–170)
LDLC SERPL CALC-MCNC: 91 MG/DL
LYMPHOCYTES # BLD AUTO: 0.58 K/UL (ref 1–4.8)
LYMPHOCYTES NFR BLD: 11.5 % (ref 22–41)
MCH RBC QN AUTO: 30 PG (ref 27–33)
MCHC RBC AUTO-ENTMCNC: 30.9 G/DL (ref 32.2–35.5)
MCV RBC AUTO: 97.2 FL (ref 81.4–97.8)
MICROALBUMIN UR-MCNC: 56.9 MG/DL
MICROALBUMIN/CREAT UR: 727 MG/G (ref 0–30)
MONOCYTES # BLD AUTO: 0.42 K/UL (ref 0–0.85)
MONOCYTES NFR BLD AUTO: 8.3 % (ref 0–13.4)
NEUTROPHILS # BLD AUTO: 3.13 K/UL (ref 1.82–7.42)
NEUTROPHILS NFR BLD: 62.3 % (ref 44–72)
NRBC # BLD AUTO: 0 K/UL
NRBC BLD-RTO: 0 /100 WBC (ref 0–0.2)
PLATELET # BLD AUTO: 139 K/UL (ref 164–446)
PMV BLD AUTO: 12.2 FL (ref 9–12.9)
POTASSIUM SERPL-SCNC: 4.7 MMOL/L (ref 3.6–5.5)
PROT SERPL-MCNC: 7.1 G/DL (ref 6–8.2)
RBC # BLD AUTO: 3.63 M/UL (ref 4.2–5.4)
SODIUM SERPL-SCNC: 143 MMOL/L (ref 135–145)
T4 FREE SERPL-MCNC: 1.66 NG/DL (ref 0.93–1.7)
TIBC SERPL-MCNC: 229 UG/DL (ref 250–450)
TRIGL SERPL-MCNC: 71 MG/DL (ref 0–149)
TSH SERPL-ACNC: 2.99 UIU/ML (ref 0.38–5.33)
UIBC SERPL-MCNC: 152 UG/DL (ref 110–370)
VIT B12 SERPL-MCNC: 1862 PG/ML (ref 211–911)
WBC # BLD AUTO: 5 K/UL (ref 4.8–10.8)

## 2025-08-27 PROCEDURE — 80061 LIPID PANEL: CPT

## 2025-08-27 PROCEDURE — 82607 VITAMIN B-12: CPT

## 2025-08-27 PROCEDURE — 82570 ASSAY OF URINE CREATININE: CPT

## 2025-08-27 PROCEDURE — 84439 ASSAY OF FREE THYROXINE: CPT

## 2025-08-27 PROCEDURE — 80053 COMPREHEN METABOLIC PANEL: CPT

## 2025-08-27 PROCEDURE — 36415 COLL VENOUS BLD VENIPUNCTURE: CPT

## 2025-08-27 PROCEDURE — 83540 ASSAY OF IRON: CPT

## 2025-08-27 PROCEDURE — 83036 HEMOGLOBIN GLYCOSYLATED A1C: CPT

## 2025-08-27 PROCEDURE — 83550 IRON BINDING TEST: CPT

## 2025-08-27 PROCEDURE — 85025 COMPLETE CBC W/AUTO DIFF WBC: CPT

## 2025-08-27 PROCEDURE — 84443 ASSAY THYROID STIM HORMONE: CPT

## 2025-08-27 PROCEDURE — 82728 ASSAY OF FERRITIN: CPT

## 2025-08-27 PROCEDURE — 82043 UR ALBUMIN QUANTITATIVE: CPT

## 2025-08-27 PROCEDURE — 82306 VITAMIN D 25 HYDROXY: CPT

## 2025-08-28 ENCOUNTER — RESULTS FOLLOW-UP (OUTPATIENT)
Dept: CARDIOLOGY | Facility: MEDICAL CENTER | Age: 75
End: 2025-08-28
Payer: MEDICARE

## 2025-09-09 ENCOUNTER — APPOINTMENT (OUTPATIENT)
Dept: MEDICAL GROUP | Facility: PHYSICIAN GROUP | Age: 75
End: 2025-09-09
Payer: MEDICARE

## (undated) DEVICE — GLOVE BIOGEL SZ 6.5 SURGICAL PF LTX (50PR/BX 4BX/CA)

## (undated) DEVICE — WATER IRRIG. STER. 1500 ML - (9/CA)

## (undated) DEVICE — NEPTUNE 4 PORT MANIFOLD - (20/PK)

## (undated) DEVICE — ELECTRODE 850 FOAM ADHESIVE - HYDROGEL RADIOTRNSPRNT (50/PK)

## (undated) DEVICE — SYRINGE 20 ML LL (50EA/BX 4BX/CA)

## (undated) DEVICE — SENSOR SPO2 NEO LNCS ADHESIVE (20/BX) SEE USER NOTES

## (undated) DEVICE — GLOVE BIOGEL SZ 6 PF LATEX - (50EA/BX 4BX/CA)

## (undated) DEVICE — PROTECTOR ULNA NERVE - (36PR/CA)

## (undated) DEVICE — SLEEVE, VASO, REPROC, LARGE

## (undated) DEVICE — SOLUTION 10%PVP-IODINE 8OZ - (24/CA)

## (undated) DEVICE — LEAD SET 6 DISP. EKG NIHON KOHDEN (100EA/CA) [9859].

## (undated) DEVICE — KIT ANESTHESIA W/CIRCUIT & 3/LT BAG W/FILTER (20EA/CA)

## (undated) DEVICE — TUBE CONNECT SUCTION CLEAR 120 X 1/4" (50EA/CA)"

## (undated) DEVICE — CATHETER IV 20 GA X 1-1/4 ---SURG.& SDS ONLY--- (50EA/BX)

## (undated) DEVICE — TUBING CLEARLINK DUO-VENT - C-FLO (48EA/CA)

## (undated) DEVICE — GLOVE BIOGEL SZ 7 SURGICAL PF LTX - (50PR/BX 4BX/CA)

## (undated) DEVICE — CANISTER SUCTION 3000ML MECHANICAL FILTER AUTO SHUTOFF MEDI-VAC NONSTERILE LF DISP  (40EA/CA)

## (undated) DEVICE — PACK CYSTOSCOPY - (14/CA)

## (undated) DEVICE — MASK AIRWAY SIZE 3 UNIQUE SILICON (10/BX)

## (undated) DEVICE — SET IRRIGATION CYSTOSCOPY Y-TYPE L81 IN (20EA/CA)

## (undated) DEVICE — CONTAINER SPECIMEN BAG OR - STERILE 4 OZ W/LID (100EA/CA)

## (undated) DEVICE — GLOVE, BIOGEL ECLIPSE, SZ 7.0, PF LTX (50/BX)

## (undated) DEVICE — BAG URODRAIN WITH TUBING - (20/CA)

## (undated) DEVICE — MASK ANESTHESIA ADULT  - (100/CA)

## (undated) DEVICE — GOWN SURGICAL X-LARGE ULTRA - FILM-REINFORCED (20/CA)

## (undated) DEVICE — CATHETER URETHRAL OPEN END AXXCESS (10EA/BX) [78257].

## (undated) DEVICE — GOWN SURGEONS X-LARGE - DISP. (30/CA)

## (undated) DEVICE — GLOVE BIOGEL SZ 7.5 SURGICAL PF LTX - (50PR/BX 4BX/CA)

## (undated) DEVICE — JELLY, KY 2 0Z STERILE

## (undated) DEVICE — CATHETER URETHRAL FOLEY SILICONE OD16 FR 10 ML (10EA/CA)

## (undated) DEVICE — KIT  I.V. START (100EA/CA)

## (undated) DEVICE — SET LEADWIRE 5 LEAD BEDSIDE DISPOSABLE ECG (1SET OF 5/EA)

## (undated) DEVICE — SPONGE GAUZESTER 4 X 4 4PLY - (128PK/CA)

## (undated) DEVICE — SLEEVE, VASO, THIGH, MED

## (undated) DEVICE — COVER FOOT UNIVERSAL DISP. - (25EA/CA)

## (undated) DEVICE — WATER IRRIG. STER 3000 ML - (4/CA)

## (undated) DEVICE — KIT ROOM DECONTAMINATION

## (undated) DEVICE — ZIPWIRE .038 STRAIGHT BENTSON TIP (5EA/BX)

## (undated) DEVICE — SODIUM CHL. IRRIGATION 0.9% 3000ML (4EA/CA 65CA/PF)

## (undated) DEVICE — LASER TRAC TIP 200 MIRCON FOR 100 WATT LASER

## (undated) DEVICE — GOWN WARMING STANDARD FLEX - (30/CA)

## (undated) DEVICE — HEAD HOLDER JUNIOR/ADULT

## (undated) DEVICE — TOWELS CLOTH SURGICAL - (4/PK 20PK/CA)

## (undated) DEVICE — BASKET ZERO TIP

## (undated) DEVICE — CONTAINER, SPECIMEN, STERILE

## (undated) DEVICE — SYRINGE 30 ML LL (56/BX)

## (undated) DEVICE — LACTATED RINGERS INJ 1000 ML - (14EA/CA 60CA/PF)

## (undated) DEVICE — CATHETER URETHRAL OPEN END AXXCESS (10EA/BX)

## (undated) DEVICE — TUBE E-T HI-LO CUFF 7.0MM (10EA/PK)

## (undated) DEVICE — CUFF BP ADULT MEDIUM DISPOSABLE (20EA/CA)

## (undated) DEVICE — SET FLUID WARMING IRRIGATRION CYSTOSCOPY (10EA/CA)

## (undated) DEVICE — PEN SKIN MARKER W/RULER - (50EA/BX)

## (undated) DEVICE — GLOVE BIOGEL INDICATOR SZ 7.5 SURGICAL PF LTX - (50PR/BX 4BX/CA)

## (undated) DEVICE — SUCTION INSTRUMENT YANKAUER BULBOUS TIP W/O VENT (50EA/CA)

## (undated) DEVICE — CONNECTOR HOSE NEPTUNE FOR CYSTO ROOM

## (undated) DEVICE — TRAY MULTI-LUMEN 7FR PRESSURE W/MAX BARRIER AND BIOPATCH - (5/CA)

## (undated) DEVICE — SET EXTENSION WITH 2 PORTS (48EA/CA) ***PART #2C8610 IS A SUBSTITUTE*****